# Patient Record
Sex: MALE | Race: WHITE | NOT HISPANIC OR LATINO
[De-identification: names, ages, dates, MRNs, and addresses within clinical notes are randomized per-mention and may not be internally consistent; named-entity substitution may affect disease eponyms.]

---

## 2015-04-08 RX ORDER — CARVEDILOL PHOSPHATE 80 MG/1
1 CAPSULE, EXTENDED RELEASE ORAL
Qty: 0 | Refills: 0 | DISCHARGE
Start: 2015-04-08

## 2015-04-08 RX ORDER — DOCUSATE SODIUM 100 MG
1 CAPSULE ORAL
Qty: 0 | Refills: 0 | DISCHARGE
Start: 2015-04-08

## 2015-04-08 RX ORDER — SPIRONOLACTONE 25 MG/1
0.5 TABLET, FILM COATED ORAL
Qty: 0 | Refills: 0 | DISCHARGE
Start: 2015-04-08

## 2017-01-26 ENCOUNTER — RX RENEWAL (OUTPATIENT)
Age: 67
End: 2017-01-26

## 2017-02-22 ENCOUNTER — APPOINTMENT (OUTPATIENT)
Dept: CARDIOLOGY | Facility: CLINIC | Age: 67
End: 2017-02-22

## 2017-02-22 VITALS
DIASTOLIC BLOOD PRESSURE: 76 MMHG | BODY MASS INDEX: 28.62 KG/M2 | WEIGHT: 211 LBS | RESPIRATION RATE: 16 BRPM | OXYGEN SATURATION: 96 % | SYSTOLIC BLOOD PRESSURE: 117 MMHG | HEART RATE: 85 BPM

## 2017-02-22 LAB
ANION GAP SERPL CALC-SCNC: 17 MMOL/L
BUN SERPL-MCNC: 25 MG/DL
CALCIUM SERPL-MCNC: 9.7 MG/DL
CHLORIDE SERPL-SCNC: 97 MMOL/L
CO2 SERPL-SCNC: 22 MMOL/L
CREAT SERPL-MCNC: 1.2 MG/DL
GLUCOSE SERPL-MCNC: 98 MG/DL
POTASSIUM SERPL-SCNC: 4 MMOL/L
SODIUM SERPL-SCNC: 136 MMOL/L

## 2017-04-03 ENCOUNTER — RX RENEWAL (OUTPATIENT)
Age: 67
End: 2017-04-03

## 2017-04-10 ENCOUNTER — APPOINTMENT (OUTPATIENT)
Dept: ELECTROPHYSIOLOGY | Facility: CLINIC | Age: 67
End: 2017-04-10

## 2017-04-10 ENCOUNTER — NON-APPOINTMENT (OUTPATIENT)
Age: 67
End: 2017-04-10

## 2017-04-10 VITALS — DIASTOLIC BLOOD PRESSURE: 73 MMHG | WEIGHT: 211 LBS | SYSTOLIC BLOOD PRESSURE: 107 MMHG | BODY MASS INDEX: 28.62 KG/M2

## 2017-05-23 ENCOUNTER — APPOINTMENT (OUTPATIENT)
Dept: CV DIAGNOSITCS | Facility: HOSPITAL | Age: 67
End: 2017-05-23

## 2017-06-16 ENCOUNTER — OTHER (OUTPATIENT)
Age: 67
End: 2017-06-16

## 2017-06-29 ENCOUNTER — APPOINTMENT (OUTPATIENT)
Dept: CARDIOLOGY | Facility: CLINIC | Age: 67
End: 2017-06-29

## 2017-06-29 VITALS
HEIGHT: 72 IN | OXYGEN SATURATION: 98 % | BODY MASS INDEX: 28.58 KG/M2 | RESPIRATION RATE: 17 BRPM | DIASTOLIC BLOOD PRESSURE: 70 MMHG | HEART RATE: 87 BPM | WEIGHT: 211 LBS | SYSTOLIC BLOOD PRESSURE: 105 MMHG

## 2017-06-30 ENCOUNTER — OTHER (OUTPATIENT)
Age: 67
End: 2017-06-30

## 2017-06-30 LAB
ANION GAP SERPL CALC-SCNC: 18 MMOL/L
BASOPHILS # BLD AUTO: 0.05 K/UL
BASOPHILS NFR BLD AUTO: 0.6 %
BUN SERPL-MCNC: 18 MG/DL
CALCIUM SERPL-MCNC: 9.8 MG/DL
CHLORIDE SERPL-SCNC: 101 MMOL/L
CO2 SERPL-SCNC: 19 MMOL/L
CREAT SERPL-MCNC: 0.96 MG/DL
EOSINOPHIL # BLD AUTO: 0.21 K/UL
EOSINOPHIL NFR BLD AUTO: 2.4 %
GLUCOSE SERPL-MCNC: 136 MG/DL
HCT VFR BLD CALC: 37.2 %
HGB BLD-MCNC: 12 G/DL
IMM GRANULOCYTES NFR BLD AUTO: 0.2 %
LYMPHOCYTES # BLD AUTO: 1.3 K/UL
LYMPHOCYTES NFR BLD AUTO: 14.6 %
MAN DIFF?: NORMAL
MCHC RBC-ENTMCNC: 29.4 PG
MCHC RBC-ENTMCNC: 32.3 GM/DL
MCV RBC AUTO: 91.2 FL
MONOCYTES # BLD AUTO: 0.92 K/UL
MONOCYTES NFR BLD AUTO: 10.4 %
NEUTROPHILS # BLD AUTO: 6.38 K/UL
NEUTROPHILS NFR BLD AUTO: 71.8 %
PLATELET # BLD AUTO: 264 K/UL
POTASSIUM SERPL-SCNC: 3.9 MMOL/L
RBC # BLD: 4.08 M/UL
RBC # FLD: 14.1 %
SODIUM SERPL-SCNC: 138 MMOL/L
WBC # FLD AUTO: 8.88 K/UL

## 2017-07-04 ENCOUNTER — RX RENEWAL (OUTPATIENT)
Age: 67
End: 2017-07-04

## 2017-07-10 ENCOUNTER — APPOINTMENT (OUTPATIENT)
Dept: ELECTROPHYSIOLOGY | Facility: CLINIC | Age: 67
End: 2017-07-10
Payer: MEDICARE

## 2017-07-10 ENCOUNTER — APPOINTMENT (OUTPATIENT)
Dept: CV DIAGNOSITCS | Facility: HOSPITAL | Age: 67
End: 2017-07-10

## 2017-07-10 ENCOUNTER — INPATIENT (INPATIENT)
Facility: HOSPITAL | Age: 67
LOS: 0 days | Discharge: HOME | DRG: 274 | End: 2017-07-11
Attending: INTERNAL MEDICINE | Admitting: INTERNAL MEDICINE
Payer: MEDICARE

## 2017-07-10 VITALS
OXYGEN SATURATION: 99 % | HEART RATE: 80 BPM | HEIGHT: 73 IN | SYSTOLIC BLOOD PRESSURE: 98 MMHG | TEMPERATURE: 98 F | RESPIRATION RATE: 18 BRPM | DIASTOLIC BLOOD PRESSURE: 73 MMHG | WEIGHT: 210.1 LBS

## 2017-07-10 DIAGNOSIS — I49.9 CARDIAC ARRHYTHMIA, UNSPECIFIED: ICD-10-CM

## 2017-07-10 DIAGNOSIS — Z98.89 OTHER SPECIFIED POSTPROCEDURAL STATES: Chronic | ICD-10-CM

## 2017-07-10 DIAGNOSIS — Z95.5 PRESENCE OF CORONARY ANGIOPLASTY IMPLANT AND GRAFT: Chronic | ICD-10-CM

## 2017-07-10 DIAGNOSIS — Z95.4 PRESENCE OF OTHER HEART-VALVE REPLACEMENT: Chronic | ICD-10-CM

## 2017-07-10 LAB
ALBUMIN SERPL ELPH-MCNC: 4.5 G/DL — SIGNIFICANT CHANGE UP (ref 3.3–5)
ALP SERPL-CCNC: 121 U/L — HIGH (ref 40–120)
ALT FLD-CCNC: 24 U/L RC — SIGNIFICANT CHANGE UP (ref 10–45)
ANION GAP SERPL CALC-SCNC: 15 MMOL/L — SIGNIFICANT CHANGE UP (ref 5–17)
APTT BLD: 33.2 SEC — SIGNIFICANT CHANGE UP (ref 27.5–37.4)
AST SERPL-CCNC: 22 U/L — SIGNIFICANT CHANGE UP (ref 10–40)
BILIRUB SERPL-MCNC: 1.1 MG/DL — SIGNIFICANT CHANGE UP (ref 0.2–1.2)
BLD GP AB SCN SERPL QL: NEGATIVE — SIGNIFICANT CHANGE UP
BUN SERPL-MCNC: 18 MG/DL — SIGNIFICANT CHANGE UP (ref 7–23)
CALCIUM SERPL-MCNC: 9.3 MG/DL — SIGNIFICANT CHANGE UP (ref 8.4–10.5)
CHLORIDE SERPL-SCNC: 101 MMOL/L — SIGNIFICANT CHANGE UP (ref 96–108)
CO2 SERPL-SCNC: 22 MMOL/L — SIGNIFICANT CHANGE UP (ref 22–31)
CREAT SERPL-MCNC: 0.95 MG/DL — SIGNIFICANT CHANGE UP (ref 0.5–1.3)
GLUCOSE SERPL-MCNC: 123 MG/DL — HIGH (ref 70–99)
HCT VFR BLD CALC: 39.7 % — SIGNIFICANT CHANGE UP (ref 39–50)
HGB BLD-MCNC: 13.3 G/DL — SIGNIFICANT CHANGE UP (ref 13–17)
INR BLD: 1.65 RATIO — HIGH (ref 0.88–1.16)
MCHC RBC-ENTMCNC: 31.1 PG — SIGNIFICANT CHANGE UP (ref 27–34)
MCHC RBC-ENTMCNC: 33.5 GM/DL — SIGNIFICANT CHANGE UP (ref 32–36)
MCV RBC AUTO: 92.8 FL — SIGNIFICANT CHANGE UP (ref 80–100)
PLATELET # BLD AUTO: 224 K/UL — SIGNIFICANT CHANGE UP (ref 150–400)
POTASSIUM SERPL-MCNC: 4 MMOL/L — SIGNIFICANT CHANGE UP (ref 3.5–5.3)
POTASSIUM SERPL-SCNC: 4 MMOL/L — SIGNIFICANT CHANGE UP (ref 3.5–5.3)
PROT SERPL-MCNC: 7.6 G/DL — SIGNIFICANT CHANGE UP (ref 6–8.3)
PROTHROM AB SERPL-ACNC: 18.2 SEC — HIGH (ref 9.8–12.7)
RBC # BLD: 4.28 M/UL — SIGNIFICANT CHANGE UP (ref 4.2–5.8)
RBC # FLD: 13.3 % — SIGNIFICANT CHANGE UP (ref 10.3–14.5)
RH IG SCN BLD-IMP: POSITIVE — SIGNIFICANT CHANGE UP
SODIUM SERPL-SCNC: 138 MMOL/L — SIGNIFICANT CHANGE UP (ref 135–145)
WBC # BLD: 7.2 K/UL — SIGNIFICANT CHANGE UP (ref 3.8–10.5)
WBC # FLD AUTO: 7.2 K/UL — SIGNIFICANT CHANGE UP (ref 3.8–10.5)

## 2017-07-10 PROCEDURE — XXXXX: CPT

## 2017-07-10 PROCEDURE — 93312 ECHO TRANSESOPHAGEAL: CPT | Mod: 26

## 2017-07-10 PROCEDURE — 93306 TTE W/DOPPLER COMPLETE: CPT | Mod: 26

## 2017-07-10 PROCEDURE — 93653 COMPRE EP EVAL TX SVT: CPT

## 2017-07-10 PROCEDURE — 93010 ELECTROCARDIOGRAM REPORT: CPT

## 2017-07-10 PROCEDURE — 93655 ICAR CATH ABLTJ DSCRT ARRHYT: CPT

## 2017-07-10 PROCEDURE — 93613 INTRACARDIAC EPHYS 3D MAPG: CPT

## 2017-07-10 RX ORDER — DOCUSATE SODIUM 100 MG
100 CAPSULE ORAL
Qty: 0 | Refills: 0 | Status: DISCONTINUED | OUTPATIENT
Start: 2017-07-10 | End: 2017-07-11

## 2017-07-10 RX ORDER — FUROSEMIDE 40 MG
20 TABLET ORAL EVERY OTHER DAY
Qty: 0 | Refills: 0 | Status: DISCONTINUED | OUTPATIENT
Start: 2017-07-10 | End: 2017-07-10

## 2017-07-10 RX ORDER — HEPARIN SODIUM 5000 [USP'U]/ML
INJECTION INTRAVENOUS; SUBCUTANEOUS
Qty: 25000 | Refills: 0 | Status: DISCONTINUED | OUTPATIENT
Start: 2017-07-10 | End: 2017-07-10

## 2017-07-10 RX ORDER — HEPARIN SODIUM 5000 [USP'U]/ML
INJECTION INTRAVENOUS; SUBCUTANEOUS
Qty: 25000 | Refills: 0 | Status: DISCONTINUED | OUTPATIENT
Start: 2017-07-10 | End: 2017-07-11

## 2017-07-10 RX ORDER — FUROSEMIDE 40 MG
40 TABLET ORAL EVERY OTHER DAY
Qty: 0 | Refills: 0 | Status: DISCONTINUED | OUTPATIENT
Start: 2017-07-12 | End: 2017-07-11

## 2017-07-10 RX ORDER — HEPARIN SODIUM 5000 [USP'U]/ML
3500 INJECTION INTRAVENOUS; SUBCUTANEOUS EVERY 6 HOURS
Qty: 0 | Refills: 0 | Status: DISCONTINUED | OUTPATIENT
Start: 2017-07-10 | End: 2017-07-11

## 2017-07-10 RX ORDER — ATORVASTATIN CALCIUM 80 MG/1
40 TABLET, FILM COATED ORAL AT BEDTIME
Qty: 0 | Refills: 0 | Status: DISCONTINUED | OUTPATIENT
Start: 2017-07-10 | End: 2017-07-11

## 2017-07-10 RX ORDER — FUROSEMIDE 40 MG
20 TABLET ORAL EVERY OTHER DAY
Qty: 0 | Refills: 0 | Status: DISCONTINUED | OUTPATIENT
Start: 2017-07-11 | End: 2017-07-11

## 2017-07-10 RX ORDER — ASPIRIN/CALCIUM CARB/MAGNESIUM 324 MG
81 TABLET ORAL DAILY
Qty: 0 | Refills: 0 | Status: DISCONTINUED | OUTPATIENT
Start: 2017-07-11 | End: 2017-07-11

## 2017-07-10 RX ORDER — CARVEDILOL PHOSPHATE 80 MG/1
25 CAPSULE, EXTENDED RELEASE ORAL EVERY 12 HOURS
Qty: 0 | Refills: 0 | Status: DISCONTINUED | OUTPATIENT
Start: 2017-07-10 | End: 2017-07-11

## 2017-07-10 RX ORDER — SPIRONOLACTONE 25 MG/1
25 TABLET, FILM COATED ORAL DAILY
Qty: 0 | Refills: 0 | Status: DISCONTINUED | OUTPATIENT
Start: 2017-07-10 | End: 2017-07-11

## 2017-07-10 RX ORDER — APIXABAN 2.5 MG/1
5 TABLET, FILM COATED ORAL EVERY 12 HOURS
Qty: 0 | Refills: 0 | Status: DISCONTINUED | OUTPATIENT
Start: 2017-07-11 | End: 2017-07-11

## 2017-07-10 RX ORDER — HEPARIN SODIUM 5000 [USP'U]/ML
7500 INJECTION INTRAVENOUS; SUBCUTANEOUS EVERY 6 HOURS
Qty: 0 | Refills: 0 | Status: DISCONTINUED | OUTPATIENT
Start: 2017-07-10 | End: 2017-07-11

## 2017-07-10 RX ORDER — PANTOPRAZOLE SODIUM 20 MG/1
40 TABLET, DELAYED RELEASE ORAL
Qty: 0 | Refills: 0 | Status: DISCONTINUED | OUTPATIENT
Start: 2017-07-10 | End: 2017-07-11

## 2017-07-10 RX ADMIN — CARVEDILOL PHOSPHATE 25 MILLIGRAM(S): 80 CAPSULE, EXTENDED RELEASE ORAL at 22:31

## 2017-07-10 RX ADMIN — ATORVASTATIN CALCIUM 40 MILLIGRAM(S): 80 TABLET, FILM COATED ORAL at 22:30

## 2017-07-10 RX ADMIN — Medication 5 MILLIGRAM(S): at 22:31

## 2017-07-10 RX ADMIN — Medication 100 MILLIGRAM(S): at 22:31

## 2017-07-10 RX ADMIN — SPIRONOLACTONE 25 MILLIGRAM(S): 25 TABLET, FILM COATED ORAL at 22:31

## 2017-07-10 RX ADMIN — HEPARIN SODIUM 1700 UNIT(S)/HR: 5000 INJECTION INTRAVENOUS; SUBCUTANEOUS at 23:40

## 2017-07-10 NOTE — PATIENT PROFILE ADULT. - PMH
AICD (automatic cardioverter/defibrillator) present    CAD (coronary artery disease)  2004  CHF (congestive heart failure)    HLD (hyperlipidemia)    HTN (hypertension)    MI (myocardial infarction)  Nov 2004  Syncope  epidsode in 11/14, was found to have been an episode of V-Fib with sucsessfull AICD shock  Systolic heart failure    URI (upper respiratory infection)  x 2 in the past 6 weeks  Valvular cardiomyopathy

## 2017-07-10 NOTE — H&P CARDIOLOGY - FAMILY HISTORY
<<-----Click on this checkbox to enter Family History Chronic obstructive pulmonary disease     Father  Still living? No  Family history of colon cancer, Age at diagnosis: Age Unknown

## 2017-07-10 NOTE — H&P CARDIOLOGY - HISTORY OF PRESENT ILLNESS
63 yo white male with h/o AVR 3 weeks ago.  Patient had f/u visit with surgeon and noted to be in afib.  Patient immediately started on eliquis and sent to see Dr Valentino for ANDREY and cardioversion.    Cardiologist Dr Blackwell  Cardiac Surgeon Dr Philippe Valentino 66 y/o male former smoker (60 PYH) with pmh of HTN (controlled on meds), HLD, CAD s/p MI (2004), PCI/LAD stent 2004, HFreF 2/2 ICM AICD 2005, Gen change 2011, LVEF 30% (as per pt), severe AS s/p AVR 2014 after 12 sec of Asystole/Syncope with 1 shock, Afib post procedure s/p DCCV 2014 with return 3 weeks later (on Eliquis last dose 7/9 AM) seen and evaluated by Dr. Snowden, EP for reports of shortness of breath from his AF noted to have atrial flutter and presents for Aflutter ablation with ANDREY prior.  Pt denies symptoms presently.      HF/Cardiologist - Dr. De Leon (taking over from Dr. Ricci)

## 2017-07-11 ENCOUNTER — TRANSCRIPTION ENCOUNTER (OUTPATIENT)
Age: 67
End: 2017-07-11

## 2017-07-11 VITALS
RESPIRATION RATE: 18 BRPM | TEMPERATURE: 98 F | OXYGEN SATURATION: 98 % | HEART RATE: 69 BPM | SYSTOLIC BLOOD PRESSURE: 117 MMHG | DIASTOLIC BLOOD PRESSURE: 88 MMHG

## 2017-07-11 DIAGNOSIS — I50.22 CHRONIC SYSTOLIC (CONGESTIVE) HEART FAILURE: ICD-10-CM

## 2017-07-11 DIAGNOSIS — I10 ESSENTIAL (PRIMARY) HYPERTENSION: ICD-10-CM

## 2017-07-11 DIAGNOSIS — E78.4 OTHER HYPERLIPIDEMIA: ICD-10-CM

## 2017-07-11 DIAGNOSIS — I48.3 TYPICAL ATRIAL FLUTTER: ICD-10-CM

## 2017-07-11 LAB
ANION GAP SERPL CALC-SCNC: 13 MMOL/L — SIGNIFICANT CHANGE UP (ref 5–17)
BUN SERPL-MCNC: 18 MG/DL — SIGNIFICANT CHANGE UP (ref 7–23)
CALCIUM SERPL-MCNC: 9 MG/DL — SIGNIFICANT CHANGE UP (ref 8.4–10.5)
CHLORIDE SERPL-SCNC: 103 MMOL/L — SIGNIFICANT CHANGE UP (ref 96–108)
CO2 SERPL-SCNC: 21 MMOL/L — LOW (ref 22–31)
CREAT SERPL-MCNC: 0.88 MG/DL — SIGNIFICANT CHANGE UP (ref 0.5–1.3)
GLUCOSE SERPL-MCNC: 113 MG/DL — HIGH (ref 70–99)
HCT VFR BLD CALC: 35.1 % — LOW (ref 39–50)
HGB BLD-MCNC: 11.6 G/DL — LOW (ref 13–17)
MCHC RBC-ENTMCNC: 31 PG — SIGNIFICANT CHANGE UP (ref 27–34)
MCHC RBC-ENTMCNC: 33.1 GM/DL — SIGNIFICANT CHANGE UP (ref 32–36)
MCV RBC AUTO: 93.5 FL — SIGNIFICANT CHANGE UP (ref 80–100)
PLATELET # BLD AUTO: 187 K/UL — SIGNIFICANT CHANGE UP (ref 150–400)
POTASSIUM SERPL-MCNC: 3.8 MMOL/L — SIGNIFICANT CHANGE UP (ref 3.5–5.3)
POTASSIUM SERPL-SCNC: 3.8 MMOL/L — SIGNIFICANT CHANGE UP (ref 3.5–5.3)
RBC # BLD: 3.76 M/UL — LOW (ref 4.2–5.8)
RBC # FLD: 13.3 % — SIGNIFICANT CHANGE UP (ref 10.3–14.5)
SODIUM SERPL-SCNC: 137 MMOL/L — SIGNIFICANT CHANGE UP (ref 135–145)
WBC # BLD: 6.5 K/UL — SIGNIFICANT CHANGE UP (ref 3.8–10.5)
WBC # FLD AUTO: 6.5 K/UL — SIGNIFICANT CHANGE UP (ref 3.8–10.5)

## 2017-07-11 PROCEDURE — C1730: CPT

## 2017-07-11 PROCEDURE — 85730 THROMBOPLASTIN TIME PARTIAL: CPT

## 2017-07-11 PROCEDURE — 93306 TTE W/DOPPLER COMPLETE: CPT

## 2017-07-11 PROCEDURE — 93312 ECHO TRANSESOPHAGEAL: CPT

## 2017-07-11 PROCEDURE — 86900 BLOOD TYPING SEROLOGIC ABO: CPT

## 2017-07-11 PROCEDURE — 93613 INTRACARDIAC EPHYS 3D MAPG: CPT

## 2017-07-11 PROCEDURE — 80048 BASIC METABOLIC PNL TOTAL CA: CPT

## 2017-07-11 PROCEDURE — 93653 COMPRE EP EVAL TX SVT: CPT

## 2017-07-11 PROCEDURE — 93010 ELECTROCARDIOGRAM REPORT: CPT

## 2017-07-11 PROCEDURE — C1766: CPT

## 2017-07-11 PROCEDURE — 86850 RBC ANTIBODY SCREEN: CPT

## 2017-07-11 PROCEDURE — 85610 PROTHROMBIN TIME: CPT

## 2017-07-11 PROCEDURE — 85027 COMPLETE CBC AUTOMATED: CPT

## 2017-07-11 PROCEDURE — C1894: CPT

## 2017-07-11 PROCEDURE — 93621 COMP EP EVL L PAC&REC C SINS: CPT

## 2017-07-11 PROCEDURE — C1732: CPT

## 2017-07-11 PROCEDURE — 80053 COMPREHEN METABOLIC PANEL: CPT

## 2017-07-11 PROCEDURE — 86901 BLOOD TYPING SEROLOGIC RH(D): CPT

## 2017-07-11 PROCEDURE — 93005 ELECTROCARDIOGRAM TRACING: CPT

## 2017-07-11 PROCEDURE — 93655 ICAR CATH ABLTJ DSCRT ARRHYT: CPT

## 2017-07-11 PROCEDURE — 99232 SBSQ HOSP IP/OBS MODERATE 35: CPT

## 2017-07-11 RX ADMIN — PANTOPRAZOLE SODIUM 40 MILLIGRAM(S): 20 TABLET, DELAYED RELEASE ORAL at 06:09

## 2017-07-11 RX ADMIN — Medication 100 MILLIGRAM(S): at 06:09

## 2017-07-11 RX ADMIN — Medication 20 MILLIGRAM(S): at 06:09

## 2017-07-11 RX ADMIN — CARVEDILOL PHOSPHATE 25 MILLIGRAM(S): 80 CAPSULE, EXTENDED RELEASE ORAL at 06:09

## 2017-07-11 RX ADMIN — Medication 81 MILLIGRAM(S): at 06:10

## 2017-07-11 RX ADMIN — Medication 5 MILLIGRAM(S): at 06:09

## 2017-07-11 RX ADMIN — APIXABAN 5 MILLIGRAM(S): 2.5 TABLET, FILM COATED ORAL at 08:44

## 2017-07-11 NOTE — DISCHARGE NOTE ADULT - CARE PROVIDER_API CALL
Carolina Snowden (MD), Cardiac Electrophysiology; Cardiovascular Disease  [address] [place] [address]  Phone: [phone]  Fax: [phone]    Jacob De Leon (MD; MPH), Adv Heart Fail Trnsplnt Cardio; Cardiology; Internal Medicine  [address] [place] [id number]  Phone: [phone]  Fax: [phone]

## 2017-07-11 NOTE — DISCHARGE NOTE ADULT - CARE PROVIDERS DIRECT ADDRESSES
[email or web address]

## 2017-07-11 NOTE — DISCHARGE NOTE ADULT - CARE PLAN
Principal Discharge DX:	Typical atrial flutter  Goal:	Your heart rate and rhythm will be controlled.  Instructions for follow-up, activity and diet:	Continue with your cardiologist and primary care MD. Continue your current medications. Call your physician for palpitations, feelings of rapid heart beat, lightheadedness, or dizziness.  Secondary Diagnosis:	Chronic systolic congestive heart failure  Goal:	You will not be short of breath.  Instructions for follow-up, activity and diet:	Take your medications as prescribed. Follow a low-salt, low salt, low cholesterol heart healthy diet. Weigh yourself every day and keep a record; call your doctor if you gain 2 pounds over one to two days or 5 pounds over three days. Get to or maintain a healthy weight; ask your heart failure team for referrals to a registered dietitian if needed. Avoid alcohol. Be active (check with your physician or cardiologist first). Find healthy ways to deal with stress, such as deep breathing, meditation, exercise, and doing hobbies that you enjoy. If you smoke, quit. (A resource to help you stop smoking is the [facility] [employer] Control – phone number [phone].).  Secondary Diagnosis:	CAD (coronary artery disease)  Goal:	Pt remains chest pain free  Instructions for follow-up, activity and diet:	Low salt, low fat diet.   Weight management.   Take medications as prescribed.    No smoking.  Follow up appointments with your doctor(s)  as instructed.  Secondary Diagnosis:	Essential hypertension  Goal:	Your blood pressure will be controlled.  Instructions for follow-up, activity and diet:	Continue with your blood pressure medications; eat a heart healthy diet with low salt diet; exercise regularly (consult with your physician or cardiologist first); maintain a heart healthy weight; if you smoke - quit (A resource to help you stop smoking is the [facility] [employer] Control – phone number 918-265-0020.); include healthy ways to manage stress. Continue to follow with your primary care physician or cardiologist.  Instructions for follow-up, activity and diet:	No heavy lifting, strenuous activity, and driving for 2 days. You may shower 24 hours following procedure but avoid baths and swimming for 1 week. Check groin site for bleeding and/or swelling daily following procedure. Call your doctor/cardiologist immediately for bleeding or swelling or if you have increased/persistent pain or drainage at the site. Principal Discharge DX:	Typical atrial flutter  Goal:	Your heart rate and rhythm will be controlled.  Instructions for follow-up, activity and diet:	Continue with your cardiologist and primary care MD. Continue your current medications. Call your physician for palpitations, feelings of rapid heart beat, lightheadedness, or dizziness.  Secondary Diagnosis:	Chronic systolic congestive heart failure  Goal:	You will not be short of breath.  Instructions for follow-up, activity and diet:	Take your medications as prescribed. Follow a low-salt, low salt, low cholesterol heart healthy diet. Weigh yourself every day and keep a record; call your doctor if you gain 2 pounds over one to two days or 5 pounds over three days. Get to or maintain a healthy weight; ask your heart failure team for referrals to a registered dietitian if needed. Avoid alcohol. Be active (check with your physician or cardiologist first). Find healthy ways to deal with stress, such as deep breathing, meditation, exercise, and doing hobbies that you enjoy. If you smoke, quit. (A resource to help you stop smoking is the [facility] [employer] Control – phone number [phone].).  Secondary Diagnosis:	CAD (coronary artery disease)  Goal:	Pt remains chest pain free  Instructions for follow-up, activity and diet:	Low salt, low fat diet.   Weight management.   Take medications as prescribed.    No smoking.  Follow up appointments with your doctor(s)  as instructed.  Secondary Diagnosis:	Essential hypertension  Goal:	Your blood pressure will be controlled.  Instructions for follow-up, activity and diet:	Continue with your blood pressure medications; eat a heart healthy diet with low salt diet; exercise regularly (consult with your physician or cardiologist first); maintain a heart healthy weight; if you smoke - quit (A resource to help you stop smoking is the [facility] [employer] Control – phone number [phone].); include healthy ways to manage stress. Continue to follow with your primary care physician or cardiologist.  Instructions for follow-up, activity and diet:	No heavy lifting, strenuous activity, and driving for 2 days. You may shower 24 hours following procedure but avoid baths and swimming for 1 week. Check groin site for bleeding and/or swelling daily following procedure. Call your doctor/cardiologist immediately for bleeding or swelling or if you have increased/persistent pain or drainage at the site.

## 2017-07-11 NOTE — DISCHARGE NOTE ADULT - PATIENT PORTAL LINK FT
“You can access the Follow[facility] Patient Portal, offered by [facility], by registering with the following website: [email or web address]”

## 2017-07-11 NOTE — PROGRESS NOTE ADULT - PROBLEM SELECTOR PLAN 1
s/p Typical Atrial Flutter Ablation on 7/10/17  Activity restrictions as d/w patient and family  Resume Eliquis 5mg bid  Monitor groin site for bleeding or hematoma  Follow up with Dr Carolina Snowden on 8/4/17 at 8:15am

## 2017-07-11 NOTE — PROGRESS NOTE ADULT - SUBJECTIVE AND OBJECTIVE BOX
INTERVAL HPI/OVERNIGHT EVENTS:  Sitting up in chair, anxious to go home    MEDICATIONS  (STANDING):  spironolactone 25 milliGRAM(s) Oral daily  aspirin enteric coated 81 milliGRAM(s) Oral daily  enalapril 5 milliGRAM(s) Oral every 12 hours  atorvastatin 40 milliGRAM(s) Oral at bedtime  carvedilol 25 milliGRAM(s) Oral every 12 hours  docusate sodium 100 milliGRAM(s) Oral two times a day  pantoprazole    Tablet 40 milliGRAM(s) Oral before breakfast  apixaban 5 milliGRAM(s) Oral every 12 hours  furosemide    Tablet 20 milliGRAM(s) Oral every other day    MEDICATIONS  (PRN):      Allergies    No Known Allergies    ROS:  General: Pt denies recent fever/chills  Neurological: denies dizziness  HEENT: denies visual changes, denies sore throat  Cardiovascular: denies chest pain/palpitations  Respiratory and Thorax: denies SOB  Gastrointestinal: denies abdominal pain/nausea/vomiting  Genitourinary: denies dysuria  Musculoskeletal: denies joint pain or swelling, denies restricted motion  Skin: denies rashes/sores    Vital Signs Last 24 Hrs  T(C): 37 (11 Jul 2017 04:43), Max: 37 (11 Jul 2017 04:43)  T(F): 98.6 (11 Jul 2017 04:43), Max: 98.6 (11 Jul 2017 04:43)  HR: 78 (11 Jul 2017 06:00) (61 - 84)  BP: 107/66 (11 Jul 2017 06:00) (93/60 - 142/97)  BP(mean): --  RR: 17 (11 Jul 2017 06:00) (17 - 18)  SpO2: 98% (11 Jul 2017 06:00) (96% - 99%)    Physical Exam:  Vital Signs : /66   HR78   RR17  Constitutional: well developed, well nourished,  no acute distress  Neurological: Alert & Oriented x 3, TERRAZAS  HEENT: NC/AT, PERRLA, Neck supple.  Respiratory: CTA B/L, No wheezing/crackles/rhonchi  Cardiovascular: (+) S1 & S2, RRR,   Gastrointestinal: soft, NT, nondistended, (+) BS  Genitourinary: non distended bladder, voiding freely  Extremities: No pedal edema, No clubbing, No cyanosis  Skin:  normal skin color and pigmentation, no skin lesions. Right groin intact, no hematoma or ecchymosis      LABS:                        11.6   6.5   )-----------( 187      ( 11 Jul 2017 05:13 )             35.1     07-11    137  |  103  |  18  ----------------------------<  113<H>  3.8   |  21<L>  |  0.88    Ca    9.0      11 Jul 2017 05:13    TPro  7.6  /  Alb  4.5  /  TBili  1.1  /  DBili  x   /  AST  22  /  ALT  24  /  AlkPhos  121<H>  07-10    PT/INR - ( 10 Jul 2017 12:46 )   PT: 18.2 sec;   INR: 1.65 ratio         PTT - ( 10 Jul 2017 12:46 )  PTT:33.2 sec      RADIOLOGY & ADDITIONAL TESTS:    TELE: NSR 60-80's    EKG: NSR @ 60 bpm

## 2017-07-11 NOTE — PROGRESS NOTE ADULT - ASSESSMENT
66 y/o male former smoker (60 PYH) with pmh of HTN (controlled on meds), HLD, CAD s/p MI (2004), PCI/LAD stent 2004, HFreF 2/2 ICM AICD 2005, Gen change 2011, LVEF 30% (as per pt), severe AS s/p AVR 2014 after 12 sec of Asystole/Syncope with 1 shock, Afib post procedure s/p DCCV 2014 with return 3 weeks later (on Eliquis last dose 7/9 AM). Patient reports of shortness of breath from his AF and noted to have atrial flutter and now s/p Typical Atrial Flutter Ablation on 7/10/17. Patient tolerated procedure well and remains in Sinus Rhythm.

## 2017-07-11 NOTE — PROGRESS NOTE ADULT - PROBLEM SELECTOR PLAN 2
Continue current medications including Coreg, Spironolactone, Enalapril, Lasix as prescribed.  Monitor daily weight or lower extremity swelling, f/u with cardiologist  Low salt, low cholesterol diet

## 2017-07-11 NOTE — DISCHARGE NOTE ADULT - PLAN OF CARE
Your heart rate and rhythm will be controlled. Continue with your cardiologist and primary care MD. Continue your current medications. Call your physician for palpitations, feelings of rapid heart beat, lightheadedness, or dizziness. You will not be short of breath. Take your medications as prescribed. Follow a low-salt, low salt, low cholesterol heart healthy diet. Weigh yourself every day and keep a record; call your doctor if you gain 2 pounds over one to two days or 5 pounds over three days. Get to or maintain a healthy weight; ask your heart failure team for referrals to a registered dietitian if needed. Avoid alcohol. Be active (check with your physician or cardiologist first). Find healthy ways to deal with stress, such as deep breathing, meditation, exercise, and doing hobbies that you enjoy. If you smoke, quit. (A resource to help you stop smoking is the [facility] – phone number [phone].). Pt remains chest pain free Low salt, low fat diet.   Weight management.   Take medications as prescribed.    No smoking.  Follow up appointments with your doctor(s)  as instructed. Your blood pressure will be controlled. Continue with your blood pressure medications; eat a heart healthy diet with low salt diet; exercise regularly (consult with your physician or cardiologist first); maintain a heart healthy weight; if you smoke - quit (A resource to help you stop smoking is the [facility] – phone number [phone].); include healthy ways to manage stress. Continue to follow with your primary care physician or cardiologist. No heavy lifting, strenuous activity, and driving for 2 days. You may shower 24 hours following procedure but avoid baths and swimming for 1 week. Check groin site for bleeding and/or swelling daily following procedure. Call your doctor/cardiologist immediately for bleeding or swelling or if you have increased/persistent pain or drainage at the site.

## 2017-07-11 NOTE — DISCHARGE NOTE ADULT - ADDITIONAL INSTRUCTIONS
Follow up with your cardiologist Dr. MONICA Snowden on 8/4/17 @ 8:15am and primary care provider in 1-2 weeks.

## 2017-08-04 ENCOUNTER — NON-APPOINTMENT (OUTPATIENT)
Age: 67
End: 2017-08-04

## 2017-08-04 ENCOUNTER — APPOINTMENT (OUTPATIENT)
Dept: ELECTROPHYSIOLOGY | Facility: CLINIC | Age: 67
End: 2017-08-04

## 2017-08-04 ENCOUNTER — APPOINTMENT (OUTPATIENT)
Dept: ELECTROPHYSIOLOGY | Facility: CLINIC | Age: 67
End: 2017-08-04
Payer: MEDICARE

## 2017-08-04 VITALS — DIASTOLIC BLOOD PRESSURE: 71 MMHG | HEART RATE: 58 BPM | SYSTOLIC BLOOD PRESSURE: 106 MMHG | OXYGEN SATURATION: 97 %

## 2017-08-04 PROCEDURE — 93000 ELECTROCARDIOGRAM COMPLETE: CPT

## 2017-08-04 PROCEDURE — 99215 OFFICE O/P EST HI 40 MIN: CPT

## 2017-08-09 ENCOUNTER — RX RENEWAL (OUTPATIENT)
Age: 67
End: 2017-08-09

## 2017-09-05 ENCOUNTER — RX RENEWAL (OUTPATIENT)
Age: 67
End: 2017-09-05

## 2017-09-28 ENCOUNTER — OTHER (OUTPATIENT)
Age: 67
End: 2017-09-28

## 2017-11-08 ENCOUNTER — APPOINTMENT (OUTPATIENT)
Dept: CARDIOLOGY | Facility: CLINIC | Age: 67
End: 2017-11-08
Payer: MEDICARE

## 2017-11-08 VITALS
HEART RATE: 66 BPM | HEIGHT: 72 IN | BODY MASS INDEX: 29.39 KG/M2 | DIASTOLIC BLOOD PRESSURE: 72 MMHG | SYSTOLIC BLOOD PRESSURE: 100 MMHG | WEIGHT: 217 LBS | RESPIRATION RATE: 17 BRPM | OXYGEN SATURATION: 97 %

## 2017-11-08 PROCEDURE — 93000 ELECTROCARDIOGRAM COMPLETE: CPT

## 2017-11-08 PROCEDURE — 36415 COLL VENOUS BLD VENIPUNCTURE: CPT

## 2017-11-08 PROCEDURE — 99214 OFFICE O/P EST MOD 30 MIN: CPT

## 2017-11-08 RX ORDER — AMOXICILLIN 500 MG/1
500 CAPSULE ORAL
Qty: 20 | Refills: 0 | Status: DISCONTINUED | COMMUNITY
Start: 2017-07-31 | End: 2017-11-08

## 2017-11-10 LAB
ALBUMIN SERPL ELPH-MCNC: 4.5 G/DL
ALP BLD-CCNC: 99 U/L
ALT SERPL-CCNC: 21 U/L
ANION GAP SERPL CALC-SCNC: 13 MMOL/L
AST SERPL-CCNC: 21 U/L
BILIRUB SERPL-MCNC: 0.9 MG/DL
BUN SERPL-MCNC: 17 MG/DL
CALCIUM SERPL-MCNC: 10 MG/DL
CHLORIDE SERPL-SCNC: 98 MMOL/L
CHOLEST SERPL-MCNC: 168 MG/DL
CHOLEST/HDLC SERPL: 2.4 RATIO
CO2 SERPL-SCNC: 27 MMOL/L
CREAT SERPL-MCNC: 1.31 MG/DL
GLUCOSE SERPL-MCNC: 90 MG/DL
HDLC SERPL-MCNC: 70 MG/DL
LDLC SERPL CALC-MCNC: 73 MG/DL
NT-PROBNP SERPL-MCNC: 899 PG/ML
POTASSIUM SERPL-SCNC: 4.4 MMOL/L
PROT SERPL-MCNC: 7.8 G/DL
SODIUM SERPL-SCNC: 138 MMOL/L
TRIGL SERPL-MCNC: 123 MG/DL

## 2017-11-15 ENCOUNTER — MEDICATION RENEWAL (OUTPATIENT)
Age: 67
End: 2017-11-15

## 2018-01-17 ENCOUNTER — OTHER (OUTPATIENT)
Age: 68
End: 2018-01-17

## 2018-01-17 ENCOUNTER — RX RENEWAL (OUTPATIENT)
Age: 68
End: 2018-01-17

## 2018-01-19 ENCOUNTER — MEDICATION RENEWAL (OUTPATIENT)
Age: 68
End: 2018-01-19

## 2018-02-21 ENCOUNTER — APPOINTMENT (OUTPATIENT)
Dept: CARDIOLOGY | Facility: CLINIC | Age: 68
End: 2018-02-21
Payer: MEDICARE

## 2018-02-21 VITALS
OXYGEN SATURATION: 97 % | BODY MASS INDEX: 29.8 KG/M2 | SYSTOLIC BLOOD PRESSURE: 120 MMHG | HEART RATE: 69 BPM | WEIGHT: 220 LBS | DIASTOLIC BLOOD PRESSURE: 69 MMHG | HEIGHT: 72 IN

## 2018-02-21 PROCEDURE — 36415 COLL VENOUS BLD VENIPUNCTURE: CPT

## 2018-02-21 PROCEDURE — 99215 OFFICE O/P EST HI 40 MIN: CPT

## 2018-02-23 LAB
ALBUMIN SERPL ELPH-MCNC: 4.4 G/DL
ALP BLD-CCNC: 95 U/L
ALT SERPL-CCNC: 20 U/L
ANION GAP SERPL CALC-SCNC: 15 MMOL/L
AST SERPL-CCNC: 22 U/L
BILIRUB SERPL-MCNC: 0.8 MG/DL
BUN SERPL-MCNC: 23 MG/DL
CALCIUM SERPL-MCNC: 9.8 MG/DL
CHLORIDE SERPL-SCNC: 99 MMOL/L
CO2 SERPL-SCNC: 24 MMOL/L
CREAT SERPL-MCNC: 1.42 MG/DL
GLUCOSE SERPL-MCNC: 119 MG/DL
NT-PROBNP SERPL-MCNC: 834 PG/ML
POTASSIUM SERPL-SCNC: 4.1 MMOL/L
PROT SERPL-MCNC: 7.5 G/DL
SODIUM SERPL-SCNC: 138 MMOL/L

## 2018-03-26 ENCOUNTER — APPOINTMENT (OUTPATIENT)
Dept: ELECTROPHYSIOLOGY | Facility: CLINIC | Age: 68
End: 2018-03-26

## 2018-05-18 ENCOUNTER — RX RENEWAL (OUTPATIENT)
Age: 68
End: 2018-05-18

## 2018-06-19 ENCOUNTER — OTHER (OUTPATIENT)
Age: 68
End: 2018-06-19

## 2018-08-08 NOTE — H&P CARDIOLOGY - PMH
AICD (automatic cardioverter/defibrillator) present    Aortic stenosis    CAD (coronary artery disease)  2004  CHF (congestive heart failure)    HLD (hyperlipidemia)    HTN (hypertension)    MI (myocardial infarction)  Nov 2004  Syncope  epidsode in 11/14, was found to have been an episode of V-Fib with sucsessfull AICD shock  Systolic heart failure    URI (upper respiratory infection)  x 2 in the past 6 weeks  Valvular cardiomyopathy 98

## 2018-08-13 ENCOUNTER — APPOINTMENT (OUTPATIENT)
Dept: CARDIOLOGY | Facility: CLINIC | Age: 68
End: 2018-08-13
Payer: MEDICARE

## 2018-08-13 VITALS
HEIGHT: 72 IN | WEIGHT: 212 LBS | HEART RATE: 60 BPM | OXYGEN SATURATION: 97 % | SYSTOLIC BLOOD PRESSURE: 117 MMHG | DIASTOLIC BLOOD PRESSURE: 76 MMHG | BODY MASS INDEX: 28.71 KG/M2

## 2018-08-13 PROCEDURE — 99214 OFFICE O/P EST MOD 30 MIN: CPT

## 2018-08-17 ENCOUNTER — NON-APPOINTMENT (OUTPATIENT)
Age: 68
End: 2018-08-17

## 2018-08-17 ENCOUNTER — APPOINTMENT (OUTPATIENT)
Dept: ELECTROPHYSIOLOGY | Facility: CLINIC | Age: 68
End: 2018-08-17
Payer: MEDICARE

## 2018-08-17 VITALS
HEART RATE: 69 BPM | HEIGHT: 72 IN | SYSTOLIC BLOOD PRESSURE: 118 MMHG | WEIGHT: 212 LBS | DIASTOLIC BLOOD PRESSURE: 76 MMHG | OXYGEN SATURATION: 97 % | BODY MASS INDEX: 28.71 KG/M2

## 2018-08-17 PROCEDURE — 93289 INTERROG DEVICE EVAL HEART: CPT

## 2018-08-17 PROCEDURE — 99214 OFFICE O/P EST MOD 30 MIN: CPT

## 2018-08-17 PROCEDURE — 93000 ELECTROCARDIOGRAM COMPLETE: CPT | Mod: 59

## 2018-08-20 ENCOUNTER — MEDICATION RENEWAL (OUTPATIENT)
Age: 68
End: 2018-08-20

## 2018-08-27 ENCOUNTER — NON-APPOINTMENT (OUTPATIENT)
Age: 68
End: 2018-08-27

## 2018-08-27 ENCOUNTER — APPOINTMENT (OUTPATIENT)
Dept: ELECTROPHYSIOLOGY | Facility: CLINIC | Age: 68
End: 2018-08-27
Payer: MEDICARE

## 2018-08-27 VITALS
WEIGHT: 213 LBS | OXYGEN SATURATION: 97 % | SYSTOLIC BLOOD PRESSURE: 104 MMHG | HEART RATE: 67 BPM | BODY MASS INDEX: 28.85 KG/M2 | DIASTOLIC BLOOD PRESSURE: 71 MMHG | HEIGHT: 72 IN

## 2018-08-27 PROCEDURE — 99213 OFFICE O/P EST LOW 20 MIN: CPT | Mod: 25

## 2018-08-27 PROCEDURE — 93000 ELECTROCARDIOGRAM COMPLETE: CPT | Mod: 59

## 2018-08-27 PROCEDURE — 93282 PRGRMG EVAL IMPLANTABLE DFB: CPT

## 2018-09-04 LAB
ANION GAP SERPL CALC-SCNC: 14 MMOL/L
BUN SERPL-MCNC: 15 MG/DL
CALCIUM SERPL-MCNC: 9.5 MG/DL
CHLORIDE SERPL-SCNC: 97 MMOL/L
CO2 SERPL-SCNC: 25 MMOL/L
CREAT SERPL-MCNC: 0.98 MG/DL
GLUCOSE SERPL-MCNC: 139 MG/DL
NT-PROBNP SERPL-MCNC: 793 PG/ML
POTASSIUM SERPL-SCNC: 4.5 MMOL/L
SODIUM SERPL-SCNC: 136 MMOL/L

## 2018-09-19 ENCOUNTER — APPOINTMENT (OUTPATIENT)
Dept: ELECTROPHYSIOLOGY | Facility: CLINIC | Age: 68
End: 2018-09-19
Payer: MEDICARE

## 2018-09-19 PROCEDURE — XXXXX: CPT

## 2018-09-28 ENCOUNTER — MEDICATION RENEWAL (OUTPATIENT)
Age: 68
End: 2018-09-28

## 2018-10-08 ENCOUNTER — RX RENEWAL (OUTPATIENT)
Age: 68
End: 2018-10-08

## 2018-10-16 ENCOUNTER — OUTPATIENT (OUTPATIENT)
Dept: OUTPATIENT SERVICES | Facility: HOSPITAL | Age: 68
LOS: 1 days | End: 2018-10-16
Payer: MEDICARE

## 2018-10-16 VITALS
HEIGHT: 72 IN | DIASTOLIC BLOOD PRESSURE: 63 MMHG | HEART RATE: 61 BPM | SYSTOLIC BLOOD PRESSURE: 99 MMHG | WEIGHT: 212.97 LBS | OXYGEN SATURATION: 98 % | RESPIRATION RATE: 18 BRPM | TEMPERATURE: 96 F

## 2018-10-16 DIAGNOSIS — I48.0 PAROXYSMAL ATRIAL FIBRILLATION: ICD-10-CM

## 2018-10-16 DIAGNOSIS — Z98.89 OTHER SPECIFIED POSTPROCEDURAL STATES: Chronic | ICD-10-CM

## 2018-10-16 DIAGNOSIS — I50.20 UNSPECIFIED SYSTOLIC (CONGESTIVE) HEART FAILURE: ICD-10-CM

## 2018-10-16 DIAGNOSIS — Z98.890 OTHER SPECIFIED POSTPROCEDURAL STATES: Chronic | ICD-10-CM

## 2018-10-16 DIAGNOSIS — Z29.9 ENCOUNTER FOR PROPHYLACTIC MEASURES, UNSPECIFIED: ICD-10-CM

## 2018-10-16 DIAGNOSIS — I10 ESSENTIAL (PRIMARY) HYPERTENSION: ICD-10-CM

## 2018-10-16 DIAGNOSIS — Z95.4 PRESENCE OF OTHER HEART-VALVE REPLACEMENT: Chronic | ICD-10-CM

## 2018-10-16 DIAGNOSIS — Z01.818 ENCOUNTER FOR OTHER PREPROCEDURAL EXAMINATION: ICD-10-CM

## 2018-10-16 DIAGNOSIS — T82.110A BREAKDOWN (MECHANICAL) OF CARDIAC ELECTRODE, INITIAL ENCOUNTER: ICD-10-CM

## 2018-10-16 DIAGNOSIS — Z95.5 PRESENCE OF CORONARY ANGIOPLASTY IMPLANT AND GRAFT: Chronic | ICD-10-CM

## 2018-10-16 DIAGNOSIS — Z95.810 PRESENCE OF AUTOMATIC (IMPLANTABLE) CARDIAC DEFIBRILLATOR: ICD-10-CM

## 2018-10-16 DIAGNOSIS — E78.5 HYPERLIPIDEMIA, UNSPECIFIED: ICD-10-CM

## 2018-10-16 DIAGNOSIS — Z95.810 PRESENCE OF AUTOMATIC (IMPLANTABLE) CARDIAC DEFIBRILLATOR: Chronic | ICD-10-CM

## 2018-10-16 LAB
ANION GAP SERPL CALC-SCNC: 13 MMOL/L — SIGNIFICANT CHANGE UP (ref 5–17)
BLD GP AB SCN SERPL QL: NEGATIVE — SIGNIFICANT CHANGE UP
BUN SERPL-MCNC: 16 MG/DL — SIGNIFICANT CHANGE UP (ref 7–23)
CALCIUM SERPL-MCNC: 9 MG/DL — SIGNIFICANT CHANGE UP (ref 8.4–10.5)
CHLORIDE SERPL-SCNC: 100 MMOL/L — SIGNIFICANT CHANGE UP (ref 96–108)
CO2 SERPL-SCNC: 25 MMOL/L — SIGNIFICANT CHANGE UP (ref 22–31)
CREAT SERPL-MCNC: 0.91 MG/DL — SIGNIFICANT CHANGE UP (ref 0.5–1.3)
GLUCOSE SERPL-MCNC: 122 MG/DL — HIGH (ref 70–99)
HCT VFR BLD CALC: 41.3 % — SIGNIFICANT CHANGE UP (ref 39–50)
HGB BLD-MCNC: 13.5 G/DL — SIGNIFICANT CHANGE UP (ref 13–17)
MCHC RBC-ENTMCNC: 31.1 PG — SIGNIFICANT CHANGE UP (ref 27–34)
MCHC RBC-ENTMCNC: 32.7 GM/DL — SIGNIFICANT CHANGE UP (ref 32–36)
MCV RBC AUTO: 95.2 FL — SIGNIFICANT CHANGE UP (ref 80–100)
PLATELET # BLD AUTO: 229 K/UL — SIGNIFICANT CHANGE UP (ref 150–400)
POTASSIUM SERPL-MCNC: 4.2 MMOL/L — SIGNIFICANT CHANGE UP (ref 3.5–5.3)
POTASSIUM SERPL-SCNC: 4.2 MMOL/L — SIGNIFICANT CHANGE UP (ref 3.5–5.3)
RBC # BLD: 4.34 M/UL — SIGNIFICANT CHANGE UP (ref 4.2–5.8)
RBC # FLD: 12.8 % — SIGNIFICANT CHANGE UP (ref 10.3–14.5)
RH IG SCN BLD-IMP: POSITIVE — SIGNIFICANT CHANGE UP
SODIUM SERPL-SCNC: 138 MMOL/L — SIGNIFICANT CHANGE UP (ref 135–145)
WBC # BLD: 6.9 K/UL — SIGNIFICANT CHANGE UP (ref 3.8–10.5)
WBC # FLD AUTO: 6.9 K/UL — SIGNIFICANT CHANGE UP (ref 3.8–10.5)

## 2018-10-16 PROCEDURE — 86900 BLOOD TYPING SEROLOGIC ABO: CPT

## 2018-10-16 PROCEDURE — 71046 X-RAY EXAM CHEST 2 VIEWS: CPT | Mod: 26

## 2018-10-16 PROCEDURE — G0463: CPT

## 2018-10-16 PROCEDURE — 86901 BLOOD TYPING SEROLOGIC RH(D): CPT

## 2018-10-16 PROCEDURE — 80048 BASIC METABOLIC PNL TOTAL CA: CPT

## 2018-10-16 PROCEDURE — 85027 COMPLETE CBC AUTOMATED: CPT

## 2018-10-16 PROCEDURE — 86850 RBC ANTIBODY SCREEN: CPT

## 2018-10-16 PROCEDURE — 71046 X-RAY EXAM CHEST 2 VIEWS: CPT

## 2018-10-16 RX ORDER — CEFAZOLIN SODIUM 1 G
2000 VIAL (EA) INJECTION ONCE
Qty: 0 | Refills: 0 | Status: DISCONTINUED | OUTPATIENT
Start: 2018-10-30 | End: 2018-10-31

## 2018-10-16 NOTE — H&P PST ADULT - PMH
AICD (automatic cardioverter/defibrillator) present    Aortic stenosis    CAD (coronary artery disease)  2004  CHF (congestive heart failure)    HLD (hyperlipidemia)    HTN (hypertension)    MI (myocardial infarction)  Nov 2004  Syncope  epidsode in 11/14, was found to have been an episode of V-Fib with sucsessfull AICD shock  Systolic heart failure    Valvular cardiomyopathy

## 2018-10-16 NOTE — H&P PST ADULT - PSH
AICD (automatic cardioverter/defibrillator) present    H/O prior ablation treatment  afib  S/P AVR    S/P hernia repair    S/P knee surgery    Stented coronary artery  LAD x 3 (Nov 2004)

## 2018-10-16 NOTE — H&P PST ADULT - ASSESSMENT
CAPRINI SCORE [CLOT]    AGE RELATED RISK FACTORS                                                       MOBILITY RELATED FACTORS  [ ] Age 41-60 years                                            (1 Point)                  [ ] Bed rest                                                        (1 Point)  [x] Age: 61-74 years                                           (2 Points)                 [ ] Plaster cast                                                   (2 Points)  [ ] Age= 75 years                                              (3 Points)                 [ ] Bed bound for more than 72 hours                 (2 Points)    DISEASE RELATED RISK FACTORS                                               GENDER SPECIFIC FACTORS  [ ] Edema in the lower extremities                       (1 Point)                  [ ] Pregnancy                                                     (1 Point)  [ ] Varicose veins                                               (1 Point)                  [ ] Post-partum < 6 weeks                                   (1 Point)             [x] BMI > 25 Kg/m2                                            (1 Point)                  [ ] Hormonal therapy  or oral contraception          (1 Point)                 [ ] Sepsis (in the previous month)                        (1 Point)                  [ ] History of pregnancy complications                 (1 point)  [ ] Pneumonia or serious lung disease                                               [ ] Unexplained or recurrent                     (1 Point)           (in the previous month)                               (1 Point)  [ ] Abnormal pulmonary function test                     (1 Point)                 SURGERY RELATED RISK FACTORS  [ ] Acute myocardial infarction                              (1 Point)                 [ ]  Section                                             (1 Point)  [ ] Congestive heart failure (in the previous month)  (1 Point)               [ ] Minor surgery                                                  (1 Point)   [ ] Inflammatory bowel disease                             (1 Point)                 [ ] Arthroscopic surgery                                        (2 Points)  [ ] Central venous access                                      (2 Points)                [x] General surgery lasting more than 45 minutes   (2 Points)       [ ] Stroke (in the previous month)                          (5 Points)               [ ] Elective arthroplasty                                         (5 Points)                                                                                                                                               HEMATOLOGY RELATED FACTORS                                                 TRAUMA RELATED RISK FACTORS  [ ] Prior episodes of VTE                                     (3 Points)                 [ ] Fracture of the hip, pelvis, or leg                       (5 Points)  [ ] Positive family history for VTE                         (3 Points)                 [ ] Acute spinal cord injury (in the previous month)  (5 Points)  [ ] Prothrombin 62214 A                                     (3 Points)                 [ ] Paralysis  (less than 1 month)                             (5 Points)  [ ] Factor V Leiden                                             (3 Points)                  [ ] Multiple Trauma within 1 month                        (5 Points)  [ ] Lupus anticoagulants                                     (3 Points)                                                           [ ] Anticardiolipin antibodies                               (3 Points)                                                       [ ] High homocysteine in the blood                      (3 Points)                                             [ ] Other congenital or acquired thrombophilia      (3 Points)                                                [ ] Heparin induced thrombocytopenia                  (3 Points)                                          Total Score [5 ]

## 2018-10-16 NOTE — H&P PST ADULT - NSANTHOSAYNRD_GEN_A_CORE
No. CONNER screening performed.  STOP BANG Legend: 0-2 = LOW Risk; 3-4 = INTERMEDIATE Risk; 5-8 = HIGH Risk

## 2018-10-16 NOTE — H&P PST ADULT - HISTORY OF PRESENT ILLNESS
This is a 67 y/o male former smoker (60 PYH) with PMH of HTN, HLD, CAD s/p anterior wall MI (2004), PCI/LAD stent x 3 2004 [place], HFreF EF 10-15%(last echo 2017), ICM with Class II heart failure, AICD 2005, Gen change 2011, severe AS s/p AVR 2014 Dr. Paez complicated by Asystole/Syncope with 1 shock, Praoxysmal Afibon Eliquis post procedure s/p DCCV 2014, s/p afib ablation 2017 presents for PST to proceed with ICD lead extraction with reimplantation 10/30/2018 for JANET.  Pt. denies chest pain/pressure, SOB/MATHEWS, dizziness, diaphoresis, palpitations, nausea, vomiting, peripheral edema, recent weight gain, or syncope. This is a 69 y/o male former smoker (60 PYH) with PMH of HTN, HLD, CAD s/p anterior wall MI (2004), PCI/LAD stent x 3 2004 [place], HFreF EF 10-15%(last echo 2017), ICM with Class II heart failure, AICD 2005, Gen change 2011, severe AS s/p AVR 2015 Dr. Paez complicated by Asystole/Syncope with 1 shock, Paroxysmal afib on Eliquis s/p DCCV 2014, s/p afib ablation 2017 presents for PST to proceed with ICD lead extraction with reimplantation 10/30/2018 for JANET.  Pt. denies chest pain/pressure, SOB/MATHEWS, dizziness, diaphoresis, palpitations, nausea, vomiting, peripheral edema, recent weight gain, or syncope.    Aortic Valve Anthony Serial number [id number] Model [id number] 4/3/2015  [employer] AICD 5/2/2005 Serial [id number] Model [id number]                          12/05/2011 Serial PSA [id number] Model [id number]

## 2018-10-22 ENCOUNTER — EMERGENCY (EMERGENCY)
Facility: HOSPITAL | Age: 68
LOS: 1 days | Discharge: HOME | End: 2018-10-22
Attending: EMERGENCY MEDICINE
Payer: MEDICARE

## 2018-10-22 ENCOUNTER — APPOINTMENT (OUTPATIENT)
Dept: CARDIOLOGY | Facility: CLINIC | Age: 68
End: 2018-10-22
Payer: MEDICARE

## 2018-10-22 VITALS
SYSTOLIC BLOOD PRESSURE: 128 MMHG | HEIGHT: 72 IN | WEIGHT: 214.95 LBS | TEMPERATURE: 98 F | HEART RATE: 68 BPM | OXYGEN SATURATION: 98 % | RESPIRATION RATE: 20 BRPM | DIASTOLIC BLOOD PRESSURE: 85 MMHG

## 2018-10-22 VITALS
BODY MASS INDEX: 29.26 KG/M2 | SYSTOLIC BLOOD PRESSURE: 120 MMHG | OXYGEN SATURATION: 97 % | WEIGHT: 216 LBS | HEART RATE: 65 BPM | HEIGHT: 72 IN | DIASTOLIC BLOOD PRESSURE: 75 MMHG

## 2018-10-22 DIAGNOSIS — Z98.89 OTHER SPECIFIED POSTPROCEDURAL STATES: Chronic | ICD-10-CM

## 2018-10-22 DIAGNOSIS — Z95.4 PRESENCE OF OTHER HEART-VALVE REPLACEMENT: Chronic | ICD-10-CM

## 2018-10-22 DIAGNOSIS — Z95.810 PRESENCE OF AUTOMATIC (IMPLANTABLE) CARDIAC DEFIBRILLATOR: Chronic | ICD-10-CM

## 2018-10-22 DIAGNOSIS — Z98.890 OTHER SPECIFIED POSTPROCEDURAL STATES: Chronic | ICD-10-CM

## 2018-10-22 DIAGNOSIS — Z95.5 PRESENCE OF CORONARY ANGIOPLASTY IMPLANT AND GRAFT: Chronic | ICD-10-CM

## 2018-10-22 PROCEDURE — 99283 EMERGENCY DEPT VISIT LOW MDM: CPT

## 2018-10-22 PROCEDURE — 99214 OFFICE O/P EST MOD 30 MIN: CPT

## 2018-10-22 PROCEDURE — 73630 X-RAY EXAM OF FOOT: CPT

## 2018-10-22 PROCEDURE — 73610 X-RAY EXAM OF ANKLE: CPT | Mod: 26,LT

## 2018-10-22 PROCEDURE — 99284 EMERGENCY DEPT VISIT MOD MDM: CPT

## 2018-10-22 PROCEDURE — 73630 X-RAY EXAM OF FOOT: CPT | Mod: 26,LT

## 2018-10-22 PROCEDURE — 73610 X-RAY EXAM OF ANKLE: CPT

## 2018-10-22 NOTE — ED ADULT NURSE NOTE - OBJECTIVE STATEMENT
pt presents to fast trak with pain left heel pt states some days ago comimng down step ladder missed step landed on left foot some small bruising to sole of left foot instep noted pain on weight bearing verbalized pt alert oriented verbal no other complaints pt declines any pain meds at this time xrays posted and pending

## 2018-10-22 NOTE — ED ADULT NURSE NOTE - NSIMPLEMENTINTERV_GEN_ALL_ED
Implemented All Universal Safety Interventions:  [place] to call system. Call bell, personal items and telephone within reach. Instruct patient to call for assistance. Room bathroom lighting operational. Non-slip footwear when patient is off stretcher. Physically safe environment: no spills, clutter or unnecessary equipment. Stretcher in lowest position, wheels locked, appropriate side rails in place.

## 2018-10-22 NOTE — ED PROVIDER NOTE - OBJECTIVE STATEMENT
TIFFANY LedbetterD: 68M hx cad htn p/w 1 week of left heel pain after missing a step while descending a step ladder. landed hard on left heel, with heel hitting concrete. since then has been having pain mainly at base of heel with difficulty ambulating. no numbness/tingling/weakness in leg. no cuts or abrasions to foot. has not taken anything for pain.

## 2018-10-22 NOTE — ED PROVIDER NOTE - PHYSICAL EXAMINATION
Rosalee Umana M.D: pt awake, alert, seen lying on stretcher NAD. 2+ DP pulse left foot. full ROM left ankle/knee/hip. intact motor and sensation throughout LLE. ttp over heel into arch with some bruising. able to ambulate with some difficulty.

## 2018-10-22 NOTE — ED ADULT NURSE NOTE - BREATHING, MLM
Addendum  created 04/20/17 1252 by Jhonathan George CRNA    Anesthesia Staff edited      
Spontaneous, unlabored and symmetrical

## 2018-10-22 NOTE — ED PROVIDER NOTE - FAMILY HISTORY
Chronic obstructive pulmonary disease     Father  Still living? No  Family history of colon cancer, Age at diagnosis: Age Unknown

## 2018-10-25 ENCOUNTER — CHART COPY (OUTPATIENT)
Age: 68
End: 2018-10-25

## 2018-10-29 ENCOUNTER — OUTPATIENT (OUTPATIENT)
Dept: OUTPATIENT SERVICES | Facility: HOSPITAL | Age: 68
LOS: 1 days | End: 2018-10-29
Payer: MEDICARE

## 2018-10-29 ENCOUNTER — TRANSCRIPTION ENCOUNTER (OUTPATIENT)
Age: 68
End: 2018-10-29

## 2018-10-29 VITALS — WEIGHT: 212.97 LBS | HEIGHT: 72 IN

## 2018-10-29 DIAGNOSIS — Z95.810 PRESENCE OF AUTOMATIC (IMPLANTABLE) CARDIAC DEFIBRILLATOR: Chronic | ICD-10-CM

## 2018-10-29 DIAGNOSIS — Z98.890 OTHER SPECIFIED POSTPROCEDURAL STATES: Chronic | ICD-10-CM

## 2018-10-29 DIAGNOSIS — Z95.4 PRESENCE OF OTHER HEART-VALVE REPLACEMENT: Chronic | ICD-10-CM

## 2018-10-29 DIAGNOSIS — Z95.5 PRESENCE OF CORONARY ANGIOPLASTY IMPLANT AND GRAFT: Chronic | ICD-10-CM

## 2018-10-29 DIAGNOSIS — Z98.89 OTHER SPECIFIED POSTPROCEDURAL STATES: Chronic | ICD-10-CM

## 2018-10-29 NOTE — PRE-ANESTHESIA EVALUATION ADULT - NSANTHPMHFT_GEN_ALL_CORE
Bioprosthetic AVR Bioprosthetic AVR in 2015. Afib ablation in 2017.  Denies difficulty swallowing food or liquids.  Had previous ANDREY in 2017 without issue.

## 2018-10-30 ENCOUNTER — TRANSCRIPTION ENCOUNTER (OUTPATIENT)
Age: 68
End: 2018-10-30

## 2018-10-30 ENCOUNTER — INPATIENT (INPATIENT)
Facility: HOSPITAL | Age: 68
LOS: 0 days | Discharge: HOME | DRG: 227 | End: 2018-10-31
Attending: INTERNAL MEDICINE | Admitting: INTERNAL MEDICINE
Payer: MEDICARE

## 2018-10-30 DIAGNOSIS — Z95.5 PRESENCE OF CORONARY ANGIOPLASTY IMPLANT AND GRAFT: Chronic | ICD-10-CM

## 2018-10-30 DIAGNOSIS — T82.110A BREAKDOWN (MECHANICAL) OF CARDIAC ELECTRODE, INITIAL ENCOUNTER: ICD-10-CM

## 2018-10-30 DIAGNOSIS — Z95.810 PRESENCE OF AUTOMATIC (IMPLANTABLE) CARDIAC DEFIBRILLATOR: Chronic | ICD-10-CM

## 2018-10-30 DIAGNOSIS — Z95.4 PRESENCE OF OTHER HEART-VALVE REPLACEMENT: Chronic | ICD-10-CM

## 2018-10-30 DIAGNOSIS — Z98.890 OTHER SPECIFIED POSTPROCEDURAL STATES: Chronic | ICD-10-CM

## 2018-10-30 DIAGNOSIS — Z98.89 OTHER SPECIFIED POSTPROCEDURAL STATES: Chronic | ICD-10-CM

## 2018-10-30 PROCEDURE — 71045 X-RAY EXAM CHEST 1 VIEW: CPT | Mod: 26

## 2018-10-30 PROCEDURE — 33241 REMOVE PULSE GENERATOR: CPT

## 2018-10-30 PROCEDURE — 93010 ELECTROCARDIOGRAM REPORT: CPT

## 2018-10-30 PROCEDURE — 33249 INSJ/RPLCMT DEFIB W/LEAD(S): CPT | Mod: 59

## 2018-10-30 PROCEDURE — 33244 REMOVE ELCTRD TRANSVENOUSLY: CPT | Mod: 59

## 2018-10-30 RX ORDER — ASPIRIN/CALCIUM CARB/MAGNESIUM 324 MG
81 TABLET ORAL DAILY
Qty: 0 | Refills: 0 | Status: DISCONTINUED | OUTPATIENT
Start: 2018-10-31 | End: 2018-10-31

## 2018-10-30 RX ORDER — CEFAZOLIN SODIUM 1 G
1000 VIAL (EA) INJECTION EVERY 8 HOURS
Qty: 0 | Refills: 0 | Status: COMPLETED | OUTPATIENT
Start: 2018-10-30 | End: 2018-10-31

## 2018-10-30 RX ORDER — FUROSEMIDE 40 MG
40 TABLET ORAL
Qty: 0 | Refills: 0 | Status: DISCONTINUED | OUTPATIENT
Start: 2018-10-31 | End: 2018-10-31

## 2018-10-30 RX ORDER — HYDROMORPHONE HYDROCHLORIDE 2 MG/ML
0.25 INJECTION INTRAMUSCULAR; INTRAVENOUS; SUBCUTANEOUS
Qty: 0 | Refills: 0 | Status: DISCONTINUED | OUTPATIENT
Start: 2018-10-30 | End: 2018-10-30

## 2018-10-30 RX ORDER — CARVEDILOL PHOSPHATE 80 MG/1
25 CAPSULE, EXTENDED RELEASE ORAL EVERY 12 HOURS
Qty: 0 | Refills: 0 | Status: DISCONTINUED | OUTPATIENT
Start: 2018-10-30 | End: 2018-10-31

## 2018-10-30 RX ORDER — POTASSIUM CHLORIDE 20 MEQ
10 PACKET (EA) ORAL DAILY
Qty: 0 | Refills: 0 | Status: DISCONTINUED | OUTPATIENT
Start: 2018-10-31 | End: 2018-10-31

## 2018-10-30 RX ORDER — PANTOPRAZOLE SODIUM 20 MG/1
40 TABLET, DELAYED RELEASE ORAL
Qty: 0 | Refills: 0 | Status: DISCONTINUED | OUTPATIENT
Start: 2018-10-30 | End: 2018-10-31

## 2018-10-30 RX ORDER — OXYCODONE AND ACETAMINOPHEN 5; 325 MG/1; MG/1
1 TABLET ORAL EVERY 4 HOURS
Qty: 0 | Refills: 0 | Status: DISCONTINUED | OUTPATIENT
Start: 2018-10-30 | End: 2018-10-31

## 2018-10-30 RX ORDER — INFLUENZA VIRUS VACCINE 15; 15; 15; 15 UG/.5ML; UG/.5ML; UG/.5ML; UG/.5ML
0.5 SUSPENSION INTRAMUSCULAR ONCE
Qty: 0 | Refills: 0 | Status: COMPLETED | OUTPATIENT
Start: 2018-10-30 | End: 2018-10-30

## 2018-10-30 RX ORDER — DOCUSATE SODIUM 100 MG
100 CAPSULE ORAL
Qty: 0 | Refills: 0 | Status: DISCONTINUED | OUTPATIENT
Start: 2018-10-30 | End: 2018-10-31

## 2018-10-30 RX ORDER — ATORVASTATIN CALCIUM 80 MG/1
40 TABLET, FILM COATED ORAL AT BEDTIME
Qty: 0 | Refills: 0 | Status: DISCONTINUED | OUTPATIENT
Start: 2018-10-30 | End: 2018-10-31

## 2018-10-30 RX ORDER — SPIRONOLACTONE 25 MG/1
25 TABLET, FILM COATED ORAL DAILY
Qty: 0 | Refills: 0 | Status: DISCONTINUED | OUTPATIENT
Start: 2018-10-31 | End: 2018-10-31

## 2018-10-30 RX ORDER — SODIUM CHLORIDE 9 MG/ML
3 INJECTION INTRAMUSCULAR; INTRAVENOUS; SUBCUTANEOUS EVERY 8 HOURS
Qty: 0 | Refills: 0 | Status: DISCONTINUED | OUTPATIENT
Start: 2018-10-30 | End: 2018-10-30

## 2018-10-30 RX ORDER — ACETAMINOPHEN 500 MG
1000 TABLET ORAL ONCE
Qty: 0 | Refills: 0 | Status: DISCONTINUED | OUTPATIENT
Start: 2018-10-30 | End: 2018-10-30

## 2018-10-30 RX ORDER — ACETAMINOPHEN 500 MG
650 TABLET ORAL EVERY 6 HOURS
Qty: 0 | Refills: 0 | Status: DISCONTINUED | OUTPATIENT
Start: 2018-10-30 | End: 2018-10-31

## 2018-10-30 RX ORDER — LIDOCAINE HCL 20 MG/ML
0.2 VIAL (ML) INJECTION ONCE
Qty: 0 | Refills: 0 | Status: DISCONTINUED | OUTPATIENT
Start: 2018-10-30 | End: 2018-10-30

## 2018-10-30 RX ORDER — ACETAMINOPHEN 500 MG
2 TABLET ORAL
Qty: 0 | Refills: 0 | DISCHARGE
Start: 2018-10-30

## 2018-10-30 RX ORDER — ONDANSETRON 8 MG/1
4 TABLET, FILM COATED ORAL ONCE
Qty: 0 | Refills: 0 | Status: DISCONTINUED | OUTPATIENT
Start: 2018-10-30 | End: 2018-10-30

## 2018-10-30 RX ADMIN — SODIUM CHLORIDE 3 MILLILITER(S): 9 INJECTION INTRAMUSCULAR; INTRAVENOUS; SUBCUTANEOUS at 07:59

## 2018-10-30 RX ADMIN — OXYCODONE AND ACETAMINOPHEN 1 TABLET(S): 5; 325 TABLET ORAL at 21:30

## 2018-10-30 RX ADMIN — OXYCODONE AND ACETAMINOPHEN 1 TABLET(S): 5; 325 TABLET ORAL at 21:00

## 2018-10-30 RX ADMIN — Medication 100 MILLIGRAM(S): at 20:24

## 2018-10-30 NOTE — DISCHARGE NOTE ADULT - CARE PLAN
Principal Discharge DX:	AICD (automatic cardioverter/defibrillator) present  Goal:	Your heart rate and rhythm will be controlled.  Assessment and plan of treatment:	Continue with follow-up visits to your electrophysiology team and with your home remote device monitoring (if applicable). Continue your medications as prescribed.  Secondary Diagnosis:	Congestive heart failure, unspecified HF chronicity, unspecified heart failure type  Goal:	You will not be short of breath.  Assessment and plan of treatment:	Take your medications as prescribed. Follow a low-salt, low salt, low cholesterol heart healthy diet. Weigh yourself every day and keep a record; call your doctor if you gain 2 pounds over one to two days or 5 pounds over three days. Get to or maintain a healthy weight; ask your heart failure team for referrals to a registered dietitian if needed. Avoid alcohol. Be active (check with your physician or cardiologist first). Find healthy ways to deal with stress, such as deep breathing, meditation, exercise, and doing hobbies that you enjoy. If you smoke, quit. (A resource to help you stop smoking is the [facility] – phone number [phone].).  Secondary Diagnosis:	Hypertension, unspecified type  Goal:	Your blood pressure will be controlled.  Assessment and plan of treatment:	Continue with your blood pressure medications; eat a heart healthy diet with low salt diet; exercise regularly (consult with your physician or cardiologist first); maintain a heart healthy weight; if you smoke - quit (A resource to help you stop smoking is the [facility] – phone number [phone].); include healthy ways to manage stress. Continue to follow with your primary care physician or cardiologist.  Secondary Diagnosis:	Hyperlipidemia, unspecified hyperlipidemia type  Goal:	Your LDL cholesterol will be less than 70mg/dL  Assessment and plan of treatment:	Continue with your cholesterol medications. Eat a heart healthy diet that is low in saturated fats and salt, and includes whole grains, fruits, vegetables and lean protein; exercise regularly (consult with your physician or cardiologist first); maintain a heart healthy weight; if you smoke - quit (A resource to help you stop smoking is the [facility] – phone number [phone].). Continue to follow with your primary physician or cardiologist.  Secondary Diagnosis:	PAF (paroxysmal atrial fibrillation)  Goal:	Your heart rate and rhythm will be controlled.  Assessment and plan of treatment:	Continue with your cardiologist and primary care MD. Continue your current medications. Call your physician for palpitations, feelings of rapid heart beat, lightheadedness, or dizziness. Resume taking your Eliquis on Saturday, November 3rd. Principal Discharge DX:	AICD (automatic cardioverter/defibrillator) present  Goal:	Your heart rate and rhythm will be controlled.  Assessment and plan of treatment:	Your follow up Electrophysiology appointment is scheduled on Friday 12/21 at 1:10 pm.  Continue with follow-up visits to your electrophysiology team and with your home remote device monitoring (if applicable). Continue your medications as prescribed.  Secondary Diagnosis:	Congestive heart failure, unspecified HF chronicity, unspecified heart failure type  Goal:	You will not be short of breath.  Assessment and plan of treatment:	Take your medications as prescribed. Follow a low-salt, low salt, low cholesterol heart healthy diet. Weigh yourself every day and keep a record; call your doctor if you gain 2 pounds over one to two days or 5 pounds over three days. Get to or maintain a healthy weight; ask your heart failure team for referrals to a registered dietitian if needed. Avoid alcohol. Be active (check with your physician or cardiologist first). Find healthy ways to deal with stress, such as deep breathing, meditation, exercise, and doing hobbies that you enjoy. If you smoke, quit. (A resource to help you stop smoking is the [facility] [employer] [facility] Tobacco Control – phone number [phone].).  Secondary Diagnosis:	Hypertension, unspecified type  Goal:	Your blood pressure will be controlled.  Assessment and plan of treatment:	Continue with your blood pressure medications; eat a heart healthy diet with low salt diet; exercise regularly (consult with your physician or cardiologist first); maintain a heart healthy weight; if you smoke - quit (A resource to help you stop smoking is the [facility] [employer] [facility] Tobacco [facility] – phone number [phone].); include healthy ways to manage stress. Continue to follow with your primary care physician or cardiologist.  Secondary Diagnosis:	Hyperlipidemia, unspecified hyperlipidemia type  Goal:	Your LDL cholesterol will be less than 70mg/dL  Assessment and plan of treatment:	Continue with your cholesterol medications. Eat a heart healthy diet that is low in saturated fats and salt, and includes whole grains, fruits, vegetables and lean protein; exercise regularly (consult with your physician or cardiologist first); maintain a heart healthy weight; if you smoke - quit (A resource to help you stop smoking is the [facility] for [facility] – phone number [phone].). Continue to follow with your primary physician or cardiologist.  Secondary Diagnosis:	PAF (paroxysmal atrial fibrillation)  Goal:	Your heart rate and rhythm will be controlled.  Assessment and plan of treatment:	Continue with your cardiologist and primary care MD. Continue your current medications. Call your physician for palpitations, feelings of rapid heart beat, lightheadedness, or dizziness. Resume taking your Eliquis on Saturday, November 3rd.

## 2018-10-30 NOTE — DISCHARGE NOTE ADULT - ADDITIONAL INSTRUCTIONS
Follow up with your cardiologist and primary care provider in 1-2 weeks. Your follow up Electrophysiology appointment is scheduled on Friday 12/21 at 1:10 pm.  Follow up with your cardiologist and primary care provider in 1-2 weeks.

## 2018-10-30 NOTE — DISCHARGE NOTE ADULT - HOSPITAL COURSE
HPI:  This is a 67 y/o male former smoker (60 PYH) with PMH of HTN, HLD, CAD s/p anterior wall MI (2004), PCI/LAD stent x 3 2004 [place], HFreF EF 10-15%(last echo 2017), ICM with Class II heart failure, AICD 2005, Gen change 2011, severe AS s/p AVR 2015 Dr. Paez complicated by Asystole/Syncope with 1 shock, Paroxysmal afib on Eliquis s/p DCCV 2014, s/p afib ablation 2017 presents for PST to proceed with ICD lead extraction with reimplantation 10/30/2018 for JANET.  Pt. denies chest pain/pressure, SOB/MATHEWS, dizziness, diaphoresis, palpitations, nausea, vomiting, peripheral edema, recent weight gain, or syncope.    Aortic Valve Anthony Serial number [id number] Model [id number] 4/3/2015  Medtronic AICD 5/2/2005 Serial [id number] Model [id number]                          12/05/2011 Serial PSA [id number] Model [id number] (16 Oct 2018 10:01)    10/30 lead extraction and AICD reimplant. Left chest wall site without swelling, bleeding. HPI:  This is a 69 y/o male former smoker (60 PYH) with PMH of HTN, HLD, CAD s/p anterior wall MI (2004), PCI/LAD stent x 3 2004 [place], HFreF EF 10-15%(last echo 2017), ICM with Class II heart failure, AICD 2005, Gen change 2011, severe AS s/p AVR 2015 Dr. Paez complicated by Asystole/Syncope with 1 shock, Paroxysmal afib on Eliquis s/p DCCV 2014, s/p afib ablation 2017 presented for  ICD lead extraction with reimplantation 10/30/2018 for JANET.  Pt. denies chest pain/pressure, SOB/MATHEWS, dizziness, diaphoresis, palpitations, nausea, vomiting, peripheral edema, recent weight gain, or syncope.    Aortic Valve Anthony Serial number [id number] Model [id number] 4/3/2015  Medtronic AICD 5/2/2005 Serial [id number] Model [id number]                          12/05/2011 Serial PSA [id number] Model [id number] (16 Oct 2018 10:01)    10/30 lead extraction and AICD reimplant. Left chest wall site without swelling, bleeding. CXR were reviewed by Dr. Rodriguez and EP team. The patient has been approved for discharge.

## 2018-10-30 NOTE — CHART NOTE - NSCHARTNOTEFT_GEN_A_CORE
BRIEF PROCEDURE NOTE    DUDLEY BERMUDEZ  [id number]    Pre-op Diagnosis: Advisory ICD lead, ICM, VT    Post-op Diagnosis: same    Procedure: ICD/lead extraction, ICD implantation    Electrophysiologist: Chen Rodriguez MD    Assistant: none    Anesthesia: GA    Access: Left axillary    Description:  6Fr sheath RFV/wire to SVC  Left ICD pocket opened and ICD removed  Lead freed from scar in pocket, active fix retracted, lead cut, anchor removed, LLD placed  access via left axillary puncture under fluoro guidance  ICD lead reomed with 16Fr laser sheath  New ICD lead to RVA  New Medtronic VVI ICD  wound closed, wire/sheath removed    Complications: none    EBL: 20cc    Disposition: to PACU/stable    Plan:  Stat CXR  PA/Lat in Am  check ICD in Am  ancef 1gm q8 x 2  F/U 10-14days  NO HEPARIN/LOVINOX

## 2018-10-30 NOTE — DISCHARGE NOTE ADULT - SECONDARY DIAGNOSIS.
Congestive heart failure, unspecified HF chronicity, unspecified heart failure type Hypertension, unspecified type Hyperlipidemia, unspecified hyperlipidemia type PAF (paroxysmal atrial fibrillation)

## 2018-10-30 NOTE — DISCHARGE NOTE ADULT - MEDICATION SUMMARY - MEDICATIONS TO TAKE
I will START or STAY ON the medications listed below when I get home from the hospital:    spironolactone 25 mg oral tablet  -- 0.5 tab(s) by mouth once a day (after a meal)  -- Indication: For Heart failure    acetaminophen 325 mg oral tablet  -- 2 tab(s) by mouth every 6 hours, As needed, Mild Pain (1 - 3)  -- Indication: For Mild pain    aspirin 81 mg oral delayed release capsule  --  by mouth   -- Indication: For history of prior ablation treatment    enalapril 5 mg oral tablet  -- 1 tab(s) by mouth every 12 hours  -- Indication: For Hypertension    Eliquis 5 mg oral tablet  -- 1 tab(s) by mouth 2 times a day. Restart taking on Saturday, November 3rd.  -- Indication: For Atrial fibrillation    atorvastatin 40 mg oral tablet  -- 1 tab(s) by mouth once a day (at bedtime)  -- Indication: For Hyperlipidemia    carvedilol 25 mg oral tablet  -- 1 tab(s) by mouth 2 times a day  -- Indication: For Hypertension    furosemide 40 mg oral tablet  -- 1 tab(s) by mouth every other day - Alt with 20  -- Indication: For Heart failure    furosemide 20 mg oral tablet  -- 1 tab(s) every other day - Alt with 40  -- Indication: For Heart failure    docusate sodium 100 mg oral capsule  -- 1 cap(s) by mouth 2 times a day  -- Indication: For stool softener    potassium chloride 10 mEq oral tablet, extended release  -- 1 tab(s) by mouth once a day  -- Indication: For potassium supplment    pantoprazole 40 mg oral delayed release tablet  -- 1 tab(s) by mouth once a day (before a meal)  -- Indication: For reflux I will START or STAY ON the medications listed below when I get home from the hospital:    spironolactone 25 mg oral tablet  -- 0.5 tab(s) by mouth once a day (after a meal)  -- Indication: For Heart failure    acetaminophen 325 mg oral tablet  -- 2 tab(s) by mouth every 6 hours, As needed, Mild Pain (1 - 3)  -- Indication: For Mild pain    aspirin 81 mg oral delayed release capsule  --  by mouth   -- Indication: For history of prior ablation treatment    enalapril 5 mg oral tablet  -- 1 tab(s) by mouth every 12 hours  -- Indication: For Hypertension    Eliquis 5 mg oral tablet  -- 1 tab(s) by mouth 2 times a day. Restart taking on Saturday, November 3rd.  -- Indication: For Atrial fibrillation    atorvastatin 40 mg oral tablet  -- 1 tab(s) by mouth once a day (at bedtime)  -- Indication: For Hyperlipidemia    carvedilol 25 mg oral tablet  -- 1 tab(s) by mouth 2 times a day  -- Indication: For Hypertension    furosemide 20 mg oral tablet  -- 1 tab(s) every other day.  -- Indication: For Heart failure    docusate sodium 100 mg oral capsule  -- 1 cap(s) by mouth 2 times a day  -- Indication: For stool softener    potassium chloride 10 mEq oral tablet, extended release  -- 1 tab(s) by mouth once a day  -- Indication: For potassium supplment    pantoprazole 40 mg oral delayed release tablet  -- 1 tab(s) by mouth once a day (before a meal)  -- Indication: For reflux

## 2018-10-30 NOTE — DISCHARGE NOTE ADULT - MEDICATION SUMMARY - MEDICATIONS TO STOP TAKING
I will STOP taking the medications listed below when I get home from the hospital:  None I will STOP taking the medications listed below when I get home from the hospital:    furosemide 40 mg oral tablet  -- 1 tab(s) by mouth every other day - Alt with 20

## 2018-10-30 NOTE — DISCHARGE NOTE ADULT - PLAN OF CARE
Your heart rate and rhythm will be controlled. Continue with follow-up visits to your electrophysiology team and with your home remote device monitoring (if applicable). Continue your medications as prescribed. You will not be short of breath. Take your medications as prescribed. Follow a low-salt, low salt, low cholesterol heart healthy diet. Weigh yourself every day and keep a record; call your doctor if you gain 2 pounds over one to two days or 5 pounds over three days. Get to or maintain a healthy weight; ask your heart failure team for referrals to a registered dietitian if needed. Avoid alcohol. Be active (check with your physician or cardiologist first). Find healthy ways to deal with stress, such as deep breathing, meditation, exercise, and doing hobbies that you enjoy. If you smoke, quit. (A resource to help you stop smoking is the [facility] for Tobacco Control – phone number [phone].). Your blood pressure will be controlled. Continue with your blood pressure medications; eat a heart healthy diet with low salt diet; exercise regularly (consult with your physician or cardiologist first); maintain a heart healthy weight; if you smoke - quit (A resource to help you stop smoking is the [facility] for Tobacco Control – phone number [phone].); include healthy ways to manage stress. Continue to follow with your primary care physician or cardiologist. Your LDL cholesterol will be less than 70mg/dL Continue with your cholesterol medications. Eat a heart healthy diet that is low in saturated fats and salt, and includes whole grains, fruits, vegetables and lean protein; exercise regularly (consult with your physician or cardiologist first); maintain a heart healthy weight; if you smoke - quit (A resource to help you stop smoking is the [facility] for Tobacco [facility] – phone number [phone].). Continue to follow with your primary physician or cardiologist. Continue with your cardiologist and primary care MD. Continue your current medications. Call your physician for palpitations, feelings of rapid heart beat, lightheadedness, or dizziness. Resume taking your Eliquis on Saturday, November 3rd. Your follow up Electrophysiology appointment is scheduled on Friday 12/21 at 1:10 pm.  Continue with follow-up visits to your electrophysiology team and with your home remote device monitoring (if applicable). Continue your medications as prescribed.

## 2018-10-30 NOTE — DISCHARGE NOTE ADULT - CONDITIONS AT DISCHARGE
Pt verbalized understanding of discharge instructions. Pt alert and oriented x4, ambulatory, voiding, tolerating diet, vss. Pt verbalized understanding of medications prescribed and to follow up with MD in time ordered.  IVL removed. Safety maintained. left chest wall site c/d/i. no s/s of hematoma/bleeding

## 2018-10-30 NOTE — DISCHARGE NOTE ADULT - PATIENT PORTAL LINK FT
You can access the [facility] Patient Portal, offered by [facility], by registering with the following website: [email or web address]

## 2018-10-30 NOTE — PROGRESS NOTE ADULT - SUBJECTIVE AND OBJECTIVE BOX
# S/P Extraction and Re- implantation today with Dr. Rodriguez    Left infraclavicular implant site slight swelling noted at site no bleeding, no ecchymosis  Pressure dressing applied, will remove and re-assess in the morning.   monitor telemetry over night  Continue with current medication regimen and resume Eliquis on Saturday morning as per Dr. Rodriguez.  F/U PA/ lat chest xray in am to check lead tip placement  Anticipate discharge planning for tomorrow.

## 2018-10-31 VITALS
OXYGEN SATURATION: 96 % | TEMPERATURE: 98 F | HEART RATE: 66 BPM | SYSTOLIC BLOOD PRESSURE: 98 MMHG | RESPIRATION RATE: 18 BRPM | DIASTOLIC BLOOD PRESSURE: 66 MMHG

## 2018-10-31 DIAGNOSIS — I50.9 HEART FAILURE, UNSPECIFIED: ICD-10-CM

## 2018-10-31 DIAGNOSIS — I10 ESSENTIAL (PRIMARY) HYPERTENSION: ICD-10-CM

## 2018-10-31 LAB
ANION GAP SERPL CALC-SCNC: 10 MMOL/L — SIGNIFICANT CHANGE UP (ref 5–17)
BUN SERPL-MCNC: 18 MG/DL — SIGNIFICANT CHANGE UP (ref 7–23)
CALCIUM SERPL-MCNC: 8.9 MG/DL — SIGNIFICANT CHANGE UP (ref 8.4–10.5)
CHLORIDE SERPL-SCNC: 103 MMOL/L — SIGNIFICANT CHANGE UP (ref 96–108)
CO2 SERPL-SCNC: 23 MMOL/L — SIGNIFICANT CHANGE UP (ref 22–31)
CREAT SERPL-MCNC: 0.88 MG/DL — SIGNIFICANT CHANGE UP (ref 0.5–1.3)
GLUCOSE SERPL-MCNC: 109 MG/DL — HIGH (ref 70–99)
HCT VFR BLD CALC: 38.3 % — LOW (ref 39–50)
HGB BLD-MCNC: 13.2 G/DL — SIGNIFICANT CHANGE UP (ref 13–17)
MCHC RBC-ENTMCNC: 32.3 PG — SIGNIFICANT CHANGE UP (ref 27–34)
MCHC RBC-ENTMCNC: 34.5 GM/DL — SIGNIFICANT CHANGE UP (ref 32–36)
MCV RBC AUTO: 93.7 FL — SIGNIFICANT CHANGE UP (ref 80–100)
PLATELET # BLD AUTO: 184 K/UL — SIGNIFICANT CHANGE UP (ref 150–400)
POTASSIUM SERPL-MCNC: 4.4 MMOL/L — SIGNIFICANT CHANGE UP (ref 3.5–5.3)
POTASSIUM SERPL-SCNC: 4.4 MMOL/L — SIGNIFICANT CHANGE UP (ref 3.5–5.3)
RBC # BLD: 4.09 M/UL — LOW (ref 4.2–5.8)
RBC # FLD: 11.7 % — SIGNIFICANT CHANGE UP (ref 10.3–14.5)
SODIUM SERPL-SCNC: 136 MMOL/L — SIGNIFICANT CHANGE UP (ref 135–145)
WBC # BLD: 9.2 K/UL — SIGNIFICANT CHANGE UP (ref 3.8–10.5)
WBC # FLD AUTO: 9.2 K/UL — SIGNIFICANT CHANGE UP (ref 3.8–10.5)

## 2018-10-31 PROCEDURE — 71046 X-RAY EXAM CHEST 2 VIEWS: CPT | Mod: 26

## 2018-10-31 PROCEDURE — 99231 SBSQ HOSP IP/OBS SF/LOW 25: CPT

## 2018-10-31 PROCEDURE — 93010 ELECTROCARDIOGRAM REPORT: CPT

## 2018-10-31 RX ORDER — FUROSEMIDE 40 MG
1 TABLET ORAL
Qty: 0 | Refills: 0 | COMMUNITY

## 2018-10-31 RX ORDER — FUROSEMIDE 40 MG
20 TABLET ORAL
Qty: 0 | Refills: 0 | Status: COMPLETED | OUTPATIENT
Start: 2018-10-31 | End: 2018-10-31

## 2018-10-31 RX ADMIN — PANTOPRAZOLE SODIUM 40 MILLIGRAM(S): 20 TABLET, DELAYED RELEASE ORAL at 05:38

## 2018-10-31 RX ADMIN — Medication 100 MILLIGRAM(S): at 05:37

## 2018-10-31 RX ADMIN — SPIRONOLACTONE 25 MILLIGRAM(S): 25 TABLET, FILM COATED ORAL at 05:38

## 2018-10-31 RX ADMIN — Medication 100 MILLIGRAM(S): at 05:38

## 2018-10-31 RX ADMIN — CARVEDILOL PHOSPHATE 25 MILLIGRAM(S): 80 CAPSULE, EXTENDED RELEASE ORAL at 05:38

## 2018-10-31 RX ADMIN — Medication 81 MILLIGRAM(S): at 05:38

## 2018-10-31 RX ADMIN — Medication 20 MILLIGRAM(S): at 09:38

## 2018-10-31 RX ADMIN — Medication 5 MILLIGRAM(S): at 05:38

## 2018-10-31 RX ADMIN — Medication 10 MILLIEQUIVALENT(S): at 09:38

## 2018-10-31 NOTE — PROGRESS NOTE ADULT - PROBLEM SELECTOR PLAN 1
Monitor chest wall site for swelling, bleeding  Confirm PA/LAT chest xray results before discharge home

## 2018-10-31 NOTE — PROGRESS NOTE ADULT - SUBJECTIVE AND OBJECTIVE BOX
INTERVAL HPI/OVERNIGHT EVENTS: Pt seen in chair, resting comfortably no complaints offered.     MEDICATIONS  (STANDING):  aspirin enteric coated 81 milliGRAM(s) Oral daily  atorvastatin 40 milliGRAM(s) Oral at bedtime  carvedilol 25 milliGRAM(s) Oral every 12 hours  ceFAZolin   IVPB 2000 milliGRAM(s) IV Intermittent once  docusate sodium 100 milliGRAM(s) Oral two times a day  enalapril 5 milliGRAM(s) Oral every 12 hours  furosemide    Tablet 20 milliGRAM(s) Oral <User Schedule>  influenza   Vaccine 0.5 milliLiter(s) IntraMuscular once  pantoprazole    Tablet 40 milliGRAM(s) Oral before breakfast  potassium chloride    Tablet ER 10 milliEquivalent(s) Oral daily  spironolactone 25 milliGRAM(s) Oral daily    MEDICATIONS  (PRN):  acetaminophen   Tablet .. 650 milliGRAM(s) Oral every 6 hours PRN Mild Pain (1 - 3)  oxyCODONE    5 mG/acetaminophen 325 mG 1 Tablet(s) Oral every 4 hours PRN Moderate Pain (4 - 6)      Allergies    No Known Allergies    Intolerances      ROS:  General: Pt denies fever/chills  Cardiovascular: denies chest pain/palpitations/leg edema  Respiratory and Thorax: denies SOB/cough/wheezing  Gastrointestinal: denies abdominal pain/diarrhea/constipation/bloody stool  Skin: denies rashes/sores      Vital Signs Last 24 Hrs  T(C): 36.6 (31 Oct 2018 05:03), Max: 36.7 (30 Oct 2018 20:43)  T(F): 97.8 (31 Oct 2018 05:03), Max: 98.1 (30 Oct 2018 20:43)  HR: 77 (31 Oct 2018 05:03) (61 - 77)  BP: 111/74 (31 Oct 2018 05:03) (100/79 - 119/73)  BP(mean): 91 (30 Oct 2018 13:00) (89 - 91)  RR: 18 (31 Oct 2018 05:03) (18 - 18)  SpO2: 94% (31 Oct 2018 05:03) (94% - 100%)      Physical Exam:  Neurological: Alert & Oriented x 3  Respiratory: CTA B/L, No wheezing/crackles/rhonchi  Cardiovascular: (+) S1 & S2, RRR  Gastrointestinal: soft, NT, nondistended, (+) BS  Extremities: No pedal edema, No clubbing, No cyanosis  Skin:  normal skin color and pigmentation, no skin lesions  Left infraclavicular site no bleeding or swelling noted. Pressure dressing removed.           LABS:                        13.2   9.2   )-----------( 184      ( 31 Oct 2018 05:13 )             38.3     10-31    136  |  103  |  18  ----------------------------<  109<H>  4.4   |  23  |  0.88    Ca    8.9      31 Oct 2018 05:13

## 2018-10-31 NOTE — PROGRESS NOTE ADULT - SUBJECTIVE AND OBJECTIVE BOX
Patient is a 68y old  Male who presents with a chief complaint of Lead extraction with AICD replacement (30 Oct 2018 23:16)          Allergies    No Known Allergies    Intolerances        Medications:  acetaminophen   Tablet .. 650 milliGRAM(s) Oral every 6 hours PRN  aspirin enteric coated 81 milliGRAM(s) Oral daily  atorvastatin 40 milliGRAM(s) Oral at bedtime  carvedilol 25 milliGRAM(s) Oral every 12 hours  ceFAZolin   IVPB 1000 milliGRAM(s) IV Intermittent every 8 hours  ceFAZolin   IVPB 2000 milliGRAM(s) IV Intermittent once  docusate sodium 100 milliGRAM(s) Oral two times a day  enalapril 5 milliGRAM(s) Oral every 12 hours  furosemide    Tablet 40 milliGRAM(s) Oral <User Schedule>  influenza   Vaccine 0.5 milliLiter(s) IntraMuscular once  oxyCODONE    5 mG/acetaminophen 325 mG 1 Tablet(s) Oral every 4 hours PRN  pantoprazole    Tablet 40 milliGRAM(s) Oral before breakfast  potassium chloride    Tablet ER 10 milliEquivalent(s) Oral daily  spironolactone 25 milliGRAM(s) Oral daily      Vitals:  T(C): 36.7 (10-30-18 @ 20:43), Max: 36.7 (10-30-18 @ 20:43)  HR: 64 (10-30-18 @ 20:43) (61 - 70)  BP: 111/64 (10-30-18 @ 20:43) (100/79 - 119/73)  BP(mean): 91 (10-30-18 @ 13:00) (89 - 91)  RR: 18 (10-30-18 @ 20:43) (16 - 18)  SpO2: 97% (10-30-18 @ 20:43) (94% - 100%)  Wt(kg): --  Daily Height in cm: 185.42 (30 Oct 2018 14:40)    Daily Weight in k.5 (30 Oct 2018 23:16)  I&O's Summary    30 Oct 2018 07:01  -  31 Oct 2018 00:09  --------------------------------------------------------  IN: 140 mL / OUT: 0 mL / NET: 140 mL          Physical Exam:  Appearance: Normal  Eyes: PERRL, EOMI  HENT: Normal oral [place], NC/AT  Cardiovascular: S1S2, RRR, No M/R/G, no JVD, No Lower extremity edema  Procedural Access Site: No hematoma, Non-tender to palpation, 2+ pulse, No bruit, No Ecchymosis  Respiratory: Clear to auscultation bilaterally  Gastrointestinal: Soft, Non tender, Normal Bowel Sounds  Musculoskeletal: No clubbing, No joint deformity   Neurologic: Non-focal  Lymphatic: No lymphadenopathy  Psychiatry: AAOx3, Mood & affect appropriate  Skin: No rashes, No ecchymoses, No cyanosis                  Lipid panel   Hgb A1c         ECG:    Electrophysiology: Lead extraction and AICD replacement    Imaging:    Interpretation of Telemetry: Patient is a 68y old  Male who presents with a chief complaint of Lead extraction with AICD replacement (30 Oct 2018 23:16)          Allergies    No Known Allergies    Intolerances        Medications:  acetaminophen   Tablet .. 650 milliGRAM(s) Oral every 6 hours PRN  aspirin enteric coated 81 milliGRAM(s) Oral daily  atorvastatin 40 milliGRAM(s) Oral at bedtime  carvedilol 25 milliGRAM(s) Oral every 12 hours  ceFAZolin   IVPB 1000 milliGRAM(s) IV Intermittent every 8 hours  ceFAZolin   IVPB 2000 milliGRAM(s) IV Intermittent once  docusate sodium 100 milliGRAM(s) Oral two times a day  enalapril 5 milliGRAM(s) Oral every 12 hours  furosemide    Tablet 40 milliGRAM(s) Oral <User Schedule>  influenza   Vaccine 0.5 milliLiter(s) IntraMuscular once  oxyCODONE    5 mG/acetaminophen 325 mG 1 Tablet(s) Oral every 4 hours PRN  pantoprazole    Tablet 40 milliGRAM(s) Oral before breakfast  potassium chloride    Tablet ER 10 milliEquivalent(s) Oral daily  spironolactone 25 milliGRAM(s) Oral daily      Vitals:  T(C): 36.7 (10-30-18 @ 20:43), Max: 36.7 (10-30-18 @ 20:43)  HR: 64 (10-30-18 @ 20:43) (61 - 70)  BP: 111/64 (10-30-18 @ 20:43) (100/79 - 119/73)  BP(mean): 91 (10-30-18 @ 13:00) (89 - 91)  RR: 18 (10-30-18 @ 20:43) (16 - 18)  SpO2: 97% (10-30-18 @ 20:43) (94% - 100%)  Wt(kg): --  Daily Height in cm: 185.42 (30 Oct 2018 14:40)    Daily Weight in k.5 (30 Oct 2018 23:16)  I&O's Summary    30 Oct 2018 07:01  -  31 Oct 2018 00:09  --------------------------------------------------------  IN: 140 mL / OUT: 0 mL / NET: 140 mL          Physical Exam:  Appearance: Normal  Eyes: PERRL, EOMI  HENT: Normal oral [place], NC/AT  Cardiovascular: S1S2, RRR, No M/R/G, no JVD, No Lower extremity edema  Procedural Access Site: No hematoma, Non-tender to palpation, 2+ pulse, No bruit, No Ecchymosis  Respiratory: Clear to auscultation bilaterally  Gastrointestinal: Soft, Non tender, Normal Bowel Sounds  Musculoskeletal: No clubbing, No joint deformity   Neurologic: Non-focal  Lymphatic: No lymphadenopathy  Psychiatry: AAOx3, Mood & affect appropriate  Skin: No rashes, No ecchymoses, No cyanosis                  Lipid panel   Hgb A1c         ECG:     Electrophysiology: Lead extraction and AICD replacement    Imaging:    Interpretation of Telemetry: SR 1st degree 70 bpm

## 2018-10-31 NOTE — PROGRESS NOTE ADULT - ASSESSMENT
HPI:  This is a 67 y/o male former smoker (60 PYH) with PMH of HTN, HLD, CAD s/p anterior wall MI (2004), PCI/LAD stent x 3 2004 [place], HFreF EF 10-15%(last echo 2017), ICM with Class II heart failure, AICD 2005, Gen change 2011, severe AS s/p AVR 2015 Dr. Paez complicated by Asystole/Syncope with 1 shock, Paroxysmal afib on Eliquis s/p DCCV 2014, s/p afib ablation 2017 presents for PST to proceed with ICD lead extraction with reimplantation 10/30/2018 for JANET.  Pt. denies chest pain/pressure, SOB/MATHEWS, dizziness, diaphoresis, palpitations, nausea, vomiting, peripheral edema, recent weight gain, or syncope.    Aortic Valve Anthony Serial number [id number] Model [id number] 4/3/2015  Medtronic AICD 5/2/2005 Serial [id number] Model [id number]                          12/05/2011 Serial PSA [id number] Model [id number] (16 Oct 2018 10:01)
Patient is a 68 year old male with PMH of HTN, HLD, CAD s/p anterior wall MI (2004), PCI/LAD stent x 3, HFreF EF 10-15%, ICM, HF, ICD, severe AS s/p AVR 2015, Paroxysmal afib on Eliquis s/p DCCV 2014, s/p afib ablation 2017 presented for PST to proceed with ICD lead extraction with reimplantation 10/30/2018 for JANET.      # S/P ICD lead extraction and reimplantation on 10/31with Dr. Rodriguez  - Tele: SR w/ 1HB, HR 60- 70's with PVCs  - Left infraclavicular site  pressure dressing removed- no bleeding or swelling noted. Dermabond tape in place.  - Chest X-ray reviewed lead tip in place  -Continue with current medication regimen, resume Eliquis on Saturday morning.   - Device checked and paired by Rep. Remote monitoring system setup by Rep.   - Discharge planning for today.

## 2018-11-03 RX ORDER — APIXABAN 2.5 MG/1
1 TABLET, FILM COATED ORAL
Qty: 0 | Refills: 0 | DISCHARGE
Start: 2018-11-03

## 2018-11-11 PROCEDURE — C1773: CPT

## 2018-11-11 PROCEDURE — C1777: CPT

## 2018-11-11 PROCEDURE — C1894: CPT

## 2018-11-11 PROCEDURE — 71046 X-RAY EXAM CHEST 2 VIEWS: CPT

## 2018-11-11 PROCEDURE — C1889: CPT

## 2018-11-11 PROCEDURE — 71045 X-RAY EXAM CHEST 1 VIEW: CPT

## 2018-11-11 PROCEDURE — 33249 INSJ/RPLCMT DEFIB W/LEAD(S): CPT

## 2018-11-11 PROCEDURE — C1892: CPT

## 2018-11-11 PROCEDURE — 80048 BASIC METABOLIC PNL TOTAL CA: CPT

## 2018-11-11 PROCEDURE — C1722: CPT

## 2018-11-11 PROCEDURE — 93005 ELECTROCARDIOGRAM TRACING: CPT

## 2018-11-11 PROCEDURE — C1769: CPT

## 2018-11-11 PROCEDURE — 85027 COMPLETE CBC AUTOMATED: CPT

## 2018-11-11 PROCEDURE — 76000 FLUOROSCOPY <1 HR PHYS/QHP: CPT

## 2018-11-11 PROCEDURE — C2629: CPT

## 2018-11-11 PROCEDURE — 82962 GLUCOSE BLOOD TEST: CPT

## 2018-12-21 DIAGNOSIS — T82.110A BREAKDOWN (MECHANICAL) OF CARDIAC ELECTRODE, INITIAL ENCOUNTER: ICD-10-CM

## 2018-12-21 DIAGNOSIS — Z01.818 ENCOUNTER FOR OTHER PREPROCEDURAL EXAMINATION: ICD-10-CM

## 2018-12-21 PROCEDURE — G0463: CPT

## 2018-12-21 PROCEDURE — 86923 COMPATIBILITY TEST ELECTRIC: CPT

## 2018-12-26 ENCOUNTER — RX RENEWAL (OUTPATIENT)
Age: 68
End: 2018-12-26

## 2019-01-14 ENCOUNTER — NON-APPOINTMENT (OUTPATIENT)
Age: 69
End: 2019-01-14

## 2019-01-14 ENCOUNTER — APPOINTMENT (OUTPATIENT)
Dept: ELECTROPHYSIOLOGY | Facility: CLINIC | Age: 69
End: 2019-01-14
Payer: MEDICARE

## 2019-01-14 VITALS
OXYGEN SATURATION: 96 % | HEART RATE: 67 BPM | SYSTOLIC BLOOD PRESSURE: 118 MMHG | WEIGHT: 222 LBS | BODY MASS INDEX: 30.07 KG/M2 | HEIGHT: 72 IN | DIASTOLIC BLOOD PRESSURE: 77 MMHG

## 2019-01-14 PROCEDURE — 93289 INTERROG DEVICE EVAL HEART: CPT

## 2019-01-14 PROCEDURE — 93000 ELECTROCARDIOGRAM COMPLETE: CPT | Mod: 59

## 2019-01-14 PROCEDURE — 99213 OFFICE O/P EST LOW 20 MIN: CPT

## 2019-01-14 NOTE — HISTORY OF PRESENT ILLNESS
[de-identified] : Referring Physician: Froilan Quiñones MD\par \par Dear Froilan:\par \par Mr. Chava Encinas was seen in the [facility] today. For our records, please allow me to summarize the history and my findings.\par \par This pleasant 67 year old man has a cardiovascular history significant for CAD s/p anterior wall MI in 2005 s/p PCI, NYHA II severe ICM (EF 20-25) s/p primary prevention ICD, bioAVR in 2015, and atrial flutter s/p CTI ablation in 2017.\par \par He was seen for device interrogation as he approached [facility]. He was referred to DR. Rodriguez for a [place] lead with appropriate prior shock. Extraction and reimplantation was performed. He has since done well without recurrent issue.\par \par Interrogation today shows normal pace/sense function and battery life. He has had no events. He denies any recent history of chest pain, shortness of breath, palpitations, dizziness, or syncope.

## 2019-01-14 NOTE — DISCUSSION/SUMMARY
[FreeTextEntry1] : In summary, this is a 68 year old man with severe ICM s/p primary prevention ICD with several episodes of appropriate therapy over the ensuing years. Mr. Encinas is now s/p extraction a Ignacio lead with reimplantation. I am pleased to see him doing well today with normal wound healing and device function. He will follow up in 3 months for repeat interrogation.\par \par Mr. Encinas appeared to understand the whole discussion and verbalized that all of his questions were answered to his satisfaction.\par \par Thank you for allowing me to be involved in the care of this pleasant man. Please feel free to contact me with any questions.

## 2019-01-22 ENCOUNTER — OUTPATIENT (OUTPATIENT)
Dept: OUTPATIENT SERVICES | Facility: HOSPITAL | Age: 69
LOS: 1 days | End: 2019-01-22
Payer: MEDICARE

## 2019-01-22 DIAGNOSIS — Z98.89 OTHER SPECIFIED POSTPROCEDURAL STATES: Chronic | ICD-10-CM

## 2019-01-22 DIAGNOSIS — Z98.890 OTHER SPECIFIED POSTPROCEDURAL STATES: Chronic | ICD-10-CM

## 2019-01-22 DIAGNOSIS — Z95.810 PRESENCE OF AUTOMATIC (IMPLANTABLE) CARDIAC DEFIBRILLATOR: Chronic | ICD-10-CM

## 2019-01-22 DIAGNOSIS — Z95.5 PRESENCE OF CORONARY ANGIOPLASTY IMPLANT AND GRAFT: Chronic | ICD-10-CM

## 2019-01-22 DIAGNOSIS — Z95.4 PRESENCE OF OTHER HEART-VALVE REPLACEMENT: Chronic | ICD-10-CM

## 2019-01-22 DIAGNOSIS — M79.672 PAIN IN LEFT FOOT: ICD-10-CM

## 2019-01-22 PROCEDURE — 73718 MRI LOWER EXTREMITY W/O DYE: CPT | Mod: 26,LT

## 2019-01-22 PROCEDURE — 73718 MRI LOWER EXTREMITY W/O DYE: CPT

## 2019-02-21 ENCOUNTER — RX RENEWAL (OUTPATIENT)
Age: 69
End: 2019-02-21

## 2019-02-22 ENCOUNTER — RX RENEWAL (OUTPATIENT)
Age: 69
End: 2019-02-22

## 2019-03-29 ENCOUNTER — APPOINTMENT (OUTPATIENT)
Dept: ELECTROPHYSIOLOGY | Facility: CLINIC | Age: 69
End: 2019-03-29

## 2019-04-01 ENCOUNTER — MOBILE ON CALL (OUTPATIENT)
Age: 69
End: 2019-04-01

## 2019-05-07 ENCOUNTER — RX RENEWAL (OUTPATIENT)
Age: 69
End: 2019-05-07

## 2019-05-20 ENCOUNTER — APPOINTMENT (OUTPATIENT)
Dept: CARDIOLOGY | Facility: CLINIC | Age: 69
End: 2019-05-20

## 2019-05-24 ENCOUNTER — MEDICATION RENEWAL (OUTPATIENT)
Age: 69
End: 2019-05-24

## 2019-05-24 ENCOUNTER — APPOINTMENT (OUTPATIENT)
Dept: CARDIOLOGY | Facility: CLINIC | Age: 69
End: 2019-05-24
Payer: MEDICARE

## 2019-05-24 VITALS — DIASTOLIC BLOOD PRESSURE: 54 MMHG | SYSTOLIC BLOOD PRESSURE: 96 MMHG

## 2019-05-24 VITALS
SYSTOLIC BLOOD PRESSURE: 92 MMHG | BODY MASS INDEX: 30.38 KG/M2 | WEIGHT: 224 LBS | OXYGEN SATURATION: 96 % | HEART RATE: 76 BPM | DIASTOLIC BLOOD PRESSURE: 50 MMHG

## 2019-05-24 PROCEDURE — 99215 OFFICE O/P EST HI 40 MIN: CPT

## 2019-05-27 NOTE — HISTORY OF PRESENT ILLNESS
[FreeTextEntry1] : DUDLEY BERMUDEZ  is a 68 year old  M\par Presents to establish local cardiovascular care. \par Last seen Dr Quiñones [facility]\par \par There is a history of coronary artery disease status post anterior wall myocardial infarction with occluded LAD status post revascularization, ischemic cardiomyopathy with severely reduced ejection fraction status post Medtronic ICD, history of aortic regurg status post aortic valve replacement, atrial fibrillation? flutter status post ablation\par \par Physically active at the gym, golf and on his boat\par Compliant with meds and diet\par There is no exertional chest pain, pressure or discomfort. \par There is no significant dyspnea on exertion or orthopnea. \par There is mild lightheadedness with positional changes\par No palpitations, ICD discharges\par No bleeding issues\par ]\par Last echocardiogram I see is from July 2017 with severely reduced LV function. Left atrial enlargement. Peak aortic velocity 1.9 m/s mild MR, mean gradient 9 mm of mercury \par Last blood work potassium 4.5, creatinine 1.0, \par \par

## 2019-05-27 NOTE — ASSESSMENT
[FreeTextEntry1] : Coronary artery disease, prior AW myocardial infarction s/p PCI LAD\par Ischemic cardiomyopathy, severe status post ICD, prior shock\par Aortic valve replacement '15 for AI\par Atrial fibrillation? flutter status post ablation '17\par Baseline low BP with h/o orthostasis\par \par Followup echocardiogram\par Followup device check.\par \par Continue ACE inhibitor. Try to transition to entresto if stable blood pressure and renal function.\par Continue aldosterone antagonist.\par Continue beta blocker. \par Continue aspirin and anticoagulation\par Reviewed bleeding precautions \par \par Monitor CBC, BNP, electrolytes and renal function\par Monior daily weights\par Low sodium diet\par SBE prophylaxis\par \par Well compensated but remains high risk\par Possible referral to cardiomyopathy specialist due to severity of LV dysfunction\par

## 2019-05-27 NOTE — PHYSICAL EXAM
[General Appearance - Well Developed] : well developed [Normal Appearance] : normal appearance [Well Groomed] : well groomed [General Appearance - Well Nourished] : well nourished [No Deformities] : no deformities [General Appearance - In No Acute Distress] : no acute distress [Normal Conjunctiva] : the conjunctiva exhibited no abnormalities [Eyelids - No Xanthelasma] : the eyelids demonstrated no xanthelasmas [Normal Oral Mucosa] : normal oral mucosa [No Oral Pallor] : no oral pallor [No Oral Cyanosis] : no oral cyanosis [Normal Jugular Venous A Waves Present] : normal jugular venous A waves present [Normal Jugular Venous V Waves Present] : normal jugular venous V waves present [No Jugular Venous Downs A Waves] : no jugular venous downs A waves [Respiration, Rhythm And Depth] : normal respiratory rhythm and effort [Exaggerated Use Of Accessory Muscles For Inspiration] : no accessory muscle use [Auscultation Breath Sounds / Voice Sounds] : lungs were clear to auscultation bilaterally [Heart Rate And Rhythm] : heart rate and rhythm were normal [Heart Sounds] : normal S1 and S2 [Murmurs] : no murmurs present [Abdomen Soft] : soft [Abdomen Tenderness] : non-tender [Abdomen Mass (___ Cm)] : no abdominal mass palpated [Abnormal Walk] : normal gait [Gait - Sufficient For Exercise Testing] : the gait was sufficient for exercise testing [Nail Clubbing] : no clubbing of the fingernails [Cyanosis, Localized] : no localized cyanosis [Petechial Hemorrhages (___cm)] : no petechial hemorrhages [Skin Color & Pigmentation] : normal skin color and pigmentation [] : no rash [No Venous Stasis] : no venous stasis [Skin Lesions] : no skin lesions [No Skin Ulcers] : no skin ulcer [No Xanthoma] : no  xanthoma was observed [Oriented To Time, Place, And Person] : oriented to person, place, and time [Affect] : the affect was normal [Mood] : the mood was normal [No Anxiety] : not feeling anxious

## 2019-05-27 NOTE — REASON FOR VISIT
[Consultation] : a consultation regarding [Cardiomyopathy] : cardiomyopathy [Coronary Artery Disease] : coronary artery disease [Medication Management] : Medication management

## 2019-06-18 ENCOUNTER — APPOINTMENT (OUTPATIENT)
Dept: CARDIOLOGY | Facility: CLINIC | Age: 69
End: 2019-06-18
Payer: MEDICARE

## 2019-06-18 PROCEDURE — 93306 TTE W/DOPPLER COMPLETE: CPT

## 2019-06-27 ENCOUNTER — APPOINTMENT (OUTPATIENT)
Dept: CARDIOLOGY | Facility: CLINIC | Age: 69
End: 2019-06-27
Payer: MEDICARE

## 2019-06-27 VITALS
DIASTOLIC BLOOD PRESSURE: 72 MMHG | HEIGHT: 73 IN | HEART RATE: 77 BPM | SYSTOLIC BLOOD PRESSURE: 120 MMHG | WEIGHT: 226 LBS | BODY MASS INDEX: 29.95 KG/M2 | OXYGEN SATURATION: 98 %

## 2019-06-27 PROCEDURE — 99215 OFFICE O/P EST HI 40 MIN: CPT

## 2019-07-01 NOTE — HISTORY OF PRESENT ILLNESS
[FreeTextEntry1] : DUDLEY BERMUDEZ  is a 69 year old  M\par History of coronary artery disease status post anterior wall myocardial infarction with occluded LAD status post revascularization, ischemic cardiomyopathy  \par with severely reduced ejection fraction status post Medtronic ICD, history of aortic regurg status post aortic valve replacement, atrial fibrillation? flutter status  \par post ablation\par \par Physically active at the gym, golf and on his boat\par Compliant with meds and diet\par There is no exertional chest pain, pressure or discomfort. \par There is no significant dyspnea on exertion or orthopnea. \par There is mild lightheadedness with positional changes\par No palpitations, ICD discharges\par No bleeding issues\par \par Labs Hemoglobin 13.4, creatinine 0.9, total cholesterol 147, LDL 65, TSH 1.9.  \par Echocardiogram demonstrates severely reduced LV function. Dilated left ventricle.  Mild mixed mitral valve disease. Bioprosthetic aortic valve mild pulmonary hypertension.\par \par \par \par

## 2019-07-01 NOTE — ASSESSMENT
[FreeTextEntry1] : Coronary artery disease, prior AW myocardial infarction s/p PCI LAD\par Ischemic cardiomyopathy, severe, dilated status post ICD, prior shock\par Aortic valve replacement '15 for AI\par Mild VHD PH\par Atrial fibrillation? flutter status post ablation '17\par Baseline low BP with h/o orthostasis\par \par Above echocardiogram and blood work have been reviewed\par Followup device check.\par \par Will start low dose entresto after washout of ACE inhibitor\par Followup blood work to monitor electrolytes and renal function\par Monitor BP/orthostasis\par Supplemental potassium has been discontinued and he will only use Lasix as needed\par If limited by blood pressure will transition carvedilol to metoprolol\par \par Continue aldosterone antagonist.\par Continue beta blocker. \par Continue aspirin and anticoagulation. Reviewed bleeding precautions \par \par Monior daily weights\par Low sodium diet\par SBE prophylaxis\par \par Well compensated but remains high risk\par Involve cardiomyopathy specialist due to severity of LV dysfunction\par

## 2019-07-22 ENCOUNTER — APPOINTMENT (OUTPATIENT)
Dept: ELECTROPHYSIOLOGY | Facility: CLINIC | Age: 69
End: 2019-07-22
Payer: MEDICARE

## 2019-07-22 PROCEDURE — 93296 REM INTERROG EVL PM/IDS: CPT

## 2019-07-22 PROCEDURE — 93295 DEV INTERROG REMOTE 1/2/MLT: CPT

## 2019-08-20 ENCOUNTER — APPOINTMENT (OUTPATIENT)
Dept: CARDIOLOGY | Facility: CLINIC | Age: 69
End: 2019-08-20
Payer: MEDICARE

## 2019-08-20 VITALS
HEART RATE: 72 BPM | BODY MASS INDEX: 30.35 KG/M2 | WEIGHT: 229 LBS | OXYGEN SATURATION: 96 % | HEIGHT: 73 IN | SYSTOLIC BLOOD PRESSURE: 88 MMHG | DIASTOLIC BLOOD PRESSURE: 42 MMHG

## 2019-08-20 PROCEDURE — 99214 OFFICE O/P EST MOD 30 MIN: CPT

## 2019-08-20 PROCEDURE — 93282 PRGRMG EVAL IMPLANTABLE DFB: CPT

## 2019-08-20 NOTE — PHYSICAL EXAM
[Normal Appearance] : normal appearance [No Deformities] : no deformities [] : no respiratory distress [Respiration, Rhythm And Depth] : normal respiratory rhythm and effort [Exaggerated Use Of Accessory Muscles For Inspiration] : no accessory muscle use [Heart Rate And Rhythm] : heart rate and rhythm were normal [Heart Sounds] : normal S1 and S2 [Bowel Sounds] : normal bowel sounds [Abdomen Soft] : soft [Skin Color & Pigmentation] : normal skin color and pigmentation [Affect] : the affect was normal [Mood] : the mood was normal [FreeTextEntry1] : no edema

## 2019-08-20 NOTE — PROCEDURE
[No] : not [ICD] : Implantable cardioverter-defibrillator [VVI] : VVI [Voltage: ___ volts] : Voltage was [unfilled] volts [Longevity: ___ months] : The estimated remaining battery life is [unfilled] months [Lead Imp:  ___ohms] : lead impedance was [unfilled] ohms [Sensing Amplitude ___mv] : sensing amplitude was [unfilled] mv [___V @] : [unfilled] V [___ ms] : [unfilled] ms [None] : none [Sense ___ %] : Sense [unfilled]% [Pace ___ %] : Pace [unfilled]% [de-identified] : Medtronic [de-identified] : Mirtha AF  MRI VR [id number] [de-identified] : 10-30-18 [de-identified] : [id number] [de-identified] : 40 [de-identified] : Detection/therapies\par \par VF detection 182bpm Therapies ATP with charge, 35J x 6\par fast VT detection 214bpm Therapies bust (2), 35J x 5\par VT detection 150bpm Therapies bust (3), 25J, 35J x 4\par \par settings\par \par Ventricle\par sensing 0.30mv\par amplitude 2.0v (auto)\par duration 0.40ms (auto)\par \par Episodes\par VT x 5:\par 7-25-19 x 3 sec with abg v rate 161bpm (does not seem real NSVT, seems possible atrial tachy/svt)\par 6-13-19 x <1sec with avg v rate 158bpm (does not seem real NSVT, seems possible atrial tachy/svt)\par 6-8-19 x 2 sec with avg v rate 156bpm (seems real NSVT) asx\par 5-21-19 x 1 sec with avg v rate 168bpm (seems real NSVT) asx\par 2-22-19 x 3 sec with avg v rate 201bpm (seems real NSVT) asx\par \par No treatments/therapies\par \par Also noted \par AF x 1 x 8minutes with max v rate 111bpm on 8-15-19 (difficult to discern if real AF, seems irregular s/p ablation on Eliquis). Note; single lead\par Patient is being monitored remotely through [facility]. Offered for us to follow remotes, patient prefers to make the decision when he speak with Dr. Pressley in follow up. \par \par Labs 7-15-19 K 4.4\par echo 6-18-19 ef 26%\par on Coreg\par \par Reviewed with Dr. Melo, no changes were advised. \par Next AICD interrogation 3 months, sooner if changes in symptoms or status. \par \par Sincerely,\par \par Emma Carter PA-C\par patient reviewed and plan advised by supervising physician Dr. Melo\par \par

## 2019-08-20 NOTE — ADDENDUM
[FreeTextEntry1] : Please note the patient was reviewed with the PA.\par I was physically present during the service of the patient\par I was directly involved in the management plan and recommendations of care provided to the patient. \par I personally reviewed the history and physical exam and plan as documented by the PA above.\par \par Aneesh Melo DO, FACC, RPVI\par Cardiologist\par 8/20/2019\par \par

## 2019-08-20 NOTE — REASON FOR VISIT
[Follow-Up - Clinic] : a clinic follow-up of [Cardiomyopathy] : cardiomyopathy [Coronary Artery Disease] : coronary artery disease [Medication Management] : Medication management

## 2019-08-20 NOTE — REVIEW OF SYSTEMS
[Dizziness] : dizziness [Shortness Of Breath] : no shortness of breath [Chest Pain] : no chest pain [Palpitations] : no palpitations

## 2019-08-20 NOTE — HISTORY OF PRESENT ILLNESS
[FreeTextEntry1] : Mr. Encinas is a 69 year old male that came in today 8-20-19 in follow up. \par \par He was last seen 6-27-19. \par \par As you know he has a history of coronary artery disease status post anterior wall myocardial infarction with occluded LAD status post revascularization, ischemic cardiomyopathy  \par with severely reduced ejection fraction status post Medtronic ICD, history of aortic regurg status post aortic valve replacement, atrial fibrillation? flutter status  \par post ablation\par \par He is very active,he just bought a boat and is out wakling everyday with his chores. \par He does not have a formal exercise regimen. He is somewhat limited with his knee and back. \par In past visit Dr. Pressley stopped Enalapril and K+ and started Entresto 24/26 BID. \par Since his recent medication changes he has noticed an increase in dizziness which is usually when he sits for a while then goes to stand. \par He currently denies chest pain, sob, palpitations, syncope, orthopena and PND. \par  \par Labs/tests\par \par labs 5-31-19 LDL 65, HDL 63, triglycerides 96, hgba1c 5.9, AST 19, ALT 20\par labs 7-15-19 bun/creat 14/0.96, Na= 140,K 4.4\par \par echocardiogram 6-18-19 ef 26% with severe global LV dysfunction with akinessi of the apex, mid to distal septum, mid to distal inferior wall and distal anterior wall. hypokinesis of all other wall segments, mild LVE,moderate diastolic dyfunction, peak MVR 4.2mmhg, mean transmitral valve gradient 2.2mmhg, mild MS, peak transaortic vavle gradient 19mmhg, mean transaortic valve gradient 9mmhg, in the presensce of bioprosthetic AV, mild TR, RVE, mild OR, PASP 44mhg, mild PHTN\par \par \par Labs Hemoglobin 13.4, creatinine 0.9, total cholesterol 147, LDL 65, TSH 1.9.  \par Echocardiogram demonstrates severely reduced LV function. Dilated left ventricle.  Mild mixed mitral valve disease. Bioprosthetic aortic valve mild pulmonary hypertension.\par \par \par \par

## 2019-08-20 NOTE — ASSESSMENT
[FreeTextEntry1] : PLAN 8-20-19\par \par \par -ICM with improvement in EF from 10-15% on 7-10-17 to currently 26%. Enalapril and K was discontinued in past visit while Entresto 24/26mg BID was added. Labs, stable as above. Patient has continued to take Furosemide 20mg QOD (which should be PRN) and has noticed some increase in dizziness since his recent medication changes. No acute edema or weight gain noted (?3 lbs since last visit) Recheck Bp in office 84/60 (sitting) standing with some dizziness 78 by palp. When reviewed with Dr. Melo, it was advised that the patient stop his Furosemide and follow his weights. If symptoms were to continue or conversely his weight starts to increase, he is aware to contact our office. We will follow him closely in the next couple of weeks. He is aware to avoid Na+. As mentioned in past note. If limited by blood pressure will transition carvedilol to metoprolol\par \par -Coronary artery disease, prior AW myocardial infarction s/p PCI LAD. Echo as above. Discuss future ischemic evaluation at time of next visit. Currently stable and asymptomatic. On Asa and statin\par \par -VHD/Aortic valve replacement '15 for AI. Echo as above. Stable. SBE prophylaxis.\par \par -s/p AICD. Interrogated today in office (see note; ?AF/NSVT) Labs and echo as above. No changes per Dr. Melo. f/u 3 months\par \par -Atrial fibrillation? flutter status post ablation '17. ?findings on AICD today. On Eliquis CBC 5-31-19 h/h 13.4/41.3. Will request most recent CBC, if not done would advise to update given symptoms as above (on Asa and Eliquis). \par \par -hyperlipidemia. On atorvastatin. Labs as above. \par \par -Baseline low BP with h/o orthostasis. Following as above. \par \par f/u in 2 weeks to follow bp on entresto, sooner if changes in symptoms or status.\par \par Sincerely,\par \par Emma Carter PA-C\par patient reviewed and plan advised by supervising physician Dr. Melo\par \par

## 2019-09-06 ENCOUNTER — APPOINTMENT (OUTPATIENT)
Dept: CARDIOLOGY | Facility: CLINIC | Age: 69
End: 2019-09-06
Payer: MEDICARE

## 2019-09-06 VITALS
DIASTOLIC BLOOD PRESSURE: 60 MMHG | BODY MASS INDEX: 30.75 KG/M2 | SYSTOLIC BLOOD PRESSURE: 100 MMHG | WEIGHT: 232 LBS | HEIGHT: 73 IN | HEART RATE: 75 BPM | OXYGEN SATURATION: 96 %

## 2019-09-06 DIAGNOSIS — Z86.79 PERSONAL HISTORY OF OTHER DISEASES OF THE CIRCULATORY SYSTEM: ICD-10-CM

## 2019-09-06 PROCEDURE — 99214 OFFICE O/P EST MOD 30 MIN: CPT

## 2019-09-06 RX ORDER — CARVEDILOL 25 MG/1
25 TABLET, FILM COATED ORAL
Qty: 28 | Refills: 0 | Status: DISCONTINUED | COMMUNITY
Start: 2018-10-09 | End: 2019-09-06

## 2019-09-06 NOTE — PHYSICAL EXAM
[Normal Appearance] : normal appearance [General Appearance - Well Developed] : well developed [Well Groomed] : well groomed [General Appearance - Well Nourished] : well nourished [No Deformities] : no deformities [General Appearance - In No Acute Distress] : no acute distress [Normal Conjunctiva] : the conjunctiva exhibited no abnormalities [Eyelids - No Xanthelasma] : the eyelids demonstrated no xanthelasmas [Normal Oral Mucosa] : normal oral mucosa [No Oral Pallor] : no oral pallor [No Oral Cyanosis] : no oral cyanosis [Normal Jugular Venous A Waves Present] : normal jugular venous A waves present [Normal Jugular Venous V Waves Present] : normal jugular venous V waves present [No Jugular Venous Downs A Waves] : no jugular venous downs A waves [Respiration, Rhythm And Depth] : normal respiratory rhythm and effort [Exaggerated Use Of Accessory Muscles For Inspiration] : no accessory muscle use [Auscultation Breath Sounds / Voice Sounds] : lungs were clear to auscultation bilaterally [Heart Rate And Rhythm] : heart rate and rhythm were normal [Heart Sounds] : normal S1 and S2 [Murmurs] : no murmurs present [Abdomen Soft] : soft [Abdomen Tenderness] : non-tender [Abdomen Mass (___ Cm)] : no abdominal mass palpated [Abnormal Walk] : normal gait [Gait - Sufficient For Exercise Testing] : the gait was sufficient for exercise testing [Nail Clubbing] : no clubbing of the fingernails [Cyanosis, Localized] : no localized cyanosis [Petechial Hemorrhages (___cm)] : no petechial hemorrhages [Skin Color & Pigmentation] : normal skin color and pigmentation [] : no rash [No Venous Stasis] : no venous stasis [Skin Lesions] : no skin lesions [No Skin Ulcers] : no skin ulcer [No Xanthoma] : no  xanthoma was observed [Oriented To Time, Place, And Person] : oriented to person, place, and time [Mood] : the mood was normal [Affect] : the affect was normal [No Anxiety] : not feeling anxious

## 2019-09-07 NOTE — ASSESSMENT
[FreeTextEntry1] : \par Coronary artery disease, prior AW myocardial infarction s/p PCI LAD\par Ischemic cardiomyopathy, severe, dilated status post ICD, prior shock\par Aortic valve replacement '15 for AI\par Mild VHD PH\par Atrial fibrillation? flutter status post ablation '17, brief recurrence\par NSVT\par Baseline low BP with orthostasis\par \par Above echocardiogram and blood work have been reviewed\par Followup device check.\par \par Continue entresto\par Monitor electrolytes and renal function\par Monitor BP/orthostasis\par Use Lasix as needed\par Transition carvedilol to metoprolol\par Hopefully this will allow us more blood pressure to up titrate his aldosterone antagonist and entresto\par Continue aldosterone antagonist.\par Continue aspirin and anticoagulation. Reviewed bleeding precautions \par Monior daily weights\par Low sodium diet\par SBE prophylaxis\par \par Well compensated but remains high risk\par Re involve cardiomyopathy specialist due to severity of LV dysfunction\par \par

## 2019-09-07 NOTE — HISTORY OF PRESENT ILLNESS
[FreeTextEntry1] : DUDLEY BERMUDEZ  is a 69 year old  M\par History of coronary artery disease status post anterior wall myocardial infarction with occluded LAD status post revascularization, ischemic cardiomyopathy  \par with severely reduced ejection fraction status post Medtronic ICD, history of aortic regurg status post aortic valve replacement, atrial fibrillation? flutter status  \par post ablation\par \par Physically active at the gym, golf and on his boat\par Compliant with meds and diet\par His major complaint is related to dizziness. \par There is no exertional chest pain, pressure or discomfort. \par There is no significant dyspnea on exertion or orthopnea. \par There is mild lightheadedness with positional changes\par No palpitations, ICD discharges\par No bleeding issues\par \par At last office visit started entresto\par \par Followup device checks will be performed in the office for followup will be scheduled in one month for adjustment of his chronic heart failure. Medications\par \par July 2019, potassium 4.4, creatinine 1.0 \par Device check with nonsustained ventricular tachycardia brief transient atrial fibrillation \par \par Labs Hemoglobin 13.4, creatinine 0.9, total cholesterol 147, LDL 65, TSH 1.9.  \par Echocardiogram demonstrates severely reduced LV function. Dilated left ventricle.  Mild mixed mitral valve disease. Bioprosthetic aortic valve mild pulmonary hypertension.\par

## 2019-09-07 NOTE — REASON FOR VISIT
[Cardiomyopathy] : cardiomyopathy [Coronary Artery Disease] : coronary artery disease [Medication Management] : Medication management [Follow-Up - Clinic] : a clinic follow-up of [Atrial Fibrillation] : atrial fibrillation [Dizziness] : dizziness [Heart Failure] : congestive heart failure

## 2019-09-18 ENCOUNTER — INBOUND DOCUMENT (OUTPATIENT)
Age: 69
End: 2019-09-18

## 2019-10-04 ENCOUNTER — APPOINTMENT (OUTPATIENT)
Dept: CARDIOLOGY | Facility: CLINIC | Age: 69
End: 2019-10-04

## 2019-10-14 ENCOUNTER — RX RENEWAL (OUTPATIENT)
Age: 69
End: 2019-10-14

## 2019-10-21 ENCOUNTER — APPOINTMENT (OUTPATIENT)
Dept: ELECTROPHYSIOLOGY | Facility: CLINIC | Age: 69
End: 2019-10-21
Payer: MEDICARE

## 2019-10-21 PROCEDURE — 93296 REM INTERROG EVL PM/IDS: CPT

## 2019-10-21 PROCEDURE — 93294 REM INTERROG EVL PM/LDLS PM: CPT

## 2019-10-25 ENCOUNTER — APPOINTMENT (OUTPATIENT)
Dept: CARDIOLOGY | Facility: CLINIC | Age: 69
End: 2019-10-25
Payer: MEDICARE

## 2019-10-25 VITALS
DIASTOLIC BLOOD PRESSURE: 60 MMHG | BODY MASS INDEX: 30.48 KG/M2 | WEIGHT: 230 LBS | HEIGHT: 73 IN | HEART RATE: 73 BPM | OXYGEN SATURATION: 95 % | SYSTOLIC BLOOD PRESSURE: 98 MMHG

## 2019-10-25 PROCEDURE — 99215 OFFICE O/P EST HI 40 MIN: CPT

## 2019-10-25 NOTE — ASSESSMENT
[FreeTextEntry1] : Coronary artery disease, prior AW myocardial infarction s/p PCI LAD\par Ischemic cardiomyopathy, severe, dilated status post ICD, prior shock\par Aortic valve replacement '15 for AI\par Mild VHD PH\par Atrial fibrillation? flutter status post ablation '17, brief recurrence\par NSVT\par Baseline low BP with orthostasis\par \par Followup device check.\par \par Continue entresto\par Monitor electrolytes and renal function.  BMP ordered.\par Monitor BP/orthostasis\par Use Lasix as needed\par Decrease metoprolol to QD dosing. \par Hopefully this will allow us more blood pressure to up titrate his aldosterone antagonist and entresto\par Continue aldosterone antagonist.\par Continue aspirin and anticoagulation. Reviewed bleeding precautions \par Monior daily weights\par Low sodium diet\par SBE prophylaxis\par \par Well compensated but remains high risk\par Re involve cardiomyopathy specialist due to severity of LV dysfunction\par \par

## 2019-10-25 NOTE — HISTORY OF PRESENT ILLNESS
[FreeTextEntry1] : DUDLEY BERMUDEZ  is a 69 year old  M\par History of coronary artery disease status post anterior wall myocardial infarction with occluded LAD status post revascularization, ischemic cardiomyopathy  \par with severely reduced ejection fraction status post Medtronic ICD, history of aortic regurg status post aortic valve replacement, atrial fibrillation? flutter status  \par post ablation\par \par Physically active at the gym, golf and on his boat\par Compliant with meds and diet\par His major complaint is related to dizziness.  Stable with most recent medication change. \par There is no exertional chest pain, pressure or discomfort. \par There is no significant dyspnea on exertion or orthopnea. \par There is mild lightheadedness with positional changes\par No palpitations, ICD discharges\par No bleeding issues\par \par Recently started Entresto.  Tolerating well.  Reports recent labs.  Results requested. \par Carvedilol was changed to metoprolol to increase BP.  Lasix was changed to PRN.  He has not utilized any.  Feels well.  Stable dizziness.  Stable MATHEWS with 1 flight of stairs.   No PND, orthopnea, worsening MATHEWS, CP, change in exercise tolerance, or edema. \par Reports recent lab work although after multiple calls, can't find BMP. \par \par July 2019, potassium 4.4, creatinine 1.0 \par Device check with nonsustained ventricular tachycardia brief transient atrial fibrillation \par \par Labs Hemoglobin 13.4, creatinine 0.9, total cholesterol 147, LDL 65, TSH 1.9.  \par Echocardiogram demonstrates severely reduced LV function. Dilated left ventricle.  Mild mixed mitral valve disease. Bioprosthetic aortic valve mild pulmonary hypertension.\par

## 2019-10-25 NOTE — PHYSICAL EXAM
[General Appearance - Well Developed] : well developed [Normal Appearance] : normal appearance [General Appearance - Well Nourished] : well nourished [Well Groomed] : well groomed [No Deformities] : no deformities [Normal Conjunctiva] : the conjunctiva exhibited no abnormalities [Eyelids - No Xanthelasma] : the eyelids demonstrated no xanthelasmas [General Appearance - In No Acute Distress] : no acute distress [No Oral Cyanosis] : no oral cyanosis [Normal Oral Mucosa] : normal oral mucosa [No Oral Pallor] : no oral pallor [Normal Jugular Venous V Waves Present] : normal jugular venous V waves present [No Jugular Venous Downs A Waves] : no jugular venous downs A waves [Normal Jugular Venous A Waves Present] : normal jugular venous A waves present [Respiration, Rhythm And Depth] : normal respiratory rhythm and effort [Exaggerated Use Of Accessory Muscles For Inspiration] : no accessory muscle use [Murmurs] : no murmurs present [Heart Rate And Rhythm] : heart rate and rhythm were normal [Auscultation Breath Sounds / Voice Sounds] : lungs were clear to auscultation bilaterally [Heart Sounds] : normal S1 and S2 [Abdomen Tenderness] : non-tender [Abdomen Soft] : soft [Abnormal Walk] : normal gait [Abdomen Mass (___ Cm)] : no abdominal mass palpated [Gait - Sufficient For Exercise Testing] : the gait was sufficient for exercise testing [Nail Clubbing] : no clubbing of the fingernails [Cyanosis, Localized] : no localized cyanosis [Petechial Hemorrhages (___cm)] : no petechial hemorrhages [Skin Color & Pigmentation] : normal skin color and pigmentation [] : no rash [No Venous Stasis] : no venous stasis [Skin Lesions] : no skin lesions [No Skin Ulcers] : no skin ulcer [No Xanthoma] : no  xanthoma was observed [Oriented To Time, Place, And Person] : oriented to person, place, and time [Affect] : the affect was normal [Mood] : the mood was normal [No Anxiety] : not feeling anxious

## 2019-10-25 NOTE — ADDENDUM
[FreeTextEntry1] : Please note the patient was reviewed with PA Uriel Barnett.\jacinto I was physically present during the service of the patient and was directly involved in the management plan and recommendations of care provided to the patient. \par I personally reviewed the history and physical examination as documented by the PA above\par

## 2019-10-25 NOTE — REASON FOR VISIT
[Follow-Up - Clinic] : a clinic follow-up of [Atrial Fibrillation] : atrial fibrillation [Dizziness] : dizziness [Coronary Artery Disease] : coronary artery disease [Cardiomyopathy] : cardiomyopathy [Heart Failure] : congestive heart failure [Medication Management] : Medication management

## 2019-10-29 ENCOUNTER — APPOINTMENT (OUTPATIENT)
Dept: UROLOGY | Facility: CLINIC | Age: 69
End: 2019-10-29

## 2019-11-05 ENCOUNTER — APPOINTMENT (OUTPATIENT)
Dept: UROLOGY | Facility: CLINIC | Age: 69
End: 2019-11-05
Payer: MEDICARE

## 2019-11-05 VITALS
DIASTOLIC BLOOD PRESSURE: 69 MMHG | HEIGHT: 73 IN | HEART RATE: 70 BPM | BODY MASS INDEX: 30.35 KG/M2 | WEIGHT: 229 LBS | SYSTOLIC BLOOD PRESSURE: 103 MMHG | TEMPERATURE: 97.9 F

## 2019-11-05 LAB
BILIRUB UR QL STRIP: NEGATIVE
CLARITY UR: CLEAR
COLLECTION METHOD: NORMAL
GLUCOSE UR-MCNC: NEGATIVE
HCG UR QL: 0.2 EU/DL
HGB UR QL STRIP.AUTO: NORMAL
KETONES UR-MCNC: NEGATIVE
LEUKOCYTE ESTERASE UR QL STRIP: NEGATIVE
NITRITE UR QL STRIP: NORMAL
PH UR STRIP: 5.5
PROT UR STRIP-MCNC: NEGATIVE
SP GR UR STRIP: 1.02

## 2019-11-05 PROCEDURE — 99204 OFFICE O/P NEW MOD 45 MIN: CPT | Mod: 25

## 2019-11-05 PROCEDURE — 81003 URINALYSIS AUTO W/O SCOPE: CPT | Mod: QW

## 2019-11-05 RX ORDER — FUROSEMIDE 20 MG/1
20 TABLET ORAL
Refills: 0 | Status: DISCONTINUED | COMMUNITY
End: 2019-11-05

## 2019-11-05 RX ORDER — AZITHROMYCIN 250 MG/1
250 TABLET, FILM COATED ORAL
Qty: 6 | Refills: 0 | Status: DISCONTINUED | COMMUNITY
Start: 2019-10-23 | End: 2019-11-05

## 2019-11-05 RX ORDER — GUAIFENESIN AND CODEINE PHOSPHATE 10; 100 MG/5ML; MG/5ML
100-10 SOLUTION ORAL
Qty: 180 | Refills: 0 | Status: DISCONTINUED | COMMUNITY
Start: 2019-10-23 | End: 2019-11-05

## 2019-11-05 NOTE — HISTORY OF PRESENT ILLNESS
[FreeTextEntry1] : Patient presents with hematuria about 4 months ago.  Only happened that once.  no dysuria. no appreciable frequency. nocturia X 1-2.  no trauma.  FOS is medium.  no history of kidney stones.

## 2019-11-05 NOTE — PHYSICAL EXAM
[General Appearance - Well Developed] : well developed [General Appearance - Well Nourished] : well nourished [Normal Appearance] : normal appearance [Well Groomed] : well groomed [General Appearance - In No Acute Distress] : no acute distress [Edema] : no peripheral edema [Respiration, Rhythm And Depth] : normal respiratory rhythm and effort [Exaggerated Use Of Accessory Muscles For Inspiration] : no accessory muscle use [Abdomen Soft] : soft [Abdomen Tenderness] : non-tender [Costovertebral Angle Tenderness] : no ~M costovertebral angle tenderness [Urethral Meatus] : meatus normal [Penis Abnormality] : normal circumcised penis [Urinary Bladder Findings] : the bladder was normal on palpation [Scrotum] : the scrotum was normal [Rectal Exam - Seminal Vesicles] : the seminal vesicles were normal [Epididymis] : the epididymides were normal [Testes Tenderness] : no tenderness of the testes [Testes Mass (___cm)] : there were no testicular masses [Anus Abnormality] : the anus and perineum were normal [Rectal Exam - Rectum] : digital rectal exam was normal [Prostate Enlargement] : the prostate was not enlarged [Prostate Tenderness] : the prostate was not tender [No Prostate Nodules] : no prostate nodules [Prostate Size ___ (0-4)] : prostate size [unfilled] (scale: 0-4) [Normal Station and Gait] : the gait and station were normal for the patient's age [] : no rash [No Focal Deficits] : no focal deficits [Oriented To Time, Place, And Person] : oriented to person, place, and time [Affect] : the affect was normal [Mood] : the mood was normal [Not Anxious] : not anxious [Inguinal Lymph Nodes Enlarged Bilaterally] : inguinal

## 2019-11-05 NOTE — LETTER BODY
[Dear  ___] : Dear  [unfilled], [Courtesy Letter:] : I had the pleasure of seeing your patient, [unfilled], in my office today. [Please see my note below.] : Please see my note below. [Sincerely,] : Sincerely, [FreeTextEntry3] : Ed\par \par Александр Florian MD\par [facility]\par  of Urology\par [employer] [facility]\par

## 2019-11-05 NOTE — ASSESSMENT
[FreeTextEntry1] : gross hematuria about 5 months ago\par \par Will get urine cytology and CT urogram\par he prefers to hold on cystoscopy\par

## 2019-11-06 LAB — URINE CYTOLOGY: NORMAL

## 2019-11-08 ENCOUNTER — APPOINTMENT (OUTPATIENT)
Dept: CT IMAGING | Facility: CLINIC | Age: 69
End: 2019-11-08
Payer: MEDICARE

## 2019-11-08 PROCEDURE — 74178 CT ABD&PLV WO CNTR FLWD CNTR: CPT

## 2019-11-08 PROCEDURE — Q9967B: CUSTOM

## 2019-11-08 PROCEDURE — 82565A: CUSTOM | Mod: QW

## 2019-11-19 ENCOUNTER — APPOINTMENT (OUTPATIENT)
Dept: UROLOGY | Facility: CLINIC | Age: 69
End: 2019-11-19
Payer: MEDICARE

## 2019-11-19 VITALS
SYSTOLIC BLOOD PRESSURE: 85 MMHG | WEIGHT: 229 LBS | BODY MASS INDEX: 30.35 KG/M2 | HEIGHT: 73 IN | HEART RATE: 76 BPM | DIASTOLIC BLOOD PRESSURE: 67 MMHG

## 2019-11-19 DIAGNOSIS — R31.0 GROSS HEMATURIA: ICD-10-CM

## 2019-11-19 PROCEDURE — 99213 OFFICE O/P EST LOW 20 MIN: CPT

## 2019-11-19 NOTE — HISTORY OF PRESENT ILLNESS
[FreeTextEntry1] : Patietn presents in follow up of CT. no gross hematuria or dysuria or urgency. no fevers or chills

## 2019-11-19 NOTE — ASSESSMENT
[FreeTextEntry1] : Impression:\par \par negative CT except for chest issue.  \par cytology negative\par \par Plan:\par \par follwou p three moths with repeat urine\par patient to speak with his PCP abutthe chest abnormality

## 2019-11-19 NOTE — LETTER BODY
[Dear  ___] : Dear  [unfilled], [Please see my note below.] : Please see my note below. [Sincerely,] : Sincerely, [Courtesy Letter:] : I had the pleasure of seeing your patient, [unfilled], in my office today. [FreeTextEntry3] : Ed\par \par Александр Florian MD\par [facility]\par  of Urology\par [employer] at [facility]\par

## 2019-11-19 NOTE — PHYSICAL EXAM
[General Appearance - Well Developed] : well developed [Normal Appearance] : normal appearance [General Appearance - Well Nourished] : well nourished [General Appearance - In No Acute Distress] : no acute distress [] : no rash [Well Groomed] : well groomed [Mood] : the mood was normal [Oriented To Time, Place, And Person] : oriented to person, place, and time [Edema] : no peripheral edema [Affect] : the affect was normal [Normal Station and Gait] : the gait and station were normal for the patient's age [No Focal Deficits] : no focal deficits [Not Anxious] : not anxious

## 2019-11-19 NOTE — PRE-OP CHECKLIST - SURGICAL CONSENT
Subjective:  Desire Masters is a 35 Fetal cardiac anomaly complicating pregnancy, antepartum 2019     Priority: Low     Family history of ASD  ? VSD on fetal anatomy US  Ruled out on MFM survey     • Pyelectasis of fetus on prenatal ultrasound-resolved 10/3/19 2019     Priority: Low     Bilateral   MFM follow up at 32 weeks     • Hx of macrosomia in infant in prior pregnancy, currently pregnant 2019     Priority: Low   • Prenatal care, subsequent pregnancy DANIEL      Birth Comments: del by liza   2 SAB     U       1 SAB     baby    Yogesh Caldwell MD     done

## 2019-11-20 ENCOUNTER — APPOINTMENT (OUTPATIENT)
Dept: CARDIOLOGY | Facility: CLINIC | Age: 69
End: 2019-11-20
Payer: MEDICARE

## 2019-11-20 PROCEDURE — 93282 PRGRMG EVAL IMPLANTABLE DFB: CPT

## 2019-11-21 NOTE — PROCEDURE
[No] : not [ICD] : Implantable cardioverter-defibrillator [VVI] : VVI [Voltage: ___ volts] : Voltage was [unfilled] volts [Longevity: ___ months] : The estimated remaining battery life is [unfilled] months [Lead Imp:  ___ohms] : lead impedance was [unfilled] ohms [___V @] : [unfilled] V [Sensing Amplitude ___mv] : sensing amplitude was [unfilled] mv [___ ms] : [unfilled] ms [None] : none [Pace ___ %] : Pace [unfilled]% [Sense ___ %] : Sense [unfilled]% [de-identified] : Medtronic [de-identified] : Mirtha AF  MRI VR [id number] [de-identified] : [id number] [de-identified] : 10-30-18 [de-identified] : 40 [de-identified] : Detection/therapies\par \par VF detection 182bpm Therapies ATP with charge, 35J x 6\par fast VT detection 214bpm Therapies bust (2), 35J x 5\par VT detection 150bpm Therapies bust (3), 25J, 35J x 4\par \par settings\par \par Ventricle\par sensing 0.30mv\par amplitude 2.0v (auto)\par duration 0.40ms (auto)\par \par Episodes\par 93 episodes of reported NSVT.  <1sec-1sec.  Most likely AF.  AF is now constant and rate controlled since early September\par \par F/U OV to discuss options.  BP remains low, but asymptomatic.  Continue current meds.  No increase at this time. Possible F/U with EP prior to OV. Will discuss with pts cardiologist.

## 2019-11-22 ENCOUNTER — MEDICATION RENEWAL (OUTPATIENT)
Age: 69
End: 2019-11-22

## 2019-12-21 ENCOUNTER — APPOINTMENT (OUTPATIENT)
Dept: CT IMAGING | Facility: CLINIC | Age: 69
End: 2019-12-21
Payer: MEDICARE

## 2019-12-21 PROCEDURE — 71250 CT THORAX DX C-: CPT

## 2019-12-30 ENCOUNTER — APPOINTMENT (OUTPATIENT)
Dept: ELECTROPHYSIOLOGY | Facility: CLINIC | Age: 69
End: 2019-12-30

## 2019-12-31 ENCOUNTER — APPOINTMENT (OUTPATIENT)
Dept: CARDIOLOGY | Facility: CLINIC | Age: 69
End: 2019-12-31
Payer: MEDICARE

## 2019-12-31 ENCOUNTER — NON-APPOINTMENT (OUTPATIENT)
Age: 69
End: 2019-12-31

## 2019-12-31 VITALS
HEIGHT: 73 IN | OXYGEN SATURATION: 98 % | HEART RATE: 74 BPM | SYSTOLIC BLOOD PRESSURE: 124 MMHG | DIASTOLIC BLOOD PRESSURE: 82 MMHG | BODY MASS INDEX: 30.35 KG/M2 | WEIGHT: 229 LBS

## 2019-12-31 PROCEDURE — 93000 ELECTROCARDIOGRAM COMPLETE: CPT

## 2019-12-31 PROCEDURE — 99215 OFFICE O/P EST HI 40 MIN: CPT

## 2019-12-31 NOTE — PHYSICAL EXAM
[General Appearance - Well Developed] : well developed [Normal Appearance] : normal appearance [General Appearance - Well Nourished] : well nourished [Well Groomed] : well groomed [No Deformities] : no deformities [General Appearance - In No Acute Distress] : no acute distress [Normal Conjunctiva] : the conjunctiva exhibited no abnormalities [No Oral Pallor] : no oral pallor [Normal Oral Mucosa] : normal oral mucosa [Eyelids - No Xanthelasma] : the eyelids demonstrated no xanthelasmas [No Oral Cyanosis] : no oral cyanosis [Normal Jugular Venous A Waves Present] : normal jugular venous A waves present [Normal Jugular Venous V Waves Present] : normal jugular venous V waves present [No Jugular Venous Downs A Waves] : no jugular venous downs A waves [Respiration, Rhythm And Depth] : normal respiratory rhythm and effort [Auscultation Breath Sounds / Voice Sounds] : lungs were clear to auscultation bilaterally [Exaggerated Use Of Accessory Muscles For Inspiration] : no accessory muscle use [Heart Rate And Rhythm] : heart rate and rhythm were normal [Murmurs] : no murmurs present [Abdomen Soft] : soft [Heart Sounds] : normal S1 and S2 [Abdomen Tenderness] : non-tender [Abdomen Mass (___ Cm)] : no abdominal mass palpated [Nail Clubbing] : no clubbing of the fingernails [Gait - Sufficient For Exercise Testing] : the gait was sufficient for exercise testing [Abnormal Walk] : normal gait [Cyanosis, Localized] : no localized cyanosis [Petechial Hemorrhages (___cm)] : no petechial hemorrhages [] : no rash [No Venous Stasis] : no venous stasis [Skin Color & Pigmentation] : normal skin color and pigmentation [No Skin Ulcers] : no skin ulcer [Skin Lesions] : no skin lesions [No Xanthoma] : no  xanthoma was observed [Mood] : the mood was normal [Affect] : the affect was normal [Oriented To Time, Place, And Person] : oriented to person, place, and time [No Anxiety] : not feeling anxious

## 2020-01-15 ENCOUNTER — OUTPATIENT (OUTPATIENT)
Dept: OUTPATIENT SERVICES | Facility: HOSPITAL | Age: 70
LOS: 1 days | End: 2020-01-15

## 2020-01-15 DIAGNOSIS — Z98.89 OTHER SPECIFIED POSTPROCEDURAL STATES: Chronic | ICD-10-CM

## 2020-01-15 DIAGNOSIS — Z95.5 PRESENCE OF CORONARY ANGIOPLASTY IMPLANT AND GRAFT: Chronic | ICD-10-CM

## 2020-01-15 DIAGNOSIS — Z95.4 PRESENCE OF OTHER HEART-VALVE REPLACEMENT: Chronic | ICD-10-CM

## 2020-01-15 DIAGNOSIS — Z98.890 OTHER SPECIFIED POSTPROCEDURAL STATES: Chronic | ICD-10-CM

## 2020-01-15 DIAGNOSIS — E87.5 HYPERKALEMIA: ICD-10-CM

## 2020-01-15 DIAGNOSIS — Z95.810 PRESENCE OF AUTOMATIC (IMPLANTABLE) CARDIAC DEFIBRILLATOR: Chronic | ICD-10-CM

## 2020-01-22 ENCOUNTER — APPOINTMENT (OUTPATIENT)
Dept: CT IMAGING | Facility: CLINIC | Age: 70
End: 2020-01-22
Payer: MEDICARE

## 2020-01-22 PROCEDURE — 70486 CT MAXILLOFACIAL W/O DYE: CPT

## 2020-02-06 ENCOUNTER — APPOINTMENT (OUTPATIENT)
Dept: CARDIOLOGY | Facility: CLINIC | Age: 70
End: 2020-02-06
Payer: MEDICARE

## 2020-02-06 VITALS — HEIGHT: 73 IN | WEIGHT: 225 LBS | BODY MASS INDEX: 29.82 KG/M2

## 2020-02-06 DIAGNOSIS — R04.0 EPISTAXIS: ICD-10-CM

## 2020-02-06 PROCEDURE — 99215 OFFICE O/P EST HI 40 MIN: CPT

## 2020-02-11 PROBLEM — R04.0 EPISTAXIS: Status: ACTIVE | Noted: 2020-02-11

## 2020-02-11 NOTE — REASON FOR VISIT
[Follow-Up - Clinic] : a clinic follow-up of [Cardiomyopathy] : cardiomyopathy [Coronary Artery Disease] : coronary artery disease [Atrial Fibrillation] : atrial fibrillation [Medication Management] : Medication management [Dizziness] : dizziness [Heart Failure] : congestive heart failure [FreeTextEntry2] : Afib

## 2020-02-11 NOTE — HISTORY OF PRESENT ILLNESS
[FreeTextEntry1] : DUDLEY BERMUDEZ  is a 69 year old  M\par History of coronary artery disease status post anterior wall myocardial infarction with occluded LAD status post revascularization, ischemic cardiomyopathy  \par with severely reduced ejection fraction status post Medtronic ICD, history of aortic regurg status post aortic valve replacement, AF post ablation.\par \par Physically active at the gym, golf and on his boat.\par There is MATHEWS with more than normal exertion.  No CP.\par Compliant with meds and diet\par Stable with most recent medication change. \par There is no exertional chest pain, pressure or discomfort. \par There is no PND or orthopnea. \par There is mild lightheadedness with positional changes\par No palpitations, ICD discharges\par No bleeding issues\par \par Recently started Entresto.  Tolerating well.  BUN: 19, Creat: 1.07\par Carvedilol was changed to metoprolol to increase BP.  Lasix was changed to PRN.  He has not utilized any.  Feels well.  Stable dizziness.  Slight increase in MATHEWS with walking hills in [place].   No PND, orthopnea, worsening MATHEWS, CP, change in exercise tolerance, or edema. \par AF now chronic.  Elevated HR at previous OV.  Dig started.  \par \par Presents today for further evaluation.  Feels well.  Epistaxis while in office.  \par Device interrogation to evaluate AF reveal decreased rates.  Average in 60's.  \par \par July 2019, potassium 4.4, creatinine 1.0 \par Device check with nonsustained ventricular tachycardia brief transient atrial fibrillation \par \par Labs Hemoglobin 13.4, creatinine 0.9, total cholesterol 147, LDL 65, TSH 1.9.  \par Echocardiogram demonstrates severely reduced LV function. Dilated left ventricle.  Mild mixed mitral valve disease. Bioprosthetic aortic valve mild pulmonary hypertension.\par

## 2020-02-11 NOTE — REASON FOR VISIT
[Atrial Fibrillation] : atrial fibrillation [Follow-Up - Clinic] : a clinic follow-up of [Cardiomyopathy] : cardiomyopathy [Coronary Artery Disease] : coronary artery disease [Heart Failure] : congestive heart failure [Dizziness] : dizziness [Medication Management] : Medication management [FreeTextEntry2] : Afib

## 2020-02-11 NOTE — ASSESSMENT
[FreeTextEntry1] : Coronary artery disease, prior anteior wall myocardial infarction s/p PCI LAD\par Ischemic cardiomyopathy, severe, dilated status post ICD, prior shock\par Aortic valve replacement '15 for AI\par Mild VHD PH\par Atrial fibrillation status post ablation '17.  Now persistent AF.  On digoxin.  Therapeutic levels.  1.0.  \par Repeat dig level to ensure it doesn't trend up.    \par NSVT\par Baseline low BP with orthostasis\par \par Epistaxis.  Called ENT and made emergent appointment.  If unable to control bleeding, they will contact us regarding NOAC. \par \par Followup device check.\par \par Continue Entresto\par Monitor electrolytes and renal function.\par Monitor BP/orthostasis\par Use Lasix as needed\par Decrease metoprolol to QD dosing. \par Hopefully this will allow us more blood pressure to up titrate his aldosterone antagonist and Entresto\par Follow up with HF team, Dr. Quiñones\par Continue aldosterone antagonist.\par Continue aspirin and anticoagulation for now.  \par Monior daily weights\par Low sodium diet\par SBE prophylaxis\par \par Well compensated but remains high risk\par Re involve cardiomyopathy specialist due to severity of LV dysfunction\par \par Preop for dental procedure.  Discussed with pts dentist, Dr. Mckeon.  May remain on NOAC for procedure.  Will use local anesthetic and will refrain from epinephrine.  \par At present, there are no active cardiac conditions. \par No recent unstable coronary syndromes, decompensated heart failure, severe valvular heart disease or significant dysrhythmias. \par Baseline functional status is acceptable. \par The clinical benefit of the proposed procedure outweighs the associated cardiovascular risk. \par Risk not attenuated with further CV testing. \par Prior testing as outlined above.\par Optimized from a cardiovascular perspective.

## 2020-02-11 NOTE — HISTORY OF PRESENT ILLNESS
[FreeTextEntry1] : DUDLEY BERMUDEZ  is a 69 year old  M\par History of coronary artery disease status post anterior wall myocardial infarction with occluded LAD status post revascularization, ischemic cardiomyopathy  \par with severely reduced ejection fraction status post Medtronic ICD, history of aortic regurg status post aortic valve replacement, AF post ablation.\par \par Physically active at the gym, golf and on his boat.\par There is MATHEWS with more than normal exertion.  No CP.\par Compliant with meds and diet\par Stable with most recent medication change. \par There is no exertional chest pain, pressure or discomfort. \par There is no PND or orthopnea. \par There is mild lightheadedness with positional changes\par No palpitations, ICD discharges\par No bleeding issues\par \par Recently started Entresto.  Tolerating well.  BUN: 19, Creat: 1.07\par Carvedilol was changed to metoprolol to increase BP.  Lasix was changed to PRN.  He has not utilized any.  Feels well.  Stable dizziness.  Slight increase in MATHEWS with walking hills in [place].   No PND, orthopnea, worsening MATHEWS, CP, change in exercise tolerance, or edema. \par AF now chronic.  Elevated HR at previous OV.  Dig started.  \par \par Presents today for further evaluation.  Feels well.  Epistaxis while in office.  \par Device interrogation to evaluate AF reveal decreased rates.  Average in 60's.  \par \par July 2019, potassium 4.4, creatinine 1.0 \par Device check with nonsustained ventricular tachycardia brief transient atrial fibrillation \par \par Labs Hemoglobin 13.4, creatinine 0.9, total cholesterol 147, LDL 65, TSH 1.9.  \par Echocardiogram demonstrates severely reduced LV function. Dilated left ventricle.  Mild mixed mitral valve disease. Bioprosthetic aortic valve mild pulmonary hypertension.\par

## 2020-02-11 NOTE — PHYSICAL EXAM
[General Appearance - Well Developed] : well developed [Normal Appearance] : normal appearance [General Appearance - Well Nourished] : well nourished [Well Groomed] : well groomed [No Deformities] : no deformities [Normal Conjunctiva] : the conjunctiva exhibited no abnormalities [General Appearance - In No Acute Distress] : no acute distress [Eyelids - No Xanthelasma] : the eyelids demonstrated no xanthelasmas [Normal Oral Mucosa] : normal oral mucosa [No Oral Pallor] : no oral pallor [No Oral Cyanosis] : no oral cyanosis [Normal Jugular Venous V Waves Present] : normal jugular venous V waves present [Normal Jugular Venous A Waves Present] : normal jugular venous A waves present [No Jugular Venous Downs A Waves] : no jugular venous downs A waves [Respiration, Rhythm And Depth] : normal respiratory rhythm and effort [Exaggerated Use Of Accessory Muscles For Inspiration] : no accessory muscle use [Auscultation Breath Sounds / Voice Sounds] : lungs were clear to auscultation bilaterally [Heart Sounds] : normal S1 and S2 [Heart Rate And Rhythm] : heart rate and rhythm were normal [Murmurs] : no murmurs present [Abdomen Soft] : soft [Abdomen Tenderness] : non-tender [Abdomen Mass (___ Cm)] : no abdominal mass palpated [Gait - Sufficient For Exercise Testing] : the gait was sufficient for exercise testing [Abnormal Walk] : normal gait [Nail Clubbing] : no clubbing of the fingernails [Cyanosis, Localized] : no localized cyanosis [Petechial Hemorrhages (___cm)] : no petechial hemorrhages [Skin Color & Pigmentation] : normal skin color and pigmentation [] : no rash [No Venous Stasis] : no venous stasis [No Xanthoma] : no  xanthoma was observed [Skin Lesions] : no skin lesions [No Skin Ulcers] : no skin ulcer [Oriented To Time, Place, And Person] : oriented to person, place, and time [Mood] : the mood was normal [Affect] : the affect was normal [No Anxiety] : not feeling anxious

## 2020-02-25 ENCOUNTER — APPOINTMENT (OUTPATIENT)
Dept: UROLOGY | Facility: CLINIC | Age: 70
End: 2020-02-25

## 2020-03-31 ENCOUNTER — APPOINTMENT (OUTPATIENT)
Dept: ELECTROPHYSIOLOGY | Facility: CLINIC | Age: 70
End: 2020-03-31

## 2020-04-07 ENCOUNTER — APPOINTMENT (OUTPATIENT)
Dept: CARDIOLOGY | Facility: CLINIC | Age: 70
End: 2020-04-07
Payer: MEDICARE

## 2020-04-07 ENCOUNTER — APPOINTMENT (OUTPATIENT)
Dept: CARDIOLOGY | Facility: CLINIC | Age: 70
End: 2020-04-07

## 2020-04-07 PROCEDURE — 99214 OFFICE O/P EST MOD 30 MIN: CPT | Mod: 95

## 2020-04-09 ENCOUNTER — APPOINTMENT (OUTPATIENT)
Dept: CARDIOLOGY | Facility: CLINIC | Age: 70
End: 2020-04-09

## 2020-04-09 NOTE — ASSESSMENT
[FreeTextEntry1] : 70 y/o M w/ h/o CAD s/p MI 2005 s/p PCI LAD, ICM (EF 20-25%, LVEDD 5.9 cm), bioprosthetic AVR (2015) 2/2 AI, aflutter ablation 7/17 who presents for f/u visit via Telehealth due to Covid pandemic. Last seen 10/22/18 and was lost to follow up due to moving to [place]. Since, he established care with Dr. Pressley. Currently endorses NYHA class I-II symptoms and is otherwise well. \par \par 1. HFrEF \par - prev on coreg 25 mg bid; switched to toprol xl 25 mg twice/day but pt was taking 1/2 tablet twice/day; instructed to increase to 1 tablet twice/day; goal to increase ultimately to 100 mg twice/day as tolerated\par - continue spironolactone 12.5 mg daily\par - on dig 0.125 mcg daily \par - c/w Entresto 24/26 twice/day \par - continue lasix prn \par \par 2. Aflutter - currently in afib (per last ECG)\par  continue eliquis 5 mg bid\par - on dig \par \par 3. CAD - s/p MI\par - continue ASA 81 mg daily and lipitor 40 mg daily\par \par total time spent via telephonic encounter 25 min

## 2020-04-09 NOTE — HISTORY OF PRESENT ILLNESS
[Home] : at home, [unfilled] , at the time of the visit. [Other Location: e.g. Home (Enter Location, City,State)___] : at [unfilled] [Spouse] : spouse [FreeTextEntry1] : Briefly, 70 y/o M w/ h/o CAD s/p MI 2005 s/p PCI LAD, ICM (EF 20-25%, LVEDD 5.9 cm), bioprosthetic AVR (2015) 2/2 AI, aflutter ablation 7/17 who presents for f/u visit via Telehealth due to Covid pandemic. Last seen 10/22/18 and was lost to follow up due to moving to [place]. Since, he established care with Dr. Pressley.\par \par Since last visit, has been doing very well. Interval changes were noted - he was started on digoxin when device interrogation revealed persistent afib. Coreg was also switched to toprol (although started on low dose) and was started on Entresto. Currently down in [place] since March. Developed shingles down there but reports otherwise doing well. \par \par Able to play golf without difficulty; able to walk 9 holes. Denies orthopnea/PND. Has been going to the gym. Denies LE edema. Adherent to medications/diet. Continues to have occasional orthostasis when sitting for prolonged periods of time but denies any episodes of syncope.

## 2020-05-27 ENCOUNTER — APPOINTMENT (OUTPATIENT)
Dept: CARDIOLOGY | Facility: CLINIC | Age: 70
End: 2020-05-27
Payer: MEDICARE

## 2020-05-27 ENCOUNTER — NON-APPOINTMENT (OUTPATIENT)
Age: 70
End: 2020-05-27

## 2020-05-27 VITALS
DIASTOLIC BLOOD PRESSURE: 84 MMHG | BODY MASS INDEX: 29.82 KG/M2 | RESPIRATION RATE: 16 BRPM | SYSTOLIC BLOOD PRESSURE: 122 MMHG | WEIGHT: 225 LBS | HEIGHT: 73 IN | OXYGEN SATURATION: 96 % | HEART RATE: 49 BPM | TEMPERATURE: 98.3 F

## 2020-05-27 DIAGNOSIS — R11.0 NAUSEA: ICD-10-CM

## 2020-05-27 PROCEDURE — 93000 ELECTROCARDIOGRAM COMPLETE: CPT

## 2020-05-27 PROCEDURE — 99215 OFFICE O/P EST HI 40 MIN: CPT

## 2020-05-27 RX ORDER — PREGABALIN 100 MG/1
100 CAPSULE ORAL TWICE DAILY
Refills: 0 | Status: DISCONTINUED | COMMUNITY
End: 2020-05-27

## 2020-05-27 NOTE — ADDENDUM
[FreeTextEntry1] : Please note the patient was reviewed with the PA.\par I was physically present during the service of the patient\par I was directly involved in the management plan and recommendations of care provided to the patient. \par I personally reviewed the history and physical exam and plan as documented by the PA above.\par \par Aneesh Melo DO, FACC, RPVI\par Cardiologist\par 05/27/2020\par \par

## 2020-05-27 NOTE — HISTORY OF PRESENT ILLNESS
[FreeTextEntry1] : DUDLEY BERMUDEZ  is a 69 year old  M\par History of coronary artery disease status post anterior wall myocardial infarction with occluded LAD status post revascularization, ischemic cardiomyopathy  \par with severely reduced ejection fraction status post Medtronic ICD, history of aortic regurg status post aortic valve replacement, AF post ablation.\par \par Requests early OV for significant fatigue.  Being treated for shingles.\par Saw Dr. Quiñones who increased his metoprolol from 12.5 BID to 25 BID.  \par EKG done in the office today: AF with slow V rate at 49.  Device set to VVI 40. \par Physically active at the gym, golf and on his boat.\par There is MATHEWS with more than normal exertion. \par Compliant with meds and diet.\par There is no exertional chest pain, pressure or discomfort. \par There is no PND or orthopnea. \par There is mild lightheadedness with positional changes\par No palpitations, ICD discharges\par No bleeding issues\par \par Tolerating Entresto.  BUN: 19, Creat: 1.07\par Carvedilol was changed to metoprolol to increase BP.  Lasix was changed to PRN.  He has not utilized any.  Feels well.  Stable dizziness. \par AF now chronic.  Elevated HR at previous OV.  Dig started.  \par \par July 2019, potassium 4.4, creatinine 1.0 \par Device check with nonsustained ventricular tachycardia brief transient atrial fibrillation \par \par Labs Hemoglobin 13.4, creatinine 0.9, total cholesterol 147, LDL 65, TSH 1.9.  \par Echocardiogram demonstrates severely reduced LV function. Dilated left ventricle.  Mild mixed mitral valve disease. Bioprosthetic aortic valve mild pulmonary hypertension.\par

## 2020-05-27 NOTE — REASON FOR VISIT
[Atrial Fibrillation] : atrial fibrillation [Follow-Up - Clinic] : a clinic follow-up of [Coronary Artery Disease] : coronary artery disease [Cardiomyopathy] : cardiomyopathy [Dizziness] : dizziness [Heart Failure] : congestive heart failure [Medication Management] : Medication management [FreeTextEntry2] : Afib

## 2020-05-27 NOTE — PHYSICAL EXAM
[Normal Appearance] : normal appearance [Well Groomed] : well groomed [General Appearance - Well Developed] : well developed [General Appearance - Well Nourished] : well nourished [No Deformities] : no deformities [General Appearance - In No Acute Distress] : no acute distress [Normal Conjunctiva] : the conjunctiva exhibited no abnormalities [Eyelids - No Xanthelasma] : the eyelids demonstrated no xanthelasmas [Normal Oral Mucosa] : normal oral mucosa [No Oral Cyanosis] : no oral cyanosis [No Oral Pallor] : no oral pallor [Normal Jugular Venous A Waves Present] : normal jugular venous A waves present [Normal Jugular Venous V Waves Present] : normal jugular venous V waves present [No Jugular Venous Downs A Waves] : no jugular venous downs A waves [Exaggerated Use Of Accessory Muscles For Inspiration] : no accessory muscle use [Respiration, Rhythm And Depth] : normal respiratory rhythm and effort [Auscultation Breath Sounds / Voice Sounds] : lungs were clear to auscultation bilaterally [Murmurs] : no murmurs present [Heart Sounds] : normal S1 and S2 [Heart Rate And Rhythm] : heart rate and rhythm were normal [Abdomen Soft] : soft [Abdomen Tenderness] : non-tender [Abdomen Mass (___ Cm)] : no abdominal mass palpated [Abnormal Walk] : normal gait [Gait - Sufficient For Exercise Testing] : the gait was sufficient for exercise testing [Petechial Hemorrhages (___cm)] : no petechial hemorrhages [Cyanosis, Localized] : no localized cyanosis [Nail Clubbing] : no clubbing of the fingernails [Skin Color & Pigmentation] : normal skin color and pigmentation [Skin Lesions] : no skin lesions [No Venous Stasis] : no venous stasis [] : no rash [Oriented To Time, Place, And Person] : oriented to person, place, and time [No Skin Ulcers] : no skin ulcer [No Xanthoma] : no  xanthoma was observed [Affect] : the affect was normal [Mood] : the mood was normal [No Anxiety] : not feeling anxious

## 2020-05-27 NOTE — ASSESSMENT
[FreeTextEntry1] : Coronary artery disease, prior anteior wall myocardial infarction s/p PCI LAD\par Ischemic cardiomyopathy, severe, dilated status post ICD, prior shock\par Aortic valve replacement '15 for AI\par Mild VHD PH\par Atrial fibrillation status post ablation '17.  Now persistent AF.  On digoxin.\par Repeat Dig level to ensure proper range. \par Decrease metoprolol to 12.5QAM and 25QHS.  \par Follow up device check next week.  May need to increase base rate.  \par \par NSVT\par Baseline low BP with orthostasis\par \par Continue Entresto\par Monitor electrolytes and renal function.\par Monitor BP/orthostasis\par Use Lasix as needed\par Up titrate his aldosterone antagonist and Entresto as pressure and symptoms allow. \par Continue follow up with HF team, Dr. Quiñones\par Continue aldosterone antagonist.\par Continue aspirin and anticoagulation.  \par Monitor daily weights\par Low sodium diet\par SBE prophylaxis\par \par Well compensated but remains high risk\par Re involve cardiomyopathy specialist due to severity of LV dysfunction

## 2020-06-02 ENCOUNTER — APPOINTMENT (OUTPATIENT)
Dept: CARDIOLOGY | Facility: CLINIC | Age: 70
End: 2020-06-02
Payer: MEDICARE

## 2020-06-02 ENCOUNTER — NON-APPOINTMENT (OUTPATIENT)
Age: 70
End: 2020-06-02

## 2020-06-02 ENCOUNTER — OUTPATIENT (OUTPATIENT)
Dept: OUTPATIENT SERVICES | Facility: HOSPITAL | Age: 70
LOS: 1 days | End: 2020-06-02

## 2020-06-02 ENCOUNTER — INPATIENT (INPATIENT)
Facility: HOSPITAL | Age: 70
LOS: 1 days | Discharge: HOME | End: 2020-06-04
Admitting: INTERNAL MEDICINE
Payer: MEDICARE

## 2020-06-02 VITALS
BODY MASS INDEX: 29.82 KG/M2 | TEMPERATURE: 98.3 F | WEIGHT: 225 LBS | HEIGHT: 73 IN | OXYGEN SATURATION: 98 % | SYSTOLIC BLOOD PRESSURE: 120 MMHG | HEART RATE: 63 BPM | DIASTOLIC BLOOD PRESSURE: 60 MMHG

## 2020-06-02 DIAGNOSIS — Z95.810 PRESENCE OF AUTOMATIC (IMPLANTABLE) CARDIAC DEFIBRILLATOR: Chronic | ICD-10-CM

## 2020-06-02 DIAGNOSIS — I50.43 ACUTE ON CHRONIC COMBINED SYSTOLIC (CONGESTIVE) AND DIASTOLIC (CONGESTIVE) HEART FAILURE: ICD-10-CM

## 2020-06-02 DIAGNOSIS — I48.20 CHRONIC ATRIAL FIBRILLATION, UNSPECIFIED: ICD-10-CM

## 2020-06-02 DIAGNOSIS — Z95.810 PRESENCE OF AUTOMATIC (IMPLANTABLE) CARDIAC DEFIBRILLATOR: ICD-10-CM

## 2020-06-02 DIAGNOSIS — Z95.5 PRESENCE OF CORONARY ANGIOPLASTY IMPLANT AND GRAFT: ICD-10-CM

## 2020-06-02 DIAGNOSIS — Z98.89 OTHER SPECIFIED POSTPROCEDURAL STATES: Chronic | ICD-10-CM

## 2020-06-02 DIAGNOSIS — I25.5 ISCHEMIC CARDIOMYOPATHY: ICD-10-CM

## 2020-06-02 DIAGNOSIS — Z95.3 PRESENCE OF XENOGENIC HEART VALVE: ICD-10-CM

## 2020-06-02 DIAGNOSIS — Z95.5 PRESENCE OF CORONARY ANGIOPLASTY IMPLANT AND GRAFT: Chronic | ICD-10-CM

## 2020-06-02 DIAGNOSIS — Z98.890 OTHER SPECIFIED POSTPROCEDURAL STATES: Chronic | ICD-10-CM

## 2020-06-02 DIAGNOSIS — R06.00 DYSPNEA, UNSPECIFIED: ICD-10-CM

## 2020-06-02 DIAGNOSIS — B02.9 ZOSTER WITHOUT COMPLICATIONS: ICD-10-CM

## 2020-06-02 DIAGNOSIS — Z95.4 PRESENCE OF OTHER HEART-VALVE REPLACEMENT: Chronic | ICD-10-CM

## 2020-06-02 DIAGNOSIS — I11.0 HYPERTENSIVE HEART DISEASE WITH HEART FAILURE: ICD-10-CM

## 2020-06-02 DIAGNOSIS — E11.9 TYPE 2 DIABETES MELLITUS WITHOUT COMPLICATIONS: ICD-10-CM

## 2020-06-02 DIAGNOSIS — Z79.01 LONG TERM (CURRENT) USE OF ANTICOAGULANTS: ICD-10-CM

## 2020-06-02 DIAGNOSIS — I25.10 ATHEROSCLEROTIC HEART DISEASE OF NATIVE CORONARY ARTERY WITHOUT ANGINA PECTORIS: ICD-10-CM

## 2020-06-02 DIAGNOSIS — I27.20 PULMONARY HYPERTENSION, UNSPECIFIED: ICD-10-CM

## 2020-06-02 DIAGNOSIS — Z79.84 LONG TERM (CURRENT) USE OF ORAL HYPOGLYCEMIC DRUGS: ICD-10-CM

## 2020-06-02 DIAGNOSIS — E78.5 HYPERLIPIDEMIA, UNSPECIFIED: ICD-10-CM

## 2020-06-02 DIAGNOSIS — I25.2 OLD MYOCARDIAL INFARCTION: ICD-10-CM

## 2020-06-02 DIAGNOSIS — Z87.891 PERSONAL HISTORY OF NICOTINE DEPENDENCE: ICD-10-CM

## 2020-06-02 PROCEDURE — 71045 X-RAY EXAM CHEST 1 VIEW: CPT | Mod: 26

## 2020-06-02 PROCEDURE — 99214 OFFICE O/P EST MOD 30 MIN: CPT

## 2020-06-02 PROCEDURE — 93000 ELECTROCARDIOGRAM COMPLETE: CPT

## 2020-06-02 PROCEDURE — 99284 EMERGENCY DEPT VISIT MOD MDM: CPT

## 2020-06-02 PROCEDURE — 99223 1ST HOSP IP/OBS HIGH 75: CPT

## 2020-06-02 RX ORDER — OXYCODONE AND ACETAMINOPHEN 5; 325 MG/1; MG/1
5-325 TABLET ORAL
Refills: 0 | Status: DISCONTINUED | COMMUNITY
End: 2020-06-02

## 2020-06-02 RX ORDER — METOPROLOL SUCCINATE 25 MG/1
25 TABLET, EXTENDED RELEASE ORAL
Qty: 180 | Refills: 3 | Status: DISCONTINUED | COMMUNITY
Start: 2019-09-06 | End: 2020-06-02

## 2020-06-02 NOTE — HISTORY OF PRESENT ILLNESS
[FreeTextEntry1] : DUDLEY BERMUDEZ  is a 69 year M  who presents today Jun 02, 2020 requesting to be seen for persistent dyspnea on exertion and fatigue. Over the past 11 weeks he has been suffering from shingles to the tifht upper chest which is significantly uncomfortable for him. There is shortness of breath with minimal exertion. His wife notes at times with exerting himself the tip of his nose gets blue. \par \par 70 y/o M w/ h/o CAD s/p MI 2005 s/p PCI LAD, ICM (EF 20-25%, LVEDD 5.9 cm), bioprosthetic AVR (2015) 2/2 AI, aflutter ablation 7/17. \par \par EKG done in the office today: AF with slow V rate at 49.  Device set to VVI 40. \par Compliant with meds and diet.\par There is no exertional chest pain, pressure or discomfort. \par There is no PND or orthopnea. \par There is mild lightheadedness with positional changes\par No palpitations, ICD discharges\par No bleeding issues\par \par Tolerating Entresto.  BUN: 19, Creat: 1.07\par Carvedilol was changed to metoprolol to increase BP.  Lasix was changed to PRN.  He has not utilized any.  Feels well.  Stable dizziness. \par AF now chronic.  Elevated HR at previous OV.  Dig started.  \par \par EKG today AF with ventricular rate control\par \par labs 5/28/2020 BUN 16, Creat 1.2, Digoxin level 1.2, Sodium 142, potassium 4.9\par \par July 2019, potassium 4.4, creatinine 1.0 \par Device check with nonsustained ventricular tachycardia brief transient atrial fibrillation \par \par Labs Hemoglobin 13.4, creatinine 0.9, total cholesterol 147, LDL 65, TSH 1.9.  \par Echocardiogram demonstrates severely reduced LV function. Dilated left ventricle.  Mild mixed mitral valve disease. Bioprosthetic aortic valve mild pulmonary hypertension.\par

## 2020-06-02 NOTE — PHYSICAL EXAM
[General Appearance - Well Developed] : well developed [Normal Appearance] : normal appearance [Well Groomed] : well groomed [General Appearance - Well Nourished] : well nourished [No Deformities] : no deformities [General Appearance - In No Acute Distress] : no acute distress [Normal Conjunctiva] : the conjunctiva exhibited no abnormalities [Eyelids - No Xanthelasma] : the eyelids demonstrated no xanthelasmas [Normal Oral Mucosa] : normal oral mucosa [No Oral Pallor] : no oral pallor [No Oral Cyanosis] : no oral cyanosis [Normal Jugular Venous A Waves Present] : normal jugular venous A waves present [Normal Jugular Venous V Waves Present] : normal jugular venous V waves present [No Jugular Venous Downs A Waves] : no jugular venous downs A waves [Exaggerated Use Of Accessory Muscles For Inspiration] : no accessory muscle use [Respiration, Rhythm And Depth] : normal respiratory rhythm and effort [Auscultation Breath Sounds / Voice Sounds] : lungs were clear to auscultation bilaterally [Heart Rate And Rhythm] : heart rate and rhythm were normal [Heart Sounds] : normal S1 and S2 [Murmurs] : no murmurs present [Abdomen Soft] : soft [Abdomen Tenderness] : non-tender [Abdomen Mass (___ Cm)] : no abdominal mass palpated [Abnormal Walk] : normal gait [Gait - Sufficient For Exercise Testing] : the gait was sufficient for exercise testing [Cyanosis, Localized] : no localized cyanosis [Nail Clubbing] : no clubbing of the fingernails [Petechial Hemorrhages (___cm)] : no petechial hemorrhages [Skin Color & Pigmentation] : normal skin color and pigmentation [] : no rash [No Venous Stasis] : no venous stasis [Skin Lesions] : no skin lesions [No Skin Ulcers] : no skin ulcer [No Xanthoma] : no  xanthoma was observed [Oriented To Time, Place, And Person] : oriented to person, place, and time [Affect] : the affect was normal [No Anxiety] : not feeling anxious [Mood] : the mood was normal

## 2020-06-02 NOTE — REASON FOR VISIT
[Atrial Fibrillation] : atrial fibrillation [Follow-Up - Clinic] : a clinic follow-up of [Coronary Artery Disease] : coronary artery disease [Cardiomyopathy] : cardiomyopathy [Medication Management] : Medication management [Dizziness] : dizziness [Heart Failure] : congestive heart failure [FreeTextEntry2] : Afib

## 2020-06-02 NOTE — REVIEW OF SYSTEMS
[see HPI] : see HPI [Shortness Of Breath] : shortness of breath [Dizziness] : no dizziness [Negative] : Heme/Lymph

## 2020-06-02 NOTE — ASSESSMENT
[FreeTextEntry1] : DUDLEY BERMUDEZ  is a 69 year M  who presents today Jun 02, 2020 with the above history and the following active issues\par \par \par Coronary artery disease, prior anteior wall myocardial infarction s/p PCI LAD\par Ischemic cardiomyopathy, severe, dilated status post ICD, prior shock\par Aortic valve replacement '15 for AI\par Mild VHD PH\par Atrial fibrillation status post ablation '17.  Now persistent AF.  On digoxin.\par \par Patient now complaining of persistent fatigue, shortness of breath on exertion, pain at shingles site. Recommend he proceed to the ER for further evaluation including labs, cardiac monitoring, echocardiogram, infectious disease evaluation and possible ischemic evaluation with angiogram. Patients and his wifes questions and concerns were addressed and answered. \par Close clinical follow-up is recommened. \par Continue to monitor AICD interrogation\par \par Continue Entresto\par Monitor electrolytes and renal function.\par Monitor BP/orthostasis\par Use Lasix as needed\par Monitor daily weights\par Low sodium diet\par SBE prophylaxis\par \par Red flag symptoms which would warrant sooner emergent evaluation reviewed with the patient. \par Questions and concerns were addressed and answered. \par \par Sincerely,\par \par Patricia Gregory PA-C\par Patients history, testing and plan reviewed with supervising MD: Dr. Harvey Pressley

## 2020-06-03 ENCOUNTER — OUTPATIENT (OUTPATIENT)
Dept: OUTPATIENT SERVICES | Facility: HOSPITAL | Age: 70
LOS: 1 days | End: 2020-06-03

## 2020-06-03 DIAGNOSIS — Z95.5 PRESENCE OF CORONARY ANGIOPLASTY IMPLANT AND GRAFT: Chronic | ICD-10-CM

## 2020-06-03 DIAGNOSIS — Z95.4 PRESENCE OF OTHER HEART-VALVE REPLACEMENT: Chronic | ICD-10-CM

## 2020-06-03 DIAGNOSIS — Z98.89 OTHER SPECIFIED POSTPROCEDURAL STATES: Chronic | ICD-10-CM

## 2020-06-03 DIAGNOSIS — Z95.810 PRESENCE OF AUTOMATIC (IMPLANTABLE) CARDIAC DEFIBRILLATOR: Chronic | ICD-10-CM

## 2020-06-03 DIAGNOSIS — Z98.890 OTHER SPECIFIED POSTPROCEDURAL STATES: Chronic | ICD-10-CM

## 2020-06-03 PROCEDURE — 93306 TTE W/DOPPLER COMPLETE: CPT | Mod: 26

## 2020-06-03 PROCEDURE — 99233 SBSQ HOSP IP/OBS HIGH 50: CPT

## 2020-06-03 PROCEDURE — 99223 1ST HOSP IP/OBS HIGH 75: CPT | Mod: 25

## 2020-06-04 PROCEDURE — 93567 NJX CAR CTH SPRVLV AORTGRPHY: CPT

## 2020-06-04 PROCEDURE — 93456 R HRT CORONARY ARTERY ANGIO: CPT | Mod: 26

## 2020-06-04 PROCEDURE — 99232 SBSQ HOSP IP/OBS MODERATE 35: CPT

## 2020-06-05 ENCOUNTER — APPOINTMENT (OUTPATIENT)
Dept: CARDIOLOGY | Facility: CLINIC | Age: 70
End: 2020-06-05
Payer: MEDICARE

## 2020-06-05 PROCEDURE — 93282 PRGRMG EVAL IMPLANTABLE DFB: CPT

## 2020-06-07 PROCEDURE — 0296T: CPT

## 2020-06-12 ENCOUNTER — APPOINTMENT (OUTPATIENT)
Dept: CARDIOLOGY | Facility: CLINIC | Age: 70
End: 2020-06-12

## 2020-06-19 ENCOUNTER — APPOINTMENT (OUTPATIENT)
Dept: CARDIOLOGY | Facility: CLINIC | Age: 70
End: 2020-06-19
Payer: MEDICARE

## 2020-06-19 VITALS
WEIGHT: 222 LBS | TEMPERATURE: 97.4 F | DIASTOLIC BLOOD PRESSURE: 72 MMHG | OXYGEN SATURATION: 96 % | HEIGHT: 72 IN | BODY MASS INDEX: 30.07 KG/M2 | HEART RATE: 60 BPM | SYSTOLIC BLOOD PRESSURE: 108 MMHG

## 2020-06-19 DIAGNOSIS — Z86.19 PERSONAL HISTORY OF OTHER INFECTIOUS AND PARASITIC DISEASES: ICD-10-CM

## 2020-06-19 DIAGNOSIS — Z86.79 PERSONAL HISTORY OF OTHER DISEASES OF THE CIRCULATORY SYSTEM: ICD-10-CM

## 2020-06-19 PROCEDURE — 0298T: CPT

## 2020-06-19 PROCEDURE — 99214 OFFICE O/P EST MOD 30 MIN: CPT

## 2020-06-19 NOTE — REVIEW OF SYSTEMS
[Dyspnea on exertion] : dyspnea during exertion [Negative] : Psychiatric [FreeTextEntry1] : dizziness

## 2020-06-19 NOTE — PHYSICAL EXAM
[General Appearance - Well Developed] : well developed [Normal Appearance] : normal appearance [Well Groomed] : well groomed [General Appearance - Well Nourished] : well nourished [Normal Conjunctiva] : the conjunctiva exhibited no abnormalities [No Deformities] : no deformities [General Appearance - In No Acute Distress] : no acute distress [Eyelids - No Xanthelasma] : the eyelids demonstrated no xanthelasmas [Normal Oral Mucosa] : normal oral mucosa [No Oral Pallor] : no oral pallor [Normal Jugular Venous A Waves Present] : normal jugular venous A waves present [No Oral Cyanosis] : no oral cyanosis [No Jugular Venous Downs A Waves] : no jugular venous downs A waves [Normal Jugular Venous V Waves Present] : normal jugular venous V waves present [Respiration, Rhythm And Depth] : normal respiratory rhythm and effort [Exaggerated Use Of Accessory Muscles For Inspiration] : no accessory muscle use [Auscultation Breath Sounds / Voice Sounds] : lungs were clear to auscultation bilaterally [Heart Sounds] : normal S1 and S2 [Murmurs] : no murmurs present [Heart Rate And Rhythm] : heart rate and rhythm were normal [Abdomen Soft] : soft [Abdomen Tenderness] : non-tender [Abdomen Mass (___ Cm)] : no abdominal mass palpated [Gait - Sufficient For Exercise Testing] : the gait was sufficient for exercise testing [Abnormal Walk] : normal gait [Cyanosis, Localized] : no localized cyanosis [Petechial Hemorrhages (___cm)] : no petechial hemorrhages [Nail Clubbing] : no clubbing of the fingernails [No Venous Stasis] : no venous stasis [] : no rash [Skin Color & Pigmentation] : normal skin color and pigmentation [No Skin Ulcers] : no skin ulcer [No Xanthoma] : no  xanthoma was observed [Skin Lesions] : no skin lesions [Oriented To Time, Place, And Person] : oriented to person, place, and time [Affect] : the affect was normal [No Anxiety] : not feeling anxious [Mood] : the mood was normal

## 2020-06-19 NOTE — HISTORY OF PRESENT ILLNESS
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 \par \par Last seen 6/2/20 with MATHEWS and was sent to the ER, subsequently had cardiac cath 6/5/20. He was discharged on Lasix daily and Spironolactone was increased to 25 mg daily. Last seen for a device interrogation 6/5/20. 17 episodes labeled VT, only 2 EGMs to review. 2 appear to be NSVT, longest being 7 beats in duration. 43 episodes labeled AF, 10 EGMs to review. Demonstrate atrial fibrillation, overall rate controlled, but rate can go up as high as 140 bpm. Due to the device being a single chamber, it is difficult to differentiate NSVT episodes (if it is actually NSVT or AF with RVR). Therefore a Zio was placed and he presents today to discuss results. See details below. Presents today 6/19/20 to review Zio. Reports some MATHEWS but it has imrproved. There is dizziness when sitting for long periods of time. Denies CP, palpitations, syncope, PND, orthopnea, claudication, and edema.\par \par \par Testing:\par \par Zio 6/7/20-6/11/20: Atrial fibrillation 100%; rates ranging from  bpm, average HR 73 bpm. 1 run of NSVT which lasted 14 beats in duration. PVC burden 1.2%. Rare ventricular couplets. Ventricular bigeminy and trigeminy present.\par \par Had cardiac cath 6/4/20. No stents were required. Hemodynamnics: Low cardiac output, mildly elevated pulm cap wedge pressure. Mild BV failure. Severe pulm htn. Bioprosthetic aortic valve was observed in the aortic position. It was recommended he f/u with pulmonologist as well.\par \par Echo 6/2/20: Technically difficult study with subobtimal images. LV function is severely reduced. LV apex, med, and distal septum, mid and distal interolateral wall, anterior wall are akinetic. Laterall wall is not well seen. LAE.  Dilated LV. Trace TR with PASP 42 mm Hg. Mod to severe DD.\par \par EKG 6/2/20 AF with ventricular rate control\par \par labs 5/28/2020 BUN 16, Creat 1.2, Digoxin level 1.2, Sodium 142, potassium 4.9\par \par July 2019, potassium 4.4, creatinine 1.0 \par Device check with nonsustained ventricular tachycardia brief transient atrial fibrillation \par \par Labs Hemoglobin 13.4, creatinine 0.9, total cholesterol 147, LDL 65, TSH 1.9.  \par Echocardiogram demonstrates severely reduced LV function. Dilated left ventricle. Mild mixed mitral valve disease. Bioprosthetic aortic valve mild pulmonary hypertension.\par

## 2020-06-19 NOTE — ADDENDUM
[FreeTextEntry1] : Please note the patient was reviewed with advanced practice provider\par I was physically present during the service of the patient\par I was directly involved in the management plan and recommendations of care provided to the patient. \par 06/05/2020\par \par Recent hospital records have been reviewed.  There is a history of coronary artery disease, ischemic cardiomyopathy, aortic valve replacement, chronic systolic heart failure.  Recent cardiac catheterization demonstrates heart failure with pulmonary hypertension and nonobstructive coronary artery disease.  Recent device checks have demonstrated persistent atrial fibrillation over the past 6 months plus months.  Requested electrophysiology evaluation regarding possible rhythm control strategy.  Reinvolve heart failure specialist regarding advanced therapies.

## 2020-06-19 NOTE — ASSESSMENT
[FreeTextEntry1] : DUDLEY BERMUDEZ is a 70 year old M who presents today Jun 19, 2020 with the above history and the following active issues:\par \par Persistent atrial fibrillation: Rate controlled today. Recommend follow up EP since patient now has persistent Afib and is symptomatic with heart failure. On Eliquis, Dig, and Toprol.\par \par CAD s/p MI 2005 s/p PCI LAD: On ASA, statin, betablocker.\par \par ICM/severe/dilated: On Entresto, Furosemide, betablocker. Medtronic single chamber ICD.\par \par Bioprosthetic AVR (2015) 2/2 AI: On ASA.  Surveillance echocardiography. Carotid and abd to be done at time of next echo.\par \par Aflutter ablation 7/17: Following with EP as above.\par \par Pulmonary HTN: Patient advised to f/u with Dr. Ochoa.\par \par Ongoing f/u with PCP and pain management.\par Patient has follow up with EP 7/1/20.\par \par \par F/U after EP eval.\par Discussed red flag symptoms, which would warrant sooner or emergent medical evaluation.\par Any questions and concerns were addressed and resolved.\par \par Sincerely,\par Gale DENTON-BC\par Patient's history, testing, and plan was reviewed with supervising physician, Dr. Harvey Pressley

## 2020-06-19 NOTE — ADDENDUM
[FreeTextEntry1] : Please note the patient was seen and examined with NP Gale Martin.\par I was physically present during the service of the patient and personally examined the patient. \par I was directly involved in the management plan and recommendations of the care provided to the patient. \par I personally reviewed the history and physical examination as documented by the NP above.\par 06/19/2020\par

## 2020-06-19 NOTE — PROCEDURE
[No] : not [Atrial Fibrillation] : atrial fibrillation [ICD] : Implantable cardioverter-defibrillator [VVI] : VVI [Lead Imp:  ___ohms] : lead impedance was [unfilled] ohms [Sensing Amplitude ___mv] : sensing amplitude was [unfilled] mv [___V @] : [unfilled] V [___ ms] : [unfilled] ms [de-identified] : Medtronic [de-identified] : 10/30/18 [de-identified] : [id number] [de-identified] : Mirtha AF MRI VR [id number] [de-identified] : Detection/therapies\par \par VF detection 182bpm Therapies ATP with charge, 35J x 6\par fast VT detection 214bpm Therapies bust (2), 35J x 5\par VT detection 150bpm Therapies bust (3), 25J, 35J x 4\par \par settings\par \par Ventricle\par sensing 0.30mv\par amplitude 2.0v (auto)\par duration 0.40ms (auto)\par \par  2.5%\par \par AF 99.7%\par \par 17 episodes labeled VT, only 2 EGMs to review. 2 appear to be NSVT, longest being 7 beats in duration.\par \par 43 episodes labeled AF, 10 EGMs to review. Demonstrate atrial fibrillation, overall rate controlled, but rate can go up as high as 140 bpm.\par \par Persistent AF noted. On AC and Dig. \par \par Possible optivol fluid accumulation 5/20/20-6/3/20.\par \par Of note, patient was sent to the hospital this week. Had cardiac cath yesterday. No stents were required. Hemodynamnics: Low cardiac output, mildly elevated pulm cap wedge pressure. Mild BV failure. Severe pulm htn. Bioprosthetic aortic valve was observed in the aortic position.\par \par He was discharged on Entresto, Spironolactone, and newly prescribed Lasix.\par \par Right groin site benign, positive pedal pulses.\par \par Plan:\par \par Due to the device being a single chamber, it is difficult to differentiate NSVT episodes (if it is actually NSVT or AF with RVR). Will place 7 day Zio. Also, now with signs of heart failure and persistent atrial fibrillation, recommend the patient f/u with an electrophysiologist. Referral placed.\par \par [place] cath [place] and [place] [facility] [place] \par Patient will follow up in 3 months for device check, but has OV 6/19/20.\par \par Of note, he has not been transmitting successfully. Advised to call [employer].\par \par F/U after testing to review results.\par Discussed red flag symptoms, which would warrant sooner or emergent medical evaluation.\par Any questions and concerns were addressed and resolved.\par \par Sincerely,\par Gale Martin FN-BC\par Patient's history, testing, and plan was reviewed with supervising physician, Dr. Harvey Pressley [de-identified] : 10.3 years [de-identified] : 39

## 2020-07-01 ENCOUNTER — APPOINTMENT (OUTPATIENT)
Dept: ELECTROPHYSIOLOGY | Facility: CLINIC | Age: 70
End: 2020-07-01
Payer: MEDICARE

## 2020-07-01 VITALS
HEART RATE: 50 BPM | SYSTOLIC BLOOD PRESSURE: 92 MMHG | DIASTOLIC BLOOD PRESSURE: 68 MMHG | OXYGEN SATURATION: 97 % | BODY MASS INDEX: 29.8 KG/M2 | HEIGHT: 72 IN | WEIGHT: 220 LBS

## 2020-07-01 PROCEDURE — 99204 OFFICE O/P NEW MOD 45 MIN: CPT

## 2020-07-01 RX ORDER — METHYLPREDNISOLONE 4 MG/1
4 TABLET ORAL
Qty: 21 | Refills: 0 | Status: DISCONTINUED | COMMUNITY
Start: 2020-01-13 | End: 2020-07-01

## 2020-07-01 RX ORDER — ACYCLOVIR 400 MG/1
400 TABLET ORAL
Refills: 0 | Status: DISCONTINUED | COMMUNITY
End: 2020-07-01

## 2020-07-01 RX ORDER — OXYCODONE AND ACETAMINOPHEN 10; 325 MG/1; MG/1
10-325 TABLET ORAL
Qty: 20 | Refills: 0 | Status: DISCONTINUED | COMMUNITY
Start: 2020-05-22 | End: 2020-07-01

## 2020-07-10 ENCOUNTER — APPOINTMENT (OUTPATIENT)
Dept: CARDIOLOGY | Facility: CLINIC | Age: 70
End: 2020-07-10
Payer: MEDICARE

## 2020-07-10 ENCOUNTER — NON-APPOINTMENT (OUTPATIENT)
Age: 70
End: 2020-07-10

## 2020-07-10 VITALS
SYSTOLIC BLOOD PRESSURE: 100 MMHG | BODY MASS INDEX: 30.48 KG/M2 | OXYGEN SATURATION: 96 % | HEART RATE: 64 BPM | WEIGHT: 225 LBS | DIASTOLIC BLOOD PRESSURE: 60 MMHG | HEIGHT: 72 IN

## 2020-07-10 PROCEDURE — 93000 ELECTROCARDIOGRAM COMPLETE: CPT

## 2020-07-10 PROCEDURE — 99215 OFFICE O/P EST HI 40 MIN: CPT

## 2020-07-10 RX ORDER — DIGOXIN 125 UG/1
125 TABLET ORAL
Qty: 93 | Refills: 1 | Status: DISCONTINUED | COMMUNITY
Start: 2019-12-31 | End: 2020-07-10

## 2020-07-10 NOTE — HISTORY OF PRESENT ILLNESS
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 \par \par Seen 6/2/20 with MATHEWS and was sent to the ER, subsequently had cardiac cath 6/5/20. He was discharged on Lasix daily and Spironolactone was increased to 25 mg daily. Last seen for a device interrogation 6/5/20. 17 episodes labeled VT, only 2 EGMs to review. 2 appear to be NSVT, longest being 7 beats in duration. 43 episodes labeled AF, 10 EGMs to review. Demonstrate atrial fibrillation, overall rate controlled, but rate can go up as high as 140 bpm. Due to the device being a single chamber, it is difficult to differentiate NSVT episodes (if it is actually NSVT or AF with RVR). Zio event monitor applied.  \par \par Since last seen, MATHEWS and exercise tolerance has decreased.  Difficultly walking short distances.  Denies CP, PND, orthopnea, or weight gain. Denies palpitations, syncope,claudication, and edema.\par Followed with EP.  Recommendations not available for review at this time.  Scheduled to follow with HF specialist Aug 17th. \par \par Testing:\par Zio 6/7/20-6/11/20: Atrial fibrillation 100%; rates ranging from  bpm, average HR 73 bpm. 1 run of NSVT which lasted 14 beats in duration. PVC burden 1.2%. Rare ventricular couplets. Ventricular bigeminy and trigeminy present.\par \par Had cardiac cath 6/4/20. No stents were required. Hemodynamnics: Low cardiac output, mildly elevated pulm cap wedge pressure. Mild BV failure. Severe pulm htn. Bioprosthetic aortic valve was observed in the aortic position. It was recommended he f/u with pulmonologist as well.\par \par Echo 6/2/20: Technically difficult study with subobtimal images. LV function is severely reduced. LV apex, med, and distal septum, mid and distal interolateral wall, anterior wall are akinetic. Laterall wall is not well seen. LAE.  Dilated LV. Trace TR with PASP 42 mm Hg. Mod to severe DD.\par \par EKG 6/2/20 AF with ventricular rate control\par \par labs 5/28/2020 BUN 16, Creat 1.2, Digoxin level 1.2, Sodium 142, potassium 4.9\par \par July 2019, potassium 4.4, creatinine 1.0 \par Device check with nonsustained ventricular tachycardia brief transient atrial fibrillation \par \par Labs Hemoglobin 13.4, creatinine 0.9, total cholesterol 147, LDL 65, TSH 1.9.  \par Echocardiogram demonstrates severely reduced LV function. Dilated left ventricle. Mild mixed mitral valve disease. Bioprosthetic aortic valve mild pulmonary hypertension.\par

## 2020-07-10 NOTE — ADDENDUM
[FreeTextEntry1] : Please note the patient was seen and examined with PA Uriel Barnett.\par I was physically present during the service of the patient and personally examined the patient. \par I was directly involved in the management plan and recommendations of care provided to the patient. \par I personally reviewed the history and physical exam examination as documented by the PA above.\par \par Dyspnea.  Reports feeling transient improvement shortly after hospitalization.  I believe the dyspnea is related to his atrial fibrillation.  Discussed with electrophysiology who will arrange rhythm control strategy.  In the meantime will increase diuretics.  Also scheduled to follow with heart failure team.  If progressive symptoms inpatient evaluation

## 2020-07-10 NOTE — PHYSICAL EXAM
[Normal Appearance] : normal appearance [General Appearance - Well Developed] : well developed [Well Groomed] : well groomed [No Deformities] : no deformities [General Appearance - Well Nourished] : well nourished [Normal Conjunctiva] : the conjunctiva exhibited no abnormalities [Eyelids - No Xanthelasma] : the eyelids demonstrated no xanthelasmas [General Appearance - In No Acute Distress] : no acute distress [Normal Oral Mucosa] : normal oral mucosa [No Oral Pallor] : no oral pallor [Normal Jugular Venous V Waves Present] : normal jugular venous V waves present [Normal Jugular Venous A Waves Present] : normal jugular venous A waves present [No Oral Cyanosis] : no oral cyanosis [No Jugular Venous Downs A Waves] : no jugular venous downs A waves [Respiration, Rhythm And Depth] : normal respiratory rhythm and effort [Heart Rate And Rhythm] : heart rate and rhythm were normal [Auscultation Breath Sounds / Voice Sounds] : lungs were clear to auscultation bilaterally [Exaggerated Use Of Accessory Muscles For Inspiration] : no accessory muscle use [Murmurs] : no murmurs present [Abdomen Soft] : soft [Heart Sounds] : normal S1 and S2 [Abdomen Tenderness] : non-tender [Abdomen Mass (___ Cm)] : no abdominal mass palpated [Nail Clubbing] : no clubbing of the fingernails [Gait - Sufficient For Exercise Testing] : the gait was sufficient for exercise testing [Abnormal Walk] : normal gait [Cyanosis, Localized] : no localized cyanosis [Petechial Hemorrhages (___cm)] : no petechial hemorrhages [No Venous Stasis] : no venous stasis [] : no rash [Skin Color & Pigmentation] : normal skin color and pigmentation [No Xanthoma] : no  xanthoma was observed [No Skin Ulcers] : no skin ulcer [Skin Lesions] : no skin lesions [Affect] : the affect was normal [Oriented To Time, Place, And Person] : oriented to person, place, and time [Mood] : the mood was normal [No Anxiety] : not feeling anxious

## 2020-07-10 NOTE — ASSESSMENT
[FreeTextEntry1] : DUDLEY BERMUDEZ is a 70 year old M who presents with the above history and the following active issues:\par \par Persistent atrial fibrillation: Rate controlled. Pt now has persistent Afib and is symptomatic with heart failure. On Eliquis, Dig, and Toprol.  Discussion to be had between EP and HF specialist to determine possible ablation.  Will follow up in 1 week. \par \par CAD s/p MI 2005 s/p PCI LAD: On ASA, statin, beta blocker.\par \par ICM/severe/dilated: On Entresto, beta blocker. Medtronic single chamber ICD.  Worsening symptoms.  Change furosemide to torsemide.  Recheck BMP 1 week. \par \par Bioprosthetic AVR (2015) 2/2 AI: On ASA.  Surveillance echocardiography. Carotid and abd to be done at time of next echo.\par \par Aflutter ablation 7/17: Following with EP as above.\par \par Pulmonary HTN: Patient advised to f/u with Dr. Ochoa.\par \par Ongoing f/u with PCP and pain management.\par \par Discussed red flag symptoms, which would warrant sooner or emergent medical evaluation.\par Any questions and concerns were addressed and resolved.

## 2020-07-11 NOTE — HISTORY OF PRESENT ILLNESS
[FreeTextEntry1] : Patient is a 70-year-old man with a prior history of myocardial infarction 2005 status post previous PCI to the LAD,  dilated cardiomyopathy ejection fraction 15 to 20%, previous bioprosthetic aortic valve replacement, s/p Medtronic single-chamber ICD implant 2005, status post appropriate shocks for VT VF 2015.  And prior atrial flutter ablation in 2017.  He underwent extraction of Medtronic [place] lead October 2018.  He has had increased shortness of breath and fatigue and his device interrogation had shown persistent atrial fibrillation.  He was treated with  digoxin.  In addition the patient has been on Coreg which was switched to Toprol and he was started also on Entresto.  He continues to complain of symptoms of shortness of breath as well as fatigue.  Denies chest pain dizziness, lightens, syncope or presyncope.  \par \par Cardiac catheterization performed 6/4/2020 showed nonobstructive coronary arteries.\par Echocardiogram from 6/3/2020 showed severely reduced ejection fraction 15 to 20%, dilated LV diameter 6.5 cm, moderate to severe diastolic dysfunction \par \par Device: Medtronic ICD VVI low rate 40, VT detection slow 150, VT / bpm\par \par Device Interrogation: Device programmed VVIR 40 bpm; remaining battery longevity 9.1 years R wave measured 9.1 mV.  Capture threshold 0.5 V at 0.4 ms.  No sustained episodes of VT VF.  Episode of nonsustained VT.  Device shows that the patient has been in persistent AF since September 2019.  The OptiVol fluid index was above threshold between May- June 2020 and is now returned to baseline\par \par Recent Zio patch Monitor for 3 days: AF with VR , NSVT\par

## 2020-07-11 NOTE — DISCUSSION/SUMMARY
[FreeTextEntry1] : Patient is a 70-year-old man with a prior history of ischemic cardiomyopathy with severe left ventricular dysfunction, status post single-chamber ICD for primary prevention and had appropriate shocks for VT/VF in 2015 and subsequent ATP terminations as well, status post extraction of [place] lead in 2018 and reimplantation of a single-chamber device.  Recent echo shows ejection fraction 15 to 20%.  His heart failure management regimen has been optimized.  He had repeat cardiac catheterization June 2020 which did not show any obstructive lesion that required intervention.  Previous echo had showed moderately dilated left atrium.\par \par He is currently rate controlled with digoxin.  Patient is also on metoprolol succinate, Entresto, torsemide and spironolactone.  His blood pressures are in the range of 100/60.\par \par Device Interrogation: Device programmed VVIR 40 bpm; remaining battery longevity 9.1 years R wave measured 9.1 mV.  Capture threshold 0.5 V at 0.4 ms.  No sustained episodes of VT VF.  Episode of nonsustained VT.  Device shows that the patient has been in persistent AF since September 2019.  The OptiVol fluid index was above threshold between May- June 2020 and is now returned to baseline.  The device was reprogrammed to VVIR 50 bpm.\par \par His main symptoms currently are shortness of breath and fatigue.  He seems to be on optimized medical regimen for his heart failure.  The AF has periods of increased ventricular response.  His QRS duration is narrow less than 110 ms.  He has been in atrial fibrillation for greater than 6 months and his left atrium is moderately dilated.  Option would be to try to restore sinus rhythm and see if he has any improvement in symptoms.  The treatment options could either be amiodarone or possibly Tikosyn.  The patient will be seeing Dr. Quiñones in follow-up and we will discuss the option of restoration of sinus rhythm.

## 2020-07-11 NOTE — PHYSICAL EXAM
[General Appearance - In No Acute Distress] : no acute distress [General Appearance - Well Developed] : well developed [Normal Conjunctiva] : the conjunctiva exhibited no abnormalities [No Oral Pallor] : no oral pallor [Normal Jugular Venous V Waves Present] : normal jugular venous V waves present [Auscultation Breath Sounds / Voice Sounds] : lungs were clear to auscultation bilaterally [Heart Sounds] : normal S1 and S2 [Arterial Pulses Normal] : the arterial pulses were normal [Abdomen Tenderness] : non-tender [Nail Clubbing] : no clubbing of the fingernails [Cyanosis, Localized] : no localized cyanosis [] : no rash [Impaired Insight] : insight and judgment were intact [FreeTextEntry1] : Normal prosthetic valve sound; trace bilateral edema

## 2020-07-11 NOTE — REVIEW OF SYSTEMS
[Shortness Of Breath] : shortness of breath [Feeling Fatigued] : feeling fatigued [Dyspnea on exertion] : dyspnea during exertion [Chest Pain] : no chest pain [Palpitations] : no palpitations [Abdominal Pain] : no abdominal pain [Dizziness] : no dizziness [Easy Bleeding] : no tendency for easy bleeding [Easy Bruising] : no tendency for easy bruising

## 2020-07-20 RX ORDER — FUROSEMIDE 20 MG/1
20 TABLET ORAL DAILY
Qty: 90 | Refills: 2 | Status: DISCONTINUED | COMMUNITY
End: 2020-07-20

## 2020-07-28 ENCOUNTER — APPOINTMENT (OUTPATIENT)
Dept: CARDIOLOGY | Facility: CLINIC | Age: 70
End: 2020-07-28
Payer: MEDICARE

## 2020-07-28 ENCOUNTER — NON-APPOINTMENT (OUTPATIENT)
Age: 70
End: 2020-07-28

## 2020-07-28 VITALS
SYSTOLIC BLOOD PRESSURE: 89 MMHG | HEART RATE: 78 BPM | WEIGHT: 221 LBS | DIASTOLIC BLOOD PRESSURE: 60 MMHG | BODY MASS INDEX: 29.93 KG/M2 | OXYGEN SATURATION: 97 % | HEIGHT: 72 IN

## 2020-07-28 PROCEDURE — 99214 OFFICE O/P EST MOD 30 MIN: CPT

## 2020-07-28 PROCEDURE — 93000 ELECTROCARDIOGRAM COMPLETE: CPT

## 2020-07-31 NOTE — ASSESSMENT
[FreeTextEntry1] : Briefly, 71 y/o M w/ h/o CAD s/p late-presenting MI 2005 s/p PCI LAD, ICM (EF 20-25%, LVEDD 5.9 cm), bioprosthetic AVR (2015) 2/2 AI, aflutter ablation 7/17, persistent afib who presents for f/u visit. Currently endorsing significant orthostasis (had longstanding issue before but now exacerbated over past several monhts) along with exertional fatigue with class III symptoms but appears hypovolemic. Etiology of decline in exertional tolerance may correlate with onset of atrial fibrillation and is pending a DCCV 8/12. Also noted to be possibly increased RV pacing recently possibly 2/2 amiodarone and may benefit from CRT upgrade. \par \par 1. HFrEF \par - prev on coreg 25 mg bid in 2018; had been on toprol xl 1/2 twice/day; couldn't tolerate 25 mg twice/day so only on toprol xl 25 mg daily\par - continue spironolactone 25 mg daily\par - on dig 0.125 mcg daily \par - on Entresto 24/26 but may be exacerbating orthostasis; will switch to losartan 12.5 mg twice/day\par - appears hypovolemic and was taking torsemide 20 mg daily; instructed him to take 10 mg as needed for weight gain\par - keep log of weight/BP\par - if persistent symptoms despite cardioversion +/- CRT upgrade, would do RHC\par - discussed possibility of LVAD if no improvement\par \par 2. Aflutter - currently in afib (per last ECG)\par  continue eliquis 5 mg bid\par - on dig \par - on amiodarone\par - plan for DCCV 8/12\par \par 3. CAD - s/p MI\par - continue ASA 81 mg daily and lipitor 40 mg daily\par \par RTC with NP at [place] in 4 weeks and me in 8 weeks

## 2020-07-31 NOTE — PHYSICAL EXAM
[General Appearance - Well Developed] : well developed [Normal Appearance] : normal appearance [General Appearance - Well Nourished] : well nourished [Normal Conjunctiva] : the conjunctiva exhibited no abnormalities [Normal Oral Mucosa] : normal oral mucosa [Respiration, Rhythm And Depth] : normal respiratory rhythm and effort [] : no respiratory distress [Heart Rate And Rhythm] : heart rate and rhythm were normal [Heart Sounds] : normal S1 and S2 [Auscultation Breath Sounds / Voice Sounds] : lungs were clear to auscultation bilaterally [Abdomen Tenderness] : non-tender [Bowel Sounds] : normal bowel sounds [Abdomen Soft] : soft [Nail Clubbing] : no clubbing of the fingernails [Abnormal Walk] : normal gait [FreeTextEntry1] : no edema b/l [Oriented To Time, Place, And Person] : oriented to person, place, and time [Skin Color & Pigmentation] : normal skin color and pigmentation

## 2020-07-31 NOTE — HISTORY OF PRESENT ILLNESS
[FreeTextEntry1] : Briefly, 71 y/o M w/ h/o CAD s/p late-presenting MI 2005 s/p PCI LAD, ICM (EF 20-25%, LVEDD 5.9 cm), bioprosthetic AVR (2015) 2/2 AI, aflutter ablation 7/17, persistent afib who presents for f/u visit. Last seen 10/22/18 and was lost to follow up due to moving to [place]. Since, he established care with Dr. Pressley.\par \jacinto Was last seen via Televisit on April 7 where he had been doing well. Had been in [place] during that time. Developed shingles down there which has been troubling him ever since. Experiences neuropathic pain despite acyclovir and lyrica. \par \par Reports not feeling well in May where he was getting more fatigued. Ended up hospitalized at [facility] in June for 2 days. Was given diuretics with improvement. Underwent angiogram with Dr. Nino and was told he had patent stents. Was told EF was reduced to 15-20%\par \jacinto Had an advisory [place] lead that was extracted and reimplanted in 2018. Has since been V-pacing 2-3%. Had his pacing rate increased recently from 40 to 50 bpm and appears to be pacing more frequently.\par \par Previously able to play golf in April and now reduced significantly. Denies orthopnea/PND. In February, was able to ride stationary bike. Denies LE edema. Adherent to medications/diet. Continues to have occasional orthostasis which has worsened when sitting for prolonged periods of time. Last Sunday he had dizziness and had a fall but denies syncope. \par \par orthostatics not attempted as supine BP was in 80s

## 2020-08-01 RX ORDER — SACUBITRIL AND VALSARTAN 24; 26 MG/1; MG/1
24-26 TABLET, FILM COATED ORAL
Qty: 180 | Refills: 1 | Status: DISCONTINUED | COMMUNITY
Start: 2019-06-27 | End: 2020-08-01

## 2020-08-05 ENCOUNTER — APPOINTMENT (OUTPATIENT)
Dept: CARDIOLOGY | Facility: CLINIC | Age: 70
End: 2020-08-05
Payer: MEDICARE

## 2020-08-05 VITALS
HEART RATE: 85 BPM | SYSTOLIC BLOOD PRESSURE: 110 MMHG | WEIGHT: 220 LBS | BODY MASS INDEX: 29.8 KG/M2 | DIASTOLIC BLOOD PRESSURE: 62 MMHG | OXYGEN SATURATION: 99 % | HEIGHT: 72 IN | TEMPERATURE: 96.9 F

## 2020-08-05 DIAGNOSIS — R53.83 OTHER FATIGUE: ICD-10-CM

## 2020-08-05 PROCEDURE — 99214 OFFICE O/P EST MOD 30 MIN: CPT

## 2020-08-05 NOTE — PHYSICAL EXAM
[General Appearance - Well Developed] : well developed [Normal Appearance] : normal appearance [Well Groomed] : well groomed [No Deformities] : no deformities [General Appearance - In No Acute Distress] : no acute distress [General Appearance - Well Nourished] : well nourished [Eyelids - No Xanthelasma] : the eyelids demonstrated no xanthelasmas [Normal Conjunctiva] : the conjunctiva exhibited no abnormalities [Normal Oral Mucosa] : normal oral mucosa [No Oral Pallor] : no oral pallor [No Oral Cyanosis] : no oral cyanosis [Normal Jugular Venous V Waves Present] : normal jugular venous V waves present [Normal Jugular Venous A Waves Present] : normal jugular venous A waves present [No Jugular Venous Downs A Waves] : no jugular venous downs A waves [Respiration, Rhythm And Depth] : normal respiratory rhythm and effort [Auscultation Breath Sounds / Voice Sounds] : lungs were clear to auscultation bilaterally [Exaggerated Use Of Accessory Muscles For Inspiration] : no accessory muscle use [Heart Rate And Rhythm] : heart rate and rhythm were normal [Heart Sounds] : normal S1 and S2 [Abdomen Soft] : soft [Murmurs] : no murmurs present [Abdomen Tenderness] : non-tender [Abdomen Mass (___ Cm)] : no abdominal mass palpated [Gait - Sufficient For Exercise Testing] : the gait was sufficient for exercise testing [Abnormal Walk] : normal gait [Cyanosis, Localized] : no localized cyanosis [Nail Clubbing] : no clubbing of the fingernails [Petechial Hemorrhages (___cm)] : no petechial hemorrhages [] : no rash [Skin Color & Pigmentation] : normal skin color and pigmentation [No Skin Ulcers] : no skin ulcer [Skin Lesions] : no skin lesions [No Venous Stasis] : no venous stasis [No Xanthoma] : no  xanthoma was observed [Oriented To Time, Place, And Person] : oriented to person, place, and time [Affect] : the affect was normal [No Anxiety] : not feeling anxious [Mood] : the mood was normal

## 2020-08-05 NOTE — ASSESSMENT
[FreeTextEntry1] : DUDLEY BERMUDEZ is a 70 year old M who presents with the above history and the following active issues:\par \par Persistent atrial fibrillation: Rate controlled. Pt now has persistent Afib and is symptomatic with heart failure. On Eliquis, Dig, and Toprol.  Scheduled for cardioversion next week. \par \par CAD s/p MI 2005 s/p PCI LAD: On ASA, statin, beta blocker.\par \par ICM/severe/dilated: Entresto DC'd by HF specialist.  On beta blocker. Medtronic single chamber ICD.  Worsening symptoms.  May attempt increase of Torsemide to 20mg.  If no improvement in symptoms, or worsening dizziness, decrease back to 10mg.  \par \par Bioprosthetic AVR (2015) 2/2 AI: On ASA.  Surveillance echocardiography. Carotid and abd to be done at time of next echo.\par \par Aflutter ablation 7/17: Following with EP as above.\par \par Pulmonary HTN: Patient advised to f/u with Dr. Ochoa.\par \par Ongoing f/u with PCP and pain management.\par \par Discussed red flag symptoms, which would warrant sooner or emergent medical evaluation.\par Any questions and concerns were addressed and resolved.

## 2020-08-05 NOTE — HISTORY OF PRESENT ILLNESS
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 \par \par Seen 6/2/20 with MATHEWS and was sent to the ER, subsequently had cardiac cath 6/5/20. He was discharged on Lasix daily and Spironolactone was increased to 25 mg daily.  \par \par Since last seen, MATHEWS increased and exercise tolerance has decreased.  Difficultly walking short distances.  Denies CP, PND, orthopnea, or weight gain. Denies palpitations, syncope,claudication, and edema.  Saw HF specialist (Nuria) who DC'd Entresto, started Losartan 12.5mg BID and decreased torsemide to 10mg QD.  Since then increased MATHEWS. BP on my evaluation was 88/52mmHg. \par \par Scheduled for cardioversion next Wednesday.  EP felt ablation was too high risk. \par \par Testing:\par Zio 6/7/20-6/11/20: Atrial fibrillation 100%; rates ranging from  bpm, average HR 73 bpm. 1 run of NSVT which lasted 14 beats in duration. PVC burden 1.2%. Rare ventricular couplets. Ventricular bigeminy and trigeminy present.\par \par Had cardiac cath 6/4/20. No stents were required. Hemodynamnics: Low cardiac output, mildly elevated pulm cap wedge pressure. Mild BV failure. Severe pulm htn. Bioprosthetic aortic valve was observed in the aortic position. It was recommended he f/u with pulmonologist as well.\par \par Echo 6/2/20: Technically difficult study with subobtimal images. LV function is severely reduced. LV apex, med, and distal septum, mid and distal interolateral wall, anterior wall are akinetic. Laterall wall is not well seen. LAE.  Dilated LV. Trace TR with PASP 42 mm Hg. Mod to severe DD.\par \par EKG 6/2/20 AF with ventricular rate control\par \par labs 5/28/2020 BUN 16, Creat 1.2, Digoxin level 1.2, Sodium 142, potassium 4.9\par \par July 2019, potassium 4.4, creatinine 1.0 \par Device check with nonsustained ventricular tachycardia brief transient atrial fibrillation \par \par Labs Hemoglobin 13.4, creatinine 0.9, total cholesterol 147, LDL 65, TSH 1.9.  \par Echocardiogram demonstrates severely reduced LV function. Dilated left ventricle. Mild mixed mitral valve disease. Bioprosthetic aortic valve mild pulmonary hypertension.\par

## 2020-08-05 NOTE — REVIEW OF SYSTEMS
[Dyspnea on exertion] : dyspnea during exertion [Negative] : Heme/Lymph [FreeTextEntry1] : dizziness

## 2020-08-09 ENCOUNTER — APPOINTMENT (OUTPATIENT)
Dept: DISASTER EMERGENCY | Facility: CLINIC | Age: 70
End: 2020-08-09

## 2020-08-09 ENCOUNTER — LABORATORY RESULT (OUTPATIENT)
Age: 70
End: 2020-08-09

## 2020-08-12 ENCOUNTER — OUTPATIENT (OUTPATIENT)
Dept: OUTPATIENT SERVICES | Facility: HOSPITAL | Age: 70
LOS: 1 days | End: 2020-08-12
Payer: MEDICARE

## 2020-08-12 DIAGNOSIS — Z98.89 OTHER SPECIFIED POSTPROCEDURAL STATES: Chronic | ICD-10-CM

## 2020-08-12 DIAGNOSIS — Z95.810 PRESENCE OF AUTOMATIC (IMPLANTABLE) CARDIAC DEFIBRILLATOR: Chronic | ICD-10-CM

## 2020-08-12 DIAGNOSIS — Z95.4 PRESENCE OF OTHER HEART-VALVE REPLACEMENT: Chronic | ICD-10-CM

## 2020-08-12 DIAGNOSIS — Z98.890 OTHER SPECIFIED POSTPROCEDURAL STATES: Chronic | ICD-10-CM

## 2020-08-12 DIAGNOSIS — Z95.5 PRESENCE OF CORONARY ANGIOPLASTY IMPLANT AND GRAFT: Chronic | ICD-10-CM

## 2020-08-12 PROCEDURE — 93320 DOPPLER ECHO COMPLETE: CPT | Mod: 26

## 2020-08-12 PROCEDURE — 93312 ECHO TRANSESOPHAGEAL: CPT | Mod: 26

## 2020-08-12 PROCEDURE — 93355 ECHO TRANSESOPHAGEAL (TEE): CPT

## 2020-08-12 PROCEDURE — 93010 ELECTROCARDIOGRAM REPORT: CPT | Mod: 76

## 2020-08-12 PROCEDURE — 92960 CARDIOVERSION ELECTRIC EXT: CPT

## 2020-08-14 RX ORDER — TRAMADOL HYDROCHLORIDE 50 MG/1
50 TABLET, COATED ORAL
Qty: 60 | Refills: 0 | Status: COMPLETED | COMMUNITY
Start: 2020-06-19 | End: 2020-08-14

## 2020-08-14 RX ORDER — PREGABALIN 100 MG/1
100 CAPSULE ORAL 3 TIMES DAILY
Refills: 0 | Status: COMPLETED | COMMUNITY
End: 2020-08-14

## 2020-08-14 RX ORDER — DIGOXIN 125 UG/1
125 TABLET ORAL DAILY
Qty: 90 | Refills: 0 | Status: COMPLETED | COMMUNITY
End: 2020-08-14

## 2020-08-17 ENCOUNTER — APPOINTMENT (OUTPATIENT)
Dept: HEART FAILURE | Facility: CLINIC | Age: 70
End: 2020-08-17

## 2020-08-18 ENCOUNTER — APPOINTMENT (OUTPATIENT)
Dept: CARDIOTHORACIC SURGERY | Facility: CLINIC | Age: 70
End: 2020-08-18
Payer: MEDICARE

## 2020-08-18 ENCOUNTER — INPATIENT (INPATIENT)
Facility: HOSPITAL | Age: 70
LOS: 3 days | Discharge: HOME | DRG: 267 | End: 2020-08-22
Attending: THORACIC SURGERY (CARDIOTHORACIC VASCULAR SURGERY) | Admitting: THORACIC SURGERY (CARDIOTHORACIC VASCULAR SURGERY)
Payer: MEDICARE

## 2020-08-18 ENCOUNTER — NON-APPOINTMENT (OUTPATIENT)
Age: 70
End: 2020-08-18

## 2020-08-18 ENCOUNTER — APPOINTMENT (OUTPATIENT)
Dept: CARDIOLOGY | Facility: CLINIC | Age: 70
End: 2020-08-18

## 2020-08-18 ENCOUNTER — OUTPATIENT (OUTPATIENT)
Dept: OUTPATIENT SERVICES | Facility: HOSPITAL | Age: 70
LOS: 1 days | End: 2020-08-18
Payer: MEDICARE

## 2020-08-18 ENCOUNTER — RESULT REVIEW (OUTPATIENT)
Age: 70
End: 2020-08-18

## 2020-08-18 VITALS
SYSTOLIC BLOOD PRESSURE: 109 MMHG | DIASTOLIC BLOOD PRESSURE: 72 MMHG | HEIGHT: 72 IN | TEMPERATURE: 97.6 F | HEART RATE: 64 BPM | OXYGEN SATURATION: 99 % | RESPIRATION RATE: 16 BRPM

## 2020-08-18 VITALS — BODY MASS INDEX: 27.94 KG/M2 | WEIGHT: 206 LBS

## 2020-08-18 VITALS
TEMPERATURE: 97 F | HEART RATE: 61 BPM | SYSTOLIC BLOOD PRESSURE: 90 MMHG | DIASTOLIC BLOOD PRESSURE: 58 MMHG | RESPIRATION RATE: 16 BRPM | OXYGEN SATURATION: 99 %

## 2020-08-18 DIAGNOSIS — I35.0 NONRHEUMATIC AORTIC (VALVE) STENOSIS: ICD-10-CM

## 2020-08-18 DIAGNOSIS — Z95.810 PRESENCE OF AUTOMATIC (IMPLANTABLE) CARDIAC DEFIBRILLATOR: Chronic | ICD-10-CM

## 2020-08-18 DIAGNOSIS — I48.91 UNSPECIFIED ATRIAL FIBRILLATION: ICD-10-CM

## 2020-08-18 DIAGNOSIS — R07.9 CHEST PAIN, UNSPECIFIED: ICD-10-CM

## 2020-08-18 DIAGNOSIS — Z98.89 OTHER SPECIFIED POSTPROCEDURAL STATES: Chronic | ICD-10-CM

## 2020-08-18 DIAGNOSIS — Z98.890 OTHER SPECIFIED POSTPROCEDURAL STATES: Chronic | ICD-10-CM

## 2020-08-18 DIAGNOSIS — Z95.5 PRESENCE OF CORONARY ANGIOPLASTY IMPLANT AND GRAFT: Chronic | ICD-10-CM

## 2020-08-18 DIAGNOSIS — Z95.4 PRESENCE OF OTHER HEART-VALVE REPLACEMENT: Chronic | ICD-10-CM

## 2020-08-18 DIAGNOSIS — I42.8 OTHER CARDIOMYOPATHIES: ICD-10-CM

## 2020-08-18 DIAGNOSIS — T82.857A STENOSIS OF OTHER CARDIAC PROSTHETIC DEVICES, IMPLANTS AND GRAFTS, INITIAL ENCOUNTER: ICD-10-CM

## 2020-08-18 LAB
ALBUMIN SERPL ELPH-MCNC: 4.8 G/DL — SIGNIFICANT CHANGE UP (ref 3.3–5)
ALP SERPL-CCNC: 94 U/L — SIGNIFICANT CHANGE UP (ref 40–120)
ALT FLD-CCNC: 34 U/L — SIGNIFICANT CHANGE UP (ref 10–45)
ANION GAP SERPL CALC-SCNC: 14 MMOL/L — SIGNIFICANT CHANGE UP (ref 5–17)
APPEARANCE UR: CLEAR — SIGNIFICANT CHANGE UP
APTT BLD: 33.4 SEC — SIGNIFICANT CHANGE UP (ref 27.5–35.5)
AST SERPL-CCNC: 26 U/L — SIGNIFICANT CHANGE UP (ref 10–40)
BASOPHILS # BLD AUTO: 0.09 K/UL — SIGNIFICANT CHANGE UP (ref 0–0.2)
BASOPHILS NFR BLD AUTO: 1.1 % — SIGNIFICANT CHANGE UP (ref 0–2)
BILIRUB SERPL-MCNC: 1.4 MG/DL — HIGH (ref 0.2–1.2)
BILIRUB UR-MCNC: NEGATIVE — SIGNIFICANT CHANGE UP
BLD GP AB SCN SERPL QL: NEGATIVE — SIGNIFICANT CHANGE UP
BUN SERPL-MCNC: 31 MG/DL — HIGH (ref 7–23)
CALCIUM SERPL-MCNC: 9.8 MG/DL — SIGNIFICANT CHANGE UP (ref 8.4–10.5)
CHLORIDE SERPL-SCNC: 92 MMOL/L — LOW (ref 96–108)
CO2 SERPL-SCNC: 29 MMOL/L — SIGNIFICANT CHANGE UP (ref 22–31)
COLOR SPEC: YELLOW — SIGNIFICANT CHANGE UP
CREAT SERPL-MCNC: 1.4 MG/DL — HIGH (ref 0.5–1.3)
DIFF PNL FLD: NEGATIVE — SIGNIFICANT CHANGE UP
EOSINOPHIL # BLD AUTO: 0.09 K/UL — SIGNIFICANT CHANGE UP (ref 0–0.5)
EOSINOPHIL NFR BLD AUTO: 1.1 % — SIGNIFICANT CHANGE UP (ref 0–6)
FIBRINOGEN PPP-MCNC: 400 MG/DL — SIGNIFICANT CHANGE UP (ref 350–510)
GLUCOSE BLDC GLUCOMTR-MCNC: 128 MG/DL — HIGH (ref 70–99)
GLUCOSE SERPL-MCNC: 285 MG/DL — HIGH (ref 70–99)
GLUCOSE UR QL: NEGATIVE — SIGNIFICANT CHANGE UP
HCT VFR BLD CALC: 41.5 % — SIGNIFICANT CHANGE UP (ref 39–50)
HGB BLD-MCNC: 13.4 G/DL — SIGNIFICANT CHANGE UP (ref 13–17)
IMM GRANULOCYTES NFR BLD AUTO: 0.5 % — SIGNIFICANT CHANGE UP (ref 0–1.5)
INR BLD: 2.14 RATIO — HIGH (ref 0.88–1.16)
KETONES UR-MCNC: NEGATIVE — SIGNIFICANT CHANGE UP
LEUKOCYTE ESTERASE UR-ACNC: NEGATIVE — SIGNIFICANT CHANGE UP
LYMPHOCYTES # BLD AUTO: 1.17 K/UL — SIGNIFICANT CHANGE UP (ref 1–3.3)
LYMPHOCYTES # BLD AUTO: 14 % — SIGNIFICANT CHANGE UP (ref 13–44)
MCHC RBC-ENTMCNC: 31 PG — SIGNIFICANT CHANGE UP (ref 27–34)
MCHC RBC-ENTMCNC: 32.3 GM/DL — SIGNIFICANT CHANGE UP (ref 32–36)
MCV RBC AUTO: 96.1 FL — SIGNIFICANT CHANGE UP (ref 80–100)
MONOCYTES # BLD AUTO: 0.76 K/UL — SIGNIFICANT CHANGE UP (ref 0–0.9)
MONOCYTES NFR BLD AUTO: 9.1 % — SIGNIFICANT CHANGE UP (ref 2–14)
NEUTROPHILS # BLD AUTO: 6.19 K/UL — SIGNIFICANT CHANGE UP (ref 1.8–7.4)
NEUTROPHILS NFR BLD AUTO: 74.2 % — SIGNIFICANT CHANGE UP (ref 43–77)
NITRITE UR-MCNC: NEGATIVE — SIGNIFICANT CHANGE UP
NRBC # BLD: 0 /100 WBCS — SIGNIFICANT CHANGE UP (ref 0–0)
NT-PROBNP SERPL-SCNC: 2154 PG/ML — HIGH (ref 0–300)
PH UR: 6 — SIGNIFICANT CHANGE UP (ref 5–8)
PLATELET # BLD AUTO: 245 K/UL — SIGNIFICANT CHANGE UP (ref 150–400)
POTASSIUM SERPL-MCNC: 3.3 MMOL/L — LOW (ref 3.5–5.3)
POTASSIUM SERPL-SCNC: 3.3 MMOL/L — LOW (ref 3.5–5.3)
PROT SERPL-MCNC: 7.3 G/DL — SIGNIFICANT CHANGE UP (ref 6–8.3)
PROT UR-MCNC: SIGNIFICANT CHANGE UP
PROTHROM AB SERPL-ACNC: 24.6 SEC — HIGH (ref 10.6–13.6)
RBC # BLD: 4.32 M/UL — SIGNIFICANT CHANGE UP (ref 4.2–5.8)
RBC # FLD: 13.6 % — SIGNIFICANT CHANGE UP (ref 10.3–14.5)
RH IG SCN BLD-IMP: POSITIVE — SIGNIFICANT CHANGE UP
SARS-COV-2 N GENE NPH QL NAA+PROBE: NOT DETECTED
SODIUM SERPL-SCNC: 135 MMOL/L — SIGNIFICANT CHANGE UP (ref 135–145)
SP GR SPEC: >1.05 (ref 1.01–1.02)
UROBILINOGEN FLD QL: ABNORMAL
WBC # BLD: 8.34 K/UL — SIGNIFICANT CHANGE UP (ref 3.8–10.5)
WBC # FLD AUTO: 8.34 K/UL — SIGNIFICANT CHANGE UP (ref 3.8–10.5)

## 2020-08-18 PROCEDURE — 99205 OFFICE O/P NEW HI 60 MIN: CPT

## 2020-08-18 PROCEDURE — 71045 X-RAY EXAM CHEST 1 VIEW: CPT | Mod: 26

## 2020-08-18 PROCEDURE — 99223 1ST HOSP IP/OBS HIGH 75: CPT

## 2020-08-18 PROCEDURE — 75572 CT HRT W/3D IMAGE: CPT

## 2020-08-18 PROCEDURE — 93000 ELECTROCARDIOGRAM COMPLETE: CPT

## 2020-08-18 PROCEDURE — 75572 CT HRT W/3D IMAGE: CPT | Mod: 26

## 2020-08-18 RX ORDER — ACETAMINOPHEN 500 MG
650 TABLET ORAL EVERY 6 HOURS
Refills: 0 | Status: DISCONTINUED | OUTPATIENT
Start: 2020-08-18 | End: 2020-08-22

## 2020-08-18 RX ORDER — FUROSEMIDE 40 MG
1 TABLET ORAL
Qty: 0 | Refills: 0 | DISCHARGE

## 2020-08-18 RX ORDER — ATORVASTATIN CALCIUM 80 MG/1
40 TABLET, FILM COATED ORAL AT BEDTIME
Refills: 0 | Status: DISCONTINUED | OUTPATIENT
Start: 2020-08-18 | End: 2020-08-22

## 2020-08-18 RX ORDER — INSULIN LISPRO 100/ML
VIAL (ML) SUBCUTANEOUS AT BEDTIME
Refills: 0 | Status: DISCONTINUED | OUTPATIENT
Start: 2020-08-18 | End: 2020-08-22

## 2020-08-18 RX ORDER — LOSARTAN POTASSIUM 100 MG/1
25 TABLET, FILM COATED ORAL DAILY
Refills: 0 | Status: DISCONTINUED | OUTPATIENT
Start: 2020-08-18 | End: 2020-08-22

## 2020-08-18 RX ORDER — HEPARIN SODIUM 5000 [USP'U]/ML
1000 INJECTION INTRAVENOUS; SUBCUTANEOUS
Qty: 25000 | Refills: 0 | Status: DISCONTINUED | OUTPATIENT
Start: 2020-08-18 | End: 2020-08-20

## 2020-08-18 RX ORDER — ASPIRIN/CALCIUM CARB/MAGNESIUM 324 MG
81 TABLET ORAL DAILY
Refills: 0 | Status: DISCONTINUED | OUTPATIENT
Start: 2020-08-18 | End: 2020-08-22

## 2020-08-18 RX ORDER — POTASSIUM CHLORIDE 20 MEQ
1 PACKET (EA) ORAL
Qty: 0 | Refills: 0 | DISCHARGE

## 2020-08-18 RX ORDER — METOPROLOL TARTRATE 50 MG
25 TABLET ORAL DAILY
Refills: 0 | Status: DISCONTINUED | OUTPATIENT
Start: 2020-08-18 | End: 2020-08-22

## 2020-08-18 RX ORDER — INSULIN LISPRO 100/ML
VIAL (ML) SUBCUTANEOUS
Refills: 0 | Status: DISCONTINUED | OUTPATIENT
Start: 2020-08-18 | End: 2020-08-22

## 2020-08-18 RX ORDER — SPIRONOLACTONE 25 MG/1
25 TABLET, FILM COATED ORAL DAILY
Refills: 0 | Status: DISCONTINUED | OUTPATIENT
Start: 2020-08-18 | End: 2020-08-18

## 2020-08-18 RX ORDER — POTASSIUM CHLORIDE 20 MEQ
20 PACKET (EA) ORAL
Refills: 0 | Status: COMPLETED | OUTPATIENT
Start: 2020-08-18 | End: 2020-08-18

## 2020-08-18 RX ORDER — AMIODARONE HYDROCHLORIDE 400 MG/1
200 TABLET ORAL DAILY
Refills: 0 | Status: DISCONTINUED | OUTPATIENT
Start: 2020-08-18 | End: 2020-08-22

## 2020-08-18 RX ORDER — SODIUM CHLORIDE 9 MG/ML
3 INJECTION INTRAMUSCULAR; INTRAVENOUS; SUBCUTANEOUS EVERY 8 HOURS
Refills: 0 | Status: DISCONTINUED | OUTPATIENT
Start: 2020-08-18 | End: 2020-08-22

## 2020-08-18 RX ADMIN — Medication 20 MILLIEQUIVALENT(S): at 23:03

## 2020-08-18 RX ADMIN — ATORVASTATIN CALCIUM 40 MILLIGRAM(S): 80 TABLET, FILM COATED ORAL at 23:03

## 2020-08-18 RX ADMIN — HEPARIN SODIUM 10 UNIT(S)/HR: 5000 INJECTION INTRAVENOUS; SUBCUTANEOUS at 21:13

## 2020-08-18 RX ADMIN — Medication 20 MILLIEQUIVALENT(S): at 20:43

## 2020-08-18 RX ADMIN — SODIUM CHLORIDE 3 MILLILITER(S): 9 INJECTION INTRAMUSCULAR; INTRAVENOUS; SUBCUTANEOUS at 23:04

## 2020-08-18 NOTE — H&P ADULT - HISTORY OF PRESENT ILLNESS
70M hx HLD, HTN, CAD s/p MI (04), PCI/LAD stent x3 ('04) at [place], Cardiomyopathy (NYHA II), AICD 2018, AFIB (on Eliquis, last dose this morning), episode of shingles back in april 2020 treated with acyclovir, with residual neuropathy despite treawith Lyrica which was discontinued due to nausea. Patient is S/P Aortic Valve Replacement (bovine prosthesis 4/3/2015) with Dr. Paez c/o of MATHEWS x 6months, fatigue, and frequent dizziness. He also reports nausea and loss of appetite x 2 weeks and lost 16lbs in 2 weeks. Patient was recently admitted to [facility] August 2020 for AFib s/p ANDREY DCCV 8/12/20 converted to SR. No thrombus in left atrial appendage. Workup/ANDREY showed well seated aortic valve prosthesis with restricted opening of aortic valve. Along with a simple atheroma in the ascending and descending aorta. EF 20%. Patient reports using 1 pillow to sleep and can walk 0.5 blocks without shortness of breathe. Patient is being considered CRT upgrade and possible LVAD if no symptomatic improvement.

## 2020-08-18 NOTE — ASSESSMENT
[FreeTextEntry1] : This is a 70 year old male with past medical history of Hyperlipidemia, Cardiomyopathy, AICD in 2018, Atrial Fibrillation ( on Eliquis, had ANDREY cardioversion 8/12/20 converted to SR ) and S/P Aortic valve Replacement  using 25 mm expanded orifice magna bovine prosthesis on 4/3/2015 with  with complaints of Dyspnea on exertion for 6 months, occasional dizziness  and fatigue ( NYHA II) . He also reports nausea and loss of appetite  for 2 weeks and lost about 16 lbs in the 2 weeks. He was recently admitted to [facility] in August 2020 for AFib. During the work up ANDREY revealed well seated aortic valve prosthesis. There is a restricted opening of aortic valve bio prosthesis. There is a simple atheroma in the ascending and descending aorta. There is smoke, but no thrombus in the left atrial appendage. EF 20%.Agitated saline contrast injection did not reveal the presence of intracardiac shunt.  He can walk up to 0.5 block  without shortness of breath and  uses 1 pillow to sleep . He had an episode of Shingles in April while in [place] treated with acyclovir, with residual neuropathy despite treatment with Lyrica which he discontinued due to nausea. He is  independent with ADLs. His STS Score is 2.17%.He is being considered for CRT upgrade and possible LVAD if he has no symptomatic improvement. He is referred by Dr. Pressley to the valve clinic for consideration of HECTOR. Denies any chest pain, palpitations, pedal edema, syncope or PND. \par \par I had the pleasure of evaluating your patient,Mr/Ms DUDLEY BERMUDEZ  who is an 70 year  male with heart failure symptoms of fatigue and Dyspnea on exertion (NYHA class II).\par \par Mr/Ms DUDLEY BERMUDEZ  has severe AS with  mG mmHg of 28, LVEF 20%.He is a intermediate risk for surgical aortic valve replacement with a calculated STS score of 2.17 %. The risks and benefits of both SAVR and HECTOR have been explained and the patient would like to proceed with further workup and consideration for HECTOR by the structural heart team. I explained the risks of vascular complication, pacemaker requirement and possible paravalvular leakage. He will require CT scan and catheterization before finalizing plans for the procedure.In light of the poor LV function and the progressive symptoms we are in favor with proceeding with urgent valve in valve TAVR and have recommended admission today and surgery Thursday pending creatinine\par \par \par \par \par

## 2020-08-18 NOTE — PHYSICAL EXAM
[General Appearance - Well Developed] : well developed [General Appearance - Well Nourished] : well nourished [Normal Conjunctiva] : the conjunctiva exhibited no abnormalities [Normal Oral Mucosa] : normal oral mucosa [No Oral Pallor] : no oral pallor [No Oral Cyanosis] : no oral cyanosis [Normal Jugular Venous A Waves Present] : normal jugular venous A waves present [Normal Jugular Venous V Waves Present] : normal jugular venous V waves present [No Pitting Edema] : no pitting edema present [No Jugular Venous Downs A Waves] : no jugular venous downs A waves [Respiration, Rhythm And Depth] : normal respiratory rhythm and effort [Abdomen Tenderness] : non-tender [Bowel Sounds] : normal bowel sounds [Abnormal Walk] : normal gait [Skin Turgor] : normal skin turgor [] : no rash [Impaired Insight] : insight and judgment were intact [Oriented To Time, Place, And Person] : oriented to person, place, and time [Cyanosis, Localized] : no localized cyanosis [Nail Clubbing] : no clubbing of the fingernails

## 2020-08-18 NOTE — CONSULT LETTER
[Dear  ___] : Dear  [unfilled], [Please see my note below.] : Please see my note below. [Courtesy Letter:] : I had the pleasure of seeing your patient, [unfilled], in my office today. [Sincerely,] : Sincerely, [Consult Closing:] : Thank you very much for allowing me to participate in the care of this patient.  If you have any questions, please do not hesitate to contact me. [FreeTextEntry2] : Dr. Harvey Pressley [FreeTextEntry3] : Alejandro Paez MD\par \par Cardiovascular & Thoracic Surgery\par Professor\par [facility]\par \par

## 2020-08-18 NOTE — H&P ADULT - NSICDXPASTSURGICALHX_GEN_ALL_CORE_FT
PAST SURGICAL HISTORY:  AICD (automatic cardioverter/defibrillator) present     H/O prior ablation treatment afib    S/P AVR     S/P hernia repair     S/P knee surgery     Stented coronary artery LAD x 3 (Nov 2004)

## 2020-08-18 NOTE — HISTORY OF PRESENT ILLNESS
[FreeTextEntry1] : This is a 70 year old male with past medical history of Hyperlipidemia, Cardiomyopathy, AICD in 2018, Atrial Fibrillation ( on Eliquis, had ANDREY cardioversion 8/12/20 converted to SR ) and S/P Aortic valve Replacement  using 25 mm expanded orifice magna bovine prosthesis on 4/3/2015 with  with complaints of Dyspnea on exertion for 6 months, occasional dizziness and fatigue ( NYHA II) . He also reports nausea and loss of appetite  for 2 weeks and lost about 16 lbs in the 2 weeks. He was recently admitted to [facility] in August 2020 for AFib. During the work up ANDREY revealed well seated aortic valve prosthesis. There is a restricted opening of aortic valve bio prosthesis. There is a simple atheroma in the ascending and descending aorta. There is smoke, but no thrombus in the left atrial appendage. EF 20%.Agitated saline contrast injection did not reveal the presence of intracardiac shunt.  He can walk up to 0.5 block  without shortness of breath and  uses 1 pillow to sleep . He had an episode of Shingles in April while in [place] treated with acyclovir, with residual neuropathy despite treatment with Lyrica which he discontinued due to nausea. He is  independent with ADLs. His STS Score is 2.17%.He is being considered for CRT upgrade and possible LVAD if he has no symptomatic improvement. He is referred by Dr. Pressley to the valve clinic for consideration of HECTOR. Denies any chest pain, palpitations, pedal edema, syncope or PND. \par

## 2020-08-18 NOTE — DISCUSSION/SUMMARY
[Severe Aortic Stenosis] : severe aortic stenosis [Deteriorating] : deteriorating [Inpatient Evaluation] : inpatient evaluation [Aortic Valve Replacement] : aortic valve replacement [Family] : the patient's family [Patient] : the patient [FreeTextEntry1] : Mr Encinas is referred by Dr Pressley for evaluation in the setting of a failing bioprosthetic AVR and progression of heart failure. 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 Visually, the leaflets of his bioAVR do appear somewhat thickened, however it is uncommon for a bioAVR to degenerate severely this quickly, suggesting some component of his AS is due to his severe LV dysfunction. Given his clinical deterioration, I would not recommend a Dobutamine stress echocardiogram--my feeling would be to proceed with TAV in HAIDER as soon as possible, to whatever extent it will mitigate his clinical decline and progression of heart failure--Dr Paez is in agreement with this recommendation.\par \par As such, we will arrange for him to be directly admitted from our office today with plans for TAV in HAID[place] this week (Thursday). His work up is completed. I would recommend holding Eliquis (last dose was this morning), starting a heparin drip given his recent cardioversion, starting Lasix 40mg IV BID (1st dose this afternoon), checking TFT's (he is on Amiodarone with significant fatigue and weight loss--though both could be due to his progressive heart failure), EPS consultation, and CHF consultation (he is followed as an outpatient by Dr Froilan Moreno, I have notified him). I left a VM for Dr Pressley outlining our findings and plan.

## 2020-08-18 NOTE — DATA REVIEWED
[FreeTextEntry1] : 8/12/20 ANDREY revealed well seated aortic valve prosthesis. There is a restricted opening of aortic valve bio prosthesis, mean gradient 28 mm hg. There is a simple atheroma in the ascending and descending aorta. There is smoke, but no thrombus in the left atrial appendage. EF 20%.Agitated saline contrast injection did not reveal the presence of intracardiac shunt. \par \par 6/4/20 Cardiac catheterization revealed mid LAD 10% stenosis at the site of prior stent. Severe pulmonary hypertension \par \par 6/18/19 ECHO revealed bio prosthetic aortic valve, peak trans aortic gradient 19 mm hg, mean trans aortic gradient 9 mm hg in the presence of bioprosthetic aortic valve and reduced flow, DVI 0.4. No AR. \par 
---

## 2020-08-18 NOTE — H&P ADULT - NSHPPHYSICALEXAM_GEN_ALL_CORE
General: WN/WD NAD  Neurology: A&Ox3, nonfocal, TERRAZAS x 4  Head:  Normocephalic, atraumatic  ENT:  Mucosa moist, no ulcerations  Neck:  Supple, no sinuses or palpable masses  Lymphatic:  No palpable cervical, supraclavicular, axillary or inguinal adenopathy  Respiratory: CTA B/L  CV: RRR, S1S2  Abdominal: Soft, NT, ND no palpable mass  MSK: No edema, + peripheral pulses, FROM all 4 extremity  Incisions: intact, no erythema or drainage

## 2020-08-18 NOTE — REVIEW OF SYSTEMS
[Feeling Tired] : feeling tired [Shortness Of Breath] : shortness of breath [SOB on Exertion] : shortness of breath during exertion [Dizziness] : dizziness [Negative] : Heme/Lymph [Chest Pain] : no chest pain [Palpitations] : no palpitations [Lower Ext Edema] : no extremity edema [Fainting] : no fainting [Easy Bruising] : no tendency for easy bruising [Easy Bleeding] : no tendency for easy bleeding

## 2020-08-18 NOTE — H&P ADULT - NSHPLABSRESULTS_GEN_ALL_CORE
13.4   8.34  )-----------( 245      ( 18 Aug 2020 18:29 )             41.5   08-18    135  |  92<L>  |  31<H>  ----------------------------<  285<H>  3.3<L>   |  29  |  1.40<H>    Ca    9.8      18 Aug 2020 18:29    TPro  7.3  /  Alb  4.8  /  TBili  1.4<H>  /  DBili  x   /  AST  26  /  ALT  34  /  AlkPhos  94  08-18      8/12/20 ANDREY   well seated aortic valve prosthesis  restricted opening of aortic valve bioprosthesis, mean gradient 28 mmhg  simple atheroma in the ascending and descending aorta   there is smoke, but no thrombus in left atrial appendage  EF 20%    6/4/20 Cardiac Cath:   mid LAD 10% stenosis of prior stent  Severe pulm hypertension

## 2020-08-18 NOTE — HISTORY OF PRESENT ILLNESS
[FreeTextEntry1] : Mr. Encinas is a 70 year old male, referred by Dr. Pressley for evaluation of aortic stenosis. He presented to the ED on 6/2 with exertional dyspnea, found to be in acute on chronic heart failure requiring IV diuresis. He has since been seen by our colleague Dr. Quiñones in the heart failure clinic, he remains symptomatic despite optimization of medical therapy. He underwent ANDREY cardioversion on 8/12 with Dr. Pressley with successful conversion to NSR. He is being considered for CRT upgrade and possible LVAD if he has no symptomatic improvement. \par \par He reports a progression of general malaise, decreased stamina, and dyspnea with minimal exertion since November. He has positional dizziness. He was started on Amiodarone two weeks prior to his cardioversion and has noted persistent nausea and decreased appetite since that time. He has lost 16lbs in the past week. He denies angina, PND, orthopnea, syncope, or LE edema. \par \par Past medical history includes CAD s/p MI in 2005 with subsequent PCI to mLAD, ICM (EF 20-25%), AVR 4/3/2015 with Dr. Paez for severe AI (25mm Magna), and aflutter ablation on 7/17. He had an episode of Shingles in April while in [place] treated with acyclovir, with residual neuropathy despite treatment with  Lyrica. \par \par Clint notes "I am not running with the same horsepower that I used to" over the past 5 months. He attributed some of this to his ongoing bains with shingles. He is "winded all the time" and has "dizzy spells". He was admitted to [facility] for diuresis and angiography demonstrated patent LAD stents. He was in AF and underwent a ANDREY/DCCV last week. At that time, it was noted that he had stenosis of the bioAVR, though unclear if true or pseudo--as in due to his severely reduced LV function. He took his last dose of Eliquis this morning.

## 2020-08-18 NOTE — H&P ADULT - PROBLEM SELECTOR PLAN 1
S/P AVR with Dr. Carmen (2015)   Plan for TAVR on thursday   Preop Workup in progress   Continue ASA/ATORV  continue toprol 25 daily   TTE/ANDREY/Cath done [place]   Mild CAD s/p stents x3 LAD

## 2020-08-18 NOTE — H&P ADULT - PROBLEM SELECTOR PLAN 3
Anticoagulated with home Eliquis 5mg BID (last dose 8/18 AM   S/P AICD 2018  S/P cardioversion (8/20)   continue Amio 200 daily   continue toprol 25 daily

## 2020-08-18 NOTE — H&P ADULT - NSHPREVIEWOFSYSTEMS_GEN_ALL_CORE
REVIEW OF SYSTEMS:  CONSTITUTIONAL: No fever, weight loss, or fatigue  EYES: No eye pain, visual disturbances, or discharge  ENMT:  No difficulty hearing, tinnitus, vertigo; No sinus or throat pain  NECK: No pain or stiffness  RESPIRATORY: Has non productive cough, No wheezing, chills or hemoptysis; + shortness of breath  CARDIOVASCULAR: No chest pain,No palpitations, dizziness, or leg swelling  GASTROINTESTINAL: No abdominal or epigastric pain. No nausea, vomiting, or hematemesis; No diarrhea ,has No melena  GENITOURINARY: No dysuria, frequency, hematuria, or incontinence  NEUROLOGICAL: No headaches, memory loss, loss of strength, numbness, or tremors  SKIN: No itching, burning, rashes, or lesions   LYMPH NODES: No enlarged glands  ENDOCRINE: No heat or cold intolerance; No hair loss  MUSCULOSKELETAL: No joint pain or swelling; No muscle, back, or extremity pain  PSYCHIATRIC: No depression, anxiety, mood swings, or difficulty sleeping  HEME/LYMPH: No easy bruising, or bleeding gums  ALLERGY: No hives or eczema REVIEW OF SYSTEMS:  CONSTITUTIONAL: No fever, +weight loss, +fatigue  EYES: No eye pain, visual disturbances, or discharge  ENMT:  No difficulty hearing, tinnitus, vertigo; No sinus or throat pain  NECK: No pain or stiffness  RESPIRATORY: Has non productive cough, No wheezing, chills or hemoptysis; + shortness of breath  CARDIOVASCULAR: No chest pain, No palpitations, or leg swelling +Dizziness  GASTROINTESTINAL: No abdominal or epigastric pain. No nausea, vomiting, or hematemesis; No diarrhea ,has No melena  GENITOURINARY: No dysuria, frequency, hematuria, or incontinence  NEUROLOGICAL: No headaches, memory loss, loss of strength, numbness, or tremors  SKIN: No itching, burning, rashes, or lesions   LYMPH NODES: No enlarged glands  ENDOCRINE: No heat or cold intolerance; No hair loss  MUSCULOSKELETAL: No joint pain or swelling; No muscle, back, or extremity pain  PSYCHIATRIC: No depression, anxiety, mood swings, or difficulty sleeping  HEME/LYMPH: No easy bruising, or bleeding gums  ALLERGY: No hives or eczema

## 2020-08-18 NOTE — H&P ADULT - NSHPSOCIALHISTORY_GEN_ALL_CORE
Patient lives at home with wife, retired, with independent ADLs. Hx of former smoking and social alcohol drinker. Denies illicit drug use.

## 2020-08-18 NOTE — REVIEW OF SYSTEMS
[Feeling Fatigued] : feeling fatigued [Eyeglasses] : currently wearing eyeglasses [Dyspnea on exertion] : dyspnea during exertion [Chest  Pressure] : chest pressure [Nausea] : nausea [Change in Appetite] : change in appetite [Dizziness] : dizziness [Negative] : Heme/Lymph [Fever] : no fever [Chills] : no chills [Lower Ext Edema] : no extremity edema [Vomiting] : no vomiting

## 2020-08-18 NOTE — H&P ADULT - ASSESSMENT
70M hx HLD, HTN, CAD s/p MI (04), PCI/LAD stent x3 ('04) at [facility], Cardiomyopathy (NYHA II), AICD 2018, AFIB (on Eliquis, last dose this morning), episode of shingles back in april 2020 treated with acyclovir, with residual neuropathy despite treawith Lyrica which was discontinued due to nausea. Patient is S/P Aortic Valve Replacement (bovine prosthesis 4/3/2015) with Dr. Paez c/o of MATHEWS x 6months, fatigue, and frequent dizziness. He also reports nausea and loss of appetite x 2 weeks and lost 16lbs in 2 weeks. Patient was recently admitted to [facility] August 2020 for AFib s/p ANDREY DCCV 8/12/20 converted to SR. No thrombus in left atrial appendage. Workup/ANDREY showed well seated aortic valve prosthesis with restricted opening of aortic valve. Along with a simple atheroma in the ascending and descending aorta. EF 20%. Patient reports using 1 pillow to sleep and can walk 0.5 blocks without shortness of breathe. Patient is being considered CRT upgrade and possible LVAD if no symptomatic improvement. Patient is a direct admit to [facility] surgery [address] [place] [address] for TAVR procedure with Dr. Paez and Dr. Waldrop on Thursday.     8/18 Direct Admission; TAVR workup in progress

## 2020-08-18 NOTE — H&P ADULT - NSICDXPASTMEDICALHX_GEN_ALL_CORE_FT
PAST MEDICAL HISTORY:  AICD (automatic cardioverter/defibrillator) present     Aortic stenosis     CAD (coronary artery disease) 2004    CHF (congestive heart failure)     HLD (hyperlipidemia)     HTN (hypertension)     MI (myocardial infarction) Nov 2004    Syncope epidsode in 11/14, was found to have been an episode of V-Fib with sucsessfull AICD shock    Systolic heart failure     Valvular cardiomyopathy

## 2020-08-18 NOTE — PHYSICAL EXAM
[General Appearance - Alert] : alert [General Appearance - In No Acute Distress] : in no acute distress [General Appearance - Well Nourished] : well nourished [General Appearance - Well Developed] : well developed [Sclera] : the sclera and conjunctiva were normal [PERRL With Normal Accommodation] : pupils were equal in size, round, and reactive to light [Outer Ear] : the ears and nose were normal in appearance [Hearing Threshold Finger Rub Not Gila] : hearing was normal [Both Tympanic Membranes Were Examined] : both tympanic membranes were normal [Neck Appearance] : the appearance of the neck was normal [] : no respiratory distress [Respiration, Rhythm And Depth] : normal respiratory rhythm and effort [Auscultation Breath Sounds / Voice Sounds] : lungs were clear to auscultation bilaterally [5th Left ICS - MCL] : palpated at the 5th LICS in the midclavicular line [Normal Rate] : normal [Rhythm Regular] : regular [Normal S1] : normal S1 [Normal S2] : normal S2 [II] : a grade 2 [I] : a grade 1 [Examination Of The Chest] : the chest was normal in appearance [2+] : left 2+ [Breast Appearance] : normal in appearance [Bowel Sounds] : normal bowel sounds [Abdomen Soft] : soft [Involuntary Movements] : no involuntary movements were seen [No CVA Tenderness] : no ~M costovertebral angle tenderness [Abnormal Walk] : normal gait [Skin Color & Pigmentation] : normal skin color and pigmentation [No Focal Deficits] : no focal deficits [Oriented To Time, Place, And Person] : oriented to person, place, and time [Skin Turgor] : normal skin turgor [Mood] : the mood was normal [Impaired Insight] : insight and judgment were intact [Affect] : the affect was normal [Memory Remote] : remote memory was not impaired [Memory Recent] : recent memory was not impaired [FreeTextEntry1] : Deferred

## 2020-08-18 NOTE — H&P ADULT - PROBLEM SELECTOR PLAN 2
EF 20%   continue diuretics   continue Losartan 25mg daily   continue toprol 25 daily   TAVR thursday

## 2020-08-18 NOTE — H&P ADULT - NSICDXFAMILYHX_GEN_ALL_CORE_FT
FAMILY HISTORY:  Chronic obstructive pulmonary disease    Father  Still living? No  Family history of colon cancer, Age at diagnosis: Age Unknown

## 2020-08-19 DIAGNOSIS — I25.10 ATHEROSCLEROTIC HEART DISEASE OF NATIVE CORONARY ARTERY WITHOUT ANGINA PECTORIS: ICD-10-CM

## 2020-08-19 DIAGNOSIS — I50.9 HEART FAILURE, UNSPECIFIED: ICD-10-CM

## 2020-08-19 DIAGNOSIS — I50.22 CHRONIC SYSTOLIC (CONGESTIVE) HEART FAILURE: ICD-10-CM

## 2020-08-19 DIAGNOSIS — N17.9 ACUTE KIDNEY FAILURE, UNSPECIFIED: ICD-10-CM

## 2020-08-19 LAB
A1C WITH ESTIMATED AVERAGE GLUCOSE RESULT: 6.3 % — HIGH (ref 4–5.6)
ANION GAP SERPL CALC-SCNC: 12 MMOL/L — SIGNIFICANT CHANGE UP (ref 5–17)
ANION GAP SERPL CALC-SCNC: 14 MMOL/L — SIGNIFICANT CHANGE UP (ref 5–17)
APTT BLD: 60.1 SEC — HIGH (ref 27.5–35.5)
APTT BLD: 67.9 SEC — HIGH (ref 27.5–35.5)
BASOPHILS # BLD AUTO: 0.1 K/UL — SIGNIFICANT CHANGE UP (ref 0–0.2)
BASOPHILS NFR BLD AUTO: 1 % — SIGNIFICANT CHANGE UP (ref 0–2)
BLD GP AB SCN SERPL QL: NEGATIVE — SIGNIFICANT CHANGE UP
BUN SERPL-MCNC: 30 MG/DL — HIGH (ref 7–23)
BUN SERPL-MCNC: 32 MG/DL — HIGH (ref 7–23)
CALCIUM SERPL-MCNC: 9.4 MG/DL — SIGNIFICANT CHANGE UP (ref 8.4–10.5)
CALCIUM SERPL-MCNC: 9.5 MG/DL — SIGNIFICANT CHANGE UP (ref 8.4–10.5)
CHLORIDE SERPL-SCNC: 96 MMOL/L — SIGNIFICANT CHANGE UP (ref 96–108)
CHLORIDE SERPL-SCNC: 97 MMOL/L — SIGNIFICANT CHANGE UP (ref 96–108)
CO2 SERPL-SCNC: 25 MMOL/L — SIGNIFICANT CHANGE UP (ref 22–31)
CO2 SERPL-SCNC: 27 MMOL/L — SIGNIFICANT CHANGE UP (ref 22–31)
CREAT SERPL-MCNC: 1.2 MG/DL — SIGNIFICANT CHANGE UP (ref 0.5–1.3)
CREAT SERPL-MCNC: 1.39 MG/DL — HIGH (ref 0.5–1.3)
EOSINOPHIL # BLD AUTO: 0.24 K/UL — SIGNIFICANT CHANGE UP (ref 0–0.5)
EOSINOPHIL NFR BLD AUTO: 2.4 % — SIGNIFICANT CHANGE UP (ref 0–6)
ESTIMATED AVERAGE GLUCOSE: 134 MG/DL — HIGH (ref 68–114)
GLUCOSE BLDC GLUCOMTR-MCNC: 126 MG/DL — HIGH (ref 70–99)
GLUCOSE BLDC GLUCOMTR-MCNC: 135 MG/DL — HIGH (ref 70–99)
GLUCOSE BLDC GLUCOMTR-MCNC: 136 MG/DL — HIGH (ref 70–99)
GLUCOSE SERPL-MCNC: 109 MG/DL — HIGH (ref 70–99)
GLUCOSE SERPL-MCNC: 156 MG/DL — HIGH (ref 70–99)
HCT VFR BLD CALC: 39 % — SIGNIFICANT CHANGE UP (ref 39–50)
HGB BLD-MCNC: 12.6 G/DL — LOW (ref 13–17)
IMM GRANULOCYTES NFR BLD AUTO: 0.5 % — SIGNIFICANT CHANGE UP (ref 0–1.5)
LYMPHOCYTES # BLD AUTO: 1.69 K/UL — SIGNIFICANT CHANGE UP (ref 1–3.3)
LYMPHOCYTES # BLD AUTO: 16.9 % — SIGNIFICANT CHANGE UP (ref 13–44)
MCHC RBC-ENTMCNC: 30.5 PG — SIGNIFICANT CHANGE UP (ref 27–34)
MCHC RBC-ENTMCNC: 32.3 GM/DL — SIGNIFICANT CHANGE UP (ref 32–36)
MCV RBC AUTO: 94.4 FL — SIGNIFICANT CHANGE UP (ref 80–100)
MONOCYTES # BLD AUTO: 1.19 K/UL — HIGH (ref 0–0.9)
MONOCYTES NFR BLD AUTO: 11.9 % — SIGNIFICANT CHANGE UP (ref 2–14)
MRSA PCR RESULT.: SIGNIFICANT CHANGE UP
NEUTROPHILS # BLD AUTO: 6.73 K/UL — SIGNIFICANT CHANGE UP (ref 1.8–7.4)
NEUTROPHILS NFR BLD AUTO: 67.3 % — SIGNIFICANT CHANGE UP (ref 43–77)
NRBC # BLD: 0 /100 WBCS — SIGNIFICANT CHANGE UP (ref 0–0)
PLATELET # BLD AUTO: 196 K/UL — SIGNIFICANT CHANGE UP (ref 150–400)
POTASSIUM SERPL-MCNC: 3.3 MMOL/L — LOW (ref 3.5–5.3)
POTASSIUM SERPL-MCNC: 3.8 MMOL/L — SIGNIFICANT CHANGE UP (ref 3.5–5.3)
POTASSIUM SERPL-SCNC: 3.3 MMOL/L — LOW (ref 3.5–5.3)
POTASSIUM SERPL-SCNC: 3.8 MMOL/L — SIGNIFICANT CHANGE UP (ref 3.5–5.3)
RBC # BLD: 4.13 M/UL — LOW (ref 4.2–5.8)
RBC # FLD: 13.5 % — SIGNIFICANT CHANGE UP (ref 10.3–14.5)
RH IG SCN BLD-IMP: POSITIVE — SIGNIFICANT CHANGE UP
S AUREUS DNA NOSE QL NAA+PROBE: SIGNIFICANT CHANGE UP
SARS-COV-2 IGG SERPL QL IA: NEGATIVE — SIGNIFICANT CHANGE UP
SARS-COV-2 IGM SERPL IA-ACNC: <0.1 INDEX — SIGNIFICANT CHANGE UP
SODIUM SERPL-SCNC: 135 MMOL/L — SIGNIFICANT CHANGE UP (ref 135–145)
SODIUM SERPL-SCNC: 136 MMOL/L — SIGNIFICANT CHANGE UP (ref 135–145)
T4 FREE SERPL-MCNC: 1.3 NG/DL — SIGNIFICANT CHANGE UP (ref 0.9–1.8)
TSH SERPL-MCNC: 5.35 UIU/ML — HIGH (ref 0.27–4.2)
WBC # BLD: 10 K/UL — SIGNIFICANT CHANGE UP (ref 3.8–10.5)
WBC # FLD AUTO: 10 K/UL — SIGNIFICANT CHANGE UP (ref 3.8–10.5)

## 2020-08-19 PROCEDURE — 93306 TTE W/DOPPLER COMPLETE: CPT | Mod: 26

## 2020-08-19 PROCEDURE — 93282 PRGRMG EVAL IMPLANTABLE DFB: CPT | Mod: 26

## 2020-08-19 PROCEDURE — 99223 1ST HOSP IP/OBS HIGH 75: CPT

## 2020-08-19 PROCEDURE — 93010 ELECTROCARDIOGRAM REPORT: CPT | Mod: 77

## 2020-08-19 PROCEDURE — 93010 ELECTROCARDIOGRAM REPORT: CPT

## 2020-08-19 PROCEDURE — 99231 SBSQ HOSP IP/OBS SF/LOW 25: CPT

## 2020-08-19 RX ORDER — MAGNESIUM SULFATE 500 MG/ML
2 VIAL (ML) INJECTION ONCE
Refills: 0 | Status: COMPLETED | OUTPATIENT
Start: 2020-08-19 | End: 2020-08-19

## 2020-08-19 RX ORDER — CHLORHEXIDINE GLUCONATE 213 G/1000ML
1 SOLUTION TOPICAL ONCE
Refills: 0 | Status: COMPLETED | OUTPATIENT
Start: 2020-08-20 | End: 2020-08-20

## 2020-08-19 RX ORDER — POTASSIUM BICARBONATE 978 MG/1
25 TABLET, EFFERVESCENT ORAL
Refills: 0 | Status: COMPLETED | OUTPATIENT
Start: 2020-08-19 | End: 2020-08-19

## 2020-08-19 RX ORDER — FUROSEMIDE 40 MG
40 TABLET ORAL ONCE
Refills: 0 | Status: COMPLETED | OUTPATIENT
Start: 2020-08-19 | End: 2020-08-19

## 2020-08-19 RX ORDER — CEFUROXIME AXETIL 250 MG
1500 TABLET ORAL ONCE
Refills: 0 | Status: DISCONTINUED | OUTPATIENT
Start: 2020-08-19 | End: 2020-08-22

## 2020-08-19 RX ORDER — POTASSIUM CHLORIDE 20 MEQ
20 PACKET (EA) ORAL ONCE
Refills: 0 | Status: COMPLETED | OUTPATIENT
Start: 2020-08-19 | End: 2020-08-19

## 2020-08-19 RX ORDER — CHLORHEXIDINE GLUCONATE 213 G/1000ML
1 SOLUTION TOPICAL ONCE
Refills: 0 | Status: COMPLETED | OUTPATIENT
Start: 2020-08-19 | End: 2020-08-19

## 2020-08-19 RX ORDER — CHLORHEXIDINE GLUCONATE 213 G/1000ML
30 SOLUTION TOPICAL ONCE
Refills: 0 | Status: COMPLETED | OUTPATIENT
Start: 2020-08-19 | End: 2020-08-20

## 2020-08-19 RX ORDER — POTASSIUM BICARBONATE 978 MG/1
25 TABLET, EFFERVESCENT ORAL
Refills: 0 | Status: DISCONTINUED | OUTPATIENT
Start: 2020-08-19 | End: 2020-08-19

## 2020-08-19 RX ADMIN — SODIUM CHLORIDE 3 MILLILITER(S): 9 INJECTION INTRAMUSCULAR; INTRAVENOUS; SUBCUTANEOUS at 06:15

## 2020-08-19 RX ADMIN — AMIODARONE HYDROCHLORIDE 200 MILLIGRAM(S): 400 TABLET ORAL at 06:19

## 2020-08-19 RX ADMIN — Medication 25 MILLIGRAM(S): at 09:51

## 2020-08-19 RX ADMIN — SODIUM CHLORIDE 3 MILLILITER(S): 9 INJECTION INTRAMUSCULAR; INTRAVENOUS; SUBCUTANEOUS at 21:13

## 2020-08-19 RX ADMIN — Medication 81 MILLIGRAM(S): at 09:51

## 2020-08-19 RX ADMIN — CHLORHEXIDINE GLUCONATE 1 APPLICATION(S): 213 SOLUTION TOPICAL at 20:35

## 2020-08-19 RX ADMIN — HEPARIN SODIUM 10 UNIT(S)/HR: 5000 INJECTION INTRAVENOUS; SUBCUTANEOUS at 04:02

## 2020-08-19 RX ADMIN — LOSARTAN POTASSIUM 25 MILLIGRAM(S): 100 TABLET, FILM COATED ORAL at 09:51

## 2020-08-19 RX ADMIN — SODIUM CHLORIDE 3 MILLILITER(S): 9 INJECTION INTRAMUSCULAR; INTRAVENOUS; SUBCUTANEOUS at 14:36

## 2020-08-19 RX ADMIN — ATORVASTATIN CALCIUM 40 MILLIGRAM(S): 80 TABLET, FILM COATED ORAL at 21:14

## 2020-08-19 RX ADMIN — HEPARIN SODIUM 10 UNIT(S)/HR: 5000 INJECTION INTRAVENOUS; SUBCUTANEOUS at 12:25

## 2020-08-19 RX ADMIN — Medication 40 MILLIGRAM(S): at 16:51

## 2020-08-19 RX ADMIN — Medication 20 MILLIEQUIVALENT(S): at 12:35

## 2020-08-19 RX ADMIN — Medication 50 GRAM(S): at 04:15

## 2020-08-19 RX ADMIN — POTASSIUM BICARBONATE 25 MILLIEQUIVALENT(S): 978 TABLET, EFFERVESCENT ORAL at 06:15

## 2020-08-19 RX ADMIN — POTASSIUM BICARBONATE 25 MILLIEQUIVALENT(S): 978 TABLET, EFFERVESCENT ORAL at 04:27

## 2020-08-19 NOTE — PROGRESS NOTE ADULT - PROBLEM SELECTOR PLAN 1
S/P AVR with Dr. Carmen (2015)   Plan for TAVR on thursday   Preop Workup in progress   Continue ASA/ATORV  continue toprol 25 daily   TTE/ANDREY/Cath done [facility]   Repeat TTE today as per HF  Mild CAD s/p stents x3 LAD

## 2020-08-19 NOTE — PROGRESS NOTE ADULT - SUBJECTIVE AND OBJECTIVE BOX
Cardiac Surgery Pre-op Note:    CC: Patient is a 70y old  Male who presents with a chief complaint of Direct Admit for Preop TAVR (18 Aug 2020 19:12)  Patient is seen sitting comfortably in bed with no complaints. Denies CP, SOB, Dizziness, Palpitations. Plan for Valve in Valve tomorrow with Dr. Paez 8/20/20.      Referring Physician:                                                                                                            Surgeon: Dr. Paez     Procedure: (Date) (Procedure) TAVR 8/20/20    Allergies    No Known Allergies    Intolerances    PAST MEDICAL & SURGICAL HISTORY:  Aortic stenosis  Valvular cardiomyopathy  CHF (congestive heart failure)  Syncope: episode in 11/14, was found to have been an episode of V-Fib with sucsessfull AICD shock  AICD (automatic cardioverter/defibrillator) present  HTN (hypertension)  HLD (hyperlipidemia)  Systolic heart failure  MI (myocardial infarction): Nov 2004  CAD (coronary artery disease): 2004  H/O prior ablation treatment: afib  AICD (automatic cardioverter/defibrillator) present  S/P AVR  S/P hernia repair  S/P knee surgery  Stented coronary artery: LAD x 3 (Nov 2004)      MEDICATIONS  (STANDING):  aMIOdarone    Tablet 200 milliGRAM(s) Oral daily  aspirin enteric coated 81 milliGRAM(s) Oral daily  atorvastatin 40 milliGRAM(s) Oral at bedtime  cefuroxime  IVPB 1500 milliGRAM(s) IV Intermittent once  chlorhexidine 0.12% Liquid 30 milliLiter(s) Swish and Spit once  chlorhexidine 4% Liquid 1 Application(s) Topical once  heparin  Infusion 1000 Unit(s)/Hr (10 mL/Hr) IV Continuous <Continuous>  insulin lispro (HumaLOG) corrective regimen sliding scale   SubCutaneous three times a day before meals  insulin lispro (HumaLOG) corrective regimen sliding scale   SubCutaneous at bedtime  losartan 25 milliGRAM(s) Oral daily  metoprolol succinate ER 25 milliGRAM(s) Oral daily  sodium chloride 0.9% lock flush 3 milliLiter(s) IV Push every 8 hours    MEDICATIONS  (PRN):  acetaminophen   Tablet .. 650 milliGRAM(s) Oral every 6 hours PRN Mild Pain (1 - 3)      Labs:                        12.6   10.00 )-----------( 196      ( 19 Aug 2020 03:12 )             39.0     08-19    136  |  97  |  32<H>  ----------------------------<  109<H>  3.3<L>   |  25  |  1.39<H>    Ca    9.4      19 Aug 2020 03:12    TPro  7.3  /  Alb  4.8  /  TBili  1.4<H>  /  DBili  x   /  AST  26  /  ALT  34  /  AlkPhos  94  08-18    PT/INR - ( 18 Aug 2020 18:29 )   PT: 24.6 sec;   INR: 2.14 ratio         PTT - ( 19 Aug 2020 03:12 )  PTT:60.1 sec    Blood Type: ABO Interpretation: O (08-18 @ 18:36)    HGB A1C:   A1C with Estimated Average Glucose (08.19.20 @ 00:12)    A1C with Estimated Average Glucose Result: 6.3: Method: Immunoassay       Reference Range                4.0-5.6%       High risk (prediabetic)        5.7-6.4%       Diabetic, diagnostic             >=6.5%       ADA diabetic treatment goal       <7.0%    Estimated Average Glucose: 134:    Prealbumin:   Pro-BNP: Serum Pro-Brain Natriuretic Peptide: 2154 pg/mL (08-18 @ 18:29)    Thyroid Panel: 08-19 @ 02:11/5.35  1.3/--/--    MRSA:  / MSSA:     Urinalysis Basic - ( 18 Aug 2020 18:29 )    Color: Yellow / Appearance: Clear / SG: >1.050 / pH: x  Gluc: x / Ketone: Negative  / Bili: Negative / Urobili: 3 mg/dL   Blood: x / Protein: Trace / Nitrite: Negative   Leuk Esterase: Negative / RBC: x / WBC x   Sq Epi: x / Non Sq Epi: x / Bacteria: x    CXR: clear lungs     EKG:    Carotid Duplex:      PFT's:    Echocardiogram:    Cardiac catheterization:    Vein Mapping:    Gen: WN/WD NAD  Neuro: AAOx3, nonfocal  Pulm: CTA B/L  CV: RRR, crescendo decrescendo murmur   Abd: Soft, NT, ND +BS  Ext: Trace edema, + peripheral pulses      Pt has AICD/PPM [x ] Yes  [ ] No             Brand Name: Medtronic   Pre-op Beta Blocker ordered within 24 hrs of surgery (CABG ONLY)?  [X ] Yes  [ ] No  If not, Why?  Type & Cross  [X ] Yes  [ ] No  NPO after Midnight [X ] Yes  [ ] No  Pre-op ABX ordered, to be taped on chart:  [X ] Yes  [ ] No     Hibiclens/Peridex ordered [X ] Yes  [ ] No  Intraop on Hold: PRBCs, CXR, ANDREY [X ]   Consent obtained  [X ] Yes  [ ] No Cardiac Surgery Pre-op Note:    CC: Patient is a 70y old  Male who presents with a chief complaint of Direct Admit for Preop TAVR (18 Aug 2020 19:12)  Patient is seen sitting comfortably in bed with no complaints. Denies CP, SOB, Dizziness, Palpitations. Plan for Valve in Valve tomorrow with Dr. Paez 8/20/20.      Referring Physician:                                                                                                            Surgeon: Dr. Paez     Procedure: (Date) (Procedure) TAVR 8/20/20    Allergies    No Known Allergies    Intolerances    PAST MEDICAL & SURGICAL HISTORY:  Aortic stenosis  Valvular cardiomyopathy  CHF (congestive heart failure)  Syncope: episode in 11/14, was found to have been an episode of V-Fib with sucsessfull AICD shock  AICD (automatic cardioverter/defibrillator) present  HTN (hypertension)  HLD (hyperlipidemia)  Systolic heart failure  MI (myocardial infarction): Nov 2004  CAD (coronary artery disease): 2004  H/O prior ablation treatment: afib  AICD (automatic cardioverter/defibrillator) present  S/P AVR  S/P hernia repair  S/P knee surgery  Stented coronary artery: LAD x 3 (Nov 2004)      MEDICATIONS  (STANDING):  aMIOdarone    Tablet 200 milliGRAM(s) Oral daily  aspirin enteric coated 81 milliGRAM(s) Oral daily  atorvastatin 40 milliGRAM(s) Oral at bedtime  cefuroxime  IVPB 1500 milliGRAM(s) IV Intermittent once  chlorhexidine 0.12% Liquid 30 milliLiter(s) Swish and Spit once  chlorhexidine 4% Liquid 1 Application(s) Topical once  heparin  Infusion 1000 Unit(s)/Hr (10 mL/Hr) IV Continuous <Continuous>  insulin lispro (HumaLOG) corrective regimen sliding scale   SubCutaneous three times a day before meals  insulin lispro (HumaLOG) corrective regimen sliding scale   SubCutaneous at bedtime  losartan 25 milliGRAM(s) Oral daily  metoprolol succinate ER 25 milliGRAM(s) Oral daily  sodium chloride 0.9% lock flush 3 milliLiter(s) IV Push every 8 hours    MEDICATIONS  (PRN):  acetaminophen   Tablet .. 650 milliGRAM(s) Oral every 6 hours PRN Mild Pain (1 - 3)      Labs:                        12.6   10.00 )-----------( 196      ( 19 Aug 2020 03:12 )             39.0     08-19    136  |  97  |  32<H>  ----------------------------<  109<H>  3.3<L>   |  25  |  1.39<H>    Ca    9.4      19 Aug 2020 03:12    TPro  7.3  /  Alb  4.8  /  TBili  1.4<H>  /  DBili  x   /  AST  26  /  ALT  34  /  AlkPhos  94  08-18    PT/INR - ( 18 Aug 2020 18:29 )   PT: 24.6 sec;   INR: 2.14 ratio         PTT - ( 19 Aug 2020 03:12 )  PTT:60.1 sec    Blood Type: ABO Interpretation: O (08-18 @ 18:36)    HGB A1C:   A1C with Estimated Average Glucose (08.19.20 @ 00:12)    A1C with Estimated Average Glucose Result: 6.3: Method: Immunoassay       Reference Range                4.0-5.6%       High risk (prediabetic)        5.7-6.4%       Diabetic, diagnostic             >=6.5%       ADA diabetic treatment goal       <7.0%    Estimated Average Glucose: 134:    Prealbumin:   Pro-BNP: Serum Pro-Brain Natriuretic Peptide: 2154 pg/mL (08-18 @ 18:29)    Thyroid Panel: 08-19 @ 02:11/5.35  1.3/--/--    MRSA:  / MSSA:     Urinalysis Basic - ( 18 Aug 2020 18:29 )    Color: Yellow / Appearance: Clear / SG: >1.050 / pH: x  Gluc: x / Ketone: Negative  / Bili: Negative / Urobili: 3 mg/dL   Blood: x / Protein: Trace / Nitrite: Negative   Leuk Esterase: Negative / RBC: x / WBC x   Sq Epi: x / Non Sq Epi: x / Bacteria: x    CXR: clear lungs   < from: Xray Chest 1 View- PORTABLE-Routine (08.18.20 @ 20:56) >  INTERPRETATION:  CLINICAL INFORMATION: Preoperative study for aortic valve replacement    TECHNIQUE: AP view of the chest. CT heart from 8/18/2020. CT chest from    COMPARISON: Chest radiograph from 10/31/2018. CT abdomen pelvis from 11/8/2019    FINDINGS:    Median sternotomy. Left chest wall AICD. Aortic valve replacement. Bilateral lower lung linear and hazy opacities. No pleural effusion orpneumothorax. Heart size is normal. Bones and soft tissues are unremarkable.    IMPRESSION:    Bilateral lower lung linear and hazy opacities are indeterminate. CT may be helpful for complete evaluation.      < end of copied text >    EKG:    Carotid Duplex:      PFT's:    Echocardiogram:    Cardiac catheterization:    Vein Mapping:    Gen: WN/WD NAD  Neuro: AAOx3, nonfocal  Pulm: CTA B/L  CV: RRR, crescendo decrescendo murmur   Abd: Soft, NT, ND +BS  Ext: Trace edema, + peripheral pulses      Pt has AICD/PPM [x ] Yes  [ ] No             Brand Name: Medtronic   Pre-op Beta Blocker ordered within 24 hrs of surgery (CABG ONLY)?  [X ] Yes  [ ] No  If not, Why?  Type & Cross  [X ] Yes  [ ] No  NPO after Midnight [X ] Yes  [ ] No  Pre-op ABX ordered, to be taped on chart:  [X ] Yes  [ ] No     Hibiclens/Peridex ordered [X ] Yes  [ ] No  Intraop on Hold: PRBCs, CXR, ANDREY [X ]   Consent obtained  [X ] Yes  [ ] No

## 2020-08-19 NOTE — CONSULT NOTE ADULT - ATTENDING COMMENTS
Case discussed at length with CTS and structural cardiology. While DSE could be utilized to distinguish pseudosevere AS from truly severe AS, his velocities/gradients are very near the severe range despite severe LV dysfunction with low stroke volume index and low cardiac output when last measured in 6/2020. Agree with plan for valve-in-valve TAVR and should he not improve then would proceed with LVAD evaluation which carries higher risks of morbidity. He appears volume overloaded but given severe AS, will wait until after TAVR for diuretics.

## 2020-08-19 NOTE — PROGRESS NOTE ADULT - SUBJECTIVE AND OBJECTIVE BOX
Cardiac Surgery Pre-op Note:    CC: Patient is a 70y old  Male who presents with a chief complaint of Direct Admit for Preop TAVR (19 Aug 2020 15:29)    Referring Physician:                                                                                                           Surgeon: Dr. Waldrop/Philippe    Procedure: (Date) (Procedure) TAVR 8/20/20    Allergies    No Known Allergies    Intolerances      PAST MEDICAL & SURGICAL HISTORY:  Aortic stenosis  Valvular cardiomyopathy  CHF (congestive heart failure)  Syncope: epidsode in 11/14, was found to have been an episode of V-Fib with sucsessfull AICD shock  AICD (automatic cardioverter/defibrillator) present  HTN (hypertension)  HLD (hyperlipidemia)  Systolic heart failure  MI (myocardial infarction): Nov 2004  CAD (coronary artery disease): 2004  H/O prior ablation treatment: afib  AICD (automatic cardioverter/defibrillator) present  S/P AVR  S/P hernia repair  S/P knee surgery  Stented coronary artery: LAD x 3 (Nov 2004)      MEDICATIONS  (STANDING):  aMIOdarone    Tablet 200 milliGRAM(s) Oral daily  aspirin enteric coated 81 milliGRAM(s) Oral daily  atorvastatin 40 milliGRAM(s) Oral at bedtime  cefuroxime  IVPB 1500 milliGRAM(s) IV Intermittent once  chlorhexidine 0.12% Liquid 30 milliLiter(s) Swish and Spit once  chlorhexidine 4% Liquid 1 Application(s) Topical once  heparin  Infusion 1000 Unit(s)/Hr (10 mL/Hr) IV Continuous <Continuous>  insulin lispro (HumaLOG) corrective regimen sliding scale   SubCutaneous three times a day before meals  insulin lispro (HumaLOG) corrective regimen sliding scale   SubCutaneous at bedtime  losartan 25 milliGRAM(s) Oral daily  metoprolol succinate ER 25 milliGRAM(s) Oral daily  sodium chloride 0.9% lock flush 3 milliLiter(s) IV Push every 8 hours    MEDICATIONS  (PRN):  acetaminophen   Tablet .. 650 milliGRAM(s) Oral every 6 hours PRN Mild Pain (1 - 3)        Labs:                        12.6   10.00 )-----------( 196      ( 19 Aug 2020 03:12 )             39.0     08-19    135  |  96  |  30<H>  ----------------------------<  156<H>  3.8   |  27  |  1.20    Ca    9.5      19 Aug 2020 11:08    TPro  7.3  /  Alb  4.8  /  TBili  1.4<H>  /  DBili  x   /  AST  26  /  ALT  34  /  AlkPhos  94  08-18    PT/INR - ( 18 Aug 2020 18:29 )   PT: 24.6 sec;   INR: 2.14 ratio         PTT - ( 19 Aug 2020 11:08 )  PTT:67.9 sec    Blood Type: ABO Interpretation: O (08-19 @ 11:22)    HGB A1C:   A1C with Estimated Average Glucose (08.19.20 @ 00:12)    A1C with Estimated Average Glucose Result: 6.3: Method: Immunoassay       Reference Range                4.0-5.6%       High risk (prediabetic)        5.7-6.4%       Diabetic, diagnostic             >=6.5%       ADA diabetic treatment goal       <7.0%    Estimated Average Glucose: 134    Prealbumin:   Pro-BNP: Serum Pro-Brain Natriuretic Peptide: 2154 pg/mL (08-18 @ 18:29)    Thyroid Panel: 08-19 @ 02:11/5.35  1.3/--/--    MRSA: MRSA PCR Result.: NotDetec (08-19 @ 00:01)   / MSSA:   Urinalysis Basic - ( 18 Aug 2020 18:29 )    Color: Yellow / Appearance: Clear / SG: >1.050 / pH: x  Gluc: x / Ketone: Negative  / Bili: Negative / Urobili: 3 mg/dL   Blood: x / Protein: Trace / Nitrite: Negative   Leuk Esterase: Negative / RBC: x / WBC x   Sq Epi: x / Non Sq Epi: x / Bacteria: x    CXR:   < from: Xray Chest 1 View- PORTABLE-Routine (08.18.20 @ 20:56) >  NTERPRETATION:  CLINICAL INFORMATION: Preoperative study for aortic valve replacement    TECHNIQUE: AP view of the chest. CT heart from 8/18/2020. CT chest from    COMPARISON: Chest radiograph from 10/31/2018. CT abdomen pelvis from 11/8/2019    FINDINGS:    Median sternotomy. Left chest wall AICD. Aortic valve replacement. Bilateral lower lung linear and hazy opacities. No pleural effusion orpneumothorax. Heart size is normal. Bones and soft tissues are unremarkable.    IMPRESSION:    Bilateral lower lung linear and hazy opacities are indeterminate. CT may be helpful for complete evaluation.      EILEEN ROACH M.D., RADIOLOGY RESIDENT    < end of copied text >    EKG:    Carotid Duplex:      PFT's:    Echocardiogram:  < from: TTE with Doppler (w/Cont) (08.19.20 @ 15:41) >  OBSERVATIONS:  Mitral Valve: There is posterior mitral annular  calcification with focal calcification on the anterior  mitral leaflet. Mild-moderate mitral regurgitation. The  peak/mean transmitral gradients= 4/1mmHg (HR= 53bpm)  Aortic Root: Aortic Root: 3 cm.  LVOT diameter: 2.3 cm.  Aortic Valve: Bioprosthetic aortic valve.  The  bioprosthetic leaflet are thickened/degenerated. Peak  transaortic valve gradient equals 58 mm Hg, mean  transaortic valve gradient equals 34 mm Hg, estimated  aortic valve area equals 0.7 sqcm (by continuity equation),  aortic valve velocity time integral equals 95 cm, the DVI=  0.17, consistent with severe bioprosthetic aortic  stenosis/degeneration of the bioprosthesis.  The LV SV= 69ml, the LV stroke volume index= 31ml/m2. (low  stroke volume). No aortic valve regurgitation seen. Peak  left ventricular outflow tract gradient equals 2 mm Hg,  mean gradient is equal to 1 mm Hg, LVOT velocity time  integral equals 16 cm.  Left Atrium: Severely dilated left atrium.  LA volume index  = 68 cc/m2.  Left Ventricle: There is severe global hypokinesis with the  basal segments stacia best and a very large area of  apical akinesis.  The LVEF= 20-25%. The left ventricle is  moderately dilated. Restrictive filling pattern with  elevated left atrial pressures.  Right Heart: The right atrium is moderately dilated.  The right atrial area= 26cm2, the right atrial volume=  95ml, the right atrial volume index= 43ml/m2.  A device wire is noted in the right heart. The right  ventricle is not well seen but the function appears  decreased. Normal tricuspid valve. Mild tricuspid  regurgitation. Normal pulmonic valve. Mild pulmonic  regurgitation.  Pericardium/PleuraNormal pericardium with no pericardial  effusion.  Hemodynamic: Estimated right ventricular systolic pressure  equals 70 mm Hg, assuming right atrial pressure equals 15  mm Hg, consistent with severe pulmonary hypertension.  ------------------------------------------------------------------------  CONCLUSIONS:  1. Bioprosthetic aortic valve.  Thebioprosthetic leaflet  are thickened/degenerated. Peak transaortic valve gradient  equals 58 mm Hg, mean transaortic valve gradient equals 34  mm Hg, estimated aortic valve area equals 0.7 sqcm (by  continuity equation), aortic valve velocity time integral  equals 95 cm, the DVI= 0.17, consistent with severe  bioprosthetic aortic stenosis/degeneration of the  bioprosthesis.  The LV SV= 69ml, the LV stroke volume index= 31ml/m2. (low  stroke volume). No aortic valve regurgitation seen.  2. The left ventricle is moderately dilated.  3. There is severe global hypokinesis with the basal  segments stacia best and a very large area of apical  akinesis.  The LVEF= 20-25%.  4. Restrictive filling pattern with elevated left atrial  pressures.  5.Estimated right ventricular systolic pressure equals 70  mm Hg, assuming right atrial pressure equals 15 mm Hg,  consistent with severe pulmonary hypertension.    < end of copied text >    Cardiac catheterization:    Vein Mapping:    Gen: WN/WD NAD  Neuro: AAOx3, nonfocal  Pulm: CTA B/L  CV: RRR, S1S2  Abd: Soft, NT, ND +BS  Ext: traceLE  edema, + peripheral pulses      Pt has AICD/PPM [X ] Yes  [ ] No             Brand Name: [employer] ([id number])  Pre-op Beta Blocker ordered within 24 hrs of surgery (CABG ONLY)?  [X ] Yes  [ ] No  If not, Why?  Type & Cross  [X ] Yes  [ ] No  NPO after Midnight [X ] Yes  [ ] No  Pre-op ABX ordered, to be taped on chart:  [X ] Yes  [ ] No     Hibiclens/Peridex ordered [X ] Yes  [ ] No  Intraop on Hold: PRBCs, CXR, ANDREY [X ]   Consent obtained  [X ] Yes  [ ] No

## 2020-08-19 NOTE — CONSULT NOTE ADULT - PROBLEM SELECTOR RECOMMENDATION 2
c/w Metoprolol ER 25mg PO daily  c/w Amiodarone 200mg PO daily  EP consult c/w Heparin drip   c/w Metoprolol ER 25mg PO daily  c/w Amiodarone 200mg PO daily  EP consult Will hold diuretics for now and reassess volume status post TAVR  c/w Losartan 25MG PO daily  c/w Metoprolol ER 25mg PO daily  c/w Amiodarone 200mg PO daily  Pending TTE  Will have EP interrogate ICD to assess percentage of RV pacing  Maintain potassium >4.0, Mg >2.0  Strict intake and output  Daily standing weight - GDMT: continue losartan 25 mg daily, if BP improved after TAVR can be switched to Entresto. Continue metoprolol succinate 25 mg daily. Eventually restart spironolactone  - Diuretics: appears overloaded but will diurese after TAVR given preload dependent state  - Device: consult EP to assess RV pacing burden which recently had been increasing

## 2020-08-19 NOTE — PROGRESS NOTE ADULT - PROBLEM SELECTOR PLAN 1
S/P AVR with Dr. Carmen (2015)   Plan for TAVR on thursday   Preop Workup in progress   Continue ASA/ATORV  continue toprol 25 daily   TTE/ANDREY/Cath done [facility]   Repeat TTE today as per HF  Mild CAD s/p stents x3 LAD

## 2020-08-19 NOTE — PROGRESS NOTE ADULT - PROBLEM SELECTOR PLAN 3
Anticoagulated with home Eliquis 5mg BID (last dose 8/18 AM)  Continue with Hep gtt   Monitor PTT  S/P AICD 2018  S/P cardioversion (8/20)   continue Amio 200 daily   continue toprol 25 daily

## 2020-08-19 NOTE — PROGRESS NOTE ADULT - ASSESSMENT
70M hx HLD, HTN, CAD s/p MI (04), PCI/LAD stent x3 ('04) at [place], Cardiomyopathy (NYHA II), AICD 2018, AFIB (on Eliquis, last dose this morning), episode of shingles back in april 2020 treated with acyclovir, with residual neuropathy despite treawith Lyrica which was discontinued due to nausea. Patient is S/P Aortic Valve Replacement (bovine prosthesis 4/3/2015) with Dr. Paez c/o of MATHEWS x 6months, fatigue, and frequent dizziness. He also reports nausea and loss of appetite x 2 weeks and lost 16lbs in 2 weeks. Patient was recently admitted to [facility] August 2020 for AFib s/p ANDREY DCCV 8/12/20 converted to SR. No thrombus in left atrial appendage. Workup/ANDREY showed well seated aortic valve prosthesis with restricted opening of aortic valve. Along with a simple atheroma in the ascending and descending aorta. EF 20%. Patient reports using 1 pillow to sleep and can walk 0.5 blocks without shortness of breathe. Patient is being considered CRT upgrade and possible LVAD if no symptomatic improvement. Patient is a direct admit to Ct surgery [address] [place] [address] for TAVR procedure with Dr. Paez and Dr. Waldrop on Thursday.     8/18 Direct Admission; TAVR workup in progress   8/19 TAVR tommrow 8/20/20; TTE today; Diuretics held for bump in creatine.

## 2020-08-19 NOTE — CONSULT NOTE ADULT - PROBLEM SELECTOR RECOMMENDATION 9
HFrEF NYHA Class III   Will hold diuretics for now, resume after procedure  c/w Losartan 25MG PO daily  c/w Metoprolol ER 25mg PO daily  c/w Amiodarone 200mg PO daily  Obtain a TTE  Will interrogate ICD HFrEF NYHA Class III   Patient is mildly fluid overloaded on exam  Will hold diuretics for now, resume after procedure  c/w Losartan 25MG PO daily  c/w Metoprolol ER 25mg PO daily  c/w Amiodarone 200mg PO daily  Obtain a TTE  Will interrogate ICD  Maintain potassium >4.0, Mg >2.1  Strict intake and output  Daily standing weight Plan is for valve in valve TAVR tomorrow with Dr. Paez and Dr. Waldrop - Plan is for valve in valve TAVR tomorrow with Dr. Paez and Dr. Waldrop

## 2020-08-19 NOTE — PROGRESS NOTE ADULT - ASSESSMENT
70M hx HLD, HTN, CAD s/p MI (04), PCI/LAD stent x3 ('04) at [place], Cardiomyopathy (NYHA II), AICD 2018, AFIB (on Eliquis, last dose this morning), episode of shingles back in april 2020 treated with acyclovir, with residual neuropathy despite treawith Lyrica which was discontinued due to nausea. Patient is S/P Aortic Valve Replacement (bovine prosthesis 4/3/2015) with Dr. Paez c/o of MATHEWS x 6months, fatigue, and frequent dizziness. He also reports nausea and loss of appetite x 2 weeks and lost 16lbs in 2 weeks. Patient was recently admitted to [facility] August 2020 for AFib s/p ANDREY DCCV 8/12/20 converted to SR. No thrombus in left atrial appendage. Workup/ANDREY showed well seated aortic valve prosthesis with restricted opening of aortic valve. Along with a simple atheroma in the ascending and descending aorta. EF 20%. Patient reports using 1 pillow to sleep and can walk 0.5 blocks without shortness of breathe. Patient is being considered CRT upgrade and possible LVAD if no symptomatic improvement. Patient is a direct admit to Ct surgery [address] [place] [address] for TAVR procedure with Dr. Paez and Dr. Waldrop on Thursday.     8/18 Direct Admission; TAVR workup in progress   8/19 TAVR tommrow 8/20/20; TTE today; Lasix IV 40x1 Today

## 2020-08-19 NOTE — CONSULT NOTE ADULT - ASSESSMENT
70M hx HLD, HTN, CAD s/p MI (04), PCI/LAD stent x3 ('04) at [place], Cardiomyopathy (NYHA II), AICD 2018, AFIB (on Eliquis, last dose this morning), episode of shingles back in april 2020 treated with acyclovir, with residual neuropathy. Patient is S/P Aortic Valve Replacement (bovine prosthesis 4/3/2015) with Dr. Paez. Patient was recently admitted to [facility] for AFib s/p ANDREY DCCV 8/12/20 converted to SR. No thrombus in left atrial appendage. Workup/ANDREY showed well seated aortic valve prosthesis with restricted opening of aortic valve.  Along with a simple atheroma in the ascending and descending aorta. EF 20%. He was also admitted in June 2020 with increased fatigue that improved with diuretics. S/P RHC, and left/right coronary angiography.  Patient admitted for TAVR tomorrow, he is mildly fluid overloaded on exam. Will hold diuretics for now and will resume after procedure.     Previous Studies:    RHC: 6/4/20  Findings: RA-8, RV-71/10, PA-86/24/40, PCW-20, PA sat-61.1, AO sat-98.4, CO-3.85, CI-1.71, SVR-21.81, PVR-415.31. Mild biventricular failure, severe pulmonary  hypertension     Coronary angiography: 6/4/20  Findings: LM normal sized, mild atherosclerosis, LAD large sized, mild atherosclerosis, Mid LAD diffuse 10% stenosis at the side of a prior stent, CX normal sized, moderated atherosclerosis, RCA large sized (dominant), moderate atherosclerosis, Aorta normal sized    ANDREY 8/12/20  Findings: Restricted opening of the well-seated aortic valve bioprosthesis, simple atheroma in the ascending and descending aorta, left ventricle dilated.   Estimated  EF 20%. Akinesis of septum, apex and anterior wall. 70M hx HLD, HTN, CAD s/p MI (04), PCI/LAD stent x3 ('04) at [place], Cardiomyopathy (NYHA II), AICD 2018, AFIB (on Eliquis, last dose this morning), episode of shingles back in april 2020 treated with acyclovir, with residual neuropathy. Patient is S/P Aortic Valve Replacement (bovine prosthesis 4/3/2015) with Dr. Paez. Patient was recently admitted to [facility] for AFib s/p ANDREY DCCV 8/12/20 converted to SR. No thrombus in left atrial appendage. Workup/ANDREY showed well seated aortic valve prosthesis with restricted opening of aortic valve.  Along with a simple atheroma in the ascending and descending aorta. EF 20%. He was also admitted in June 2020 with increased fatigue that improved with diuretics. S/P RHC, and left/right coronary angiography.  Patient admitted for TAVR tomorrow, he is mildly fluid overloaded on exam. Will hold diuretics for now and will resume after procedure.     Previous Studies:    RHC: 6/4/20  Findings: RA-8, RV-71/10, PA-86/24/40, PCW-20, PA sat-61.1, AO sat-98.4, CO-3.85, CI-1.71, SVR-21.81, PVR-415.31. Mild biventricular failure, severe pulmonary  hypertension     Coronary angiography: 6/4/20  Findings: LM normal sized, mild atherosclerosis, LAD large sized, mild atherosclerosis, Mid LAD diffuse 10% stenosis at the side of a prior stent, CX normal sized, moderated atherosclerosis, RCA large sized (dominant), moderate atherosclerosis, Aorta normal sized    ANDREY 8/12/20  Findings: No thrombus in left atrial appendage. Workup/ANDREY showed well seated aortic valve prosthesis with restricted opening of aortic valve.  Along with a simple atheroma in the ascending and descending aorta. EF 20%. Left ventricle dilated. Akinesis of septum, apex and anterior wall. 69 YO M with a PMH of ICM, HFrEF (EF 15-20%, LVEDD 6.5 cm), CAD s/p late-presenting MI (2005), s/p PCI to LAD, s/p single chamber AICD (2018), s/p bioprosthetic AVR (2015) 2/2 AI, AFib/flutter (on Eliquis), s/p ablation 7/2017 and more recently DCCV on 8/12/20, HTN, and HLD, who was admitted for valve in valve TAVR.      Workup/ANDREY showed well seated aortic valve prosthesis with restricted opening of aortic valve.  Along with a simple atheroma in the ascending and descending aorta. EF 20%. He was also admitted in June 2020 with increased fatigue that improved with diuretics. S/P RHC, and left/right coronary angiography.  Patient admitted for TAVR tomorrow, he is mildly fluid overloaded on exam. Will hold diuretics for now and will resume after procedure.     Previous Studies:    RHC: 6/4/20  Findings: RA-8, RV-71/10, PA-86/24/40, PCW-20, PA sat-61.1, AO sat-98.4, CO-3.85, CI-1.71, SVR-21.81, PVR-415.31. Mild biventricular failure, severe pulmonary  hypertension     Coronary angiography: 6/4/20  Findings: LM normal sized, mild atherosclerosis, LAD large sized, mild atherosclerosis, Mid LAD diffuse 10% stenosis at the side of a prior stent, CX normal sized, moderated atherosclerosis, RCA large sized (dominant), moderate atherosclerosis, Aorta normal sized    ANDREY 8/12/20  Findings: No thrombus in left atrial appendage. Workup/ANDREY showed well seated aortic valve prosthesis with restricted opening of aortic valve.  Along with a simple atheroma in the ascending and descending aorta. EF 20%. Left ventricle dilated. Akinesis of septum, apex and anterior wall. 71 YO M with a PMH of ICM, HFrEF (EF 15-20%, LVEDD 6.5 cm), CAD s/p late-presenting MI (2005), s/p PCI to LAD, s/p single chamber AICD (2018), s/p bioprosthetic AVR (2015) 2/2 AI, AFib/flutter (on Eliquis), s/p ablation 7/2017 and more recently DCCV on 8/12/20, HTN, and HLD, who was admitted for valve in valve TAVR, scheduled for tomorrow. He had an FEDERICO on admission which is improving and currently has mildly elevated filling pressures on exam. He is NYHA class III and normotensive on low dose GDMT.     Previous Studies:  6/4/20 RHC: RA-8, RV-71/10, PA-66/24/40, PCW-20, PA sat-61.1, AO sat-98.4, CO-3.85, CI-1.71, SVR-21.81, PVR-415.31.    Coronary angiography: 6/4/20  Findings: LM normal sized, mild atherosclerosis, LAD large sized, mild atherosclerosis, Mid LAD diffuse 10% stenosis at the side of a prior stent, CX normal sized, moderated atherosclerosis, RCA large sized (dominant), moderate atherosclerosis, Aorta normal sized    ANDREY 8/12/20  Findings: No thrombus in left atrial appendage. Well seated aortic valve prosthesis with restricted opening of aortic valve.  Along with a simple atheroma in the ascending and descending aorta. EF 20%. Left ventricle dilated. Akinesis of septum, apex and anterior wall. 71 YO M with a history of advanced ACC/AHA Stage C ICM (LV 6.5 cm, EF 15-20%), CAD with late-presenting MI (2005), s/p PCI to LAD, s/p single chamber AICD (2018), s/p bioprosthetic AVR (2015) 2/2 AI, persistent AFib/flutter (on Eliquis), s/p ablation 7/2017 and more recently DCCV on 8/12/20, HTN, and HLD, who was been having worsening HF symptoms and dizziness found incidentally to have severe bioprosthetic AS on ANDREY for DCCV who was admitted in preparation of valve in valve TAVR, scheduled for tomorrow. He had an FEDERICO on admission which has resolved and currently has mildly elevated filling pressures on exam. He is NYHA class III and normotensive on low dose GDMT. Given severe symptomatic AS which can be addressed in a minimally invasive fashion, TAVR is entirely reasonable but if he does not improve the plan would be to consider for advanced therapies evaluation in the future.     TTE 8/19: LV 6.4 cm, LVEF 20-25%, severe bioprosthetic AS (peak velocity 3.8 m/s, peak gradient 58 mmHg, mean gradient 34 mmHg, AALIYAH 0.7 cm2), SVI 31 ml/m2, estimated RAP 10-15 mmHg    Previous Studies:  6/4/20 RHC: RA-8, RV-71/10, PA-66/24/40, PCW-20, PA sat-61.1, AO sat-98.4, CO-3.85, CI-1.71, SVR-21.81, PVR-415.31.    Coronary angiography: 6/4/20  Findings: LM normal sized, mild atherosclerosis, LAD large sized, mild atherosclerosis, Mid LAD diffuse 10% stenosis at the side of a prior stent, CX normal sized, moderated atherosclerosis, RCA large sized (dominant), moderate atherosclerosis, Aorta normal sized    ANDREY 8/12/20  Findings: No thrombus in left atrial appendage. Well seated aortic valve prosthesis with restricted opening of aortic valve.  Along with a simple atheroma in the ascending and descending aorta. EF 20%. Left ventricle dilated. Akinesis of septum, apex and anterior wall.

## 2020-08-19 NOTE — CONSULT NOTE ADULT - PROBLEM SELECTOR RECOMMENDATION 5
c/w Heparin drip (on Eliquis at home)  c/w Metoprolol ER 25mg PO daily  c/w Amiodarone 200mg PO daily

## 2020-08-19 NOTE — PROGRESS NOTE ADULT - SUBJECTIVE AND OBJECTIVE BOX
*****Structural Heart Team*****    Subjective:    Patient resting comfortably in bed with his wife at bedside, offering no complaints.        PAST MEDICAL & SURGICAL HISTORY:  Aortic stenosis  Valvular cardiomyopathy  CHF (congestive heart failure)  Syncope: epidsode in 11/14, was found to have been an episode of V-Fib with sucsessfull AICD shock  AICD (automatic cardioverter/defibrillator) present  HTN (hypertension)  HLD (hyperlipidemia)  Systolic heart failure  MI (myocardial infarction): Nov 2004  CAD (coronary artery disease): 2004  H/O prior ablation treatment: afib  AICD (automatic cardioverter/defibrillator) present  S/P AVR  S/P hernia repair  S/P knee surgery  Stented coronary artery: LAD x 3 (Nov 2004)        T(C): 36.8 (08-19-20 @ 11:55), Max: 36.9 (08-18-20 @ 20:10)  HR: 55 (08-19-20 @ 11:55) (55 - 61)  BP: 99/65 (08-19-20 @ 11:55) (90/58 - 107/68)  RR: 18 (08-19-20 @ 11:55) (16 - 18)  SpO2: 97% (08-19-20 @ 11:55) (95% - 99%)  Wt(kg): --  08-18 @ 07:01  -  08-19 @ 07:00  --------------------------------------------------------  IN: 1000 mL / OUT: 400 mL / NET: 600 mL    08-19 @ 07:01  -  08-19 @ 15:30  --------------------------------------------------------  IN: 730 mL / OUT: 425 mL / NET: 305 mL      MEDICATIONS  (STANDING):  aMIOdarone    Tablet 200 milliGRAM(s) Oral daily  aspirin enteric coated 81 milliGRAM(s) Oral daily  atorvastatin 40 milliGRAM(s) Oral at bedtime  cefuroxime  IVPB 1500 milliGRAM(s) IV Intermittent once  chlorhexidine 0.12% Liquid 30 milliLiter(s) Swish and Spit once  chlorhexidine 4% Liquid 1 Application(s) Topical once  heparin  Infusion 1000 Unit(s)/Hr (10 mL/Hr) IV Continuous <Continuous>  insulin lispro (HumaLOG) corrective regimen sliding scale   SubCutaneous three times a day before meals  insulin lispro (HumaLOG) corrective regimen sliding scale   SubCutaneous at bedtime  losartan 25 milliGRAM(s) Oral daily  metoprolol succinate ER 25 milliGRAM(s) Oral daily  sodium chloride 0.9% lock flush 3 milliLiter(s) IV Push every 8 hours    MEDICATIONS  (PRN):  acetaminophen   Tablet .. 650 milliGRAM(s) Oral every 6 hours PRN Mild Pain (1 - 3)      Review of Symptoms:  Constitutional: Awake, Alert, Follows commands  Respiratory: + SOB/MATHEWS  Cardiac: Denies CP, Denies Palpitations  Gastrointestinal: Denies Pain, Denies N/V, tolerating po intake  Vascular: Negative  Extremities: No Edema, No joint pain or swelling  Neurological: Negative  Endocrine: No heat or cold intolerance, No excessive thirst  Heme/Onc: Negative    Exam:  General: A/O x3, WILLEM, Following commands  HEENT: Supple, No JVD, Trachea midline, no masses  Pulmonary: CTAB, = Chest Excursion, no accessory muscle use  Cor: S1S2, RRR, III/VI MALLIKA  ECG: SR  Gastrointestinal: Soft, NT/ND, + Bowel Sounds  Neuro: = motor and sensory B/L, No focal deficits  Vascular: 1+ pulses B/L, No edema  Extremities: No joint pain or swelling  Skin: Warm/Dry/Normal color, Normal turgor, no rashes                          12.6   10.00 )-----------( 196      ( 19 Aug 2020 03:12 )             39.0   08-19    135  |  96  |  30<H>  ----------------------------<  156<H>  3.8   |  27  |  1.20    Ca    9.5      19 Aug 2020 11:08    TPro  7.3  /  Alb  4.8  /  TBili  1.4<H>  /  DBili  x   /  AST  26  /  ALT  34  /  AlkPhos  94  08-18  PT/INR - ( 18 Aug 2020 18:29 )   PT: 24.6 sec;   INR: 2.14 ratio         PTT - ( 19 Aug 2020 11:08 )  PTT:67.9 sec    Assesment/Plan:    70 Year old male with a failed bioprosthetic AVR    1.) Failed BioAVR: Patient was admitted from clinic yesterday due to a progression of symptoms. He is scheduled for his TAVR tomorrow. I spent 15 minutes answering any questions he and his wife had.  2.) NPO after midnight

## 2020-08-19 NOTE — CONSULT NOTE ADULT - SUBJECTIVE AND OBJECTIVE BOX
HPI:  70M hx HLD, HTN, CAD s/p MI (04), PCI/LAD stent x3 ('04) at [place], Cardiomyopathy (NYHA II), AICD 2018, AFIB (on Eliquis, last dose this morning), episode of shingles back in 2020 treated with acyclovir, with residual neuropathy despite treawith Lyrica which was discontinued due to nausea. Patient is S/P Aortic Valve Replacement (bovine prosthesis 4/3/2015) with Dr. Paez c/o of MATHEWS x 6months, fatigue, and frequent dizziness. He also reports nausea and loss of appetite x 2 weeks and lost 16lbs in 2 weeks. Patient was recently admitted to [facility] 2020 for AFib s/p ANDREY DCCV 20 converted to SR. No thrombus in left atrial appendage. Workup/ANDREY showed well seated aortic valve prosthesis with restricted opening of aortic valve. Along with a simple atheroma in the ascending and descending aorta. EF 20%. Patient reports using 1 pillow to sleep and can walk 0.5 blocks without shortness of breathe. Patient is being considered CRT upgrade and possible LVAD if no symptomatic improvement. (18 Aug 2020 19:12)    PAST MEDICAL & SURGICAL HISTORY:  Aortic stenosis  Valvular cardiomyopathy  CHF (congestive heart failure)  Syncope: epidsode in , was found to have been an episode of V-Fib with sucsessfull AICD shock  AICD (automatic cardioverter/defibrillator) present  HTN (hypertension)  HLD (hyperlipidemia)  Systolic heart failure  MI (myocardial infarction): 2004  CAD (coronary artery disease):   H/O prior ablation treatment: afib  AICD (automatic cardioverter/defibrillator) present  S/P AVR  S/P hernia repair  S/P knee surgery  Stented coronary artery: LAD x 3 (2004)    REVIEW OF SYSTEMS:  14 point ROS negative in detail apart from as documented in HPI.    MEDICATIONS  (STANDING):  aMIOdarone    Tablet 200 milliGRAM(s) Oral daily  aspirin enteric coated 81 milliGRAM(s) Oral daily  atorvastatin 40 milliGRAM(s) Oral at bedtime  cefuroxime  IVPB 1500 milliGRAM(s) IV Intermittent once  chlorhexidine 0.12% Liquid 30 milliLiter(s) Swish and Spit once  chlorhexidine 4% Liquid 1 Application(s) Topical once  heparin  Infusion 1000 Unit(s)/Hr (10 mL/Hr) IV Continuous <Continuous>  insulin lispro (HumaLOG) corrective regimen sliding scale   SubCutaneous three times a day before meals  insulin lispro (HumaLOG) corrective regimen sliding scale   SubCutaneous at bedtime  losartan 25 milliGRAM(s) Oral daily  metoprolol succinate ER 25 milliGRAM(s) Oral daily  sodium chloride 0.9% lock flush 3 milliLiter(s) IV Push every 8 hours    MEDICATIONS  (PRN):  acetaminophen   Tablet .. 650 milliGRAM(s) Oral every 6 hours PRN Mild Pain (1 - 3)    Home Medications:  acetaminophen 325 mg oral tablet: 2 tab(s) orally every 6 hours, As needed, Mild Pain (1 - 3) (30 Oct 2018 23:29)  amiodarone 200 mg oral tablet: 1 tab(s) orally once a day (18 Aug 2020 18:56)  aspirin 81 mg oral delayed release capsule:  orally  (30 Oct 2018 07:49)  atorvastatin 40 mg oral tablet: 1 tab(s) orally once a day (at bedtime) (30 Oct 2018 07:49)  Eliquis 5 mg oral tablet: 1 tab(s) orally 2 times a day. Restart taking on Saturday, . (30 Oct 2018 23:29)  losartan 25 mg oral tablet: Take .5 tab(s) orally twice daily (18 Aug 2020 19:00)  spironolactone 25 mg oral tablet: 1 tab(s) orally once a day (18 Aug 2020 18:59)  Toprol-XL 25 mg oral tablet, extended release: 1 tab(s) orally once a day (18 Aug 2020 18:56)  torsemide 20 mg oral tablet: 1 tab(s) orally once a day (18 Aug 2020 18:57)    Allergies    No Known Allergies    Intolerances      Social History:  Patient lives at home with wife, retired, with independent ADLs. Hx of former smoking and social alcohol drinker. Denies illicit drug use. (18 Aug 2020 19:12)    FAMILY HISTORY:  Family history of colon cancer (Father)  Chronic obstructive pulmonary disease      ICU Vital Signs Last 24 Hrs  T(C): 36.8, Max: 36.9 (08-18 @ 20:10)  HR: 55 (55 - 61)  BP: 99/65 (90/58 - 107/68)  BP(mean): 81 (81 - 81)  ABP: --  ABP(mean): --  RR: 18 (16 - 18)  SpO2: 97% (95% - 99%)    Weight in k.8 (20)  Weight in k.3 (20)      I&O's Summary Last 24 Hrs    IN: 1000 mL / OUT: 400 mL / NET: 600 mL      Tele:    Physical Exam:    General: No distress. Comfortable.  HEENT: EOM intact.  Neck: Neck supple. JVP not elevated. No masses  Chest: Clear to auscultation bilaterally  CV: Normal S1 and S2. No murmurs, rub, or gallops. Radial pulses normal.  Abdomen: Soft, non-distended, non-tender  Skin: No rashes or skin breakdown  Neurology: Alert and oriented times three. Sensation intact  Psych: Affect normal    Labs:    ( 20 @ 03:12 )               12.6   10.00 )--------( 196                  39.0     ( 20 @ 11:08 )     135  |  96  |  30  ---------------------<  156  3.8  |  27  |  1.20    Ca 9.5  Phos x   Mg x     ( 20 @ 18:29 )  TPro  7.3  /  Alb  4.8  /  TBili  1.4  /  DBili  x   /  AST  26  /  ALT  34  /  AlkPhos  94    PTT/PT/INR - ( 20 @ 11:08 )  PTT: 67.9 sec / PT: x     / INR: x          Serum Pro-Brain Natriuretic Peptide: 2154 (20 @ 18:29)                RADIOLOGY & ADDITIONAL STUDIES: HPI:  70M hx HLD, HTN, CAD s/p MI (04), PCI/LAD stent x3 ('04) at [place], Cardiomyopathy (NYHA II), AICD 2018, AFIB (on Eliquis, last dose this morning), episode of shingles back in 2020 treated with acyclovir, with residual neuropathy. Patient is S/P Aortic Valve Replacement (bovine prosthesis 4/3/2015) with Dr. Paez. Patient was recently admitted to [facility] for AFib s/p ANDREY DCCV 20 converted to SR. No thrombus in left atrial appendage. Workup/ANDREY showed well seated aortic valve prosthesis with restricted opening of aortic valve.  Along with a simple atheroma in the ascending and descending aorta. EF 20%. He was also admitted in 2020 with increased fatigue that improved with diuretics. S/P RHC, and left/right coronary angiography.    Patient reports using 1 pillow to sleep, can walk one block, go up a flight of stairs without SOB, Patient reports a year ago he was able to ride his bike and be more physically active. His stamina has noticeable going down since December. Patient also endorsed dizziness when standing when sitting for a prolonged time (s/p fall in May). He has lost about 16 lbs for the past three weeks, he attributes it as a side effect of Amiodarone (which has improved since he started taking it at night). He denies LOC, fever, chills, changes in bowel habits. No chest pain, palpitations, bleeding, LE edema he denies any ICD firing.       PAST MEDICAL & SURGICAL HISTORY:  Aortic stenosis  Valvular cardiomyopathy  CHF (congestive heart failure)  Syncope: epidsode in , was found to have been an episode of V-Fib with successful AICD shock  AICD (automatic cardioverter/defibrillator) present  HTN (hypertension)  HLD (hyperlipidemia)  Systolic heart failure  MI (myocardial infarction): 2004  CAD (coronary artery disease):   H/O prior ablation treatment: afib  AICD (automatic cardioverter/defibrillator) present  S/P AVR  S/P hernia repair  S/P knee surgery  Stented coronary artery: LAD x 3 (2004)    REVIEW OF SYSTEMS:  14 point ROS negative in detail apart from as documented in HPI.    MEDICATIONS  (STANDING):  aMIOdarone    Tablet 200 milliGRAM(s) Oral daily  aspirin enteric coated 81 milliGRAM(s) Oral daily  atorvastatin 40 milliGRAM(s) Oral at bedtime  cefuroxime  IVPB 1500 milliGRAM(s) IV Intermittent once  chlorhexidine 0.12% Liquid 30 milliLiter(s) Swish and Spit once  chlorhexidine 4% Liquid 1 Application(s) Topical once  heparin  Infusion 1000 Unit(s)/Hr (10 mL/Hr) IV Continuous <Continuous>  insulin lispro (HumaLOG) corrective regimen sliding scale   SubCutaneous three times a day before meals  insulin lispro (HumaLOG) corrective regimen sliding scale   SubCutaneous at bedtime  losartan 25 milliGRAM(s) Oral daily  metoprolol succinate ER 25 milliGRAM(s) Oral daily  sodium chloride 0.9% lock flush 3 milliLiter(s) IV Push every 8 hours    MEDICATIONS  (PRN):  acetaminophen   Tablet .. 650 milliGRAM(s) Oral every 6 hours PRN Mild Pain (1 - 3)    Home Medications:  acetaminophen 325 mg oral tablet: 2 tab(s) orally every 6 hours, As needed, Mild Pain (1 - 3) (30 Oct 2018 23:29)  amiodarone 200 mg oral tablet: 1 tab(s) orally once a day (18 Aug 2020 18:56)  aspirin 81 mg oral delayed release capsule:  orally  (30 Oct 2018 07:49)  atorvastatin 40 mg oral tablet: 1 tab(s) orally once a day (at bedtime) (30 Oct 2018 07:49)  Eliquis 5 mg oral tablet: 1 tab(s) orally 2 times a day. Restart taking on Saturday, . (30 Oct 2018 23:29)  losartan 25 mg oral tablet: Take .5 tab(s) orally twice daily (18 Aug 2020 19:00)  spironolactone 25 mg oral tablet: 1 tab(s) orally once a day (18 Aug 2020 18:59)  Toprol-XL 25 mg oral tablet, extended release: 1 tab(s) orally once a day (18 Aug 2020 18:56)  torsemide 20 mg oral tablet: 1 tab(s) orally once a day (18 Aug 2020 18:57)    Allergies    No Known Allergies    Intolerances      Social History:  Patient lives at home with wife, retired, with independent ADLs. Hx of former smoking and social alcohol drinker. Denies illicit drug use. (18 Aug 2020 19:12)    FAMILY HISTORY:  Family history of colon cancer (Father)  Chronic obstructive pulmonary disease      ICU Vital Signs Last 24 Hrs  T(C): 36.8, Max: 36.9 ( @ 20:10)  HR: 55 (55 - 61)  BP: 99/65 (90/58 - 107/68)  BP(mean): 81 (81 - 81)  ABP: --  ABP(mean): --  RR: 18 (16 - 18)  SpO2: 97% (95% - 99%)    Weight in k.8 (20)  Weight in k.3 (20)      I&O's Summary Last 24 Hrs    IN: 1000 mL / OUT: 400 mL / NET: 600 mL      Tele:    Physical Exam:    General: No distress. Comfortable.  HEENT: EOM intact.  Neck: Neck supple.  10-12cm H2O JVP  Chest: Clear to auscultation bilaterally  CV: Normal S1 and S2. II out of VI systolic aortic murmur, no rub, or gallops. Radial pulses normal.  Abdomen: Soft, non-distended, non-tender  Skin: No rashes or skin breakdown  Neurology: Alert and oriented times three. Sensation intact  Psych: Affect normal    Labs:    ( 20 @ 03:12 )               12.6   10.00 )--------( 196                  39.0     ( 20 @ 11:08 )     135  |  96  |  30  ---------------------<  156  3.8  |  27  |  1.20    Ca 9.5  Phos x   Mg x     ( 20 @ 18:29 )  TPro  7.3  /  Alb  4.8  /  TBili  1.4  /  DBili  x   /  AST  26  /  ALT  34  /  AlkPhos  94    PTT/PT/INR - ( 20 @ 11:08 )  PTT: 67.9 sec / PT: x     / INR: x          Serum Pro-Brain Natriuretic Peptide: 2154 (20 @ 18:29) HPI:  70M hx HLD, HTN, CAD s/p MI (04), PCI/LAD stent x3 ('04) at [place], Cardiomyopathy (NYHA II), AICD 2018, AFIB (on Eliquis, last dose this morning), episode of shingles back in 2020 treated with acyclovir, with residual neuropathy. Patient is S/P Aortic Valve Replacement (bovine prosthesis 4/3/2015) with Dr. Paez. Patient was recently admitted to [facility] for AFib s/p ANDREY DCCV 20 converted to SR. He was also admitted in 2020 with increased fatigue that improved with diuretics. S/P RHC, and left/right coronary angiography.    Patient reports using 1 pillow to sleep, can walk one block, go up a flight of stairs without SOB, Patient reports a year ago he was able to ride his bike and be more physically active. His stamina has noticeable going down since December. Patient also endorsed dizziness when standing when sitting for a prolonged time (s/p fall in May). He has lost about 16 lbs for the past three weeks, he attributes it as a side effect of Amiodarone (which has improved since he started taking it at night). He denies LOC, fever, chills, changes in bowel habits. No chest pain, palpitations, bleeding, LE edema he denies any ICD firing.       PAST MEDICAL & SURGICAL HISTORY:  Aortic stenosis  Valvular cardiomyopathy  CHF (congestive heart failure)  Syncope: epidsode in , was found to have been an episode of V-Fib with successful AICD shock  AICD (automatic cardioverter/defibrillator) present  HTN (hypertension)  HLD (hyperlipidemia)  Systolic heart failure  MI (myocardial infarction): 2004  CAD (coronary artery disease):   H/O prior ablation treatment: afib  AICD (automatic cardioverter/defibrillator) present  S/P AVR  S/P hernia repair  S/P knee surgery  Stented coronary artery: LAD x 3 (2004)    REVIEW OF SYSTEMS:  14 point ROS negative in detail apart from as documented in HPI.    MEDICATIONS  (STANDING):  aMIOdarone    Tablet 200 milliGRAM(s) Oral daily  aspirin enteric coated 81 milliGRAM(s) Oral daily  atorvastatin 40 milliGRAM(s) Oral at bedtime  cefuroxime  IVPB 1500 milliGRAM(s) IV Intermittent once  chlorhexidine 0.12% Liquid 30 milliLiter(s) Swish and Spit once  chlorhexidine 4% Liquid 1 Application(s) Topical once  heparin  Infusion 1000 Unit(s)/Hr (10 mL/Hr) IV Continuous <Continuous>  insulin lispro (HumaLOG) corrective regimen sliding scale   SubCutaneous three times a day before meals  insulin lispro (HumaLOG) corrective regimen sliding scale   SubCutaneous at bedtime  losartan 25 milliGRAM(s) Oral daily  metoprolol succinate ER 25 milliGRAM(s) Oral daily  sodium chloride 0.9% lock flush 3 milliLiter(s) IV Push every 8 hours    MEDICATIONS  (PRN):  acetaminophen   Tablet .. 650 milliGRAM(s) Oral every 6 hours PRN Mild Pain (1 - 3)    Home Medications:  acetaminophen 325 mg oral tablet: 2 tab(s) orally every 6 hours, As needed, Mild Pain (1 - 3) (30 Oct 2018 23:29)  amiodarone 200 mg oral tablet: 1 tab(s) orally once a day (18 Aug 2020 18:56)  aspirin 81 mg oral delayed release capsule:  orally  (30 Oct 2018 07:49)  atorvastatin 40 mg oral tablet: 1 tab(s) orally once a day (at bedtime) (30 Oct 2018 07:49)  Eliquis 5 mg oral tablet: 1 tab(s) orally 2 times a day. Restart taking on Saturday, . (30 Oct 2018 23:29)  losartan 25 mg oral tablet: Take .5 tab(s) orally twice daily (18 Aug 2020 19:00)  spironolactone 25 mg oral tablet: 1 tab(s) orally once a day (18 Aug 2020 18:59)  Toprol-XL 25 mg oral tablet, extended release: 1 tab(s) orally once a day (18 Aug 2020 18:56)  torsemide 20 mg oral tablet: 1 tab(s) orally once a day (18 Aug 2020 18:57)    Allergies    No Known Allergies    Intolerances      Social History:  Patient lives at home with wife, retired, with independent ADLs. Hx of former smoking and social alcohol drinker. Denies illicit drug use. (18 Aug 2020 19:12)    FAMILY HISTORY:  Family history of colon cancer (Father)  Chronic obstructive pulmonary disease      ICU Vital Signs Last 24 Hrs  T(C): 36.8, Max: 36.9 ( @ 20:10)  HR: 55 (55 - 61)  BP: 99/65 (90/58 - 107/68)  BP(mean): 81 (81 - 81)  ABP: --  ABP(mean): --  RR: 18 (16 - 18)  SpO2: 97% (95% - 99%)    Weight in k.8 (20)  Weight in k.3 (20)      I&O's Summary Last 24 Hrs    IN: 1000 mL / OUT: 400 mL / NET: 600 mL      Tele:    Physical Exam:    General: No distress. Comfortable.  HEENT: EOM intact.  Neck: Neck supple.  10-12cm H2O JVP  Chest: Clear to auscultation bilaterally  CV: Normal S1 and S2. II out of VI systolic aortic murmur, no rub, or gallops. Radial pulses normal.  Abdomen: Soft, non-distended, non-tender  Skin: No rashes or skin breakdown  Neurology: Alert and oriented times three. Sensation intact  Psych: Affect normal    Labs:    ( 20 @ 03:12 )               12.6   10.00 )--------( 196                  39.0     ( 20 @ 11:08 )     135  |  96  |  30  ---------------------<  156  3.8  |  27  |  1.20    Ca 9.5  Phos x   Mg x     ( 20 @ 18:29 )  TPro  7.3  /  Alb  4.8  /  TBili  1.4  /  DBili  x   /  AST  26  /  ALT  34  /  AlkPhos  94    PTT/PT/INR - ( 20 @ 11:08 )  PTT: 67.9 sec / PT: x     / INR: x          Serum Pro-Brain Natriuretic Peptide: 2154 (20 @ 18:29) HPI:  71 YO M with a PMH of ICM, HFrEF (EF 15-20%, LVEDD 6.5 cm), CAD s/p late-presenting MI (), s/p PCI to LAD, s/p single chamber AICD (), s/p bioprosthetic AVR () 2/2 AI, AFib/flutter (on Eliquis), s/p ablation 2017 and more recently DCCV on 20, HTN, and HLD, who was admitted for valve in valve TAVR.     He was recently admitted in 2020 with increased fatigue that improved with diuretics. S/P RHC which showed normal right sided filling pressures, elevated left sided pressures, low CI, high SVR, and severe PH. Then     Over the past 6 months patient reports worsening MATHEWS and decreased stamina. Patient reports a year ago he was able to ride his bike and be more physically active. Patient reports using 1 pillow to sleep, can walk one block, go up a flight of stairs without SOB. Patient also endorsed dizziness when standing when sitting for a prolonged time (s/p fall in May). He has lost about 16 lbs for the past three weeks, which he attributes to nausea as a side effect of Amiodarone (which has improved since he started taking it at night). He denies LOC, fever, chills, changes in bowel habits. No chest pain, palpitations, bleeding, LE edema he denies any ICD discharges. Of note, he had an episode of shingles back in 2020 treated with acyclovir, with residual neuropathy.       PAST MEDICAL & SURGICAL HISTORY:  Aortic stenosis  Valvular cardiomyopathy  CHF (congestive heart failure)  Syncope: epidsode in , was found to have been an episode of V-Fib with successful AICD shock  AICD (automatic cardioverter/defibrillator) present  HTN (hypertension)  HLD (hyperlipidemia)  Systolic heart failure  MI (myocardial infarction): 2004  CAD (coronary artery disease):   H/O prior ablation treatment: afib  AICD (automatic cardioverter/defibrillator) present  S/P AVR  S/P hernia repair  S/P knee surgery  Stented coronary artery: LAD x 3 (2004)    REVIEW OF SYSTEMS:  14 point ROS negative in detail apart from as documented in HPI.    MEDICATIONS  (STANDING):  aMIOdarone    Tablet 200 milliGRAM(s) Oral daily  aspirin enteric coated 81 milliGRAM(s) Oral daily  atorvastatin 40 milliGRAM(s) Oral at bedtime  cefuroxime  IVPB 1500 milliGRAM(s) IV Intermittent once  chlorhexidine 0.12% Liquid 30 milliLiter(s) Swish and Spit once  chlorhexidine 4% Liquid 1 Application(s) Topical once  heparin  Infusion 1000 Unit(s)/Hr (10 mL/Hr) IV Continuous <Continuous>  insulin lispro (HumaLOG) corrective regimen sliding scale   SubCutaneous three times a day before meals  insulin lispro (HumaLOG) corrective regimen sliding scale   SubCutaneous at bedtime  losartan 25 milliGRAM(s) Oral daily  metoprolol succinate ER 25 milliGRAM(s) Oral daily  sodium chloride 0.9% lock flush 3 milliLiter(s) IV Push every 8 hours    MEDICATIONS  (PRN):  acetaminophen   Tablet .. 650 milliGRAM(s) Oral every 6 hours PRN Mild Pain (1 - 3)    Home Medications:  acetaminophen 325 mg oral tablet: 2 tab(s) orally every 6 hours, As needed, Mild Pain (1 - 3) (30 Oct 2018 23:29)  amiodarone 200 mg oral tablet: 1 tab(s) orally once a day (18 Aug 2020 18:56)  aspirin 81 mg oral delayed release capsule:  orally  (30 Oct 2018 07:49)  atorvastatin 40 mg oral tablet: 1 tab(s) orally once a day (at bedtime) (30 Oct 2018 07:49)  Eliquis 5 mg oral tablet: 1 tab(s) orally 2 times a day. Restart taking on Saturday, . (30 Oct 2018 23:29)  losartan 25 mg oral tablet: Take .5 tab(s) orally twice daily (18 Aug 2020 19:00)  spironolactone 25 mg oral tablet: 1 tab(s) orally once a day (18 Aug 2020 18:59)  Toprol-XL 25 mg oral tablet, extended release: 1 tab(s) orally once a day (18 Aug 2020 18:56)  torsemide 20 mg oral tablet: 1 tab(s) orally once a day alternating with 10 mg QOD (18 Aug 2020 18:57)    Allergies  No Known Allergies    Social History:  Patient lives at home with wife, retired, with independent ADLs. Hx of former smoking and social alcohol drinker. Denies illicit drug use. (18 Aug 2020 19:12)    FAMILY HISTORY:  Family history of colon cancer (Father)  Chronic obstructive pulmonary disease      ICU Vital Signs Last 24 Hrs  T(C): 36.8, Max: 36.9 ( @ 20:10)  HR: 55 (55 - 61)  BP: 99/65 (90/58 - 107/68)  BP(mean): 81 (81 - 81)  ABP: --  ABP(mean): --  RR: 18 (16 - 18)  SpO2: 97% (95% - 99%)    Weight in k.8 (20)  Weight in k.3 (20)      I&O's Summary Last 24 Hrs    IN: 1000 mL / OUT: 400 mL / NET: 600 mL      Tele: SB/SR/1st degree AVB with occasional  50-70s    Physical Exam:    General: No distress. Comfortable.  HEENT: EOM intact.  Neck: Neck supple. JVP 10 cm H2O  Chest: Clear to auscultation bilaterally  CV: RRR. Normal S1 and S2. II/VI SM, no rub, or gallops. Radial pulses normal. No edema.  Abdomen: Soft, non-distended, non-tender  Skin: No rashes or skin breakdown. Warm peripherally.  Neurology: Alert and oriented times three. Sensation intact  Psych: Affect normal    Labs:    ( 20 @ 03:12 )               12.6   10.00 )--------( 196                  39.0     ( 20 @ 11:08 )     135  |  96  |  30  ---------------------<  156  3.8  |  27  |  1.20    Ca 9.5  Phos x   Mg x     ( 20 @ 18:29 )  TPro  7.3  /  Alb  4.8  /  TBili  1.4  /  DBili  x   /  AST  26  /  ALT  34  /  AlkPhos  94    PTT/PT/INR - ( 20 @ 11:08 )  PTT: 67.9 sec / PT: x     / INR: x          Serum Pro-Brain Natriuretic Peptide: 2154 (20 @ 18:29) HPI:  71 YO M with a PMH of ICM, HFrEF (EF 15-20%, LVEDD 6.5 cm), CAD s/p late-presenting MI (), s/p PCI to LAD, s/p single chamber AICD (), s/p bioprosthetic AVR () 2/2 AI, AFib/flutter (on Eliquis), s/p ablation 2017 and more recently DCCV on 20, HTN, and HLD, who was admitted for valve in valve TAVR.     He was recently admitted in 2020 with increased fatigue that improved with diuretics. S/P RHC which showed normal right sided filling pressures, elevated left sided pressures, low CI, high SVR, and severe PH. More recently workup/ANDREY prior to DCCV last week showed well seated aortic valve prosthesis, but with restricted opening.    Over the past 6 months patient reports worsening MATHEWS and decreased stamina. Patient reports a year ago he was able to ride his bike and be more physically active. Patient reports using 1 pillow to sleep, can walk one block, go up a flight of stairs without SOB. Patient also endorsed dizziness when standing when sitting for a prolonged time (s/p fall in May). He has lost about 16 lbs for the past three weeks, which he attributes to nausea as a side effect of Amiodarone (which has improved since he started taking it at night). He denies LOC, fever, chills, changes in bowel habits. No chest pain, palpitations, bleeding, LE edema he denies any ICD discharges. Of note, he had an episode of shingles back in 2020 treated with acyclovir, with residual neuropathy.       PAST MEDICAL & SURGICAL HISTORY:  Aortic stenosis  Valvular cardiomyopathy  CHF (congestive heart failure)  Syncope: epidsode in , was found to have been an episode of V-Fib with successful AICD shock  AICD (automatic cardioverter/defibrillator) present  HTN (hypertension)  HLD (hyperlipidemia)  Systolic heart failure  MI (myocardial infarction): 2004  CAD (coronary artery disease):   H/O prior ablation treatment: afib  AICD (automatic cardioverter/defibrillator) present  S/P AVR  S/P hernia repair  S/P knee surgery  Stented coronary artery: LAD x 3 (2004)    REVIEW OF SYSTEMS:  14 point ROS negative in detail apart from as documented in HPI.    MEDICATIONS  (STANDING):  aMIOdarone    Tablet 200 milliGRAM(s) Oral daily  aspirin enteric coated 81 milliGRAM(s) Oral daily  atorvastatin 40 milliGRAM(s) Oral at bedtime  cefuroxime  IVPB 1500 milliGRAM(s) IV Intermittent once  chlorhexidine 0.12% Liquid 30 milliLiter(s) Swish and Spit once  chlorhexidine 4% Liquid 1 Application(s) Topical once  heparin  Infusion 1000 Unit(s)/Hr (10 mL/Hr) IV Continuous <Continuous>  insulin lispro (HumaLOG) corrective regimen sliding scale   SubCutaneous three times a day before meals  insulin lispro (HumaLOG) corrective regimen sliding scale   SubCutaneous at bedtime  losartan 25 milliGRAM(s) Oral daily  metoprolol succinate ER 25 milliGRAM(s) Oral daily  sodium chloride 0.9% lock flush 3 milliLiter(s) IV Push every 8 hours    MEDICATIONS  (PRN):  acetaminophen   Tablet .. 650 milliGRAM(s) Oral every 6 hours PRN Mild Pain (1 - 3)    Home Medications:  acetaminophen 325 mg oral tablet: 2 tab(s) orally every 6 hours, As needed, Mild Pain (1 - 3) (30 Oct 2018 23:29)  amiodarone 200 mg oral tablet: 1 tab(s) orally once a day (18 Aug 2020 18:56)  aspirin 81 mg oral delayed release capsule:  orally  (30 Oct 2018 07:49)  atorvastatin 40 mg oral tablet: 1 tab(s) orally once a day (at bedtime) (30 Oct 2018 07:49)  Eliquis 5 mg oral tablet: 1 tab(s) orally 2 times a day. Restart taking on Saturday, . (30 Oct 2018 23:29)  losartan 25 mg oral tablet: Take .5 tab(s) orally twice daily (18 Aug 2020 19:00)  spironolactone 25 mg oral tablet: 1 tab(s) orally once a day (18 Aug 2020 18:59)  Toprol-XL 25 mg oral tablet, extended release: 1 tab(s) orally once a day (18 Aug 2020 18:56)  torsemide 20 mg oral tablet: 1 tab(s) orally once a day alternating with 10 mg QOD (18 Aug 2020 18:57)    Allergies  No Known Allergies    Social History:  Patient lives at home with wife, retired, with independent ADLs. Hx of former smoking and social alcohol drinker. Denies illicit drug use. (18 Aug 2020 19:12)    FAMILY HISTORY:  Family history of colon cancer (Father)  Chronic obstructive pulmonary disease      ICU Vital Signs Last 24 Hrs  T(C): 36.8, Max: 36.9 ( @ 20:10)  HR: 55 (55 - 61)  BP: 99/65 (90/58 - 107/68)  BP(mean): 81 (81 - 81)  ABP: --  ABP(mean): --  RR: 18 (16 - 18)  SpO2: 97% (95% - 99%)    Weight in k.8 (20)  Weight in k.3 (20)      I&O's Summary Last 24 Hrs    IN: 1000 mL / OUT: 400 mL / NET: 600 mL      Tele: SB/SR/1st degree AVB with occasional  50-70s    Physical Exam:    General: No distress. Comfortable.  HEENT: EOM intact.  Neck: Neck supple. JVP 10 cm H2O  Chest: Clear to auscultation bilaterally  CV: RRR. Normal S1 and S2. II/VI SM, no rub, or gallops. Radial pulses normal. No edema.  Abdomen: Soft, non-distended, non-tender  Skin: No rashes or skin breakdown. Warm peripherally.  Neurology: Alert and oriented times three. Sensation intact  Psych: Affect normal    Labs:    ( 20 @ 03:12 )               12.6   10.00 )--------( 196                  39.0     ( 20 @ 11:08 )     135  |  96  |  30  ---------------------<  156  3.8  |  27  |  1.20    Ca 9.5  Phos x   Mg x     ( 20 @ 18:29 )  TPro  7.3  /  Alb  4.8  /  TBili  1.4  /  DBili  x   /  AST  26  /  ALT  34  /  AlkPhos  94    PTT/PT/INR - ( 20 @ 11:08 )  PTT: 67.9 sec / PT: x     / INR: x          Serum Pro-Brain Natriuretic Peptide: 2154 (20 @ 18:29)

## 2020-08-20 ENCOUNTER — APPOINTMENT (OUTPATIENT)
Dept: CARDIOTHORACIC SURGERY | Facility: HOSPITAL | Age: 70
End: 2020-08-20

## 2020-08-20 LAB
ALBUMIN SERPL ELPH-MCNC: 4.2 G/DL — SIGNIFICANT CHANGE UP (ref 3.3–5)
ALP SERPL-CCNC: 82 U/L — SIGNIFICANT CHANGE UP (ref 40–120)
ALT FLD-CCNC: 32 U/L — SIGNIFICANT CHANGE UP (ref 10–45)
ANION GAP SERPL CALC-SCNC: 13 MMOL/L — SIGNIFICANT CHANGE UP (ref 5–17)
APTT BLD: 28.1 SEC — SIGNIFICANT CHANGE UP (ref 27.5–35.5)
AST SERPL-CCNC: 25 U/L — SIGNIFICANT CHANGE UP (ref 10–40)
BASOPHILS # BLD AUTO: 0.06 K/UL — SIGNIFICANT CHANGE UP (ref 0–0.2)
BASOPHILS NFR BLD AUTO: 0.7 % — SIGNIFICANT CHANGE UP (ref 0–2)
BILIRUB SERPL-MCNC: 1.3 MG/DL — HIGH (ref 0.2–1.2)
BUN SERPL-MCNC: 26 MG/DL — HIGH (ref 7–23)
CALCIUM SERPL-MCNC: 8.7 MG/DL — SIGNIFICANT CHANGE UP (ref 8.4–10.5)
CHLORIDE SERPL-SCNC: 99 MMOL/L — SIGNIFICANT CHANGE UP (ref 96–108)
CO2 SERPL-SCNC: 25 MMOL/L — SIGNIFICANT CHANGE UP (ref 22–31)
CREAT SERPL-MCNC: 1.06 MG/DL — SIGNIFICANT CHANGE UP (ref 0.5–1.3)
EOSINOPHIL # BLD AUTO: 0.21 K/UL — SIGNIFICANT CHANGE UP (ref 0–0.5)
EOSINOPHIL NFR BLD AUTO: 2.6 % — SIGNIFICANT CHANGE UP (ref 0–6)
GLUCOSE BLDC GLUCOMTR-MCNC: 125 MG/DL — HIGH (ref 70–99)
GLUCOSE BLDC GLUCOMTR-MCNC: 126 MG/DL — HIGH (ref 70–99)
GLUCOSE BLDC GLUCOMTR-MCNC: 95 MG/DL — SIGNIFICANT CHANGE UP (ref 70–99)
GLUCOSE SERPL-MCNC: 136 MG/DL — HIGH (ref 70–99)
HCT VFR BLD CALC: 36.7 % — LOW (ref 39–50)
HGB BLD-MCNC: 11.7 G/DL — LOW (ref 13–17)
IMM GRANULOCYTES NFR BLD AUTO: 0.6 % — SIGNIFICANT CHANGE UP (ref 0–1.5)
INR BLD: 1.38 RATIO — HIGH (ref 0.88–1.16)
LYMPHOCYTES # BLD AUTO: 1.03 K/UL — SIGNIFICANT CHANGE UP (ref 1–3.3)
LYMPHOCYTES # BLD AUTO: 12.8 % — LOW (ref 13–44)
MCHC RBC-ENTMCNC: 30.6 PG — SIGNIFICANT CHANGE UP (ref 27–34)
MCHC RBC-ENTMCNC: 31.9 GM/DL — LOW (ref 32–36)
MCV RBC AUTO: 96.1 FL — SIGNIFICANT CHANGE UP (ref 80–100)
MONOCYTES # BLD AUTO: 0.98 K/UL — HIGH (ref 0–0.9)
MONOCYTES NFR BLD AUTO: 12.2 % — SIGNIFICANT CHANGE UP (ref 2–14)
NEUTROPHILS # BLD AUTO: 5.69 K/UL — SIGNIFICANT CHANGE UP (ref 1.8–7.4)
NEUTROPHILS NFR BLD AUTO: 71.1 % — SIGNIFICANT CHANGE UP (ref 43–77)
NRBC # BLD: 0 /100 WBCS — SIGNIFICANT CHANGE UP (ref 0–0)
PLATELET # BLD AUTO: 187 K/UL — SIGNIFICANT CHANGE UP (ref 150–400)
POTASSIUM SERPL-MCNC: 3.4 MMOL/L — LOW (ref 3.5–5.3)
POTASSIUM SERPL-SCNC: 3.4 MMOL/L — LOW (ref 3.5–5.3)
PROT SERPL-MCNC: 6.5 G/DL — SIGNIFICANT CHANGE UP (ref 6–8.3)
PROTHROM AB SERPL-ACNC: 16.2 SEC — HIGH (ref 10.6–13.6)
RBC # BLD: 3.82 M/UL — LOW (ref 4.2–5.8)
RBC # FLD: 13.6 % — SIGNIFICANT CHANGE UP (ref 10.3–14.5)
SODIUM SERPL-SCNC: 137 MMOL/L — SIGNIFICANT CHANGE UP (ref 135–145)
WBC # BLD: 8.02 K/UL — SIGNIFICANT CHANGE UP (ref 3.8–10.5)
WBC # FLD AUTO: 8.02 K/UL — SIGNIFICANT CHANGE UP (ref 3.8–10.5)

## 2020-08-20 PROCEDURE — 33361 REPLACE AORTIC VALVE PERQ: CPT | Mod: Q0,62

## 2020-08-20 PROCEDURE — 93010 ELECTROCARDIOGRAM REPORT: CPT

## 2020-08-20 PROCEDURE — 93287 PERI-PX DEVICE EVAL & PRGR: CPT | Mod: 26,76

## 2020-08-20 PROCEDURE — 33361 REPLACE AORTIC VALVE PERQ: CPT | Mod: 62,Q0

## 2020-08-20 PROCEDURE — 93355 ECHO TRANSESOPHAGEAL (TEE): CPT

## 2020-08-20 PROCEDURE — 94010 BREATHING CAPACITY TEST: CPT | Mod: 26

## 2020-08-20 RX ORDER — POTASSIUM BICARBONATE 978 MG/1
25 TABLET, EFFERVESCENT ORAL EVERY 4 HOURS
Refills: 0 | Status: COMPLETED | OUTPATIENT
Start: 2020-08-20 | End: 2020-08-20

## 2020-08-20 RX ORDER — CEFUROXIME AXETIL 250 MG
1500 TABLET ORAL EVERY 8 HOURS
Refills: 0 | Status: COMPLETED | OUTPATIENT
Start: 2020-08-20 | End: 2020-08-21

## 2020-08-20 RX ORDER — APIXABAN 2.5 MG/1
5 TABLET, FILM COATED ORAL EVERY 12 HOURS
Refills: 0 | Status: DISCONTINUED | OUTPATIENT
Start: 2020-08-20 | End: 2020-08-22

## 2020-08-20 RX ADMIN — Medication 81 MILLIGRAM(S): at 15:36

## 2020-08-20 RX ADMIN — Medication 100 MILLIGRAM(S): at 19:06

## 2020-08-20 RX ADMIN — SODIUM CHLORIDE 3 MILLILITER(S): 9 INJECTION INTRAMUSCULAR; INTRAVENOUS; SUBCUTANEOUS at 05:30

## 2020-08-20 RX ADMIN — ATORVASTATIN CALCIUM 40 MILLIGRAM(S): 80 TABLET, FILM COATED ORAL at 21:54

## 2020-08-20 RX ADMIN — Medication 25 MILLIGRAM(S): at 05:33

## 2020-08-20 RX ADMIN — SODIUM CHLORIDE 3 MILLILITER(S): 9 INJECTION INTRAMUSCULAR; INTRAVENOUS; SUBCUTANEOUS at 15:23

## 2020-08-20 RX ADMIN — APIXABAN 5 MILLIGRAM(S): 2.5 TABLET, FILM COATED ORAL at 19:06

## 2020-08-20 RX ADMIN — POTASSIUM BICARBONATE 25 MILLIEQUIVALENT(S): 978 TABLET, EFFERVESCENT ORAL at 15:37

## 2020-08-20 RX ADMIN — SODIUM CHLORIDE 3 MILLILITER(S): 9 INJECTION INTRAMUSCULAR; INTRAVENOUS; SUBCUTANEOUS at 21:50

## 2020-08-20 RX ADMIN — AMIODARONE HYDROCHLORIDE 200 MILLIGRAM(S): 400 TABLET ORAL at 05:33

## 2020-08-20 RX ADMIN — CHLORHEXIDINE GLUCONATE 30 MILLILITER(S): 213 SOLUTION TOPICAL at 10:25

## 2020-08-20 RX ADMIN — POTASSIUM BICARBONATE 25 MILLIEQUIVALENT(S): 978 TABLET, EFFERVESCENT ORAL at 21:54

## 2020-08-20 RX ADMIN — CHLORHEXIDINE GLUCONATE 1 APPLICATION(S): 213 SOLUTION TOPICAL at 05:23

## 2020-08-20 NOTE — PROGRESS NOTE ADULT - ASSESSMENT
70M hx HLD, HTN, CAD s/p MI (04), PCI/LAD stent x3 ('04) at [facility], Cardiomyopathy (NYHA II), AICD 2018, AFIB (on Eliquis, last dose this morning), episode of shingles back in april 2020 treated with acyclovir, with residual neuropathy despite treawith Lyrica which was discontinued due to nausea. Patient is S/P Aortic Valve Replacement (bovine prosthesis 4/3/2015) with Dr. Paez c/o of MATHWES x 6months, fatigue, and frequent dizziness. He also reports nausea and loss of appetite x 2 weeks and lost 16lbs in 2 weeks. Patient was recently admitted to [facility] August 2020 for AFib s/p ANDREY DCCV 8/12/20 converted to SR. No thrombus in left atrial appendage. Workup/ANDREY showed well seated aortic valve prosthesis with restricted opening of aortic valve. Along with a simple atheroma in the ascending and descending aorta. EF 20%. Patient reports using 1 pillow to sleep and can walk 0.5 blocks without shortness of breathe. Patient is being considered CRT upgrade and possible LVAD if no symptomatic improvement. Patient is a direct admit to Ct surgery [address] [place] [address] for TAVR procedure with Dr. Paez and Dr. Waldrop on Thursday.     8/18 Direct Admission; TAVR workup in progress   8/19 TAVR tommrow 8/20/20; TTE today; Lasix IV 40x1 Today   8/20  POST   tavr    VSS    NSB/SR  w/  PPM

## 2020-08-20 NOTE — PROCEDURE NOTE - ADDITIONAL PROCEDURE DETAILS
1. Normal ICD function   2. ICD therapies turned back on and mode changed back to VVIR (from VVI)
1. Normal ICD function with pacing/sensing thresholds and impedances WNL  2. Battery longevity is 10.1years  3. Patient is not pacemaker dependent  4. ICD therapies turned off and mode changed to VVI (from VVIR)
Indication: Check percentage of pacing  -normal sensing and pacing thresholds  -stable lead impedance  -Review of stored data did not reveal any events for review.   -Ventricular paced 43.8%   -Optivol fluid index below threshold indicating euvolemia   -Changes Made: None    [id number]

## 2020-08-20 NOTE — PROGRESS NOTE ADULT - PROBLEM SELECTOR PLAN 3
Anticoagulated with home Eliquis 5mg BID   S/P AICD 2018  S/P cardioversion (8/20)   continue Amio 200 daily   continue toprol 25 daily

## 2020-08-20 NOTE — PROGRESS NOTE ADULT - PROBLEM SELECTOR PLAN 1
post  TAVR  Continue ASA/ATORV  continue toprol 25 daily   TTe  friday   home fri saturday    Mild CAD s/p stents x3 LAD

## 2020-08-20 NOTE — CHART NOTE - NSCHARTNOTEFT_GEN_A_CORE
FEMORAL ANGIOSEAL ASSESSMENT NOTE    Right groin angioseal in place with good hemostatis w/o any bleeding or hematoma  Pulses in the RIGHT lower extremity are palpablE, ( right femoral and right pedal pulses + 2).  Right groin is soft/non tender, + capillary refill   Patient denies leg/s  or foot pain, numbeness, tingling,  or chest pain  post 12 lead EKG WNL, no ST or T wave changes noted when compared to pre procedural EKG    Complications: None

## 2020-08-20 NOTE — PROGRESS NOTE ADULT - SUBJECTIVE AND OBJECTIVE BOX
This patient has been implanted with a Transcatheter Aortic Valve Implant under the following NCT/HEATH:    TAVR:    Commercial Implant NCT [id number], HEATH N/A and will be placed into the TVT Registry.  YEHUDA Leon  [id number]

## 2020-08-20 NOTE — PRE-OP CHECKLIST - SELECT TESTS ORDERED
Urinalysis/HCG/CMP/INR/Type and Cross/Spirometry/Results in MD note/EKG/CXR/PT/PTT/UCG/Hepatic Function/Type and Screen/BMP/CBC

## 2020-08-20 NOTE — PROGRESS NOTE ADULT - SUBJECTIVE AND OBJECTIVE BOX
VITAL SIGNS    Telemetry:     NSB-  NSR  55    Vital Signs Last 24 Hrs  T(C): 36.4 (20 @ 16:23), Max: 36.8 (20 @ 20:36)  T(F): 97.5 (20 @ 16:23), Max: 98.2 (20 @ 20:36)  HR: 54 (20 @ 16:23) (50 - 58)  BP: 151/78 (20 @ 16:23) (95/65 - 151/78)  RR: 18 (20 @ 16:23) (15 - 18)  SpO2: 96% (20 @ 16:23) (95% - 98%)                   Daily Height in cm: 185.42 (20 Aug 2020 10:22)    Daily Weight in k.6 (20 Aug 2020 08:07)      Bilirubin Total, Serum: 1.3 mg/dL ( @ 13:35)    CAPILLARY BLOOD GLUCOSE      POCT Blood Glucose.: 126 mg/dL (20 Aug 2020 07:33)  POCT Blood Glucose.: 136 mg/dL (19 Aug 2020 21:34)  POCT Blood Glucose.: 142 mg/dL (19 Aug 2020 17:23)                        PHYSICAL EXAM    Neurology: alert and oriented x 3, moves all extremities with no defecits  CV :  RRR  Lungs:   CTA B/L  Abdomen: soft, nontender, nondistended, positive bowel sounds  Extremities:       BL  groin     no edemA

## 2020-08-21 DIAGNOSIS — R91.1 SOLITARY PULMONARY NODULE: ICD-10-CM

## 2020-08-21 LAB
ANION GAP SERPL CALC-SCNC: 11 MMOL/L — SIGNIFICANT CHANGE UP (ref 5–17)
BASOPHILS # BLD AUTO: 0.08 K/UL — SIGNIFICANT CHANGE UP (ref 0–0.2)
BASOPHILS NFR BLD AUTO: 0.7 % — SIGNIFICANT CHANGE UP (ref 0–2)
BUN SERPL-MCNC: 20 MG/DL — SIGNIFICANT CHANGE UP (ref 7–23)
CALCIUM SERPL-MCNC: 8.9 MG/DL — SIGNIFICANT CHANGE UP (ref 8.4–10.5)
CHLORIDE SERPL-SCNC: 98 MMOL/L — SIGNIFICANT CHANGE UP (ref 96–108)
CO2 SERPL-SCNC: 25 MMOL/L — SIGNIFICANT CHANGE UP (ref 22–31)
CREAT SERPL-MCNC: 1.12 MG/DL — SIGNIFICANT CHANGE UP (ref 0.5–1.3)
EOSINOPHIL # BLD AUTO: 0.26 K/UL — SIGNIFICANT CHANGE UP (ref 0–0.5)
EOSINOPHIL NFR BLD AUTO: 2.4 % — SIGNIFICANT CHANGE UP (ref 0–6)
GLUCOSE BLDC GLUCOMTR-MCNC: 117 MG/DL — HIGH (ref 70–99)
GLUCOSE BLDC GLUCOMTR-MCNC: 129 MG/DL — HIGH (ref 70–99)
GLUCOSE BLDC GLUCOMTR-MCNC: 167 MG/DL — HIGH (ref 70–99)
GLUCOSE BLDC GLUCOMTR-MCNC: 197 MG/DL — HIGH (ref 70–99)
GLUCOSE SERPL-MCNC: 121 MG/DL — HIGH (ref 70–99)
HCT VFR BLD CALC: 37.4 % — LOW (ref 39–50)
HGB BLD-MCNC: 11.8 G/DL — LOW (ref 13–17)
IMM GRANULOCYTES NFR BLD AUTO: 0.7 % — SIGNIFICANT CHANGE UP (ref 0–1.5)
LYMPHOCYTES # BLD AUTO: 1.33 K/UL — SIGNIFICANT CHANGE UP (ref 1–3.3)
LYMPHOCYTES # BLD AUTO: 12.4 % — LOW (ref 13–44)
MCHC RBC-ENTMCNC: 30.9 PG — SIGNIFICANT CHANGE UP (ref 27–34)
MCHC RBC-ENTMCNC: 31.6 GM/DL — LOW (ref 32–36)
MCV RBC AUTO: 97.9 FL — SIGNIFICANT CHANGE UP (ref 80–100)
MONOCYTES # BLD AUTO: 1.57 K/UL — HIGH (ref 0–0.9)
MONOCYTES NFR BLD AUTO: 14.6 % — HIGH (ref 2–14)
NEUTROPHILS # BLD AUTO: 7.44 K/UL — HIGH (ref 1.8–7.4)
NEUTROPHILS NFR BLD AUTO: 69.2 % — SIGNIFICANT CHANGE UP (ref 43–77)
NRBC # BLD: 0 /100 WBCS — SIGNIFICANT CHANGE UP (ref 0–0)
PLATELET # BLD AUTO: 181 K/UL — SIGNIFICANT CHANGE UP (ref 150–400)
POTASSIUM SERPL-MCNC: 3.9 MMOL/L — SIGNIFICANT CHANGE UP (ref 3.5–5.3)
POTASSIUM SERPL-SCNC: 3.9 MMOL/L — SIGNIFICANT CHANGE UP (ref 3.5–5.3)
RBC # BLD: 3.82 M/UL — LOW (ref 4.2–5.8)
RBC # FLD: 13.7 % — SIGNIFICANT CHANGE UP (ref 10.3–14.5)
SODIUM SERPL-SCNC: 134 MMOL/L — LOW (ref 135–145)
WBC # BLD: 10.75 K/UL — HIGH (ref 3.8–10.5)
WBC # FLD AUTO: 10.75 K/UL — HIGH (ref 3.8–10.5)

## 2020-08-21 PROCEDURE — 99232 SBSQ HOSP IP/OBS MODERATE 35: CPT

## 2020-08-21 PROCEDURE — 99233 SBSQ HOSP IP/OBS HIGH 50: CPT

## 2020-08-21 PROCEDURE — 93010 ELECTROCARDIOGRAM REPORT: CPT

## 2020-08-21 PROCEDURE — 93306 TTE W/DOPPLER COMPLETE: CPT | Mod: 26

## 2020-08-21 RX ORDER — SPIRONOLACTONE 25 MG/1
25 TABLET, FILM COATED ORAL DAILY
Refills: 0 | Status: DISCONTINUED | OUTPATIENT
Start: 2020-08-21 | End: 2020-08-22

## 2020-08-21 RX ADMIN — Medication 25 MILLIGRAM(S): at 05:56

## 2020-08-21 RX ADMIN — SODIUM CHLORIDE 3 MILLILITER(S): 9 INJECTION INTRAMUSCULAR; INTRAVENOUS; SUBCUTANEOUS at 13:11

## 2020-08-21 RX ADMIN — APIXABAN 5 MILLIGRAM(S): 2.5 TABLET, FILM COATED ORAL at 05:56

## 2020-08-21 RX ADMIN — SODIUM CHLORIDE 3 MILLILITER(S): 9 INJECTION INTRAMUSCULAR; INTRAVENOUS; SUBCUTANEOUS at 21:25

## 2020-08-21 RX ADMIN — Medication 2: at 17:18

## 2020-08-21 RX ADMIN — AMIODARONE HYDROCHLORIDE 200 MILLIGRAM(S): 400 TABLET ORAL at 05:56

## 2020-08-21 RX ADMIN — Medication 81 MILLIGRAM(S): at 11:08

## 2020-08-21 RX ADMIN — LOSARTAN POTASSIUM 25 MILLIGRAM(S): 100 TABLET, FILM COATED ORAL at 05:56

## 2020-08-21 RX ADMIN — Medication 100 MILLIGRAM(S): at 02:43

## 2020-08-21 RX ADMIN — SODIUM CHLORIDE 3 MILLILITER(S): 9 INJECTION INTRAMUSCULAR; INTRAVENOUS; SUBCUTANEOUS at 05:55

## 2020-08-21 RX ADMIN — APIXABAN 5 MILLIGRAM(S): 2.5 TABLET, FILM COATED ORAL at 17:10

## 2020-08-21 RX ADMIN — ATORVASTATIN CALCIUM 40 MILLIGRAM(S): 80 TABLET, FILM COATED ORAL at 21:27

## 2020-08-21 NOTE — SBIRT NOTE ADULT - NSSBIRTALCPOSREINDET_GEN_A_CORE
SW discussed alcohol consumption with patient. He feels confident that he can stop at anytime he wants and never has more than 2 beers in one sitting.

## 2020-08-21 NOTE — PROGRESS NOTE ADULT - ASSESSMENT
71 YO M with a history of advanced ACC/AHA Stage C ICM (LV 6.5 cm, EF 15-20%), CAD with late-presenting MI (2005), s/p PCI to LAD, s/p single chamber AICD (2018), s/p bioprosthetic AVR (2015) 2/2 AI, persistent AFib/flutter (on Eliquis), s/p ablation 7/2017 and more recently DCCV on 8/12/20, HTN, and HLD, who was been having worsening HF symptoms and dizziness found incidentally to have severe bioprosthetic AS on ANDREY for DCCV who was admitted in preparation of valve in valve TAVR which he underwent yesterday. Procedure was uncomplicated and blood pressures appears somewhat improved. ICD interrogation also showed 44% ventricular pacing which is also likely contributing to his heart failure syndrome. Will augment HF medical therapy and follow closely as outpatient.     Outpatient f/u August 26 at 9:30 AM at [facility]    TTE 8/19: LV 6.4 cm, LVEF 20-25%, severe bioprosthetic AS (peak velocity 3.8 m/s, peak gradient 58 mmHg, mean gradient 34 mmHg, AALIYAH 0.7 cm2), SVI 31 ml/m2, estimated RAP 10-15 mmHg    Previous Studies:  6/4/20 RHC: RA-8, RV-71/10, PA-66/24/40, PCW-20, PA sat-61.1, AO sat-98.4, CO-3.85, CI-1.71, SVR-21.81, PVR-415.31.    Coronary angiography: 6/4/20  Findings: LM normal sized, mild atherosclerosis, LAD large sized, mild atherosclerosis, Mid LAD diffuse 10% stenosis at the side of a prior stent, CX normal sized, moderated atherosclerosis, RCA large sized (dominant), moderate atherosclerosis, Aorta normal sized    ANDREY 8/12/20  Findings: No thrombus in left atrial appendage. Well seated aortic valve prosthesis with restricted opening of aortic valve.  Along with a simple atheroma in the ascending and descending aorta. EF 20%. Left ventricle dilated. Akinesis of septum, apex and anterior wall.

## 2020-08-21 NOTE — PROVIDER CONTACT NOTE (OTHER) - ASSESSMENT
Pt AOx4, woken from sleep. Per cardiac tele: 8 beats WCT. Pt states asymptomatic. 37.0C 111/72, HR 66, 99% RA, RR 18.

## 2020-08-21 NOTE — PROGRESS NOTE ADULT - PROBLEM SELECTOR PLAN 1
post  TAVR  Continue ASA/ATORV  continue toprol 25 daily   Home Saturday    Mild CAD s/p stents x3 LAD

## 2020-08-21 NOTE — CONSULT NOTE ADULT - ASSESSMENT
ASSESSMENT:  70M hx HLD, HTN, former smoker (2ppd for 30 years, quit 16 years ago), CAD s/p MI (04), PCI/LAD stent x3 ('04) at [place], Aortic Valve Replacement (bovine prosthesis 4/3/2015) with Dr. Paez, Cardiomyopathy (NYHA II), AICD 2018, AFIB (on Eliquis, last dose this morning), episode of shingles back in april 2020 treated with acyclovir, with residual neuropathy despite treated with Lyrica presents s/p TAVR with improvement in fatigue and SOB. He currently denies any coughing, SOB, chest pain, fever, chills, hemoptysis. He was incidentally found to have new 2cm RLL mass potentially consistent with neoplasm vs infection. We are consulted for further workup of new lung mass.     PLAN:    - Plan discussed with Dr. Waggoner  - Recommend outpatient PET/CT Chest to further characterize mass   - Follow up with Dr. Waggoner 1-2 weeks after PET/CT chest performed    [id number]

## 2020-08-21 NOTE — PROGRESS NOTE ADULT - SUBJECTIVE AND OBJECTIVE BOX
Subjective:  Underwent TAVR yesterday which was uncomplicated. Denies dyspnea or dizziness when ambulating.     Medications:  acetaminophen   Tablet .. 650 milliGRAM(s) Oral every 6 hours PRN  aMIOdarone    Tablet 200 milliGRAM(s) Oral daily  apixaban 5 milliGRAM(s) Oral every 12 hours  aspirin enteric coated 81 milliGRAM(s) Oral daily  atorvastatin 40 milliGRAM(s) Oral at bedtime  cefuroxime  IVPB 1500 milliGRAM(s) IV Intermittent once  insulin lispro (HumaLOG) corrective regimen sliding scale   SubCutaneous three times a day before meals  insulin lispro (HumaLOG) corrective regimen sliding scale   SubCutaneous at bedtime  losartan 25 milliGRAM(s) Oral daily  metoprolol succinate ER 25 milliGRAM(s) Oral daily  sodium chloride 0.9% lock flush 3 milliLiter(s) IV Push every 8 hours    Vitals:  T(C): 36.6 (08-21-20 @ 05:28), Max: 37 (08-21-20 @ 02:33)  HR: 82 (08-21-20 @ 05:28) (50 - 82)  BP: 112/67 (08-21-20 @ 05:28) (108/75 - 151/78)  BP(mean): 85 (08-21-20 @ 00:03) (85 - 85)  RR: 18 (08-21-20 @ 05:28) (15 - 18)  SpO2: 96% (08-21-20 @ 05:28) (95% - 99%)    Daily Height in cm: 185.42 (20 Aug 2020 10:22)    Daily     Weight (kg): 94.6 (08-20 @ 10:22)    I&O's Summary    20 Aug 2020 07:01  -  21 Aug 2020 07:00  --------------------------------------------------------  IN: 470 mL / OUT: 0 mL / NET: 470 mL        Physical Exam:  Appearance: No Acute Distress  HEENT: JVP  10-12 cm H20  Cardiovascular: RRR, Normal S1 S2, No murmurs/rubs/gallops  Respiratory: Clear to auscultation bilaterally  Gastrointestinal: Soft, Non-tender, non-distended	  Skin: no skin lesions  Neurologic: Non-focal  Extremities: No LE edema, warm and well perfused  Psychiatry: A & O x 3, Mood & affect appropriate      Labs:                        11.8   10.75 )-----------( 181      ( 21 Aug 2020 07:20 )             37.4     08-21    134<L>  |  98  |  20  ----------------------------<  121<H>  3.9   |  25  |  1.12    Ca    8.9      21 Aug 2020 07:20    TPro  6.5  /  Alb  4.2  /  TBili  1.3<H>  /  DBili  x   /  AST  25  /  ALT  32  /  AlkPhos  82  08-20      PT/INR - ( 20 Aug 2020 13:35 )   PT: 16.2 sec;   INR: 1.38 ratio         PTT - ( 20 Aug 2020 13:35 )  PTT:28.1 sec      Serum Pro-Brain Natriuretic Peptide: 2154 pg/mL (08-18 @ 18:29)

## 2020-08-21 NOTE — PROGRESS NOTE ADULT - SUBJECTIVE AND OBJECTIVE BOX
*****Structural Heart Team*****    Subjective:    The patient is resting comfortably in bed, offering no complaints. He states that he feels better than he did prior to his TAVR.    PAST MEDICAL & SURGICAL HISTORY:  Aortic stenosis  Valvular cardiomyopathy  CHF (congestive heart failure)  Syncope: epidsode in 11/14, was found to have been an episode of V-Fib with sucsessfull AICD shock  AICD (automatic cardioverter/defibrillator) present  HTN (hypertension)  HLD (hyperlipidemia)  Systolic heart failure  MI (myocardial infarction): Nov 2004  CAD (coronary artery disease): 2004  H/O prior ablation treatment: afib  AICD (automatic cardioverter/defibrillator) present  S/P AVR  S/P hernia repair  S/P knee surgery  Stented coronary artery: LAD x 3 (Nov 2004)        T(C): 36.6 (08-21-20 @ 05:28), Max: 37 (08-21-20 @ 02:33)  HR: 82 (08-21-20 @ 05:28) (50 - 82)  BP: 112/67 (08-21-20 @ 05:28) (111/60 - 151/78)  RR: 18 (08-21-20 @ 05:28) (15 - 18)  SpO2: 96% (08-21-20 @ 05:28) (95% - 99%)  Wt(kg): --  08-20 @ 07:01  -  08-21 @ 07:00  --------------------------------------------------------  IN: 470 mL / OUT: 0 mL / NET: 470 mL    08-21 @ 07:01  -  08-21 @ 10:56  --------------------------------------------------------  IN: 360 mL / OUT: 0 mL / NET: 360 mL      MEDICATIONS  (STANDING):  aMIOdarone    Tablet 200 milliGRAM(s) Oral daily  apixaban 5 milliGRAM(s) Oral every 12 hours  aspirin enteric coated 81 milliGRAM(s) Oral daily  atorvastatin 40 milliGRAM(s) Oral at bedtime  cefuroxime  IVPB 1500 milliGRAM(s) IV Intermittent once  insulin lispro (HumaLOG) corrective regimen sliding scale   SubCutaneous three times a day before meals  insulin lispro (HumaLOG) corrective regimen sliding scale   SubCutaneous at bedtime  losartan 25 milliGRAM(s) Oral daily  metoprolol succinate ER 25 milliGRAM(s) Oral daily  sodium chloride 0.9% lock flush 3 milliLiter(s) IV Push every 8 hours    MEDICATIONS  (PRN):  acetaminophen   Tablet .. 650 milliGRAM(s) Oral every 6 hours PRN Mild Pain (1 - 3)      Review of Symptoms:  Constitutional: Awake, Alert, Follows commands  Respiratory: + MATHEWS on admission  Cardiac: Denies CP, Denies Palpitations  Gastrointestinal: Denies Pain, Denies N/V, tolerating po intake  Vascular: Negative  Extremities: + Edema, No joint pain or swelling  Neurological: Negative  Endocrine: No heat or cold intolerance, No excessive thirst  Heme/Onc: Negative    Exam:  General: A/O x3, NAD  HEENT: Supple, No JVD, Trachea midline, no masses  Pulmonary: CTAB, = Chest Excursion, no accessory muscle use  Cor: S1S2, RRR, No murmur or rubs noted  ECG: paced  Groin/Wound: B/L soft, no hematoma. Mild ecchymosis  Gastrointestinal: Soft, NT/ND, + Bowel Sounds  Neuro: = motor and sensory B/L, No focal deficits  Vascular: 1+ pulses B/L, Trace edema  Extremities: No joint pain or swelling.  Skin: Warm/Dry/Normal color, Normal turgor, no rashes                          11.8   10.75 )-----------( 181      ( 21 Aug 2020 07:20 )             37.4   08-21    134<L>  |  98  |  20  ----------------------------<  121<H>  3.9   |  25  |  1.12    Ca    8.9      21 Aug 2020 07:20    TPro  6.5  /  Alb  4.2  /  TBili  1.3<H>  /  DBili  x   /  AST  25  /  ALT  32  /  AlkPhos  82  08-20  PT/INR - ( 20 Aug 2020 13:35 )   PT: 16.2 sec;   INR: 1.38 ratio         PTT - ( 20 Aug 2020 13:35 )  PTT:28.1 sec    Imaging Reviewed:    Post Op TTE: Pending      Assesment/Plan:  70 M s/p TAVR for Severe Aortic Stenosis, Acute on Chronic Combined Heart Failure Class 3  1.) S/P TAVR: ASA 81 mg po daily, Continue to Monitor Groins. Ambulate as tolerated.  Will f/u Post op TTE  2.) Daily ECG's  3.) Heart Failure: Monitor I and O's, daily weight. I will restart his torsemide 20 mg po daily, and his spironolactone 25 mg po daily.  4.) Ambulate  5.) Discharge Plan: Patient is to follow up with Dr. Castellanos in 1 week post discharge. HE should then follow up with the [facility]  in 30 days, with a repeat Transthoracic Echo to be done at that visit.

## 2020-08-21 NOTE — PROGRESS NOTE ADULT - SUBJECTIVE AND OBJECTIVE BOX
Subjective:  "Feel great"  NASRIN ganesh      Tele:  SR 60-70           V/S:                    T(F): 97.9 (20 @ 05:28), Max: 98.6 (20 @ 02:33)  HR: 82 (20 @ 05:28) (50 - 82)  BP: 112/67 (20 @ 05:28) (111/72 - 151/78)  RR: 18 (20 @ 05:28) (17 - 18)  SpO2: 96% (20 @ 05:28) (95% - 99%)  Wt(kg): --      LV EF:      Labs:      134<L>  |  98  |  20  ----------------------------<  121<H>  3.9   |  25  |  1.12    Ca    8.9      21 Aug 2020 07:20    TPro  6.5  /  Alb  4.2  /  TBili  1.3<H>  /  DBili  x   /  AST  25  /  ALT  32  /  AlkPhos  82                                 11.8   10.75 )-----------( 181      ( 21 Aug 2020 07:20 )             37.4        PT/INR - ( 20 Aug 2020 13:35 )   PT: 16.2 sec;   INR: 1.38 ratio         PTT - ( 20 Aug 2020 13:35 )  PTT:28.1 sec     CAPILLARY BLOOD GLUCOSE      POCT Blood Glucose.: 117 mg/dL (21 Aug 2020 11:41)  POCT Blood Glucose.: 129 mg/dL (21 Aug 2020 08:09)  POCT Blood Glucose.: 125 mg/dL (20 Aug 2020 21:53)  POCT Blood Glucose.: 95 mg/dL (20 Aug 2020 18:27)               I&O's Detail    20 Aug 2020 07:01  -  21 Aug 2020 07:00  --------------------------------------------------------  IN:    IV PiggyBack: 50 mL    Oral Fluid: 420 mL  Total IN: 470 mL    OUT:  Total OUT: 0 mL    Total NET: 470 mL      21 Aug 2020 07:01  -  21 Aug 2020 13:47  --------------------------------------------------------  IN:    Oral Fluid: 360 mL  Total IN: 360 mL    OUT:  Total OUT: 0 mL    Total NET: 360 mL          CHEST TUBE:  [ ] YES [ ] NO  OUTPUT:     per 24 hours    AIR LEAKS:  [ ] YES [ ] NO      MESHA DRAIN:   [ ] YES [ ] NO  OUTPUT:     per 24 hours    EPICARDIAL WIRES:  [ ] YES [ ] NO      BOWEL MOVEMENT:  [ ] YES [ ] NO      Daily     Daily Weight in k (21 Aug 2020 09:53)  Medications:  acetaminophen   Tablet .. 650 milliGRAM(s) Oral every 6 hours PRN  aMIOdarone    Tablet 200 milliGRAM(s) Oral daily  apixaban 5 milliGRAM(s) Oral every 12 hours  aspirin enteric coated 81 milliGRAM(s) Oral daily  atorvastatin 40 milliGRAM(s) Oral at bedtime  cefuroxime  IVPB 1500 milliGRAM(s) IV Intermittent once  insulin lispro (HumaLOG) corrective regimen sliding scale   SubCutaneous three times a day before meals  insulin lispro (HumaLOG) corrective regimen sliding scale   SubCutaneous at bedtime  losartan 25 milliGRAM(s) Oral daily  metoprolol succinate ER 25 milliGRAM(s) Oral daily  sodium chloride 0.9% lock flush 3 milliLiter(s) IV Push every 8 hours  spironolactone 25 milliGRAM(s) Oral daily  torsemide 20 milliGRAM(s) Oral daily        Physical Exam:    Neuro:     Pulm:     CV:    Abd:     Extremities:    Incision(s):                  PAST MEDICAL & SURGICAL HISTORY:  Aortic stenosis  Valvular cardiomyopathy  CHF (congestive heart failure)  Syncope: epidsode in , was found to have been an episode of V-Fib with sucsessfull AICD shock  AICD (automatic cardioverter/defibrillator) present  HTN (hypertension)  HLD (hyperlipidemia)  Systolic heart failure  MI (myocardial infarction): 2004  CAD (coronary artery disease):   H/O prior ablation treatment: afib  AICD (automatic cardioverter/defibrillator) present  S/P AVR  S/P hernia repair  S/P knee surgery  Stented coronary artery: LAD x 3 (2004)

## 2020-08-21 NOTE — PROGRESS NOTE ADULT - REASON FOR ADMISSION
Direct Admit for Preop TAVR
sp  TAVR
TAVR
Direct Admit for Preop TAVR

## 2020-08-21 NOTE — PROGRESS NOTE ADULT - ASSESSMENT
70M hx HLD, HTN, CAD s/p MI (04), PCI/LAD stent x3 ('04) at [place], Cardiomyopathy (NYHA II), AICD 2018, AFIB (on Eliquis, last dose this morning), episode of shingles back in april 2020 treated with acyclovir, with residual neuropathy despite treawith Lyrica which was discontinued due to nausea. Patient is S/P Aortic Valve Replacement (bovine prosthesis 4/3/2015) with Dr. Paez c/o of MATHEWS x 6months, fatigue, and frequent dizziness. He also reports nausea and loss of appetite x 2 weeks and lost 16lbs in 2 weeks. Patient was recently admitted to [facility] August 2020 for AFib s/p ANDREY DCCV 8/12/20 converted to SR. No thrombus in left atrial appendage. Workup/ANDREY showed well seated aortic valve prosthesis with restricted opening of aortic valve. Along with a simple atheroma in the ascending and descending aorta. EF 20%. Patient reports using 1 pillow to sleep and can walk 0.5 blocks without shortness of breathe. Patient is being considered CRT upgrade and possible LVAD if no symptomatic improvement. Patient is a direct admit to Ct surgery [facility] for TAVR procedure with Dr. Paez and Dr. Waldrop on Thursday.     8/18 Direct Admission; TAVR workup in progress   8/19 TAVR tommrow 8/20/20; TTE today; Lasix IV 40x1 Today   8/20  POST   tavr    VSS    NSB/SR  w/  PPM  8/21 Echo completed CT reveal RLL spicated lesion> O/P folow up pulmonary and PET scan as per Dr Waggoner

## 2020-08-21 NOTE — CONSULT NOTE ADULT - SUBJECTIVE AND OBJECTIVE BOX
HPI:  70M hx HLD, HTN, former smoker (2ppd for 30 years, quit 16 years ago), CAD s/p MI (04), PCI/LAD stent x3 ('04) at [place], Cardiomyopathy (NYHA II), AICD 2018, AFIB (on Eliquis, last dose this morning), episode of shingles back in april 2020 treated with acyclovir, with residual neuropathy despite treated with Lyrica which was discontinued due to nausea. Patient is S/P Aortic Valve Replacement (bovine prosthesis 4/3/2015) with Dr. Paez c/o of MATHEWS x 6months, fatigue, and frequent dizziness. He also reports nausea and loss of appetite x 2 weeks and lost 16lbs in 2 weeks. Patient was recently admitted to [facility] August 2020 for AFib s/p ANDREY DCCV 8/12/20 converted to SR. No thrombus in left atrial appendage. Workup/ANDREY showed well seated aortic valve prosthesis with restricted opening of aortic valve. Along with a simple atheroma in the ascending and descending aorta. EF 20%. Patient reports using 1 pillow to sleep and can walk 0.5 blocks without shortness of breathe. Patient is being considered CRT upgrade and possible LVAD if no symptomatic improvement. While undergoing TAVR workup, CT Heart demonstarted 2cm spiculated right lower lobe mass not seen on previous chest CT in December. He follow with an outpatient pulmonologist for emphysema in [place], [place] who he states is Dr. Reddy. Thoracic surgery is consulted for new 2cm RLL lung nodule.       PAST MEDICAL & SURGICAL HISTORY:  Aortic stenosis  Valvular cardiomyopathy  CHF (congestive heart failure)  Syncope: epidsode in 11/14, was found to have been an episode of V-Fib with sucsessfull AICD shock  AICD (automatic cardioverter/defibrillator) present  HTN (hypertension)  HLD (hyperlipidemia)  Systolic heart failure  MI (myocardial infarction): Nov 2004  CAD (coronary artery disease): 2004  H/O prior ablation treatment: afib  AICD (automatic cardioverter/defibrillator) present  S/P AVR  S/P hernia repair  S/P knee surgery  Stented coronary artery: LAD x 3 (Nov 2004)      REVIEW OF SYSTEMS      General: No Weight change/ Fatigue/ HA/Dizzy	    Skin/Breast: No Rashes/ Lesions/ Masses  	  Ophthalmologic: No Blurry vision/ Glaucoma/ Blindness  	  ENMT: No Hearing loss/ Drainage/ Lesions	    Respiratory and Thorax: No Cough/ Wheezing/ SOB/ Hemoptysis/ Sputum production  	  Cardiovascular: No Chest pain/ Palpitations/ Diaphoresis	    Gastrointestinal: No Nausea/ Vomiting/ Constipation/ Appetite Change	    Genitourinary: No Hematuria Dysuria/ Frequency change/ Impotence	    Musculoskeletal: No Pain/ Weakness/ Claudication	    Neurological: No Seizures/ TIA/CVA/ Paresthesias    Psychiatric: No Dementia/ Depression/ SI/HI	    Hematology/Lymphatics: No hx of bleeding/ Edema	    Endocrine: No Hyperglycemia/ Hypoglycemia    Allergic/Immunologic: No Anaphylaxis/ Intolerance/ Recent illnesses    MEDICATIONS  (STANDING):  aMIOdarone    Tablet 200 milliGRAM(s) Oral daily  apixaban 5 milliGRAM(s) Oral every 12 hours  aspirin enteric coated 81 milliGRAM(s) Oral daily  atorvastatin 40 milliGRAM(s) Oral at bedtime  cefuroxime  IVPB 1500 milliGRAM(s) IV Intermittent once  insulin lispro (HumaLOG) corrective regimen sliding scale   SubCutaneous three times a day before meals  insulin lispro (HumaLOG) corrective regimen sliding scale   SubCutaneous at bedtime  losartan 25 milliGRAM(s) Oral daily  metoprolol succinate ER 25 milliGRAM(s) Oral daily  sodium chloride 0.9% lock flush 3 milliLiter(s) IV Push every 8 hours  spironolactone 25 milliGRAM(s) Oral daily  torsemide 20 milliGRAM(s) Oral daily    MEDICATIONS  (PRN):  acetaminophen   Tablet .. 650 milliGRAM(s) Oral every 6 hours PRN Mild Pain (1 - 3)      Allergies    No Known Allergies    Intolerances        SOCIAL HISTORY:  Occupation: Retired, formerly telephone   Smoking Hx: 60 pack year history ( 2ppd for 30 years, quit 16 years ago)  Etoh Hx: Socially  IVDA Hx: denies  Lives w/ wife (47 years)      FAMILY HISTORY:  Family history of colon cancer (Father)  Chronic obstructive pulmonary disease    unless noted, no significant family hx with Mother, Father, Siblings    Vital Signs Last 24 Hrs  T(C): 36.6 (21 Aug 2020 05:28), Max: 37 (21 Aug 2020 02:33)  T(F): 97.9 (21 Aug 2020 05:28), Max: 98.6 (21 Aug 2020 02:33)  HR: 82 (21 Aug 2020 05:28) (50 - 82)  BP: 112/67 (21 Aug 2020 05:28) (111/72 - 151/78)  BP(mean): 85 (21 Aug 2020 00:03) (85 - 85)  RR: 18 (21 Aug 2020 05:28) (17 - 18)  SpO2: 96% (21 Aug 2020 05:28) (95% - 99%)    General: WN/WD NAD  Neurology: Awake, nonfocal, TERRAZAS x 4  Eyes: Scleras clear, Gross vision intact  ENT: Gross hearing intact, grossly patent pharynx, no stridor  Neck: Neck supple, trachea midline, No JVD  Respiratory: CTA B/L, No wheezing, rales, rhonchi  CV: RRR, S1S2, no murmurs, rubs or gallops  Abdominal: Soft, NT, ND    Extremities: No edema, + peripheral pulses  Psych: Oriented x 3, normal affect      LABS:                        11.8   10.75 )-----------( 181      ( 21 Aug 2020 07:20 )             37.4     08-21    134<L>  |  98  |  20  ----------------------------<  121<H>  3.9   |  25  |  1.12    Ca    8.9      21 Aug 2020 07:20    TPro  6.5  /  Alb  4.2  /  TBili  1.3<H>  /  DBili  x   /  AST  25  /  ALT  32  /  AlkPhos  82  08-20    PT/INR - ( 20 Aug 2020 13:35 )   PT: 16.2 sec;   INR: 1.38 ratio         PTT - ( 20 Aug 2020 13:35 )  PTT:28.1 sec      RADIOLOGY & ADDITIONAL STUDIES:    < from: CT Heart without Coronaries w/ IV Cont (08.18.20 @ 12:02) >  IMPRESSION:    1.  Bioprosthetic aortic valve with calcified leaflets.    2.  Aortic and peripheral access vessel measurements as reported above.    3.  Dilated left ventricle with markedly thinned myocardium in the mid anteroseptal and anterior segments as well as the apical septal, anterior and inferior segments of the left ventricle with myocardial calcification and fatty metaplasia.  These findings are consistent with myocardial infarction in the territory supplied by the left anterior descending coronary artery.    4.  Region of low attenuation noted adjacent to the ICD wire in the superior vena cava at the level of the azygous vein measuringapproximately 20 mm x 7 mm (sagittal view) may represent thrombus, fibrin, less likely a vegetation.  Clinical correlation recommended.      5. New right lower lobe 2 cm nodule with mild spiculation suspicious for neoplasm given concurrent finding of emphysema. No lymphadenopathy. Centrilobular emphysema noted. There is a new right lower lobe 2 cm nodule when compared with December 21, 2019, which is indeterminate.

## 2020-08-21 NOTE — PROGRESS NOTE ADULT - PROBLEM SELECTOR PLAN 2
- GDMT: increase losartan to 25 mg BID, will consider switch to Entresto as outpatient. Start spironolactone 25 mg daily. Continue metoprolol succinate 25 mg daily  - Diuretics: resume lasix 20 mg PO daily  - Device: interrogation reveals 44% v-pacing, outpatient f/u to consider CRT upgrade - GDMT: increase losartan to 25 mg BID, will consider switch to Entresto as outpatient. Start spironolactone 25 mg daily. Continue metoprolol succinate 25 mg daily  - Diuretics: resume torsemide 10 mg PO daily  - Device: interrogation reveals 44% v-pacing, outpatient f/u to consider CRT upgrade

## 2020-08-22 ENCOUNTER — TRANSCRIPTION ENCOUNTER (OUTPATIENT)
Age: 70
End: 2020-08-22

## 2020-08-22 VITALS — WEIGHT: 212.08 LBS

## 2020-08-22 LAB
ANION GAP SERPL CALC-SCNC: 10 MMOL/L — SIGNIFICANT CHANGE UP (ref 5–17)
BASOPHILS # BLD AUTO: 0.07 K/UL — SIGNIFICANT CHANGE UP (ref 0–0.2)
BASOPHILS NFR BLD AUTO: 0.9 % — SIGNIFICANT CHANGE UP (ref 0–2)
BUN SERPL-MCNC: 18 MG/DL — SIGNIFICANT CHANGE UP (ref 7–23)
CALCIUM SERPL-MCNC: 8.9 MG/DL — SIGNIFICANT CHANGE UP (ref 8.4–10.5)
CHLORIDE SERPL-SCNC: 101 MMOL/L — SIGNIFICANT CHANGE UP (ref 96–108)
CO2 SERPL-SCNC: 26 MMOL/L — SIGNIFICANT CHANGE UP (ref 22–31)
CREAT SERPL-MCNC: 0.99 MG/DL — SIGNIFICANT CHANGE UP (ref 0.5–1.3)
EOSINOPHIL # BLD AUTO: 0.23 K/UL — SIGNIFICANT CHANGE UP (ref 0–0.5)
EOSINOPHIL NFR BLD AUTO: 3.1 % — SIGNIFICANT CHANGE UP (ref 0–6)
GLUCOSE BLDC GLUCOMTR-MCNC: 161 MG/DL — HIGH (ref 70–99)
GLUCOSE SERPL-MCNC: 124 MG/DL — HIGH (ref 70–99)
HCT VFR BLD CALC: 35.7 % — LOW (ref 39–50)
HGB BLD-MCNC: 11.4 G/DL — LOW (ref 13–17)
IMM GRANULOCYTES NFR BLD AUTO: 0.4 % — SIGNIFICANT CHANGE UP (ref 0–1.5)
LYMPHOCYTES # BLD AUTO: 1.31 K/UL — SIGNIFICANT CHANGE UP (ref 1–3.3)
LYMPHOCYTES # BLD AUTO: 17.6 % — SIGNIFICANT CHANGE UP (ref 13–44)
MCHC RBC-ENTMCNC: 31.1 PG — SIGNIFICANT CHANGE UP (ref 27–34)
MCHC RBC-ENTMCNC: 31.9 GM/DL — LOW (ref 32–36)
MCV RBC AUTO: 97.3 FL — SIGNIFICANT CHANGE UP (ref 80–100)
MONOCYTES # BLD AUTO: 1.14 K/UL — HIGH (ref 0–0.9)
MONOCYTES NFR BLD AUTO: 15.3 % — HIGH (ref 2–14)
NEUTROPHILS # BLD AUTO: 4.66 K/UL — SIGNIFICANT CHANGE UP (ref 1.8–7.4)
NEUTROPHILS NFR BLD AUTO: 62.7 % — SIGNIFICANT CHANGE UP (ref 43–77)
NRBC # BLD: 0 /100 WBCS — SIGNIFICANT CHANGE UP (ref 0–0)
PLATELET # BLD AUTO: 176 K/UL — SIGNIFICANT CHANGE UP (ref 150–400)
POTASSIUM SERPL-MCNC: 3.9 MMOL/L — SIGNIFICANT CHANGE UP (ref 3.5–5.3)
POTASSIUM SERPL-SCNC: 3.9 MMOL/L — SIGNIFICANT CHANGE UP (ref 3.5–5.3)
RBC # BLD: 3.67 M/UL — LOW (ref 4.2–5.8)
RBC # FLD: 13.7 % — SIGNIFICANT CHANGE UP (ref 10.3–14.5)
SODIUM SERPL-SCNC: 137 MMOL/L — SIGNIFICANT CHANGE UP (ref 135–145)
WBC # BLD: 7.44 K/UL — SIGNIFICANT CHANGE UP (ref 3.8–10.5)
WBC # FLD AUTO: 7.44 K/UL — SIGNIFICANT CHANGE UP (ref 3.8–10.5)

## 2020-08-22 PROCEDURE — 86923 COMPATIBILITY TEST ELECTRIC: CPT

## 2020-08-22 PROCEDURE — C8929: CPT

## 2020-08-22 PROCEDURE — 87641 MR-STAPH DNA AMP PROBE: CPT

## 2020-08-22 PROCEDURE — 86900 BLOOD TYPING SEROLOGIC ABO: CPT

## 2020-08-22 PROCEDURE — P9016: CPT

## 2020-08-22 PROCEDURE — 83880 ASSAY OF NATRIURETIC PEPTIDE: CPT

## 2020-08-22 PROCEDURE — C1889: CPT

## 2020-08-22 PROCEDURE — 99239 HOSP IP/OBS DSCHRG MGMT >30: CPT

## 2020-08-22 PROCEDURE — 83036 HEMOGLOBIN GLYCOSYLATED A1C: CPT

## 2020-08-22 PROCEDURE — C1769: CPT

## 2020-08-22 PROCEDURE — 81003 URINALYSIS AUTO W/O SCOPE: CPT

## 2020-08-22 PROCEDURE — 93355 ECHO TRANSESOPHAGEAL (TEE): CPT

## 2020-08-22 PROCEDURE — 84439 ASSAY OF FREE THYROXINE: CPT

## 2020-08-22 PROCEDURE — 80053 COMPREHEN METABOLIC PANEL: CPT

## 2020-08-22 PROCEDURE — 93010 ELECTROCARDIOGRAM REPORT: CPT

## 2020-08-22 PROCEDURE — 94010 BREATHING CAPACITY TEST: CPT

## 2020-08-22 PROCEDURE — 80048 BASIC METABOLIC PNL TOTAL CA: CPT

## 2020-08-22 PROCEDURE — C1894: CPT

## 2020-08-22 PROCEDURE — P9047: CPT

## 2020-08-22 PROCEDURE — 87640 STAPH A DNA AMP PROBE: CPT

## 2020-08-22 PROCEDURE — 82962 GLUCOSE BLOOD TEST: CPT

## 2020-08-22 PROCEDURE — C1884: CPT

## 2020-08-22 PROCEDURE — 71045 X-RAY EXAM CHEST 1 VIEW: CPT

## 2020-08-22 PROCEDURE — C1887: CPT

## 2020-08-22 PROCEDURE — 84443 ASSAY THYROID STIM HORMONE: CPT

## 2020-08-22 PROCEDURE — 85384 FIBRINOGEN ACTIVITY: CPT

## 2020-08-22 PROCEDURE — 85730 THROMBOPLASTIN TIME PARTIAL: CPT

## 2020-08-22 PROCEDURE — 86901 BLOOD TYPING SEROLOGIC RH(D): CPT

## 2020-08-22 PROCEDURE — 93005 ELECTROCARDIOGRAM TRACING: CPT

## 2020-08-22 PROCEDURE — 86850 RBC ANTIBODY SCREEN: CPT

## 2020-08-22 PROCEDURE — 85027 COMPLETE CBC AUTOMATED: CPT

## 2020-08-22 PROCEDURE — 85610 PROTHROMBIN TIME: CPT

## 2020-08-22 PROCEDURE — 76000 FLUOROSCOPY <1 HR PHYS/QHP: CPT

## 2020-08-22 PROCEDURE — C9399: CPT

## 2020-08-22 PROCEDURE — 86769 SARS-COV-2 COVID-19 ANTIBODY: CPT

## 2020-08-22 PROCEDURE — C1760: CPT

## 2020-08-22 RX ADMIN — Medication 20 MILLIGRAM(S): at 07:59

## 2020-08-22 RX ADMIN — LOSARTAN POTASSIUM 25 MILLIGRAM(S): 100 TABLET, FILM COATED ORAL at 05:29

## 2020-08-22 RX ADMIN — Medication 81 MILLIGRAM(S): at 12:56

## 2020-08-22 RX ADMIN — Medication 2: at 08:06

## 2020-08-22 RX ADMIN — SODIUM CHLORIDE 3 MILLILITER(S): 9 INJECTION INTRAMUSCULAR; INTRAVENOUS; SUBCUTANEOUS at 05:29

## 2020-08-22 RX ADMIN — APIXABAN 5 MILLIGRAM(S): 2.5 TABLET, FILM COATED ORAL at 05:29

## 2020-08-22 RX ADMIN — SPIRONOLACTONE 25 MILLIGRAM(S): 25 TABLET, FILM COATED ORAL at 07:59

## 2020-08-22 RX ADMIN — AMIODARONE HYDROCHLORIDE 200 MILLIGRAM(S): 400 TABLET ORAL at 05:29

## 2020-08-22 RX ADMIN — Medication 25 MILLIGRAM(S): at 05:29

## 2020-08-22 NOTE — DISCHARGE NOTE PROVIDER - NSDCFUADDINST_GEN_ALL_CORE_FT
TAVR discharge instructions>Keep femoral or groin site clean,please shower daily and pat site dry.Watch site for signs of reddness or drainage, these should be reported to your doctor.You will receive a card about your valve in the mail,please carry in your wallet.

## 2020-08-22 NOTE — DISCHARGE NOTE PROVIDER - INSTRUCTIONS
Do not drink more than four  8 ounce glasses of beverage in a 24 hour period during your first 2 weeks home    Low salt diet

## 2020-08-22 NOTE — DISCHARGE NOTE NURSING/CASE MANAGEMENT/SOCIAL WORK - PATIENT PORTAL LINK FT
You can access the FollowMyHealth Patient Portal offered by [facility] by registering at the following website: [email or web address]. By joining [employer]’s FollowMyHealth portal, you will also be able to view your health information using other applications (apps) compatible with our system.

## 2020-08-22 NOTE — DISCHARGE NOTE PROVIDER - NSDCCPCAREPLAN_GEN_ALL_CORE_FT
PRINCIPAL DISCHARGE DIAGNOSIS  Diagnosis: S/P TAVR (transcatheter aortic valve replacement)  Assessment and Plan of Treatment: ASA valve thrombotic prophylaxis  Eliqiis Afib  Low dose diuretics leg edema  losartan HTN  aMIO aFIB

## 2020-08-22 NOTE — DISCHARGE NOTE NURSING/CASE MANAGEMENT/SOCIAL WORK - NSDCFUADDAPPT_GEN_ALL_CORE_FT
Your Care Navigator Nurse Practitioner will be in touch to see you in your home within a few days from discharge. The Follow Your Heart program can help ensure you understand your medications, discharge instructions and answer any questions you may have at that time. They are also a great source to address concerns during the day and may be reached at [phone].  Follow up appointment with Dr Philippe Morgan 8/25   Cardiac surgery office at [facility] entrance 3  first floor on left after entering [facility]  Office telephone

## 2020-08-22 NOTE — DISCHARGE NOTE PROVIDER - HOSPITAL COURSE
70M hx HLD, HTN, CAD s/p MI (04), PCI/LAD stent x3 ('04) at [place], Cardiomyopathy (NYHA II), AICD 2018, AFIB (on Eliquis, last dose this morning), episode of shingles back in april 2020 treated with acyclovir, with residual neuropathy despite treawith Lyrica which was discontinued due to nausea. Patient is S/P Aortic Valve Replacement (bovine prosthesis 4/3/2015) with Dr. Black c/o of MATHEWS x 6months, fatigue, and frequent dizziness. He also reports nausea and loss of appetite x 2 weeks and lost 16lbs in 2 weeks. Patient was recently admitted to [facility] August 2020 for AFib s/p ANDREY DCCV 8/12/20 converted to SR. No thrombus in left atrial appendage. Workup/ANDREY showed well seated aortic valve prosthesis with restricted opening of aortic valve. Along with a simple atheroma in the ascending and descending aorta. EF 20%. Patient reports using 1 pillow to sleep and can walk 0.5 blocks without shortness of breathe. Patient is being considered CRT upgrade and possible LVAD if no symptomatic improvement. Patient is a direct admit to Ct surgery [address] [place] [address] for TAVR procedure with Dr. Black and Dr. Waldrop on Thursday.         8/18 Direct Admission; TAVR workup in progress     8/19 TAVR tommrow 8/20/20; TTE today; Lasix IV 40x1 Today     8/20  POST   tavr    VSS    NSB/SR  w/  PPM    8/21 Echo completed While undergoing TAVR workup, CT Heart demonstrated 2cm spiculated right lower lobe mass not seen on previous chest CT in December. He will follow with an outpatient pulmonologist Dr Guerrero  in [place] who has seen him before (last January) for treatment COPD  Will need PET scan as per Dr Waggoner        8/22 D/C home, reinforce follow up Dr Guerrero in [place] for RLL lesion and need for PET scan Spoken to by Dr black at bedside

## 2020-08-22 NOTE — DISCHARGE NOTE PROVIDER - NSDCACTIVITY_GEN_ALL_CORE
Bathing allowed/Showering allowed/Do not make important decisions/Walking - Indoors allowed/No heavy lifting/straining/Do not drive or operate machinery/Stairs allowed/Walking - Outdoors allowed

## 2020-08-22 NOTE — DISCHARGE NOTE PROVIDER - NSDCPNSUBOBJ_GEN_ALL_CORE
Subjective:  "Im ok, ready to leave...going to make appt with Dr Guerrero my lung doctor about that spot in my lung"    Explained to pt he needs to have PET scan to evaluatte RLL lesion            Tele:  Sr  60s                 V/S:                      T(F): 97.9 (20 @ 04:51), Max: 98.8 (20 @ 00:17)    HR: 60 (20 @ 06:14) (60 - 73)    BP: 99/65 (20 @ 06:14) (99/65 - 115/70)    RR: 16 (20 @ 04:51) (16 - 18)    SpO2: 98% (20 @ 04:51) (97% - 98%)    Wt(kg): --          LV EF:            Labs:            137  |  101  |  18    ----------------------------<  124<H>    3.9   |  26  |  0.99        Ca    8.9      22 Aug 2020 05:37        TPro  6.5  /  Alb  4.2  /  TBili  1.3<H>  /  DBili  x   /  AST  25  /  ALT  32  /  AlkPhos  82                                     11.4     7.44  )-----------( 176      ( 22 Aug 2020 05:37 )               35.7          PT/INR - ( 20 Aug 2020 13:35 )   PT: 16.2 sec;   INR: 1.38 ratio               PTT - ( 20 Aug 2020 13:35 )  PTT:28.1 sec       CAPILLARY BLOOD GLUCOSE            POCT Blood Glucose.: 161 mg/dL (22 Aug 2020 07:55)    POCT Blood Glucose.: 197 mg/dL (21 Aug 2020 21:45)    POCT Blood Glucose.: 167 mg/dL (21 Aug 2020 17:17)                 CXR:        I&O's Detail        21 Aug 2020 07:01  -  22 Aug 2020 07:00    --------------------------------------------------------    IN:      Oral Fluid: 760 mL    Total IN: 760 mL        OUT:      Voided: 800 mL    Total OUT: 800 mL        Total NET: -40 mL            22 Aug 2020 07:01  -  22 Aug 2020 11:55    --------------------------------------------------------    IN:      Oral Fluid: 360 mL    Total IN: 360 mL        OUT:    Total OUT: 0 mL        Total NET: 360 mL            Echo :         BOWEL MOVEMENT:  [ x] YES [ ] NO          Daily       Daily Weight in k.2 (22 Aug 2020 07:49)    Medications:    acetaminophen   Tablet .. 650 milliGRAM(s) Oral every 6 hours PRN    aMIOdarone    Tablet 200 milliGRAM(s) Oral daily    apixaban 5 milliGRAM(s) Oral every 12 hours    aspirin enteric coated 81 milliGRAM(s) Oral daily    atorvastatin 40 milliGRAM(s) Oral at bedtime    cefuroxime  IVPB 1500 milliGRAM(s) IV Intermittent once    insulin lispro (HumaLOG) corrective regimen sliding scale   SubCutaneous three times a day before meals    insulin lispro (HumaLOG) corrective regimen sliding scale   SubCutaneous at bedtime    losartan 25 milliGRAM(s) Oral daily    metoprolol succinate ER 25 milliGRAM(s) Oral daily    sodium chloride 0.9% lock flush 3 milliLiter(s) IV Push every 8 hours    spironolactone 25 milliGRAM(s) Oral daily    torsemide 20 milliGRAM(s) Oral daily                Physical Exam:        Neuro: alert, no deficits        Pulm: clear bilaterally        CV: S1 S2 RRR         Abd: soft  non tender reports BM this am        Extremities: trace leg edema B/l        Incision(s): B/L groin sites sl ecchymosis, no hematoma                           PAST MEDICAL & SURGICAL HISTORY:    Aortic stenosis    Valvular cardiomyopathy    CHF (congestive heart failure)    Syncope: epidsode in , was found to have been an episode of V-Fib with sucsessfull AICD shock    AICD (automatic cardioverter/defibrillator) present    HTN (hypertension)    HLD (hyperlipidemia)    Systolic heart failure    MI (myocardial infarction): 2004    CAD (coronary artery disease):     H/O prior ablation treatment: afib    AICD (automatic cardioverter/defibrillator) present    S/P AVR    S/P hernia repair    S/P knee surgery    Stented coronary artery: LAD x 3 (2004)

## 2020-08-22 NOTE — DISCHARGE NOTE PROVIDER - NSDCFUSCHEDAPPT_GEN_ALL_CORE_FT
DUDLEY BERMUDEZ ; 08/24/2020 ; [facility] [address]  DUDLEY BERMUDEZ ; 08/25/2020 ; [facility] [address] [facility] DUDLEY Skelton ; 08/26/2020 ; [facility] [address] [facility] DUDLEY Skelton ; 09/28/2020 ; [facility] [address]  DUDLEY BERMUDEZ ; 09/28/2020 ; [facility] Card[facility] [address]

## 2020-08-22 NOTE — DISCHARGE NOTE PROVIDER - CARE PROVIDER_API CALL
Alejandro Paez  THORACIC AND CARDIAC SURGERY  [address] [place] [address]  Phone: [phone]  Fax: [phone]  Scheduled Appointment: 08/24/2020    Avel Guerrero  [id number]  [address] [place] [address]  Phone: [phone]  Fax: [phone]  Follow Up Time: 1 week

## 2020-08-22 NOTE — DISCHARGE NOTE PROVIDER - NSDCFUADDAPPT_GEN_ALL_CORE_FT
Your Care Navigator Nurse Practitioner will be in touch to see you in your home within a few days from discharge. The Follow Your Heart program can help ensure you understand your medications, discharge instructions and answer any questions you may have at that time. They are also a great source to address concerns during the day and may be reached at [phone].  Follow up appointment with Dr Philippe Morgan 8/25   Cardiac surgery office at [facility] entrance 3  first floor on left after entering [facility]  Office telephone

## 2020-08-22 NOTE — DISCHARGE NOTE PROVIDER - PROVIDER TOKENS
PROVIDER:[TOKEN:[[id number]],SCHEDULEDAPPT:[08/24/2020]],PROVIDER:[TOKEN:[[id number]],FOLLOWUP:[1 week]]

## 2020-08-23 ENCOUNTER — TRANSCRIPTION ENCOUNTER (OUTPATIENT)
Age: 70
End: 2020-08-23

## 2020-08-24 ENCOUNTER — NON-APPOINTMENT (OUTPATIENT)
Age: 70
End: 2020-08-24

## 2020-08-24 ENCOUNTER — APPOINTMENT (OUTPATIENT)
Dept: ELECTROPHYSIOLOGY | Facility: CLINIC | Age: 70
End: 2020-08-24
Payer: MEDICARE

## 2020-08-24 VITALS
SYSTOLIC BLOOD PRESSURE: 80 MMHG | BODY MASS INDEX: 27.9 KG/M2 | HEART RATE: 66 BPM | TEMPERATURE: 97.3 F | OXYGEN SATURATION: 99 % | WEIGHT: 206 LBS | HEIGHT: 72 IN | DIASTOLIC BLOOD PRESSURE: 60 MMHG

## 2020-08-24 PROCEDURE — 99214 OFFICE O/P EST MOD 30 MIN: CPT

## 2020-08-25 ENCOUNTER — APPOINTMENT (OUTPATIENT)
Dept: HEART FAILURE | Facility: CLINIC | Age: 70
End: 2020-08-25
Payer: MEDICARE

## 2020-08-25 ENCOUNTER — APPOINTMENT (OUTPATIENT)
Dept: CARDIOTHORACIC SURGERY | Facility: CLINIC | Age: 70
End: 2020-08-25
Payer: MEDICARE

## 2020-08-25 VITALS
TEMPERATURE: 98.7 F | OXYGEN SATURATION: 98 % | HEART RATE: 90 BPM | WEIGHT: 211 LBS | BODY MASS INDEX: 28.58 KG/M2 | HEIGHT: 72 IN | SYSTOLIC BLOOD PRESSURE: 88 MMHG | RESPIRATION RATE: 12 BRPM | DIASTOLIC BLOOD PRESSURE: 53 MMHG

## 2020-08-25 VITALS — HEART RATE: 54 BPM | DIASTOLIC BLOOD PRESSURE: 67 MMHG | SYSTOLIC BLOOD PRESSURE: 103 MMHG

## 2020-08-25 VITALS — DIASTOLIC BLOOD PRESSURE: 62 MMHG | HEART RATE: 55 BPM | SYSTOLIC BLOOD PRESSURE: 89 MMHG

## 2020-08-25 VITALS — WEIGHT: 210 LBS | HEIGHT: 72 IN | BODY MASS INDEX: 28.44 KG/M2

## 2020-08-25 VITALS — SYSTOLIC BLOOD PRESSURE: 80 MMHG | DIASTOLIC BLOOD PRESSURE: 60 MMHG

## 2020-08-25 VITALS — HEART RATE: 63 BPM | RESPIRATION RATE: 15 BRPM | OXYGEN SATURATION: 97 % | TEMPERATURE: 98.2 F

## 2020-08-25 PROCEDURE — 99214 OFFICE O/P EST MOD 30 MIN: CPT

## 2020-08-25 RX ORDER — HYDROCORTISONE 25 MG/G
2.5 OINTMENT TOPICAL
Qty: 28 | Refills: 0 | Status: DISCONTINUED | COMMUNITY
Start: 2020-06-15

## 2020-08-25 RX ORDER — PREGABALIN 50 MG/1
50 CAPSULE ORAL
Qty: 90 | Refills: 0 | Status: DISCONTINUED | COMMUNITY
Start: 2020-05-08

## 2020-08-25 RX ORDER — METFORMIN HYDROCHLORIDE 500 MG/1
500 TABLET, COATED ORAL DAILY
Refills: 0 | Status: COMPLETED | COMMUNITY
End: 2020-08-25

## 2020-08-25 RX ORDER — ACYCLOVIR 200 MG/1
200 CAPSULE ORAL
Qty: 80 | Refills: 0 | Status: DISCONTINUED | COMMUNITY
Start: 2020-06-04

## 2020-08-25 NOTE — ASSESSMENT
[FreeTextEntry1] : Briefly, 69 y/o M w/ h/o CAD s/p late-presenting MI 2005 s/p PCI LAD, ICM (EF 25%, LVEDD 5.4 cm), s/p single chamber ICD with lead revision in 2018, bioprosthetic AVR (2015) 2/2 AI, now s/p HECTOR  for severe bioprosthetic AS 8/20/20, aflutter ablation 7/17, persistent afib, s/p DCCV 8/12/20, who presents for f/u visit post discharge. He is currently orthostatic and hypotensive likely in the setting of hypovolemia. He is NYHA class II. \par \par 1. HFrEF \par - prev on coreg 25 mg bid in 2018; had been on toprol xl 1/2 twice/day; couldn't tolerate 25 mg twice/day so only on toprol xl 25 mg daily\par - also prev on Entresto 24/26 but switched to losartan 12.5 mg twice/day d/t orthostasis\par - currently hypovolemic and orthostatic, so he will hold his torsemide/spironolactone and I asked him to increase his fluid intake today and tomorrow. \par - he will continue Toprol XL 25 mg daily and resume losartan 12.5 mg BID only if his SBP is >90\par - he will repeat labs on Thursday and I will call him to see how his weight/BP is\par - per ICD interrogation during admission, he was  44%, which could also be contributing to his HF syndrome and can consider CRT upgrade, although appears to be improving post TAVR\par \par 2. Aflutter - currently in afib (per last ECG), rate controlled\par - continue eliquis 5 mg bid\par - off digoxin since his DCCV 8/2020, c/w amiodarone per Dr. Apple\par \par 3. 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 Paez and Dr. Waldrop\par \par 4. CAD - s/p MI\par - continue ASA 81 mg daily and lipitor 40 mg daily\par \par 5. RLL nodule\par - pending PET/CT per thoracic surgery, Dr. Waggoner\par - he will also follow-up with his pulmonologist, Dr. Guerrero in [place]\jacinto \jacinto RTC in 2 weeks with the HF NP and with Dr. Quiñones at his next availability.

## 2020-08-25 NOTE — HISTORY OF PRESENT ILLNESS
[FreeTextEntry1] : Briefly, 71 y/o M w/ h/o CAD s/p late-presenting MI 2005 s/p PCI LAD, ICM (EF 25%, LVEDD 5.4 cm), s/p single chamber ICD with lead revision in 2018, bioprosthetic AVR (2015) 2/2 AI, now s/p HECTOR for severe bioprosthetic AS 8/20/20, aflutter ablation 7/17, persistent afib, s/p DCCV 8/12/20, who presents for f/u post hospital discharge. He has also established care with Dr. Pressley since moving to [place].\par \par Patient reports over the last 6 months worsening MATHEWS and fatigue. Ended up hospitalized at [facility] in June for 2 days. Was given diuretics with improvement. Underwent angiogram with Dr. Nino which showed 10% stenosis at the site of prior stent. He also had a RHC at that time with results below. Was told EF was reduced to 15-20%. He was also incidentally found to have severe bioprosthetic AS on ANDREY for DCCV and was admitted to [facility] on 8/18 for a valve in valve TAVR, which he had done on 8/20. He had an FEDERICO on admission for which his diuretics were held and then resumed prior to discharge with torsemide 20 mg daily (PTA was alternating 10mg and 20mg QOD). Of note, incidentally found to have a new RLL nodule on cardiac CT for which he is planning to have an outpatient PET/CT per Dr. Waggoner (thoracic sx). He was discharged home on 8/22 with a discharge weight of 212 lbs. \par \par Since discharge he states he feels "great." He was able to walk about 1/2 a mile with his wife yesterday evening without SOB or fatigue. His BP at home has been ranging /50-60s, but today in clinic he is orthostatic and hypotensive. His weight has been stable at 210 lbs since discharge, but he endorses urinating frequently with the torsemide 20mg daily.\par \par He has occasional lightheadedness when he stands up, but denies any syncope or falls. He denies CP, palpitations, orthopnea, PND, cough, abdominal discomfort, and LE edema. His appetite has significantly increased and his bowel and bladder habits are unchanged. He has been limiting fluid and sodium in his diet and taking his medications as directed.

## 2020-08-25 NOTE — PHYSICAL EXAM
[General Appearance - Well Developed] : well developed [Normal Conjunctiva] : the conjunctiva exhibited no abnormalities [General Appearance - Well Nourished] : well nourished [Normal Appearance] : normal appearance [Normal Oral Mucosa] : normal oral mucosa [Respiration, Rhythm And Depth] : normal respiratory rhythm and effort [] : no respiratory distress [Auscultation Breath Sounds / Voice Sounds] : lungs were clear to auscultation bilaterally [Heart Rate And Rhythm] : heart rate and rhythm were normal [Heart Sounds] : normal S1 and S2 [Bowel Sounds] : normal bowel sounds [Abdomen Soft] : soft [Abdomen Tenderness] : non-tender [Abnormal Walk] : normal gait [Nail Clubbing] : no clubbing of the fingernails [Skin Color & Pigmentation] : normal skin color and pigmentation [Oriented To Time, Place, And Person] : oriented to person, place, and time [FreeTextEntry1] : no edema b/l

## 2020-08-25 NOTE — REASON FOR VISIT
[Follow-Up - From Hospitalization] : follow-up of a recent hospitalization for [Aortic Stenosis] : aortic stenosis [Discharge Date: ___] : Discharge Date: [unfilled] [Spouse] : spouse [FreeTextEntry2] : and follow-up of congestive heart failure

## 2020-08-26 ENCOUNTER — NON-APPOINTMENT (OUTPATIENT)
Age: 70
End: 2020-08-26

## 2020-08-26 ENCOUNTER — APPOINTMENT (OUTPATIENT)
Dept: HEART FAILURE | Facility: CLINIC | Age: 70
End: 2020-08-26

## 2020-08-30 NOTE — HISTORY OF PRESENT ILLNESS
[FreeTextEntry1] : Patient is a 70-year-old man with a prior history of myocardial infarction 2005 status post previous PCI to the LAD,  dilated cardiomyopathy ejection fraction 15 to 20%, s/p Medtronic single-chamber ICD implant 2005, status post appropriate shocks for VT VF 2015.  And prior atrial flutter ablation in 2017.  He underwent extraction of Medtronic Estevan lead October 2018.  Previous bioprosthetic aortic valve replacement with subsequent premature failure noted on ANDREY.  S/P  TAV in [place]. \par \par He has had increased shortness of breath and fatigue and his device interrogation had shown persistent atrial fibrillation.  He was treated with  digoxin.  In addition the patient has been on Coreg which was switched to Toprol and he was started also on Entresto.  \par \par Since the procedure, he feels MUCH better.  Has gained some weight back.  More energy.  MATHEWS has decreased significantly.  Denies chest pain dizziness, lightheadedness, syncope or presyncope.  \par \par EKG today shows AF with V pacing. \par \par Cardiac catheterization performed 6/4/2020 showed nonobstructive coronary arteries.\par Echocardiogram from 6/3/2020 showed severely reduced ejection fraction 15 to 20%, dilated LV diameter 6.5 cm, moderate to severe diastolic dysfunction \par \par Device: Medtronic ICD VVI low rate 40, VT detection slow 150, VT / bpm\par \par Device Interrogation: Device programmed VVIR 40 bpm; remaining battery longevity 9.1 years R wave measured 9.1 mV.  Capture threshold 0.5 V at 0.4 ms.  No sustained episodes of VT VF.  Episode of nonsustained VT.  Device shows that the patient has been in persistent AF since September 2019.  The OptiVol fluid index was above threshold between May- June 2020 and is now returned to baseline\par \par Recent Zio patch Monitor for 3 days: AF with VR , NSVT\par

## 2020-08-30 NOTE — DISCUSSION/SUMMARY
[FreeTextEntry1] : Patient is a 70-year-old man with a prior history of ischemic cardiomyopathy with severe left ventricular dysfunction, status post single-chamber ICD for primary prevention and had appropriate shocks for VT/VF in 2015 and subsequent ATP terminations as well, status post extraction of [place] lead in 2018 and reimplantation of a single-chamber device.  Recent echo shows ejection fraction 15 to 20%.  His heart failure management regimen has been optimized.  He had repeat cardiac catheterization June 2020 which did not show any obstructive lesion that required intervention.  Previous echo had showed moderately dilated left atrium.\par \par His main symptoms currently are shortness of breath and fatigue.  Improved significantly since his TAVR.  Still in AF on EKG today.  Remain on amiodarone temporarily.  Evaluate for worsening symtpoms that may be related to AF.  If continued improvement, will consider discontinuation.  Continue follow up with Philippe Rojas and Wendie. \par \par The patient was seen with the PA.  We discussed the history physical findings and plans.  Agree with the findings and recommendations.  He is feeling well status post TAVR.  He remains in atrial fibrillation and is on amiodarone 200 mg once daily.  He was cardioverted on 8/12/2020 and was in sinus rhythm.  We will see how he does over the next few months and decide whether to pursue another cardioversion or catheter ablation based on how he is feeling.  The patient will follow-up with pulmonary regarding amiodarone as well as the right lower lobe lung mass that was described during the TAVR work-up.\par Follow-up evaluation with electrophysiology in 3 months\par

## 2020-08-30 NOTE — PHYSICAL EXAM
[General Appearance - In No Acute Distress] : no acute distress [Normal Conjunctiva] : the conjunctiva exhibited no abnormalities [General Appearance - Well Developed] : well developed [Auscultation Breath Sounds / Voice Sounds] : lungs were clear to auscultation bilaterally [No Oral Pallor] : no oral pallor [Normal Jugular Venous V Waves Present] : normal jugular venous V waves present [Heart Sounds] : normal S1 and S2 [Arterial Pulses Normal] : the arterial pulses were normal [Cyanosis, Localized] : no localized cyanosis [Nail Clubbing] : no clubbing of the fingernails [Abdomen Tenderness] : non-tender [Impaired Insight] : insight and judgment were intact [] : no rash [FreeTextEntry1] : Normal prosthetic valve sound; trace bilateral edema

## 2020-08-30 NOTE — PHYSICAL EXAM
[Normal Conjunctiva] : the conjunctiva exhibited no abnormalities [General Appearance - Well Developed] : well developed [General Appearance - In No Acute Distress] : no acute distress [No Oral Pallor] : no oral pallor [Normal Jugular Venous V Waves Present] : normal jugular venous V waves present [Auscultation Breath Sounds / Voice Sounds] : lungs were clear to auscultation bilaterally [Arterial Pulses Normal] : the arterial pulses were normal [Heart Sounds] : normal S1 and S2 [Nail Clubbing] : no clubbing of the fingernails [Cyanosis, Localized] : no localized cyanosis [Abdomen Tenderness] : non-tender [Impaired Insight] : insight and judgment were intact [] : no rash [FreeTextEntry1] : Normal prosthetic valve sound; trace bilateral edema

## 2020-08-31 ENCOUNTER — APPOINTMENT (OUTPATIENT)
Dept: NUCLEAR MEDICINE | Facility: CLINIC | Age: 70
End: 2020-08-31

## 2020-09-09 ENCOUNTER — APPOINTMENT (OUTPATIENT)
Dept: HEART FAILURE | Facility: CLINIC | Age: 70
End: 2020-09-09
Payer: MEDICARE

## 2020-09-09 VITALS
SYSTOLIC BLOOD PRESSURE: 108 MMHG | BODY MASS INDEX: 29.8 KG/M2 | TEMPERATURE: 97.9 F | HEART RATE: 74 BPM | HEIGHT: 72 IN | WEIGHT: 220 LBS | RESPIRATION RATE: 12 BRPM | OXYGEN SATURATION: 96 % | DIASTOLIC BLOOD PRESSURE: 66 MMHG

## 2020-09-09 PROCEDURE — 99214 OFFICE O/P EST MOD 30 MIN: CPT

## 2020-09-09 RX ORDER — LOSARTAN POTASSIUM 25 MG/1
25 TABLET, FILM COATED ORAL TWICE DAILY
Qty: 30 | Refills: 2 | Status: DISCONTINUED | COMMUNITY
Start: 2020-08-01 | End: 2020-09-09

## 2020-09-14 NOTE — ASSESSMENT
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 He is currently NYHA class II and normotensive.\par \par 1. HFrEF \par - prev on coreg 25 mg bid in 2018; had been on toprol xl 1/2 twice/day; couldn't tolerate 25 mg twice/day so only on toprol xl 25 mg daily\par - currently tolerating losartan 12.5 mg twice/day with SBP generally 100-110s, so will try switching to Entresto 24-25 mg BID\par - he will take an additional 10 mg of torsemide today (took 10 mg this morning) and then continue only PRN for weight gain 3 lbs in a day or 5 lbs in a week (goal weight ~217 lbs).\par - continue spironolactone 25 mg daily\par - he will repeat BMP and pro-BNP in 1 week\par - per ICD interrogation during admission, he was  44%, which could also be contributing to his HF syndrome and can consider CRT upgrade, although appears to be improving post TAVR\par \par 2. Aflutter - currently in afib (per last ECG), rate controlled\par - continue eliquis 5 mg bid\par - off digoxin since his DCCV 8/2020, c/w amiodarone per Dr. Apple\par \par 3. 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 Paez and Dr. Waldrop\par \par 4. CAD - s/p MI\par - continue ASA 81 mg daily and lipitor 40 mg daily\par \par 5. RLL nodule\par - pending PET/CT on 9/16 per thoracic surgery, Dr. Waggoner\par - he will also follow-up with his pulmonologist, Dr. Guerrero in [facility] RTC in 2 weeks with the HF NP for further medication uptitration and with Dr. Quiñones in November (earliest availability).

## 2020-09-14 NOTE — HISTORY OF PRESENT ILLNESS
[FreeTextEntry1] : Briefly, 69 y/o M w/ h/o CAD s/p late-presenting MI 2005 s/p PCI LAD, ICM (EF 25%, LVEDD 5.4 cm), s/p single chamber ICD with lead revision in 2018, bioprosthetic AVR (2015) 2/2 AI, now s/p HECTOR for severe bioprosthetic AS 8/20/20, aflutter ablation 7/17, persistent afib, s/p DCCV 8/12/20, who presents for routine f/u. He has also established care with Dr. Pressley since moving to [place].\par \par Patient reports over the last 6 months worsening MATHEWS and fatigue. Ended up hospitalized at [facility] in June for 2 days. Was given diuretics with improvement. Underwent angiogram with Dr. Nino which showed 10% stenosis at the site of prior stent. He also had a RHC at that time with results below. Was told EF was reduced to 15-20%. He was also incidentally found to have severe bioprosthetic AS on ANDREY for DCCV and was admitted to [facility] on 8/18 for a valve in valve TAVR, which he had done on 8/20. He had an FEDERICO on admission for which his diuretics were held and then resumed prior to discharge with torsemide 20 mg daily (PTA was alternating 10mg and 20mg QOD). Of note, incidentally found to have a new RLL nodule on cardiac CT for which he is planning to have an outpatient PET/CT per Dr. Waggoner (thoracic sx). He was discharged home on 8/22 with a discharge weight of 212 lbs. \par \par Since he was seen in clinic 2 weeks ago he continues to feel "great." He is able to walk about 1/2 a mile without SOB or fatigue. He was noted to be hypotensive and orthostatic in clinic last week so his diuretics were held. Since this time his weight had increased from 210 lbs to 215-217 lbs, so resumed torsemide alternating with 10/20 mg QOD (today 220 lbs). His SBP generally ranges 100-110s at home. He also attributes some of his weight gain to increased appetite, but does endorse occasional dietary indiscretion. He denies CP, palpitations, syncope, LH/dizziness, orthopnea, PND, cough, abdominal discomfort, and LE edema. His appetite is normal and his bowel and bladder habits are unchanged.

## 2020-09-14 NOTE — PHYSICAL EXAM
[Normal Appearance] : normal appearance [General Appearance - Well Developed] : well developed [General Appearance - Well Nourished] : well nourished [Normal Conjunctiva] : the conjunctiva exhibited no abnormalities [Normal Oral Mucosa] : normal oral mucosa [Respiration, Rhythm And Depth] : normal respiratory rhythm and effort [] : no respiratory distress [Auscultation Breath Sounds / Voice Sounds] : lungs were clear to auscultation bilaterally [Heart Rate And Rhythm] : heart rate and rhythm were normal [Bowel Sounds] : normal bowel sounds [Heart Sounds] : normal S1 and S2 [Abdomen Soft] : soft [Abdomen Tenderness] : non-tender [Abnormal Walk] : normal gait [Nail Clubbing] : no clubbing of the fingernails [Skin Color & Pigmentation] : normal skin color and pigmentation [Oriented To Time, Place, And Person] : oriented to person, place, and time [FreeTextEntry1] : trace edema bilaterally (R>L)

## 2020-09-14 NOTE — REASON FOR VISIT
[Spouse] : spouse [Follow-Up - Clinic] : a clinic follow-up of [Heart Failure] : congestive heart failure

## 2020-09-16 ENCOUNTER — APPOINTMENT (OUTPATIENT)
Dept: NUCLEAR MEDICINE | Facility: CLINIC | Age: 70
End: 2020-09-16
Payer: MEDICARE

## 2020-09-16 PROCEDURE — A9552B: CUSTOM

## 2020-09-16 PROCEDURE — 78815 PET IMAGE W/CT SKULL-THIGH: CPT | Mod: PI

## 2020-09-21 ENCOUNTER — RX RENEWAL (OUTPATIENT)
Age: 70
End: 2020-09-21

## 2020-09-22 ENCOUNTER — APPOINTMENT (OUTPATIENT)
Dept: HEART FAILURE | Facility: CLINIC | Age: 70
End: 2020-09-22
Payer: MEDICARE

## 2020-09-22 VITALS
BODY MASS INDEX: 30.07 KG/M2 | WEIGHT: 222 LBS | OXYGEN SATURATION: 96 % | HEART RATE: 72 BPM | HEIGHT: 72 IN | RESPIRATION RATE: 12 BRPM | DIASTOLIC BLOOD PRESSURE: 53 MMHG | TEMPERATURE: 98.8 F | SYSTOLIC BLOOD PRESSURE: 94 MMHG

## 2020-09-22 PROCEDURE — 99214 OFFICE O/P EST MOD 30 MIN: CPT

## 2020-09-22 NOTE — PHYSICAL EXAM
[General Appearance - Well Developed] : well developed [Normal Appearance] : normal appearance [General Appearance - Well Nourished] : well nourished [Normal Conjunctiva] : the conjunctiva exhibited no abnormalities [Respiration, Rhythm And Depth] : normal respiratory rhythm and effort [Auscultation Breath Sounds / Voice Sounds] : lungs were clear to auscultation bilaterally [Heart Rate And Rhythm] : heart rate and rhythm were normal [Heart Sounds] : normal S1 and S2 [Bowel Sounds] : normal bowel sounds [Abdomen Soft] : soft [Abdomen Tenderness] : non-tender [Abnormal Walk] : normal gait [Nail Clubbing] : no clubbing of the fingernails [Skin Color & Pigmentation] : normal skin color and pigmentation [Oriented To Time, Place, And Person] : oriented to person, place, and time [Well Groomed] : well groomed [General Appearance - In No Acute Distress] : no acute distress [Edema] : no peripheral edema present [] : no hepato-splenomegaly [Cyanosis, Localized] : no localized cyanosis [Impaired Insight] : insight and judgment were intact [Affect] : the affect was normal [Mood] : the mood was normal [FreeTextEntry1] : BLE varicosities

## 2020-09-22 NOTE — HISTORY OF PRESENT ILLNESS
[FreeTextEntry1] : Mr. Encinas is a very pleasant 71 y/o M w/ h/o CAD s/p late-presenting MI 2005 s/p PCI LAD, ICM (EF 25%, LVEDD 5.4 cm), s/p single chamber ICD with lead revision in 2018, bioprosthetic AVR (2015) 2/2 AI, more recently s/p HECTOR for severe bioprosthetic AS (8/20/20), aflutter ablation 7/17 w/ persistent afib, s/p DCCV 8/12/20, who presents for routine follow-up for his cardiomyopathy. Of note, since moving to [place] has established care with Dr. Pressley. \par \par In June 2020, presented to [facility] for 6 months of worsening SOB and fatigue and was admitted for 2 days and given diuretics with improvement. Underwent angiogram with Dr. Nino which showed 10% stenosis at the site of prior stent. He also had a RHC at that time with results below. Was told EF was reduced to 15-20%. He was also incidentally found to have severe bioprosthetic AS on ANDREY for DCCV and was admitted to [facility] on 8/18 for a valve in valve TAVR, which he had done on 8/20. He had an FEDERICO on admission for which his diuretics were held and then resumed prior to discharge with torsemide 20 mg daily (PTA was alternating 10mg and 20mg QOD). Of note, incidentally found to have a new RLL nodule on cardiac CT for which he is planning to have an outpatient PET/CT per Dr. Waggoner (thoracic sx). He was discharged home on 8/22 with a discharge weight of 212 lbs. \par \par Currently, he tells me this is the best he's felt. He notes marked improvement of exertional tolerance and has been physical active with boating. He was able to walk to clinic today from entrance 3 without stopping. His appetite has been excellent and is consuming more food. Tolerated transition from Losartan to low dose Entresto well but continued with Torsemide, alternating 10 mg with 20 mg daily as he noted gradual weight gain. Weight now 219-220 lbs (today 219.5), compared to 215-217 lbs at his last visit. Review of home BPs generally 100-110/70s, with few readings with SBP 90s. He endorses occasional episodes of dizziness especially if he sits for prolonged periods. Currently without LH/dizziness and denies CP, palpitations, orthopnea, PND, cough, ABD distention and LE swelling. Additionally, he underwent PET scan for further evaluation of perviously visualized RLL nodule and plans to follow-up with Dr. Waggoner this week to review results. \par

## 2020-09-28 ENCOUNTER — APPOINTMENT (OUTPATIENT)
Dept: CARDIOLOGY | Facility: CLINIC | Age: 70
End: 2020-09-28
Payer: MEDICARE

## 2020-09-28 VITALS
HEIGHT: 72 IN | BODY MASS INDEX: 30.34 KG/M2 | OXYGEN SATURATION: 97 % | HEART RATE: 75 BPM | DIASTOLIC BLOOD PRESSURE: 60 MMHG | SYSTOLIC BLOOD PRESSURE: 98 MMHG | WEIGHT: 224 LBS

## 2020-09-28 PROCEDURE — 99215 OFFICE O/P EST HI 40 MIN: CPT

## 2020-09-28 PROCEDURE — 93279 PRGRMG DEV EVAL PM/LDLS PM: CPT

## 2020-09-28 NOTE — PROCEDURE
[No] : not [Atrial Fibrillation] : atrial fibrillation [ICD] : Implantable cardioverter-defibrillator [VVI] : VVI [Lead Imp:  ___ohms] : lead impedance was [unfilled] ohms [Sensing Amplitude ___mv] : sensing amplitude was [unfilled] mv [___V @] : [unfilled] V [___ ms] : [unfilled] ms [de-identified] : Medtronic [de-identified] : Mirtha AF MRI VR [id number] [de-identified] : [id number] [de-identified] : 10/30/18 [de-identified] : 39 [de-identified] : 10 years [de-identified] : Detection/therapies\par \par VF detection 182bpm Therapies ATP with charge, 35J x 6\par fast VT detection 214bpm Therapies bust (2), 35J x 5\par VT detection 150bpm Therapies bust (3), 25J, 35J x 4\par \par settings\par \par Ventricle\par sensing 0.30mv\par amplitude 2.0v (auto)\par duration 0.40ms (auto)\par \par  47.7%\par \par AF 0%\par \par Increase in OptiVol - recommend remote check in 1 month\par In office check in 3 months - same day as OV with SD\par \par Red flag symptoms which would warrant sooner emergent evaluation reviewed with the patient. \par Questions and concerns were addressed and answered. \par \par Sincerely,\par \par Patricia Gregory PA-C\par Patients history, testing and plan reviewed with supervising MD: Dr. Harvey Pressley

## 2020-09-28 NOTE — ADDENDUM
[FreeTextEntry1] : Please note the patient was reviewed with advanced practice provider\par I was physically present during the service of the patient\par I was directly involved in the management plan and recommendations of care provided to the patient. \par

## 2020-10-01 ENCOUNTER — NON-APPOINTMENT (OUTPATIENT)
Age: 70
End: 2020-10-01

## 2020-10-01 ENCOUNTER — OUTPATIENT (OUTPATIENT)
Dept: OUTPATIENT SERVICES | Facility: HOSPITAL | Age: 70
LOS: 1 days | End: 2020-10-01
Payer: MEDICARE

## 2020-10-01 ENCOUNTER — APPOINTMENT (OUTPATIENT)
Dept: CARDIOTHORACIC SURGERY | Facility: CLINIC | Age: 70
End: 2020-10-01
Payer: MEDICARE

## 2020-10-01 VITALS
BODY MASS INDEX: 29.93 KG/M2 | RESPIRATION RATE: 17 BRPM | OXYGEN SATURATION: 97 % | HEART RATE: 62 BPM | HEIGHT: 72 IN | DIASTOLIC BLOOD PRESSURE: 56 MMHG | WEIGHT: 221 LBS | SYSTOLIC BLOOD PRESSURE: 80 MMHG

## 2020-10-01 VITALS — DIASTOLIC BLOOD PRESSURE: 62 MMHG | SYSTOLIC BLOOD PRESSURE: 102 MMHG

## 2020-10-01 DIAGNOSIS — Z98.89 OTHER SPECIFIED POSTPROCEDURAL STATES: Chronic | ICD-10-CM

## 2020-10-01 DIAGNOSIS — I35.0 NONRHEUMATIC AORTIC (VALVE) STENOSIS: ICD-10-CM

## 2020-10-01 DIAGNOSIS — Z95.810 PRESENCE OF AUTOMATIC (IMPLANTABLE) CARDIAC DEFIBRILLATOR: Chronic | ICD-10-CM

## 2020-10-01 DIAGNOSIS — Z98.890 OTHER SPECIFIED POSTPROCEDURAL STATES: Chronic | ICD-10-CM

## 2020-10-01 DIAGNOSIS — Z95.5 PRESENCE OF CORONARY ANGIOPLASTY IMPLANT AND GRAFT: Chronic | ICD-10-CM

## 2020-10-01 DIAGNOSIS — Z95.4 PRESENCE OF OTHER HEART-VALVE REPLACEMENT: Chronic | ICD-10-CM

## 2020-10-01 PROCEDURE — 99215 OFFICE O/P EST HI 40 MIN: CPT

## 2020-10-01 PROCEDURE — 93000 ELECTROCARDIOGRAM COMPLETE: CPT

## 2020-10-01 PROCEDURE — C8929: CPT

## 2020-10-01 PROCEDURE — 93306 TTE W/DOPPLER COMPLETE: CPT | Mod: 26

## 2020-10-01 RX ORDER — SACUBITRIL AND VALSARTAN 24; 26 MG/1; MG/1
24-26 TABLET, FILM COATED ORAL TWICE DAILY
Qty: 60 | Refills: 5 | Status: DISCONTINUED | COMMUNITY
Start: 2020-09-09 | End: 2020-10-01

## 2020-10-01 NOTE — ASSESSMENT
[FreeTextEntry1] : Mr. Encinas is recovering well from from his TAV in [place]. He feels well, and is back to his usual activities. No medication changes were made today, but I did reconcile his current medication list. He should continue to increase his activity as tolerated. He will continue to follow up with the Heart Failure service and Dr. Pressley. We will see him again in one year, but I did explain we will see him sooner if needed. He is going to have bloodwork done in his PCP's office tomorrow,

## 2020-10-01 NOTE — HISTORY OF PRESENT ILLNESS
[FreeTextEntry1] : DUDLEY BERMUDEZ 70 year year old M, presenting today for his  30-day follow up after his TAVR procedure on 8/20/2020 with a 26 mm CoreValve Via Transfemoral approach. He had an uncomplicated course, and he was discharged on POD #2.\par \par Today he presents with his wife, stating that he feels great, and is back to how he was before his symptoms. He is back walking, and playing golf, as well as working on his boat. He denies any SOB or CP. He does havr trace pedal edema. He recently was c/o dizziness and Low BP, so Dr. Pressley took him off of Entresto, and placed him back on losartan.\par \par 5 meter Gait:   4.2/ 4.2/ 4.1        Seconds\par Current NYHA Class: I\par  [Diabetes Mellitus] : Diabetes Mellitus [Oral] : controlled with oral diabetes medication [Dyslipidemia] : Dyslipidemia [Hypertension] : Hypertension [No angina, No symptoms] : No symptoms of [Heart Failure within 2 Weeks] : Heart Failure in last 2 weeks [Class I] : Class I [A fib / A flutter] : A fib / A flutter

## 2020-10-01 NOTE — PHYSICAL EXAM
[Sclera] : the sclera and conjunctiva were normal [PERRL With Normal Accommodation] : pupils were equal in size, round, and reactive to light [Neck Appearance] : the appearance of the neck was normal [Neck Cervical Mass (___cm)] : no neck mass was observed [Jugular Venous Distention Increased] : there was no jugular-venous distention [Respiration, Rhythm And Depth] : normal respiratory rhythm and effort [Exaggerated Use Of Accessory Muscles For Inspiration] : no accessory muscle use [Auscultation Breath Sounds / Voice Sounds] : lungs were clear to auscultation bilaterally [Apical Impulse] : the apical impulse was normal [Heart Rate And Rhythm] : heart rate was normal and rhythm regular [Heart Sounds] : normal S1 and S2 [Murmurs] : no murmurs [Arterial Pulses Carotid] : carotid pulses were normal with no bruits [Abdominal Aorta] : the abdominal aorta was normal [Arterial Pulses Femoral] : femoral pulses were normal without bruits [Nonpitting Edema] : nonpitting edema present [___ +] : bilateral [unfilled]+ pitting edema to the ankles [Bowel Sounds] : normal bowel sounds [Abdomen Soft] : soft [Abdomen Tenderness] : non-tender [Abnormal Walk] : normal gait [Nail Clubbing] : no clubbing  or cyanosis of the fingernails [Musculoskeletal - Swelling] : no joint swelling seen [Skin Color & Pigmentation] : normal skin color and pigmentation [Skin Turgor] : normal skin turgor [] : no rash [Sensation] : the sensory exam was normal to light touch and pinprick [Motor Exam] : the motor exam was normal [No Focal Deficits] : no focal deficits [Oriented To Time, Place, And Person] : oriented to person, place, and time

## 2020-10-03 NOTE — REVIEW OF SYSTEMS
Dyspnea on exertion] : dyspnea during exertion [Negative] : Heme/Lymph [FreeTextEntry1] : dizziness

## 2020-10-03 NOTE — HISTORY OF PRESENT ILLNESS
[FreeTextEntry1] : DUDLEY BERMUDEZ  is a 70 year old  M\par w/ h/o CAD s/p late-presenting MI 2005 s/p PCI LAD, ICM,s/p single chamber ICD with lead revision in 2018, bioprosthetic AVR (2015I, more recently s/p HECTOR for severe bioprosthetic AS (8/20/20), aflutter ablation 7/17 w/ persistent afib, s/p DCCV 8/12/20, who presents for routine follow-up for his cardiomyopathy. \par \par In June 2020, presented to [facility] for 6 months of worsening SOB and fatigue and was admitted for 2 days and given diuretics \par Underwent angiogram with Dr. Nino which showed 10% stenosis at the site of prior stent. He also had a RHC at that time with results below. \par Found to have severe bioprosthetic AS on ANDREY for DCCV and was admitted to [facility] on 8/18 for a valve in valve TAVR, which he had done on 8/20. \par Of note, incidentally found to have a new RLL nodule on cardiac CT for which he is planning to have an outpatient PET/CT \par Adjustment of his diuretics due to weight gain there is an increased appetite \par \par He feels back to his baseline. He reports a dramatic recovery. \par He now takes alternating doses of diuretic. \par On my examination his blood pressure is still low. \par There is lightheadedness with change in position. \par There was prior intolerance to Entresto. \par \par Zio 6/7/20-6/11/20: Atrial fibrillation 100%; rates ranging from  bpm, average HR 73 bpm. 1 run of NSVT which lasted 14 beats in duration. PVC burden 1.2%. Rare ventricular couplets. Ventricular bigeminy and trigeminy present.\par \par Had cardiac cath 6/4/20. No stents were required. Hemodynamnics: Low cardiac output, Severe pulm htn. Bioprosthetic aortic valve was observed in the aortic position. \par \par Echo: \par TTE 8/21/20 - LVEDD 5.4 cm, EF 25%, nl RV size with mildly reduced function, mild-mod MR, well seated Evolut Pro+ THV-in-valve with nl function (peak 11, mean 6), mild TR, est RAP 15 mmHg, est RVSP 71 mmHg.\par \par Cardiac Cath: \par [facility] 6/4/20 - Ao 126/77/92, RA 8, RV 71/10, PA 66/24/40, PCW 20, PaSat 61%, AoSat 98%, Jan CO/CI 3.85/1.7, PVR 5.19 Reyna, SVR 1744 dsc\par [facility] 6/4/20 - LM mild CAD, mLAD 10% stenosis at site of prior stent, LCx/RCA mod CAD\par  \par Blood work September 2020 creatinine 1.4 BNP 1050 potassium 4.4 \par

## 2020-10-03 NOTE — ASSESSMENT
[FreeTextEntry1] : Coronary artery disease, prior MI, PCI\par Ischemic cardiomyopathy \par Implantable defibrillator h/o NSVT\par s/p valve in valve TAVR\par Right lower lobe nodule \par Atrial fibrillation status post cardioversion \par Prior CTA Afl\par \par Referred to cardiac rehab. \par Change to angiotensin receptor blocker.  Off entresto\par Requested upcoming valve center echocardiogram. \par Continue short-term amiodarone. Follow-up with electrophysiology regarding long-term rhythm control strategy. ? CRT  Off digoxin\par Continue aspirin and high intensity statin therapy. \par Continue anticoagulation. \par Continue beta-blocker. \par Continue diuretics.  Aldactone, prn torsemide, monitor daily wt\par SBE ppx prn\par \par Follow up thoracics, pulm\par Monitor heart rate blood pressure and weights at cardiac rehab \par Discussed red flag symptoms, which would warrant sooner or emergent medical evaluation.\par Any questions and concerns were addressed and resolved. \par

## 2020-10-06 ENCOUNTER — APPOINTMENT (OUTPATIENT)
Dept: CARDIOLOGY | Facility: CLINIC | Age: 70
End: 2020-10-06
Payer: MEDICARE

## 2020-10-06 VITALS
HEIGHT: 72 IN | SYSTOLIC BLOOD PRESSURE: 114 MMHG | BODY MASS INDEX: 30.2 KG/M2 | HEART RATE: 63 BPM | WEIGHT: 223 LBS | DIASTOLIC BLOOD PRESSURE: 75 MMHG | OXYGEN SATURATION: 99 %

## 2020-10-06 PROCEDURE — 99214 OFFICE O/P EST MOD 30 MIN: CPT

## 2020-10-07 ENCOUNTER — APPOINTMENT (OUTPATIENT)
Dept: THORACIC SURGERY | Facility: CLINIC | Age: 70
End: 2020-10-07
Payer: MEDICARE

## 2020-10-07 DIAGNOSIS — R91.1 SOLITARY PULMONARY NODULE: ICD-10-CM

## 2020-10-07 PROCEDURE — 99443: CPT

## 2020-10-09 NOTE — HISTORY OF PRESENT ILLNESS
[FreeTextEntry1] : 70 year old male with hx of HTN, HLD, DM, CAD s/p MI (04), PCI/LAD stent x3 ('04) at [place], \par cardiomyopathy (NYHA II), AICD 2018, AFIB (on Eliquis, last dose this morning), and s/p TAVR 8/2020 found to have lung nodule on CT Heart scan\par \par CT Heart 8/18/2020: There is a new right lower lobe 2 cm nodule when compared with December 21, 2019, which is indeterminate.  \par \par PETCT scan 9/16/2020: Non-FDG avid groundglass opacity, 1.8 x 1.4 cm (image 126) in the location of previously identified 2 cm right lower lobe nodule seen on CT chest August 18, 2020. Centrilobular emphysema and peripheral reticulations in the lower lobes bilaterally, unchanged.

## 2020-10-09 NOTE — ASSESSMENT
[FreeTextEntry1] : 70 year old male with hx of HTN, HLD, DM, CAD s/p MI (04), PCI/LAD stent x3 ('04) at [place], cardiomyopathy (NYHA II), AICD 2018, AFIB (on Eliquis, last dose this morning), and s/p TAVR 8/2020 found to have lung nodule on CT Heart scan\par \par CT Heart 8/18/2020: There is a new right lower lobe 2 cm nodule when compared with December 21, 2019, which is indeterminate.  \par \par PETCT scan 9/16/2020: Non-FDG avid groundglass opacity, 1.8 x 1.4 cm (image 126) in the location of previously identified 2 cm right lower lobe nodule seen on CT chest August 18, 2020. Centrilobular emphysema and peripheral reticulations in the lower lobes bilaterally, unchanged. \par \par I have reviewed the medical records and images with the patient and made the following recommendations. The nodule appears inflammatory in nature. No surgical intervention is warranted at  this time. Follow up with Dr. Guerrero, Pulmonologist. No further visits are required at this time, however, the patient may return to the office as needed if any issues arise. \par \par Written by Cony Sanches NP, acting as a scribe for Ruperto Leavitt MD.\par The documentation recorded by the scribe accurately reflects the service I personally performed and the decisions made by me. RUPERTO LEAVITT MD\par  \par

## 2020-10-12 NOTE — HISTORY OF PRESENT ILLNESS
[FreeTextEntry1] : Mr. 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 Followed by Dr. Pressley. \jacinto peng In June 2020, presented to [facility] for 6 months of worsening SOB and fatigue and was admitted for 2 days and given diuretics with improvement. Underwent angiogram with Dr. Nino which showed 10% stenosis at the site of prior stent. He also had a RHC at that time with results below. Was told EF was reduced to 15-20%. He was also incidentally found to have severe bioprosthetic AS on ANDREY for DCCV and was admitted to [facility] on 8/18 for a valve in valve TAVR, which he had done on 8/20. He had an FEDERICO on admission for which his diuretics were held and then resumed prior to discharge with torsemide 20 mg daily. Of note, incidentally found to have a new RLL nodule on cardiac CT for which he is planning to have an outpatient PET/CT per Dr. Waggoner (thoracic sx). He was discharged home on 8/22 with a discharge weight of 212 lbs. \jacinto peng Has been followed by NPs recently. He notes marked improvement of exertional tolerance and has been physically active with boating. His appetite has been excellent and is consuming more food. Weight has increased gradually from 212 (on discharge) to 223 which he thinks is due to caloric intake. Was put on Entresto but reports worsening dizziness/lightheadedness so was switched to losartan with improvement. Review of home BPs generally 100-110/70s, with few readings with SBP 90s. Currently without LH/dizziness and denies CP, palpitations, orthopnea, PND, cough, ABD distention and LE swelling. \par \jacinto Has been taking torsemide 20 mg per day alternating with 10 mg daily.

## 2020-10-12 NOTE — PHYSICAL EXAM
[General Appearance - Well Developed] : well developed [Normal Appearance] : normal appearance [Well Groomed] : well groomed [General Appearance - Well Nourished] : well nourished [General Appearance - In No Acute Distress] : no acute distress [Normal Conjunctiva] : the conjunctiva exhibited no abnormalities [Respiration, Rhythm And Depth] : normal respiratory rhythm and effort [Auscultation Breath Sounds / Voice Sounds] : lungs were clear to auscultation bilaterally [Heart Rate And Rhythm] : heart rate and rhythm were normal [Heart Sounds] : normal S1 and S2 [Edema] : no peripheral edema present [Bowel Sounds] : normal bowel sounds [Abdomen Soft] : soft [Abdomen Tenderness] : non-tender [] : no hepato-splenomegaly [Abnormal Walk] : normal gait [Nail Clubbing] : no clubbing of the fingernails [Cyanosis, Localized] : no localized cyanosis [Skin Color & Pigmentation] : normal skin color and pigmentation [Oriented To Time, Place, And Person] : oriented to person, place, and time [Impaired Insight] : insight and judgment were intact [Affect] : the affect was normal [Mood] : the mood was normal [FreeTextEntry1] : BLE varicosities

## 2020-10-12 NOTE — ASSESSMENT
[FreeTextEntry1] : 69 y/o M w/ h/o CAD s/p late-presenting MI 2005 s/p PCI LAD, ICM (EF 15-20%, LVEDD 6 cm) s/p single chamber ICD with lead revision in 2018, bioprosthetic AVR (2015) 2/2 AI more recently s/p 26 mm EVOLUT HECTOR for severe bioprosthetic AS (8/20/20), aflutter ablation 7/17 w/ persistent afib, s/p DCCV 8/12/20, who presents for routine follow-up for his cardiomyopathy. Followed by Dr. Pressley. Currently appears fairly compensated and has NYHA class II symptoms. \par \par 1. ICM, HFrEF \par - reduce torsemide to 10 mg daily with 20 mg as needed for weight gain\par - does not tolerate Entresto as has significant pre-syncopal episodes (were occurring even prior to Entresto many years ago); on losartan 12.5 mg twice/day; increase to 25 mg twice/day; repeat labs in 2 weeks \par - Continue Sprinolactone 25 mg QD\par - Continue Toprol XL 25 mg QD; has been RV pacing\par - per ICD interrogation during admission, he was  44%, which could also be contributing to his HF syndrome and can consider CRT upgrade, although appears to be improving post TAVR\par \par 2. Aflutter/afib\par - currently in afib (per last ECG), rate controlled; RV pacing\par - continue eliquis 5 mg bid\par - off digoxin since his DCCV 8/2020, c/w amiodarone per Dr. Apple\par - if has worsening symptoms, may need BiV upgrade\par \par 3. 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 Paez and Dr. Waldrop\par \par 4. CAD - s/p MI\par - continue ASA 81 mg daily and lipitor 40 mg daily\par \par 5. RLL nodule\par - underwent PET/CT on 9/16 per thoracic surgery, Dr. Waggoner; awaiting to review results this week\par - he will also follow-up with his pulmonologist, Dr. Guerrero in [place]\par \par RTC in 3 weeks with the HF NP for further medication uptitration

## 2020-10-13 ENCOUNTER — APPOINTMENT (OUTPATIENT)
Dept: HEART FAILURE | Facility: CLINIC | Age: 70
End: 2020-10-13

## 2020-10-19 ENCOUNTER — APPOINTMENT (OUTPATIENT)
Dept: ELECTROPHYSIOLOGY | Facility: CLINIC | Age: 70
End: 2020-10-19
Payer: MEDICARE

## 2020-10-19 VITALS
BODY MASS INDEX: 30.88 KG/M2 | DIASTOLIC BLOOD PRESSURE: 50 MMHG | HEIGHT: 72 IN | TEMPERATURE: 97.3 F | SYSTOLIC BLOOD PRESSURE: 80 MMHG | HEART RATE: 73 BPM | WEIGHT: 228 LBS | OXYGEN SATURATION: 98 %

## 2020-10-19 PROCEDURE — 99213 OFFICE O/P EST LOW 20 MIN: CPT

## 2020-10-19 PROCEDURE — 99072 ADDL SUPL MATRL&STAF TM PHE: CPT

## 2020-10-26 ENCOUNTER — APPOINTMENT (OUTPATIENT)
Dept: HEART FAILURE | Facility: CLINIC | Age: 70
End: 2020-10-26
Payer: MEDICARE

## 2020-10-26 VITALS
HEIGHT: 72 IN | RESPIRATION RATE: 12 BRPM | OXYGEN SATURATION: 97 % | WEIGHT: 222 LBS | SYSTOLIC BLOOD PRESSURE: 95 MMHG | HEART RATE: 66 BPM | BODY MASS INDEX: 30.07 KG/M2 | TEMPERATURE: 98.2 F | DIASTOLIC BLOOD PRESSURE: 62 MMHG

## 2020-10-26 PROCEDURE — 99072 ADDL SUPL MATRL&STAF TM PHE: CPT

## 2020-10-26 PROCEDURE — 99214 OFFICE O/P EST MOD 30 MIN: CPT

## 2020-10-26 NOTE — ASSESSMENT
[FreeTextEntry1] : 71 y/o M w/ h/o CAD s/p late-presenting MI 2005 s/p PCI LAD, ICM (EF 15-20%, LVEDD 6 cm) s/p single chamber ICD with lead revision in 2018, bioprosthetic AVR (2015) 2/2 AI more recently s/p 26 mm EVOLUT HECTOR for severe bioprosthetic AS (8/20/20), aflutter ablation 7/17 w/ persistent afib, s/p DCCV 8/12/20, who presents for routine follow-up for his cardiomyopathy. Followed by Dr. Pressley. Currently appears fairly compensated and has NYHA class II symptoms. \par \par 1. ICM, HFrEF \par - will resume torsemide 10 daily alternating with 20 mg daily \par - does not tolerate Entresto as has significant pre-syncopal episodes (were occurring even prior to Entresto many years ago); on losartan 12.5 mg twice/day; unable to increase\par - Continue Sprinolactone 25 mg QD\par - Continue Toprol XL 12.5 mg twice/day; has been RV pacing\par - per ICD interrogation during admission, he was  44%, which could also be contributing to his HF syndrome; would be in favor of upgrading to CRT to optimize him further and possibly prevent deterioration from chronic RV pacing\par - discussed if things were to worsen in future of possible LVAD as an option but no current indication for this\par \par 2. Aflutter/afib\par - currently in afib (per last ECG), rate controlled; RV pacing\par - continue eliquis 5 mg bid\par - off digoxin since his DCCV 8/2020, c/w amiodarone per Dr. Apple \par - if has worsening symptoms, may need BiV upgrade\par \par 3. 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 Paez and Dr. Waldrop\par \par 4. CAD - s/p MI\par - continue ASA 81 mg daily and lipitor 40 mg daily\par \par 5. RLL nodule 1.8x1.4 cm opacity \par - underwent PET/CT on 9/16 whichh was non-FDG avid and reviewed with Dr. Waggoner which felt it as inflammatory and no further intervention was warranted\par - he will also follow-up with his pulmonologist, Dr. Guerrero in [place]\[facility] \par RTC in 3 months

## 2020-10-26 NOTE — HISTORY OF PRESENT ILLNESS
[FreeTextEntry1] : Mr. Encinas is a very pleasant 71 y/o M w/ h/o CAD s/p late-presenting MI 2005 s/p PCI LAD, ICM (EF 15-20%, LVEDD 6 cm) s/p single chamber ICD with lead revision in 2018, bioprosthetic AVR (2015) 2/2 AI more recently s/p 26 mm EVOLUT HECTOR for severe bioprosthetic AS (8/20/20), aflutter ablation 7/17 w/ persistent afib, s/p DCCV 8/12/20, who presents for routine follow-up for his cardiomyopathy. Followed by Dr. Pressley. \par \par Since last visit has been feeling well. Unable to tolerate increase in losartan to 25 mg twice/day so reduced back to losartan 12.5 mg twice/day. Also had tried reducing torsemide to 10 mg daily but had fluid retention which improved with increase in diuretics. Weight has been 222-224 pounds. He notes marked improvement of exertional tolerance and has been physically active with boating. His appetite has been excellent and is consuming more food. Currently without LH/dizziness and denies CP, palpitations, orthopnea, PND, cough, ABD distention and LE swelling. \par \jacinto Had seen Dr. Apple for possible consideration of BiV device since he is predominantly RV pacing but concerns are of risk of infection with a new valve so being deferred for now.

## 2020-10-29 ENCOUNTER — APPOINTMENT (OUTPATIENT)
Dept: CARDIOLOGY | Facility: CLINIC | Age: 70
End: 2020-10-29
Payer: MEDICARE

## 2020-10-29 PROCEDURE — 93295 DEV INTERROG REMOTE 1/2/MLT: CPT

## 2020-10-29 NOTE — PROCEDURE
[No] : not [Atrial Fibrillation] : atrial fibrillation [ICD] : Implantable cardioverter-defibrillator [VVI] : VVI [Lead Imp:  ___ohms] : lead impedance was [unfilled] ohms [Sensing Amplitude ___mv] : sensing amplitude was [unfilled] mv [___V @] : [unfilled] V [___ ms] : [unfilled] ms [de-identified] : Medtronic [de-identified] : Mirtha AF MRI VR [id number] [de-identified] : [id number] [de-identified] : 10/30/18 [de-identified] : 40 [de-identified] : 10 years [de-identified] : Detection/therapies\par \par VF detection 182bpm Therapies ATP with charge, 35J x 6\par fast VT detection 214bpm Therapies bust (2), 35J x 5\par VT detection 150bpm Therapies bust (3), 25J, 35J x 4\par \par settings\par \par Ventricle\par sensing 0.30mv\par amplitude 2.0v (auto)\par duration 0.40ms (auto)\par \par  35.9%\par \par AF 0%\par \par OptiVol returned to normal limits.\par \par Continue to monitor AICD\par \par Sincerely,\par \par Patricia Gregory PA-C\par supervising MD: Dr. Patrick Valentino

## 2020-11-02 NOTE — PHYSICAL EXAM
[General Appearance - Well Developed] : well developed [General Appearance - In No Acute Distress] : no acute distress [Normal Conjunctiva] : the conjunctiva exhibited no abnormalities [No Oral Pallor] : no oral pallor [Normal Jugular Venous V Waves Present] : normal jugular venous V waves present [Auscultation Breath Sounds / Voice Sounds] : lungs were clear to auscultation bilaterally [Heart Sounds] : normal S1 and S2 [Arterial Pulses Normal] : the arterial pulses were normal [Abdomen Tenderness] : non-tender [Nail Clubbing] : no clubbing of the fingernails [Cyanosis, Localized] : no localized cyanosis [] : no rash [Impaired Insight] : insight and judgment were intact [FreeTextEntry1] : Normal prosthetic valve sound; trace bilateral edema

## 2020-11-02 NOTE — REVIEW OF SYSTEMS
[Feeling Fatigued] : feeling fatigued [Shortness Of Breath] : shortness of breath [Dyspnea on exertion] : dyspnea during exertion [Chest Pain] : no chest pain [Palpitations] : no palpitations [Abdominal Pain] : no abdominal pain [Dizziness] : no dizziness [Easy Bleeding] : no tendency for easy bleeding [Easy Bruising] : no tendency for easy bruising

## 2020-11-02 NOTE — DISCUSSION/SUMMARY
[FreeTextEntry1] : Patient is a 70-year-old man with a prior history of ischemic cardiomyopathy with severe left ventricular dysfunction, status post single-chamber ICD for primary prevention and had appropriate shocks for VT/VF in 2015 and subsequent ATP terminations as well, status post extraction of [place] lead in 2018 and reimplantation of a single-chamber device.  He is status post HECTOR procedure and is feeling well.  His paced QRS is left bundle with apical pacing from the RV.  Would consider upgrade to biventricular device.  I discussed this with the patient and and he is considering the option.

## 2020-11-02 NOTE — HISTORY OF PRESENT ILLNESS
[FreeTextEntry1] : Patient seen in follow-up status post recent TAVR of prosthetic aortic valve.  He has been feeling much better since the procedure.  He is also status post cardioversion for A. fib and is on amiodarone.    Patient is a 70-year-old man with a prior history of myocardial infarction 2005 status post previous PCI to the LAD,  dilated cardiomyopathy ejection fraction 15 to 20%, s/p Medtronic single-chamber ICD implant 2005, status post appropriate shocks for VT VF 2015.  And prior atrial flutter ablation in 2017.  He underwent extraction of Medtronic [place] lead October 2018.  Previous bioprosthetic aortic valve replacement with subsequent premature failure noted on ANDREY.  S/P  TAV in [place]. \par \par Cardiac catheterization performed 6/4/2020 showed nonobstructive coronary arteries.\par Echocardiogram from 6/3/2020 showed severely reduced ejection fraction 15 to 20%, dilated LV diameter 6.5 cm, moderate to severe diastolic dysfunction \par \par Device: Medtronic ICD VVI low rate 40, VT detection slow 150, VT / bpm\par \par Device Interrogation: Device programmed VVIR 40 bpm; remaining battery longevity 9.1 years R wave measured 9.1 mV.  Capture threshold 0.5 V at 0.4 ms.  No sustained episodes of VT VF.  Episode of nonsustained VT.  Device shows that the patient has been in persistent AF since September 2019.  The OptiVol fluid index was above threshold between May- June 2020 and is now returned to baseline\par \par \par

## 2020-11-10 ENCOUNTER — APPOINTMENT (OUTPATIENT)
Dept: CARDIOLOGY | Facility: CLINIC | Age: 70
End: 2020-11-10

## 2020-12-07 ENCOUNTER — APPOINTMENT (OUTPATIENT)
Dept: CARDIOLOGY | Facility: CLINIC | Age: 70
End: 2020-12-07

## 2020-12-08 ENCOUNTER — OUTPATIENT (OUTPATIENT)
Dept: OUTPATIENT SERVICES | Facility: HOSPITAL | Age: 70
LOS: 1 days | End: 2020-12-08

## 2020-12-08 DIAGNOSIS — Z98.890 OTHER SPECIFIED POSTPROCEDURAL STATES: Chronic | ICD-10-CM

## 2020-12-08 DIAGNOSIS — Z95.810 PRESENCE OF AUTOMATIC (IMPLANTABLE) CARDIAC DEFIBRILLATOR: Chronic | ICD-10-CM

## 2020-12-08 DIAGNOSIS — Z95.4 PRESENCE OF OTHER HEART-VALVE REPLACEMENT: Chronic | ICD-10-CM

## 2020-12-08 DIAGNOSIS — Z95.5 PRESENCE OF CORONARY ANGIOPLASTY IMPLANT AND GRAFT: Chronic | ICD-10-CM

## 2020-12-08 DIAGNOSIS — Z98.89 OTHER SPECIFIED POSTPROCEDURAL STATES: Chronic | ICD-10-CM

## 2020-12-10 ENCOUNTER — APPOINTMENT (OUTPATIENT)
Dept: CT IMAGING | Facility: CLINIC | Age: 70
End: 2020-12-10
Payer: MEDICARE

## 2020-12-10 PROCEDURE — 71250 CT THORAX DX C-: CPT

## 2020-12-14 ENCOUNTER — OUTPATIENT (OUTPATIENT)
Dept: OUTPATIENT SERVICES | Facility: HOSPITAL | Age: 70
LOS: 1 days | End: 2020-12-14

## 2020-12-14 DIAGNOSIS — Z98.89 OTHER SPECIFIED POSTPROCEDURAL STATES: Chronic | ICD-10-CM

## 2020-12-14 DIAGNOSIS — Z95.810 PRESENCE OF AUTOMATIC (IMPLANTABLE) CARDIAC DEFIBRILLATOR: Chronic | ICD-10-CM

## 2020-12-14 DIAGNOSIS — Z98.890 OTHER SPECIFIED POSTPROCEDURAL STATES: Chronic | ICD-10-CM

## 2020-12-14 DIAGNOSIS — Z95.4 PRESENCE OF OTHER HEART-VALVE REPLACEMENT: Chronic | ICD-10-CM

## 2020-12-14 DIAGNOSIS — Z95.5 PRESENCE OF CORONARY ANGIOPLASTY IMPLANT AND GRAFT: Chronic | ICD-10-CM

## 2020-12-15 ENCOUNTER — APPOINTMENT (OUTPATIENT)
Dept: CARDIOLOGY | Facility: CLINIC | Age: 70
End: 2020-12-15
Payer: MEDICARE

## 2020-12-15 PROCEDURE — 93282 PRGRMG EVAL IMPLANTABLE DFB: CPT

## 2020-12-15 PROCEDURE — 99072 ADDL SUPL MATRL&STAF TM PHE: CPT

## 2020-12-15 NOTE — PROCEDURE
[No] : not [Atrial Fibrillation] : atrial fibrillation [ICD] : Implantable cardioverter-defibrillator [VVI] : VVI [Voltage: ___ volts] : Voltage was [unfilled] volts [Charge Time: ___ sec] : charge time was [unfilled] seconds [Threshold Testing Performed] : Threshold testing was performed [Lead Imp:  ___ohms] : lead impedance was [unfilled] ohms [Sensing Amplitude ___mv] : sensing amplitude was [unfilled] mv [___V @] : [unfilled] V [___ ms] : [unfilled] ms [de-identified] : Medtronic [de-identified] : Mirtha AF MRI VR [id number] [de-identified] : [id number] [de-identified] : 10/30/18 [de-identified] : 39 [de-identified] : 9.8 years [de-identified] : Detection/therapies\par \par VF detection 182bpm Therapies ATP with charge, 35J x 6\par fast VT detection 214bpm Therapies bust (2), 35J x 5\par VT detection 150bpm Therapies bust (3), 25J, 35J x 4\par \par settings\par \par Ventricle\par sensing 0.30mv\par amplitude 2.0v (auto)\par duration 0.40ms (auto)\par \par  59.7%\par \par AF 0%\par \par 0 Episodes. \par \par Recheck device in office 3 months.  OV same day.

## 2021-03-19 ENCOUNTER — APPOINTMENT (OUTPATIENT)
Dept: CARDIOLOGY | Facility: CLINIC | Age: 71
End: 2021-03-19
Payer: MEDICARE

## 2021-03-19 VITALS
WEIGHT: 236 LBS | HEIGHT: 72 IN | SYSTOLIC BLOOD PRESSURE: 110 MMHG | HEART RATE: 82 BPM | TEMPERATURE: 96.8 F | OXYGEN SATURATION: 98 % | DIASTOLIC BLOOD PRESSURE: 60 MMHG | BODY MASS INDEX: 31.97 KG/M2

## 2021-03-19 PROCEDURE — 93282 PRGRMG EVAL IMPLANTABLE DFB: CPT

## 2021-03-19 PROCEDURE — 99215 OFFICE O/P EST HI 40 MIN: CPT

## 2021-03-19 PROCEDURE — 99072 ADDL SUPL MATRL&STAF TM PHE: CPT

## 2021-03-19 NOTE — PROCEDURE
[ICD] : Implantable cardioverter-defibrillator [Lead Imp:  ___ohms] : lead impedance was [unfilled] ohms [Sensing Amplitude ___mv] : sensing amplitude was [unfilled] mv [___V @] : [unfilled] V [___ ms] : [unfilled] ms [de-identified] : Medtronic [de-identified] : Mirtha AF MRI VR [id number] [de-identified] : [id number] [de-identified] : 10/30/18 [de-identified] : [id number] [de-identified] : Battery longevity 5.1 years [de-identified] :  68.9%\par \par AF 0%\par \par No events.\par \par Detection/therapies\par \par VF detection 182bpm Therapies ATP with charge, 35J x 6\par fast VT detection 214bpm Therapies burst (2), 35J x 5\par VT detection 150bpm Therapies bust (3), 25J, 35J x 4\par \par settings\par \par Ventricle\par sensing 0.30mv\par amplitude 2.0v (auto)\par duration 0.40ms (auto)\par \par F/U: DRC in 3 months and DVC in 6 months\par Discussed red flag symptoms, which would warrant sooner or emergent medical evaluation.\par Any questions and concerns were addressed and resolved.\par \par Sincerely,\par Gale Martin FNP-BC\par Patient's history, testing, and plan was reviewed with supervising physician, Dr. Harvey Pressley

## 2021-03-20 NOTE — ASSESSMENT
[FreeTextEntry1] : \par Coronary artery disease, prior MI, PCI\par Ischemic cardiomyopathy \par CSHF PH\par Implantable defibrillator h/o NSVT\par s/p valve in valve TAVR 8/20\par Right lower lobe nodule \par Atrial fibrillation status post cardioversion \par Prior CTA Afl\par CRI last 1.9\par \par Follow up echo\par Continue cardiac rehab. \par \par Continue angiotensin receptor blocker\par Continue beta-blocker. \par Continue aldosterone antagonist\par Continue loop diuretic as prescribed.   No additional doses\par May re challenge with entresto if stable BP and BMP\par Consider addition of SGLT2 inhibitor \par \par Continue short-term amiodarone.  \par Follow-up with electrophysiology regarding long-term rhythm control strategy.  Prefer not to use long term amio \par if significant ventricular pacing can consider upgrade to BiV ? CRT  \par Off digoxin\par \par Continue aspirin \par Continue high intensity statin therapy.  cholesterol well controlled \par Continue anticoagulation. \par \par Monitor daily wt.  weight loss for long-term cardiovascular health\par SBE ppx prn\par Monitor heart rate blood pressure and weights at cardiac rehab \par \par Follow up re HF re advanced rx defer evaluation for LVAD at this time\par \par Discussed red flag symptoms, which would warrant sooner or emergent medical evaluation.\par Any questions and concerns were addressed and resolved. \par

## 2021-03-20 NOTE — REVIEW OF SYSTEMS
[Dyspnea on exertion] : dyspnea during exertion [Negative] : Heme/Lymph [Recent Weight Gain (___ Lbs)] : recent [unfilled] ~Ulb weight gain [see HPI] : see HPI [FreeTextEntry1] : dizziness

## 2021-03-20 NOTE — HISTORY OF PRESENT ILLNESS
[FreeTextEntry1] : DUDLEY BERMUDEZ  is a 70 year old  M\par \par w/ h/o CAD s/p late-presenting MI 2005 s/p PCI LAD, ICM, s/p single chamber ICD with lead revision in 2018, bioprosthetic AVR (2015, s/p HECTOR for severe bioprosthetic AS (8/20/20), aflutter ablation 7/17 w/ persistent afib, s/p DCCV 8/12/20\par \par In June 2020, presented to [facility] for 6 months of worsening SOB and fatigue and was admitted for 2 days and given diuretics \par Underwent angiogram with Dr. Nino which showed 10% stenosis at the site of prior stent. He also had a RHC at that time with results below. \par Found to have severe bioprosthetic AS on ANDREY for DCCV and was admitted to [facility] on 8/18 for a valve in valve TAVR, which he had done on 8/20. \par Of note, incidentally found to have a new RLL nodule on cardiac CT, had thoracic f/u and PET/CT \par \par He feels back to his baseline. \par He reports a dramatic recovery. \par Gained weight d/t lifestyle, calories\par Loves cardiac rehab\par Received his vaccines \par Recently had abnormal blood work which he attributes to taking additional doses of loop diuretic \par Previously Entresto was stopped due to dizziness and hypotension\par Remains on amiodarone per EP, last saw EP in October\par \par Last blood work with A1c 6.8 potassium 4.2 BUN 47 total cholesterol 96 LDL 45 creatinine 1.9 \par Prior creatinine 1.1 \par \par last EKG demonstrates a paced rhythm\par outside echocardiogram describes severe LV dysfunction with a mean gradient of 6 no stenosis or regurgitation mild mitral regurgitation \par Cardiac Cath: \par RHC 6/4/20 - Ao 126/77/92, RA 8, RV 71/10, PA 66/24/40, PCW 20, PaSat 61%, Jan CO/CI 3.85/1.7\par LHC 6/4/20 - LM mild CAD, mLAD 10% stenosis at site of prior stent, LCx/RCA mod CAD

## 2021-03-20 NOTE — HISTORY OF PRESENT ILLNESS
[FreeTextEntry1] : DUDLEY BERMUDEZ  is a 70 year old  M\par \par w/ h/o CAD s/p late-presenting MI 2005 s/p PCI LAD, ICM, s/p single chamber ICD with lead revision in 2018, bioprosthetic AVR (2015, s/p HECTOR for severe bioprosthetic AS (8/20/20), aflutter ablation 7/17 w/ persistent afib, s/p DCCV 8/12/20\par \par In June 2020, presented to [facility] for 6 months of worsening SOB and fatigue and was admitted for 2 days and given diuretics \par Underwent angiogram with Dr. Nino which showed 10% stenosis at the site of prior stent. He also had a RHC at that time with results below. \par Found to have severe bioprosthetic AS on ANDREY for DCCV and was admitted to [facility] on 8/18 for a valve in valve TAVR, which he had done on 8/20. \par Of note, incidentally found to have a new RLL nodule on cardiac CT, had thoracic f/u and PET/CT \par \par He feels back to his baseline. \par He reports a dramatic recovery. \par Gained weight d/t lifestyle, calories\par Loves cardiac rehab\par Received his vaccines \par Recently had abnormal blood work which he attributes to taking additional doses of loop diuretic \par Previously Entresto was stopped due to dizziness and hypotension\par Remains on amiodarone per EP, last saw EP in October\par \par Last blood work with A1c 6.8 potassium 4.2 BUN 47 total cholesterol 96 LDL 45 creatinine 1.9 \par Prior creatinine 1.1 \par \par last EKG demonstrates a paced rhythm\par outside echocardiogram describes severe LV dysfunction with a mean gradient of 6 no stenosis or regurgitation mild mitral regurgitation \par Cardiac Cath: \par RHC 6/4/20 - Ao 126/77/92, RA 8, RV 71/10, PA 66/24/40, PCW 20, PaSat 61%, Jan CO/CI 3.85/1.7\par LHC 6/4/20 - LM mild CAD, mLAD 10% stenosis at site of prior stent, LCx/RCA mod CAD

## 2021-04-13 ENCOUNTER — RX RENEWAL (OUTPATIENT)
Age: 71
End: 2021-04-13

## 2021-04-13 ENCOUNTER — NON-APPOINTMENT (OUTPATIENT)
Age: 71
End: 2021-04-13

## 2021-04-13 ENCOUNTER — APPOINTMENT (OUTPATIENT)
Dept: CARDIOLOGY | Facility: CLINIC | Age: 71
End: 2021-04-13
Payer: MEDICARE

## 2021-04-13 PROCEDURE — 93306 TTE W/DOPPLER COMPLETE: CPT

## 2021-04-13 PROCEDURE — 99072 ADDL SUPL MATRL&STAF TM PHE: CPT

## 2021-04-14 ENCOUNTER — RX RENEWAL (OUTPATIENT)
Age: 71
End: 2021-04-14

## 2021-04-29 ENCOUNTER — OUTPATIENT (OUTPATIENT)
Dept: OUTPATIENT SERVICES | Facility: HOSPITAL | Age: 71
LOS: 1 days | End: 2021-04-29

## 2021-04-29 DIAGNOSIS — Z95.5 PRESENCE OF CORONARY ANGIOPLASTY IMPLANT AND GRAFT: Chronic | ICD-10-CM

## 2021-04-29 DIAGNOSIS — Z95.810 PRESENCE OF AUTOMATIC (IMPLANTABLE) CARDIAC DEFIBRILLATOR: Chronic | ICD-10-CM

## 2021-04-29 DIAGNOSIS — Z98.89 OTHER SPECIFIED POSTPROCEDURAL STATES: Chronic | ICD-10-CM

## 2021-04-29 DIAGNOSIS — Z98.890 OTHER SPECIFIED POSTPROCEDURAL STATES: Chronic | ICD-10-CM

## 2021-04-29 DIAGNOSIS — Z95.4 PRESENCE OF OTHER HEART-VALVE REPLACEMENT: Chronic | ICD-10-CM

## 2021-05-12 PROCEDURE — 71046 X-RAY EXAM CHEST 2 VIEWS: CPT

## 2021-05-18 ENCOUNTER — NON-APPOINTMENT (OUTPATIENT)
Age: 71
End: 2021-05-18

## 2021-06-10 ENCOUNTER — INPATIENT (INPATIENT)
Facility: HOSPITAL | Age: 71
LOS: 0 days | Discharge: HOME | End: 2021-06-11
Attending: FAMILY MEDICINE | Admitting: INTERNAL MEDICINE
Payer: MEDICARE

## 2021-06-10 ENCOUNTER — OUTPATIENT (OUTPATIENT)
Dept: OUTPATIENT SERVICES | Facility: HOSPITAL | Age: 71
LOS: 1 days | End: 2021-06-10

## 2021-06-10 DIAGNOSIS — Z95.3 PRESENCE OF XENOGENIC HEART VALVE: ICD-10-CM

## 2021-06-10 DIAGNOSIS — Z98.89 OTHER SPECIFIED POSTPROCEDURAL STATES: Chronic | ICD-10-CM

## 2021-06-10 DIAGNOSIS — I11.0 HYPERTENSIVE HEART DISEASE WITH HEART FAILURE: ICD-10-CM

## 2021-06-10 DIAGNOSIS — Z95.810 PRESENCE OF AUTOMATIC (IMPLANTABLE) CARDIAC DEFIBRILLATOR: Chronic | ICD-10-CM

## 2021-06-10 DIAGNOSIS — Z20.822 CONTACT WITH AND (SUSPECTED) EXPOSURE TO COVID-19: ICD-10-CM

## 2021-06-10 DIAGNOSIS — Z95.5 PRESENCE OF CORONARY ANGIOPLASTY IMPLANT AND GRAFT: Chronic | ICD-10-CM

## 2021-06-10 DIAGNOSIS — Z79.01 LONG TERM (CURRENT) USE OF ANTICOAGULANTS: ICD-10-CM

## 2021-06-10 DIAGNOSIS — I50.43 ACUTE ON CHRONIC COMBINED SYSTOLIC (CONGESTIVE) AND DIASTOLIC (CONGESTIVE) HEART FAILURE: ICD-10-CM

## 2021-06-10 DIAGNOSIS — Z95.4 PRESENCE OF OTHER HEART-VALVE REPLACEMENT: Chronic | ICD-10-CM

## 2021-06-10 DIAGNOSIS — Z87.891 PERSONAL HISTORY OF NICOTINE DEPENDENCE: ICD-10-CM

## 2021-06-10 DIAGNOSIS — I25.2 OLD MYOCARDIAL INFARCTION: ICD-10-CM

## 2021-06-10 DIAGNOSIS — E66.9 OBESITY, UNSPECIFIED: ICD-10-CM

## 2021-06-10 DIAGNOSIS — E78.5 HYPERLIPIDEMIA, UNSPECIFIED: ICD-10-CM

## 2021-06-10 DIAGNOSIS — B02.29 OTHER POSTHERPETIC NERVOUS SYSTEM INVOLVEMENT: ICD-10-CM

## 2021-06-10 DIAGNOSIS — Z79.84 LONG TERM (CURRENT) USE OF ORAL HYPOGLYCEMIC DRUGS: ICD-10-CM

## 2021-06-10 DIAGNOSIS — Z95.810 PRESENCE OF AUTOMATIC (IMPLANTABLE) CARDIAC DEFIBRILLATOR: ICD-10-CM

## 2021-06-10 DIAGNOSIS — E11.9 TYPE 2 DIABETES MELLITUS WITHOUT COMPLICATIONS: ICD-10-CM

## 2021-06-10 DIAGNOSIS — I48.20 CHRONIC ATRIAL FIBRILLATION, UNSPECIFIED: ICD-10-CM

## 2021-06-10 DIAGNOSIS — Z98.890 OTHER SPECIFIED POSTPROCEDURAL STATES: Chronic | ICD-10-CM

## 2021-06-10 DIAGNOSIS — I50.9 HEART FAILURE, UNSPECIFIED: ICD-10-CM

## 2021-06-10 DIAGNOSIS — I25.10 ATHEROSCLEROTIC HEART DISEASE OF NATIVE CORONARY ARTERY WITHOUT ANGINA PECTORIS: ICD-10-CM

## 2021-06-10 DIAGNOSIS — I25.9 CHRONIC ISCHEMIC HEART DISEASE, UNSPECIFIED: ICD-10-CM

## 2021-06-10 PROCEDURE — 99223 1ST HOSP IP/OBS HIGH 75: CPT

## 2021-06-10 PROCEDURE — 71045 X-RAY EXAM CHEST 1 VIEW: CPT | Mod: 26

## 2021-06-10 PROCEDURE — 71250 CT THORAX DX C-: CPT | Mod: 26

## 2021-06-10 PROCEDURE — 93010 ELECTROCARDIOGRAM REPORT: CPT

## 2021-06-10 PROCEDURE — 99285 EMERGENCY DEPT VISIT HI MDM: CPT

## 2021-06-11 ENCOUNTER — OUTPATIENT (OUTPATIENT)
Dept: OUTPATIENT SERVICES | Facility: HOSPITAL | Age: 71
LOS: 1 days | End: 2021-06-11

## 2021-06-11 DIAGNOSIS — Z98.89 OTHER SPECIFIED POSTPROCEDURAL STATES: Chronic | ICD-10-CM

## 2021-06-11 DIAGNOSIS — Z98.890 OTHER SPECIFIED POSTPROCEDURAL STATES: Chronic | ICD-10-CM

## 2021-06-11 DIAGNOSIS — Z95.810 PRESENCE OF AUTOMATIC (IMPLANTABLE) CARDIAC DEFIBRILLATOR: Chronic | ICD-10-CM

## 2021-06-11 DIAGNOSIS — Z95.4 PRESENCE OF OTHER HEART-VALVE REPLACEMENT: Chronic | ICD-10-CM

## 2021-06-11 DIAGNOSIS — Z95.5 PRESENCE OF CORONARY ANGIOPLASTY IMPLANT AND GRAFT: Chronic | ICD-10-CM

## 2021-06-11 PROCEDURE — 93010 ELECTROCARDIOGRAM REPORT: CPT

## 2021-06-11 PROCEDURE — 99233 SBSQ HOSP IP/OBS HIGH 50: CPT

## 2021-06-13 NOTE — PHYSICAL EXAM
[General Appearance - Well Developed] : well developed [Normal Appearance] : normal appearance [Well Groomed] : well groomed [General Appearance - In No Acute Distress] : no acute distress [General Appearance - Well Nourished] : well nourished [No Deformities] : no deformities [Normal Conjunctiva] : the conjunctiva exhibited no abnormalities [Eyelids - No Xanthelasma] : the eyelids demonstrated no xanthelasmas [No Oral Pallor] : no oral pallor [Normal Oral Mucosa] : normal oral mucosa [Normal Jugular Venous A Waves Present] : normal jugular venous A waves present [No Oral Cyanosis] : no oral cyanosis [No Jugular Venous Downs A Waves] : no jugular venous downs A waves [Normal Jugular Venous V Waves Present] : normal jugular venous V waves present [Respiration, Rhythm And Depth] : normal respiratory rhythm and effort [Auscultation Breath Sounds / Voice Sounds] : lungs were clear to auscultation bilaterally [Exaggerated Use Of Accessory Muscles For Inspiration] : no accessory muscle use [Heart Sounds] : normal S1 and S2 [Heart Rate And Rhythm] : heart rate and rhythm were normal [Murmurs] : no murmurs present [Abdomen Tenderness] : non-tender [Abdomen Soft] : soft [Abdomen Mass (___ Cm)] : no abdominal mass palpated [Abnormal Walk] : normal gait [Gait - Sufficient For Exercise Testing] : the gait was sufficient for exercise testing [Nail Clubbing] : no clubbing of the fingernails [Cyanosis, Localized] : no localized cyanosis [Petechial Hemorrhages (___cm)] : no petechial hemorrhages [Skin Color & Pigmentation] : normal skin color and pigmentation [] : no rash [No Venous Stasis] : no venous stasis [Skin Lesions] : no skin lesions [No Skin Ulcers] : no skin ulcer [Oriented To Time, Place, And Person] : oriented to person, place, and time [No Xanthoma] : no  xanthoma was observed [Affect] : the affect was normal [No Anxiety] : not feeling anxious [Mood] : the mood was normal 13-Jun-2021 16:19

## 2021-06-14 ENCOUNTER — NON-APPOINTMENT (OUTPATIENT)
Age: 71
End: 2021-06-14

## 2021-06-14 ENCOUNTER — APPOINTMENT (OUTPATIENT)
Dept: ELECTROPHYSIOLOGY | Facility: CLINIC | Age: 71
End: 2021-06-14
Payer: MEDICARE

## 2021-06-14 VITALS
HEART RATE: 72 BPM | SYSTOLIC BLOOD PRESSURE: 100 MMHG | TEMPERATURE: 98 F | WEIGHT: 228 LBS | OXYGEN SATURATION: 96 % | DIASTOLIC BLOOD PRESSURE: 60 MMHG | HEIGHT: 72 IN | BODY MASS INDEX: 30.88 KG/M2

## 2021-06-14 DIAGNOSIS — E10.29 TYPE 1 DIABETES MELLITUS WITH OTHER DIABETIC KIDNEY COMPLICATION: ICD-10-CM

## 2021-06-14 PROCEDURE — 99072 ADDL SUPL MATRL&STAF TM PHE: CPT

## 2021-06-14 PROCEDURE — 93000 ELECTROCARDIOGRAM COMPLETE: CPT

## 2021-06-14 PROCEDURE — 99214 OFFICE O/P EST MOD 30 MIN: CPT

## 2021-06-16 PROBLEM — E10.29 TYPE 1 DIABETES MELLITUS WITH OTHER KIDNEY COMPLICATION: Status: ACTIVE | Noted: 2020-07-28

## 2021-06-16 NOTE — DISCUSSION/SUMMARY
[FreeTextEntry1] : Patient is a 70-year-old man with a prior history of ischemic cardiomyopathy with severe left ventricular dysfunction, status post single-chamber ICD for primary prevention and previously had appropriate shocks for VT/VF in 2015 and subsequent ATP terminations as well, status post extraction of [place] lead in 2018 and reimplantation of a single-chamber device.  He is status post HECTOR procedure.\par \par He had acute systolic heart failure that has improved with diuretics.\par \par Patient is in atrial fibrillation and and it may be beneficial him to restore sinus rhythm.  I discussed this with his cardiologist Dr. Pressley and agreed to proceed with a ANDREY cardioversion.  He has had prior ANDREY and no contraindication to undergoing a ANDREY.  The patient will also benefit from upgrade to a dual-chamber biventricular device.  He is right ventricular pacing has a wide  ms.  We will discuss this further after the cardioversion procedure.\par \par Procedural Planning: ANDREY cardioversion with Dr. Pressley.\par Anticoagulation: Eliquis -continue\par \par Antiarrhythmic-amiodarone continue\par \par

## 2021-06-16 NOTE — REASON FOR VISIT
[Follow-Up - Clinic] : a clinic follow-up of [Arrhythmia/ECG Abnorrmalities] : arrhythmia/ECG abnormalities [Spouse] : spouse

## 2021-06-16 NOTE — REVIEW OF SYSTEMS
[Feeling Fatigued] : feeling fatigued [SOB] : shortness of breath [Dyspnea on exertion] : dyspnea during exertion [Sore Throat] : no sore throat [Cough] : no cough [Abdominal Pain] : no abdominal pain [Rash] : no rash [Dizziness] : no dizziness [Easy Bleeding] : no tendency for easy bleeding

## 2021-06-16 NOTE — PHYSICAL EXAM
[General Appearance - Well Developed] : well developed [General Appearance - In No Acute Distress] : no acute distress [No Oral Pallor] : no oral pallor [Normal Jugular Venous V Waves Present] : normal jugular venous V waves present [Heart Sounds] : normal S1 and S2 [Auscultation Breath Sounds / Voice Sounds] : lungs were clear to auscultation bilaterally [Arterial Pulses Normal] : the arterial pulses were normal [Abdomen Tenderness] : non-tender [Nail Clubbing] : no clubbing of the fingernails [Cyanosis, Localized] : no localized cyanosis [] : no rash [Impaired Insight] : insight and judgment were intact [No Acute Distress] : no acute distress [Normal Conjunctiva] : normal conjunctiva [Normal Venous Pressure] : normal venous pressure [FreeTextEntry1] : Normal prosthetic valve sound; trace bilateral edema

## 2021-06-16 NOTE — HISTORY OF PRESENT ILLNESS
[FreeTextEntry1] : Patient is a 70-year-old man who is seen in follow-up evaluation for his atrial fibrillation.  He has been on amiodarone.  The patient presented to the office last week with increased swelling in his hands as well as lower extremity and shortness of breath and he was felt to be in heart failure.  He was referred to the hospital where he was given diuretics and improved.  He is having occasional dizzy spells.  The dizziness has been chronic usually occurs upon standing.  He denies chest pain, palpitations, cough, fever or abdominal pain.  He has baseline dyspnea with exertion as well as fatigue.  The patient denies PND or orthopnea.\par \par \par Previous history: S/P  TAV in [facility].  He has had prior  cardioversion for A. fib.  Prior  history of myocardial infarction 2005 status post previous PCI to the LAD,  dilated cardiomyopathy ejection fraction 15 to 20%, s/p Medtronic single-chamber ICD implant 2005, status post appropriate shocks for VT VF 2015.  And prior atrial flutter ablation in 2017.  He underwent extraction of Medtronic Estevan lead October 2018.  Previous bioprosthetic aortic valve replacement with subsequent premature failure noted on ANDREY. \par \par Cardiac catheterization performed 6/4/2020 showed nonobstructive coronary arteries.\par Echocardiogram from 6/3/2020 showed severely reduced ejection fraction 15 to 20%, dilated LV diameter 6.5 cm, moderate to severe diastolic dysfunction \par \par Device: Medtronic ICD VVI low rate 40, VT detection slow 150, VT / bpm\par \par Device Interrogation: Device programmed VVIR 40 bpm; remaining battery longevity 8.9 years.  R wave measured 7.1 mV.  Capture threshold 0.75 V at 0.4 ms.  Noepisodes of VT VF.    The OptiVol fluid index was above threshold between May 2021 \par \par \par

## 2021-06-28 ENCOUNTER — OUTPATIENT (OUTPATIENT)
Dept: OUTPATIENT SERVICES | Facility: HOSPITAL | Age: 71
LOS: 1 days | End: 2021-06-28

## 2021-06-28 DIAGNOSIS — Z98.89 OTHER SPECIFIED POSTPROCEDURAL STATES: Chronic | ICD-10-CM

## 2021-06-28 DIAGNOSIS — Z95.5 PRESENCE OF CORONARY ANGIOPLASTY IMPLANT AND GRAFT: Chronic | ICD-10-CM

## 2021-06-28 DIAGNOSIS — Z98.890 OTHER SPECIFIED POSTPROCEDURAL STATES: Chronic | ICD-10-CM

## 2021-06-28 DIAGNOSIS — Z95.810 PRESENCE OF AUTOMATIC (IMPLANTABLE) CARDIAC DEFIBRILLATOR: Chronic | ICD-10-CM

## 2021-06-28 DIAGNOSIS — Z95.4 PRESENCE OF OTHER HEART-VALVE REPLACEMENT: Chronic | ICD-10-CM

## 2021-07-02 ENCOUNTER — APPOINTMENT (OUTPATIENT)
Dept: CARDIOLOGY | Facility: CLINIC | Age: 71
End: 2021-07-02
Payer: MEDICARE

## 2021-07-02 VITALS
WEIGHT: 230 LBS | DIASTOLIC BLOOD PRESSURE: 66 MMHG | HEART RATE: 78 BPM | OXYGEN SATURATION: 97 % | HEIGHT: 73 IN | TEMPERATURE: 97.3 F | SYSTOLIC BLOOD PRESSURE: 106 MMHG | BODY MASS INDEX: 30.48 KG/M2

## 2021-07-02 PROCEDURE — 99215 OFFICE O/P EST HI 40 MIN: CPT

## 2021-07-02 PROCEDURE — 99072 ADDL SUPL MATRL&STAF TM PHE: CPT

## 2021-07-05 NOTE — ASSESSMENT
[FreeTextEntry1] : plan for ANDREY guided cardioversion \par subsequent upgrade to biventricular device?\par follow-up with electrophysiology regarding use of amiodarone \par outside hospital records have been requested \par cut back on his losartan to once a day \par continue aspirin anticoagulation statin beta-blocker diuretic and aldosterone antagonist \par will set up appointment with heart failure specialist\par \par Coronary artery disease, prior MI, PCI\par Ischemic cardiomyopathy \par CSHF PH\par Implantable defibrillator h/o NSVT\par s/p valve in valve TAVR 8/20\par Right lower lobe nodule \par Atrial fibrillation status post cardioversion recurrent\par Prior CTA Afl\par CRI \par \par s/p cardiac rehab. \par \par Continue angiotensin receptor blocker\par Continue beta-blocker. \par Continue aldosterone antagonist\par Continue loop diuretic as prescribed\par May re challenge with entresto if stable BP and BMP\par Consider addition of SGLT2 inhibitor \par \par Continue short-term amiodarone.  \par Follow-up with electrophysiology regarding long-term rhythm control strategy.  Prefer not to use long term amio \par if significant ventricular pacing can consider upgrade to BiV ? CRT  \par Off digoxin\par \par Continue aspirin \par Continue high intensity statin therapy.  cholesterol well controlled \par Continue anticoagulation. \par \par Monitor daily wt.  weight loss for long-term cardiovascular health\par SBE ppx prn\par Monitor heart rate blood pressure and weights \par \par Follow up re HF re advanced rx \par \par Discussed red flag symptoms, which would warrant sooner or emergent medical evaluation.\par Any questions and concerns were addressed and resolved.

## 2021-07-05 NOTE — REVIEW OF SYSTEMS
[Negative] : Heme/Lymph [Feeling Fatigued] : feeling fatigued [Dizziness] : dizziness [Weight Loss (___ Lbs)] : [unfilled] ~Ulb weight loss

## 2021-07-05 NOTE — HISTORY OF PRESENT ILLNESS
[FreeTextEntry1] : DUDLEY BERMUDEZ  is a 71 year old  M\par \par w/ h/o CAD s/p late-presenting MI 2005 s/p PCI LAD, ICM, s/p single chamber ICD with lead revision in 2018, bioprosthetic AVR (2015, s/p HECTOR for severe bioprosthetic AS (8/20/20), aflutter ablation 7/17 w/ persistent afib, s/p DCCV 8/12/20\par \par In June 2020, presented to [facility] for 6 months of worsening SOB and fatigue and was admitted for 2 days and given diuretics \par Underwent angiogram with Dr. Nino which showed 10% stenosis at the site of prior stent. He also had a RHC at that time with results below. \par Found to have severe bioprosthetic AS on ANDREY for DCCV and was admitted to [facility] on 8/18 for a valve in valve TAVR, which he had done on 8/20. \par Of note, incidentally found to have a new RLL nodule on cardiac CT, had thoracic f/u and PET/CT \par \par He reports a dramatic recovery. \par Previously Entresto was stopped due to dizziness and hypotension\par Remains on amiodarone per EP\par \par Last seen in March at that time device check was unremarkable \par there was subsequent loss of connection of his device \par \par recent fluid retention and weight gain clinical heart failure \par admitted to the hospital improved with IV diuretics \par he lost over 10 pounds \par \par he does report having dizzy spells \par \par last echocardiogram demonstrates severe left ventricular dysfunction mild mitral regurgitation TAVR with peak gradient of 17 no aortic regurgitation moderate pulmonary hypertension \par \par \par

## 2021-07-07 ENCOUNTER — OUTPATIENT (OUTPATIENT)
Dept: OUTPATIENT SERVICES | Facility: HOSPITAL | Age: 71
LOS: 1 days | End: 2021-07-07
Payer: MEDICARE

## 2021-07-07 DIAGNOSIS — Z98.89 OTHER SPECIFIED POSTPROCEDURAL STATES: Chronic | ICD-10-CM

## 2021-07-07 DIAGNOSIS — Z95.4 PRESENCE OF OTHER HEART-VALVE REPLACEMENT: Chronic | ICD-10-CM

## 2021-07-07 DIAGNOSIS — Z95.5 PRESENCE OF CORONARY ANGIOPLASTY IMPLANT AND GRAFT: Chronic | ICD-10-CM

## 2021-07-07 DIAGNOSIS — Z95.810 PRESENCE OF AUTOMATIC (IMPLANTABLE) CARDIAC DEFIBRILLATOR: Chronic | ICD-10-CM

## 2021-07-07 DIAGNOSIS — Z98.890 OTHER SPECIFIED POSTPROCEDURAL STATES: Chronic | ICD-10-CM

## 2021-07-07 PROCEDURE — 93320 DOPPLER ECHO COMPLETE: CPT | Mod: 26

## 2021-07-07 PROCEDURE — 93010 ELECTROCARDIOGRAM REPORT: CPT

## 2021-07-07 PROCEDURE — 93312 ECHO TRANSESOPHAGEAL: CPT | Mod: 26

## 2021-07-19 ENCOUNTER — APPOINTMENT (OUTPATIENT)
Dept: ELECTROPHYSIOLOGY | Facility: CLINIC | Age: 71
End: 2021-07-19
Payer: MEDICARE

## 2021-07-19 ENCOUNTER — NON-APPOINTMENT (OUTPATIENT)
Age: 71
End: 2021-07-19

## 2021-07-19 VITALS
WEIGHT: 226 LBS | SYSTOLIC BLOOD PRESSURE: 100 MMHG | OXYGEN SATURATION: 97 % | HEIGHT: 73 IN | HEART RATE: 69 BPM | TEMPERATURE: 97.3 F | BODY MASS INDEX: 29.95 KG/M2 | DIASTOLIC BLOOD PRESSURE: 64 MMHG

## 2021-07-19 PROCEDURE — 99214 OFFICE O/P EST MOD 30 MIN: CPT

## 2021-07-19 PROCEDURE — 93000 ELECTROCARDIOGRAM COMPLETE: CPT

## 2021-07-19 NOTE — REASON FOR VISIT
[Arrhythmia/ECG Abnorrmalities] : arrhythmia/ECG abnormalities [Spouse] : spouse [Follow-Up - Clinic] : a clinic follow-up of

## 2021-07-20 ENCOUNTER — APPOINTMENT (OUTPATIENT)
Age: 71
End: 2021-07-20
Payer: MEDICARE

## 2021-07-20 VITALS
HEIGHT: 73 IN | HEART RATE: 71 BPM | DIASTOLIC BLOOD PRESSURE: 74 MMHG | OXYGEN SATURATION: 99 % | BODY MASS INDEX: 29.95 KG/M2 | SYSTOLIC BLOOD PRESSURE: 114 MMHG | WEIGHT: 226 LBS | RESPIRATION RATE: 14 BRPM

## 2021-07-20 DIAGNOSIS — E03.9 HYPOTHYROIDISM, UNSPECIFIED: ICD-10-CM

## 2021-07-20 PROCEDURE — 99215 OFFICE O/P EST HI 40 MIN: CPT

## 2021-07-23 NOTE — CARDIOLOGY SUMMARY
[de-identified] : \par 7/19/21 - ? afib (per Dr. Apple in sinus); HR 70, RV paced \par 10/1/20 - afib, V paced, HR 51 \par 8/24/20 - AFib/, HR 69\par 12/31/19 - AFib, HR 73, low voltage, PRWP \par 11/8/17 - NSR, HR 76, APC, limb lead reversal, PRWP\par 6/29/17  Aflutter,HR 79 [de-identified] : \par 10/1/20 - LVEDD 6 cm, EF 20% (segmental; inferoseptal, apical, anterolateral wall akinetic; basal lateral function preserved), mild MR; TAVR in place; AALIYAH 2.2; LVOT VTi 12.9 cm, IVC nl sized\par \par TTE 8/21/20 - LVEDD 5.4 cm, EF 25%, nl RV size with mildly reduced function, mild-mod MR, well seated Evolut Pro+ THV-in-valve with nl function (peak 11, mean 6), mild TR, est RAP 15 mmHg, est RVSP 71 mmHg.\par \par TTE 6/18/19 - LVEDD 5.8 cm, EF 20-25%, mild MR, nl functioning bioAVR (peak 19, mean 9), mod LA dilatation, \par \par ANDREY 7/10/17 - EF 10-15%, LVEDD 5.9 cm, mild MR, bioprosthetic Ao valve, no aortic insufficiency, severe segmental LV dysfunction (akinesis of mid-apical septum, aneurysmal and dyskinetic LV apex, akinesis of anterior wall)\par \par TTE 12/16 LVIDd 5.8 cm. severe global LVSD. Right ventricle mildly dilated with global hypokinesis.  Normal prosthetic aortic valve: IVS 1.0 cm, severe global LVSD, normal diastolic functioning. Right ventricle mildly dilated with global hypokinesis. Normal prosthetic aortic valve, mild MR, mild pulmonic insufficiency, normal Tricuspid valve. [de-identified] : \par 8/20/20 - HECTOR; /36, Ao 101/63/80\par \par RHC 6/4/20 - Ao 126/77/92, RA 8, RV 71/10, PA 66/24/40, PCW 20, PaSat 61%, AoSat 98%, Jan CO/CI 3.85/1.7, PVR 5.19 Reyna, SVR 1744 dsc\par \par [facility] 6/4/20 - LM mild CAD, mLAD 10% stenosis at site of prior stent, LCx/RCA mod CAD\par  \par [facility] ([place]) - 3 stents placed\par

## 2021-07-23 NOTE — PHYSICAL EXAM
[Well Developed] : well developed [Well Nourished] : well nourished [No Acute Distress] : no acute distress [Normal Conjunctiva] : normal conjunctiva [No Carotid Bruit] : no carotid bruit [Normal S1, S2] : normal S1, S2 [No Murmur] : no murmur [No Rub] : no rub [No Gallop] : no gallop [Clear Lung Fields] : clear lung fields [Good Air Entry] : good air entry [No Respiratory Distress] : no respiratory distress  [Soft] : abdomen soft [Non Tender] : non-tender [No Masses/organomegaly] : no masses/organomegaly [Normal Bowel Sounds] : normal bowel sounds [Normal Gait] : normal gait [No Cyanosis] : no cyanosis [No Clubbing] : no clubbing [No Varicosities] : no varicosities [No Rash] : no rash [No Skin Lesions] : no skin lesions [Moves all extremities] : moves all extremities [No Focal Deficits] : no focal deficits [Normal Speech] : normal speech [Alert and Oriented] : alert and oriented [Normal memory] : normal memory [de-identified] : JVP approx 10-12 cm with HJR, v waves [de-identified] : overweight [de-identified] : trace edema L ankle

## 2021-07-23 NOTE — HISTORY OF PRESENT ILLNESS
[FreeTextEntry1] : Mr. Encinas is a very pleasant 70 y/o M w/ h/o CAD s/p late-presenting MI 2005 s/p PCI LAD, ICM (EF 15-20%, LVEDD 6 cm) s/p single chamber ICD with lead revision in 2018, bioprosthetic AVR (2015) 2/2 AI more recently s/p 26 mm EVOLUT HECTOR for severe bioprosthetic AS (8/20/20), aflutter ablation 7/17 w/ persistent afib, s/p DCCV x2 (8/12/20, 7/7/21), who presents for routine follow-up for his cardiomyopathy. Followed by Dr. Pressley. \jacinto peng Was last seen by me 10/26/20 and had been doing well. Was in cardiac rehab September 2020-March 2021. Was well until mid-April when he gradually had been gaining weight and his wife noted increased dyspnea with exertion. Had gone to [place] to visit his granddaughter. Subsequently, had gradual weight gain until June when noted he was up to 240 pounds. Was admitted to [place] in June for 2 days where he had IV diuresis with improvement and lost approximately 10-14 pounds. Subsequently felt well but due to concern that this was precipitated by afib, he underwent ANDREY/DCCV July 7th. \par \par He has previously been intolerant to adjustment in his neurohormonal therapies. Unable to tolerate increase in losartan to 25 mg twice/day so reduced back to losartan 12.5 mg daily. Has had chronic dizziness worse with standing. He had 2 episodes of syncope (May and June) unrelated to arrhythmias. \par \par Weight has lately been 226-228 pounds. He notes marked improvement of exertional tolerance but has difficulty due to pain in right knee. Has been boating without issues. His appetite has been excellent. Denies orthopnea/PND. Does note a cough. Currently without LH/dizziness and denies CP, palpitations, orthopnea, PND, cough, ABD distention and LE swelling. \jacinto \jacinto Had seen Dr. Apple yesterday for possible consideration of BiV device since he is predominantly RV pacing. Dr. Apple would like to do dual chamber BiV upgrade once more than 1 month out from cardioversion. \par \jacinto Received Covid (Moderna) vaccine x2 (last in February).

## 2021-07-23 NOTE — ASSESSMENT
[FreeTextEntry1] : Mr. Encinas is a very pleasant 70 y/o M w/ h/o CAD s/p late-presenting MI 2005 s/p PCI LAD, ICM (EF 15-20%, LVEDD 6 cm) s/p single chamber ICD with lead revision in 2018, bioprosthetic AVR (2015) 2/2 AI c/b prosthetic stenosis s/p 26 mm EVOLUT HECTOR (8/20/20), aflutter ablation 7/17 w/ afib s/p DCCV x 2 (most recent 7/7/21; on AC) who presents for routine follow-up for his cardiomyopathy. Currently appears mildly overloaded and has NYHA class II symptoms. Of note, chronically RV pacing. \par \par 1. ICM, HFrEF \par - on torsemide 20 mg daily; encouraged to take additional 20 mg tonight; goal weight 222-224 pounds; if weight gain >3 pounds, instructed to take additional 10 mg in evening as needed  \par - does not tolerate Entresto as has significant pre-syncopal episodes (were occurring even prior to Entresto many years ago); on losartan 12.5 mg daily\par - Continue Sprinolactone 25 mg QD\par - Continue Toprol XL 12.5 mg twice/day; has been RV pacing with plan for dual chamber BiV device once >1 month from DCCV\par - discussed if things were to worsen in future of possible LVAD as an option but no current indication for this\par \par 2. Aflutter/afib\par - currently in afib (per last ECG), rate controlled; RV pacing\par - continue eliquis 5 mg bid\par - off digoxin since his DCCV 8/2020, c/w amiodarone per Dr. Apple \par \par 3. 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 Paez and Dr. Waldrop\par \par 4. CAD - s/p MI\par - continue ASA 81 mg daily and lipitor 40 mg daily\par \par 5. RLL nodule 1.8x1.4 cm opacity \par - underwent PET/CT on 9/16 whichh was non-FDG avid and reviewed with Dr. Waggoner which felt it as inflammatory and no further intervention was warranted\par - he will also follow-up with his pulmonologist, Dr. Guerrero in [place]\par \par RTC in 2 months with me

## 2021-07-29 ENCOUNTER — OUTPATIENT (OUTPATIENT)
Dept: OUTPATIENT SERVICES | Facility: HOSPITAL | Age: 71
LOS: 1 days | End: 2021-07-29

## 2021-07-29 DIAGNOSIS — Z95.810 PRESENCE OF AUTOMATIC (IMPLANTABLE) CARDIAC DEFIBRILLATOR: Chronic | ICD-10-CM

## 2021-07-29 DIAGNOSIS — Z95.4 PRESENCE OF OTHER HEART-VALVE REPLACEMENT: Chronic | ICD-10-CM

## 2021-07-29 DIAGNOSIS — Z98.89 OTHER SPECIFIED POSTPROCEDURAL STATES: Chronic | ICD-10-CM

## 2021-07-29 DIAGNOSIS — Z95.5 PRESENCE OF CORONARY ANGIOPLASTY IMPLANT AND GRAFT: Chronic | ICD-10-CM

## 2021-07-29 DIAGNOSIS — Z98.890 OTHER SPECIFIED POSTPROCEDURAL STATES: Chronic | ICD-10-CM

## 2021-08-06 NOTE — HISTORY OF PRESENT ILLNESS
[FreeTextEntry1] : The patient is a 72-year-old man who is seen in follow-up evaluation status post electrical cardioversion this week.  Patient underwent ANDREY cardioversion.  Accompanied by spouse today.  In follow-up he is feeling much better with less shortness of breath and more energy.\par \par He had an EKG today that shows he is still in sinus rhythm.  The main discussion  is whether we should upgrade his device with an atrial lead and LV pacing.\par \par I am in favor of the upgrade -  the issue would be timing -  we will have to wait approximately a month after the cardioversion before we can safely hold anticoagulation.\par \par Previous history: S/P  TAV in [place].  He has had prior  cardioversion for A. fib.  Prior  history of myocardial infarction 2005 status post previous PCI to the LAD,  dilated cardiomyopathy ejection fraction 15 to 20%, s/p Medtronic single-chamber ICD implant 2005, status post appropriate shocks for VT VF 2015.  And prior atrial flutter ablation in 2017.  He underwent extraction of Medtronic Estevan lead October 2018.  Previous bioprosthetic aortic valve replacement with subsequent premature failure noted on ANDREY. \par \par Cardiac catheterization performed 6/4/2020 showed nonobstructive coronary arteries.\par Echocardiogram from 6/3/2020 showed severely reduced ejection fraction 15 to 20%, dilated LV diameter 6.5 cm, moderate to severe diastolic dysfunction \par \par Device: Medtronic ICD VVI low rate 40, VT detection slow 150, VT / bpm\par \par Device Interrogation: Device programmed VVIR 40 bpm; remaining battery longevity 8.9 years.  R wave measured 7.1 mV.  Capture threshold 0.75 V at 0.4 ms.  Noepisodes of VT VF.    The OptiVol fluid index was above threshold between May 2021 \par \par \par

## 2021-08-06 NOTE — PHYSICAL EXAM
[No Acute Distress] : no acute distress [Normal Venous Pressure] : normal venous pressure [General Appearance - Well Developed] : well developed [General Appearance - In No Acute Distress] : no acute distress [Normal Conjunctiva] : the conjunctiva exhibited no abnormalities [No Oral Pallor] : no oral pallor [Normal Jugular Venous V Waves Present] : normal jugular venous V waves present [Auscultation Breath Sounds / Voice Sounds] : lungs were clear to auscultation bilaterally [Heart Sounds] : normal S1 and S2 [Arterial Pulses Normal] : the arterial pulses were normal [Abdomen Tenderness] : non-tender [Nail Clubbing] : no clubbing of the fingernails [Cyanosis, Localized] : no localized cyanosis [] : no rash [Impaired Insight] : insight and judgment were intact [FreeTextEntry1] : Normal prosthetic valve sound; trace bilateral edema

## 2021-08-06 NOTE — DISCUSSION/SUMMARY
[FreeTextEntry1] : Previous history of ischemic cardiomyopathy with severe left ventricular dysfunction, status post single-chamber ICD for primary prevention and previously had appropriate shocks for VT/VF in 2015 and subsequent ATP terminations as well, status post extraction of Estevan lead in 2018 and reimplantation of a single-chamber device.  He is status post HECTOR procedure.  He has had atrial fibrillation and recently underwent electrical cardioversion and has been feeling well since the procedure with improvement in symptoms.  The patient has maintained sinus rhythm and is mostly ventricular paced.  He is feeling better in sinus rhythm.  Would recommend that we upgrade his device to provide AV synchrony as well as RV LV synchrony.  I discussed the procedure in detail with the patient and his spouse and they are agreeable.  Also discussed it with his cardiologist Dr. Pressley who is in agreement.\par \par We will plan for upgrade to biventricular device as soon as we can safely hold anticoagulation.  He had a recent cardioversion and would like to wait approximately a month.  \par \par \par \par \par Procedural Planning: Hold Eliquis evening prior to procedure\par \par \par \par

## 2021-08-11 ENCOUNTER — NON-APPOINTMENT (OUTPATIENT)
Age: 71
End: 2021-08-11

## 2021-08-16 ENCOUNTER — OUTPATIENT (OUTPATIENT)
Dept: OUTPATIENT SERVICES | Facility: HOSPITAL | Age: 71
LOS: 1 days | End: 2021-08-16

## 2021-08-16 DIAGNOSIS — Z95.4 PRESENCE OF OTHER HEART-VALVE REPLACEMENT: Chronic | ICD-10-CM

## 2021-08-16 DIAGNOSIS — Z98.89 OTHER SPECIFIED POSTPROCEDURAL STATES: Chronic | ICD-10-CM

## 2021-08-16 DIAGNOSIS — Z95.810 PRESENCE OF AUTOMATIC (IMPLANTABLE) CARDIAC DEFIBRILLATOR: Chronic | ICD-10-CM

## 2021-08-16 DIAGNOSIS — Z95.5 PRESENCE OF CORONARY ANGIOPLASTY IMPLANT AND GRAFT: Chronic | ICD-10-CM

## 2021-08-16 DIAGNOSIS — Z98.890 OTHER SPECIFIED POSTPROCEDURAL STATES: Chronic | ICD-10-CM

## 2021-08-17 ENCOUNTER — OUTPATIENT (OUTPATIENT)
Dept: OUTPATIENT SERVICES | Facility: HOSPITAL | Age: 71
LOS: 1 days | End: 2021-08-17
Payer: MEDICARE

## 2021-08-17 DIAGNOSIS — Z95.4 PRESENCE OF OTHER HEART-VALVE REPLACEMENT: Chronic | ICD-10-CM

## 2021-08-17 DIAGNOSIS — Z98.89 OTHER SPECIFIED POSTPROCEDURAL STATES: Chronic | ICD-10-CM

## 2021-08-17 DIAGNOSIS — Z95.5 PRESENCE OF CORONARY ANGIOPLASTY IMPLANT AND GRAFT: Chronic | ICD-10-CM

## 2021-08-17 DIAGNOSIS — Z95.810 PRESENCE OF AUTOMATIC (IMPLANTABLE) CARDIAC DEFIBRILLATOR: Chronic | ICD-10-CM

## 2021-08-17 DIAGNOSIS — Z98.890 OTHER SPECIFIED POSTPROCEDURAL STATES: Chronic | ICD-10-CM

## 2021-08-17 PROCEDURE — 33249 INSJ/RPLCMT DEFIB W/LEAD(S): CPT

## 2021-08-17 PROCEDURE — 93010 ELECTROCARDIOGRAM REPORT: CPT

## 2021-08-17 PROCEDURE — 33241 REMOVE PULSE GENERATOR: CPT

## 2021-08-18 PROCEDURE — 93010 ELECTROCARDIOGRAM REPORT: CPT

## 2021-08-18 PROCEDURE — 71045 X-RAY EXAM CHEST 1 VIEW: CPT | Mod: 26

## 2021-08-25 ENCOUNTER — APPOINTMENT (OUTPATIENT)
Dept: CARDIOLOGY | Facility: CLINIC | Age: 71
End: 2021-08-25
Payer: MEDICARE

## 2021-08-25 VITALS
DIASTOLIC BLOOD PRESSURE: 60 MMHG | HEART RATE: 62 BPM | BODY MASS INDEX: 30.08 KG/M2 | OXYGEN SATURATION: 95 % | SYSTOLIC BLOOD PRESSURE: 100 MMHG | WEIGHT: 228 LBS | TEMPERATURE: 97.8 F

## 2021-08-25 PROCEDURE — 99024 POSTOP FOLLOW-UP VISIT: CPT

## 2021-08-25 PROCEDURE — 93283 PRGRMG EVAL IMPLANTABLE DFB: CPT

## 2021-08-30 ENCOUNTER — NON-APPOINTMENT (OUTPATIENT)
Age: 71
End: 2021-08-30

## 2021-08-30 ENCOUNTER — APPOINTMENT (OUTPATIENT)
Dept: CARDIOLOGY | Facility: CLINIC | Age: 71
End: 2021-08-30
Payer: MEDICARE

## 2021-08-30 VITALS
DIASTOLIC BLOOD PRESSURE: 66 MMHG | TEMPERATURE: 97.5 F | OXYGEN SATURATION: 96 % | HEART RATE: 70 BPM | WEIGHT: 226 LBS | HEIGHT: 73 IN | BODY MASS INDEX: 29.95 KG/M2 | SYSTOLIC BLOOD PRESSURE: 112 MMHG

## 2021-08-30 PROCEDURE — 93295 DEV INTERROG REMOTE 1/2/MLT: CPT

## 2021-08-30 PROCEDURE — 99205 OFFICE O/P NEW HI 60 MIN: CPT

## 2021-08-30 PROCEDURE — 93296 REM INTERROG EVL PM/IDS: CPT

## 2021-09-07 NOTE — PATIENT PROFILE ADULT - NSTRANSFERDENTURES_GEN_A_NUR
"Chief Complaint  Abdominal Pain (diverticulitis flare up)    Subjective          Nadege Bose presents to Carroll Regional Medical Center lower/partial

## 2021-09-09 ENCOUNTER — NON-APPOINTMENT (OUTPATIENT)
Age: 71
End: 2021-09-09

## 2021-09-13 ENCOUNTER — APPOINTMENT (OUTPATIENT)
Dept: DISASTER EMERGENCY | Facility: CLINIC | Age: 71
End: 2021-09-13

## 2021-09-13 DIAGNOSIS — Z01.818 ENCOUNTER FOR OTHER PREPROCEDURAL EXAMINATION: ICD-10-CM

## 2021-09-14 LAB — SARS-COV-2 N GENE NPH QL NAA+PROBE: NOT DETECTED

## 2021-09-16 ENCOUNTER — OUTPATIENT (OUTPATIENT)
Dept: INPATIENT UNIT | Facility: HOSPITAL | Age: 71
LOS: 1 days | End: 2021-09-16
Payer: MEDICARE

## 2021-09-16 DIAGNOSIS — Z95.4 PRESENCE OF OTHER HEART-VALVE REPLACEMENT: Chronic | ICD-10-CM

## 2021-09-16 DIAGNOSIS — Z95.810 PRESENCE OF AUTOMATIC (IMPLANTABLE) CARDIAC DEFIBRILLATOR: Chronic | ICD-10-CM

## 2021-09-16 DIAGNOSIS — Z95.5 PRESENCE OF CORONARY ANGIOPLASTY IMPLANT AND GRAFT: Chronic | ICD-10-CM

## 2021-09-16 DIAGNOSIS — Z98.890 OTHER SPECIFIED POSTPROCEDURAL STATES: Chronic | ICD-10-CM

## 2021-09-16 DIAGNOSIS — Z98.89 OTHER SPECIFIED POSTPROCEDURAL STATES: Chronic | ICD-10-CM

## 2021-09-16 PROCEDURE — 93010 ELECTROCARDIOGRAM REPORT: CPT

## 2021-09-16 PROCEDURE — 93568 NJX CAR CTH NSLC P-ART ANGRP: CPT

## 2021-09-16 PROCEDURE — 93451 RIGHT HEART CATH: CPT | Mod: 26

## 2021-09-20 ENCOUNTER — APPOINTMENT (OUTPATIENT)
Dept: CARDIOLOGY | Facility: CLINIC | Age: 71
End: 2021-09-20
Payer: MEDICARE

## 2021-09-20 VITALS
RESPIRATION RATE: 14 BRPM | DIASTOLIC BLOOD PRESSURE: 60 MMHG | HEIGHT: 73 IN | SYSTOLIC BLOOD PRESSURE: 98 MMHG | OXYGEN SATURATION: 97 % | TEMPERATURE: 96.9 F | BODY MASS INDEX: 30.35 KG/M2 | HEART RATE: 66 BPM | WEIGHT: 229 LBS

## 2021-09-20 PROCEDURE — 99213 OFFICE O/P EST LOW 20 MIN: CPT

## 2021-09-20 NOTE — PHYSICAL EXAM
[Well Developed] : well developed [Well Nourished] : well nourished [No Acute Distress] : no acute distress [Normal Conjunctiva] : normal conjunctiva [Normal Venous Pressure] : normal venous pressure [Normal S1, S2] : normal S1, S2 [No Rub] : no rub [No Gallop] : no gallop [Clear Lung Fields] : clear lung fields [Good Air Entry] : good air entry [No Respiratory Distress] : no respiratory distress  [Soft] : abdomen soft [Non Tender] : non-tender [Normal Bowel Sounds] : normal bowel sounds [Normal Gait] : normal gait [No Cyanosis] : no cyanosis [No Clubbing] : no clubbing [No Varicosities] : no varicosities [No Rash] : no rash [No Skin Lesions] : no skin lesions [Moves all extremities] : moves all extremities [No Focal Deficits] : no focal deficits [Normal Speech] : normal speech [Alert and Oriented] : alert and oriented [Normal memory] : normal memory [de-identified] : Well-healed midline sternotomy, device well-seated in pocket [de-identified] : trace to 1+ RLE

## 2021-09-20 NOTE — DISCUSSION/SUMMARY
[FreeTextEntry1] : \par 70 yo M with CAD s/p late presentation MI, chronic systolic CHF s/p biV upgrade and with hospitalization in 6/21 for decompensated CHF, AVR s/p Manasa HECTOR, AF, HTN, HLD presents for follow up after cardiomems.\par \par RHC normal wedge mild phtn. feels well. no chest pain or palpitations. \par \par transmitted past 4 days, pending reads. RCFV access c/d/i.\par \par - continue current cv regimen. close follow up with advanced HF, EP/device, primary cardiologist.

## 2021-09-20 NOTE — REASON FOR VISIT
[Cardiac Failure] : cardiac failure [Structural Heart and Valve Disease] : structural heart and valve disease [Hyperlipidemia] : hyperlipidemia [Hypertension] : hypertension [Coronary Artery Disease] : coronary artery disease [FreeTextEntry3] : Dr. Diaz

## 2021-09-20 NOTE — CARDIOLOGY SUMMARY
[de-identified] : 8/18/21: A-paced, v-sensed [de-identified] : 6/11/21: [facility] - EF 15%, TAVR well-seated [de-identified] : 8/25/21: [facility] [de-identified] : 6/2020: 10% ISR of LAD stent [de-identified] : 8/2020: Manasa - 26MM Evolut Pro Plus TAVR

## 2021-09-20 NOTE — HISTORY OF PRESENT ILLNESS
[FreeTextEntry1] : \par 70 yo M with CAD s/p late presentation MI, chronic systolic CHF s/p biV upgrade and with hospitalization in 6/21 for decompensated CHF, AVR s/p Manasa HECTOR, AF, HTN, HLD presents for follow up after cardiomems.\par \par RHC normal wedge mild phtn. feels well. no chest pain or palpitations. \par \par transmitted past 4 days, pending reads.

## 2021-09-21 ENCOUNTER — NON-APPOINTMENT (OUTPATIENT)
Age: 71
End: 2021-09-21

## 2021-09-21 ENCOUNTER — APPOINTMENT (OUTPATIENT)
Dept: CARDIOLOGY | Facility: CLINIC | Age: 71
End: 2021-09-21
Payer: MEDICARE

## 2021-09-21 VITALS
BODY MASS INDEX: 30.35 KG/M2 | HEART RATE: 72 BPM | WEIGHT: 229 LBS | HEIGHT: 73 IN | RESPIRATION RATE: 14 BRPM | OXYGEN SATURATION: 96 % | DIASTOLIC BLOOD PRESSURE: 66 MMHG | SYSTOLIC BLOOD PRESSURE: 100 MMHG

## 2021-09-21 PROCEDURE — 99215 OFFICE O/P EST HI 40 MIN: CPT

## 2021-09-21 PROCEDURE — 93000 ELECTROCARDIOGRAM COMPLETE: CPT

## 2021-09-23 ENCOUNTER — RESULT CHARGE (OUTPATIENT)
Age: 71
End: 2021-09-23

## 2021-09-23 ENCOUNTER — APPOINTMENT (OUTPATIENT)
Dept: ULTRASOUND IMAGING | Facility: CLINIC | Age: 71
End: 2021-09-23
Payer: MEDICARE

## 2021-09-23 PROCEDURE — 76700 US EXAM ABDOM COMPLETE: CPT

## 2021-09-24 RX ORDER — AMIODARONE HYDROCHLORIDE 200 MG/1
200 TABLET ORAL
Qty: 90 | Refills: 1 | Status: DISCONTINUED | COMMUNITY
Start: 2020-07-16 | End: 2021-09-24

## 2021-09-24 NOTE — PHYSICAL EXAM
[Well Developed] : well developed [Well Nourished] : well nourished [No Acute Distress] : no acute distress [Normal Conjunctiva] : normal conjunctiva [No Carotid Bruit] : no carotid bruit [Normal S1, S2] : normal S1, S2 [No Murmur] : no murmur [No Rub] : no rub [No Gallop] : no gallop [Clear Lung Fields] : clear lung fields [Good Air Entry] : good air entry [No Respiratory Distress] : no respiratory distress  [Soft] : abdomen soft [Non Tender] : non-tender [No Masses/organomegaly] : no masses/organomegaly [Normal Bowel Sounds] : normal bowel sounds [Normal Gait] : normal gait [No Cyanosis] : no cyanosis [No Clubbing] : no clubbing [No Varicosities] : no varicosities [No Rash] : no rash [No Skin Lesions] : no skin lesions [Moves all extremities] : moves all extremities [No Focal Deficits] : no focal deficits [Normal Speech] : normal speech [Alert and Oriented] : alert and oriented [Normal memory] : normal memory [de-identified] : overweight [de-identified] : JVP approx 8-10 cm with HJR, v waves [de-identified] : trace edema L ankle

## 2021-09-24 NOTE — HISTORY OF PRESENT ILLNESS
[FreeTextEntry1] : Mr. Encinas is a very pleasant 72 y/o M w/ h/o CAD s/p late-presenting MI 2005 s/p PCI LAD, ICM/HFrEF (EF 15-20%, LVEDD 6 cm) s/p single chamber ICD with lead revision in 2018 with upgrade to dual-chamber device (8/17/21) and recent MEMS (9/16/21), bioprosthetic AVR (2015) 2/2 AI s/p 26 mm EVOLUT HECTOR for severe bioprosthetic AS (8/20/20), aflutter ablation 7/17 w/ persistent afib, s/p DCCV x2 (8/12/20, 7/7/21), who presents for routine follow-up for his cardiomyopathy. Followed by Dr. Pressley. \jacinto peng Was last seen by me 7/20/21 and had been doing well but in April had noted dyspnea on exertion and was admitted for heart failure in [place] in June with subsequent DCCV in July. \par \par He has previously been intolerant to adjustment in his neurohormonal therapies. Unable to tolerate increase in losartan to 25 mg twice/day so reduced back to losartan 12.5 mg daily (although has been taking twice/day). Has had chronic dizziness worse with standing which has increased despite dual-chamber upgrade. He previously had 2 episodes of syncope (May and June) unrelated to arrhythmias. Had another episode mid-July with positional change. \par \par Weight has lately been 228-230 pounds. He notes marked improvement of exertional tolerance but has difficulty due to pain in right knee and would like to consider knee replacement. Has tried physical therapy and gel injections. His appetite has been excellent. Denies orthopnea/PND. Currently without LH/dizziness and denies CP, palpitations, orthopnea, PND, cough, ABD distention and LE swelling. \par \jacinto Received Covid (Moderna) vaccine x2 (last in February). \par \jacinto Denies any numbness/tingling in hands. Reports some back pain at times. \par \par Orthostatics checked:\par supine: HR 66, /80\par sitting: HR 61, /64\par standing (not dizzy): HR 64, /63

## 2021-09-24 NOTE — ASSESSMENT
[FreeTextEntry1] : Mr. Encinas is a very pleasant 70 y/o M w/ h/o CAD s/p late-presenting MI 2005 s/p PCI LAD, HFrEF/ICM (EF 15-20%, LVEDD 6 cm) s/p single chamber ICD with lead revision in 2018 with upgrade to dual-chamber, s/p MEMS, bioprosthetic AVR (2015) 2/2 AI c/b prosthetic stenosis s/p 26 mm EVOLUT HECTOR (8/20/20), aflutter ablation 7/17 w/ afib s/p DCCV x 2 (most recent 7/7/21; on AC) who presents for routine follow-up for his cardiomyopathy. Currently appears euvolemic with persistent dizziness that is episodic. Endorses NYHA class II symptoms. Of note, chronically RV pacing. \par \par 1. ICM, HFrEF \par - on torsemide 20 mg daily; if weight gain >3 pounds, instructed to take additional 10 mg in evening as needed  \par - does not tolerate Entresto as has significant pre-syncopal episodes (were occurring even prior to Entresto many years ago)\par - reduce losartan to 12.5 mg daily\par - Continue Sprinolactone 25 mg QD\par - Continue Toprol XL 12.5 mg twice/day; currently A pacing after atrial lead placed \par - discussed if things were to worsen in future of possible LVAD as an option but no current indication for this\par \par 2. Aflutter/afib\par - A pacing\par - continue eliquis 5 mg bid\par - off digoxin since his DCCV 8/2020\par - d/w Dr. Apple; can d/c amio \par \par 3. 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 CAD - s/p MI\par - continue ASA 81 mg daily and lipitor 40 mg daily\par \par 5. RLL nodule 1.8x1.4 cm opacity \par - underwent PET/CT on 9/16 which was non-FDG avid and reviewed with Dr. Waggoner which felt it as inflammatory and no further intervention was warranted\par - he will also follow-up with his pulmonologist, Dr. Guerrero in [place]\par \par 6. Syncope -\par - would do carotid dopplers \par - given low voltage and atrial myopathy, would consider evaluation for TTR amyloidosis \par - recommend compression stockings\par \par RTC in 3 months with me

## 2021-09-24 NOTE — CARDIOLOGY SUMMARY
[de-identified] : \par 9/21/21 - A paced, long LA interval, PRWP, low voltage \par 7/19/21 - ? afib (per Dr. Apple in sinus); HR 70, RV paced \par 10/1/20 - afib, V paced, HR 51 \par 8/24/20 - AFib/, HR 69\par 12/31/19 - AFib, HR 73, low voltage, PRWP \par 11/8/17 - NSR, HR 76, APC, limb lead reversal, PRWP\par 6/29/17  Aflutter,HR 79 [de-identified] : \par 10/1/20 - LVEDD 6 cm, EF 20% (segmental; inferoseptal, apical, anterolateral wall akinetic; basal lateral function preserved), mild MR; TAVR in place; AALIYAH 2.2; LVOT VTi 12.9 cm, IVC nl sized\par \par TTE 8/21/20 - LVEDD 5.4 cm, EF 25%, nl RV size with mildly reduced function, mild-mod MR, well seated Evolut Pro+ THV-in-valve with nl function (peak 11, mean 6), mild TR, est RAP 15 mmHg, est RVSP 71 mmHg.\par \par TTE 6/18/19 - LVEDD 5.8 cm, EF 20-25%, mild MR, nl functioning bioAVR (peak 19, mean 9), mod LA dilatation, \par \par ANDREY 7/10/17 - EF 10-15%, LVEDD 5.9 cm, mild MR, bioprosthetic Ao valve, no aortic insufficiency, severe segmental LV dysfunction (akinesis of mid-apical septum, aneurysmal and dyskinetic LV apex, akinesis of anterior wall)\par \par TTE 12/16 LVIDd 5.8 cm. severe global LVSD. Right ventricle mildly dilated with global hypokinesis.  Normal prosthetic aortic valve: IVS 1.0 cm, severe global LVSD, normal diastolic functioning. Right ventricle mildly dilated with global hypokinesis. Normal prosthetic aortic valve, mild MR, mild pulmonic insufficiency, normal Tricuspid valve. [de-identified] : \par 9/16/21 - RHC/MEMS implant - RA 12, RV 50/13, PA 51/20/33, PCWP 11, CO/CI 4.7/2.08 (PA sat 59%, Ao sat 93%, Hb 11.5), PVR 4.68 REYNA\par \par 8/20/20 - HECTOR; /36, Ao 101/63/80\par \par [facility] 6/4/20 - Ao 126/77/92, RA 8, RV 71/10, PA 66/24/40, PCW 20, PaSat 61%, AoSat 98%, Jan CO/CI 3.85/1.7, PVR 5.19 Reyna, SVR 1744 dsc\par \par [facility] 6/4/20 - LM mild CAD, mLAD 10% stenosis at site of prior stent, LCx/RCA mod CAD\par  \par [facility] ([place]) - 3 stents placed\par

## 2021-09-28 ENCOUNTER — NON-APPOINTMENT (OUTPATIENT)
Age: 71
End: 2021-09-28

## 2021-10-04 ENCOUNTER — NON-APPOINTMENT (OUTPATIENT)
Age: 71
End: 2021-10-04

## 2021-10-05 ENCOUNTER — NON-APPOINTMENT (OUTPATIENT)
Age: 71
End: 2021-10-05

## 2021-10-06 ENCOUNTER — NON-APPOINTMENT (OUTPATIENT)
Age: 71
End: 2021-10-06

## 2021-10-11 ENCOUNTER — NON-APPOINTMENT (OUTPATIENT)
Age: 71
End: 2021-10-11

## 2021-10-11 ENCOUNTER — APPOINTMENT (OUTPATIENT)
Dept: ELECTROPHYSIOLOGY | Facility: CLINIC | Age: 71
End: 2021-10-11
Payer: MEDICARE

## 2021-10-11 VITALS
HEART RATE: 88 BPM | WEIGHT: 230 LBS | BODY MASS INDEX: 30.48 KG/M2 | SYSTOLIC BLOOD PRESSURE: 100 MMHG | HEIGHT: 73 IN | TEMPERATURE: 97.7 F | OXYGEN SATURATION: 95 % | DIASTOLIC BLOOD PRESSURE: 60 MMHG

## 2021-10-11 PROCEDURE — 99024 POSTOP FOLLOW-UP VISIT: CPT

## 2021-10-11 NOTE — END OF VISIT
[FreeTextEntry3] : The patient was seen with the PA.  We discussed the history physical findings and plans.  Agree with the findings and recommendations.  He is feeling much better after the atrial lead upgrade.  Patient is off amiodarone.  If we should see any increase in AF burden we will rediscuss treatment options which includes catheter ablation.\par

## 2021-10-11 NOTE — HISTORY OF PRESENT ILLNESS
[FreeTextEntry1] : Patient is a 71-year-old man who is seen in follow-up evaluation for his atrial fibrillation.  Now s/p upgrade to dual chamber device.  Increased AV delay in order to minimize RV pacing.  Currently 2.7%.  Amiodarone was D/C'd with symptoms of shaking.  \par Presents for follow up.  Since device implant and d/c amiodarone, pt feels much better.  Tremors have resolved.  MATHEWS has decreased.  Endurance has increased.  He recently was able to walk across a field for a family members [employer] game.   The patient denies PND or orthopnea.  Weight has been stable.  CardioMEMs readings have been stable. \par \par Device check today with [employer] rep reveals low AF burden off amio.  0.4%.  V rate controlled.  Atrial lead functioning well.  \par \par \par Previous history: S/P  TAV in [place].  He has had prior  cardioversion for A. fib.  Prior  history of myocardial infarction 2005 status post previous PCI to the LAD,  dilated cardiomyopathy ejection fraction 15 to 20%, s/p Medtronic single-chamber ICD implant 2005, status post appropriate shocks for VT VF 2015.  And prior atrial flutter ablation in 2017.  He underwent extraction of Medtronic [place] lead October 2018.  Previous bioprosthetic aortic valve replacement with subsequent premature failure noted on ANDREY. \par \par Cardiac catheterization performed 6/4/2020 showed nonobstructive coronary arteries.\par Echocardiogram from 6/3/2020 showed severely reduced ejection fraction 15 to 20%, dilated LV diameter 6.5 cm, moderate to severe diastolic dysfunction \par \par Device: Medtronic ICD VVI low rate 40, VT detection slow 150, VT / bpm\par \par Device Interrogation: Device programmed VVIR 40 bpm; remaining battery longevity 8.9 years.  R wave measured 7.1 mV.  Capture threshold 0.75 V at 0.4 ms.  Noepisodes of VT VF.    The OptiVol fluid index was above threshold between May 2021 \par \par \par

## 2021-10-11 NOTE — DISCUSSION/SUMMARY
[FreeTextEntry1] : Patient is a 71-year-old man with a prior history of ischemic cardiomyopathy with severe left ventricular dysfunction, status post upgrade to dual chamber ICD for primary prevention.  Previously had appropriate shocks for VT/VF in 2015 and subsequent ATP terminations as well, status post extraction of [place] lead in 2018 and reimplantation of a single-chamber device.  He is status post HECTOR procedure.\par \par He had acute systolic heart failure that has improved with diuretics.  Now stable s/p CardioMEMs device. \par \par Pt feels much better with dual chamber device and off amiodarone.  Will continue to monitor for increased AF burden.  May consider antiarrhythmics with increased burden and symptoms.  Continue AC and other meds. \par \par Remote monitoring of device.  Reevaluation in 4-5 months.

## 2021-10-20 ENCOUNTER — TRANSCRIPTION ENCOUNTER (OUTPATIENT)
Age: 71
End: 2021-10-20

## 2021-10-20 ENCOUNTER — NON-APPOINTMENT (OUTPATIENT)
Age: 71
End: 2021-10-20

## 2021-10-22 ENCOUNTER — NON-APPOINTMENT (OUTPATIENT)
Age: 71
End: 2021-10-22

## 2021-10-25 ENCOUNTER — NON-APPOINTMENT (OUTPATIENT)
Age: 71
End: 2021-10-25

## 2021-10-26 ENCOUNTER — NON-APPOINTMENT (OUTPATIENT)
Age: 71
End: 2021-10-26

## 2021-11-01 ENCOUNTER — APPOINTMENT (OUTPATIENT)
Dept: CARDIOLOGY | Facility: CLINIC | Age: 71
End: 2021-11-01

## 2021-11-03 ENCOUNTER — NON-APPOINTMENT (OUTPATIENT)
Age: 71
End: 2021-11-03

## 2021-11-05 ENCOUNTER — APPOINTMENT (OUTPATIENT)
Dept: ORTHOPEDIC SURGERY | Facility: CLINIC | Age: 71
End: 2021-11-05
Payer: MEDICARE

## 2021-11-05 PROCEDURE — 99205 OFFICE O/P NEW HI 60 MIN: CPT

## 2021-11-05 PROCEDURE — 73564 X-RAY EXAM KNEE 4 OR MORE: CPT | Mod: RT

## 2021-11-06 NOTE — DISCUSSION/SUMMARY
[de-identified] : This patient has severe right knee osteoarthritis.  He is tried and failed a course of conservative management and would like to proceed with a right total knee arthroplasty using robotic navigation for assistance.\par \par The patient is an appropriate candidate for consideration of right total knee replacement. An extensive discussion was conducted of the natural history of the disease and the variety of surgical and non-surgical treatment options available to the patient. A risk/benefit analysis was discussed with the patient reviewing the advantages and disadvantages of surgical intervention at this time. Both the level and length of the patient's pain have made additional conservative treatment measures consisting of NSAIDs, physical therapy, corticosteroids, and/or viscosupplementation contraindicated. A full explanation was given of the nature and the purpose of the procedure and anesthesia, its benefits, possible alternative methods of diagnosis or treatment, the risks involved, the possibility of complications, the foreseeable consequences of the procedure and the possible results of the non-treatment. I reviewed the plan of care as well as used a model of a total knee implant equivalent to the one that will be used for their total knee joint replacement. The ability to secure the implant utilizing cement or cementless (press-fit) was discussed with the patient. The patient agrees with the plan of care, as well as the use of implants for their total knee replacement.   We also discussed that if robotic/computer navigation is utilized, then additional incisions may need to be made to accommodate the computer navigation arrays, which will be placed in the femur and tibia.\par \par No guarantee or assurance was made as to the results that may be obtained. Specifically, the risks were identified to include, but are not limited to the following: Infection, phlebitis, pulmonary embolism, death, paralysis, dislocation, pain, stiffness, instability, limp, weakness, breakage, leg-length inequality, uncontrolled bleeding, nerve injury, blood vessel injury, pressure sores, anesthetic risks, delayed healing of wound and bone, and wear and loosening. Further discussion was undertaken with the patient about the details of surgical preparation, treatment, and postoperative rehabilitation including medical clearance, autotransfusion, the hospital course, and the postoperative rehabilitation involved. All in all, I feel that this patient is a good candidate for surgical reconstruction.\par \par The patient and I discussed the current SARS-CoV-2 (COVID-19) pandemic which has affected our local hospitals. We discussed that our hospitals treat patients with COVID-19. All efforts will be made to avoid cohorting the patient with diagnosed or suspected COVID-19 patient. They also understand that we will screen them 24-48 hours prior to surgery. Despite our best efforts, there is a potential risk for iatrogenic transmission of COVID-19 to the patient during the perioperative period. Al COVID-19 during the perioperative period may increase the patient´s risks of an adverse outcome including postoperative pneumonia, difficulty breathing, requirement for a breathing tube (general endotracheal intubation), and death. The patient is understanding of this risk, and is willing to proceed with surgery at this time.\par \par I did have a discussion with the patient they are at an increased risk for complication after the surgery.  He has atrial fibrillation and is on Eliquis for this as well as an AICD.  His cardiac risk factors are therefore significantly increased.  We discussed that he will need to hold the Eliquis before surgery and we can restart it after surgery but will need to reduce the dose of Eliquis to 2.5 mg twice a day from 5 mg twice a day because of the risk of wound hematoma.

## 2021-11-06 NOTE — PHYSICAL EXAM
[de-identified] : Patient is well nourished, well-developed, in no acute distress, with appropriate mood and affect. The patient is oriented to time, place, and person. Respirations are even and unlabored. Gait evaluation does reveal a limp. There is no inguinal adenopathy. Examination of the contralateral knee shows normal range of motion, strength, no tenderness, and intact skin. The affected limb is well-perfused, without skin lesions, shows a grossly normal motor and sensory examination. Knee motion is significantly reduced and does cause significant pain. The knee moves from  degrees. The knee is stable within that range-of-motion to AP and ML stress. The alignment of the knee is 5 degrees varus. Muscle strength is normal. Pedal pulses are palpable. Hip examination was negative.\par  [de-identified] : Long standing knee, AP knee, lateral knee, and patellar views of the right knee were ordered and taken in the office and demonstrate degenerative joint disease of the knee with joint space narrowing, osteophyte formation, and subchondral sclerosis.

## 2021-11-06 NOTE — HISTORY OF PRESENT ILLNESS
[de-identified] : This is very nice 71-year-old gentleman experiencing chronic right knee pain, which is severe in intensity. The pain substantially limits activities of daily living. Walking tolerance is reduced. Medication and activity modification have been minimally effective for a period lasting greater than three months in duration. Assistive devices and external support were not deemed by the patient to be helpful in improving their function. Due to the severity of osteoarthritis and level of pain, physical therapy is contraindicated. Pain and restriction of function are intolerable at this time. The patient denies any radiation of the pain to the feet and it is not associated with numbness, tingling, or weakness.  He does have a history of atrial fibrillation, AICD and is on Eliquis.

## 2021-11-08 ENCOUNTER — RX RENEWAL (OUTPATIENT)
Age: 71
End: 2021-11-08

## 2021-11-10 ENCOUNTER — NON-APPOINTMENT (OUTPATIENT)
Age: 71
End: 2021-11-10

## 2021-11-11 ENCOUNTER — RX RENEWAL (OUTPATIENT)
Age: 71
End: 2021-11-11

## 2021-11-16 ENCOUNTER — NON-APPOINTMENT (OUTPATIENT)
Age: 71
End: 2021-11-16

## 2021-11-17 ENCOUNTER — OUTPATIENT (OUTPATIENT)
Dept: OUTPATIENT SERVICES | Facility: HOSPITAL | Age: 71
LOS: 1 days | End: 2021-11-17

## 2021-11-17 DIAGNOSIS — Z98.89 OTHER SPECIFIED POSTPROCEDURAL STATES: Chronic | ICD-10-CM

## 2021-11-17 DIAGNOSIS — Z98.890 OTHER SPECIFIED POSTPROCEDURAL STATES: Chronic | ICD-10-CM

## 2021-11-17 DIAGNOSIS — Z95.810 PRESENCE OF AUTOMATIC (IMPLANTABLE) CARDIAC DEFIBRILLATOR: Chronic | ICD-10-CM

## 2021-11-17 DIAGNOSIS — Z95.4 PRESENCE OF OTHER HEART-VALVE REPLACEMENT: Chronic | ICD-10-CM

## 2021-11-17 DIAGNOSIS — Z95.5 PRESENCE OF CORONARY ANGIOPLASTY IMPLANT AND GRAFT: Chronic | ICD-10-CM

## 2021-11-18 ENCOUNTER — APPOINTMENT (OUTPATIENT)
Dept: CARDIOLOGY | Facility: CLINIC | Age: 71
End: 2021-11-18
Payer: MEDICARE

## 2021-11-18 ENCOUNTER — OUTPATIENT (OUTPATIENT)
Dept: OUTPATIENT SERVICES | Facility: HOSPITAL | Age: 71
LOS: 1 days | End: 2021-11-18

## 2021-11-18 DIAGNOSIS — Z95.5 PRESENCE OF CORONARY ANGIOPLASTY IMPLANT AND GRAFT: Chronic | ICD-10-CM

## 2021-11-18 DIAGNOSIS — Z95.810 PRESENCE OF AUTOMATIC (IMPLANTABLE) CARDIAC DEFIBRILLATOR: Chronic | ICD-10-CM

## 2021-11-18 DIAGNOSIS — Z95.4 PRESENCE OF OTHER HEART-VALVE REPLACEMENT: Chronic | ICD-10-CM

## 2021-11-18 DIAGNOSIS — Z98.89 OTHER SPECIFIED POSTPROCEDURAL STATES: Chronic | ICD-10-CM

## 2021-11-18 DIAGNOSIS — Z98.890 OTHER SPECIFIED POSTPROCEDURAL STATES: Chronic | ICD-10-CM

## 2021-11-18 PROCEDURE — 78803 RP LOCLZJ TUM SPECT 1 AREA: CPT

## 2021-11-18 PROCEDURE — A9538 TC99M PYROPHOSPHATE: CPT

## 2021-11-19 ENCOUNTER — NON-APPOINTMENT (OUTPATIENT)
Age: 71
End: 2021-11-19

## 2021-11-23 ENCOUNTER — APPOINTMENT (OUTPATIENT)
Dept: CARDIOLOGY | Facility: CLINIC | Age: 71
End: 2021-11-23
Payer: MEDICARE

## 2021-11-23 ENCOUNTER — RX CHANGE (OUTPATIENT)
Age: 71
End: 2021-11-23

## 2021-11-23 VITALS
SYSTOLIC BLOOD PRESSURE: 100 MMHG | TEMPERATURE: 98.2 F | OXYGEN SATURATION: 96 % | DIASTOLIC BLOOD PRESSURE: 66 MMHG | WEIGHT: 227 LBS | BODY MASS INDEX: 30.09 KG/M2 | HEART RATE: 83 BPM | HEIGHT: 73 IN

## 2021-11-23 DIAGNOSIS — Z95.0 PRESENCE OF CARDIAC PACEMAKER: ICD-10-CM

## 2021-11-23 PROCEDURE — 99215 OFFICE O/P EST HI 40 MIN: CPT

## 2021-11-23 RX ORDER — LEVOTHYROXINE SODIUM 0.05 MG/1
50 TABLET ORAL
Qty: 90 | Refills: 1 | Status: DISCONTINUED | COMMUNITY
Start: 2021-07-20 | End: 2021-11-23

## 2021-11-23 NOTE — ASSESSMENT
[FreeTextEntry1] : Coronary artery disease, prior MI, Prior CTA Afl, PCI, HTN, Ischemic cardiomyopathy, CRI\par CSHF right heart cath with depressed cardiac index with normal wedge pressure and mild pulmonary \par Implantable defibrillator h/o NSVT\par s/p dual chamber PPM not CRT increased AV delay to minimize RV pacing\par s/p valve in valve TAVR 8/20\par Right lower lobe nodule . Atrial fibrillation status post cardioversion recurrent 7/21 off amiodarone \par s/p mems\par dizziness\par \par follow-up CardioMEMS Case discussed with heart failure specialist  follow-up with heart failure regarding advanced therapy\par follow-up device check if recurrent atrial fibrillation then will address catheter ablation Follow-up with electrophysiology off amio, dig\par Instructed to follow up with PMD regarding thyroid medication \par \par continue aspirin anticoagulation HI statin ARB beta-blocker diuretic and aldosterone antagonist \par additional diuretics for weight gain \par addition of SGLT2 inhibitor \par intolerant of Entresto \par Off amiodorone \par There is overall improvement in pulmonary function, tremors, dizziness, and fatigue\par SBE ppx prn\par Monitor heart rate blood pressure and weights \par \par Lexiscan stress test requested to evaluate perfusion for preoperative risk\par Carotid Doppler requested due to symptoms of dizziness \par \par F/u after noninvasive testing \par \par Discussed red flag symptoms that would warrant immediate intervention. All patient questions and concerns have been addressed Instructed to call the office if any new symptoms.\par \par Encounter documented by Anna Abernathy on 11/23/2021  acting as a scribe for Dr. Harvey Pressley MD [facility]\par

## 2021-11-23 NOTE — HISTORY OF PRESENT ILLNESS
[FreeTextEntry1] : DUDLEY BERMUDEZ  is a 71 year old  M\par \par w/ h/o CAD s/p late-presenting MI 2005 s/p PCI LAD, ICM s/p mems, s/p single chamber ICD with lead revision in 2018 then dual chamber (failed CRT), bioprosthetic AVR (2015, s/p HECTOR for severe bioprosthetic AS (8/20/20), aflutter ablation 7/17 w/ persistent afib, s/p DCCV 8/12/20 then 7/21\par unable to place LV lead due to coronary venous anatomy\par \par In June 2020, presented to [facility] for 6 months due to worsening SOB and fatigue. was admitted for 2 days and given diuretics \par Underwent angiogram with Dr. Nino which showed 10% stenosis at the site of prior stent. He also had a RHC at that time with results below. \par Found to have severe bioprosthetic AS on ANDREY for DCCV and was admitted to [facility] on 8/18 for a valve in valve TAVR, which he had done on 8/20. \par Of note, incidentally found to have a new RLL nodule on cardiac CT, had thoracic f/u and PET/CT \par \par Previous intolerance to Entresto. Symptoms of dizziness and hypotension on medication \par Is no longer on amiodarone. improvement in tremors\par overall improvement in pulmonary function, dizziness, and fatigue \par Start Jardiance \par \par Previously given extra doses of torsemide for elevated mems readings CardioMEMS f\par ollowed by heart failure service \par \par Scheduled to undergo knee replacement January 31 2022 [place] \par Further CV testing requested to assess cardiovascular risk \par pt. reports surgery is required as current functional status affects QOL\par \par device with low burden of atrial fibrillation \par July 2021 ANDREY aortic valve bioprosthesis LV dysfunction mild MR \par device upgrade August 2021 \par CardioMEMS September 2021 right heart cath with depressed cardiac index with normal wedge pressure and mild pulmonary hypertension PA 51/20 wedge 11 \par PYP study was equivocal for cardiac amyloid \par blood work November 2021 hemoglobin 13.6 potassium 4.0 creatinine 1.2 LDL 64\par last echocardiogram demonstrates severe left ventricular dysfunction mild mitral regurgitation TAVR with peak gradient of 17 no aortic regurgitation moderate pulmonary hypertension

## 2021-11-24 ENCOUNTER — RX CHANGE (OUTPATIENT)
Age: 71
End: 2021-11-24

## 2021-11-29 ENCOUNTER — NON-APPOINTMENT (OUTPATIENT)
Age: 71
End: 2021-11-29

## 2021-11-29 ENCOUNTER — APPOINTMENT (OUTPATIENT)
Dept: CARDIOLOGY | Facility: CLINIC | Age: 71
End: 2021-11-29
Payer: MEDICARE

## 2021-11-29 PROCEDURE — 93296 REM INTERROG EVL PM/IDS: CPT

## 2021-11-29 PROCEDURE — 93295 DEV INTERROG REMOTE 1/2/MLT: CPT | Mod: NC

## 2021-12-01 ENCOUNTER — RX CHANGE (OUTPATIENT)
Age: 71
End: 2021-12-01

## 2021-12-01 ENCOUNTER — APPOINTMENT (OUTPATIENT)
Dept: CARDIOLOGY | Facility: CLINIC | Age: 71
End: 2021-12-01
Payer: MEDICARE

## 2021-12-01 VITALS
HEART RATE: 69 BPM | TEMPERATURE: 97.1 F | DIASTOLIC BLOOD PRESSURE: 68 MMHG | SYSTOLIC BLOOD PRESSURE: 104 MMHG | WEIGHT: 229 LBS | BODY MASS INDEX: 30.35 KG/M2 | HEIGHT: 73 IN | OXYGEN SATURATION: 97 %

## 2021-12-01 PROCEDURE — 93283 PRGRMG EVAL IMPLANTABLE DFB: CPT

## 2021-12-01 RX ORDER — ORAL SEMAGLUTIDE 7 MG/1
7 TABLET ORAL DAILY
Refills: 0 | Status: DISCONTINUED | COMMUNITY
End: 2021-12-01

## 2021-12-02 ENCOUNTER — NON-APPOINTMENT (OUTPATIENT)
Age: 71
End: 2021-12-02

## 2021-12-02 ENCOUNTER — RX CHANGE (OUTPATIENT)
Age: 71
End: 2021-12-02

## 2021-12-02 ENCOUNTER — APPOINTMENT (OUTPATIENT)
Dept: RADIOLOGY | Facility: CLINIC | Age: 71
End: 2021-12-02
Payer: MEDICARE

## 2021-12-02 PROCEDURE — 71046 X-RAY EXAM CHEST 2 VIEWS: CPT

## 2021-12-02 RX ORDER — EMPAGLIFLOZIN 10 MG/1
10 TABLET, FILM COATED ORAL DAILY
Qty: 90 | Refills: 1 | Status: DISCONTINUED | COMMUNITY
Start: 2021-11-23 | End: 2021-12-02

## 2021-12-06 ENCOUNTER — RESULT CHARGE (OUTPATIENT)
Age: 71
End: 2021-12-06

## 2021-12-06 ENCOUNTER — NON-APPOINTMENT (OUTPATIENT)
Age: 71
End: 2021-12-06

## 2021-12-07 ENCOUNTER — APPOINTMENT (OUTPATIENT)
Dept: CARDIOLOGY | Facility: CLINIC | Age: 71
End: 2021-12-07
Payer: MEDICARE

## 2021-12-07 ENCOUNTER — NON-APPOINTMENT (OUTPATIENT)
Age: 71
End: 2021-12-07

## 2021-12-07 VITALS
DIASTOLIC BLOOD PRESSURE: 78 MMHG | BODY MASS INDEX: 29.95 KG/M2 | SYSTOLIC BLOOD PRESSURE: 126 MMHG | OXYGEN SATURATION: 98 % | HEART RATE: 79 BPM | RESPIRATION RATE: 14 BRPM | WEIGHT: 226 LBS | HEIGHT: 73 IN

## 2021-12-07 PROCEDURE — 93000 ELECTROCARDIOGRAM COMPLETE: CPT

## 2021-12-07 PROCEDURE — 99214 OFFICE O/P EST MOD 30 MIN: CPT

## 2021-12-07 NOTE — CARDIOLOGY SUMMARY
[de-identified] : \par 12/7/21 - A paced, long NH interval, PVC, \par 9/21/21 - A paced, long NH interval, PRWP, low voltage \par 7/19/21 - ? afib (per Dr. Apple in sinus); HR 70, RV paced \par 10/1/20 - afib, V paced, HR 51 \par 8/24/20 - AFib/, HR 69\par 12/31/19 - AFib, HR 73, low voltage, PRWP \par 11/8/17 - NSR, HR 76, APC, limb lead reversal, PRWP\par 6/29/17  Aflutter,HR 79 [de-identified] : \par 11/18/21 - Tc-Pyp - heart-to-contralateral ratio 1.3 (borderline/suggestive of ATTR); grade 2 (myocardial uptake to rib; borderline/suggestive of ATTR); equivocal for cardiac amyloidosis  [de-identified] : \par 10/1/20 - LVEDD 6 cm, EF 20% (segmental; inferoseptal, apical, anterolateral wall akinetic; basal lateral function preserved), mild MR; TAVR in place; AAILYAH 2.2; LVOT VTi 12.9 cm, IVC nl sized\par \par TTE 8/21/20 - LVEDD 5.4 cm, EF 25%, nl RV size with mildly reduced function, mild-mod MR, well seated Evolut Pro+ THV-in-valve with nl function (peak 11, mean 6), mild TR, est RAP 15 mmHg, est RVSP 71 mmHg.\par \par TTE 6/18/19 - LVEDD 5.8 cm, EF 20-25%, mild MR, nl functioning bioAVR (peak 19, mean 9), mod LA dilatation, \par \par ANDREY 7/10/17 - EF 10-15%, LVEDD 5.9 cm, mild MR, bioprosthetic Ao valve, no aortic insufficiency, severe segmental LV dysfunction (akinesis of mid-apical septum, aneurysmal and dyskinetic LV apex, akinesis of anterior wall)\par \par TTE 12/16 LVIDd 5.8 cm. severe global LVSD. Right ventricle mildly dilated with global hypokinesis.  Normal prosthetic aortic valve: IVS 1.0 cm, severe global LVSD, normal diastolic functioning. Right ventricle mildly dilated with global hypokinesis. Normal prosthetic aortic valve, mild MR, mild pulmonic insufficiency, normal Tricuspid valve. [de-identified] : \par  [de-identified] : \par 9/16/21 - RHC/MEMS implant - RA 12, RV 50/13, PA 51/20/33, PCWP 11, CO/CI 4.7/2.08 (PA sat 59%, Ao sat 93%, Hb 11.5), PVR 4.68 REYNA\par \par 8/20/20 - HECTOR; /36, Ao 101/63/80\par \par [facility] 6/4/20 - Ao 126/77/92, RA 8, RV 71/10, PA 66/24/40, PCW 20, PaSat 61%, AoSat 98%, Jan CO/CI 3.85/1.7, PVR 5.19 Reyna, SVR 1744 dsc\par \par [facility] 6/4/20 - LM mild CAD, mLAD 10% stenosis at site of prior stent, LCx/RCA mod CAD\par  \par [facility] ([place]) - 3 stents placed\par

## 2021-12-07 NOTE — PHYSICAL EXAM
[Well Developed] : well developed [Well Nourished] : well nourished [No Acute Distress] : no acute distress [Normal Conjunctiva] : normal conjunctiva [No Carotid Bruit] : no carotid bruit [Normal S1, S2] : normal S1, S2 [No Murmur] : no murmur [No Rub] : no rub [No Gallop] : no gallop [Clear Lung Fields] : clear lung fields [Good Air Entry] : good air entry [No Respiratory Distress] : no respiratory distress  [Soft] : abdomen soft [Non Tender] : non-tender [No Masses/organomegaly] : no masses/organomegaly [Normal Bowel Sounds] : normal bowel sounds [Normal Gait] : normal gait [No Cyanosis] : no cyanosis [No Clubbing] : no clubbing [No Varicosities] : no varicosities [No Rash] : no rash [No Skin Lesions] : no skin lesions [Moves all extremities] : moves all extremities [No Focal Deficits] : no focal deficits [Normal Speech] : normal speech [Alert and Oriented] : alert and oriented [Normal memory] : normal memory [de-identified] : overweight [de-identified] : JVP approx 6-8 cm without HJR [de-identified] : trace edema L ankle

## 2021-12-07 NOTE — ASSESSMENT
[FreeTextEntry1] : Mr. 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 Currently appears euvolemic with persistent dizziness that is episodic. Endorses NYHA class II symptoms and is euvolemic currently. \par \par 1. ICM, HFrEF \par - on torsemide 20 mg daily with 10 mg in evening every other day; goal weight 227-228 pounds\par - plans on starting Farxiga 10 mg daily so counseled him to stop 10 mg at night for now; may need to reduce morning torsemide as well \par - does not tolerate Entresto as has significant pre-syncopal episodes (were occurring even prior to Entresto many years ago)\par - c/w losartan 12.5 mg twice/day\par - Continue Sprinolactone 25 mg QD\par - Continue Toprol XL 12.5 mg twice/day; currently A pacing after atrial lead placed \par - discussed if things were to worsen in future of possible LVAD as an option but no current indication for this\par \par 2. Aflutter/afib\par - A pacing\par - continue eliquis 5 mg bid\par - off digoxin since his DCCV 8/2020\par - now off amiodarone with improvement in tremor\par \par 3. 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 CAD - s/p MI\par - continue ASA 81 mg daily and lipitor 40 mg daily\par \par 5. RLL nodule 1.8x1.4 cm opacity \par - underwent PET/CT on 9/16 which was non-FDG avid and reviewed with Dr. Waggoner which felt it as inflammatory and no further intervention was warranted\par - he will also follow-up with his pulmonologist, Dr. Guerrero in [place]\par \par 6. Syncope -\par - would do carotid dopplers \par - given low voltage and atrial myopathy, evaluated for TTR amyloidosis which was equivocal; would require cardiac biopsy for definitive diagnosis but will defer as low suspicion \par - recommend compression stockings\par \par RTC in 3 months with me

## 2021-12-07 NOTE — HISTORY OF PRESENT ILLNESS
[FreeTextEntry1] : Mr. Encinas is a very pleasant 72 y/o M w/ h/o CAD s/p late-presenting MI 2005 s/p PCI LAD, ICM/HFrEF (EF 15-20%, LVEDD 6 cm) s/p single chamber ICD with lead revision in 2018 with upgrade to dual-chamber device (8/17/21) and recent MEMS (9/16/21), bioprosthetic AVR (2015) 2/2 AI s/p 26 mm EVOLUT HECTOR for severe bioprosthetic AS (8/20/20), aflutter ablation 7/17 w/ persistent afib, s/p DCCV x2 (8/12/20, 7/7/21), who presents for routine follow-up for his cardiomyopathy. Followed by Dr. Pressley. danish peng Was last seen by me 9/21/21 and had been doing well. Has had diuretics adjusted based on his CardioMEMS. Also underwent Tc-Pyp study to evaluate for amyloidosis which was equivocal. For definitive purposes, would require a cardiac biopsy which we will defer for now. danish peng He has previously been intolerant to adjustment in his neurohormonal therapies. Has been tolerating losartan 12.5 mg twice/day and toprol 12.5 mg twice/day. Has had chronic dizziness worse with standing after prolonged period of sitting which has been fairly stable. Hasn't had recent episodes of syncope. He previously had 2 episodes of syncope (May and June) unrelated to arrhythmias. danish peng Weight has lately been 228-230 pounds; today was 227 pounds. He notes marked improvement of exertional tolerance but has difficulty due to pain in right knee and would like to consider knee replacement; would like to have it done end of January. Has tried physical therapy and gel injections. His appetite has been excellent. Denies orthopnea/PND. Currently without LH/dizziness and denies CP, palpitations, orthopnea, PND, cough, abdominal distention and LE swelling.danish peng Was recently placed on Farxiga 10 mg daily which he will be starting today. danish peng Received Covid (Moderna) vaccine x2 (last in February). danish peng Denies any numbness/tingling in hands. Reports some back pain at times.

## 2021-12-14 ENCOUNTER — NON-APPOINTMENT (OUTPATIENT)
Age: 71
End: 2021-12-14

## 2021-12-17 ENCOUNTER — NON-APPOINTMENT (OUTPATIENT)
Age: 71
End: 2021-12-17

## 2021-12-27 ENCOUNTER — APPOINTMENT (OUTPATIENT)
Dept: CARDIOLOGY | Facility: CLINIC | Age: 71
End: 2021-12-27
Payer: MEDICARE

## 2021-12-27 ENCOUNTER — NON-APPOINTMENT (OUTPATIENT)
Age: 71
End: 2021-12-27

## 2021-12-27 PROCEDURE — 93979 VASCULAR STUDY: CPT

## 2021-12-27 PROCEDURE — 93880 EXTRACRANIAL BILAT STUDY: CPT

## 2021-12-28 ENCOUNTER — NON-APPOINTMENT (OUTPATIENT)
Age: 71
End: 2021-12-28

## 2022-01-01 ENCOUNTER — RX RENEWAL (OUTPATIENT)
Age: 72
End: 2022-01-01

## 2022-01-03 ENCOUNTER — NON-APPOINTMENT (OUTPATIENT)
Age: 72
End: 2022-01-03

## 2022-01-06 ENCOUNTER — APPOINTMENT (OUTPATIENT)
Dept: CARDIOLOGY | Facility: CLINIC | Age: 72
End: 2022-01-06
Payer: MEDICARE

## 2022-01-06 PROCEDURE — 93015 CV STRESS TEST SUPVJ I&R: CPT

## 2022-01-06 PROCEDURE — A9502: CPT

## 2022-01-06 PROCEDURE — 78452 HT MUSCLE IMAGE SPECT MULT: CPT

## 2022-01-10 ENCOUNTER — OUTPATIENT (OUTPATIENT)
Dept: OUTPATIENT SERVICES | Facility: HOSPITAL | Age: 72
LOS: 1 days | End: 2022-01-10
Payer: MEDICARE

## 2022-01-10 VITALS
TEMPERATURE: 98 F | WEIGHT: 227.96 LBS | OXYGEN SATURATION: 95 % | RESPIRATION RATE: 16 BRPM | DIASTOLIC BLOOD PRESSURE: 68 MMHG | HEART RATE: 78 BPM | HEIGHT: 72 IN | SYSTOLIC BLOOD PRESSURE: 101 MMHG

## 2022-01-10 DIAGNOSIS — Z95.4 PRESENCE OF OTHER HEART-VALVE REPLACEMENT: Chronic | ICD-10-CM

## 2022-01-10 DIAGNOSIS — Z95.810 PRESENCE OF AUTOMATIC (IMPLANTABLE) CARDIAC DEFIBRILLATOR: Chronic | ICD-10-CM

## 2022-01-10 DIAGNOSIS — Z95.5 PRESENCE OF CORONARY ANGIOPLASTY IMPLANT AND GRAFT: Chronic | ICD-10-CM

## 2022-01-10 DIAGNOSIS — Z01.818 ENCOUNTER FOR OTHER PREPROCEDURAL EXAMINATION: ICD-10-CM

## 2022-01-10 DIAGNOSIS — Z98.890 OTHER SPECIFIED POSTPROCEDURAL STATES: Chronic | ICD-10-CM

## 2022-01-10 DIAGNOSIS — E11.9 TYPE 2 DIABETES MELLITUS WITHOUT COMPLICATIONS: ICD-10-CM

## 2022-01-10 DIAGNOSIS — Z98.89 OTHER SPECIFIED POSTPROCEDURAL STATES: Chronic | ICD-10-CM

## 2022-01-10 DIAGNOSIS — M17.11 UNILATERAL PRIMARY OSTEOARTHRITIS, RIGHT KNEE: ICD-10-CM

## 2022-01-10 DIAGNOSIS — I48.91 UNSPECIFIED ATRIAL FIBRILLATION: ICD-10-CM

## 2022-01-10 DIAGNOSIS — Z95.810 PRESENCE OF AUTOMATIC (IMPLANTABLE) CARDIAC DEFIBRILLATOR: ICD-10-CM

## 2022-01-10 DIAGNOSIS — Z29.9 ENCOUNTER FOR PROPHYLACTIC MEASURES, UNSPECIFIED: ICD-10-CM

## 2022-01-10 DIAGNOSIS — J43.9 EMPHYSEMA, UNSPECIFIED: ICD-10-CM

## 2022-01-10 DIAGNOSIS — Z86.79 PERSONAL HISTORY OF OTHER DISEASES OF THE CIRCULATORY SYSTEM: ICD-10-CM

## 2022-01-10 LAB
ANION GAP SERPL CALC-SCNC: 16 MMOL/L — SIGNIFICANT CHANGE UP (ref 5–17)
BLD GP AB SCN SERPL QL: NEGATIVE — SIGNIFICANT CHANGE UP
BUN SERPL-MCNC: 34 MG/DL — HIGH (ref 7–23)
CALCIUM SERPL-MCNC: 9.5 MG/DL — SIGNIFICANT CHANGE UP (ref 8.4–10.5)
CHLORIDE SERPL-SCNC: 97 MMOL/L — SIGNIFICANT CHANGE UP (ref 96–108)
CO2 SERPL-SCNC: 24 MMOL/L — SIGNIFICANT CHANGE UP (ref 22–31)
CREAT SERPL-MCNC: 1.52 MG/DL — HIGH (ref 0.5–1.3)
GLUCOSE SERPL-MCNC: 96 MG/DL — SIGNIFICANT CHANGE UP (ref 70–99)
HCT VFR BLD CALC: 42.5 % — SIGNIFICANT CHANGE UP (ref 39–50)
HGB BLD-MCNC: 13.9 G/DL — SIGNIFICANT CHANGE UP (ref 13–17)
MCHC RBC-ENTMCNC: 31.2 PG — SIGNIFICANT CHANGE UP (ref 27–34)
MCHC RBC-ENTMCNC: 32.7 GM/DL — SIGNIFICANT CHANGE UP (ref 32–36)
MCV RBC AUTO: 95.5 FL — SIGNIFICANT CHANGE UP (ref 80–100)
NRBC # BLD: 0 /100 WBCS — SIGNIFICANT CHANGE UP (ref 0–0)
PLATELET # BLD AUTO: 209 K/UL — SIGNIFICANT CHANGE UP (ref 150–400)
POTASSIUM SERPL-MCNC: 3.6 MMOL/L — SIGNIFICANT CHANGE UP (ref 3.5–5.3)
POTASSIUM SERPL-SCNC: 3.6 MMOL/L — SIGNIFICANT CHANGE UP (ref 3.5–5.3)
RBC # BLD: 4.45 M/UL — SIGNIFICANT CHANGE UP (ref 4.2–5.8)
RBC # FLD: 13 % — SIGNIFICANT CHANGE UP (ref 10.3–14.5)
RH IG SCN BLD-IMP: POSITIVE — SIGNIFICANT CHANGE UP
SODIUM SERPL-SCNC: 137 MMOL/L — SIGNIFICANT CHANGE UP (ref 135–145)
WBC # BLD: 10.71 K/UL — HIGH (ref 3.8–10.5)
WBC # FLD AUTO: 10.71 K/UL — HIGH (ref 3.8–10.5)

## 2022-01-10 PROCEDURE — G1004: CPT

## 2022-01-10 PROCEDURE — 73700 CT LOWER EXTREMITY W/O DYE: CPT | Mod: ME

## 2022-01-10 PROCEDURE — 36415 COLL VENOUS BLD VENIPUNCTURE: CPT

## 2022-01-10 PROCEDURE — 87640 STAPH A DNA AMP PROBE: CPT

## 2022-01-10 PROCEDURE — 86901 BLOOD TYPING SEROLOGIC RH(D): CPT

## 2022-01-10 PROCEDURE — 80048 BASIC METABOLIC PNL TOTAL CA: CPT

## 2022-01-10 PROCEDURE — 87641 MR-STAPH DNA AMP PROBE: CPT

## 2022-01-10 PROCEDURE — 73700 CT LOWER EXTREMITY W/O DYE: CPT | Mod: 26,RT,ME

## 2022-01-10 PROCEDURE — 83036 HEMOGLOBIN GLYCOSYLATED A1C: CPT

## 2022-01-10 PROCEDURE — G0463: CPT

## 2022-01-10 PROCEDURE — 85027 COMPLETE CBC AUTOMATED: CPT

## 2022-01-10 PROCEDURE — 86900 BLOOD TYPING SEROLOGIC ABO: CPT

## 2022-01-10 PROCEDURE — 86850 RBC ANTIBODY SCREEN: CPT

## 2022-01-10 RX ORDER — METOPROLOL TARTRATE 50 MG
1 TABLET ORAL
Qty: 0 | Refills: 0 | DISCHARGE

## 2022-01-10 RX ORDER — METOPROLOL TARTRATE 50 MG
0.5 TABLET ORAL
Qty: 0 | Refills: 0 | DISCHARGE

## 2022-01-10 RX ORDER — AMIODARONE HYDROCHLORIDE 400 MG/1
1 TABLET ORAL
Qty: 0 | Refills: 0 | DISCHARGE

## 2022-01-10 RX ORDER — CEFAZOLIN SODIUM 1 G
2000 VIAL (EA) INJECTION ONCE
Refills: 0 | Status: COMPLETED | OUTPATIENT
Start: 2022-01-31 | End: 2022-01-31

## 2022-01-10 NOTE — H&P PST ADULT - PROBLEM SELECTOR PLAN 5
last dose Eliquis 10/27/18 PM dose per Dr. Rodriguez  continue beta blocker Last dose of Eliquis 01/27/22, will confirm with cardio.

## 2022-01-10 NOTE — H&P PST ADULT - NSICDXPASTMEDICALHX_GEN_ALL_CORE_FT
PAST MEDICAL HISTORY:  AICD (automatic cardioverter/defibrillator) present     Aortic stenosis severe, s/p AVR    CAD (coronary artery disease) 2004    CHF (congestive heart failure)     HLD (hyperlipidemia)     HTN (hypertension)     MI (myocardial infarction) Nov 2004    Syncope epidsode in 11/14, was found to have been an episode of V-Fib with sucsessfull AICD shock    Systolic heart failure EF 15%    Valvular cardiomyopathy      PAST MEDICAL HISTORY:  AICD (automatic cardioverter/defibrillator) present     Aortic stenosis severe, s/p AVR    CAD (coronary artery disease) 2004    Carotid stenosis - moderate, b/l (Doppler 12/27/21)    CHF (congestive heart failure)     H/O emphysema - moderately severe, CT chest 06/10/21    H/O pulmonary hypertension     HLD (hyperlipidemia)     HTN (hypertension)     Ischemic cardiomyopathy     Longstanding persistent atrial fibrillation     MI (myocardial infarction) Nov 2004    NSVT (nonsustained ventricular tachycardia)     Osteoarthritis of right knee     Syncope epidsode in 11/14, was found to have been an episode of V-Fib with sucsessfull AICD shock    Systolic heart failure EF 15%    Type 2 diabetes mellitus     Valvular cardiomyopathy      PAST MEDICAL HISTORY:  AICD (automatic cardioverter/defibrillator) present     Aortic stenosis severe, s/p AVR    CAD (coronary artery disease) 2004    Carotid stenosis - moderate, b/l (Doppler 12/27/21)    CHF (congestive heart failure)     H/O emphysema - moderately severe, CT chest 06/10/21    H/O pulmonary hypertension     HLD (hyperlipidemia)     HTN (hypertension)     Ischemic cardiomyopathy     Longstanding persistent atrial fibrillation     MI (myocardial infarction) Nov 2004    Neuralgia     NSVT (nonsustained ventricular tachycardia)     Osteoarthritis of right knee     Shingles 04/2020    Syncope epidsode in 11/14, was found to have been an episode of V-Fib with sucsessfull AICD shock    Systolic heart failure EF 15%    Type 2 diabetes mellitus     Valvular cardiomyopathy

## 2022-01-10 NOTE — H&P PST ADULT - PROBLEM SELECTOR PLAN 1
14-Oct-2021 11:51 ICD lead extraction with reimplantation 10/30/18  CXR, CBC, BMP, type and screen sent Right Total Knee Arthroplasty with JOCELIN Robot.

## 2022-01-10 NOTE — H&P PST ADULT - MUSCULOSKELETAL
negative No joint pain, swelling or deformity; no limitation of movement detailed exam details… Right knee/no joint swelling/decreased ROM

## 2022-01-10 NOTE — H&P PST ADULT - PROBLEM SELECTOR PLAN 3
continue statin The Caprini score indicates that this patient is at high risk for a VTE event (score 6 or greater). Surgical patients in this group will benefit from both pharmacologic prophylaxis and intermittent compression devices.  The surgical team will determine the balance between VTE risk and bleeding risk, and other clinical considerations.

## 2022-01-10 NOTE — H&P PST ADULT - LAST CARDIAC ANGIOGRAM/IMAGING
2014 LAD moderate atherosclerosis moderate in-stent restenosis 09/16/21 - [facility]/VERÓNICA implant

## 2022-01-10 NOTE — H&P PST ADULT - PROBLEM SELECTOR PLAN 6
The Caprini score indicates this patient is at risk for a VTE event (score 3-5).  Most surgical patients in this group would benefit from pharmacologic prophylaxis.  The surgical team will determine the balance between VTE risk and bleeding risk Pulmonary clearance pending.

## 2022-01-10 NOTE — H&P PST ADULT - NS MD HP INPLANTS MED DEV
3 REINA LAD/Automatic Implantable Cardioverter Defibrillator/Pacemaker/Heart valve Medtronic [id number] Cobald XT DR THOMAS, 3 REINA LAD, Aortic valve, MEMS/Automatic Implantable Cardioverter Defibrillator

## 2022-01-10 NOTE — H&P PST ADULT - PAIN LOCATION, PROFILE
DOS   04/01/2019  CPT   03909  18902    86940.26  [id number]   NPR  DX   M51.26   M54.16   M47.816  OP SX AUTH  [id number]   VALID   04/01/2019 - 06/01/2019    LEFT  LEVELS   L5 - S1   PROCEDURE   TRANSFORAMINAL EPIDURAL INJECTIN  DR. Justin Samano  INSURANCE:   Arsalan Sol Right knee

## 2022-01-10 NOTE — H&P PST ADULT - REASON FOR ADMISSION
Right knee pain. Goal - exercising more pain free. Right knee pain. Goal - to exercise more - pain free.

## 2022-01-10 NOTE — H&P PST ADULT - OTHER CARE PROVIDERS
Dr. Froilan Quiñones Dr. Froilan Quiñones [phone] - main cardiologist giving clearance, Dr. Harvey Pressley [phone] - local cardiologist in [place]  - did stress test on 01/03/22, Dr. Avel Guerrero [phone] (pulm) - will schedule an appointment for clearance Dr. Froilan Quiñones [phone] - main cardiologist giving clearance, Dr. Harvey Pressley [phone] - local cardiologist in [place], Dr. Avel Guerrero [phone] (pulm) - will schedule an appointment for clearance

## 2022-01-10 NOTE — H&P PST ADULT - ASSESSMENT
CAPRINI SCORE [CLOT]    AGE RELATED RISK FACTORS                                                       MOBILITY RELATED FACTORS  [ ] Age 41-60 years                                            (1 Point)                  [ ] Bed rest                                                        (1 Point)  [x] Age: 61-74 years                                           (2 Points)                 [ ] Plaster cast                                                   (2 Points)  [ ] Age= 75 years                                              (3 Points)                 [ ] Bed bound for more than 72 hours                 (2 Points)    DISEASE RELATED RISK FACTORS                                               GENDER SPECIFIC FACTORS  [ ] Edema in the lower extremities                       (1 Point)                  [ ] Pregnancy                                                     (1 Point)  [ ] Varicose veins                                               (1 Point)                  [ ] Post-partum < 6 weeks                                   (1 Point)             [x] BMI > 25 Kg/m2                                            (1 Point)                  [ ] Hormonal therapy  or oral contraception          (1 Point)                 [ ] Sepsis (in the previous month)                        (1 Point)                  [ ] History of pregnancy complications                 (1 point)  [ ] Pneumonia or serious lung disease                                               [ ] Unexplained or recurrent                     (1 Point)           (in the previous month)                               (1 Point)  [ ] Abnormal pulmonary function test                     (1 Point)                 SURGERY RELATED RISK FACTORS  [ ] Acute myocardial infarction                              (1 Point)                 [ ]  Section                                             (1 Point)  [ ] Congestive heart failure (in the previous month)  (1 Point)               [ ] Minor surgery                                                  (1 Point)   [ ] Inflammatory bowel disease                             (1 Point)                 [ ] Arthroscopic surgery                                        (2 Points)  [ ] Central venous access                                      (2 Points)                [x] General surgery lasting more than 45 minutes   (2 Points)       [ ] Stroke (in the previous month)                          (5 Points)               [ ] Elective arthroplasty                                         (5 Points)                                                                                                                                               HEMATOLOGY RELATED FACTORS                                                 TRAUMA RELATED RISK FACTORS  [ ] Prior episodes of VTE                                     (3 Points)                 [ ] Fracture of the hip, pelvis, or leg                       (5 Points)  [ ] Positive family history for VTE                         (3 Points)                 [ ] Acute spinal cord injury (in the previous month)  (5 Points)  [ ] Prothrombin 62839 A                                     (3 Points)                 [ ] Paralysis  (less than 1 month)                             (5 Points)  [ ] Factor V Leiden                                             (3 Points)                  [ ] Multiple Trauma within 1 month                        (5 Points)  [ ] Lupus anticoagulants                                     (3 Points)                                                           [ ] Anticardiolipin antibodies                               (3 Points)                                                       [ ] High homocysteine in the blood                      (3 Points)                                             [ ] Other congenital or acquired thrombophilia      (3 Points)                                                [ ] Heparin induced thrombocytopenia                  (3 Points)                                          Total Score [5 ] CAPRINI SCORE [CLOT]    AGE RELATED RISK FACTORS                                                       MOBILITY RELATED FACTORS  [ ] Age 41-60 years                                            (1 Point)                  [ ] Bed rest                                                        (1 Point)  [x] Age: 61-74 years                                           (2 Points)                 [ ] Plaster cast                                                   (2 Points)  [ ] Age= 75 years                                              (3 Points)                 [ ] Bed bound for more than 72 hours                 (2 Points)    DISEASE RELATED RISK FACTORS                                               GENDER SPECIFIC FACTORS  [ ] Edema in the lower extremities                       (1 Point)                  [ ] Pregnancy                                                     (1 Point)  [ ] Varicose veins                                               (1 Point)                  [ ] Post-partum < 6 weeks                                   (1 Point)             [x] BMI > 25 Kg/m2                                            (1 Point)                  [ ] Hormonal therapy  or oral contraception          (1 Point)                 [ ] Sepsis (in the previous month)                        (1 Point)                  [ ] History of pregnancy complications                 (1 point)  [ ] Pneumonia or serious lung disease                                               [ ] Unexplained or recurrent                     (1 Point)           (in the previous month)                               (1 Point)  [ ] Abnormal pulmonary function test                     (1 Point)                 SURGERY RELATED RISK FACTORS  [ ] Acute myocardial infarction                              (1 Point)                 [ ]  Section                                             (1 Point)  [x] Congestive heart failure (in the previous month)  (1 Point)               [ ] Minor surgery                                                  (1 Point)   [ ] Inflammatory bowel disease                             (1 Point)                 [ ] Arthroscopic surgery                                        (2 Points)  [ ] Central venous access                                      (2 Points)                [ ] General surgery lasting more than 45 minutes   (2 Points)       [ ] Stroke (in the previous month)                          (5 Points)               [x] Elective arthroplasty                                         (5 Points)                                                                                                                                               HEMATOLOGY RELATED FACTORS                                                 TRAUMA RELATED RISK FACTORS  [ ] Prior episodes of VTE                                     (3 Points)                 [ ] Fracture of the hip, pelvis, or leg                       (5 Points)  [ ] Positive family history for VTE                         (3 Points)                 [ ] Acute spinal cord injury (in the previous month)  (5 Points)  [ ] Prothrombin 32891 A                                     (3 Points)                 [ ] Paralysis  (less than 1 month)                             (5 Points)  [ ] Factor V Leiden                                             (3 Points)                  [ ] Multiple Trauma within 1 month                        (5 Points)  [ ] Lupus anticoagulants                                     (3 Points)                                                           [ ] Anticardiolipin antibodies                               (3 Points)                                                       [ ] High homocysteine in the blood                      (3 Points)                                             [ ] Other congenital or acquired thrombophilia      (3 Points)                                                [ ] Heparin induced thrombocytopenia                  (3 Points)                                          Total Score [ 9 ]

## 2022-01-10 NOTE — H&P PST ADULT - NSICDXPASTSURGICALHX_GEN_ALL_CORE_FT
PAST SURGICAL HISTORY:  AICD (automatic cardioverter/defibrillator) present single chamber 2005, replaced with dual chamber 08/17 /2021    H/O prior ablation treatment afib - 2017, 07/2021    S/P AVR 2015, complicated by Asystole/Syncope with 1 shock, Transcatheter valveinvalve aortic valve replacement utilizing a right common femoral arterial percutaneous approach utilizing a 26 Medtronic Evolut core valve on 08/20/2020    S/P hernia repair - right inguinal, 1982    S/P knee surgery - Right knee arthroscopy, 2000    Stented coronary artery LAD x 3 (Nov 2004)     PAST SURGICAL HISTORY:  AICD (automatic cardioverter/defibrillator) present single chamber 2005, replaced with dual chamber 08/17/2021    H/O prior ablation treatment afib - 2017, 07/2021    S/P AVR 2015, complicated by Asystole/Syncope with 1 shock, Transcatheter valve-in-valve aortic valve replacement utilizing a right common femoral arterial percutaneous approach utilizing a 26 Medtronic Evolut core valve on 08/20/2020    S/P hernia repair - right inguinal, 1982    S/P knee surgery - Right knee arthroscopy, 2000    Stented coronary artery LAD x 3 (Nov 2004)

## 2022-01-10 NOTE — H&P PST ADULT - HISTORY OF PRESENT ILLNESS
This is a 72 y/o male former smoker (60 PYH) with PMH of severe emphysema, HTN, HLD, CAD s/p anterior wall MI (2004), PCI/LAD stent x 3 2004 [place], CHF (Echo 06/10/21 EF 15%), single chamber AICD 2005, Gen change 2011, ICD lead extraction with reimplantation 10/30/2018, upgrade to dual chamber device 08/17/21 and recent MEMS 09/16/21, severe AS s/p AVR 2015 Dr. Paez complicated by Asystole/Syncope with 1 shock, Transcatheter valve-in-valve aortic valve replacement utilizing a right common femoral arterial percutaneous approach utilizing a 26 Medtronic Evolut core valve on 08/20/2020, Paroxysmal afib on Eliquis, s/p afib ablation 2017, 07/2021 c/o worsening Right knee pain for over 20 years, an x-ray evealed severe OA of Right knee. Pt. denies chest pain/pressure, SOB/MATHEWS, dizziness, diaphoresis, palpitations, nausea, vomiting, peripheral edema, recent weight gain, or syncope. He is scheduled for      This is a 72 y/o male former smoker (60 PYH) with PMH of moderately severe emphysema (CT chest 06/10/21), HTN, HLD, CAD s/p anterior wall MI (2004), PCI/LAD stent x 3 2004 [place], CHF (Echo 06/10/21 EF 15%), single chamber AICD 2005, Generator change 2011, ICD lead extraction with reimplantation 10/30/2018, upgrade to dual chamber device 08/17/21 and recent MEMS 09/16/21, severe AS s/p AVR 2015 Dr. Paez complicated by Asystole/Syncope with 1 shock, Transcatheter valve-in-valve aortic valve replacement utilizing a right common femoral arterial percutaneous approach utilizing a 26 Medtronic Evolut core valve on 08/20/2020, Paroxysmal a-fib on Eliquis, s/p afib ablation 2017, 07/2021 c/o worsening Right knee pain for over 20 years, an x-ray evealed severe OA of Right knee.  He is scheduled for Right Total Knee Arthroplasty with JOCELIN Robot.     Patient will schedule covid test for 01/28/22 in [place].      This is a 70 y/o male former smoker (60 PYH) with PMH of moderately severe emphysema (CT chest 06/10/21), HTN, HLD, CAD s/p anterior wall MI (2004), PCI/LAD stent x 3 2004 [place], CHF (Echo 06/10/21 EF 15%), single chamber AICD 2005, Generator change 2011, ICD lead extraction with reimplantation 10/30/2018, upgrade to dual chamber device 08/17/21 and recent MEMS 09/16/21, severe AS s/p AVR 2015 Dr. Paez complicated by Asystole/Syncope with 1 shock, Transcatheter valve-in-valve aortic valve replacement utilizing a right common femoral arterial percutaneous approach utilizing a 26 Medtronic Evolut core valve on 08/20/2020, Paroxysmal a-fib on Eliquis, s/p a-fib ablation 2017, 07/2021 c/o worsening Right knee pain for over 20 years, an x-ray evealed severe OA of Right knee.  He is scheduled for Right Total Knee Arthroplasty with JOCELIN Robot.     Patient will schedule covid test for 01/28/22 in [place].

## 2022-01-11 LAB
A1C WITH ESTIMATED AVERAGE GLUCOSE RESULT: 6.1 % — HIGH (ref 4–5.6)
ESTIMATED AVERAGE GLUCOSE: 128 MG/DL — HIGH (ref 68–114)
MRSA PCR RESULT.: SIGNIFICANT CHANGE UP
S AUREUS DNA NOSE QL NAA+PROBE: SIGNIFICANT CHANGE UP

## 2022-01-14 ENCOUNTER — NON-APPOINTMENT (OUTPATIENT)
Age: 72
End: 2022-01-14

## 2022-01-14 ENCOUNTER — APPOINTMENT (OUTPATIENT)
Dept: CARDIOLOGY | Facility: CLINIC | Age: 72
End: 2022-01-14
Payer: MEDICARE

## 2022-01-14 VITALS
HEART RATE: 82 BPM | OXYGEN SATURATION: 95 % | SYSTOLIC BLOOD PRESSURE: 98 MMHG | HEIGHT: 72 IN | BODY MASS INDEX: 29.8 KG/M2 | WEIGHT: 220 LBS | DIASTOLIC BLOOD PRESSURE: 60 MMHG | TEMPERATURE: 96.8 F

## 2022-01-14 PROBLEM — M17.11 UNILATERAL PRIMARY OSTEOARTHRITIS, RIGHT KNEE: Chronic | Status: ACTIVE | Noted: 2022-01-10

## 2022-01-14 PROBLEM — E11.9 TYPE 2 DIABETES MELLITUS WITHOUT COMPLICATIONS: Chronic | Status: ACTIVE | Noted: 2022-01-10

## 2022-01-14 PROBLEM — I65.29 OCCLUSION AND STENOSIS OF UNSPECIFIED CAROTID ARTERY: Chronic | Status: ACTIVE | Noted: 2022-01-10

## 2022-01-14 PROBLEM — I25.5 ISCHEMIC CARDIOMYOPATHY: Chronic | Status: ACTIVE | Noted: 2022-01-10

## 2022-01-14 PROBLEM — Z87.09 PERSONAL HISTORY OF OTHER DISEASES OF THE RESPIRATORY SYSTEM: Chronic | Status: ACTIVE | Noted: 2022-01-10

## 2022-01-14 PROBLEM — I48.11 LONGSTANDING PERSISTENT ATRIAL FIBRILLATION: Chronic | Status: ACTIVE | Noted: 2022-01-10

## 2022-01-14 PROBLEM — Z86.79 PERSONAL HISTORY OF OTHER DISEASES OF THE CIRCULATORY SYSTEM: Chronic | Status: ACTIVE | Noted: 2022-01-10

## 2022-01-14 PROCEDURE — 99214 OFFICE O/P EST MOD 30 MIN: CPT

## 2022-01-14 NOTE — PHYSICAL EXAM
[Normal S1, S2] : normal S1, S2 [Normal Gait] : normal gait [No Edema] : no edema [Normal] : alert and oriented, normal memory

## 2022-01-14 NOTE — HISTORY OF PRESENT ILLNESS
[FreeTextEntry1] : DUDLEY BERMUDEZ  is a 71 year old  M\par \par w/ h/o CAD s/p late-presenting MI 2005 s/p PCI LAD, ICM s/p mems, s/p single chamber ICD with lead revision in 2018 then dual chamber (failed CRT), bioprosthetic AVR (2015, s/p HECTOR for severe bioprosthetic AS (8/20/20), aflutter ablation 7/17 w/ persistent afib, s/p DCCV 8/12/20 then 7/21\par unable to place LV lead due to coronary venous anatomy\par \par In June 2020, presented to [facility] for 6 months due to worsening SOB and fatigue. was admitted for 2 days and given diuretics \par Underwent angiogram with Dr. Nino which showed 10% stenosis at the site of prior stent. He also had a RHC at that time with results below. \par Found to have severe bioprosthetic AS on ANDREY for DCCV and was admitted to [facility] on 8/18 for a valve in valve TAVR, which he had done on 8/20. \par Of note, incidentally found to have a new RLL nodule on cardiac CT, had thoracic f/u and PET/CT \par \par Previous intolerance to Entresto. Symptoms of dizziness and hypotension on medication \par Is no longer on amiodarone. improvement in tremors\par overall improvement in pulmonary function, dizziness, and fatigue \par now tolerating SGLT2i well just w/ mild positional dizziness\par \par Previously given extra doses of torsemide for elevated mems readings CardioMEMS followed by heart failure service Dr. Quiñones \par \par Scheduled to undergo knee replacement January 31 2022 [place] \par He can walk up to 5 min, limited w/ knee pain. Denies exertional chest pain or discomfort. Denies unusual shortness of breath, orthopnea, weight gain, or LE edema. Denies palpitations, lightheadedness, dizziness, or syncope.  Denies any unusual bleeding or black/tarry stools. \par \par EKG 12/7/21 A paced\par Carotid doppler 12/27/21 mild to mod nonbstructive atherosclerosis BL\par US abd AAA  mod atherosclerosis \par Nuclear stress test 1/6/22 large anterior, apical , inferior, inferolateral fixed defects s/o infarct, TID 1.05, EF 19%\par \par Device interrogated 12/1/21 high atrial rate 10/26 c/w AT, v-rates controlled. Outputs adjusted for battery longevity. \par \par July 2021 ANDREY aortic valve bioprosthesis LV dysfunction mild MR. EF 20%\par device upgrade August 2021 dual chamber, atrial paced\par CardioMEMS September 2021 right heart cath with depressed cardiac index with normal wedge pressure and mild pulmonary hypertension PA 51/20 wedge 11 \par PYP study was equivocal for cardiac amyloid \par blood work November 2021 hemoglobin 13.6 potassium 4.0 creatinine 1.2 LDL 64\par last echocardiogram demonstrates severe left ventricular dysfunction mild mitral regurgitation TAVR with peak gradient of 17 no aortic regurgitation moderate pulmonary hypertension

## 2022-01-14 NOTE — ADDENDUM
[FreeTextEntry1] : Please note the patient was seen and examined with NP Alem Forbes\par I was physically present during the service of the patient and personally examined the patient. \jacinto I was directly involved in the management plan and recommendations of the care provided to the patient. \par I personally reviewed the history and physical examination as documented by the NP above.\par 01/14/2022\par

## 2022-01-14 NOTE — ASSESSMENT
[FreeTextEntry1] : DUDLEY BERMUDEZ is a 71 year old M who presents today Jan 14, 2022 \par preop cardiac clearance for TKR in [place] 1/31/22\par fax [phone]\par [phone]\par \par At present, there is no recent unstable coronary syndromes, decompensated heart failure, severe valvular heart disease or significant dysrhythmias.  \par Baseline functional status is limited. Knee pain limiting his QOL. \par The clinical benefit of the proposed procedure outweighs the associated cardiovascular risk.  \par Risk not attenuated with further CV testing.  \par Prior testing as outlined above.\par Optimized from a cardiovascular perspective.\par Please continue beta blocker and ASA perioperatively. \par May hold eliquis 48-72h prior and restart as soon as hemodynamically stable. \par DVT ppx per protocol. Monitor VS closely. \par Even fluid balance with h/o CHF \par \par Coronary artery disease, prior MI, Prior CTA Afl, PCI, HTN, Ischemic cardiomyopathy (EF 20%), CRI\par CSHF right heart cath with depressed cardiac index with normal wedge pressure and mild pulmonary \par Implantable defibrillator h/o NSVT\par s/p dual chamber PPM not CRT increased AV delay to minimize RV pacing\par s/p valve in valve TAVR 8/20\par Right lower lobe nodule . Atrial fibrillation status post cardioversion recurrent 7/21 off amiodarone \par s/p mems, follows w/ heart failure Dr. Quiñones specialist \par \par Positional dizziness self limiting. Use caution when changing position. BP borderline, cont to monitor. \par \par Reviewed results of nuclear stress testing and US imaging. \par There is no evidence of ischemia at this time. Large fixed defect c/w prior MI. \par Mild to moderate nonobstructive PAD noted. \par No angina at present. \par Cont medical rx. \par continue aspirin anticoagulation HI statin ARB beta-blocker diuretic and aldosterone antagonist \par additional diuretics for weight gain \par tolerating addition of SGLT2 inhibitor well\par intolerant of Entresto \par Off amiodorone \par There is overall improvement in pulmonary function, tremors, dizziness, and fatigue\par SBE ppx prn\par Monitor heart rate blood pressure and weights \par \par Please do not hesitate to call for any questions or concerns. \par \par Followup echo planned 2 mo to monitor aortic valve gradient, PASP, and EF. Office visit thereafter. \par Discussed red flag symptoms that would warrant immediate intervention. All patient questions and concerns have been addressed Instructed to call the office if any new symptoms.\par \par Sincerely,\par \par RAN Davis\par Patients history, testing, and plan reviewed with supervising MD: Dr. Harvey Pressley

## 2022-01-14 NOTE — REASON FOR VISIT
[FreeTextEntry1] : \par preop cardiac clearance for TKR in [place] 1/31/22\par fax [phone]\par [phone]\par

## 2022-01-20 ENCOUNTER — NON-APPOINTMENT (OUTPATIENT)
Age: 72
End: 2022-01-20

## 2022-01-21 NOTE — H&P PST ADULT - NSANTHSNORERD_ENT_A_CORE
----- Message from Linda Vieira sent at 1/21/2022 11:31 AM EST -----  Subject: Message to Provider    QUESTIONS  Information for Provider? Need Losartan to be re send to [facility] on [facility] since [employer] do not have any. Please call  ---------------------------------------------------------------------------  --------------  CALL BACK INFO  What is the best way for the office to contact you? OK to leave message on   voicemail  Preferred Call Back Phone Number? [phone]  ---------------------------------------------------------------------------  --------------  SCRIPT ANSWERS  Relationship to Patient?  Self No

## 2022-01-30 ENCOUNTER — FORM ENCOUNTER (OUTPATIENT)
Age: 72
End: 2022-01-30

## 2022-01-31 ENCOUNTER — INPATIENT (INPATIENT)
Facility: HOSPITAL | Age: 72
LOS: 0 days | Discharge: HOME | DRG: 470 | End: 2022-02-01
Attending: ORTHOPAEDIC SURGERY | Admitting: ORTHOPAEDIC SURGERY
Payer: MEDICARE

## 2022-01-31 ENCOUNTER — APPOINTMENT (OUTPATIENT)
Dept: ORTHOPEDIC SURGERY | Facility: HOSPITAL | Age: 72
End: 2022-01-31

## 2022-01-31 VITALS
SYSTOLIC BLOOD PRESSURE: 103 MMHG | RESPIRATION RATE: 18 BRPM | OXYGEN SATURATION: 97 % | HEART RATE: 80 BPM | TEMPERATURE: 98 F | HEIGHT: 72 IN | DIASTOLIC BLOOD PRESSURE: 70 MMHG | WEIGHT: 227.96 LBS

## 2022-01-31 DIAGNOSIS — Z95.5 PRESENCE OF CORONARY ANGIOPLASTY IMPLANT AND GRAFT: Chronic | ICD-10-CM

## 2022-01-31 DIAGNOSIS — Z98.890 OTHER SPECIFIED POSTPROCEDURAL STATES: Chronic | ICD-10-CM

## 2022-01-31 DIAGNOSIS — Z95.810 PRESENCE OF AUTOMATIC (IMPLANTABLE) CARDIAC DEFIBRILLATOR: Chronic | ICD-10-CM

## 2022-01-31 DIAGNOSIS — M17.11 UNILATERAL PRIMARY OSTEOARTHRITIS, RIGHT KNEE: ICD-10-CM

## 2022-01-31 DIAGNOSIS — Z98.89 OTHER SPECIFIED POSTPROCEDURAL STATES: Chronic | ICD-10-CM

## 2022-01-31 DIAGNOSIS — Z95.4 PRESENCE OF OTHER HEART-VALVE REPLACEMENT: Chronic | ICD-10-CM

## 2022-01-31 DIAGNOSIS — Z00.00 ENCOUNTER FOR GENERAL ADULT MEDICAL EXAMINATION W/OUT ABNORMAL FINDINGS: ICD-10-CM

## 2022-01-31 PROCEDURE — 20985 CPTR-ASST DIR MS PX: CPT

## 2022-01-31 PROCEDURE — 73560 X-RAY EXAM OF KNEE 1 OR 2: CPT | Mod: 26,RT

## 2022-01-31 PROCEDURE — 27447 TOTAL KNEE ARTHROPLASTY: CPT | Mod: RT

## 2022-01-31 DEVICE — COMP FEM CR CMNTLSS BEADED W/ PA SZ 6 RT: Type: IMPLANTABLE DEVICE | Site: RIGHT | Status: FUNCTIONAL

## 2022-01-31 DEVICE — INSERT TIB BEARING CS X3 SZ 6 9MM: Type: IMPLANTABLE DEVICE | Site: RIGHT | Status: FUNCTIONAL

## 2022-01-31 DEVICE — BASEPLATE TIB TRIATHLON TRITAN SZ 6: Type: IMPLANTABLE DEVICE | Site: RIGHT | Status: FUNCTIONAL

## 2022-01-31 DEVICE — MAKO BONE PIN 4MM X 140MM: Type: IMPLANTABLE DEVICE | Site: RIGHT | Status: FUNCTIONAL

## 2022-01-31 DEVICE — IMP PATELLA SYMMETRIC 9X33MM: Type: IMPLANTABLE DEVICE | Site: RIGHT | Status: FUNCTIONAL

## 2022-01-31 DEVICE — MAKO BONE PIN 4MM X 110MM: Type: IMPLANTABLE DEVICE | Site: RIGHT | Status: FUNCTIONAL

## 2022-01-31 RX ORDER — APIXABAN 2.5 MG/1
5 TABLET, FILM COATED ORAL
Refills: 0 | Status: DISCONTINUED | OUTPATIENT
Start: 2022-02-01 | End: 2022-02-01

## 2022-01-31 RX ORDER — PANTOPRAZOLE SODIUM 20 MG/1
40 TABLET, DELAYED RELEASE ORAL
Refills: 0 | Status: DISCONTINUED | OUTPATIENT
Start: 2022-01-31 | End: 2022-02-01

## 2022-01-31 RX ORDER — ONDANSETRON 8 MG/1
4 TABLET, FILM COATED ORAL EVERY 6 HOURS
Refills: 0 | Status: DISCONTINUED | OUTPATIENT
Start: 2022-01-31 | End: 2022-02-01

## 2022-01-31 RX ORDER — ACETAMINOPHEN 500 MG
975 TABLET ORAL EVERY 8 HOURS
Refills: 0 | Status: DISCONTINUED | OUTPATIENT
Start: 2022-01-31 | End: 2022-02-01

## 2022-01-31 RX ORDER — MAGNESIUM HYDROXIDE 400 MG/1
30 TABLET, CHEWABLE ORAL DAILY
Refills: 0 | Status: DISCONTINUED | OUTPATIENT
Start: 2022-01-31 | End: 2022-02-01

## 2022-01-31 RX ORDER — LOSARTAN POTASSIUM 100 MG/1
12.5 TABLET, FILM COATED ORAL
Refills: 0 | Status: DISCONTINUED | OUTPATIENT
Start: 2022-01-31 | End: 2022-02-01

## 2022-01-31 RX ORDER — ACETAMINOPHEN 500 MG
1000 TABLET ORAL ONCE
Refills: 0 | Status: DISCONTINUED | OUTPATIENT
Start: 2022-01-31 | End: 2022-02-01

## 2022-01-31 RX ORDER — ASPIRIN/CALCIUM CARB/MAGNESIUM 324 MG
81 TABLET ORAL
Refills: 0 | Status: DISCONTINUED | OUTPATIENT
Start: 2022-01-31 | End: 2022-01-31

## 2022-01-31 RX ORDER — GLUCAGON INJECTION, SOLUTION 0.5 MG/.1ML
1 INJECTION, SOLUTION SUBCUTANEOUS ONCE
Refills: 0 | Status: DISCONTINUED | OUTPATIENT
Start: 2022-01-31 | End: 2022-02-01

## 2022-01-31 RX ORDER — CHLORHEXIDINE GLUCONATE 213 G/1000ML
1 SOLUTION TOPICAL ONCE
Refills: 0 | Status: DISCONTINUED | OUTPATIENT
Start: 2022-01-31 | End: 2022-01-31

## 2022-01-31 RX ORDER — SENNA PLUS 8.6 MG/1
2 TABLET ORAL AT BEDTIME
Refills: 0 | Status: DISCONTINUED | OUTPATIENT
Start: 2022-01-31 | End: 2022-02-01

## 2022-01-31 RX ORDER — CEFAZOLIN SODIUM 1 G
2000 VIAL (EA) INJECTION EVERY 8 HOURS
Refills: 0 | Status: COMPLETED | OUTPATIENT
Start: 2022-01-31 | End: 2022-02-01

## 2022-01-31 RX ORDER — PANTOPRAZOLE SODIUM 20 MG/1
40 TABLET, DELAYED RELEASE ORAL ONCE
Refills: 0 | Status: COMPLETED | OUTPATIENT
Start: 2022-01-31 | End: 2022-01-31

## 2022-01-31 RX ORDER — TRAMADOL HYDROCHLORIDE 50 MG/1
50 TABLET ORAL EVERY 6 HOURS
Refills: 0 | Status: DISCONTINUED | OUTPATIENT
Start: 2022-01-31 | End: 2022-02-01

## 2022-01-31 RX ORDER — OXYCODONE HYDROCHLORIDE 5 MG/1
10 TABLET ORAL EVERY 4 HOURS
Refills: 0 | Status: DISCONTINUED | OUTPATIENT
Start: 2022-01-31 | End: 2022-02-01

## 2022-01-31 RX ORDER — SPIRONOLACTONE 25 MG/1
25 TABLET, FILM COATED ORAL DAILY
Refills: 0 | Status: DISCONTINUED | OUTPATIENT
Start: 2022-01-31 | End: 2022-02-01

## 2022-01-31 RX ORDER — SODIUM CHLORIDE 9 MG/ML
500 INJECTION, SOLUTION INTRAVENOUS ONCE
Refills: 0 | Status: DISCONTINUED | OUTPATIENT
Start: 2022-01-31 | End: 2022-01-31

## 2022-01-31 RX ORDER — OXYCODONE HYDROCHLORIDE 5 MG/1
5 TABLET ORAL EVERY 4 HOURS
Refills: 0 | Status: DISCONTINUED | OUTPATIENT
Start: 2022-01-31 | End: 2022-02-01

## 2022-01-31 RX ORDER — INSULIN LISPRO 100/ML
VIAL (ML) SUBCUTANEOUS AT BEDTIME
Refills: 0 | Status: DISCONTINUED | OUTPATIENT
Start: 2022-01-31 | End: 2022-02-01

## 2022-01-31 RX ORDER — INSULIN LISPRO 100/ML
VIAL (ML) SUBCUTANEOUS
Refills: 0 | Status: DISCONTINUED | OUTPATIENT
Start: 2022-01-31 | End: 2022-02-01

## 2022-01-31 RX ORDER — SODIUM CHLORIDE 9 MG/ML
1000 INJECTION, SOLUTION INTRAVENOUS
Refills: 0 | Status: DISCONTINUED | OUTPATIENT
Start: 2022-01-31 | End: 2022-02-01

## 2022-01-31 RX ORDER — METOPROLOL TARTRATE 50 MG
12.5 TABLET ORAL
Refills: 0 | Status: DISCONTINUED | OUTPATIENT
Start: 2022-01-31 | End: 2022-02-01

## 2022-01-31 RX ORDER — DEXTROSE 50 % IN WATER 50 %
12.5 SYRINGE (ML) INTRAVENOUS ONCE
Refills: 0 | Status: DISCONTINUED | OUTPATIENT
Start: 2022-01-31 | End: 2022-02-01

## 2022-01-31 RX ORDER — ASPIRIN/CALCIUM CARB/MAGNESIUM 324 MG
81 TABLET ORAL DAILY
Refills: 0 | Status: DISCONTINUED | OUTPATIENT
Start: 2022-01-31 | End: 2022-02-01

## 2022-01-31 RX ORDER — SODIUM CHLORIDE 9 MG/ML
1000 INJECTION INTRAMUSCULAR; INTRAVENOUS; SUBCUTANEOUS
Refills: 0 | Status: DISCONTINUED | OUTPATIENT
Start: 2022-01-31 | End: 2022-02-01

## 2022-01-31 RX ORDER — SODIUM CHLORIDE 9 MG/ML
3 INJECTION INTRAMUSCULAR; INTRAVENOUS; SUBCUTANEOUS EVERY 8 HOURS
Refills: 0 | Status: DISCONTINUED | OUTPATIENT
Start: 2022-01-31 | End: 2022-01-31

## 2022-01-31 RX ORDER — DEXTROSE 50 % IN WATER 50 %
25 SYRINGE (ML) INTRAVENOUS ONCE
Refills: 0 | Status: DISCONTINUED | OUTPATIENT
Start: 2022-01-31 | End: 2022-02-01

## 2022-01-31 RX ORDER — ATORVASTATIN CALCIUM 80 MG/1
40 TABLET, FILM COATED ORAL AT BEDTIME
Refills: 0 | Status: DISCONTINUED | OUTPATIENT
Start: 2022-01-31 | End: 2022-02-01

## 2022-01-31 RX ORDER — TRAMADOL HYDROCHLORIDE 50 MG/1
50 TABLET ORAL ONCE
Refills: 0 | Status: DISCONTINUED | OUTPATIENT
Start: 2022-01-31 | End: 2022-01-31

## 2022-01-31 RX ORDER — SODIUM CHLORIDE 9 MG/ML
500 INJECTION, SOLUTION INTRAVENOUS ONCE
Refills: 0 | Status: DISCONTINUED | OUTPATIENT
Start: 2022-02-01 | End: 2022-01-31

## 2022-01-31 RX ORDER — LIDOCAINE HCL 20 MG/ML
0.2 VIAL (ML) INJECTION ONCE
Refills: 0 | Status: DISCONTINUED | OUTPATIENT
Start: 2022-01-31 | End: 2022-01-31

## 2022-01-31 RX ORDER — DEXTROSE 50 % IN WATER 50 %
15 SYRINGE (ML) INTRAVENOUS ONCE
Refills: 0 | Status: DISCONTINUED | OUTPATIENT
Start: 2022-01-31 | End: 2022-02-01

## 2022-01-31 RX ORDER — FENTANYL CITRATE 50 UG/ML
25 INJECTION INTRAVENOUS
Refills: 0 | Status: DISCONTINUED | OUTPATIENT
Start: 2022-01-31 | End: 2022-01-31

## 2022-01-31 RX ORDER — POLYETHYLENE GLYCOL 3350 17 G/17G
17 POWDER, FOR SOLUTION ORAL AT BEDTIME
Refills: 0 | Status: DISCONTINUED | OUTPATIENT
Start: 2022-01-31 | End: 2022-02-01

## 2022-01-31 RX ADMIN — TRAMADOL HYDROCHLORIDE 50 MILLIGRAM(S): 50 TABLET ORAL at 06:55

## 2022-01-31 RX ADMIN — PANTOPRAZOLE SODIUM 40 MILLIGRAM(S): 20 TABLET, DELAYED RELEASE ORAL at 06:54

## 2022-01-31 RX ADMIN — Medication 100 MILLIGRAM(S): at 22:10

## 2022-01-31 RX ADMIN — Medication 81 MILLIGRAM(S): at 22:11

## 2022-01-31 RX ADMIN — SODIUM CHLORIDE 50 MILLILITER(S): 9 INJECTION INTRAMUSCULAR; INTRAVENOUS; SUBCUTANEOUS at 11:19

## 2022-01-31 RX ADMIN — ATORVASTATIN CALCIUM 40 MILLIGRAM(S): 80 TABLET, FILM COATED ORAL at 22:10

## 2022-01-31 RX ADMIN — Medication 2: at 18:07

## 2022-01-31 RX ADMIN — Medication 1: at 12:31

## 2022-01-31 RX ADMIN — SENNA PLUS 2 TABLET(S): 8.6 TABLET ORAL at 22:10

## 2022-01-31 RX ADMIN — Medication 100 MILLIGRAM(S): at 17:56

## 2022-01-31 NOTE — PHYSICAL THERAPY INITIAL EVALUATION ADULT - PERTINENT HX OF CURRENT PROBLEM, REHAB EVAL
Pt is a 72 y/o male former smoker with PMH of moderately severe emphysema, HTN, HLD, CAD s/p anterior wall MI, PCI/LAD stent x 3 2004 [place], CHF, single chamber AICD 2005, Generator change 2011, ICD lead extraction with reimplantation, upgrade to dual chamber device 08/17/21 and recent MEMS, severe AS s/p AVR 2015 Dr. Paez complicated by Asystole/Syncope with 1 shock, Transcatheter valve in valve aortic valve replacement,

## 2022-01-31 NOTE — OCCUPATIONAL THERAPY INITIAL EVALUATION ADULT - LIVES WITH, PROFILE
Pt lives with spouse in condo, [address] and first floor setup. Pt reports independence with self care and fxnl mobility./spouse

## 2022-01-31 NOTE — PRE-ANESTHESIA EVALUATION ADULT - NS MD HP INPLANTS MED DEV
Medtronic [id number] Cobald XT DR THOMAS, 3 REINA LAD, Aortic valve, MEMS/Automatic Implantable Cardioverter Defibrillator

## 2022-01-31 NOTE — PRE-ANESTHESIA EVALUATION ADULT - LAST CARDIAC ANGIOGRAM/IMAGING
Same Day Surgery Discharge Instructions for  Sedation and General Anesthesia       It's not unusual to feel dizzy, light-headed or faint for up to 24 hours after surgery or while taking pain medication.  If you have these symptoms: sit for a few minutes before standing and have someone assist you when you get up to walk or use the bathroom.      You should rest and relax for the next 24 hours. We recommend you make arrangements to have an adult stay with you for at least 24 hours after your discharge.  Avoid hazardous and strenuous activity.      DO NOT DRIVE any vehicle or operate mechanical equipment for 24 hours following the end of your surgery.  Even though you may feel normal, your reactions may be affected by the medication you have received.      Do not drink alcoholic beverages for 24 hours following surgery.       Slowly progress to your regular diet as you feel able. It's not unusual to feel nauseated and/or vomit after receiving anesthesia.  If you develop these symptoms, drink clear liquids (apple juice, ginger ale, broth, 7-up, etc. ) until you feel better.  If your nausea and vomiting persists for 24 hours, please notify your surgeon.        All narcotic pain medications, along with inactivity and anesthesia, can cause constipation. Drinking plenty of liquids and increasing fiber intake will help.      For any questions of a medical nature, call your surgeon.      Do not make important decisions for 24 hours.      If you had general anesthesia, you may have a sore throat for a couple of days related to the breathing tube used during surgery.  You may use Cepacol lozenges to help with this discomfort.  If it worsens or if you develop a fever, contact your surgeon.       If you feel your pain is not well managed with the pain medications prescribed by your surgeon, please contact your surgeon's office to let them know so they can address your concerns.             **If you have questions or concerns  about your procedure,  call Dr. Salazar at [phone]**        Discharge Instructions for Power Port Placement      General Instructions:    You will be given a card with information about your port.  You should carry this with you in your wallet/billfold. It provides information to clinicians about your port.  You should also carry the card in case your port triggers any security devices.     Activity:    Limit your activity for the first few days after your port is placed    Incision Care:     Unless otherwise directed by your surgeon, the dressing may removed tomorrow.      If a topical skin adhesive was used to close incision, you may shower tomorrow. Do not use soaps, lotions, or ointments on the wound area. Do not scrub the wound. After bathing, pat the wound dry with a soft towel.  Do not scratch, rub, or pick at the strips or film. Do not place tape directly over the strips or film.  Do not apply liquids (such as peroxide), ointments, or creams to the wound while the strips or film are in place.    Call your surgeon if you have:     Redness, swelling or drainage from incision     A fever of 101 F or greater.      Nausea or vomiting.     Pain that is not controlled by medications and/or rest.     Questions or concerns.        **If you have questions or concerns about your procedure,  call Dr. Salazar at [phone]**   09/16/21 - [facility]/VERÓNICA implant

## 2022-01-31 NOTE — PRE-ANESTHESIA EVALUATION ADULT - NSANTHPMHFT_GEN_ALL_CORE
Chart and notes/labs/imaging reviewed.  H&P - 72 y/o male.  Pmhx -  former smoker (60 PYH) w/ moderately severe emphysema (CT chest 06/10/21), HTN, HLD, CAD s/p anterior wall MI (2004), PCI/LAD stent x 3 2004 [place], CHF (Echo 06/10/21 EF 15-20%), single chamber AICD 2005 w/ Generator change 2011, ICD lead extraction with reimplantation 10/30/2018, upgrade to dual chamber device 08/17/21 and recent MEMS 09/16/21, severe AS s/p AVR 2015 Dr. Paez complicated by Asystole/Syncope with 1 shock, Transcatheter valveinvalve aortic valve replacement utilizing a right common femoral arterial percutaneous approach utilizing a 26 Medtronic Evolut core valve on 08/20/2020, Paroxysmal a-fib on Eliquis, s/p a-fib ablation 2017.  Admission - C/ worsening Right knee pain for over 20 years, an x-ray evealed severe OA of Right knee.  He is scheduled for Right Total Knee Arthroplasty with JOCELIN Robot.

## 2022-01-31 NOTE — PRE-ANESTHESIA EVALUATION ADULT - NSANTHADDINFOFT_GEN_ALL_CORE
-Patient w/ multiple comorbidities as above.  -Risks of anesthesia were explained to the patient.  -Risks including but not limited to MI, CVA, Heart failure, Malignant arrythmia, Respiratory failure requiring prolonged intubation, PE/Clot, Aspiration, and death were explained to the patient.  -wife at the bedside.  -Patient understands and wishes to proceed.    Pt seen and preopd @~715am

## 2022-01-31 NOTE — CHART NOTE - NSCHARTNOTEFT_GEN_A_CORE
Discontinued Patients right brachial arturo. catheter tip intact. Pressure applied for 15 minutes. Hemostasis achieved Palatable right radial and ulnar pulses noted. Good pleth on pulse on right thumb and 5th finger. No hematoma noted

## 2022-01-31 NOTE — PHYSICAL THERAPY INITIAL EVALUATION ADULT - PLANNED THERAPY INTERVENTIONS, PT EVAL
GOAL: Pt will perform 12 stairs with or without U HR as needed within 2-4weeks./gait training/transfer training

## 2022-01-31 NOTE — OCCUPATIONAL THERAPY INITIAL EVALUATION ADULT - TRANSFER TRAINING, PT EVAL
Pt will complete functional simulated tub transfer with independence and appropriate AD within 4 weeks.

## 2022-01-31 NOTE — PHYSICAL THERAPY INITIAL EVALUATION ADULT - ADDITIONAL COMMENTS
As per pt, pt lives in a condo w/ spouse and no  steps to enter. PTA pt was independent w/ all mobility and ADLs. Pt owns RW

## 2022-01-31 NOTE — PHYSICAL THERAPY INITIAL EVALUATION ADULT - PRECAUTIONS/LIMITATIONS, REHAB EVAL
continued from above: Paroxysmal a-fib on Eliquis, s/p a-fib ablation 2017, 07/2021 c/o worsening Right knee pain for over 20 years, an x-ray evealed severe OA of Right knee. Now s/p Right Total Knee Arthroplasty with JOCELIN Robot on 1/31/22 continued from above: Paroxysmal a-fib on Eliquis, s/p a-fib ablation 2017, 07/2021 c/o worsening Right knee pain for over 20 years, an x-ray evealed severe OA of Right knee. Now s/p Right Total Knee Arthroplasty with JOCELIN Robot on 1/31/22/fall precautions

## 2022-01-31 NOTE — OCCUPATIONAL THERAPY INITIAL EVALUATION ADULT - PERTINENT HX OF CURRENT PROBLEM, REHAB EVAL
71M former smoker (60 PYH) with PMH of moderately severe emphysema (CT chest 06/10/21), HTN, HLD, CAD s/p anterior wall MI (2004), PCI/LAD stent x 3 2004 [place], CHF (Echo 06/10/21 EF 15%), single chamber AICD 2005, Generator change 2011, ICD lead extraction with reimplantation 10/30/2018, upgrade to dual chamber device 08/17/21 and recent MEMS 09/16/21, severe AS s/p AVR 2015 Dr. Paez complicated by Asystole/Syncope with 1 shock. (CONT BELOW)

## 2022-01-31 NOTE — PATIENT PROFILE ADULT - VISION (WITH CORRECTIVE LENSES IF THE PATIENT USUALLY WEARS THEM):
Patient: Nilsa Rodríguez MRN: [id number]  SSN: [id number] YOB: 1966  Age: 45 y.o. Sex: male Nilsa Rodríguez is a 45 y.o. male who was seen by radiation oncology at the request of Dr. Will Escalona regarding the role of radiation therapy in treatment of this locally advanced rectal adenocarcinoma. He has a medical history significant for GERD and prior bilateral inguinal hernia repair. He is a former smoker, recently quit. He drinks 1-2 beers daily. Over the few months prior to diagnosis he noted bright red blood per rectum and change in bowel habits with frequent, small caliber stools. He was seen by GI and was scheduled for colonoscopy. However, following his bowel prep he passed only a small amount of stool, but began vomiting. He was seen at the ER on 06/26/18 where a CT scan showed fluid-filled large and small bowel loops with increased soft tissue suggesting an obstructing high rectal cancer. Flexible sigmoidoscopy on 06/26/18 showed a completely obstructing circumferential mass at 5-6 cm from the anal verge which could not be traversed. Extensive biopsies were taken. However, final pathology showed only fragments of hyperplastic polyp with ulceration and inflammation. On 06/29/18 he underwent right transverse diverting colostomy under the direction of Dr. Lindsay Ochoa. Additional biopsies were taken at that time. Pathology  Revealed poorly differentiated colorectal adenocarcinoma. Peritoneal fluid was also sent, which revealed only rare atypical cells. No obvious peritoneal disease was seen. Pet/CT scan on 07/19/18 showed hypermetabolic mass in the proximal rectum. In addition, multiple pelvic lymph nodes were suspicious for regional lymph node metastases. Pelvic MRI on 07/23/18 showed the rectal tumor approximately 8 cm in length with extension through the muscularis wall into the perirectal fat on the right.    Lymph nodes were seen both within and lateral to the mesorectal fascia. Clinical staging was T3N2M0. He was seen by Dr. Hudson Vivar and was recommended to undergo pre-operative chemoradiotherapy. According to Dr. Jessy Oglesby, surgery would likely consist of a low anterior resection with low rectal anastomosis. He was also seen by Dr. Luis Sanchez, radiation oncology. The recommendation was for neoadjuvant chemoradiation therapy.  
  
Please see the details of his treatment below as well as my plans for future care and surveillance. Please do not hesitate to call with questions or concerns at any time. DIAGNOSIS: Adenocarcinoma of the rectum, T3N2M0, Stage III 
 
PREVIOUS TREATMENT:   
1) Flexible sigmoidoscopy w/biopsy, 06/26/18 2) Right transverse diverting colostomy, 6/29/2018 TREATMENT DATES:   
1) Initial: 8/20/2018 - 9/25/2018  
2) Boost: 9/26/2018 - 9/28/2018 ANATOMIC SITE:  Pelvis DOSE: 4500 cGy in 25 fractions pelvis, 540cGy in 3 fractions pelvis boost. Total 5040 cGy. BEAM ARRANGEMENT:  3D Conformal - 15MV pelvis, 3D Conformal 15MV pelvis boost.  
 
CHEMOTHERAPY: Capecitabine TREATMENT COURSE:  Mr Casey Deleon did reasonably well. He had the frequent urge for bowel movements with discharge of blood, mucous, other fecal material despite colostomy. Week 2 worsening fecal urgency with less blood, but continued fecal material, small amounts. Also thinks that he has an aggravated hemorrhoid that was making urgency worse. Week 3 increased zeferino-anal pain and dysuria. Continued frequent urgent small bowel movements. Week 4 decreased small urgent bowel movements, but increased nocturia and daytime urinary frequency. Continued mild to moderate perianal discomfort. Week 5-6 no significant changes. PLAN:  The patient will be seen in follow up in 4 weeks to assess acute and sub acute side effects. He is also scheduled with his surgeon on October 1, 2018. Scheduled for repeat imaging, 10/17/2018.   
 
 
Davis Samuel NP    
 
 Partially impaired: cannot see medication labels or newsprint, but can see obstacles in path, and the surrounding layout; can count fingers at arm's length

## 2022-01-31 NOTE — CHART NOTE - NSCHARTNOTEFT_GEN_A_CORE
Resting without complaints.  No Chest Pain, SOB, N/V.    T(C): 36 (01-31-22 @ 10:10), Max: 36.7 (01-31-22 @ 06:37)  HR: 59 (01-31-22 @ 11:15) (59 - 80)  BP: 96/55 (01-31-22 @ 11:15) (95/52 - 114/58)  RR: 16 (01-31-22 @ 11:15) (16 - 18)  SpO2: 97% (01-31-22 @ 11:15) (95% - 99%)      Exam:  Alert and [place], No Acute Distress  Card: +S1/S2, RRR  Pulm: CTAB  Laterality: R Knee  Dressing: Aquacel c/d/i  Calves soft, non-tender bilaterally  +PF/DF/EHL/FHL  SILT  + DP pulse    Xray: Impression:    Status-post right total knee arthroplasty.    --- End of Report ---             A/P: 71y Male S/p R Total Knee Arthroplasty. VSS. NAD  -PT/OT-WBAT RLE  -IS  -DVT PPx: Eliquis 5mg PO BID, ASA 81mg PO Daily, SCDs, OOB and early ambulation  -Pain Control  -Avoid NSAIDs  -Continue Current Tx  -Dispo planning    Bib Lovelace PA-C  Orthopedic Surgery Team  Team Pager #[phone]

## 2022-01-31 NOTE — OCCUPATIONAL THERAPY INITIAL EVALUATION ADULT - PRECAUTIONS/LIMITATIONS, REHAB EVAL
(CONT) Transcatheter valve-in-valve aortic valve replacement utilizing a right common femoral arterial percutaneous approach utilizing a 26 Medtronic Evolut core valve on 08/20/2020, Paroxysmal a-fib on Eliquis, s/p a-fib ablation 2017, 07/2021 c/o worsening Right knee pain for over 20 years, an x-ray evealed severe OA of Right knee.  s/p Right Total knee arthroplasty with Kin robot on 1/31. (CONT) Transcatheter valve-in-valve aortic valve replacement utilizing a right common femoral arterial percutaneous approach utilizing a 26 Medtronic Evolut core valve on 08/20/2020, Paroxysmal a-fib on Eliquis, s/p a-fib ablation 2017, 07/2021 c/o worsening Right knee pain for over 20 years, an x-ray evealed severe OA of Right knee.  s/p Right Total knee arthroplasty with Kin robot on 1/31./fall precautions

## 2022-01-31 NOTE — PATIENT PROFILE ADULT - FALL HARM RISK - UNIVERSAL INTERVENTIONS
Bed in lowest position, wheels locked, appropriate side rails in place/Call bell, personal items and telephone in reach/Instruct patient to call for assistance before getting out of bed or chair/Non-slip footwear when patient is out of bed/[place] to call system/Physically safe environment - no spills, clutter or unnecessary equipment/Purposeful Proactive Rounding/Room/bathroom lighting operational, light cord in reach

## 2022-02-01 ENCOUNTER — TRANSCRIPTION ENCOUNTER (OUTPATIENT)
Age: 72
End: 2022-02-01

## 2022-02-01 VITALS
DIASTOLIC BLOOD PRESSURE: 67 MMHG | RESPIRATION RATE: 18 BRPM | TEMPERATURE: 98 F | HEART RATE: 73 BPM | SYSTOLIC BLOOD PRESSURE: 101 MMHG | OXYGEN SATURATION: 97 %

## 2022-02-01 LAB
ANION GAP SERPL CALC-SCNC: 14 MMOL/L — SIGNIFICANT CHANGE UP (ref 5–17)
BUN SERPL-MCNC: 35 MG/DL — HIGH (ref 7–23)
CALCIUM SERPL-MCNC: 9.1 MG/DL — SIGNIFICANT CHANGE UP (ref 8.4–10.5)
CHLORIDE SERPL-SCNC: 98 MMOL/L — SIGNIFICANT CHANGE UP (ref 96–108)
CO2 SERPL-SCNC: 26 MMOL/L — SIGNIFICANT CHANGE UP (ref 22–31)
CREAT SERPL-MCNC: 1.37 MG/DL — HIGH (ref 0.5–1.3)
GLUCOSE SERPL-MCNC: 124 MG/DL — HIGH (ref 70–99)
HCT VFR BLD CALC: 36.3 % — LOW (ref 39–50)
HGB BLD-MCNC: 11.8 G/DL — LOW (ref 13–17)
MCHC RBC-ENTMCNC: 31.6 PG — SIGNIFICANT CHANGE UP (ref 27–34)
MCHC RBC-ENTMCNC: 32.5 GM/DL — SIGNIFICANT CHANGE UP (ref 32–36)
MCV RBC AUTO: 97.3 FL — SIGNIFICANT CHANGE UP (ref 80–100)
NRBC # BLD: 0 /100 WBCS — SIGNIFICANT CHANGE UP (ref 0–0)
PLATELET # BLD AUTO: 173 K/UL — SIGNIFICANT CHANGE UP (ref 150–400)
POTASSIUM SERPL-MCNC: 4.2 MMOL/L — SIGNIFICANT CHANGE UP (ref 3.5–5.3)
POTASSIUM SERPL-SCNC: 4.2 MMOL/L — SIGNIFICANT CHANGE UP (ref 3.5–5.3)
RBC # BLD: 3.73 M/UL — LOW (ref 4.2–5.8)
RBC # FLD: 13.3 % — SIGNIFICANT CHANGE UP (ref 10.3–14.5)
SODIUM SERPL-SCNC: 138 MMOL/L — SIGNIFICANT CHANGE UP (ref 135–145)
WBC # BLD: 13.27 K/UL — HIGH (ref 3.8–10.5)
WBC # FLD AUTO: 13.27 K/UL — HIGH (ref 3.8–10.5)

## 2022-02-01 PROCEDURE — 82962 GLUCOSE BLOOD TEST: CPT

## 2022-02-01 PROCEDURE — 80048 BASIC METABOLIC PNL TOTAL CA: CPT

## 2022-02-01 PROCEDURE — 97161 PT EVAL LOW COMPLEX 20 MIN: CPT

## 2022-02-01 PROCEDURE — 97165 OT EVAL LOW COMPLEX 30 MIN: CPT

## 2022-02-01 PROCEDURE — C1713: CPT

## 2022-02-01 PROCEDURE — 73560 X-RAY EXAM OF KNEE 1 OR 2: CPT

## 2022-02-01 PROCEDURE — C1776: CPT

## 2022-02-01 PROCEDURE — 97535 SELF CARE MNGMENT TRAINING: CPT

## 2022-02-01 PROCEDURE — S2900: CPT

## 2022-02-01 PROCEDURE — 85027 COMPLETE CBC AUTOMATED: CPT

## 2022-02-01 PROCEDURE — C9399: CPT

## 2022-02-01 RX ORDER — OXYCODONE HYDROCHLORIDE 5 MG/1
1 TABLET ORAL
Qty: 0 | Refills: 0 | DISCHARGE
Start: 2022-02-01

## 2022-02-01 RX ORDER — OXYCODONE HYDROCHLORIDE 5 MG/1
1 TABLET ORAL
Qty: 42 | Refills: 0
Start: 2022-02-01 | End: 2022-02-07

## 2022-02-01 RX ORDER — TRAMADOL HYDROCHLORIDE 50 MG/1
1 TABLET ORAL
Qty: 28 | Refills: 0
Start: 2022-02-01 | End: 2022-02-07

## 2022-02-01 RX ORDER — PANTOPRAZOLE SODIUM 20 MG/1
1 TABLET, DELAYED RELEASE ORAL
Qty: 0 | Refills: 0 | DISCHARGE
Start: 2022-02-01

## 2022-02-01 RX ORDER — TRAMADOL HYDROCHLORIDE 50 MG/1
1 TABLET ORAL
Qty: 0 | Refills: 0 | DISCHARGE
Start: 2022-02-01

## 2022-02-01 RX ORDER — ACETAMINOPHEN 500 MG
3 TABLET ORAL
Qty: 0 | Refills: 0 | DISCHARGE
Start: 2022-02-01

## 2022-02-01 RX ORDER — POLYETHYLENE GLYCOL 3350 17 G/17G
17 POWDER, FOR SOLUTION ORAL
Qty: 0 | Refills: 0 | DISCHARGE
Start: 2022-02-01

## 2022-02-01 RX ORDER — SENNA PLUS 8.6 MG/1
2 TABLET ORAL
Qty: 0 | Refills: 0 | DISCHARGE
Start: 2022-02-01

## 2022-02-01 RX ADMIN — PANTOPRAZOLE SODIUM 40 MILLIGRAM(S): 20 TABLET, DELAYED RELEASE ORAL at 05:57

## 2022-02-01 RX ADMIN — Medication 100 MILLIGRAM(S): at 12:08

## 2022-02-01 RX ADMIN — Medication 100 MILLIGRAM(S): at 00:27

## 2022-02-01 RX ADMIN — OXYCODONE HYDROCHLORIDE 5 MILLIGRAM(S): 5 TABLET ORAL at 08:59

## 2022-02-01 RX ADMIN — Medication 975 MILLIGRAM(S): at 00:27

## 2022-02-01 RX ADMIN — Medication 975 MILLIGRAM(S): at 09:25

## 2022-02-01 RX ADMIN — Medication 1: at 12:06

## 2022-02-01 RX ADMIN — OXYCODONE HYDROCHLORIDE 10 MILLIGRAM(S): 5 TABLET ORAL at 15:00

## 2022-02-01 RX ADMIN — OXYCODONE HYDROCHLORIDE 5 MILLIGRAM(S): 5 TABLET ORAL at 09:25

## 2022-02-01 RX ADMIN — OXYCODONE HYDROCHLORIDE 10 MILLIGRAM(S): 5 TABLET ORAL at 14:36

## 2022-02-01 RX ADMIN — Medication 81 MILLIGRAM(S): at 12:08

## 2022-02-01 RX ADMIN — APIXABAN 5 MILLIGRAM(S): 2.5 TABLET, FILM COATED ORAL at 05:56

## 2022-02-01 RX ADMIN — Medication 975 MILLIGRAM(S): at 08:55

## 2022-02-01 NOTE — DISCHARGE NOTE PROVIDER - NSDCFUADDINST_GEN_ALL_CORE_FT
Dressing should be removed per date on dressing.   Patient may shower, limit direct water to dressing, if wet pat dry   Out of bed, ambulate, weight bearing as tolerated-   Physical therapy to assist with exercise and help increase endurance.   Please contact Doctors office regarding arrangements for out patient Physical therapy.

## 2022-02-01 NOTE — DISCHARGE NOTE NURSING/CASE MANAGEMENT/SOCIAL WORK - NSDCPEFALRISK_GEN_ALL_CORE
For information on Fall & Injury Prevention, visit: [email or web address] OR  [email or web address] OR  https://www.cdc.gov/steadi/patient.html

## 2022-02-01 NOTE — PROGRESS NOTE ADULT - ASSESSMENT
S/P R TKR    Plan    ck labs  OOB/PT/WBAT  Ecotrin for DVT prophylaxis         Concepcion Brooks PA-C   Beeper    [id number]

## 2022-02-01 NOTE — PROGRESS NOTE ADULT - SUBJECTIVE AND OBJECTIVE BOX
Patient is a 71y old  Male who presents with a chief complaint of Right knee pain. Goal - to exercise more - pain free. (10 Jabari 2022 15:40)      POST OPERATIVE DAY #: 1  Patient comfortable  No complaints    T(C): 36.5 (02-01-22 @ 04:24), Max: 37.3 (01-31-22 @ 21:20)  HR: 72 (02-01-22 @ 04:24) (58 - 85)  BP: 96/55 (02-01-22 @ 04:24) (92/54 - 114/58)  RR: 18 (02-01-22 @ 04:24) (16 - 18)  SpO2: 97% (02-01-22 @ 04:24) (93% - 100%)  Wt(kg): --    PHYSICAL EXAM:  NAD, Alert  EXT:  Rt Knee:  Aquacel Dressing C/D/I  Calves soft  (+) DF/PF;  EHL FHL:  No gross Sensation deficits noted   (+) Distal Pulses;   B/L, PAS

## 2022-02-01 NOTE — DISCHARGE NOTE NURSING/CASE MANAGEMENT/SOCIAL WORK - PATIENT PORTAL LINK FT
You can access the FollowMyHealth Patient Portal offered by [facility] by registering at the following website: [email or web address]. By joining [email or web address]’s FollowMyHealth portal, you will also be able to view your health information using other applications (apps) compatible with our system.

## 2022-02-01 NOTE — DISCHARGE NOTE PROVIDER - NSDCFUADDAPPT_GEN_ALL_CORE_FT
Please call Dr. Edmonds' s office within next few days to schedule a follow up appointment about 14 days after surgery.

## 2022-02-01 NOTE — DISCHARGE NOTE PROVIDER - HOSPITAL COURSE
History of Present Illness    This is a 72 y/o male former smoker (60 PYH) with PMH of moderately severe emphysema (CT chest 06/10/21), HTN, HLD, CAD s/p anterior wall MI (2004), PCI/LAD stent x 3 2004 [place], CHF (Echo 06/10/21 EF 15%), single chamber AICD 2005, Generator change 2011, ICD lead extraction with reimplantation 10/30/2018, upgrade to dual chamber device 08/17/21 and recent MEMS 09/16/21, severe AS s/p AVR 2015 Dr. Paez complicated by Asystole/Syncope with 1 shock, Transcatheter valve-in-valve aortic valve replacement utilizing a right common femoral arterial percutaneous approach utilizing a 26 Medtronic Evolut core valve on 08/20/2020, Paroxysmal a-fib on Eliquis, s/p a-fib ablation 2017, 07/2021 c/o worsening Right knee pain for over 20 years, an x-ray evealed severe OA of Right knee.  He is scheduled for Right Total Knee Arthroplasty with JOCELIN Robot.   Patient will schedule covid test for 01/28/22 in [place].     Goal - to exercise more - pain free.    Allergies/Medications:   Allergies:        Allergies:  	No Known Allergies:        Intolerances:  	Entresto: Drug, Other, dizziness    Home Medications:   * Patient Currently Takes Medications as of 10-Jabari-2022 16:22 documented in Structured Notes  · 	atorvastatin 40 mg oral tablet: Last Dose Taken:  , 1 tab(s) orally once a day (at bedtime)  · 	aspirin 81 mg oral delayed release capsule: Last Dose Taken:  ,  orally   · 	torsemide 20 mg oral tablet: Last Dose Taken:  , 1 tab(s) orally once a day  · 	spironolactone 25 mg oral tablet: Last Dose Taken:  , 1 tab(s) orally once a day  · 	losartan 25 mg oral tablet: Last Dose Taken:  , Take .5 tab(s) orally twice daily  · 	torsemide 10 mg oral tablet: Last Dose Taken:  , 1 tab(s) orally every other night  · 	Farxiga 10 mg oral tablet: Last Dose Taken:  , 1 tab(s) orally once a day, last dose 01/27/22  · 	Eliquis 5 mg oral tablet: Last Dose Taken:  , 1 tab(s) orally 2 times a day, last dose 01/27/22  · 	Lyrica 100 mg oral capsule: Last Dose Taken:  , 1 cap(s) orally 2 times a day  · 	docusate sodium 100 mg oral tablet: Last Dose Taken:  , 1 tab(s) orally 2 times a day  · 	metoprolol succinate 25 mg oral tablet, extended release: Last Dose Taken:  , 0.5 tab(s) orally 2 times a day    PMH/PSH/FH/SH:    Past Medical, Past Surgical, and Family History:  PAST MEDICAL HISTORY:  AICD (automatic cardioverter/defibrillator) present   Aortic stenosis severe, s/p AVR  CAD (coronary artery disease) 2004  Carotid stenosis - moderate, b/l (Doppler 12/27/21)  CHF (congestive heart failure)   H/O emphysema - moderately severe, CT chest 06/10/21  H/O pulmonary hypertension   HLD (hyperlipidemia)   HTN (hypertension)   Ischemic cardiomyopathy   Longstanding persistent atrial fibrillation   MI (myocardial infarction) Nov 2004  Neuralgia   NSVT (nonsustained ventricular tachycardia)   Osteoarthritis of right knee   Shingles 04/2020  Syncope epidsode in 11/14, was found to have been an episode of V-Fib with sucsessfull AICD shock  Systolic heart failure EF 15%  Type 2 diabetes mellitus   Valvular cardiomyopathy.     PAST SURGICAL HISTORY:  AICD (automatic cardioverter/defibrillator) present single chamber 2005, replaced with dual chamber 08/17/2021  H/O prior ablation treatment afib - 2017, 07/2021  S/P AVR 2015, complicated by Asystole/Syncope with 1 shock, Transcatheter valve-in-valve aortic valve replacement utilizing a right common femoral arterial percutaneous approach utilizing a 26 Medtronic Evolut core valve on 08/20/2020  S/P hernia repair - right inguinal, 1982     Patient presents to the hospital for elective surgery, underwent a right total knee replacement-  -tolerated procedure without complications.  Patient was evaluated by Physical therapy whom recommended home with out patient PT for discharge plan.  Patient is stable for discharge when cleared by PT.

## 2022-02-01 NOTE — DISCHARGE NOTE PROVIDER - NSDCFUSCHEDAPPT_GEN_ALL_CORE_FT
DUDLEY BERMUDEZ ; 02/28/2022 ; [facility] [address] ; 03/01/2022 ; [facility] [address] ; 03/15/2022 ; [facility] [address] ; 04/06/2022 ; [facility] [address] ; 04/08/2022 ; [facility] [address] [place]

## 2022-02-01 NOTE — DISCHARGE NOTE PROVIDER - NSDCMRMEDTOKEN_GEN_ALL_CORE_FT
acetaminophen 325 mg oral tablet: 3 tab(s) orally every 8 hours  aspirin 81 mg oral delayed release capsule:  orally   atorvastatin 40 mg oral tablet: 1 tab(s) orally once a day (at bedtime)  docusate sodium 100 mg oral tablet: 1 tab(s) orally 2 times a day  Eliquis 5 mg oral tablet: 1 tab(s) orally 2 times a day, last dose 01/27/22  Farxiga 10 mg oral tablet: 1 tab(s) orally once a day, last dose 01/27/22  losartan 25 mg oral tablet: Take .5 tab(s) orally twice daily  Lyrica 100 mg oral capsule: 1 cap(s) orally 2 times a day  metoprolol succinate 25 mg oral tablet, extended release: 0.5 tab(s) orally 2 times a day  oxyCODONE 5 mg oral tablet: 1 or 2 tab(s) orally every 4 hours, as needed for moderate to severe pain  pantoprazole 40 mg oral delayed release tablet: 1 tab(s) orally once a day (before a meal)  polyethylene glycol 3350 oral powder for reconstitution: 17 gram(s) orally once a day (at bedtime)  senna oral tablet: 2 tab(s) orally once a day (at bedtime)  spironolactone 25 mg oral tablet: 1 tab(s) orally once a day  torsemide 10 mg oral tablet: 1 tab(s) orally every other night  torsemide 20 mg oral tablet: 1 tab(s) orally once a day  traMADol 50 mg oral tablet: 1 tab(s) orally every 6 hours, As needed, Mild Pain (1 - 3)   acetaminophen 325 mg oral tablet: 3 tab(s) orally every 8 hours  aspirin 81 mg oral delayed release capsule: 1 cap(s) orally once a day  atorvastatin 40 mg oral tablet: 1 tab(s) orally once a day (at bedtime)  docusate sodium 100 mg oral tablet: 1 tab(s) orally 2 times a day  Eliquis 5 mg oral tablet: 1 tab(s) orally 2 times a day, last dose 01/27/22  Farxiga 10 mg oral tablet: 1 tab(s) orally once a day, last dose 01/27/22  losartan 25 mg oral tablet: Take .5 tab(s) orally twice daily  Lyrica 100 mg oral capsule: 1 cap(s) orally 2 times a day  metoprolol succinate 25 mg oral tablet, extended release: 0.5 tab(s) orally 2 times a day  oxyCODONE 5 mg oral tablet: 1 or 2 tab(s) orally every 4 hours, as needed for moderate to severe pain MDD:6  pantoprazole 40 mg oral delayed release tablet: 1 tab(s) orally once a day (before a meal)  polyethylene glycol 3350 oral powder for reconstitution: 17 gram(s) orally once a day (at bedtime)  senna oral tablet: 2 tab(s) orally once a day (at bedtime)  spironolactone 25 mg oral tablet: 1 tab(s) orally once a day  torsemide 10 mg oral tablet: 1 tab(s) orally every other day  torsemide 20 mg oral tablet: 1 tab(s) orally once a day  traMADol 50 mg oral tablet: 1 tab(s) orally every 6 hours, As needed, oderate Pain MDD:4

## 2022-02-01 NOTE — DISCHARGE NOTE PROVIDER - CARE PROVIDER_API CALL
Bradly Edmonds)  Orthopaedic Surgery  [address], [address]  [place] [address]  Phone: [phone]  Fax: [phone]  Follow Up Time:

## 2022-02-02 ENCOUNTER — NON-APPOINTMENT (OUTPATIENT)
Age: 72
End: 2022-02-02

## 2022-02-03 ENCOUNTER — NON-APPOINTMENT (OUTPATIENT)
Age: 72
End: 2022-02-03

## 2022-02-09 ENCOUNTER — APPOINTMENT (OUTPATIENT)
Dept: ORTHOPEDIC SURGERY | Facility: CLINIC | Age: 72
End: 2022-02-09
Payer: MEDICARE

## 2022-02-09 VITALS — BODY MASS INDEX: 30.48 KG/M2 | WEIGHT: 230 LBS | HEIGHT: 73 IN

## 2022-02-09 PROCEDURE — 99024 POSTOP FOLLOW-UP VISIT: CPT

## 2022-02-09 NOTE — DISCUSSION/SUMMARY
[de-identified] : The patient is doing well after joint replacement surgery. \par Aquacel removed. Steris placed.\par Absorbable sutures intact. \par f/u next week for regular scheduled post-op visit.\par

## 2022-02-09 NOTE — HISTORY OF PRESENT ILLNESS
[de-identified] : Status-post right  total knee  arthroplasty here for initial postoperative evaluation. He was concerned about his bandage filling up with blood. He is here for a bandage change and wound check.  Excellent progress is noted in terms of pain and restoration of function. Pain is well controlled with oral medications. There has been no change in medical health since discharge. The patient does require assistive devices.

## 2022-02-09 NOTE — PHYSICAL EXAM
[de-identified] : Well developed, well nourished in no apparent distress, awake, alert and orientated to person, place and time with appropriate mood and affect\par Respirations are even and unlabored. Gait evaluation does not reveal a limp. There is no inguinal adenopathy. The affected limb is well-perfused with palpable pedal pulse, without skin lesions, shows a grossly normal motor and sensory examination. Incision is CDI Knee motion is 10-80\par Bandage saturated, incision clean and dry.

## 2022-02-17 ENCOUNTER — APPOINTMENT (OUTPATIENT)
Dept: ORTHOPEDIC SURGERY | Facility: CLINIC | Age: 72
End: 2022-02-17
Payer: MEDICARE

## 2022-02-17 DIAGNOSIS — M17.11 UNILATERAL PRIMARY OSTEOARTHRITIS, RIGHT KNEE: ICD-10-CM

## 2022-02-17 PROCEDURE — 73564 X-RAY EXAM KNEE 4 OR MORE: CPT | Mod: RT

## 2022-02-17 PROCEDURE — 99024 POSTOP FOLLOW-UP VISIT: CPT

## 2022-02-17 NOTE — PHYSICAL EXAM
[de-identified] : Well developed, well nourished in no apparent distress, awake, alert and orientated to person, place and time with appropriate mood and affect\par Respirations are even and unlabored. Gait evaluation does not reveal a limp. There is no inguinal adenopathy. The affected limb is well-perfused with palpable pedal pulse, without skin lesions, shows a grossly normal motor and sensory examination. Incision is CDI. Knee motion is 10-90\par  [de-identified] : AP, lateral, sunrise knee x-rays of the right knee were ordered and obtained in the office and demonstrate satisfactory position and alignment of the components are present. No signs of loosening are seen.

## 2022-02-17 NOTE — HISTORY OF PRESENT ILLNESS
[de-identified] : Status-post right  total knee  arthroplasty here for routine postoperative evaluation.   Excellent progress is noted in terms of pain and restoration of function. Pain is well controlled with oral medications. There has been no change in medical health since discharge. The patient does require assistive devices.

## 2022-02-17 NOTE — DISCUSSION/SUMMARY
[de-identified] : The patient is doing well after right total knee arthroplasty. Written infectious precautions were reviewed. The patient will progress with physical therapy at this time and they will work on transitioning from requiring assistive devices for ambulation.  Eliquis therapy will be continued for the full 4 week postoperative course for the purpose of orthopedic thromboembolism prophylaxis. Return around the 6 week anniversary from surgery for follow-up evaluation.

## 2022-02-21 ENCOUNTER — OUTPATIENT (OUTPATIENT)
Dept: OUTPATIENT SERVICES | Facility: HOSPITAL | Age: 72
LOS: 1 days | End: 2022-02-21

## 2022-02-21 ENCOUNTER — FORM ENCOUNTER (OUTPATIENT)
Age: 72
End: 2022-02-21

## 2022-02-21 DIAGNOSIS — Z95.4 PRESENCE OF OTHER HEART-VALVE REPLACEMENT: Chronic | ICD-10-CM

## 2022-02-21 DIAGNOSIS — Z96.651 PRESENCE OF RIGHT ARTIFICIAL KNEE JOINT: ICD-10-CM

## 2022-02-21 DIAGNOSIS — Z98.89 OTHER SPECIFIED POSTPROCEDURAL STATES: Chronic | ICD-10-CM

## 2022-02-21 DIAGNOSIS — Z95.5 PRESENCE OF CORONARY ANGIOPLASTY IMPLANT AND GRAFT: Chronic | ICD-10-CM

## 2022-02-21 DIAGNOSIS — Z95.810 PRESENCE OF AUTOMATIC (IMPLANTABLE) CARDIAC DEFIBRILLATOR: Chronic | ICD-10-CM

## 2022-02-21 DIAGNOSIS — Z98.890 OTHER SPECIFIED POSTPROCEDURAL STATES: Chronic | ICD-10-CM

## 2022-02-22 ENCOUNTER — NON-APPOINTMENT (OUTPATIENT)
Age: 72
End: 2022-02-22

## 2022-02-28 ENCOUNTER — APPOINTMENT (OUTPATIENT)
Dept: CARDIOLOGY | Facility: CLINIC | Age: 72
End: 2022-02-28

## 2022-03-01 ENCOUNTER — APPOINTMENT (OUTPATIENT)
Dept: CARDIOLOGY | Facility: CLINIC | Age: 72
End: 2022-03-01
Payer: MEDICARE

## 2022-03-01 ENCOUNTER — NON-APPOINTMENT (OUTPATIENT)
Age: 72
End: 2022-03-01

## 2022-03-01 PROCEDURE — 93296 REM INTERROG EVL PM/IDS: CPT

## 2022-03-01 PROCEDURE — 93295 DEV INTERROG REMOTE 1/2/MLT: CPT

## 2022-03-02 ENCOUNTER — NON-APPOINTMENT (OUTPATIENT)
Age: 72
End: 2022-03-02

## 2022-03-07 ENCOUNTER — NON-APPOINTMENT (OUTPATIENT)
Age: 72
End: 2022-03-07

## 2022-03-11 ENCOUNTER — APPOINTMENT (OUTPATIENT)
Dept: ORTHOPEDIC SURGERY | Facility: CLINIC | Age: 72
End: 2022-03-11
Payer: MEDICARE

## 2022-03-11 PROCEDURE — 99024 POSTOP FOLLOW-UP VISIT: CPT

## 2022-03-12 NOTE — HISTORY OF PRESENT ILLNESS
[de-identified] : This is very nice 71 year male  here for interim evaluation of right total knee arthroplasty. The patient reports good pain relief since surgery in the replaced joint and satisfactory restoration of function in terms of activities of daily living. The patient no longer requires an assistive device for ambulation, does not require pain medication, and completed postoperative physical therapy. They report unlimited activities of daily living and unlimited walking tolerance. The patient is thrilled with their progress from surgery and ultimate outcome.

## 2022-03-12 NOTE — PHYSICAL EXAM
[de-identified] : Well developed, well nourished in no apparent distress, awake, alert and orientated to person, place and time with appropriate mood and affect\par Respirations are even and unlabored. Gait evaluation does not reveal a limp. There is no inguinal adenopathy. The affected limb is well-perfused with palpable pedal pulse, without skin lesions, shows a grossly normal motor and sensory examination. Incision is healed. Knee motion is 5-118

## 2022-03-12 NOTE — DISCUSSION/SUMMARY
[de-identified] : The patient is doing well after joint replacement surgery. Continue to be weight bearing as tolerated without restriction. We recommend to continue long term exercise program and stretching exercises Follow up is recommended at the 6 month anniversary from surgery.

## 2022-03-15 ENCOUNTER — APPOINTMENT (OUTPATIENT)
Dept: CARDIOLOGY | Facility: CLINIC | Age: 72
End: 2022-03-15

## 2022-03-23 ENCOUNTER — APPOINTMENT (OUTPATIENT)
Dept: CARDIOLOGY | Facility: CLINIC | Age: 72
End: 2022-03-23
Payer: MEDICARE

## 2022-03-23 VITALS
WEIGHT: 228 LBS | BODY MASS INDEX: 30.22 KG/M2 | OXYGEN SATURATION: 98 % | SYSTOLIC BLOOD PRESSURE: 90 MMHG | DIASTOLIC BLOOD PRESSURE: 60 MMHG | HEIGHT: 73 IN | HEART RATE: 83 BPM | TEMPERATURE: 97.5 F

## 2022-03-23 PROCEDURE — 93283 PRGRMG EVAL IMPLANTABLE DFB: CPT

## 2022-03-27 ENCOUNTER — FORM ENCOUNTER (OUTPATIENT)
Age: 72
End: 2022-03-27

## 2022-03-29 ENCOUNTER — NON-APPOINTMENT (OUTPATIENT)
Age: 72
End: 2022-03-29

## 2022-03-30 ENCOUNTER — NON-APPOINTMENT (OUTPATIENT)
Age: 72
End: 2022-03-30

## 2022-04-01 ENCOUNTER — APPOINTMENT (OUTPATIENT)
Dept: CARDIOLOGY | Facility: CLINIC | Age: 72
End: 2022-04-01
Payer: MEDICARE

## 2022-04-01 ENCOUNTER — NON-APPOINTMENT (OUTPATIENT)
Age: 72
End: 2022-04-01

## 2022-04-01 VITALS
OXYGEN SATURATION: 98 % | DIASTOLIC BLOOD PRESSURE: 77 MMHG | HEART RATE: 64 BPM | WEIGHT: 229 LBS | BODY MASS INDEX: 30.35 KG/M2 | HEIGHT: 73 IN | SYSTOLIC BLOOD PRESSURE: 126 MMHG

## 2022-04-01 PROCEDURE — 99214 OFFICE O/P EST MOD 30 MIN: CPT

## 2022-04-01 PROCEDURE — 93000 ELECTROCARDIOGRAM COMPLETE: CPT

## 2022-04-03 NOTE — ASSESSMENT
[FreeTextEntry1] : Mr. 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 S/P Right total knee arthroplasty 1/31/22  with improvement in his exercise tolerance. Currently appears compensated and normotensive with approx 1-2 lbs of extra fluid. Reports some late evening eating this week. MEMS 31 today with range 26-31. Still with episodic dizziness related to position changes  Endorses NYHA class II symptoms. Episode of VT 2/4/22  treated with ATP( post op) \par \par 1. ICM, HFrEF \par - on torsemide 20 mg daily with 10 mg in evening every other day; goal weight 227-228 pounds. MEMS is 31 today and will take the evening torsemide 10 mg today\par - continue Farxiga 10 mg daily \par - does not tolerate Entresto as has significant pre-syncopal episodes (were occurring even prior to Entresto many years ago)\par - c/w losartan 12.5 mg twice/day\par - Continue Spirolactone 25 mg QD\par - Continue Toprol XL 12.5 mg twice/day; intermittent A pacing after atrial lead placed. Set MVP  \par - discussed if things were to worsen in future of possible LVAD as an option but no current indication for this\par \par 2. Aflutter/afib- in NSR today with 1 st degree AVB  ms\par - intermittent A pacing\par - continue eliquis 5 mg bid\par - off digoxin since his DCCV 8/2020\par - now off amiodarone with improvement in tremor\par \par 3. 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 CAD - s/p MI\par - continue ASA 81 mg daily and Lipitor 40 mg daily\par \par 5. RLL nodule 1.8x1.4 cm opacity \par - underwent PET/CT on 9/16 which was non-FDG avid and previously reviewed with Dr. Waggoner which felt it as inflammatory and no further intervention was warranted\par - he will also follow-up with his pulmonologist, Dr. Guerrero in [place]\par \par 6. Syncope - no recent episodes\par - given low voltage and atrial myopathy, evaluated for TTR amyloidosis which was equivocal; would require cardiac biopsy for definitive diagnosis but will defer as low suspicion \par - recommend compression stockings\par - carotid dopplers 12/27/21 moderate non-obstructive disease\par \par RTC in 6 months with me

## 2022-04-03 NOTE — HISTORY OF PRESENT ILLNESS
[FreeTextEntry1] : Mr. Encinas is a very pleasant 72 y/o M w/ h/o CAD s/p late-presenting MI 2005 s/p PCI LAD, ICM/HFrEF (EF 15-20%, LVEDD 6 cm) s/p single chamber ICD with lead revision in 2018 with upgrade to dual-chamber device (8/17/21) s/p MEMS (9/16/21), bioprosthetic AVR (2015) 2/2 AI s/p 26 mm EVOLUT HECTOR for severe bioprosthetic AS (8/20/20), aflutter ablation 7/17 w/ persistent afib, s/p DCCV x2 (8/12/20, 7/7/21), who presents for routine follow-up for his cardiomyopathy. Followed by Dr. Pressley. \jacinto peng Was last seen by me 12/7/21 and had been doing well. Has had diuretics adjusted based on his CardioMEMS. Previously underwent Tc-Pyp study to evaluate for amyloidosis which was equivocal. For definitive purposes, would require a cardiac biopsy which we will defer for now. \par \par He has previously been intolerant to adjustment in his neurohormonal therapies. Has been tolerating losartan 12.5 mg twice/day and Toprol 12.5 mg twice/day. Has had chronic dizziness worse with standing after prolonged period of sitting which has been fairly stable. Hasn't had recent episodes of syncope. He previously had 2 episodes of syncope (May and June 2021) unrelated to arrhythmias. \par danish Weight has lately been 228-230 pounds; today was 228 pounds and home B/P range 100/60 and is 126/77 in office today. He notes marked improvement of exertional tolerance since his knee replacement. CardioMEMS : 4/1- 31, 3/31/22-  28, 3/30- 27, 3/27/22- 32 and 3/26/22- 26.\jacinto peng Was having significant pain in right knee and underwent knee replacement 1/31/21 from which he had no complications. Underwent physical therapy. He is able to walk 2 miles without dyspnea. Still has some mild swelling of right knee with no pain and is off pain meds. \par \par His appetite has been excellent. Still has occasional dizziness with position changes Currently without LH/dizziness and denies CP, palpitations, orthopnea, PND, cough, abdominal distention and LE swelling.\par He was called for ICD follow up on 3/23/22 after remote monitor revealed an episode of VT 2/4/22 for 12 seconds successfully treated by ATP. Device with MVP  with normal function and Optivol WNL. \par \jacinto Received Covid (Moderna) vaccine x2 (last in February).and booster without adverse reaction.\par \par Denies any numbness/tingling in hands. Reports some back pain at times.

## 2022-04-03 NOTE — CARDIOLOGY SUMMARY
[de-identified] : \par 4/1/22 NSR with 1 st degree  ms, low voltage, PRWP\par 12/7/21 - A paced, long NV interval, PVC, \par 9/21/21 - A paced, long NV interval, PRWP, low voltage \par 7/19/21 - ? afib (per Dr. Apple in sinus); HR 70, RV paced \par \par 10/1/20 - afib, V paced, HR 51 \par 8/24/20 - AFib/, HR 69\par 12/31/19 - AFib, HR 73, low voltage, PRWP \par 11/8/17 - NSR, HR 76, APC, limb lead reversal, PRWP\par 6/29/17  Aflutter,HR 79 [de-identified] : \par 11/18/21 - Tc-Pyp - heart-to-contralateral ratio 1.3 (borderline/suggestive of ATTR); grade 2 (myocardial uptake to rib; borderline/suggestive of ATTR); equivocal for cardiac amyloidosis  [de-identified] : \par 10/1/20 - LVEDD 6 cm, EF 20% (segmental; inferoseptal, apical, anterolateral wall akinetic; basal lateral function preserved), mild MR; TAVR in place; AALIYAH 2.2; LVOT VTi 12.9 cm, IVC nl sized\par \par TTE 8/21/20 - LVEDD 5.4 cm, EF 25%, nl RV size with mildly reduced function, mild-mod MR, well seated Evolut Pro+ THV-in-valve with nl function (peak 11, mean 6), mild TR, est RAP 15 mmHg, est RVSP 71 mmHg.\par \par TTE 6/18/19 - LVEDD 5.8 cm, EF 20-25%, mild MR, nl functioning bioAVR (peak 19, mean 9), mod LA dilatation, \par \par ANDREY 7/10/17 - EF 10-15%, LVEDD 5.9 cm, mild MR, bioprosthetic Ao valve, no aortic insufficiency, severe segmental LV dysfunction (akinesis of mid-apical septum, aneurysmal and dyskinetic LV apex, akinesis of anterior wall)\par \par TTE 12/16 LVIDd 5.8 cm. severe global LVSD. Right ventricle mildly dilated with global hypokinesis.  Normal prosthetic aortic valve: IVS 1.0 cm, severe global LVSD, normal diastolic functioning. Right ventricle mildly dilated with global hypokinesis. Normal prosthetic aortic valve, mild MR, mild pulmonic insufficiency, normal Tricuspid valve. [de-identified] : \par  [de-identified] : \par 9/16/21 - RHC/MEMS implant - RA 12, RV 50/13, PA 51/20/33, PCWP 11, CO/CI 4.7/2.08 (PA sat 59%, Ao sat 93%, Hb 11.5), PVR 4.68 REYNA\par \par 8/20/20 - HECTOR; /36, Ao 101/63/80\par \par [facility] 6/4/20 - Ao 126/77/92, RA 8, RV 71/10, PA 66/24/40, PCW 20, PaSat 61%, AoSat 98%, Jan CO/CI 3.85/1.7, PVR 5.19 Reyna, SVR 1744 dsc\par \par [facility] 6/4/20 - LM mild CAD, mLAD 10% stenosis at site of prior stent, LCx/RCA mod CAD\par  \par [facility] ([place]) - 3 stents placed\par

## 2022-04-03 NOTE — PHYSICAL EXAM
[Well Developed] : well developed [Well Nourished] : well nourished [No Acute Distress] : no acute distress [Normal Conjunctiva] : normal conjunctiva [No Carotid Bruit] : no carotid bruit [Normal S1, S2] : normal S1, S2 [No Murmur] : no murmur [No Rub] : no rub [No Gallop] : no gallop [Clear Lung Fields] : clear lung fields [Good Air Entry] : good air entry [No Respiratory Distress] : no respiratory distress  [Soft] : abdomen soft [Non Tender] : non-tender [No Masses/organomegaly] : no masses/organomegaly [Normal Bowel Sounds] : normal bowel sounds [Normal Gait] : normal gait [No Cyanosis] : no cyanosis [No Clubbing] : no clubbing [No Varicosities] : no varicosities [No Rash] : no rash [No Skin Lesions] : no skin lesions [Moves all extremities] : moves all extremities [No Focal Deficits] : no focal deficits [Normal Speech] : normal speech [Alert and Oriented] : alert and oriented [Normal memory] : normal memory [Normal] : alert and oriented, normal memory [de-identified] : overweight [de-identified] : JVP approx 8 cm without HJR [de-identified] : Left chest ICD [de-identified] :  edema of right knee with healing incision

## 2022-04-05 LAB
ALBUMIN SERPL ELPH-MCNC: 4.8 G/DL
ALP BLD-CCNC: 128 U/L
ALT SERPL-CCNC: 17 U/L
ANION GAP SERPL CALC-SCNC: 18 MMOL/L
AST SERPL-CCNC: 20 U/L
BASOPHILS # BLD AUTO: 0.1 K/UL
BASOPHILS NFR BLD AUTO: 1 %
BILIRUB SERPL-MCNC: 0.9 MG/DL
BUN SERPL-MCNC: 24 MG/DL
CALCIUM SERPL-MCNC: 9.8 MG/DL
CHLORIDE SERPL-SCNC: 96 MMOL/L
CO2 SERPL-SCNC: 24 MMOL/L
CREAT SERPL-MCNC: 1.24 MG/DL
EGFR: 62 ML/MIN/1.73M2
EOSINOPHIL # BLD AUTO: 0.22 K/UL
EOSINOPHIL NFR BLD AUTO: 2.2 %
ESTIMATED AVERAGE GLUCOSE: 134 MG/DL
HBA1C MFR BLD HPLC: 6.3 %
HCT VFR BLD CALC: 45.4 %
HGB BLD-MCNC: 14 G/DL
IMM GRANULOCYTES NFR BLD AUTO: 0.5 %
LYMPHOCYTES # BLD AUTO: 1.76 K/UL
LYMPHOCYTES NFR BLD AUTO: 17.5 %
MAGNESIUM SERPL-MCNC: 2.4 MG/DL
MAN DIFF?: NORMAL
MCHC RBC-ENTMCNC: 29.2 PG
MCHC RBC-ENTMCNC: 30.8 GM/DL
MCV RBC AUTO: 94.6 FL
MONOCYTES # BLD AUTO: 1.26 K/UL
MONOCYTES NFR BLD AUTO: 12.5 %
NEUTROPHILS # BLD AUTO: 6.68 K/UL
NEUTROPHILS NFR BLD AUTO: 66.3 %
NT-PROBNP SERPL-MCNC: 1033 PG/ML
PLATELET # BLD AUTO: 272 K/UL
POTASSIUM SERPL-SCNC: 4.1 MMOL/L
PROT SERPL-MCNC: 8 G/DL
RBC # BLD: 4.8 M/UL
RBC # FLD: 14.1 %
SODIUM SERPL-SCNC: 137 MMOL/L
WBC # FLD AUTO: 10.07 K/UL

## 2022-04-06 ENCOUNTER — APPOINTMENT (OUTPATIENT)
Dept: CARDIOLOGY | Facility: CLINIC | Age: 72
End: 2022-04-06
Payer: MEDICARE

## 2022-04-06 PROCEDURE — 93306 TTE W/DOPPLER COMPLETE: CPT

## 2022-04-08 ENCOUNTER — APPOINTMENT (OUTPATIENT)
Dept: CARDIOLOGY | Facility: CLINIC | Age: 72
End: 2022-04-08
Payer: MEDICARE

## 2022-04-08 VITALS
DIASTOLIC BLOOD PRESSURE: 58 MMHG | BODY MASS INDEX: 29.82 KG/M2 | WEIGHT: 225 LBS | TEMPERATURE: 97.3 F | OXYGEN SATURATION: 96 % | HEART RATE: 85 BPM | SYSTOLIC BLOOD PRESSURE: 104 MMHG | HEIGHT: 73 IN

## 2022-04-08 PROCEDURE — 99214 OFFICE O/P EST MOD 30 MIN: CPT

## 2022-04-08 RX ORDER — OXYCODONE 5 MG/1
5 TABLET ORAL EVERY 6 HOURS
Qty: 28 | Refills: 0 | Status: DISCONTINUED | COMMUNITY
Start: 2022-02-25 | End: 2022-04-08

## 2022-04-08 RX ORDER — OXYCODONE 5 MG/1
5 TABLET ORAL
Qty: 40 | Refills: 0 | Status: DISCONTINUED | COMMUNITY
Start: 2022-03-07 | End: 2022-04-08

## 2022-04-08 RX ORDER — OXYCODONE 5 MG/1
5 TABLET ORAL
Qty: 40 | Refills: 0 | Status: DISCONTINUED | COMMUNITY
Start: 2022-02-08 | End: 2022-04-08

## 2022-04-08 RX ORDER — OXYCODONE 5 MG/1
5 TABLET ORAL
Qty: 40 | Refills: 0 | Status: DISCONTINUED | COMMUNITY
Start: 2022-03-11 | End: 2022-04-08

## 2022-04-08 RX ORDER — OXYCODONE 5 MG/1
5 TABLET ORAL
Qty: 40 | Refills: 0 | Status: DISCONTINUED | COMMUNITY
Start: 2022-02-16 | End: 2022-04-08

## 2022-04-11 ENCOUNTER — RX RENEWAL (OUTPATIENT)
Age: 72
End: 2022-04-11

## 2022-04-11 NOTE — REASON FOR VISIT
[Arrhythmia/ECG Abnorrmalities] : arrhythmia/ECG abnormalities [Structural Heart and Valve Disease] : structural heart and valve disease [Coronary Artery Disease] : coronary artery disease

## 2022-04-11 NOTE — ASSESSMENT
[FreeTextEntry1] : Coronary artery disease, prior MI, Prior CTA Afl, PCI, HTN, Ischemic cardiomyopathy (EF 20%), CRI\par CSHF right heart cath with depressed cardiac index with normal wedge pressure and mild pulmonary \par Implantable defibrillator h/o NSVT\par s/p dual chamber PPM not CRT increased AV delay to minimize RV pacing\par s/p valve in valve TAVR 8/20\par Right lower lobe nodule . Atrial fibrillation status post cardioversion recurrent 7/21 off amiodarone \par s/p mems, follows w/ heart failure Dr. Quiñones specialist \par \par I discussed possible enrollment in cardiac rehab CardioMEMS is being managed by Dr. Quiñones recent device check has been reviewed continue beta-blocker therapy\par \par Cont medical rx. \par continue aspirin anticoagulation HI statin ARB beta-blocker diuretic and aldosterone antagonist \par additional diuretics for weight gain \par tolerating addition of SGLT2 inhibitor well\par intolerant of Entresto \par Off amiodorone \par There is overall improvement in pulmonary function, tremors, dizziness, and fatigue\par SBE ppx prn\par Monitor heart rate blood pressure and weights \par Discussed red flag symptoms that would warrant immediate intervention. All patient questions and concerns have been addressed Instructed to call the office if any new symptoms.\par \par

## 2022-04-11 NOTE — HISTORY OF PRESENT ILLNESS
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 was admitted for 2 days and given diuretics \par Underwent angiogram with Dr. Nino which showed 10% stenosis at the site of prior stent. He also had a RHC at that time with results below. \par Found to have severe bioprosthetic AS on ANDREY for DCCV and was admitted to [facility] on 8/18 for a valve in valve TAVR, which he had done on 8/20. \par Of note, incidentally found to have a new RLL nodule on cardiac CT, had thoracic f/u and PET/CT \par \par Previous intolerance to Entresto. Symptoms of dizziness and hypotension on medication \par Is no longer on amiodarone. improvement in tremors\par overall improvement in pulmonary function, dizziness, and fatigue \par now tolerating SGLT2i wellw/ mild positional dizziness\par \par Previously given extra doses of torsemide for elevated mems readings CardioMEMS followed by heart failure service Dr. Quiñones \par \par In the interim he underwent his knee replacement he reports feeling the best he has felt in a long time he is increasing his stamina completed work on physical therapy he is ambulating \par \par Denies exertional chest pain or discomfort. Denies unusual shortness of breath, orthopnea, weight gain, or LE edema. Denies palpitations, lightheadedness, dizziness, or syncope.  Denies any unusual bleeding or black/tarry stools. \par \par EKG 12/7/21 A paced\par Carotid doppler 12/27/21 mild to mod nonbstructive atherosclerosis BL\par US abd AAA  mod atherosclerosis \par Nuclear stress test 1/6/22 large anterior, apical , inferior, inferolateral fixed defects s/o infarct, TID 1.05, EF 19%\par Device interrogated 12/1/21 high atrial rate 10/26 c/w AT, v-rates controlled. Outputs adjusted for battery longevity. \par july 2021 ANDREY aortic valve bioprosthesis LV dysfunction mild MR. EF 20%\par device upgrade August 2021 dual chamber, atrial paced\par CardioMEMS September 2021 right heart cath with depressed cardiac index with normal wedge pressure and mild pulmonary hypertension PA 51/20 wedge 11 \par PYP study was equivocal for cardiac amyloid \par blood work November 2021 hemoglobin 13.6 potassium 4.0 creatinine 1.2 LDL 64\par last echocardiogram demonstrates severe left ventricular dysfunction mild mitral regurgitation TAVR with peak gradient of 17 no aortic regurgitation moderate pulmonary hypertension \par \par \par

## 2022-04-15 ENCOUNTER — APPOINTMENT (OUTPATIENT)
Dept: CARDIOLOGY | Facility: CLINIC | Age: 72
End: 2022-04-15

## 2022-04-18 ENCOUNTER — NON-APPOINTMENT (OUTPATIENT)
Age: 72
End: 2022-04-18

## 2022-04-26 ENCOUNTER — NON-APPOINTMENT (OUTPATIENT)
Age: 72
End: 2022-04-26

## 2022-04-26 ENCOUNTER — APPOINTMENT (OUTPATIENT)
Dept: CARDIOLOGY | Facility: CLINIC | Age: 72
End: 2022-04-26
Payer: MEDICARE

## 2022-04-26 VITALS
HEIGHT: 73 IN | WEIGHT: 223 LBS | TEMPERATURE: 97.1 F | HEART RATE: 81 BPM | BODY MASS INDEX: 29.55 KG/M2 | SYSTOLIC BLOOD PRESSURE: 110 MMHG | OXYGEN SATURATION: 95 % | DIASTOLIC BLOOD PRESSURE: 60 MMHG

## 2022-04-26 PROCEDURE — 93283 PRGRMG EVAL IMPLANTABLE DFB: CPT

## 2022-04-26 NOTE — PROCEDURE
[No] : not [Atrial Fibrillation] : atrial fibrillation [ICD] : Implantable cardioverter-defibrillator [Threshold Testing Performed] : Threshold testing was performed [Lead Imp:  ___ohms] : lead impedance was [unfilled] ohms [Sensing Amplitude ___mv] : sensing amplitude was [unfilled] mv [___V @] : [unfilled] V [___ ms] : [unfilled] ms [de-identified] : Medtronic [de-identified] : Cobalt XT  [id number] [de-identified] : [id number] [de-identified] : 08/17/2021 [de-identified] : AAI-DDDR [de-identified] :  [de-identified] : 11 years [de-identified] : AP: 3.6%\par : 61.3%\par \par AF started yesterday.  Seen today for urgent evaluation with device alerting at 4 am for the past two days.  Device was set to alert for AF >6 hours as well as AF with V rate >100 for 6 hours.  Pt has now been in AF appox 92% of the time for the past 2 days.  Asymptomatic. \par \par AF alert has been disabled.  AF with elevated HR alert remains.  Time adjusted to Eastern Standard Time so alerts will occur at 920 AM instead of 420AM.  \par \par Metoprolol recently increased by HF specialist.  4/13\par \par Will discuss AF with EP tomorrow in office and will contact pt with further instructions.

## 2022-04-28 ENCOUNTER — FORM ENCOUNTER (OUTPATIENT)
Age: 72
End: 2022-04-28

## 2022-05-11 ENCOUNTER — NON-APPOINTMENT (OUTPATIENT)
Age: 72
End: 2022-05-11

## 2022-05-23 ENCOUNTER — APPOINTMENT (OUTPATIENT)
Dept: ELECTROPHYSIOLOGY | Facility: CLINIC | Age: 72
End: 2022-05-23
Payer: MEDICARE

## 2022-05-23 VITALS
OXYGEN SATURATION: 95 % | BODY MASS INDEX: 29.55 KG/M2 | SYSTOLIC BLOOD PRESSURE: 102 MMHG | HEART RATE: 74 BPM | HEIGHT: 73 IN | WEIGHT: 223 LBS | DIASTOLIC BLOOD PRESSURE: 60 MMHG | TEMPERATURE: 97.7 F

## 2022-05-23 PROCEDURE — 99214 OFFICE O/P EST MOD 30 MIN: CPT

## 2022-05-30 NOTE — PHYSICAL EXAM
[No Acute Distress] : no acute distress [Normal Conjunctiva] : normal conjunctiva [Normal Venous Pressure] : normal venous pressure [Normal S1, S2] : normal S1, S2 [No Rub] : no rub [Clear Lung Fields] : clear lung fields [Non Tender] : non-tender [No Edema] : no edema [No Oral Pallor] : no oral pallor [Normal Jugular Venous V Waves Present] : normal jugular venous V waves present [Arterial Pulses Normal] : the arterial pulses were normal [FreeTextEntry1] : Normal prosthetic valve sound; trace bilateral edema [Impaired Insight] : insight and judgment were intact

## 2022-05-30 NOTE — HISTORY OF PRESENT ILLNESS
[FreeTextEntry1] : Patient is a 69-year-old man is seen in follow-up evaluation for atrial fibrillation.\par \par Previous history: S/P  TAV in [place].  He has had prior  cardioversion for A. fib.  Prior  history of myocardial infarction 2005 status post previous PCI to the LAD,  dilated cardiomyopathy ejection fraction 15 to 20%, s/p Medtronic single-chamber ICD implant 2005, status post appropriate shocks for VT VF 2015.  And prior atrial flutter ablation in 2017.  He underwent extraction of Medtronic Estevan lead October 2018.  Previous bioprosthetic aortic valve replacement with subsequent premature failure noted on ANDREY.he underwent upgrade of his single-chamber ICD to a dual-chamber ICD.  He has a CardioMEMS device\par \par Patient was previously on amiodarone and this was discontinued because of tremors.  The tremors had resolved after discontinuation.\par After upgrade to biventricular device the patient has felt well and has remained in sinus rhythm.  Recently had paroxysmal AF episode but is now in sinus rhythm and feeling well again.\par \par \par Echocardiogram from 4/Lasix 22 showed EF 20%.  He has mild mitral regurgitation.  He had a low mean transaortic valve gradient 8 mmHg.  Patient has severe left atrial enlargement with left atrial volume index 54 cc per metered square.\par Previous history:  \par \par Cardiac catheterization performed 6/4/2020 showed nonobstructive coronary arteries.\par Echocardiogram from 6/3/2020 showed severely reduced ejection fraction 15 to 20%, dilated LV diameter 6.5 cm, moderate to severe diastolic dysfunction\par \par Device: Medtronic cobalt XT DR dual-chamber remaining longevity 10.8 years.  Programmed lower rate 60 upper rate 130 with VF as well as VT zone.  Paced and sensed AV delay set long at 350 ms.  Rate sensor was programmed on.\par \par

## 2022-05-30 NOTE — REVIEW OF SYSTEMS
[Feeling Fatigued] : not feeling fatigued [Sore Throat] : no sore throat [SOB] : no shortness of breath [Dyspnea on exertion] : dyspnea during exertion [Chest Discomfort] : no chest discomfort [Palpitations] : no palpitations [Cough] : no cough [Abdominal Pain] : no abdominal pain [Rash] : no rash [Dizziness] : no dizziness [Easy Bleeding] : no tendency for easy bleeding

## 2022-05-30 NOTE — DISCUSSION/SUMMARY
[FreeTextEntry1] : Patient is a 71-year-old man with a prior history of ischemic cardiomyopathy with severe left ventricular dysfunction, systolic heart failure, status post upgrade to dual chamber ICD for primary prevention.  Previously had appropriate shocks for VT/VF in 2015 and subsequent ATP terminations as well, status post extraction of Estevan lead in 2018 and reimplantation of a single-chamber device.  He is status post HECTOR procedure.\par \par He had acute systolic heart failure that has improved with diuretics.  Now stable s/p CardioMEMs device. \par \par Pt feels much better with dual chamber device.\par \par He has had paroxysmal atrial fibrillation and associated symptoms with fatigue as well as shortness of breath.  When he is in sinus rhythm he feels well.  Patient previously took amiodarone but developed tremors and it was discontinued.\par \par Is at high risk for recurrent atrial fibrillation given his severely enlarged left atrium.  He does not do well in A. fib in terms of symptoms and would need additional treatment within the antiarrhythmic and/or catheter ablation procedure.  Choice of antiarrhythmic can be Tikosyn.  His creatinine from 4/5/2022 was 1.2.\par \par Is a burden currently is less than 0.1% based on the device interrogation.  We will monitor his over the next few months and decide on antiarrhythmic with Tikosyn versus catheter ablation.\par \par The OptiVol fluid index was below threshold\par \par There were no episodes of VT VF.\par \par Recommendation: Continue anticoagulation, aggressive treatment for his LV dysfunction heart failure and follow the burden.  Follow-up electrophysiology evaluation in 3 to 4 months\par

## 2022-06-07 ENCOUNTER — NON-APPOINTMENT (OUTPATIENT)
Age: 72
End: 2022-06-07

## 2022-06-17 ENCOUNTER — RX RENEWAL (OUTPATIENT)
Age: 72
End: 2022-06-17

## 2022-06-26 ENCOUNTER — INPATIENT (INPATIENT)
Facility: HOSPITAL | Age: 72
LOS: 4 days | Discharge: HOME | End: 2022-07-01
Attending: STUDENT IN AN ORGANIZED HEALTH CARE EDUCATION/TRAINING PROGRAM | Admitting: INTERNAL MEDICINE
Payer: MEDICARE

## 2022-06-26 DIAGNOSIS — Z98.89 OTHER SPECIFIED POSTPROCEDURAL STATES: Chronic | ICD-10-CM

## 2022-06-26 DIAGNOSIS — Z98.890 OTHER SPECIFIED POSTPROCEDURAL STATES: Chronic | ICD-10-CM

## 2022-06-26 DIAGNOSIS — Z95.4 PRESENCE OF OTHER HEART-VALVE REPLACEMENT: Chronic | ICD-10-CM

## 2022-06-26 DIAGNOSIS — Z95.5 PRESENCE OF CORONARY ANGIOPLASTY IMPLANT AND GRAFT: Chronic | ICD-10-CM

## 2022-06-26 DIAGNOSIS — Z95.810 PRESENCE OF AUTOMATIC (IMPLANTABLE) CARDIAC DEFIBRILLATOR: Chronic | ICD-10-CM

## 2022-06-26 PROCEDURE — 99291 CRITICAL CARE FIRST HOUR: CPT

## 2022-06-26 PROCEDURE — 33967 INSERT I-AORT PERCUT DEVICE: CPT

## 2022-06-26 PROCEDURE — 71045 X-RAY EXAM CHEST 1 VIEW: CPT | Mod: 26,76

## 2022-06-26 PROCEDURE — 93460 R&L HRT ART/VENTRICLE ANGIO: CPT | Mod: 26

## 2022-06-26 PROCEDURE — 99223 1ST HOSP IP/OBS HIGH 75: CPT

## 2022-06-26 PROCEDURE — 93010 ELECTROCARDIOGRAM REPORT: CPT | Mod: 76

## 2022-06-27 ENCOUNTER — NON-APPOINTMENT (OUTPATIENT)
Age: 72
End: 2022-06-27

## 2022-06-27 PROCEDURE — 93306 TTE W/DOPPLER COMPLETE: CPT | Mod: 26

## 2022-06-27 PROCEDURE — 93283 PRGRMG EVAL IMPLANTABLE DFB: CPT | Mod: 26

## 2022-06-27 PROCEDURE — 99223 1ST HOSP IP/OBS HIGH 75: CPT

## 2022-06-27 PROCEDURE — 71045 X-RAY EXAM CHEST 1 VIEW: CPT | Mod: 26

## 2022-06-28 ENCOUNTER — APPOINTMENT (OUTPATIENT)
Dept: CARDIOLOGY | Facility: CLINIC | Age: 72
End: 2022-06-28

## 2022-06-28 PROCEDURE — 99233 SBSQ HOSP IP/OBS HIGH 50: CPT

## 2022-07-01 ENCOUNTER — OUTPATIENT (OUTPATIENT)
Dept: OUTPATIENT SERVICES | Facility: HOSPITAL | Age: 72
LOS: 1 days | End: 2022-07-01

## 2022-07-01 DIAGNOSIS — Z95.810 PRESENCE OF AUTOMATIC (IMPLANTABLE) CARDIAC DEFIBRILLATOR: Chronic | ICD-10-CM

## 2022-07-01 DIAGNOSIS — Z98.890 OTHER SPECIFIED POSTPROCEDURAL STATES: Chronic | ICD-10-CM

## 2022-07-01 DIAGNOSIS — Z95.4 PRESENCE OF OTHER HEART-VALVE REPLACEMENT: Chronic | ICD-10-CM

## 2022-07-01 DIAGNOSIS — Z95.5 PRESENCE OF CORONARY ANGIOPLASTY IMPLANT AND GRAFT: Chronic | ICD-10-CM

## 2022-07-01 DIAGNOSIS — Z98.89 OTHER SPECIFIED POSTPROCEDURAL STATES: Chronic | ICD-10-CM

## 2022-07-01 PROCEDURE — 93010 ELECTROCARDIOGRAM REPORT: CPT

## 2022-07-01 PROCEDURE — 99233 SBSQ HOSP IP/OBS HIGH 50: CPT

## 2022-07-07 DIAGNOSIS — E87.6 HYPOKALEMIA: ICD-10-CM

## 2022-07-07 DIAGNOSIS — I47.2 VENTRICULAR TACHYCARDIA: ICD-10-CM

## 2022-07-07 DIAGNOSIS — I50.9 HEART FAILURE, UNSPECIFIED: ICD-10-CM

## 2022-07-07 DIAGNOSIS — Z87.891 PERSONAL HISTORY OF NICOTINE DEPENDENCE: ICD-10-CM

## 2022-07-07 DIAGNOSIS — I25.10 ATHEROSCLEROTIC HEART DISEASE OF NATIVE CORONARY ARTERY WITHOUT ANGINA PECTORIS: ICD-10-CM

## 2022-07-07 DIAGNOSIS — E86.0 DEHYDRATION: ICD-10-CM

## 2022-07-07 DIAGNOSIS — Z95.3 PRESENCE OF XENOGENIC HEART VALVE: ICD-10-CM

## 2022-07-07 DIAGNOSIS — I11.0 HYPERTENSIVE HEART DISEASE WITH HEART FAILURE: ICD-10-CM

## 2022-07-07 DIAGNOSIS — B02.29 OTHER POSTHERPETIC NERVOUS SYSTEM INVOLVEMENT: ICD-10-CM

## 2022-07-07 DIAGNOSIS — R07.9 CHEST PAIN, UNSPECIFIED: ICD-10-CM

## 2022-07-07 DIAGNOSIS — Z95.5 PRESENCE OF CORONARY ANGIOPLASTY IMPLANT AND GRAFT: ICD-10-CM

## 2022-07-07 DIAGNOSIS — I48.91 UNSPECIFIED ATRIAL FIBRILLATION: ICD-10-CM

## 2022-07-07 DIAGNOSIS — I27.20 PULMONARY HYPERTENSION, UNSPECIFIED: ICD-10-CM

## 2022-07-07 DIAGNOSIS — Z95.810 PRESENCE OF AUTOMATIC (IMPLANTABLE) CARDIAC DEFIBRILLATOR: ICD-10-CM

## 2022-07-07 DIAGNOSIS — I25.5 ISCHEMIC CARDIOMYOPATHY: ICD-10-CM

## 2022-07-07 DIAGNOSIS — Z85.038 PERSONAL HISTORY OF OTHER MALIGNANT NEOPLASM OF LARGE INTESTINE: ICD-10-CM

## 2022-07-07 DIAGNOSIS — E03.9 HYPOTHYROIDISM, UNSPECIFIED: ICD-10-CM

## 2022-07-07 DIAGNOSIS — A05.9 BACTERIAL FOODBORNE INTOXICATION, UNSPECIFIED: ICD-10-CM

## 2022-07-07 DIAGNOSIS — J44.9 CHRONIC OBSTRUCTIVE PULMONARY DISEASE, UNSPECIFIED: ICD-10-CM

## 2022-07-07 DIAGNOSIS — I25.2 OLD MYOCARDIAL INFARCTION: ICD-10-CM

## 2022-07-07 DIAGNOSIS — Z88.8 ALLERGY STATUS TO OTHER DRUGS, MEDICAMENTS AND BIOLOGICAL SUBSTANCES STATUS: ICD-10-CM

## 2022-07-08 DIAGNOSIS — I50.22 CHRONIC SYSTOLIC (CONGESTIVE) HEART FAILURE: ICD-10-CM

## 2022-07-11 ENCOUNTER — NON-APPOINTMENT (OUTPATIENT)
Age: 72
End: 2022-07-11

## 2022-07-12 NOTE — ED ADULT NURSE NOTE - CHPI ED NUR DURATION
week(s) Price (Do Not Change): 0.00 Instructions: This plan will send the code FBSD to the PM system.  DO NOT or CHANGE the price. Detail Level: Simple

## 2022-07-13 ENCOUNTER — APPOINTMENT (OUTPATIENT)
Dept: CARDIOLOGY | Facility: CLINIC | Age: 72
End: 2022-07-13

## 2022-07-13 ENCOUNTER — NON-APPOINTMENT (OUTPATIENT)
Age: 72
End: 2022-07-13

## 2022-07-13 VITALS
OXYGEN SATURATION: 96 % | BODY MASS INDEX: 30.35 KG/M2 | DIASTOLIC BLOOD PRESSURE: 60 MMHG | TEMPERATURE: 97.1 F | WEIGHT: 230 LBS | HEART RATE: 71 BPM | SYSTOLIC BLOOD PRESSURE: 110 MMHG

## 2022-07-13 PROCEDURE — 93283 PRGRMG EVAL IMPLANTABLE DFB: CPT

## 2022-07-18 NOTE — PROGRESS NOTE ADULT - PROBLEM/PLAN-2
DISPLAY PLAN FREE TEXT Orientation to room/Bed in low position, brakes on/Side rails x 2 or 4 up, assess large gaps, such that a patient could get extremity or other body part entrapped, use additional safety procedures/Call light is within reach, educate patient/family on its functionality/Patient and family education available to parents and patient

## 2022-07-22 ENCOUNTER — APPOINTMENT (OUTPATIENT)
Dept: CARDIOLOGY | Facility: CLINIC | Age: 72
End: 2022-07-22

## 2022-07-22 VITALS
HEIGHT: 73 IN | OXYGEN SATURATION: 96 % | BODY MASS INDEX: 30.22 KG/M2 | TEMPERATURE: 97.5 F | WEIGHT: 228 LBS | DIASTOLIC BLOOD PRESSURE: 60 MMHG | SYSTOLIC BLOOD PRESSURE: 96 MMHG | HEART RATE: 77 BPM

## 2022-07-22 PROCEDURE — 99215 OFFICE O/P EST HI 40 MIN: CPT

## 2022-07-28 ENCOUNTER — NON-APPOINTMENT (OUTPATIENT)
Age: 72
End: 2022-07-28

## 2022-07-29 DIAGNOSIS — R00.2 PALPITATIONS: ICD-10-CM

## 2022-07-29 DIAGNOSIS — Z95.0 PRESENCE OF CARDIAC PACEMAKER: ICD-10-CM

## 2022-07-29 DIAGNOSIS — I47.2 VENTRICULAR TACHYCARDIA: ICD-10-CM

## 2022-08-01 ENCOUNTER — NON-APPOINTMENT (OUTPATIENT)
Age: 72
End: 2022-08-01

## 2022-08-04 NOTE — ASSESSMENT
[FreeTextEntry1] : dilated cardiomyopathy \par VT storm.  \par Multiple ICD shocks.   \par Monitor electrolytes. \par Follow-up with EP regarding long-term rhythm control strategy.  \par Low-sodium diet \par Follow-up CardioMEMS.  \par will coordinate care with heart failure specialist and electrophysiology\par \par Coronary artery disease, prior MI, Prior CTA Afl, PCI, HTN, Ischemic cardiomyopathy (EF 20%), CRI\par CSHF right heart cath with depressed cardiac index with normal wedge pressure and mild pulmonary \par Implantable defibrillator h/o NSVT\par s/p dual chamber PPM not CRT increased AV delay to minimize RV pacing\par s/p valve in valve TAVR 8/20\par Right lower lobe nodule \par Atrial fibrillation status post cardioversion recurrent 7/21 \par s/p mems, follows w/ heart failure Dr. Quiñones specialist \par \par ? enrollment in cardiac rehab \par CardioMEMS is being managed by Dr. Quiñones \par recent device check has been reviewed \par continue beta-blocker therapy\par \par continue aspirin anticoagulation HI statin ARB beta-blocker diuretic and aldosterone antagonist \par additional diuretics for weight gain \par tolerating addition of SGLT2 inhibitor \par intolerant of Entresto \par SBE ppx prn\par Monitor heart rate blood pressure and weights \par Discussed red flag symptoms that would warrant immediate intervention. All patient questions and concerns have been addressed Instructed to call the office if any new symptoms.\par

## 2022-08-04 NOTE — HISTORY OF PRESENT ILLNESS
[FreeTextEntry1] : DUDLEY BERMUDEZ  is a 72 year old  M\par w/ h/o CAD s/p late-presenting MI 2005 s/p PCI LAD, ICM s/p mems, s/p single chamber ICD with lead revision in 2018 then dual chamber (failed CRT), bioprosthetic AVR (2015, s/p HECTOR for severe bioprosthetic AS (8/20/20), aflutter ablation 7/17 w/ persistent afib, s/p DCCV 8/12/20 then 7/21\par unable to place LV lead due to coronary venous anatomy\par \par In June 2020, presented to [facility] for 6 months due to worsening SOB and fatigue. was admitted for 2 days and given diuretics \par Underwent angiogram with Dr. Nino which showed 10% stenosis at the site of prior stent. He also had a RHC at that time with results below. \par Found to have severe bioprosthetic AS on ANDREY for DCCV and was admitted to [facility] on 8/18 for a valve in valve TAVR, which he had done on 8/20. \par Of note, incidentally found to have a new RLL nodule on cardiac CT, had thoracic f/u and PET/CT \par \par Previous intolerance to Entresto. Symptoms of dizziness and hypotension on medication \par prior tremors with amio\par Previously given extra doses of torsemide for elevated mems readings CardioMEMS followed by heart failure service Dr. Quiñones \par \par p/w VT storm \par He reports having gastroenteritis.  Dyspepsia, nausea, loose bowel movements.  Felt wiped out.  ICD discharges started at home. Total of 26 shocks.\par Cardiac catheterization demonstrated low filling pressures\par Echocardiogram demonstrated severe left ventricular dysfunction\par Loaded with amiodarone.  \par Had balloon pump and Raleigh-Jian catheter.   \par Received IV fluids.\par \par recent device check with elevated OptiVol. \par There is no syncope.\par \par Denies exertional chest pain or discomfort. \par Denies unusual shortness of breath, orthopnea, weight gain, or LE edema. \par Denies any unusual bleeding or black/tarry stools.  \par \par Blood work July 2022 hemoglobin 11.6 potassium 4.2 creatinine 0.8 \par EKG 12/7/21 A paced\par Carotid doppler 12/27/21 mild to mod nonbstructive atherosclerosis BL\par US abd AAA  mod atherosclerosis \par Nuclear stress test 1/6/22 large anterior, apical , inferior, inferolateral fixed defects s/o infarct, TID 1.05, EF 19%\par PYP study was equivocal for cardiac amyloid \par last echocardiogram demonstrates severe left ventricular dysfunction mild mitral regurgitation TAVR with peak gradient of 17 no aortic regurgitation moderate pulmonary hypertension \par

## 2022-08-07 ENCOUNTER — FORM ENCOUNTER (OUTPATIENT)
Age: 72
End: 2022-08-07

## 2022-08-11 ENCOUNTER — APPOINTMENT (OUTPATIENT)
Dept: ORTHOPEDIC SURGERY | Facility: CLINIC | Age: 72
End: 2022-08-11

## 2022-08-11 VITALS — WEIGHT: 228 LBS | HEIGHT: 74 IN | BODY MASS INDEX: 29.26 KG/M2

## 2022-08-11 PROCEDURE — 99024 POSTOP FOLLOW-UP VISIT: CPT

## 2022-08-11 PROCEDURE — 73564 X-RAY EXAM KNEE 4 OR MORE: CPT | Mod: RT

## 2022-08-11 NOTE — DISCUSSION/SUMMARY
[de-identified] : The patient is doing well after right total knee arthroplasty. Continue to be weight bearing as tolerated without restriction.  Daily home exercise program recommended.  Follow up is recommended in 1 year for long-term monitoring.

## 2022-08-11 NOTE — PHYSICAL EXAM
[de-identified] : Patient is well nourished, well-developed, in no acute distress, with appropriate mood and affect. The patient is oriented to time, place, and person. Respirations are even and unlabored. Gait evaluation does not reveal a limp. There is no inguinal adenopathy. Examination of the contralateral knee shows normal range of motion, strength, no tenderness, and intact skin. The operative limb is well-perfused, has a well appearing surgical scar, and shows a grossly normal motor and sensory examination. Knee motion is painless and the right knee moves from 0 to 125 degrees. The knee is stable within that range-of-motion to AP and ML stress. The alignment of the knee is neutral. Muscle strength is normal. Quadriceps and hamstring muscle strength is normal bilaterally. Pedal pulses are palpable.  [de-identified] : AP, lateral, tunnel, and sunrise knee x-rays of the right knee were ordered and obtained in the office and demonstrate satisfactory position and alignment of the components are present. No signs of loosening are seen.

## 2022-08-11 NOTE — HISTORY OF PRESENT ILLNESS
[de-identified] : This is very nice 72-year-old gentleman here for interim evaluation of right total knee arthroplasty. The patient reports good pain relief since surgery in the replaced joint and satisfactory restoration of function in terms of activities of daily living. The patient no longer requires an assistive device for ambulation, does not require pain medication, and completed postoperative physical therapy. They report unlimited activities of daily living and unlimited walking tolerance. The patient is thrilled with their progress from surgery and ultimate outcome. \par

## 2022-08-21 ENCOUNTER — NON-APPOINTMENT (OUTPATIENT)
Age: 72
End: 2022-08-21

## 2022-08-22 ENCOUNTER — APPOINTMENT (OUTPATIENT)
Dept: CARDIOLOGY | Facility: CLINIC | Age: 72
End: 2022-08-22

## 2022-08-22 PROCEDURE — 93296 REM INTERROG EVL PM/IDS: CPT

## 2022-08-22 PROCEDURE — 93295 DEV INTERROG REMOTE 1/2/MLT: CPT

## 2022-09-07 ENCOUNTER — APPOINTMENT (OUTPATIENT)
Dept: ELECTROPHYSIOLOGY | Facility: CLINIC | Age: 72
End: 2022-09-07

## 2022-09-07 ENCOUNTER — NON-APPOINTMENT (OUTPATIENT)
Age: 72
End: 2022-09-07

## 2022-09-07 VITALS
SYSTOLIC BLOOD PRESSURE: 100 MMHG | BODY MASS INDEX: 29.26 KG/M2 | TEMPERATURE: 97.1 F | OXYGEN SATURATION: 96 % | WEIGHT: 228 LBS | HEIGHT: 74 IN | DIASTOLIC BLOOD PRESSURE: 60 MMHG | HEART RATE: 79 BPM

## 2022-09-07 PROCEDURE — 99214 OFFICE O/P EST MOD 30 MIN: CPT

## 2022-09-09 NOTE — PHYSICAL EXAM
[No Acute Distress] : no acute distress [Normal Conjunctiva] : normal conjunctiva [Normal Venous Pressure] : normal venous pressure [Normal S1, S2] : normal S1, S2 [No Rub] : no rub [Clear Lung Fields] : clear lung fields [Non Tender] : non-tender [No Edema] : no edema [No Oral Pallor] : no oral pallor [Normal Jugular Venous V Waves Present] : normal jugular venous V waves present [Arterial Pulses Normal] : the arterial pulses were normal [Impaired Insight] : insight and judgment were intact [FreeTextEntry1] : Normal prosthetic valve sound; trace bilateral edema

## 2022-09-12 ENCOUNTER — NON-APPOINTMENT (OUTPATIENT)
Age: 72
End: 2022-09-12

## 2022-09-13 ENCOUNTER — OUTPATIENT (OUTPATIENT)
Dept: OUTPATIENT SERVICES | Facility: HOSPITAL | Age: 72
LOS: 1 days | Discharge: HOME | End: 2022-09-13
Payer: COMMERCIAL

## 2022-09-13 ENCOUNTER — NON-APPOINTMENT (OUTPATIENT)
Age: 72
End: 2022-09-13

## 2022-09-13 DIAGNOSIS — Z98.890 OTHER SPECIFIED POSTPROCEDURAL STATES: Chronic | ICD-10-CM

## 2022-09-13 DIAGNOSIS — Z98.89 OTHER SPECIFIED POSTPROCEDURAL STATES: Chronic | ICD-10-CM

## 2022-09-13 DIAGNOSIS — Z95.5 PRESENCE OF CORONARY ANGIOPLASTY IMPLANT AND GRAFT: Chronic | ICD-10-CM

## 2022-09-13 DIAGNOSIS — Z95.810 PRESENCE OF AUTOMATIC (IMPLANTABLE) CARDIAC DEFIBRILLATOR: Chronic | ICD-10-CM

## 2022-09-13 DIAGNOSIS — Z95.4 PRESENCE OF OTHER HEART-VALVE REPLACEMENT: Chronic | ICD-10-CM

## 2022-09-13 PROCEDURE — 93283 PRGRMG EVAL IMPLANTABLE DFB: CPT | Mod: 26

## 2022-09-20 DIAGNOSIS — Z79.01 LONG TERM (CURRENT) USE OF ANTICOAGULANTS: ICD-10-CM

## 2022-09-20 DIAGNOSIS — I11.0 HYPERTENSIVE HEART DISEASE WITH HEART FAILURE: ICD-10-CM

## 2022-09-20 DIAGNOSIS — I25.10 ATHEROSCLEROTIC HEART DISEASE OF NATIVE CORONARY ARTERY WITHOUT ANGINA PECTORIS: ICD-10-CM

## 2022-09-20 DIAGNOSIS — Z95.810 PRESENCE OF AUTOMATIC (IMPLANTABLE) CARDIAC DEFIBRILLATOR: ICD-10-CM

## 2022-09-20 DIAGNOSIS — I25.2 OLD MYOCARDIAL INFARCTION: ICD-10-CM

## 2022-09-20 DIAGNOSIS — I48.91 UNSPECIFIED ATRIAL FIBRILLATION: ICD-10-CM

## 2022-09-20 DIAGNOSIS — E11.9 TYPE 2 DIABETES MELLITUS WITHOUT COMPLICATIONS: ICD-10-CM

## 2022-09-20 DIAGNOSIS — E03.9 HYPOTHYROIDISM, UNSPECIFIED: ICD-10-CM

## 2022-09-20 DIAGNOSIS — I50.23 ACUTE ON CHRONIC SYSTOLIC (CONGESTIVE) HEART FAILURE: ICD-10-CM

## 2022-09-20 DIAGNOSIS — I42.0 DILATED CARDIOMYOPATHY: ICD-10-CM

## 2022-09-20 PROCEDURE — 92960 CARDIOVERSION ELECTRIC EXT: CPT

## 2022-09-21 ENCOUNTER — APPOINTMENT (OUTPATIENT)
Dept: ELECTROPHYSIOLOGY | Facility: CLINIC | Age: 72
End: 2022-09-21

## 2022-09-21 ENCOUNTER — NON-APPOINTMENT (OUTPATIENT)
Age: 72
End: 2022-09-21

## 2022-09-21 VITALS
HEART RATE: 72 BPM | SYSTOLIC BLOOD PRESSURE: 98 MMHG | OXYGEN SATURATION: 96 % | TEMPERATURE: 97.1 F | WEIGHT: 223 LBS | BODY MASS INDEX: 28.62 KG/M2 | DIASTOLIC BLOOD PRESSURE: 62 MMHG | HEIGHT: 74 IN

## 2022-09-21 PROCEDURE — 99214 OFFICE O/P EST MOD 30 MIN: CPT | Mod: 25

## 2022-09-21 PROCEDURE — 93000 ELECTROCARDIOGRAM COMPLETE: CPT

## 2022-10-03 ENCOUNTER — NON-APPOINTMENT (OUTPATIENT)
Age: 72
End: 2022-10-03

## 2022-10-04 ENCOUNTER — NON-APPOINTMENT (OUTPATIENT)
Age: 72
End: 2022-10-04

## 2022-10-06 ENCOUNTER — NON-APPOINTMENT (OUTPATIENT)
Age: 72
End: 2022-10-06

## 2022-10-06 NOTE — REVIEW OF SYSTEMS
[Dyspnea on exertion] : dyspnea during exertion [Feeling Fatigued] : not feeling fatigued [Sore Throat] : no sore throat [SOB] : no shortness of breath [Chest Discomfort] : no chest discomfort [Palpitations] : no palpitations [Cough] : no cough [Abdominal Pain] : no abdominal pain [Rash] : no rash [Dizziness] : no dizziness [Easy Bleeding] : no tendency for easy bleeding

## 2022-10-06 NOTE — DISCUSSION/SUMMARY
[FreeTextEntry1] : Atrial fibrillation: Has remained in sinus rhythm post cardioversion.  Seems to be feeling better in sinus rhythm.  Continue low-dose amiodarone.  We could consider other alternatives if he has recurrent atrial fibrillation: Other antiarrhythmic versus catheter ablation.  Continue on anticoagulation no bleeding issues.\par \par Dual-chamber ICD: Interrogated and functioning normally.  No VT VF.\par \par Ischemic cardiomyopathy: EF approximately 20 to 25% by last echo on 6/27/2022.  s/p CardioMEMs device.  Normal readings.  Follow-up evaluation of his heart failure regimen.\par \par  Follow-up electrophysiology evaluation in 3 to 4 months\par  [EKG obtained to assist in diagnosis and management of assessed problem(s)] : EKG obtained to assist in diagnosis and management of assessed problem(s)

## 2022-10-06 NOTE — HISTORY OF PRESENT ILLNESS
[FreeTextEntry1] : He is status post recent cardioversion.  Follow-up today he is feeling better in sinus rhythm.\par \par Patient is currently on amiodarone 200 mg/day anticoagulation with Eliquis 5 mg twice daily.  He is also on aspirin.  Patient is on metoprolol 25 mg/day.\par \par Previous history: S/P  TAV in [place].  He has had prior  cardioversion for A. fib.  Prior  history of myocardial infarction 2005 status post previous PCI to the LAD,  dilated cardiomyopathy ejection fraction 15 to 20%, s/p Medtronic single-chamber ICD implant 2005, status post appropriate shocks for VT VF 2015.  And prior atrial flutter ablation in 2017.  He underwent extraction of Medtronic Estevan lead October 2018.  Previous bioprosthetic aortic valve replacement with subsequent premature failure noted on ANDREY.he underwent upgrade of his single-chamber ICD to a dual-chamber ICD.  He has a CardioMEMS device.Patient was previously on amiodarone and this was discontinued because of tremors.  The tremors had resolved after discontinuation. After upgrade to dual-chamber device the patient has felt well and had remained in sinus rhythm.  \par \par Echocardiogram from 4/Lasix 22 showed EF 20%.  He has mild mitral regurgitation.  He had a low mean transaortic valve gradient 8 mmHg.  Patient has severe left atrial enlargement with left atrial volume index 54 cc per metered square.\par Previous history:  \par \par Cardiac catheterization performed 6/4/2020 showed nonobstructive coronary arteries.\par Echocardiogram from 6/3/2020 showed severely reduced ejection fraction 15 to 20%, dilated LV diameter 6.5 cm, moderate to severe diastolic dysfunction\par \par Device: Medtronic cobalt XT DR dual-chamber remaining longevity 10.8 years.  Programmed lower rate 60 upper rate 130 with VF as well as VT zone.  Paced and sensed AV delay set long at 350 ms.  Rate sensor was programmed on.\par \par

## 2022-10-10 ENCOUNTER — NON-APPOINTMENT (OUTPATIENT)
Age: 72
End: 2022-10-10

## 2022-10-10 ENCOUNTER — APPOINTMENT (OUTPATIENT)
Dept: ELECTROPHYSIOLOGY | Facility: CLINIC | Age: 72
End: 2022-10-10

## 2022-10-10 VITALS
BODY MASS INDEX: 28.75 KG/M2 | DIASTOLIC BLOOD PRESSURE: 40 MMHG | HEIGHT: 74 IN | SYSTOLIC BLOOD PRESSURE: 80 MMHG | OXYGEN SATURATION: 94 % | TEMPERATURE: 97.1 F | WEIGHT: 224 LBS | HEART RATE: 86 BPM

## 2022-10-10 VITALS — DIASTOLIC BLOOD PRESSURE: 54 MMHG | SYSTOLIC BLOOD PRESSURE: 82 MMHG

## 2022-10-10 PROCEDURE — 93000 ELECTROCARDIOGRAM COMPLETE: CPT

## 2022-10-10 PROCEDURE — 99214 OFFICE O/P EST MOD 30 MIN: CPT | Mod: 25

## 2022-10-11 ENCOUNTER — NON-APPOINTMENT (OUTPATIENT)
Age: 72
End: 2022-10-11

## 2022-10-11 NOTE — DISCUSSION/SUMMARY
[EKG obtained to assist in diagnosis and management of assessed problem(s)] : EKG obtained to assist in diagnosis and management of assessed problem(s) [FreeTextEntry1] : Atrial fibrillation: Extensive discussion with patient regarding further treatment options to include AF ablation versus discontinuation of amiodarone and initiation of dofetilide.  Risks and benefits of each modality discussed.   At this point would recommend initiation of dofetilide.  Explained the need for inpatient monitoring.  Patient verbalizes understanding.  We will arrange for admission beginning of next week.  Hold amiodarone starting this Friday.  Any new or worsening symptoms prior to admission, he will alert us or go to the nearest ED.\par \par Dual-chamber ICD: Interrogated and functioning normally.  No VT VF.\par \par Ischemic cardiomyopathy: EF approximately 20 to 25% by last echo on 6/27/2022.  s/p CardioMEMs device.  Increased PAD readings with atrial fibrillation.  History of syncope.  Most likely orthostatic in nature.  No arrhythmia noted on device interrogation.  He is scheduled to see his primary cardiologist this week.  Has a new home blood pressure monitor that has been averaging systolic pressure in the 90s.  Recommend patient bring home monitor to next office visit to determine accuracy and for further medication management.  He also follows with Dr. Quiñones and has an appointment with him next week.  They are following his cardio mems readings.\par

## 2022-10-11 NOTE — REVIEW OF SYSTEMS
[Feeling Fatigued] : feeling fatigued [Dyspnea on exertion] : dyspnea during exertion [Dizziness] : dizziness [Weakness] : weakness [Negative] : Psychiatric [Sore Throat] : no sore throat [SOB] : no shortness of breath [Chest Discomfort] : no chest discomfort [Palpitations] : no palpitations [Easy Bleeding] : no tendency for easy bleeding

## 2022-10-11 NOTE — PHYSICAL EXAM
[No Acute Distress] : no acute distress [Normal Conjunctiva] : normal conjunctiva [Normal Venous Pressure] : normal venous pressure [Normal S1, S2] : normal S1, S2 [No Rub] : no rub [Clear Lung Fields] : clear lung fields [Non Tender] : non-tender [Normal] : alert and oriented, normal memory [de-identified] : Trace edema b/l.

## 2022-10-11 NOTE — END OF VISIT
[FreeTextEntry3] : The patient was seen with the PA.  We discussed the history, physical findings and plans.  Agree with the findings and recommendations.\par The patient is symptomatic from atrial fibrillation and would benefit from restoration of sinus rhythm.  He was previously seen better after recent cardioversion.  Echocardiogram was reviewed and is moderate left atrial dilatation and does not have significant MR.  Would favor catheter ablation given the failure of amiodarone but the patient is concerned about invasive procedure and would prefer to try other medication first.  We discussed the option of dofetilide.  He will need to be admitted for initiation of dofetilide.  We will discontinue amiodarone prior to dofetilide is initiation.  Continuing on interrupted anticoagulation.\par \par Patient and wife expressed understanding and I given option to come to emergency room tomorrow morning.

## 2022-10-11 NOTE — HISTORY OF PRESENT ILLNESS
[FreeTextEntry1] : Presents today for reevaluation in the setting of symptomatic atrial fibrillation.  Notes that since going back into atrial fibrillation, his stamina has been steadily decreasing.  Shortness of breath has been increasing.  Denies any chest pain, PND, orthopnea, or edema.  CardioMEMS PAD readings have been increasing.  Historically mid 20s.  With atrial fibrillation increased to high 30s low 40s.  Underwent cardioversion and maintained sinus rhythm for approximately 2 weeks.  PAD began to drop.  Went back into atrial fibrillation and PAD is now increasing again.\par \par He is hypotensive with orthostatic symptoms.  Had episode of syncope flying home from [place] over the weekend.  Had been sitting on the plane for approximately an hour, got up to walk to the bathroom and fell.  States that he became dizzy upon standing and syncopized.  He regained consciousness and got up.  This has happened to him on several occasions previously.  No arrhythmia noted on device interrogation today.\par \par AF burden currently at 57% and is ventricular paced.  Remains on amiodarone 200 mg/day anticoagulation with Eliquis 5 mg twice daily.  He is also on aspirin.  Patient is on metoprolol 25 mg/day.\par \par EKG today shows atrial fibrillation with ventricular pacing.\par \par Previous history: S/P  TAV in [place].  He has had prior  cardioversion for A. fib.  Prior  history of myocardial infarction 2005 status post previous PCI to the LAD,  dilated cardiomyopathy ejection fraction 15 to 20%, s/p Medtronic single-chamber ICD implant 2005, status post appropriate shocks for VT VF 2015.  And prior atrial flutter ablation in 2017.  He underwent extraction of Medtronic [place] lead October 2018.  Previous bioprosthetic aortic valve replacement with subsequent premature failure noted on ANDREY.he underwent upgrade of his single-chamber ICD to a dual-chamber ICD.  He has a CardioMEMS device.Patient was previously on amiodarone and this was discontinued because of tremors.  The tremors had resolved after discontinuation. After upgrade to dual-chamber device the patient has felt well and had remained in sinus rhythm.  \par \par Echocardiogram from 4/Lasix 22 showed EF 20%.  He has mild mitral regurgitation.  He had a low mean transaortic valve gradient 8 mmHg.  Patient has severe left atrial enlargement with left atrial volume index 54 cc per metered square.\par Previous history:  \par \par Cardiac catheterization performed 6/4/2020 showed nonobstructive coronary arteries.\par Echocardiogram from 6/3/2020 showed severely reduced ejection fraction 15 to 20%, dilated LV diameter 6.5 cm, moderate to severe diastolic dysfunction\par \par Device: MedJudobaby cobalt XT DR dual-chamber remaining longevity 10.8 years.  Programmed lower rate 60 upper rate 130 with VF as well as VT zone.  Paced and sensed AV delay set long at 350 ms.  Rate sensor was programmed on.

## 2022-10-14 ENCOUNTER — APPOINTMENT (OUTPATIENT)
Dept: CARDIOLOGY | Facility: CLINIC | Age: 72
End: 2022-10-14

## 2022-10-18 ENCOUNTER — NON-APPOINTMENT (OUTPATIENT)
Age: 72
End: 2022-10-18

## 2022-10-18 NOTE — REVIEW OF SYSTEMS
[Feeling Fatigued] : not feeling fatigued [Sore Throat] : no sore throat [SOB] : no shortness of breath [Dyspnea on exertion] : not dyspnea during exertion [Chest Discomfort] : no chest discomfort [Lower Ext Edema] : no extremity edema [Palpitations] : no palpitations [Orthopnea] : no orthopnea [PND] : no PND [Cough] : no cough [Abdominal Pain] : no abdominal pain [Rash] : no rash [Dizziness] : no dizziness [Easy Bleeding] : no tendency for easy bleeding

## 2022-10-18 NOTE — DISCUSSION/SUMMARY
[FreeTextEntry1] : Patient is a 72-year-old man with a prior history of ischemic cardiomyopathy with severe left ventricular dysfunction, systolic heart failure, status post upgrade to dual chamber ICD for primary prevention.  Previously had appropriate shocks for VT/VF in 2015 and subsequent ATP terminations as well, status post extraction of [place] lead in 2018 and reimplantation of a single-chamber device.  Now with dual chamber device.  He is status post HECTOR procedure.\par \par He had acute systolic heart failure that has improved with diuretics.  Now stable s/p CardioMEMs device. Followed by Dr. Quiñones.  \par \par He has had paroxysmal atrial fibrillation.  Is now in AF and asymptomatic.  Tolerating low dose amiodarone.  Rate controlled with V. pacing.   Recommend increase amiodarone to 400mg QD for the next several days.  If he does not self convert, will schedule DCCV early next week so long as he has not missed any of his Eliquis.  \par \par He is at high risk for recurrent atrial fibrillation given his severely enlarged left atrium.  Historically, he  does not do well in A. fib in terms of symptoms and would need additional treatment with an antiarrhythmic and/or catheter ablation procedure.  If amio/DCCV does not work, may consider Tikosyn.  His creatinine from 4/5/2022 was 1.2.\par \par The OptiVol fluid index was below threshold\par \par There were no episodes of VT VF.

## 2022-10-18 NOTE — HISTORY OF PRESENT ILLNESS
[FreeTextEntry1] : Patient is a 72-year-old man that presents today for routine evaluation.  Device interrogated and patient is in atrial fibrillation.  AF started approximately midnight.  Rate controlled with v pacing.  States that overall he feels well.  Denies any fatigue, shortness of breath, chest pain, palpitations, PND, orthopnea, or edema.\par \par He is tolerating current dose of amiodarone.  Previously noted tremors have not recurred with 200 mg daily.\par \par PAD readings on CardioMEMs have overall been stable in upper 20's.  Most recent reading was 33mmHg and is being addressed by Dr. Quiñones.  \par \par Device: Medtronic cobalt XT DR dual-chamber remaining longevity 9.2 years.  Programmed lower rate 60 upper rate 130 with VF as well as VT zone.  Paced and sensed AV delay set long at 350 ms.  Rate sensor was programmed on.\par \par Previous history: \par S/P  TAV in [place].  He has had prior  cardioversion for A. fib.  Prior  history of myocardial infarction 2005 status post previous PCI to the LAD,  dilated cardiomyopathy ejection fraction 15 to 20%, s/p Medtronic single-chamber ICD implant 2005, status post appropriate shocks for VT VF 2015.  And prior atrial flutter ablation in 2017.  He underwent extraction of Medtronic [place] lead October 2018.  Previous bioprosthetic aortic valve replacement with subsequent premature failure noted on ANDREY.  He underwent upgrade of his single-chamber ICD to a dual-chamber ICD.  He has a CardioMEMS device\par \par Patient was previously on amiodarone and this was discontinued because of tremors.  The tremors had resolved after discontinuation.\par \par Echocardiogram from 4/Lasix 22 showed EF 20%.  He has mild mitral regurgitation.  He had a low mean transaortic valve gradient 8 mmHg.  Patient has severe left atrial enlargement with left atrial volume index 54 cc per metered square.\par Previous history:  \par \par Cardiac catheterization performed 6/4/2020 showed nonobstructive coronary arteries.\par Echocardiogram from 6/3/2020 showed severely reduced ejection fraction 15 to 20%, dilated LV diameter 6.5 cm, moderate to severe diastolic dysfunction

## 2022-10-19 ENCOUNTER — APPOINTMENT (OUTPATIENT)
Dept: CARDIOLOGY | Facility: CLINIC | Age: 72
End: 2022-10-19

## 2022-11-01 ENCOUNTER — APPOINTMENT (OUTPATIENT)
Dept: RADIOLOGY | Facility: CLINIC | Age: 72
End: 2022-11-01

## 2022-11-01 PROCEDURE — 71046 X-RAY EXAM CHEST 2 VIEWS: CPT

## 2022-11-02 ENCOUNTER — NON-APPOINTMENT (OUTPATIENT)
Age: 72
End: 2022-11-02

## 2022-11-07 ENCOUNTER — NON-APPOINTMENT (OUTPATIENT)
Age: 72
End: 2022-11-07

## 2022-11-11 ENCOUNTER — NON-APPOINTMENT (OUTPATIENT)
Age: 72
End: 2022-11-11

## 2022-11-11 ENCOUNTER — APPOINTMENT (OUTPATIENT)
Dept: CARDIOLOGY | Facility: CLINIC | Age: 72
End: 2022-11-11

## 2022-11-11 VITALS
DIASTOLIC BLOOD PRESSURE: 58 MMHG | HEART RATE: 76 BPM | HEIGHT: 74 IN | WEIGHT: 225 LBS | SYSTOLIC BLOOD PRESSURE: 100 MMHG | BODY MASS INDEX: 28.88 KG/M2 | TEMPERATURE: 96.9 F | OXYGEN SATURATION: 95 %

## 2022-11-11 DIAGNOSIS — I47.20 VENTRICULAR TACHYCARDIA, UNSPECIFIED: ICD-10-CM

## 2022-11-11 DIAGNOSIS — I50.23 ACUTE ON CHRONIC SYSTOLIC (CONGESTIVE) HEART FAILURE: ICD-10-CM

## 2022-11-11 PROCEDURE — 99215 OFFICE O/P EST HI 40 MIN: CPT | Mod: 25

## 2022-11-11 PROCEDURE — 93000 ELECTROCARDIOGRAM COMPLETE: CPT

## 2022-11-11 RX ORDER — AMIODARONE HYDROCHLORIDE 200 MG/1
200 TABLET ORAL DAILY
Refills: 0 | Status: DISCONTINUED | COMMUNITY
End: 2022-11-11

## 2022-11-14 NOTE — ASSESSMENT
[FreeTextEntry1] : DUDLEY BERMUDEZ is a 72 year old M who presents today Nov 14, 2022 with the above history and the following active issues: \par \par AF \par reviewed records [facility] discharged 11/9/22. Presented with MATHEWS increasing AF burden\par Saw EP then had inpatient TIkosyn load.  \par converted to NSR by atrial paced rhythm\par QTc improved by today's EKG, rec keep EP followup, cont remote monitoring for recurrent AF\par cont AC, monitor CBC\par Reviewed drug interactions with Tikosyn pt will notify our office before any change/additions are made\par \par dilated cardiomyopathy \par VT storm.  \par Multiple ICD shocks.   \par Monitor electrolytes. \par Follow-up with EP regarding long-term rhythm control strategy.  \par Follow-up CardioMEMS.  \par will coordinate care with heart failure specialist and electrophysiology\par \par HFrEF\par PAD trending down, euvolemic on exam, cont to monitor CMEMS now back in NSR\par \par Coronary artery disease, prior MI, Prior CTA Afl, PCI, HTN, Ischemic cardiomyopathy (EF 20%), CRI\par CSHF right heart cath with depressed cardiac index with normal wedge pressure and mild pulmonary \par Implantable defibrillator h/o NSVT\par s/p dual chamber PPM not CRT increased AV delay to minimize RV pacing\par s/p valve in valve TAVR 8/20\par Right lower lobe nodule \par Atrial fibrillation \par s/p mems, follows w/ heart failure Dr. Quiñones specialist \par \par ? enrollment in cardiac rehab \par CardioMEMS is being managed by Dr. Quiñones \par recent device check has been reviewed \par continue beta-blocker therapy\par continue aspirin anticoagulation HI statin ARB beta-blocker diuretic and aldosterone antagonist \par additional diuretics for weight gain \par tolerating addition of SGLT2 inhibitor \par intolerant of Entresto \par SBE ppx prn\par Monitor heart rate blood pressure and weights \par \par \par Discussed red flag symptoms that would warrant immediate intervention. All patient questions and concerns have been addressed Instructed to call the office if any new symptoms.\par Close clinical followup arranged\par \par Sincerely,\par \par RAN Davis\par Patients history, testing, and plan reviewed with supervising MD: Dr. Harvey Pressley

## 2022-11-14 NOTE — ADDENDUM
[FreeTextEntry1] : Please note the patient was seen and examined with NP Alem Forbes\par I was physically present during the service of the patient and personally examined the patient. \par I was directly involved in the management plan and recommendations of the care provided to the patient. \par I personally reviewed the history and physical examination as documented by the NP above.\par 11/11/2022\par

## 2022-11-14 NOTE — HISTORY OF PRESENT ILLNESS
Telephone Encounter by Bonnie Vital RN at 09/25/18 04:37 PM     Author:  Bonnie Vital RN Service:  (none) Author Type:  Registered Nurse     Filed:  09/25/18 04:37 PM Encounter Date:  9/4/2018 Status:  Signed     :  Bonnie Vital RN (Registered Nurse)            See telephone encounter 9/21/18[SA1.1M]      Revision History        User Key Date/Time User Provider Type Action    > SA1.1 09/25/18 04:37 PM Bonnie Vital RN Registered Nurse Sign    M - Manual             [FreeTextEntry1] : DUDLEY EBRMUDEZ  is a 72 year old  M\par w/ h/o CAD s/p late-presenting MI 2005 s/p PCI LAD, ICM s/p mems, s/p single chamber ICD with lead revision in 2018 then dual chamber (failed CRT), bioprosthetic AVR (2015, s/p HECTOR for severe bioprosthetic AS (8/20/20), aflutter ablation 7/17 w/ persistent afib, s/p DCCV 8/12/20 then 7/21\par unable to place LV lead due to coronary venous anatomy\par \par In June 2020, presented to [facility] for 6 months due to worsening SOB and fatigue. was admitted for 2 days and given diuretics \par Underwent angiogram with Dr. Nino which showed 10% stenosis at the site of prior stent. He also had a RHC at that time with results below. \par Found to have severe bioprosthetic AS on ANDREY for DCCV and was admitted to [facility] on 8/18 for a valve in valve TAVR, which he had done on 8/20. \par Of note, incidentally found to have a new RLL nodule on cardiac CT, had thoracic f/u and PET/CT \par \par Previous intolerance to Entresto. Symptoms of dizziness and hypotension on medication \par prior tremors with amio\par Previously given extra doses of torsemide for elevated mems readings CardioMEMS followed by heart failure service Dr. Quiñones \par \par p/w VT storm \par He reports having gastroenteritis.  Dyspepsia, nausea, loose bowel movements.  Felt wiped out.  ICD discharges started at home. Total of 26 shocks.\par Cardiac catheterization demonstrated low filling pressures\par Echocardiogram demonstrated severe left ventricular dysfunction\par Loaded with amiodarone.  \par Had balloon pump and [place]-J[place] catheter.   \par Received IV fluids.\par \par There is no syncope.\par Denies exertional chest pain or discomfort. \par Denies unusual shortness of breath, orthopnea, weight gain, or LE edema. \par Denies any unusual bleeding or black/tarry stools.  \par \par Saw EP then had inpatient TIkosyn load. Presented with MATHEWS increasing AF. \par reviewed records [facility] discharged 11/9/22. \par converted to NSR by atrial paced rhythm\par EKG 11/9/22 atrial paced QTc 585msec (prior 603msec) \par labs 11/10/22 hgb 12.1, k 3.5, cr 1.4, LDL 49, a1c 6.5, tsh 1.7, mg 2\par \par * EKG today 11/11/22 Atrial Paced QTc 489msec\par Pt feels much better since converting back to NSR. Has ongoing EP followup in office and remote device f/u. \par PAD shows 36 mmHg which has trended down in the past month. \par \par Past testing for reference: \par Carotid doppler 12/27/21 mild to mod nonbstructive atherosclerosis BL\par US abd AAA  mod atherosclerosis \par Nuclear stress test 1/6/22 large anterior, apical , inferior, inferolateral fixed defects s/o infarct, TID 1.05, EF 19%\par PYP study was equivocal for cardiac amyloid \par last echocardiogram demonstrates severe left ventricular dysfunction mild mitral regurgitation TAVR with peak gradient of 17 no aortic regurgitation moderate pulmonary hypertension \par \par

## 2022-11-16 ENCOUNTER — NON-APPOINTMENT (OUTPATIENT)
Age: 72
End: 2022-11-16

## 2022-11-21 ENCOUNTER — APPOINTMENT (OUTPATIENT)
Dept: CARDIOLOGY | Facility: CLINIC | Age: 72
End: 2022-11-21

## 2022-11-21 ENCOUNTER — NON-APPOINTMENT (OUTPATIENT)
Age: 72
End: 2022-11-21

## 2022-11-21 PROCEDURE — 93296 REM INTERROG EVL PM/IDS: CPT

## 2022-11-21 PROCEDURE — 93295 DEV INTERROG REMOTE 1/2/MLT: CPT

## 2022-11-21 NOTE — DISCHARGE NOTE ADULT - MEDICATION SUMMARY - MEDICATIONS TO TAKE
Patient seen 11/21/22 surgery letter received Washout of abdominal wall hematoma 30min OR time needed under MAC    Pre op instructions reviewed. Dr. Germania Donovan will do H&P. Pre op packet given to patient.     Post op appt scheduled     Faxed to scheduling     Placed on calender and [place] I will START or STAY ON the medications listed below when I get home from the hospital:    spironolactone 25 mg oral tablet  -- 0.5 tab(s) by mouth once a day (after a meal)  -- Indication: For Heart Failure    aspirin 81 mg oral delayed release capsule  --  by mouth   -- Indication: For Coronary artery disease    enalapril 5 mg oral tablet  -- 1 tab(s) by mouth every 12 hours  -- Indication: For Heart Failure    Eliquis 5 mg oral tablet  -- 1 tab(s) by mouth 2 times a day last dose 7/9 AM  -- Indication: For Aflutter    atorvastatin 40 mg oral tablet  -- 1 tab(s) by mouth once a day (at bedtime)  -- Indication: For Hyperlipidemia    carvedilol 25 mg oral tablet  -- 1 tab(s) by mouth 2 times a day  -- Indication: For Heart Failure    furosemide 40 mg oral tablet  -- 1 tab(s) by mouth every other day - Alt with 20  -- Indication: For Heart Failure    furosemide 20 mg oral tablet  -- 1 tab(s) every other day - Alt with 40  -- Indication: For Heart Failure    docusate sodium 100 mg oral capsule  -- 1 cap(s) by mouth 2 times a day  -- Indication: For Constipation    pantoprazole 40 mg oral delayed release tablet  -- 1 tab(s) by mouth once a day (before a meal)  -- Indication: For GERD

## 2022-11-28 ENCOUNTER — APPOINTMENT (OUTPATIENT)
Dept: ELECTROPHYSIOLOGY | Facility: CLINIC | Age: 72
End: 2022-11-28

## 2022-11-28 VITALS
BODY MASS INDEX: 28.62 KG/M2 | SYSTOLIC BLOOD PRESSURE: 108 MMHG | HEIGHT: 74 IN | TEMPERATURE: 96.9 F | HEART RATE: 75 BPM | OXYGEN SATURATION: 95 % | WEIGHT: 223 LBS | DIASTOLIC BLOOD PRESSURE: 70 MMHG

## 2022-11-28 PROCEDURE — 93000 ELECTROCARDIOGRAM COMPLETE: CPT

## 2022-11-28 PROCEDURE — 99214 OFFICE O/P EST MOD 30 MIN: CPT | Mod: 25

## 2022-12-03 ENCOUNTER — NON-APPOINTMENT (OUTPATIENT)
Age: 72
End: 2022-12-03

## 2022-12-03 NOTE — CARDIOLOGY SUMMARY
[de-identified] : Atrial paced ventricula sensed\par Diffuse low voltage. \par QRS duration 110 msec  \par Old anterior infarct. \par Diffuse nonspecific T-abnormality.

## 2022-12-03 NOTE — DISCUSSION/SUMMARY
[FreeTextEntry1] : Atrial fibrillation: Has remained in sinus rhythm since conversion on dofetilide 11/9/2022.  He is atrially paced.  QT interval is 470 ms.  His creatinine was 1.4.  Get follow-up creatinine.  His medications reviewed and no significant interaction with dofetilide.  Continue on anticoagulation with Eliquis.  \par \par Dual-chamber ICD: Interrogated and functioning normally.  No VT/ VF. Had episode of AT on 11/16/22. No AF.\par \par Ischemic cardiomyopathy: EF approximately 20 to 25% by last echo on 6/27/2022.  s/p CardioMEMs device.  Normal readings. Optivol fluid index below threshold since in SR. Ventricular paced 28%.   Follow-up evaluation of his heart failure regimen.\par \par Follow-up electrophysiology evaluation in 3 to 4 months\par  [EKG obtained to assist in diagnosis and management of assessed problem(s)] : EKG obtained to assist in diagnosis and management of assessed problem(s)

## 2022-12-03 NOTE — HISTORY OF PRESENT ILLNESS
[FreeTextEntry1] : Cardiologist: Harvey Pressley MD\par PCP: Chava Diaz MD\par Follow-up for atrial fibrillation.\par Recent admission to [facility] 11/7/2022 to 11/10/2020.  Presented to the emergency room for admission for initiation of dofetilide.  He converted on dofetilide 250 MCG's twice daily.  Patient is feeling better in sinus rhythm.  He had shortness of breath and dyspnea with mild exertion -the symptoms has improved significantly after conversion to sinus rhythm..\par The patient was previously treated with amiodarone and had cardioversion and was on amiodarone and had recurrent atrial fibrillation.  Amiodarone was discontinued in preparation for initiation of dofetilide.  He also had tremors on amiodarone.\par \par He has an ischemic cardiomyopathy with an EF 20 to 25%.\par \par Dual-chamber ICD was interrogated and functioning normally.  There are no VT VF episode.  No AF episodes since conversion on Tikosyn.\par Previous history: S/P  TAV in [place].  He has had prior  cardioversion for A. fib.  Prior  history of myocardial infarction 2005 status post previous PCI to the LAD,  dilated cardiomyopathy ejection fraction 15 to 20%, s/p Medtronic single-chamber ICD implant 2005, status post appropriate shocks for VT VF 2015.  And prior atrial flutter ablation in 2017.  He underwent extraction of Medtronic [place] lead October 2018.  Previous bioprosthetic aortic valve replacement with subsequent premature failure noted on ANDREY. He underwent upgrade of his single-chamber ICD to a dual-chamber ICD.  He has a CardioMEMS device. Patient was previously on amiodarone and this was discontinued because of tremors.  The tremors had resolved after discontinuation. After upgrade to dual-chamber device the patient has felt well and had remained in sinus rhythm.  \par \par Echocardiogram from 4/20 22 showed EF 20%.  He has mild mitral regurgitation.  He had a low mean transaortic valve gradient 8 mmHg.  Patient has severe left atrial enlargement with left atrial volume index 54 cc per metered square.\par   \par Cardiac catheterization performed 6/4/2020 showed nonobstructive coronary arteries.\par Echocardiogram from 6/3/2020 showed severely reduced ejection fraction 15 to 20%, dilated LV diameter 6.5 cm, moderate to severe diastolic dysfunction\par \par Device: Medtronic cobalt XT DR dual-chamber remaining longevity  8years.  Programmed lower rate 60 upper rate 130 with VF as well as VT zone.  Paced and sensed AV delay set long at 350 ms.  Rate sensor was programmed on. Optivol fluid index below threshold\par Episode Nov 16th 2022: at\par Atrial paced 72%, ventricular paced 29%\par \par

## 2022-12-03 NOTE — PHYSICAL EXAM
[No Acute Distress] : no acute distress [Normal Venous Pressure] : normal venous pressure [Normal S1, S2] : normal S1, S2 [No Rub] : no rub [Clear Lung Fields] : clear lung fields [Non Tender] : non-tender [No Edema] : no edema [Normal Jugular Venous V Waves Present] : normal jugular venous V waves present [Normal Gait] : normal gait [No Cyanosis] : no cyanosis [No Clubbing] : no clubbing [Alert and Oriented] : alert and oriented

## 2022-12-06 ENCOUNTER — NON-APPOINTMENT (OUTPATIENT)
Age: 72
End: 2022-12-06

## 2022-12-20 ENCOUNTER — NON-APPOINTMENT (OUTPATIENT)
Age: 72
End: 2022-12-20

## 2022-12-23 ENCOUNTER — NON-APPOINTMENT (OUTPATIENT)
Age: 72
End: 2022-12-23

## 2023-01-03 ENCOUNTER — OUTPATIENT (OUTPATIENT)
Dept: OUTPATIENT SERVICES | Facility: HOSPITAL | Age: 73
LOS: 1 days | End: 2023-01-03
Payer: MEDICARE

## 2023-01-03 DIAGNOSIS — Z98.890 OTHER SPECIFIED POSTPROCEDURAL STATES: Chronic | ICD-10-CM

## 2023-01-03 DIAGNOSIS — G50.0 TRIGEMINAL NEURALGIA: ICD-10-CM

## 2023-01-03 DIAGNOSIS — Z95.5 PRESENCE OF CORONARY ANGIOPLASTY IMPLANT AND GRAFT: Chronic | ICD-10-CM

## 2023-01-03 DIAGNOSIS — Z98.89 OTHER SPECIFIED POSTPROCEDURAL STATES: Chronic | ICD-10-CM

## 2023-01-03 DIAGNOSIS — Z95.810 PRESENCE OF AUTOMATIC (IMPLANTABLE) CARDIAC DEFIBRILLATOR: Chronic | ICD-10-CM

## 2023-01-03 DIAGNOSIS — Z95.4 PRESENCE OF OTHER HEART-VALVE REPLACEMENT: Chronic | ICD-10-CM

## 2023-01-03 PROCEDURE — 70553 MRI BRAIN STEM W/O & W/DYE: CPT | Mod: 26

## 2023-01-03 PROCEDURE — 70553 MRI BRAIN STEM W/O & W/DYE: CPT

## 2023-01-03 PROCEDURE — 71046 X-RAY EXAM CHEST 2 VIEWS: CPT

## 2023-01-03 PROCEDURE — 71046 X-RAY EXAM CHEST 2 VIEWS: CPT | Mod: 26

## 2023-01-06 ENCOUNTER — NON-APPOINTMENT (OUTPATIENT)
Age: 73
End: 2023-01-06

## 2023-01-10 ENCOUNTER — NON-APPOINTMENT (OUTPATIENT)
Age: 73
End: 2023-01-10

## 2023-01-18 ENCOUNTER — APPOINTMENT (OUTPATIENT)
Dept: ELECTROPHYSIOLOGY | Facility: CLINIC | Age: 73
End: 2023-01-18
Payer: MEDICARE

## 2023-01-18 VITALS
SYSTOLIC BLOOD PRESSURE: 100 MMHG | DIASTOLIC BLOOD PRESSURE: 60 MMHG | OXYGEN SATURATION: 94 % | BODY MASS INDEX: 28.88 KG/M2 | TEMPERATURE: 97.1 F | HEART RATE: 74 BPM | HEIGHT: 74 IN | WEIGHT: 225 LBS

## 2023-01-18 PROCEDURE — 99214 OFFICE O/P EST MOD 30 MIN: CPT | Mod: 25

## 2023-01-18 PROCEDURE — 93000 ELECTROCARDIOGRAM COMPLETE: CPT

## 2023-01-18 NOTE — PHYSICAL EXAM
[No Acute Distress] : no acute distress [Normal Venous Pressure] : normal venous pressure [Normal S1, S2] : normal S1, S2 [No Rub] : no rub [Clear Lung Fields] : clear lung fields [Non Tender] : non-tender [Normal Gait] : normal gait [No Edema] : no edema [No Cyanosis] : no cyanosis [No Clubbing] : no clubbing [Alert and Oriented] : alert and oriented [Normal Jugular Venous V Waves Present] : normal jugular venous V waves present

## 2023-01-23 ENCOUNTER — NON-APPOINTMENT (OUTPATIENT)
Age: 73
End: 2023-01-23

## 2023-01-23 ENCOUNTER — FORM ENCOUNTER (OUTPATIENT)
Age: 73
End: 2023-01-23

## 2023-01-27 ENCOUNTER — NON-APPOINTMENT (OUTPATIENT)
Age: 73
End: 2023-01-27

## 2023-01-30 ENCOUNTER — NON-APPOINTMENT (OUTPATIENT)
Age: 73
End: 2023-01-30

## 2023-02-03 ENCOUNTER — APPOINTMENT (OUTPATIENT)
Dept: CARDIOLOGY | Facility: CLINIC | Age: 73
End: 2023-02-03
Payer: MEDICARE

## 2023-02-03 ENCOUNTER — NON-APPOINTMENT (OUTPATIENT)
Age: 73
End: 2023-02-03

## 2023-02-03 VITALS
WEIGHT: 223 LBS | HEIGHT: 74 IN | OXYGEN SATURATION: 99 % | BODY MASS INDEX: 28.62 KG/M2 | SYSTOLIC BLOOD PRESSURE: 109 MMHG | HEART RATE: 74 BPM | DIASTOLIC BLOOD PRESSURE: 76 MMHG

## 2023-02-03 DIAGNOSIS — G50.0 TRIGEMINAL NEURALGIA: ICD-10-CM

## 2023-02-03 PROCEDURE — 99214 OFFICE O/P EST MOD 30 MIN: CPT | Mod: 25

## 2023-02-03 PROCEDURE — 93000 ELECTROCARDIOGRAM COMPLETE: CPT

## 2023-02-03 NOTE — DISCUSSION/SUMMARY
[FreeTextEntry1] : Atrial fibrillation: Has remained in sinus rhythm since conversion on dofetilide 11/9/2022.  He is atrially paced.  QT interval is 460 ms.  His medications reviewed and no significant interaction with dofetilide.  Continue on anticoagulation with Eliquis.  \par \par Dual-chamber ICD: Interrogated and functioning normally.  No VT/ VF. Had episode of AT on 11/16/22. No AF.\par \par Ischemic cardiomyopathy: EF approximately 20 to 25% by last echo on 6/27/2022.  s/p CardioMEMs device.  Normal readings. Optivol fluid index below threshold since in SR. Ventricular paced25%.   Follow-up evaluation of his heart failure regimen.\par \par Follow-up electrophysiology evaluation in  4 months\par F/u laboratory testing - renal function, K+\par f/u with cardiology and HF\par  [EKG obtained to assist in diagnosis and management of assessed problem(s)] : EKG obtained to assist in diagnosis and management of assessed problem(s)

## 2023-02-03 NOTE — HISTORY OF PRESENT ILLNESS
[FreeTextEntry1] : Cardiologist: Harvey Pressley MD\par PCP: Chava Diaz MD\par Follow-up for atrial fibrillation.\par Admission to [facility] 11/7/2022 to 11/10/202: .  Presented to the emergency room for admission for initiation of dofetilide.  He converted on dofetilide 250 MCG's twice daily. Lar[place] better in SR.\par f/u 1/18/23: remains in SR. Feeling well. He had  dyspnea with mild exertion -the symptoms has improved significantly after conversion to sinus rhythm..\par Previous treatment for AF: The patient was previously treated with amiodarone and had cardioversion and was on amiodarone and had recurrent atrial fibrillation.  Amiodarone was discontinued in preparation for initiation of dofetilide.  He also had tremors on amiodarone.\par \par He has an ischemic cardiomyopathy with an EF 20 to 25%.\par \par Dual-chamber ICD was interrogated and functioning normally.  There are no VT VF episode.  No AF episodes since conversion on Tikosyn.\par Previous history: S/P  TAV in [place].  He has had prior  cardioversion for A. fib.  Prior  history of myocardial infarction 2005 status post previous PCI to the LAD,  dilated cardiomyopathy ejection fraction 15 to 20%, s/p Medtronic single-chamber ICD implant 2005, status post appropriate shocks for VT VF 2015.  And prior atrial flutter ablation in 2017.  He underwent extraction of Medtronic [place] lead October 2018.  Previous bioprosthetic aortic valve replacement with subsequent premature failure noted on ANDREY. He underwent upgrade of his single-chamber ICD to a dual-chamber ICD.  He has a CardioMEMS device. Patient was previously on amiodarone and this was discontinued because of tremors.  The tremors had resolved after discontinuation. After upgrade to dual-chamber device the patient has felt well and had remained in sinus rhythm.  \par \par Echocardiogram from 4/20 22 showed EF 20%.  He has mild mitral regurgitation.  He had a low mean transaortic valve gradient 8 mmHg.  Patient has severe left atrial enlargement with left atrial volume index 54 cc per metered square.\par   \par Cardiac catheterization performed 6/4/2020 showed nonobstructive coronary arteries.\par Echocardiogram from 6/3/2020 showed severely reduced ejection fraction 15 to 20%, dilated LV diameter 6.5 cm, moderate to severe diastolic dysfunction\par \par Device: Medtronic cobalt XT DR dual-chamber remaining longevity  8years.  Programmed lower rate 60 upper rate 130 with VF as well as VT zone.  Paced and sensed AV delay set long at 350 ms.  Rate sensor was programmed on. Optivol fluid index below threshold\par Episode Nov 16th 2022: at\par Atrial paced 72%, ventricular paced 29%\par \par

## 2023-02-07 ENCOUNTER — NON-APPOINTMENT (OUTPATIENT)
Age: 73
End: 2023-02-07

## 2023-02-10 ENCOUNTER — APPOINTMENT (OUTPATIENT)
Dept: CARDIOLOGY | Facility: CLINIC | Age: 73
End: 2023-02-10
Payer: MEDICARE

## 2023-02-10 VITALS
HEIGHT: 74 IN | WEIGHT: 227 LBS | DIASTOLIC BLOOD PRESSURE: 62 MMHG | HEART RATE: 76 BPM | SYSTOLIC BLOOD PRESSURE: 90 MMHG | TEMPERATURE: 96.8 F | BODY MASS INDEX: 29.13 KG/M2 | OXYGEN SATURATION: 96 %

## 2023-02-10 PROCEDURE — 99215 OFFICE O/P EST HI 40 MIN: CPT

## 2023-02-10 RX ORDER — AMOXICILLIN 500 MG/1
500 TABLET, FILM COATED ORAL
Qty: 4 | Refills: 0 | Status: DISCONTINUED | COMMUNITY
Start: 2022-07-28 | End: 2023-02-10

## 2023-02-12 NOTE — HISTORY OF PRESENT ILLNESS
[FreeTextEntry1] : DUDLEY BERMUDEZ  is a 72 year old  M\par w/ h/o CAD s/p late-presenting MI 2005 s/p PCI LAD, ICM s/p mems, s/p single chamber ICD with lead revision in 2018 then dual chamber (failed CRT), bioprosthetic AVR (2015, s/p HECTOR for severe bioprosthetic AS (8/20/20), aflutter ablation 7/17 w/ persistent afib, s/p DCCV 8/12/20 then 7/21 now on tikosyn, VT mendez, CRI\par \par Unable to place LV lead due to coronary venous anatomy\par \par In June 2020, presented to [facility] for 6 months due to worsening SOB and fatigue\par Underwent angiogram with Dr. Nino which showed 10% stenosis at the site of prior stent\par Severe bioprosthetic AS on ANDREY for DCCV, [facility] valve in valve TAVR 8/20. \par Of note, incidentally found to have a new RLL nodule on cardiac CT, had thoracic f/u and PET/CT \par \par p/w VT storm \par He reports having gastroenteritis.  Dyspepsia, nausea, loose bowel movements.  Felt wiped out.  \par ICD discharges started at home. Total of 26 shocks.\par Cardiac catheterization demonstrated low filling pressures\par Echocardiogram demonstrated severe left ventricular dysfunction\par Loaded with amiodarone.  \par Had balloon pump and Anderson-Jian catheter.   \par Received IV fluids.\par \par Presented with MATHEWS increasing AF. \par Inpatient TIkosyn load. \par \par Previous intolerance to Entresto. Symptoms of dizziness and hypotension on medication \par prior tremors with amio\par Previously given extra doses of torsemide for elevated mems readings \par CardioMEMS followed by heart failure service Dr. Quiñones \par recently seen heart failure no changes in medical therapy\par \par Recent major difficulty has been related to the development of trigeminal neuralgia \par \par minor weight gain he reports related to the season\par has remained in sinus rhythm\par Denies exertional chest pain or discomfort. \par Denies unusual shortness of breath, orthopnea, weight gain, or LE edema. \par Denies any unusual bleeding or black/tarry stools.  \par \par November 2022 hemoglobin 15.1 creatinine 1.6 \par EKG 11/9/22 atrial paced QTc 585msec (prior 603msec) \par labs 11/10/22 hgb 12.1, k 3.5, cr 1.4, LDL 49, a1c 6.5, tsh 1.7, mg 2\par \par Past testing for reference: \par Carotid doppler 12/27/21 mild to mod nonbstructive atherosclerosis BL\par US abd AAA  mod atherosclerosis \par Nuclear stress test 1/6/22 large anterior, apical , inferior, inferolateral fixed defects s/o infarct, TID 1.05, EF 19%\par PYP study was equivocal for cardiac amyloid \par last echocardiogram demonstrates severe left ventricular dysfunction mild mitral regurgitation TAVR with peak gradient of 17 no aortic regurgitation moderate pulmonary hypertension \par

## 2023-02-12 NOTE — ASSESSMENT
[FreeTextEntry1] : AF, sx\par Tikosyn \par remote monitoring follow-up device check \par follow-up blood work, EKG, EP on dofetilide \par cont AC, monitor CBC\par Reviewed drug interactions with Tikosyn \par pt will notify our office before any change/additions are made\par \par s/p ICD\par VT storm.  \par Multiple ICD shocks.   \par Monitor electrolytes. \par Follow-up with EP \par \par CSHF\par dilated ischemic cardiomyopathy \par HFrEF\par CRI\par s/p dual chamber PPM not CRT increased AV delay to minimize RV pacing\par s/p valve in valve TAVR 8/20\par s/p mems, follows w/ heart failure Dr. Quiñones specialist \par baseline low blood pressure \par continue ARB beta-blocker SGLT2i diuretic and aldosterone antagonist \par intolerant of Entresto \par SBE ppx prn\par Monitor heart rate blood pressure and weights \par refer to cardiac rehab \par follow-up echo monitor prosthetic valve \par \par Coronary artery disease, prior MI\par antiplatelet and lipid-lowering therapy therapy\par continue beta-blocker therapy\par

## 2023-02-13 ENCOUNTER — NON-APPOINTMENT (OUTPATIENT)
Age: 73
End: 2023-02-13

## 2023-02-21 ENCOUNTER — APPOINTMENT (OUTPATIENT)
Dept: CARDIOLOGY | Facility: CLINIC | Age: 73
End: 2023-02-21
Payer: MEDICARE

## 2023-02-21 ENCOUNTER — NON-APPOINTMENT (OUTPATIENT)
Age: 73
End: 2023-02-21

## 2023-02-21 PROCEDURE — 93296 REM INTERROG EVL PM/IDS: CPT

## 2023-02-21 PROCEDURE — 93295 DEV INTERROG REMOTE 1/2/MLT: CPT

## 2023-02-26 ENCOUNTER — FORM ENCOUNTER (OUTPATIENT)
Age: 73
End: 2023-02-26

## 2023-02-27 ENCOUNTER — NON-APPOINTMENT (OUTPATIENT)
Age: 73
End: 2023-02-27

## 2023-03-01 NOTE — CARDIOLOGY SUMMARY
[de-identified] : 2/3/23  NSR with 1 st degree AVB , low voltage, PRWP\par 4/1/22 NSR with 1 st degree  ms, low voltage, PRWP\par 12/7/21 - A paced, long DC interval, PVC, \par 9/21/21 - A paced, long DC interval, PRWP, low voltage \par 7/19/21 - ? afib (per Dr. Apple in sinus); HR 70, RV paced \par \par 10/1/20 - afib, V paced, HR 51 \par 8/24/20 - AFib/, HR 69\par 12/31/19 - AFib, HR 73, low voltage, PRWP \par 11/8/17 - NSR, HR 76, APC, limb lead reversal, PRWP\par 6/29/17  Aflutter,HR 79 [de-identified] : \par 11/18/21 - Tc-Pyp - heart-to-contralateral ratio 1.3 (borderline/suggestive of ATTR); grade 2 (myocardial uptake to rib; borderline/suggestive of ATTR); equivocal for cardiac amyloidosis  [de-identified] : \par 10/1/20 - LVEDD 6 cm, EF 20% (segmental; inferoseptal, apical, anterolateral wall akinetic; basal lateral function preserved), mild MR; TAVR in place; AALIYAH 2.2; LVOT VTi 12.9 cm, IVC nl sized\par \par TTE 8/21/20 - LVEDD 5.4 cm, EF 25%, nl RV size with mildly reduced function, mild-mod MR, well seated Evolut Pro+ THV-in-valve with nl function (peak 11, mean 6), mild TR, est RAP 15 mmHg, est RVSP 71 mmHg.\par \par TTE 6/18/19 - LVEDD 5.8 cm, EF 20-25%, mild MR, nl functioning bioAVR (peak 19, mean 9), mod LA dilatation, \par \par ANDREY 7/10/17 - EF 10-15%, LVEDD 5.9 cm, mild MR, bioprosthetic Ao valve, no aortic insufficiency, severe segmental LV dysfunction (akinesis of mid-apical septum, aneurysmal and dyskinetic LV apex, akinesis of anterior wall)\par \par TTE 12/16 LVIDd 5.8 cm. severe global LVSD. Right ventricle mildly dilated with global hypokinesis.  Normal prosthetic aortic valve: IVS 1.0 cm, severe global LVSD, normal diastolic functioning. Right ventricle mildly dilated with global hypokinesis. Normal prosthetic aortic valve, mild MR, mild pulmonic insufficiency, normal Tricuspid valve. [de-identified] : \par  [de-identified] : \par 9/16/21 - RHC/MEMS implant - RA 12, RV 50/13, PA 51/20/33, PCWP 11, CO/CI 4.7/2.08 (PA sat 59%, Ao sat 93%, Hb 11.5), PVR 4.68 REYNA\par \par 8/20/20 - HECTOR; /36, Ao 101/63/80\par \par [facility] 6/4/20 - Ao 126/77/92, RA 8, RV 71/10, PA 66/24/40, PCW 20, PaSat 61%, AoSat 98%, Jan CO/CI 3.85/1.7, PVR 5.19 Reyna, SVR 1744 dsc\par \par [facility] 6/4/20 - LM mild CAD, mLAD 10% stenosis at site of prior stent, LCx/RCA mod CAD\par  \par [facility] ([place]) - 3 stents placed\par

## 2023-03-01 NOTE — ASSESSMENT
[FreeTextEntry1] : Mr. Encinas is a very pleasant 72 y/o M w/ h/o CAD s/p late-presenting MI 2005 s/p PCI LAD, HFrEF/ICM (EF 15-20%, LVEDD 6 cm) s/p single chamber ICD with lead revision in 2018 with upgrade to dual-chamber, s/p MEMS, bioprosthetic AVR (2015) 2/2 AI c/b prosthetic stenosis s/p 26 mm EVOLUT HECTOR (8/20/20), aflutter ablation 7/17 w/ afib s/p DCCV x 2 (most recent 7/7/21; on AC), trigeminal neuralgia ( 4/2022)  who presents for routine follow-up for his cardiomyopathy. S/P Right total knee arthroplasty 1/31/22  with improvement in his exercise tolerance.but has new issues secondary to trigeminal neuralgia with left face pain, decreased ability to talk and eat. \par  Currently appears compensated and normotensive with MEMS 25.( at goal)  Still with episodic dizziness related to position changes  Endorses NYHA class II symptoms. \par \par 1. ICM, HFrEF \par - on torsemide 20 mg alternating with 30 mg daily with 10 mg in evening every other day; goal weight 227-228 and is 223 lbs today in office with   MEMS 25 ( at goal) \par - continue Farxiga 10 mg daily \par - does not tolerate Entresto as has significant pre-syncopal episodes (were occurring even prior to Entresto many years ago)\par - c/w losartan 12.5 mg twice/day\par - Continue Spirolactone 25 mg QD\par - Continue Toprol XL 12.5 mg twice/day; intermittent A pacing after atrial lead placed. Set MVP  \par - will add serum pro BNP with next lab work \par - discussed if things were to worsen in future of possible LVAD as an option but no current indication for this\par \par 2. Aflutter/afib- in NSR today with 1 st degree AVB \par - intermittent A pacing\par - continue eliquis 5 mg bid\par - off digoxin since his DCCV 8/2020\par - now off amiodarone and is on Tikosyn, will follow up with EP next week\par \par 3. 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 CAD - s/p MI\par - continue ASA 81 mg daily and Lipitor 40 mg daily\par \par 5. RLL nodule 1.8x1.4 cm opacity \par - underwent PET/CT on 9/16 which was non-FDG avid and previously reviewed with Dr. Waggoner which felt it as inflammatory and no further intervention was warranted\par - he will also follow-up with his pulmonologist, Dr. Guerrero in [place]\par \par 6. Syncope - no recent episodes\par - given low voltage and atrial myopathy, evaluated for TTR amyloidosis which was equivocal; would require cardiac biopsy for definitive diagnosis but will defer as low suspicion \par - recommend compression stockings\par - carotid dopplers 12/27/21 moderate non-obstructive disease\par \par 7. Trigeminal Neuralgia\par - will follow up with neurology for treatment \par \par RTC in 6 months

## 2023-03-01 NOTE — PHYSICAL EXAM
[Well Developed] : well developed [Well Nourished] : well nourished [No Acute Distress] : no acute distress [No Carotid Bruit] : no carotid bruit [Normal S1, S2] : normal S1, S2 [No Murmur] : no murmur [No Rub] : no rub [No Gallop] : no gallop [Clear Lung Fields] : clear lung fields [Good Air Entry] : good air entry [No Respiratory Distress] : no respiratory distress  [Soft] : abdomen soft [Non Tender] : non-tender [No Masses/organomegaly] : no masses/organomegaly [Normal Bowel Sounds] : normal bowel sounds [No Cyanosis] : no cyanosis [No Clubbing] : no clubbing [No Varicosities] : no varicosities [No Rash] : no rash [No Skin Lesions] : no skin lesions [Moves all extremities] : moves all extremities [No Focal Deficits] : no focal deficits [Normal Speech] : normal speech [Normal] : alert and oriented, normal memory [Alert and Oriented] : alert and oriented [Normal memory] : normal memory [Normal Conjunctiva] : normal conjunctiva [de-identified] : overweight [de-identified] : JVP approx 8 cm without HJR [de-identified] : Left facial decreased tone secondary to trigeminal neuralgia [de-identified] : Left chest ICD [de-identified] : slow gait  [de-identified] : trace lower extremity edema bilaterally, healed right knee incision

## 2023-03-01 NOTE — HISTORY OF PRESENT ILLNESS
[FreeTextEntry1] : Mr. Encinas is a very pleasant 73 y/o M w/ h/o CAD s/p late-presenting MI 2005 s/p PCI LAD, ICM/HFrEF (EF 15-20%, LVEDD 6 cm) s/p single chamber ICD with lead revision in 2018 with upgrade to dual-chamber device (8/17/21) s/p MEMS (9/16/21), bioprosthetic AVR (2015) 2/2 AI s/p 26 mm EVOLUT HECTOR for severe bioprosthetic AS (8/20/20), aflutter ablation 7/17 w/ persistent afib, s/p DCCV x2 (8/12/20, 7/7/21), Trigeminal neuralgia ( 4/2022) who presents for routine follow-up for his cardiomyopathy, last seen 4/2022 Followed by Dr. Pressley. \par \jacinto Was last seen by me 4/1/22 and has been feeling well from heart perspective but was diagnosed with Trigeminal Neuralgia last spring ( 2022)  but worsened around October-November 2022 with increased pain on left face with difficult taking and eating. He had a RTKR 1/2022 and has recovered well after that surgery. His activity is not limited by shortness of breath but is limited by back issues ( chronic)   He has also had Covid since last visit but had mild symptoms and did not require treatment. \par \par He is being treated with Lyrica and started at 100 mg bid and is now up to 1200 mg daily. He was seen by neuro and will be starting Tegretol 200 mg bid. He will also be trying acupuncture. He is looking into going to a center that specializes in Trigeminal Neuralgia. \par \par Weight 223- 225 lbs He takes torsemide 20 mg alternating with 30 mg daily MEMS today 25 with goal 24. \par \par He was seen by EP 1/2022 and amiio was d/c and started on Tikosyn which he is tolerating. He has a follow up schedule with EP next week.  \par \par He is tolerating neurohormonal therapies with B/P 109/76 today. Has had chronic dizziness worse with standing after prolonged period of sitting which has been fairly stable. Hasn't had recent episodes of syncope but did have a fall while going to bathroom on an airplane. . He previously had 2 episodes of syncope (May and June 2021) unrelated to arrhythmias. \par \par Currently, he denies LH/dizziness and denies CP, palpitations, orthopnea, PND, cough, abdominal distention and LE swelling.\par \par Previously underwent Tc-Pyp study to evaluate for amyloidosis which was equivocal. For definitive purposes, would require a cardiac biopsy which we will defer for now. \par . \par Received Covid (Moderna) vaccine x2 (last in February).and booster without adverse reaction. He had Covid 2022, mild symptoms

## 2023-03-21 ENCOUNTER — NON-APPOINTMENT (OUTPATIENT)
Age: 73
End: 2023-03-21

## 2023-03-29 ENCOUNTER — NON-APPOINTMENT (OUTPATIENT)
Age: 73
End: 2023-03-29

## 2023-03-30 ENCOUNTER — FORM ENCOUNTER (OUTPATIENT)
Age: 73
End: 2023-03-30

## 2023-04-03 ENCOUNTER — NON-APPOINTMENT (OUTPATIENT)
Age: 73
End: 2023-04-03

## 2023-04-04 ENCOUNTER — NON-APPOINTMENT (OUTPATIENT)
Age: 73
End: 2023-04-04

## 2023-04-05 ENCOUNTER — NON-APPOINTMENT (OUTPATIENT)
Age: 73
End: 2023-04-05

## 2023-04-10 ENCOUNTER — NON-APPOINTMENT (OUTPATIENT)
Age: 73
End: 2023-04-10

## 2023-04-10 ENCOUNTER — APPOINTMENT (OUTPATIENT)
Dept: ELECTROPHYSIOLOGY | Facility: CLINIC | Age: 73
End: 2023-04-10

## 2023-04-10 ENCOUNTER — APPOINTMENT (OUTPATIENT)
Dept: ELECTROPHYSIOLOGY | Facility: CLINIC | Age: 73
End: 2023-04-10
Payer: MEDICARE

## 2023-04-10 VITALS
WEIGHT: 227 LBS | DIASTOLIC BLOOD PRESSURE: 60 MMHG | BODY MASS INDEX: 29.13 KG/M2 | HEIGHT: 74 IN | HEART RATE: 81 BPM | OXYGEN SATURATION: 98 % | SYSTOLIC BLOOD PRESSURE: 100 MMHG

## 2023-04-10 PROCEDURE — 93000 ELECTROCARDIOGRAM COMPLETE: CPT | Mod: 59

## 2023-04-10 PROCEDURE — 93283 PRGRMG EVAL IMPLANTABLE DFB: CPT

## 2023-04-10 PROCEDURE — 99214 OFFICE O/P EST MOD 30 MIN: CPT

## 2023-04-10 NOTE — PHYSICAL EXAM
[Well Developed] : well developed [Well Nourished] : well nourished [No Acute Distress] : no acute distress [Normal Venous Pressure] : normal venous pressure [No Carotid Bruit] : no carotid bruit [Normal S1, S2] : normal S1, S2 [No Murmur] : no murmur [No Rub] : no rub [No Gallop] : no gallop [Clear Lung Fields] : clear lung fields [Good Air Entry] : good air entry [No Respiratory Distress] : no respiratory distress  [Soft] : abdomen soft [Non Tender] : non-tender [Normal Bowel Sounds] : normal bowel sounds [Normal Gait] : normal gait [No Edema] : no edema [No Cyanosis] : no cyanosis [Moves all extremities] : moves all extremities [Normal Speech] : normal speech [Alert and Oriented] : alert and oriented

## 2023-04-13 NOTE — DISCUSSION/SUMMARY
[EKG obtained to assist in diagnosis and management of assessed problem(s)] : EKG obtained to assist in diagnosis and management of assessed problem(s) [FreeTextEntry1] : This is a 73 yo male with h/o HTN, DM, CAD, MI, PCI, Ischemic Cardiomyopathy, TAVR, Medtronic ICD, VT storm with multiple shocks, atrial fibrillation on Dofetilide who presents for evaluation today for recurrent atrial fibrillation noted on remote alert. The patient reports recent URI and is on antibiotics (last dose today) that started after the atrial fibrillation started. He did notice difficulty walking around the time of the start of this episode of Afib. He denies CP, SOB, palpitations, lightheadedness, dizziness, near syncope or syncope. ICD interrogation today revealed normal device function. Optivol WNL. Stored data revealed recurrent Afib with most recent started April 2nd. AF burden is 11.4%. He self converted and remains in NSR/Apace Vsense during this visit. It was d/w the patient and his wife that he self converted and is an acceptable endpoint at this time. The Dofetilide is maintaining NSR with AF burden only 11.4%. If he has recurrent Afib and burden increased to more than 20% then may need to consider an Afib ablation at that time. Questions were answered and the patient verbalized understanding. The assessment, findings and plan were d/w Dr. Apple who was present during the visit.

## 2023-04-13 NOTE — REVIEW OF SYSTEMS
[Fever] : no fever [Chills] : no chills [Feeling Fatigued] : not feeling fatigued [SOB] : no shortness of breath [Dyspnea on exertion] : not dyspnea during exertion [Chest Discomfort] : no chest discomfort [Lower Ext Edema] : no extremity edema [Palpitations] : no palpitations [Orthopnea] : no orthopnea [Syncope] : no syncope [Cough] : no cough [Wheezing] : no wheezing [Abdominal Pain] : no abdominal pain [Dizziness] : no dizziness [Weakness] : no weakness

## 2023-04-13 NOTE — HISTORY OF PRESENT ILLNESS
[FreeTextEntry1] : This is a 73 yo male with h/o HTN, DM, CAD, MI, PCI, Ischemic Cardiomyopathy, TAVR, Medtronic ICD, VT storm with multiple shocks, atrial fibrillation on Dofetilide who presents for evaluation today for recurrent atrial fibrillation noted on remote alert. The patient reports recent URI and is on antibiotics (last dose today) that started after the atrial fibrillation started. He did notice difficulty walking around the time of the start of this episode of Afib. He denies CP, SOB, palpitations, lightheadedness, dizziness, near syncope or syncope. \par \par Previously\par Admission to [facility] 11/7/2022 to 11/10/202. Presented to the emergency room for admission for initiation of dofetilide. He converted on dofetilide 250 MCG's twice daily. [place] better in SR.\par F/U 1/18/23: remains in SR. Feeling well. He had  dyspnea with mild exertion -the symptoms has improved significantly after conversion to sinus rhythm..\par Previous treatment for AF: The patient was previously treated with amiodarone and had cardioversion and was on amiodarone and had recurrent atrial fibrillation. Amiodarone was discontinued in preparation for initiation of dofetilide. He had tremors on amiodarone.\par \par He has an ischemic cardiomyopathy with an EF 20 to 25%.\par \par Dual-chamber ICD was interrogated and functioning normally.  There are no VT VF episode.  No AF episodes since conversion on Tikosyn.\par Previous history: S/P  TAV in [place].  He has had prior  cardioversion for A. fib.  Prior  history of myocardial infarction 2005 status post previous PCI to the LAD,  dilated cardiomyopathy ejection fraction 15 to 20%, s/p Medtronic single-chamber ICD implant 2005, status post appropriate shocks for VT VF 2015.  And prior atrial flutter ablation in 2017.  He underwent extraction of Medtronic [place] lead October 2018.  Previous bioprosthetic aortic valve replacement with subsequent premature failure noted on ANDREY. He underwent upgrade of his single-chamber ICD to a dual-chamber ICD.  He has a CardioMEMS device. Patient was previously on amiodarone and this was discontinued because of tremors.  The tremors had resolved after discontinuation. After upgrade to dual-chamber device the patient has felt well and had remained in sinus rhythm.  \par \par Echocardiogram from 4/20 22 showed EF 20%.  He has mild mitral regurgitation.  He had a low mean transaortic valve gradient 8 mmHg.  Patient has severe left atrial enlargement with left atrial volume index 54 cc per metered square.\par   \par Cardiac catheterization performed 6/4/2020 showed nonobstructive coronary arteries.\par Echocardiogram from 6/3/2020 showed severely reduced ejection fraction 15 to 20%, dilated LV diameter 6.5 cm, moderate to severe diastolic dysfunction\par \par Device: Medtronic cobalt XT DR dual-chamber remaining longevity  8years.  Programmed lower rate 60 upper rate 130 with VF as well as VT zone.  Paced and sensed AV delay set long at 350 ms.  Rate sensor was programmed on. Optivol fluid index below threshold\par Episode Nov 16th 2022: at\par Atrial paced 72%, ventricular paced 29%\par \par

## 2023-04-13 NOTE — END OF VISIT
[FreeTextEntry3] : The patient was seen with the PA.  We discussed the history, physical findings and plans.  Agree with the findings and recommendations.\par

## 2023-04-13 NOTE — CARDIOLOGY SUMMARY
[de-identified] : 4/10/2023 Apace Vsense  79bpm OR 380ms, QRS 128ms and QT 408ms [de-identified] : Medtronic Cobalt DDD ICD interrogation revealed normal pacing and sensing thresholds\par Atrial lead sensing 1.1mV output 0.40ms at 0.50V\par RV lead sensing 9.9mV output 0.40ms at .50V\par Stored data revealed atrial high rate episodes c/w Afib \par AF burden 11.4%\par Optivol WNL\par Estimated battery longevity is 6.9 years\par Real time EGM Karli Smyth

## 2023-04-14 ENCOUNTER — NON-APPOINTMENT (OUTPATIENT)
Age: 73
End: 2023-04-14

## 2023-04-18 ENCOUNTER — RX RENEWAL (OUTPATIENT)
Age: 73
End: 2023-04-18

## 2023-04-18 ENCOUNTER — NON-APPOINTMENT (OUTPATIENT)
Age: 73
End: 2023-04-18

## 2023-04-21 ENCOUNTER — NON-APPOINTMENT (OUTPATIENT)
Age: 73
End: 2023-04-21

## 2023-04-24 ENCOUNTER — RX RENEWAL (OUTPATIENT)
Age: 73
End: 2023-04-24

## 2023-04-25 ENCOUNTER — NON-APPOINTMENT (OUTPATIENT)
Age: 73
End: 2023-04-25

## 2023-04-25 ENCOUNTER — RX RENEWAL (OUTPATIENT)
Age: 73
End: 2023-04-25

## 2023-05-01 ENCOUNTER — FORM ENCOUNTER (OUTPATIENT)
Age: 73
End: 2023-05-01

## 2023-05-01 ENCOUNTER — NON-APPOINTMENT (OUTPATIENT)
Age: 73
End: 2023-05-01

## 2023-05-15 ENCOUNTER — NON-APPOINTMENT (OUTPATIENT)
Age: 73
End: 2023-05-15

## 2023-05-23 ENCOUNTER — NON-APPOINTMENT (OUTPATIENT)
Age: 73
End: 2023-05-23

## 2023-05-23 ENCOUNTER — APPOINTMENT (OUTPATIENT)
Dept: CARDIOLOGY | Facility: CLINIC | Age: 73
End: 2023-05-23
Payer: MEDICARE

## 2023-05-23 PROCEDURE — 93296 REM INTERROG EVL PM/IDS: CPT

## 2023-05-23 PROCEDURE — 93295 DEV INTERROG REMOTE 1/2/MLT: CPT

## 2023-05-26 ENCOUNTER — APPOINTMENT (OUTPATIENT)
Dept: CARDIOLOGY | Facility: CLINIC | Age: 73
End: 2023-05-26
Payer: MEDICARE

## 2023-05-26 VITALS
WEIGHT: 233 LBS | SYSTOLIC BLOOD PRESSURE: 100 MMHG | HEIGHT: 74 IN | DIASTOLIC BLOOD PRESSURE: 70 MMHG | BODY MASS INDEX: 29.9 KG/M2 | OXYGEN SATURATION: 95 % | HEART RATE: 73 BPM

## 2023-05-26 PROCEDURE — 93306 TTE W/DOPPLER COMPLETE: CPT

## 2023-05-26 PROCEDURE — 93000 ELECTROCARDIOGRAM COMPLETE: CPT

## 2023-05-26 PROCEDURE — 99214 OFFICE O/P EST MOD 30 MIN: CPT | Mod: 25

## 2023-05-26 PROCEDURE — 93283 PRGRMG EVAL IMPLANTABLE DFB: CPT

## 2023-05-26 NOTE — PHYSICAL EXAM
[Well Developed] : well developed [Well Nourished] : well nourished [No Acute Distress] : no acute distress [Normal Venous Pressure] : normal venous pressure [No Carotid Bruit] : no carotid bruit [Normal S1, S2] : normal S1, S2 [No Murmur] : no murmur [No Rub] : no rub [No Gallop] : no gallop [Clear Lung Fields] : clear lung fields [Good Air Entry] : good air entry [No Respiratory Distress] : no respiratory distress  [Soft] : abdomen soft [Non Tender] : non-tender [Normal Bowel Sounds] : normal bowel sounds [Normal Gait] : normal gait [No Edema] : no edema [No Cyanosis] : no cyanosis [Normal] : no rash, no skin lesions [Moves all extremities] : moves all extremities [Normal Speech] : normal speech [Alert and Oriented] : alert and oriented

## 2023-05-26 NOTE — HISTORY OF PRESENT ILLNESS
[FreeTextEntry1] : This is a 71 yo male presents today for follow up. He denies Chest pain , SOB or Palpitations.\par Cardiomems PAD 31-29 has been taking Torsemide 40 mg BID ( he was only supposed to take this for 2 days)\par Daisha shows Afib burden 48% rate controlled.\par 5/26/23 Echo EF 20-25% LVDIDd 6.6 cm ( 6.2 cm)\par \par  h/o HTN, DM, CAD, MI, PCI, Ischemic Cardiomyopathy, TAVR, Medtronic ICD, VT storm with multiple shocks, atrial fibrillation on Dofetilide who presents for evaluation today for recurrent atrial fibrillation noted on remote alert. The patient reports recent URI and is on antibiotics (last dose today) that started after the atrial fibrillation started. He did notice difficulty walking around the time of the start of this episode of Afib. He denies CP, SOB, palpitations, lightheadedness, dizziness, near syncope or syncope. \par \par Previously\par Admission to [facility] 11/7/2022 to 11/10/202. Presented to the emergency room for admission for initiation of dofetilide. He converted on dofetilide 250 MCG's twice daily. [place] better in SR.\par F/U 1/18/23: remains in SR. Feeling well. He had  dyspnea with mild exertion -the symptoms has improved significantly after conversion to sinus rhythm..\par Previous treatment for AF: The patient was previously treated with amiodarone and had cardioversion and was on amiodarone and had recurrent atrial fibrillation. Amiodarone was discontinued in preparation for initiation of dofetilide. He had tremors on amiodarone.\par \par He has an ischemic cardiomyopathy with an EF 20 to 25%.\par \par Dual-chamber ICD was interrogated and functioning normally.  There are no VT VF episode.  No AF episodes since conversion on Tikosyn.\par Previous history: S/P  TAV in [place].  He has had prior  cardioversion for A. fib.  Prior  history of myocardial infarction 2005 status post previous PCI to the LAD,  dilated cardiomyopathy ejection fraction 15 to 20%, s/p Medtronic single-chamber ICD implant 2005, status post appropriate shocks for VT VF 2015.  And prior atrial flutter ablation in 2017.  He underwent extraction of Medtronic [place] lead October 2018.  Previous bioprosthetic aortic valve replacement with subsequent premature failure noted on ANDREY. He underwent upgrade of his single-chamber ICD to a dual-chamber ICD.  He has a CardioMEMS device. Patient was previously on amiodarone and this was discontinued because of tremors.  The tremors had resolved after discontinuation. After upgrade to dual-chamber device the patient has felt well and had remained in sinus rhythm.  \par \par Echocardiogram from 4/20 22 showed EF 20%.  He has mild mitral regurgitation.  He had a low mean transaortic valve gradient 8 mmHg.  Patient has severe left atrial enlargement with left atrial volume index 54 cc per metered square.\par   \par Cardiac catheterization performed 6/4/2020 showed nonobstructive coronary arteries.\par Echocardiogram from 6/3/2020 showed severely reduced ejection fraction 15 to 20%, dilated LV diameter 6.5 cm, moderate to severe diastolic dysfunction\par \par Device: Medtronic cobalt XT DR dual-chamber remaining longevity  8years.  Programmed lower rate 60 upper rate 130 with VF as well as VT zone.  Paced and sensed AV delay set long at 350 ms.  Rate sensor was programmed on. Optivol fluid index below threshold\par Episode Nov 16th 2022: at\par Atrial paced 72%, ventricular paced 29%\par \par

## 2023-05-26 NOTE — DISCUSSION/SUMMARY
[Patient] : the patient [FreeTextEntry1] : Discussed case with Dr Gordon\par Pt will call Tuesday to have a appointment with EP to discuss ablation\par Having lab work done today Will check K+ ( due to increased Torsemide)\par Will do Cardiomems QD\par \par

## 2023-05-26 NOTE — CARDIOLOGY SUMMARY
[de-identified] : 4/10/2023 Apace Vsense  79bpm OH 380ms, QRS 128ms and QT 408ms [de-identified] : Medtronic Cobalt DDD ICD interrogation revealed normal pacing and sensing thresholds\par Atrial lead sensing 1.1mV output 0.40ms at 0.50V\par RV lead sensing 9.9mV output 0.40ms at .50V\par Stored data revealed atrial high rate episodes c/w Afib \par AF burden 11.4%\par Optivol WNL\par Estimated battery longevity is 6.9 years\par Real time EGM Karli Smyth

## 2023-05-26 NOTE — REVIEW OF SYSTEMS
[Feeling Fatigued] : not feeling fatigued [Blurry Vision] : no blurred vision [SOB] : no shortness of breath [Dyspnea on exertion] : not dyspnea during exertion [Cough] : no cough [Wheezing] : no wheezing [Weakness] : no weakness [Negative] : Musculoskeletal

## 2023-05-31 ENCOUNTER — NON-APPOINTMENT (OUTPATIENT)
Age: 73
End: 2023-05-31

## 2023-05-31 ENCOUNTER — APPOINTMENT (OUTPATIENT)
Dept: ELECTROPHYSIOLOGY | Facility: CLINIC | Age: 73
End: 2023-05-31
Payer: MEDICARE

## 2023-05-31 VITALS
WEIGHT: 230 LBS | DIASTOLIC BLOOD PRESSURE: 60 MMHG | BODY MASS INDEX: 29.52 KG/M2 | SYSTOLIC BLOOD PRESSURE: 100 MMHG | HEIGHT: 74 IN | HEART RATE: 75 BPM | OXYGEN SATURATION: 95 %

## 2023-05-31 PROCEDURE — 93000 ELECTROCARDIOGRAM COMPLETE: CPT

## 2023-05-31 PROCEDURE — 99214 OFFICE O/P EST MOD 30 MIN: CPT | Mod: 25

## 2023-06-04 ENCOUNTER — FORM ENCOUNTER (OUTPATIENT)
Age: 73
End: 2023-06-04

## 2023-06-05 ENCOUNTER — NON-APPOINTMENT (OUTPATIENT)
Age: 73
End: 2023-06-05

## 2023-06-12 ENCOUNTER — NON-APPOINTMENT (OUTPATIENT)
Age: 73
End: 2023-06-12

## 2023-06-14 ENCOUNTER — NON-APPOINTMENT (OUTPATIENT)
Age: 73
End: 2023-06-14

## 2023-06-18 NOTE — DISCUSSION/SUMMARY
[FreeTextEntry1] : Atrial fibrillation: now in SR. Continue on Dofetilide 250 bid. Considering AF ablation if recurrent AF\par \par Dual-chamber ICD: Interrogated and functioning normally.  No VT VF.\par \par Ischemic cardiomyopathy: EF approximately 20 to 25% by last echo on 5/26/23.  s/p CardioMEMs device.  Normal readings.  Follow-up evaluation of his heart failure regimen.\par \par  Follow-up electrophysiology evaluation in  4 months\par  [EKG obtained to assist in diagnosis and management of assessed problem(s)] : EKG obtained to assist in diagnosis and management of assessed problem(s)

## 2023-06-18 NOTE — HISTORY OF PRESENT ILLNESS
[FreeTextEntry1] : Patient seen in follow-up evaluation today because he was noted to be in atrial fibrillation.  He does not have symptoms and is not aware of the AF.  He has had no increase in swelling/edema or shortness of breath.  He feels at his baseline.  He has been doing work around the house including gardening without any difficulty.\par \par The patient has been on dofetilide 250 mcg's twice daily.  He is also maintained on anticoagulation with Eliquis in addition to his other medications. \par Patient had an echocardiogram performed on 5/26/2023 that showed EF 20 to 25%, mild mitral regurgitation, mild left atrial enlargement with left atrial volume index 36.7 cc per metered square.\par \par \par \par Previous history: S/P  TAV in [place].  He has had prior  cardioversion for A. fib.  Prior  history of myocardial infarction 2005 status post previous PCI to the LAD,  dilated cardiomyopathy ejection fraction 15 to 20%, s/p Medtronic single-chamber ICD implant 2005, status post appropriate shocks for VT VF 2015.  And prior atrial flutter ablation in 2017.  He underwent extraction of Medtronic Estevan lead October 2018.  Previous bioprosthetic aortic valve replacement with subsequent premature failure noted on ANDREY.he underwent upgrade of his single-chamber ICD to a dual-chamber ICD.  He has a CardioMEMS device.Patient was previously on amiodarone and this was discontinued because of tremors.  The tremors had resolved after discontinuation. After upgrade to dual-chamber device the patient has felt well and had remained in sinus rhythm.  \par \par Echocardiogram from 4/Lasix 22 showed EF 20%.  He has mild mitral regurgitation.  He had a low mean transaortic valve gradient 8 mmHg.  Patient has severe left atrial enlargement with left atrial volume index 54 cc per metered square.\par Previous history:  \par \par Cardiac catheterization performed 6/4/2020 showed nonobstructive coronary arteries.\par Echocardiogram from 6/3/2020 showed severely reduced ejection fraction 15 to 20%, dilated LV diameter 6.5 cm, moderate to severe diastolic dysfunction\par \par Device: Medtronic cobalt XT DR dual-chamber remaining longevity 10.8 years.  Programmed lower rate 60 upper rate 130 with VF as well as VT zone.  Paced and sensed AV delay set long at 350 ms.  Rate sensor was programmed on.\par \par

## 2023-06-18 NOTE — REVIEW OF SYSTEMS
6 [Dyspnea on exertion] : dyspnea during exertion [Feeling Fatigued] : not feeling fatigued [Sore Throat] : no sore throat [SOB] : no shortness of breath [Chest Discomfort] : no chest discomfort [Palpitations] : no palpitations [Cough] : no cough [Abdominal Pain] : no abdominal pain [Rash] : no rash [Dizziness] : no dizziness [Easy Bleeding] : no tendency for easy bleeding

## 2023-06-18 NOTE — CARDIOLOGY SUMMARY
[de-identified] : Sinus  Rhythm \par -Old anterior infarct. \par  -  Diffuse nonspecific T-abnormality. \par  Low voltage

## 2023-06-20 ENCOUNTER — NON-APPOINTMENT (OUTPATIENT)
Age: 73
End: 2023-06-20

## 2023-06-27 ENCOUNTER — NON-APPOINTMENT (OUTPATIENT)
Age: 73
End: 2023-06-27

## 2023-06-27 ENCOUNTER — APPOINTMENT (OUTPATIENT)
Dept: CARDIOLOGY | Facility: CLINIC | Age: 73
End: 2023-06-27
Payer: MEDICARE

## 2023-06-27 VITALS
HEART RATE: 80 BPM | DIASTOLIC BLOOD PRESSURE: 72 MMHG | HEIGHT: 74 IN | OXYGEN SATURATION: 95 % | WEIGHT: 230 LBS | BODY MASS INDEX: 29.52 KG/M2 | SYSTOLIC BLOOD PRESSURE: 100 MMHG

## 2023-06-27 PROCEDURE — 93000 ELECTROCARDIOGRAM COMPLETE: CPT

## 2023-06-27 PROCEDURE — 99215 OFFICE O/P EST HI 40 MIN: CPT | Mod: 25

## 2023-07-03 NOTE — H&P ADULT - PROBLEM/PLAN-3
Mskristen from mom, , [phone].  Mom calling to see if Shaun needed any more vaccinations.  The had his 1 yr wcc in Feb but they did a delayed vaccination schedule so mom checking to see if he's utd.     DISPLAY PLAN FREE TEXT

## 2023-07-05 ENCOUNTER — NON-APPOINTMENT (OUTPATIENT)
Age: 73
End: 2023-07-05

## 2023-07-05 ENCOUNTER — APPOINTMENT (OUTPATIENT)
Dept: ELECTROPHYSIOLOGY | Facility: CLINIC | Age: 73
End: 2023-07-05
Payer: MEDICARE

## 2023-07-05 VITALS
HEIGHT: 74 IN | BODY MASS INDEX: 29.52 KG/M2 | OXYGEN SATURATION: 95 % | WEIGHT: 230 LBS | HEART RATE: 81 BPM | DIASTOLIC BLOOD PRESSURE: 60 MMHG | SYSTOLIC BLOOD PRESSURE: 100 MMHG

## 2023-07-05 PROCEDURE — 99214 OFFICE O/P EST MOD 30 MIN: CPT | Mod: 25

## 2023-07-05 PROCEDURE — 93000 ELECTROCARDIOGRAM COMPLETE: CPT

## 2023-07-06 NOTE — ASSESSMENT
[FreeTextEntry1] : AF, sx\par Tikosyn \par follow-up device check \par follow-up blood work, EKG, EP on dofetilide \par cont AC, monitor CBC\par Reviewed drug interactions with Tikosyn \par Discussed rhythm control strategy.  Note that he is on Tikosyn.  Case has been discussed with electrophysiology.  \par Discussed ablation procedure.\par \par s/p ICD\par VT storm.  \par Multiple ICD shocks.   \par Monitor electrolytes. \par Follow-up with EP \par \par CSHF\par dilated ischemic cardiomyopathy \par HFrEF\par CRI\par s/p dual chamber PPM not CRT increased AV delay to minimize RV pacing\par s/p valve in valve TAVR 8/20\par s/p mems, follows w/ heart failure Dr. Quiñones specialist \par baseline low blood pressure \par continue ARB beta-blocker SGLT2i diuretic and aldosterone antagonist \par intolerant of Entresto \par SBE ppx prn\par Monitor heart rate blood pressure and weights \par ? cardiac rehab \par follow-up echo monitor prosthetic valve \par \par Coronary artery disease, prior MI\par antiplatelet and lipid-lowering therapy therapy\par continue beta-blocker therapy\par \par

## 2023-07-06 NOTE — HISTORY OF PRESENT ILLNESS
[FreeTextEntry1] : DUDLEY BERMUDEZ  is a 73 year old  M\par \par w/ h/o CAD s/p late-presenting MI 2005 s/p PCI LAD, ICM s/p mems, s/p single chamber ICD with lead revision in 2018 then dual chamber (failed CRT), bioprosthetic AVR (2015, s/p HECTOR for severe bioprosthetic AS (8/20/20), aflutter ablation 7/17 w/ persistent afib, s/p DCCV 8/12/20 then 7/21 now on tikosyn, VT mendez, CRI\par \par Unable to place LV lead due to coronary venous anatomy\par \par In June 2020, presented to [facility] for 6 months due to worsening SOB and fatigue\par Underwent angiogram with Dr. Nino which showed 10% stenosis at the site of prior stent\par Severe bioprosthetic AS on ANDREY for DCCV, [facility] valve in valve TAVR 8/20. \par Of note, incidentally found to have a new RLL nodule on cardiac CT, had thoracic f/u and PET/CT \par \par p/w VT storm \par He reports having gastroenteritis.  Dyspepsia, nausea, loose bowel movements.  Felt wiped out.  \par ICD discharges started at home. Total of 26 shocks.\par Cardiac catheterization demonstrated low filling pressures\par Echocardiogram demonstrated severe left ventricular dysfunction\par Loaded with amiodarone.  \par Had balloon pump and [place]-Jian catheter.   \par Received IV fluids.\par \par Presented with MATHEWS increasing AF. \par Inpatient TIkosyn load. \par \par Previous intolerance to Entresto. Symptoms of dizziness and hypotension on medication \par prior tremors with amio\par Previously given extra doses of torsemide for elevated mems readings \par \par Recently his family was visiting.  \par He had significant symptoms of fatigue and dyspnea.  \par He did not have his usual exercise tolerance.  \par His weight has been steady.  \par There is easy bruising\par \par There was recurrent atrial fibrillation on a remote alert.  Seen by electrophysiology.  At electrophysiology follow-up he was in normal sinus rhythm.  There is an increasing burden of atrial fibrillation last saw EP in May \par recent CardioMEMS readings have been elevated.  \par CardioMEMS followed by advanced heart failure clinic.\par \par last echocardiogram demonstrates an ejection fraction of 20 to 25%.  Dilated cardiomyopathy.  Moderate pulmonary hypertension.  P\par prior echocardiogram demonstrates similar findings\par May 2023 creatinine 1.3 TSH 1.6 potassium 4.4 \par prior blood work with A1c 6.1 LDL 65 \par EKG demonstrates a paced rhythm with PVCs.  \par \par Denies exertional chest pain or discomfort. \par Denies orthopnea, weight gain, or LE edema. \par Denies any unusual bleeding\par \par Past testing for reference: \par Carotid doppler 12/27/21 mild to mod nonbstructive atherosclerosis BL\par US abd AAA  mod atherosclerosis \par Nuclear stress test 1/6/22 large anterior, apical , inferior, inferolateral fixed defects s/o infarct, TID 1.05, EF 19%\par PYP study was equivocal for cardiac amyloid \par

## 2023-07-12 ENCOUNTER — NON-APPOINTMENT (OUTPATIENT)
Age: 73
End: 2023-07-12

## 2023-07-14 ENCOUNTER — NON-APPOINTMENT (OUTPATIENT)
Age: 73
End: 2023-07-14

## 2023-07-16 ENCOUNTER — RX RENEWAL (OUTPATIENT)
Age: 73
End: 2023-07-16

## 2023-07-21 ENCOUNTER — NON-APPOINTMENT (OUTPATIENT)
Age: 73
End: 2023-07-21

## 2023-07-25 ENCOUNTER — NON-APPOINTMENT (OUTPATIENT)
Age: 73
End: 2023-07-25

## 2023-08-01 ENCOUNTER — NON-APPOINTMENT (OUTPATIENT)
Age: 73
End: 2023-08-01

## 2023-08-02 ENCOUNTER — APPOINTMENT (OUTPATIENT)
Dept: CARDIOLOGY | Facility: CLINIC | Age: 73
End: 2023-08-02
Payer: MEDICARE

## 2023-08-02 VITALS
OXYGEN SATURATION: 96 % | HEIGHT: 74 IN | BODY MASS INDEX: 29.39 KG/M2 | WEIGHT: 229 LBS | DIASTOLIC BLOOD PRESSURE: 58 MMHG | HEART RATE: 74 BPM | SYSTOLIC BLOOD PRESSURE: 96 MMHG

## 2023-08-02 PROCEDURE — 93283 PRGRMG EVAL IMPLANTABLE DFB: CPT

## 2023-08-04 ENCOUNTER — NON-APPOINTMENT (OUTPATIENT)
Age: 73
End: 2023-08-04

## 2023-08-04 ENCOUNTER — APPOINTMENT (OUTPATIENT)
Dept: HEART FAILURE | Facility: CLINIC | Age: 73
End: 2023-08-04

## 2023-08-04 ENCOUNTER — LABORATORY RESULT (OUTPATIENT)
Age: 73
End: 2023-08-04

## 2023-08-04 ENCOUNTER — APPOINTMENT (OUTPATIENT)
Dept: HEART FAILURE | Facility: CLINIC | Age: 73
End: 2023-08-04
Payer: MEDICARE

## 2023-08-04 VITALS
OXYGEN SATURATION: 91 % | SYSTOLIC BLOOD PRESSURE: 86 MMHG | HEIGHT: 66 IN | BODY MASS INDEX: 36.8 KG/M2 | DIASTOLIC BLOOD PRESSURE: 61 MMHG | WEIGHT: 229 LBS | HEART RATE: 69 BPM

## 2023-08-04 VITALS — SYSTOLIC BLOOD PRESSURE: 113 MMHG | DIASTOLIC BLOOD PRESSURE: 75 MMHG

## 2023-08-04 DIAGNOSIS — I49.9 CARDIAC ARRHYTHMIA, UNSPECIFIED: ICD-10-CM

## 2023-08-04 PROCEDURE — 93000 ELECTROCARDIOGRAM COMPLETE: CPT

## 2023-08-04 PROCEDURE — 36415 COLL VENOUS BLD VENIPUNCTURE: CPT

## 2023-08-04 PROCEDURE — 99214 OFFICE O/P EST MOD 30 MIN: CPT | Mod: 25

## 2023-08-04 NOTE — PHYSICAL EXAM
[Well Developed] : well developed [Well Nourished] : well nourished [No Acute Distress] : no acute distress [No Carotid Bruit] : no carotid bruit [Normal Conjunctiva] : normal conjunctiva [Normal S1, S2] : normal S1, S2 [No Murmur] : no murmur [No Rub] : no rub [No Gallop] : no gallop [Clear Lung Fields] : clear lung fields [Good Air Entry] : good air entry [No Respiratory Distress] : no respiratory distress  [Soft] : abdomen soft [Non Tender] : non-tender [No Masses/organomegaly] : no masses/organomegaly [Normal Bowel Sounds] : normal bowel sounds [No Cyanosis] : no cyanosis [No Clubbing] : no clubbing [No Varicosities] : no varicosities [No Rash] : no rash [No Skin Lesions] : no skin lesions [Moves all extremities] : moves all extremities [No Focal Deficits] : no focal deficits [Normal Speech] : normal speech [Normal] : alert and oriented, normal memory [Alert and Oriented] : alert and oriented [Normal memory] : normal memory [de-identified] : overweight [de-identified] : Left facial decreased tone secondary to trigeminal neuralgia [de-identified] : JVP approx 8 cm without HJR [de-identified] : Left chest ICD [de-identified] : slow gait  [de-identified] : trace lower extremity edema bilaterally R>L due to prior right knee incision  [de-identified] : ecchymoses on arms

## 2023-08-04 NOTE — CARDIOLOGY SUMMARY
[de-identified] : 8/4/23 AF75, RBBB, LAFB, low voltage, NSST 2/3/23  NSR with 1 st degree AVB , low voltage, PRWP 4/1/22 NSR with 1 st degree  ms, low voltage, PRWP 12/7/21 - A paced, long NV interval, PVC,  9/21/21 - A paced, long NV interval, PRWP, low voltage  7/19/21 - ? afib (per Dr. Apple in sinus); HR 70, RV paced   10/1/20 - afib, V paced, HR 51  8/24/20 - AFib/, HR 69 12/31/19 - AFib, HR 73, low voltage, PRWP  11/8/17 - NSR, HR 76, APC, limb lead reversal, PRWP 6/29/17  Aflutter,HR 79 [de-identified] : \par  11/18/21 - Tc-Pyp - heart-to-contralateral ratio 1.3 (borderline/suggestive of ATTR); grade 2 (myocardial uptake to rib; borderline/suggestive of ATTR); equivocal for cardiac amyloidosis  [de-identified] : 5/26/2023 that showed EF 20 to 25%, mild mitral regurgitation, mild left atrial enlargement with left atrial volume index 36.7 cc per metered square.  10/1/20 - LVEDD 6 cm, EF 20% (segmental; inferoseptal, apical, anterolateral wall akinetic; basal lateral function preserved), mild MR; TAVR in place; AALIYAH 2.2; LVOT VTi 12.9 cm, IVC nl sized  TTE 8/21/20 - LVEDD 5.4 cm, EF 25%, nl RV size with mildly reduced function, mild-mod MR, well seated Evolut Pro+ THV-in-valve with nl function (peak 11, mean 6), mild TR, est RAP 15 mmHg, est RVSP 71 mmHg.  TTE 6/18/19 - LVEDD 5.8 cm, EF 20-25%, mild MR, nl functioning bioAVR (peak 19, mean 9), mod LA dilatation,   ANDREY 7/10/17 - EF 10-15%, LVEDD 5.9 cm, mild MR, bioprosthetic Ao valve, no aortic insufficiency, severe segmental LV dysfunction (akinesis of mid-apical septum, aneurysmal and dyskinetic LV apex, akinesis of anterior wall)  TTE 12/16 LVIDd 5.8 cm. severe global LVSD. Right ventricle mildly dilated with global hypokinesis.  Normal prosthetic aortic valve: IVS 1.0 cm, severe global LVSD, normal diastolic functioning. Right ventricle mildly dilated with global hypokinesis. Normal prosthetic aortic valve, mild MR, mild pulmonic insufficiency, normal Tricuspid valve. [de-identified] : \par   [de-identified] : \par  9/16/21 - RHC/MEMS implant - RA 12, RV 50/13, PA 51/20/33, PCWP 11, CO/CI 4.7/2.08 (PA sat 59%, Ao sat 93%, Hb 11.5), PVR 4.68 REYNA\par  \par  8/20/20 - HECTOR; /36, Ao 101/63/80\par  \par  [facility] 6/4/20 - Ao 126/77/92, RA 8, RV 71/10, PA 66/24/40, PCW 20, PaSat 61%, AoSat 98%, Jan CO/CI 3.85/1.7, PVR 5.19 Reyna, SVR 1744 dsc\par  \par  [facility] 6/4/20 - LM mild CAD, mLAD 10% stenosis at site of prior stent, LCx/RCA mod CAD\par   \par  [facility] ([place]) - 3 stents placed\par

## 2023-08-04 NOTE — HISTORY OF PRESENT ILLNESS
[FreeTextEntry1] : Mr. Encinas is a very pleasant 74 y/o M w/ h/o CAD s/p late-presenting MI 2005 s/p PCI LAD, ICM/HFrEF (EF 15-20%, LVEDD 6 cm) s/p single chamber ICD with lead revision in 2018 with upgrade to dual-chamber device (8/17/21) s/p MEMS (9/16/21), bioprosthetic AVR (2015) 2/2 AI s/p 26 mm EVOLUT HECTOR for severe bioprosthetic AS (8/20/20), aflutter ablation 7/17 w/ persistent afib, s/p DCCV x2 (8/12/20, 7/7/21), Trigeminal neuralgia ( 4/2022) who presents for follow-up for his cardiomyopathy with recurrent AF and elevated CardioMEMS readings, last seen 2/3/23 by Dr. Quiñones. Followed by Dr. Pressley.   HPI: Diagnosed with Trigeminal Neuralgia spring ( 2022) but worsened around October-November 2022 with increased pain on left face with difficult taking and eating. He follwed up with accupuncturist in [place] office with resolution of symptoms and has continued the lyrica and tegretol. He had a RTKR 1/2022 and has recovered well after that surgery. He is being treated with Lyrica and Tegretol.  He had a follow up with EP 7/5/23 due to recurrent AF and is maintained on dofetilide 250 mg bid and anticoagulation with Eliquis. He had a remote transmission on 8/3/23 with AF 67% and with stable Optivol and is not above threshold. ( see scanned data).  Last TTE 5/26/2023 with EF 20 to 25%, mild mitral regurgitation, mild left atrial enlargement. EKG today with AF 75.  Pt was called due to increase in CardioMEMS on 8/1 to 34 wth recent range 30-36 and goal 24. Weight was 229 lbs with range at home 228-231 lbs and goal is 227 lbs or less. He was instructed to take additional torsemide 20 mg for 2 days and will repeat MEMS with goal 24 or less. Home B/P range 100/60 and was 109/73 in office today on losartan 12.5 mg bid, farxiga 10 mg daily, marcelino 25 mg daily, metoprolol 25 mg in am and 12.5 mg in evening and torsemide 60 mg daily. He is scheduled for a RHC and CardioMEMS re calibration at [facility] on 8/22/23 and will discuss AF ablation at [place] after hemodynamics are obtained.   Currently, His activity is not limited by shortness of breath but has some back issues. States he was able to walk the length of airport terminals at [facility] and [place] without dyspnea. Reports he can walk several blocks and work on his boat including bending without dyspnea. He sleeps with 2 pillows with no orthopnea/PND.   He previously had 2 episodes of syncope (May and June 2021) unrelated to arrhythmias with no further episodes of syncope. Admits to diet indiscretions especially over the summer and admits to drinking > 1.5-2 liters of fluid during warm weather.   Currently, he denies LH/dizziness and denies CP, palpitations, orthopnea, PND, cough, abdominal distention and LE swelling and no ICD shocks.   Previously underwent Tc-Pyp study to evaluate for amyloidosis which was equivocal. For definitive purposes, would require a cardiac biopsy which we will defer for now.  .  Received Covid (Moderna) vaccine x2 (last in February).and booster without adverse reaction. He had Covid 2022, mild symptoms

## 2023-08-04 NOTE — ASSESSMENT
[FreeTextEntry1] : Mr. Encinas is a very pleasant 72 y/o M w/ h/o CAD s/p late-presenting MI 2005 s/p PCI LAD, HFrEF/ICM (EF 15-20%, LVEDD 6 cm) s/p single chamber ICD with lead revision in 2018 with upgrade to dual-chamber, s/p MEMS, bioprosthetic AVR (2015) 2/2 AI c/b prosthetic stenosis s/p 26 mm EVOLUT HECTOR (8/20/20), aflutter ablation 7/17 w/ afib s/p DCCV x 2 (most recent 7/7/21; on AC), trigeminal neuralgia ( 4/2022)  S/P Right total knee arthroplasty 1/31/22  with improvement in his exercise tolerance, improvement in symptoms from left trigeminal neuralgia with acupuncture who presents for routine follow-up for his cardiomyopathy with recurrent AF and elevated CardioMEMS readings to 34 on 8/1 and range 30-36 and goal 24 or less. Scheduled for RHC 8/22/23 with MEMS re-calibration.   Currently appears mildly volume overloaded with weight gain due to diet indiscretions and increased fluids and increase in MEMS to 34 ( 30-36) with goal 24-25.  EKG with AF. Optivol stable.  Endorses NYHA class II symptoms and is low normotensive with no dizziness/LH..   1. ICM, HFrEF  - take additional  torsemide 20 mg  x 1 today and then continue torsemide 40 mg in am and 20 mg in pm with additional 20 mg as needed for weight gain of 2-3 lbs in 2-3 days; goal weight 227-228 and is 229 lbs today in office with   MEMS 34 on 8/1 with goal 24-25. - continue Farxiga 10 mg daily  - does not tolerate Entresto as has significant pre-syncopal episodes (were occurring even prior to Entresto many years ago) - c/w losartan 12.5 mg twice/day - Continue Spirolactone 25 mg QD - Continue Toprol XL 25 mg in am and 12.5 mg in the evening; Device set at Set MVP  with AF 67% - lab work work today including CMP, CBC, serum pro BNP, TSH  - Dr. Quiñones previously discussed if things were to worsen in future of possible LVAD as an option but no current indication for this  2. Aflutter/afib- in AF tody from last prior NSR  - will have RHC 8/22/23 and will decide afther hemodynamics about AF ablation  - continue eliquis 5 mg bid - off digoxin since his DCCV 8/2020 - now off amiodarone and is on Tikosyn  3. 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 CAD - s/p MI - continue ASA 81 mg daily and Lipitor 40 mg daily  5. RLL nodule 1.8x1.4 cm opacity  - underwent PET/CT on 9/16 which was non-FDG avid and previously reviewed with Dr. Waggoner which felt it as inflammatory, and no further intervention was warranted - he will also follow-up with his pulmonologist, Dr. Guerrero in [place]  6. Syncope - no recent episodes - given low voltage and atrial myopathy, evaluated for TTR amyloidosis which was equivocal; would require cardiac biopsy for definitive diagnosis but will defer as low suspicion  - recommend compression stockings - carotid dopplers 12/27/21 moderate non-obstructive disease  7. Trigeminal Neuralgia- improved - will follow up with neurology for treatment   RTC with Dr. Quiñones in 9/2023, will call with labs

## 2023-08-07 LAB
ALBUMIN SERPL ELPH-MCNC: 4.7 G/DL
ALP BLD-CCNC: 142 U/L
ALT SERPL-CCNC: 21 U/L
ANION GAP SERPL CALC-SCNC: 16 MMOL/L
AST SERPL-CCNC: 22 U/L
BILIRUB SERPL-MCNC: 0.8 MG/DL
BUN SERPL-MCNC: 30 MG/DL
CALCIUM SERPL-MCNC: 9.2 MG/DL
CHLORIDE SERPL-SCNC: 98 MMOL/L
CO2 SERPL-SCNC: 28 MMOL/L
CREAT SERPL-MCNC: 1.26 MG/DL
EGFR: 60 ML/MIN/1.73M2
ESTIMATED AVERAGE GLUCOSE: 143 MG/DL
HBA1C MFR BLD HPLC: 6.6 %
NT-PROBNP SERPL-MCNC: 1593 PG/ML
POTASSIUM SERPL-SCNC: 4 MMOL/L
PROT SERPL-MCNC: 6.9 G/DL
SODIUM SERPL-SCNC: 142 MMOL/L
TSH SERPL-ACNC: 1.85 UIU/ML
URATE SERPL-MCNC: 9 MG/DL

## 2023-08-14 ENCOUNTER — APPOINTMENT (OUTPATIENT)
Dept: ELECTROPHYSIOLOGY | Facility: CLINIC | Age: 73
End: 2023-08-14

## 2023-08-16 ENCOUNTER — NON-APPOINTMENT (OUTPATIENT)
Age: 73
End: 2023-08-16

## 2023-08-17 ENCOUNTER — APPOINTMENT (OUTPATIENT)
Dept: CARDIOLOGY | Facility: CLINIC | Age: 73
End: 2023-08-17

## 2023-08-17 RX ORDER — APIXABAN 2.5 MG/1
1 TABLET, FILM COATED ORAL
Qty: 0 | Refills: 0 | DISCHARGE

## 2023-08-17 RX ORDER — DAPAGLIFLOZIN 10 MG/1
1 TABLET, FILM COATED ORAL
Qty: 0 | Refills: 0 | DISCHARGE

## 2023-08-17 RX ORDER — PREGABALIN 200 MG/1
200 CAPSULE ORAL
Refills: 0 | Status: COMPLETED | OUTPATIENT
Start: 2023-08-17

## 2023-08-17 RX ORDER — METOPROLOL TARTRATE 50 MG
0.5 TABLET ORAL
Qty: 0 | Refills: 0 | DISCHARGE

## 2023-08-17 RX ORDER — LOSARTAN POTASSIUM 100 MG/1
1 TABLET, FILM COATED ORAL
Qty: 0 | Refills: 0 | DISCHARGE

## 2023-08-17 RX ORDER — CARBAMAZEPINE 100 MG/1
100 TABLET, EXTENDED RELEASE ORAL
Refills: 0 | Status: DISCONTINUED | COMMUNITY
End: 2023-08-17

## 2023-08-17 RX ORDER — PREGABALIN 300 MG/1
300 CAPSULE ORAL
Refills: 0 | Status: DISCONTINUED | COMMUNITY
End: 2023-08-17

## 2023-08-17 RX ORDER — TORSEMIDE 20 MG/1
20 TABLET ORAL
Refills: 0 | Status: COMPLETED | OUTPATIENT
Start: 2023-08-17

## 2023-08-17 RX ORDER — METOPROLOL SUCCINATE 25 MG/1
25 TABLET, EXTENDED RELEASE ORAL
Refills: 0 | Status: COMPLETED | OUTPATIENT
Start: 2023-08-17

## 2023-08-17 RX ORDER — METOPROLOL SUCCINATE 25 MG/1
25 TABLET, EXTENDED RELEASE ORAL
Qty: 180 | Refills: 3 | Status: DISCONTINUED | COMMUNITY
Start: 2021-11-08 | End: 2023-08-17

## 2023-08-17 RX ORDER — LOSARTAN POTASSIUM 25 MG/1
25 TABLET, FILM COATED ORAL
Refills: 0 | Status: COMPLETED | OUTPATIENT
Start: 2023-08-17

## 2023-08-17 RX ORDER — LOSARTAN POTASSIUM 25 MG/1
25 TABLET, FILM COATED ORAL
Qty: 180 | Refills: 1 | Status: DISCONTINUED | COMMUNITY
Start: 2020-10-01 | End: 2023-08-17

## 2023-08-17 RX ORDER — DOCUSATE SODIUM 100 MG
1 CAPSULE ORAL
Qty: 0 | Refills: 0 | DISCHARGE

## 2023-08-17 RX ORDER — LEVOTHYROXINE SODIUM 88 UG/1
88 CAPSULE ORAL DAILY
Refills: 0 | Status: DISCONTINUED | COMMUNITY
End: 2023-08-17

## 2023-08-17 RX ORDER — METOPROLOL TARTRATE 50 MG
1 TABLET ORAL
Refills: 0 | DISCHARGE

## 2023-08-17 RX ORDER — TORSEMIDE 20 MG/1
20 TABLET ORAL
Qty: 200 | Refills: 2 | Status: DISCONTINUED | COMMUNITY
Start: 2020-07-10 | End: 2023-08-17

## 2023-08-17 NOTE — DISCUSSION/SUMMARY
[FreeTextEntry1] :  73 year old M w/ h/o CAD s/p late-presenting MI 2005 s/p PCI LAD, ICM s/p mems, s/p single chamber ICD with lead revision in 2018 then dual chamber (failed CRT), bioprosthetic AVR (2015, s/p HECTOR for severe bioprosthetic AS (8/20/20), aflutter ablation 7/17 w/ persistent afib on Eliquis, s/p DCCV 8/12/20 then 7/21 now on tikosyn, VT mendez, CRI, TAVR 8/20, recent TTE  ejection fraction of 20 to 25%. Dilated cardiomyopathy and mooderate pulmonary hypertension. Pt to undergo RHC and CardioMEMs calibration in the setting of recent fatigue, dyspnea, and elevated CardioMems readings (no weight gain) who underwent telehealth visit today. The following instructions were given:  Discussion was had with the patient that he will need to be NPO for 6 hours prior to scheduled procedure.  Patient verbalized understanding of above and all pre procedural questions answered. Discussed with patient he will need to hold Eliquis for 3 doses prior to scheduled procedure (last dose 8/20 PM) Discussed with patient that he will need to hold Farxiga for 3 days prior to procedure (last dose Friday 8/18).

## 2023-08-17 NOTE — REASON FOR VISIT
[Spouse] : spouse [FreeTextEntry1] :  73 year old M w/ h/o CAD s/p late-presenting MI 2005 s/p PCI LAD, ICM s/p mems, s/p single chamber ICD with lead revision in 2018 then dual chamber (failed CRT), bioprosthetic AVR (2015, s/p HECTOR for severe bioprosthetic AS (8/20/20), aflutter ablation 7/17 w/ persistent afib on Eliquis, s/p DCCV 8/12/20 then 7/21 now on tikosyn, VT mendez, CRI, TAVR 8/20, recent TTE  ejection fraction of 20 to 25%. Dilated cardiomyopathy and mooderate pulmonary hypertension. Pt to undergo RHC and CardioMEMs calibration in the setting of recent fatigue, dyspnea, and elevated CardioMems readings (no weight gain).

## 2023-08-17 NOTE — H&P CARDIOLOGY - HISTORY OF PRESENT ILLNESS
73 year old M w/ h/o CAD s/p late-presenting MI 2005 s/p PCI LAD, ICM s/p mems, s/p single chamber ICD with lead revision in 2018 then dual chamber (failed CRT), bioprosthetic AVR (2015, s/p HECTOR for severe bioprosthetic AS (8/20/20), aflutter ablation 7/17 w/ persistent afib on Eliquis, s/p DCCV 8/12/20 then 7/21 now on tikosyn, VT mendez, CRI, TAVR 8/20, recent TTE  ejection fraction of 20 to 25%. Dilated cardiomyopathy and mooderate pulmonary hypertension. Pt to undergo RHC and CardioMEMs calibration in the setting of recent fatigue, dyspnea, and elevated CardioMems readings (no weight gain).     73 year old M w/ h/o CAD s/p late-presenting MI 2005 s/p PCI LAD, ICM s/p mems, s/p single chamber ICD with lead revision in 2018 then dual chamber (failed CRT), bioprosthetic AVR (2015, s/p HECTOR for severe bioprosthetic AS (8/20/20), aflutter ablation 7/17 w/ persistent afib on Eliquis, s/p DCCV 8/12/20 then 7/21 now on tikosyn, VT mendez, CRI, TAVR 8/20, recent TTE  ejection fraction of 20 to 25%. Dilated cardiomyopathy and moderate pulmonary hypertension. Pt to undergo RHC and CardioMEMs calibration in the setting of recent fatigue, dyspnea, and elevated CardioMems readings (no weight gain).     73 year old M w/ h/o CAD s/p late-presenting MI 2005 s/p PCI LAD, ICM s/p mems, s/p single chamber ICD with lead revision in 2018 then dual chamber (failed CRT), bioprosthetic AVR (2015, s/p HECTOR for severe bioprosthetic AS (8/20/20), aflutter ablation 7/17 w/ persistent afib on Eliquis, s/p DCCV 8/12/20 then 7/21 now on tikosyn, VT mendez, CRI, TAVR 8/20, recent TTE revealed ejection fraction of 20 to 25%, dilated cardiomyopathy and moderate pulmonary hypertension. Pt to undergo RHC and CardioMEMs calibration in the setting of recent fatigue, dyspnea, and elevated CardioMems readings (no weight gain).     73 year old M w/ h/o CAD s/p late-presenting MI 2005 s/p PCI LAD, ICM s/p mems, s/p single chamber ICD with lead revision in 2018 then dual chamber (failed CRT), bioprosthetic AVR (2015, s/p HECTOR for severe bioprosthetic AS (8/20/20), aflutter ablation 7/17 w/ persistent afib on Eliquis, s/p DCCV 8/12/20 then 7/21 now on tikosyn, VT mendez, CRI, TAVR 8/20, recent TTE revealed ejection fraction of 20 to 25%, dilated cardiomyopathy and moderate pulmonary hypertension. Pt to undergo RHC and CardioMEMs calibration in the setting of recent fatigue, dyspnea, and elevated CardioMems readings (no weight gain).     Card: Dr Froilan Quiñones

## 2023-08-22 ENCOUNTER — OUTPATIENT (OUTPATIENT)
Dept: OUTPATIENT SERVICES | Facility: HOSPITAL | Age: 73
LOS: 1 days | Discharge: HOME | End: 2023-08-22
Payer: MEDICARE

## 2023-08-22 ENCOUNTER — APPOINTMENT (OUTPATIENT)
Dept: CARDIOLOGY | Facility: CLINIC | Age: 73
End: 2023-08-22

## 2023-08-22 ENCOUNTER — TRANSCRIPTION ENCOUNTER (OUTPATIENT)
Age: 73
End: 2023-08-22

## 2023-08-22 VITALS
DIASTOLIC BLOOD PRESSURE: 64 MMHG | HEIGHT: 73 IN | WEIGHT: 229.06 LBS | OXYGEN SATURATION: 95 % | SYSTOLIC BLOOD PRESSURE: 112 MMHG | HEART RATE: 85 BPM

## 2023-08-22 VITALS
DIASTOLIC BLOOD PRESSURE: 70 MMHG | RESPIRATION RATE: 16 BRPM | OXYGEN SATURATION: 95 % | SYSTOLIC BLOOD PRESSURE: 118 MMHG | HEART RATE: 78 BPM

## 2023-08-22 DIAGNOSIS — I50.23 ACUTE ON CHRONIC SYSTOLIC (CONGESTIVE) HEART FAILURE: ICD-10-CM

## 2023-08-22 DIAGNOSIS — Z98.89 OTHER SPECIFIED POSTPROCEDURAL STATES: Chronic | ICD-10-CM

## 2023-08-22 DIAGNOSIS — Z95.4 PRESENCE OF OTHER HEART-VALVE REPLACEMENT: Chronic | ICD-10-CM

## 2023-08-22 DIAGNOSIS — I50.22 CHRONIC SYSTOLIC (CONGESTIVE) HEART FAILURE: ICD-10-CM

## 2023-08-22 DIAGNOSIS — Z95.5 PRESENCE OF CORONARY ANGIOPLASTY IMPLANT AND GRAFT: Chronic | ICD-10-CM

## 2023-08-22 DIAGNOSIS — Z98.890 OTHER SPECIFIED POSTPROCEDURAL STATES: Chronic | ICD-10-CM

## 2023-08-22 DIAGNOSIS — Z95.810 PRESENCE OF AUTOMATIC (IMPLANTABLE) CARDIAC DEFIBRILLATOR: Chronic | ICD-10-CM

## 2023-08-22 LAB
ANION GAP SERPL CALC-SCNC: 13 MMOL/L — SIGNIFICANT CHANGE UP (ref 5–17)
BUN SERPL-MCNC: 28 MG/DL — HIGH (ref 7–23)
CALCIUM SERPL-MCNC: 9.4 MG/DL — SIGNIFICANT CHANGE UP (ref 8.4–10.5)
CHLORIDE SERPL-SCNC: 102 MMOL/L — SIGNIFICANT CHANGE UP (ref 96–108)
CO2 SERPL-SCNC: 26 MMOL/L — SIGNIFICANT CHANGE UP (ref 22–31)
CREAT SERPL-MCNC: 1.28 MG/DL — SIGNIFICANT CHANGE UP (ref 0.5–1.3)
EGFR: 59 ML/MIN/1.73M2 — LOW
GLUCOSE BLDC GLUCOMTR-MCNC: 116 MG/DL — HIGH (ref 70–99)
GLUCOSE SERPL-MCNC: 125 MG/DL — HIGH (ref 70–99)
HCT VFR BLD CALC: 46.7 % — SIGNIFICANT CHANGE UP (ref 39–50)
HGB BLD-MCNC: 15.2 G/DL — SIGNIFICANT CHANGE UP (ref 13–17)
HGB FLD-MCNC: 15.2 G/DL — SIGNIFICANT CHANGE UP (ref 12.6–17.4)
MCHC RBC-ENTMCNC: 30.3 PG — SIGNIFICANT CHANGE UP (ref 27–34)
MCHC RBC-ENTMCNC: 32.5 GM/DL — SIGNIFICANT CHANGE UP (ref 32–36)
MCV RBC AUTO: 93.2 FL — SIGNIFICANT CHANGE UP (ref 80–100)
NRBC # BLD: 0 /100 WBCS — SIGNIFICANT CHANGE UP (ref 0–0)
OXYHGB MFR BLDMV: 58 % — LOW (ref 90–95)
PLATELET # BLD AUTO: 161 K/UL — SIGNIFICANT CHANGE UP (ref 150–400)
POTASSIUM SERPL-MCNC: 3.6 MMOL/L — SIGNIFICANT CHANGE UP (ref 3.5–5.3)
POTASSIUM SERPL-SCNC: 3.6 MMOL/L — SIGNIFICANT CHANGE UP (ref 3.5–5.3)
RBC # BLD: 5.01 M/UL — SIGNIFICANT CHANGE UP (ref 4.2–5.8)
RBC # FLD: 14.4 % — SIGNIFICANT CHANGE UP (ref 10.3–14.5)
SAO2 % BLD: 59.1 % — LOW (ref 60–90)
SODIUM SERPL-SCNC: 141 MMOL/L — SIGNIFICANT CHANGE UP (ref 135–145)
WBC # BLD: 8.9 K/UL — SIGNIFICANT CHANGE UP (ref 3.8–10.5)
WBC # FLD AUTO: 8.9 K/UL — SIGNIFICANT CHANGE UP (ref 3.8–10.5)

## 2023-08-22 PROCEDURE — 99152 MOD SED SAME PHYS/QHP 5/>YRS: CPT | Mod: 59

## 2023-08-22 PROCEDURE — 93451 RIGHT HEART CATH: CPT | Mod: 26,59

## 2023-08-22 NOTE — ASU PATIENT PROFILE, ADULT - NSICDXPASTSURGICALHX_GEN_ALL_CORE_FT
PAST SURGICAL HISTORY:  AICD (automatic cardioverter/defibrillator) present single chamber 2005, replaced with dual chamber 08/17/2021    H/O prior ablation treatment afib - 2017, 07/2021    S/P AVR 2015, complicated by Asystole/Syncope with 1 shock, Transcatheter valve-in-valve aortic valve replacement utilizing a right common femoral arterial percutaneous approach utilizing a 26 Medtronic Evolut core valve on 08/20/2020    S/P hernia repair - right inguinal, 1982    S/P knee surgery - Right knee arthroscopy, 2000    Stented coronary artery LAD x 3 (Nov 2004)

## 2023-08-22 NOTE — H&P CARDIOLOGY - HISTORY OF PRESENT ILLNESS
This is a 74 yo man with history of sysHF, s/p AICD Medronic single chamber ICD implanted 2005 upgraded to dual chamber, CardioMems implant, AS s/p AVR, pulm HTN, HTN, HLD, MI/CAD s/p PCI LAD, Type 2 DM, Afib s/p ablation on Eliquis last dose was 8/19 PM, cardioversion presents today for RHC and cardiomems recalibration.   ECHO 5/2023 showed EF of 20-25% mild MR   referrimg MD Dr. Froilan Quiñones     73 year old M w/ h/o CAD s/p late-presenting MI 2005 s/p PCI LAD, ICM s/p mems, s/p single chamber ICD with lead revision in 2018 then dual chamber (failed CRT), bioprosthetic AVR (2015, s/p HECTOR for severe bioprosthetic AS (8/20/20), aflutter ablation 7/17 w/ persistent afib on Eliquis, s/p DCCV 8/12/20 then 7/21 now on tikosyn, VT mendez, CRI, TAVR 8/20, recent TTE revealed ejection fraction of 20 to 25%, dilated cardiomyopathy and moderate pulmonary hypertension. Pt to undergo RHC and CardioMEMs calibration in the setting of recent fatigue, dyspnea, and elevated CardioMems readings (no weight gain).   Last dose Eliquis was 8/19 PM  ECHO 5/2023 showed EF of 20-25% mild MR   referrimg MD Dr. Froilan Quiñones

## 2023-08-22 NOTE — ASU DISCHARGE PLAN (ADULT/PEDIATRIC) - CARE PROVIDER_API CALL
Froilan Quiñones  [facility]  [address] [place], [place] [address]  Phone: [phone]  Fax: [phone]  Follow Up Time:

## 2023-08-22 NOTE — ASU DISCHARGE PLAN (ADULT/PEDIATRIC) - ASU DC SPECIAL INSTRUCTIONSFT
Wound Care:   the day AFTER your procedure remove bandage GENTLY, and clean using  mild soap and gentle warm, water stream, pat dry. leave OPEN to air. YOU MAY SHOWER   DO NOT apply lotions, creams, ointments, powder, perfumes to your incision site  DO NOT SOAK your site for 1 week ( no baths, no pools, no tubs, etc...)  Check  your groin and /or wrist daily.A small amount of bruising, and soarness are normal    ACTIVITY: for 24 hours   - DO NOT DRIVE  - DO NOT make any important decisions or sign legal documents   - DO NOT operate heavy machineries   - you may resume sexual activity in 48 hours, unless otherwise instructed by your cardiologist     If your procedure was done through the WRIST: for the NEXT 3DAYS:  - avoid pushing, pulling, with that affected wrist   - avoid repeated movement of that hand and wrist ( eg: typing, hammering)  - DO NOT LIFT anything more than 5 lbs     If your procedure was done through the GROIN: for the NEXT 5 DAYS  - Limit climbing stairs, DO NOT soak in bathtub or pool  - no strenuous activities, pushing, pulling, straining  - Do not lift anything 10lbs or heavier     MEDICATION:   take your medications as explained ( see discharge paperwork)   If you received a STENT, you will be taking antiplatelet medications to KEEP YOUR STENT OPEN ( eg: Aspirin, Plavix, Brilinta, Effient, etc).  Take as prescribed DO NOT STOP taking them without consulting with your cardiologist first.     Follow heart healthy diet recommended by your doctor, , if you smoke STOP SMOKING ( may call [phone] for center of tobacco control if you need assistance)     CALL your doctor to make appointment in 2 WEEKS     ***CALL YOUR DOCTOR***  if you experience: fever, chills, body aches, or severe pain, swelling, redness, heat or yellow discharge at incision site  If you experience Bleeding or excruciating pain at the procedural site, swelling ( golf ball size) at your procedural site  If you experience CHEST PAIN  If you experience extremity numbness, tingling, temperature change ( of your procedural site)   If you are unable to reach your doctor, you may contact:   -Cardiology Office at [facility] at [phone] or   - [facility] [phone]  - [facility] [phone]

## 2023-08-22 NOTE — H&P CARDIOLOGY - NSICDXPASTMEDICALHX_GEN_ALL_CORE_FT
PAST MEDICAL HISTORY:  AICD (automatic cardioverter/defibrillator) present     Aortic stenosis severe, s/p AVR    CAD (coronary artery disease) 2004    Carotid stenosis - moderate, b/l (Doppler 12/27/21)    CHF (congestive heart failure)     H/O emphysema - moderately severe, CT chest 06/10/21    H/O pulmonary hypertension     HLD (hyperlipidemia)     HTN (hypertension)     Ischemic cardiomyopathy     Longstanding persistent atrial fibrillation     MI (myocardial infarction) Nov 2004    Neuralgia     NSVT (nonsustained ventricular tachycardia)     Osteoarthritis of right knee     Shingles 04/2020    Syncope epidsode in 11/14, was found to have been an episode of V-Fib with sucsessfull AICD shock    Systolic heart failure EF 15%    Type 2 diabetes mellitus     Valvular cardiomyopathy     
PAST MEDICAL HISTORY:  AICD (automatic cardioverter/defibrillator) present     Aortic stenosis severe, s/p AVR    CAD (coronary artery disease) 2004    Carotid stenosis - moderate, b/l (Doppler 12/27/21)    CHF (congestive heart failure)     H/O emphysema - moderately severe, CT chest 06/10/21    H/O pulmonary hypertension     HLD (hyperlipidemia)     HTN (hypertension)     Ischemic cardiomyopathy     Longstanding persistent atrial fibrillation     MI (myocardial infarction) Nov 2004    Neuralgia     NSVT (nonsustained ventricular tachycardia)     Osteoarthritis of right knee     Shingles 04/2020    Syncope epidsode in 11/14, was found to have been an episode of V-Fib with sucsessfull AICD shock    Systolic heart failure EF 15%    Type 2 diabetes mellitus     Valvular cardiomyopathy

## 2023-08-22 NOTE — ASU DISCHARGE PLAN (ADULT/PEDIATRIC) - NS MD DC FALL RISK RISK
For information on Fall & Injury Prevention, visit: [email or web address] OR  [email or web address] OR  https://www.cdc.gov/steadi/patient.html

## 2023-08-22 NOTE — H&P CARDIOLOGY - NSICDXPASTSURGICALHX_GEN_ALL_CORE_FT
PAST SURGICAL HISTORY:  AICD (automatic cardioverter/defibrillator) present single chamber 2005, replaced with dual chamber 08/17/2021    H/O prior ablation treatment afib - 2017, 07/2021    S/P AVR 2015, complicated by Asystole/Syncope with 1 shock, Transcatheter valve-in-valve aortic valve replacement utilizing a right common femoral arterial percutaneous approach utilizing a 26 Medtronic Evolut core valve on 08/20/2020    S/P hernia repair - right inguinal, 1982    S/P knee surgery - Right knee arthroscopy, 2000    Stented coronary artery LAD x 3 (Nov 2004)    
PAST SURGICAL HISTORY:  AICD (automatic cardioverter/defibrillator) present single chamber 2005, replaced with dual chamber 08/17/2021    H/O prior ablation treatment afib - 2017, 07/2021    S/P AVR 2015, complicated by Asystole/Syncope with 1 shock, Transcatheter valve-in-valve aortic valve replacement utilizing a right common femoral arterial percutaneous approach utilizing a 26 Medtronic Evolut core valve on 08/20/2020    S/P hernia repair - right inguinal, 1982    S/P knee surgery - Right knee arthroscopy, 2000    Stented coronary artery LAD x 3 (Nov 2004)

## 2023-08-22 NOTE — ASU PATIENT PROFILE, ADULT - NSICDXPASTMEDICALHX_GEN_ALL_CORE_FT
PAST MEDICAL HISTORY:  AICD (automatic cardioverter/defibrillator) present     Aortic stenosis severe, s/p AVR    CAD (coronary artery disease) 2004    Carotid stenosis - moderate, b/l (Doppler 12/27/21)    CHF (congestive heart failure)     H/O emphysema - moderately severe, CT chest 06/10/21    H/O pulmonary hypertension     HLD (hyperlipidemia)     HTN (hypertension)     Ischemic cardiomyopathy     Longstanding persistent atrial fibrillation     MI (myocardial infarction) Nov 2004    Neuralgia     NSVT (nonsustained ventricular tachycardia)     Osteoarthritis of right knee     Shingles 04/2020    Syncope epidsode in 11/14, was found to have been an episode of V-Fib with sucsessfull AICD shock    Systolic heart failure EF 15%    Type 2 diabetes mellitus     Valvular cardiomyopathy

## 2023-08-22 NOTE — H&P CARDIOLOGY - NSICDXFAMILYHX_GEN_ALL_CORE_FT
FAMILY HISTORY:  Chronic obstructive pulmonary disease    Father  Still living? No  Family history of colon cancer, Age at diagnosis: Age Unknown    
FAMILY HISTORY:  Chronic obstructive pulmonary disease    Father  Still living? No  Family history of colon cancer, Age at diagnosis: Age Unknown

## 2023-08-28 ENCOUNTER — APPOINTMENT (OUTPATIENT)
Dept: ELECTROPHYSIOLOGY | Facility: CLINIC | Age: 73
End: 2023-08-28
Payer: MEDICARE

## 2023-08-28 ENCOUNTER — NON-APPOINTMENT (OUTPATIENT)
Age: 73
End: 2023-08-28

## 2023-08-28 VITALS
SYSTOLIC BLOOD PRESSURE: 98 MMHG | OXYGEN SATURATION: 96 % | HEART RATE: 92 BPM | DIASTOLIC BLOOD PRESSURE: 64 MMHG | WEIGHT: 228 LBS | HEIGHT: 66 IN | BODY MASS INDEX: 36.64 KG/M2

## 2023-08-28 PROCEDURE — 99214 OFFICE O/P EST MOD 30 MIN: CPT

## 2023-08-28 RX ORDER — TORSEMIDE 20 MG/1
20 TABLET ORAL EVERY MORNING
Refills: 0 | Status: DISCONTINUED | COMMUNITY
End: 2023-08-28

## 2023-08-28 NOTE — DISCUSSION/SUMMARY
[EKG obtained to assist in diagnosis and management of assessed problem(s)] : EKG obtained to assist in diagnosis and management of assessed problem(s) [FreeTextEntry1] : Atrial fibrillation: now in SR. Continue on Dofetilide 250 bid. Considering AF ablation if recurrent AF\par \par Dual-chamber ICD: Interrogated and functioning normally.  No VT VF.\par \par Ischemic cardiomyopathy: EF approximately 20 to 25% by last echo on 5/26/23.  s/p CardioMEMs device.  Normal readings.  Follow-up evaluation of his heart failure regimen.\par \par  Follow-up electrophysiology evaluation in  4 months\par

## 2023-08-28 NOTE — HISTORY OF PRESENT ILLNESS
[FreeTextEntry1] : F/u eval for AF. In SR today. He does not have symptoms and is not aware of the AF.  He has had no increase in swelling/edema or shortness of breath.  He feels at his baseline.   The patient has been on dofetilide 250 mcg's twice daily.  He is also maintained on anticoagulation with Eliquis in addition to his other medications.  Patient had an echocardiogram performed on 5/26/2023 that showed EF 20 to 25%, mild mitral regurgitation, mild left atrial enlargement with left atrial volume index 36.7 cc per metered square.    Previous history: S/P  TAV in [place].  He has had prior  cardioversion for A. fib.  Prior  history of myocardial infarction 2005 status post previous PCI to the LAD,  dilated cardiomyopathy ejection fraction 15 to 20%, s/p Medtronic single-chamber ICD implant 2005, status post appropriate shocks for VT VF 2015.  And prior atrial flutter ablation in 2017.  He underwent extraction of Medtronic [place] lead October 2018.  Previous bioprosthetic aortic valve replacement with subsequent premature failure noted on ANDREY.he underwent upgrade of his single-chamber ICD to a dual-chamber ICD.  He has a CardioMEMS device.Patient was previously on amiodarone and this was discontinued because of tremors.  The tremors had resolved after discontinuation. After upgrade to dual-chamber device the patient has felt well and had remained in sinus rhythm.    Echocardiogram from 4/Lasix 22 showed EF 20%.  He has mild mitral regurgitation.  He had a low mean transaortic valve gradient 8 mmHg.  Patient has severe left atrial enlargement with left atrial volume index 54 cc per metered square. Previous history:    Cardiac catheterization performed 6/4/2020 showed nonobstructive coronary arteries. Echocardiogram from 6/3/2020 showed severely reduced ejection fraction 15 to 20%, dilated LV diameter 6.5 cm, moderate to severe diastolic dysfunction  Device: Medtronic cobalt XT DR dual-chamber remaining longevity 10.8 years.  Programmed lower rate 60 upper rate 130 with VF as well as VT zone.  Paced and sensed AV delay set long at 350 ms.  Rate sensor was programmed on.

## 2023-08-30 ENCOUNTER — NON-APPOINTMENT (OUTPATIENT)
Age: 73
End: 2023-08-30

## 2023-09-03 ENCOUNTER — NON-APPOINTMENT (OUTPATIENT)
Age: 73
End: 2023-09-03

## 2023-09-06 ENCOUNTER — NON-APPOINTMENT (OUTPATIENT)
Age: 73
End: 2023-09-06

## 2023-09-06 ENCOUNTER — APPOINTMENT (OUTPATIENT)
Dept: HEART FAILURE | Facility: CLINIC | Age: 73
End: 2023-09-06
Payer: MEDICARE

## 2023-09-06 VITALS
HEART RATE: 89 BPM | OXYGEN SATURATION: 94 % | BODY MASS INDEX: 36.96 KG/M2 | DIASTOLIC BLOOD PRESSURE: 73 MMHG | HEIGHT: 66 IN | SYSTOLIC BLOOD PRESSURE: 106 MMHG | WEIGHT: 230 LBS

## 2023-09-06 PROCEDURE — 93000 ELECTROCARDIOGRAM COMPLETE: CPT

## 2023-09-06 PROCEDURE — 99214 OFFICE O/P EST MOD 30 MIN: CPT | Mod: 25

## 2023-09-06 NOTE — HISTORY OF PRESENT ILLNESS
[FreeTextEntry1] : Mr. Encinas is a very pleasant 72 y/o M w/ h/o CAD s/p late-presenting MI 2005 s/p PCI LAD, ICM/HFrEF (EF 15-20%, LVEDD 6 cm) s/p single chamber ICD with lead revision in 2018 with upgrade to dual-chamber device (8/17/21) s/p MEMS (9/16/21), bioprosthetic AVR (2015) 2/2 AI s/p 26 mm EVOLUT HECTOR for severe bioprosthetic AS (8/20/20), aflutter ablation 7/17 w/ persistent afib, s/p DCCV x2 (8/12/20, 7/7/21), Trigeminal neuralgia (4/2022) who presents for follow-up for his cardiomyopathy. Followed by Dr. Pressley.   He has followed up with Dr. Apple  due to recurrent AF and is maintained on dofetilide 250 mg bid and anticoagulation with Eliquis. Has had elevated CardioMEMS and has taken additional torsemide as directed without much improvement in MEMS readings. Had a calibration study on 8/22 which showed MEMS to be accurate (full report below) with elevated filling pressures and CI of 2 (BP not reported). Being considered for AF ablation at [place].   Weight at home has ranged 227-229 lbs and goal is 227 lbs or less. Home B/P range 100-110.   Currently, his activity is not limited by shortness of breath but has some back issues. Reports he can walk several blocks and work on his boat including bending without dyspnea. Limited by back pain. He sleeps with 2 pillows with no orthopnea/PND.   He previously had 2 episodes of syncope (May and June 2021) unrelated to arrhythmias with no further episodes of syncope.   Currently, he denies LH/dizziness and denies CP, palpitations, orthopnea, PND, cough, abdominal distention and LE swelling and no ICD shocks.   Previously underwent Tc-Pyp study to evaluate for amyloidosis which was equivocal. For definitive purposes, would require a cardiac biopsy which we will defer for now.  .  Received Covid (Moderna) vaccine x2 (last in February).and booster without adverse reaction. He had Covid 2022, mild symptoms

## 2023-09-06 NOTE — CARDIOLOGY SUMMARY
[de-identified] : 9/6/23 afib, low voltage, RBBB 8/4/23 AF75, RBBB, LAFB, low voltage, NSST 2/3/23  NSR with 1 st degree AVB , low voltage, PRWP 4/1/22 NSR with 1 st degree  ms, low voltage, PRWP 12/7/21 - A paced, long IN interval, PVC,  9/21/21 - A paced, long IN interval, PRWP, low voltage  7/19/21 - ? afib (per Dr. Apple in sinus); HR 70, RV paced   10/1/20 - afib, V paced, HR 51  8/24/20 - AFib/, HR 69 12/31/19 - AFib, HR 73, low voltage, PRWP  11/8/17 - NSR, HR 76, APC, limb lead reversal, PRWP 6/29/17  Aflutter,HR 79 [de-identified] : \par  11/18/21 - Tc-Pyp - heart-to-contralateral ratio 1.3 (borderline/suggestive of ATTR); grade 2 (myocardial uptake to rib; borderline/suggestive of ATTR); equivocal for cardiac amyloidosis  [de-identified] : 5/26/2023 EF 20 to 25%, mild mitral regurgitation, mild left atrial enlargement, mild TR, RVSP 56, E/e' 21, LVOT VTI 14.8, IVC 2.2 cm; AALIYAH 1.4 (peak 10; mean 6)  10/1/20 - LVEDD 6 cm, EF 20% (segmental; inferoseptal, apical, anterolateral wall akinetic; basal lateral function preserved), mild MR; TAVR in place; AALIYAH 2.2; LVOT VTi 12.9 cm, IVC nl sized  TTE 8/21/20 - LVEDD 5.4 cm, EF 25%, nl RV size with mildly reduced function, mild-mod MR, well seated Evolut Pro+ THV-in-valve with nl function (peak 11, mean 6), mild TR, est RAP 15 mmHg, est RVSP 71 mmHg.  TTE 6/18/19 - LVEDD 5.8 cm, EF 20-25%, mild MR, nl functioning bioAVR (peak 19, mean 9), mod LA dilatation,   ANDREY 7/10/17 - EF 10-15%, LVEDD 5.9 cm, mild MR, bioprosthetic Ao valve, no aortic insufficiency, severe segmental LV dysfunction (akinesis of mid-apical septum, aneurysmal and dyskinetic LV apex, akinesis of anterior wall)  TTE 12/16 LVIDd 5.8 cm. severe global LVSD. Right ventricle mildly dilated with global hypokinesis.  Normal prosthetic aortic valve: IVS 1.0 cm, severe global LVSD, normal diastolic functioning. Right ventricle mildly dilated with global hypokinesis. Normal prosthetic aortic valve, mild MR, mild pulmonic insufficiency, normal Tricuspid valve. [de-identified] : \par   [de-identified] : 8/22/23 - RHC - RA 16, RV 60/20, PA 61/36/43, PCWP 22 (v 27), CO/CI 4.6/2.03 (SBP missing) 9/16/21 - RHC/MEMS implant - RA 12, RV 50/13, PA 51/20/33, PCWP 11, CO/CI 4.7/2.08 (PA sat 59%, Ao sat 93%, Hb 11.5), PVR 4.68 REYNA  8/20/20 - HECTOR; /36, Ao 101/63/80  C 6/4/20 - Ao 126/77/92, RA 8, RV 71/10, PA 66/24/40, PCW 20, PaSat 61%, AoSat 98%, Jan CO/CI 3.85/1.7, PVR 5.19 Reyna, SVR 1744 dsc  [facility] 6/4/20 - LM mild CAD, mLAD 10% stenosis at site of prior stent, LCx/RCA mod CAD   [facility] ([place]) - 3 stents placed

## 2023-09-06 NOTE — ASSESSMENT
[FreeTextEntry1] : Mr. Encinas is a very pleasant 72 y/o M w/ h/o CAD s/p late-presenting MI 2005 s/p PCI LAD, HFrEF/ICM (EF 15-20%, LVEDD 6 cm) s/p single chamber ICD with lead revision in 2018 with upgrade to dual-chamber, s/p MEMS, bioprosthetic AVR (2015) 2/2 AI c/b prosthetic stenosis s/p 26 mm EVOLUT HECTOR (8/20/20), aflutter ablation 7/17 w/ afib s/p DCCV x 2 (most recent 7/7/21; on AC), trigeminal neuralgia ( 4/2022)  S/P Right total knee arthroplasty 1/31/22  with improvement in his exercise tolerance, improvement in symptoms from left trigeminal neuralgia with acupuncture who presents for routine follow-up for his cardiomyopathy with recurrent AF and elevated CardioMEMS readings to 34 on 8/1 and range 30-36 and goal 24 or less. Scheduled for RHC 8/22/23 with MEMS re-calibration.   Currently appears mildly volume overloaded with weight gain due to diet indiscretions and increased fluids and increase in MEMS to 34 ( 30-36) with goal 24-25.  EKG with AF. Optivol stable.  Endorses NYHA class II symptoms and is low normotensive with no dizziness/LH..   1. ICM, HFrEF  - take additional  torsemide 20 mg  x 1 today and then continue torsemide 40 mg in am and 20 mg in pm with additional 20 mg as needed for weight gain of 2-3 lbs in 2-3 days; goal weight 227-228 and is 229 lbs today in office with   MEMS 34 on 8/1 with goal 24-25. - continue Farxiga 10 mg daily  - does not tolerate Entresto as has significant pre-syncopal episodes (were occurring even prior to Entresto many years ago) - c/w losartan 12.5 mg twice/day - Continue Spirolactone 25 mg QD - Continue Toprol XL 25 mg in am and 12.5 mg in the evening; Device set at Set MVP  with AF 67% - lab work work today including CMP, CBC, serum pro BNP, TSH  - Dr. Quiñones previously discussed if things were to worsen in future of possible LVAD as an option but no current indication for this  2. Aflutter/afib- in AF tody from last prior NSR  - will have RHC 8/22/23 and will decide afther hemodynamics about AF ablation  - continue eliquis 5 mg bid - off digoxin since his DCCV 8/2020 - now off amiodarone and is on Tikosyn  3. 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 CAD - s/p MI - continue ASA 81 mg daily and Lipitor 40 mg daily  5. RLL nodule 1.8x1.4 cm opacity  - underwent PET/CT on 9/16 which was non-FDG avid and previously reviewed with Dr. Waggoner which felt it as inflammatory, and no further intervention was warranted - he will also follow-up with his pulmonologist, Dr. Guerrero in [place]  6. Syncope - no recent episodes - given low voltage and atrial myopathy, evaluated for TTR amyloidosis which was equivocal; would require cardiac biopsy for definitive diagnosis but will defer as low suspicion  - recommend compression stockings - carotid dopplers 12/27/21 moderate non-obstructive disease  7. Trigeminal Neuralgia- improved - will follow up with neurology for treatment   RTC with Dr. Quiñones in 9/2023, will call with labs

## 2023-09-06 NOTE — PHYSICAL EXAM
[Well Developed] : well developed [Well Nourished] : well nourished [No Acute Distress] : no acute distress [Normal Conjunctiva] : normal conjunctiva [No Carotid Bruit] : no carotid bruit [Normal S1, S2] : normal S1, S2 [No Murmur] : no murmur [No Rub] : no rub [No Gallop] : no gallop [Clear Lung Fields] : clear lung fields [Good Air Entry] : good air entry [No Respiratory Distress] : no respiratory distress  [Soft] : abdomen soft [Non Tender] : non-tender [No Masses/organomegaly] : no masses/organomegaly [Normal Bowel Sounds] : normal bowel sounds [No Cyanosis] : no cyanosis [No Clubbing] : no clubbing [No Varicosities] : no varicosities [No Rash] : no rash [No Skin Lesions] : no skin lesions [Moves all extremities] : moves all extremities [No Focal Deficits] : no focal deficits [Normal Speech] : normal speech [Normal] : alert and oriented, normal memory [Alert and Oriented] : alert and oriented [Normal memory] : normal memory [de-identified] : overweight [de-identified] : Left facial decreased tone secondary to trigeminal neuralgia [de-identified] : JVP approx 8 cm without HJR [de-identified] : Left chest ICD [de-identified] : slow gait  [de-identified] : trace lower extremity edema bilaterally R>L due to prior right knee incision  [de-identified] : ecchymoses on arms

## 2023-09-06 NOTE — HISTORY OF PRESENT ILLNESS
[FreeTextEntry1] : F/u eval for AF. In SR today. He does not have symptoms and is not aware of the AF.  He has had no increase in swelling/edema or shortness of breath.  He feels at his baseline.   The patient has been on dofetilide 250 mcg's twice daily.  He is also maintained on anticoagulation with Eliquis in addition to his other medications.  Patient had an echocardiogram performed on 5/26/2023 that showed EF 20 to 25%, mild mitral regurgitation, mild left atrial enlargement with left atrial volume index 36.7 cc per metered square.  Previous history: S/P  TAV in [place].  He has had prior  cardioversion for A. fib.  Prior  history of myocardial infarction 2005 status post previous PCI to the LAD,  dilated cardiomyopathy ejection fraction 15 to 20%, s/p Medtronic single-chamber ICD implant 2005, status post appropriate shocks for VT VF 2015.  And prior atrial flutter ablation in 2017.  He underwent extraction of Medtronic [place] lead October 2018.  Previous bioprosthetic aortic valve replacement with subsequent premature failure noted on ANDREY.he underwent upgrade of his single-chamber ICD to a dual-chamber ICD.  He has a CardioMEMS device.Patient was previously on amiodarone and this was discontinued because of tremors.  The tremors had resolved after discontinuation. After upgrade to dual-chamber device the patient has felt well and had remained in sinus rhythm.    Echocardiogram from 4/Lasix 22 showed EF 20%.  He has mild mitral regurgitation.  He had a low mean transaortic valve gradient 8 mmHg.  Patient has severe left atrial enlargement with left atrial volume index 54 cc per metered square. Previous history:    Cardiac catheterization performed 6/4/2020 showed nonobstructive coronary arteries. Echocardiogram from 6/3/2020 showed severely reduced ejection fraction 15 to 20%, dilated LV diameter 6.5 cm, moderate to severe diastolic dysfunction  Device: Medtronic cobalt XT DR dual-chamber remaining longevity 10.8 years.  Programmed lower rate 60 upper rate 130 with VF as well as VT zone.  Paced and sensed AV delay set long at 350 ms.  Rate sensor was programmed on.

## 2023-09-06 NOTE — DISCUSSION/SUMMARY
[FreeTextEntry1] : Atrial fibrillation: now in SR. Continue on Dofetilide 250 bid.  Considering AF ablation if recurrent AF  Dual-chamber ICD: Interrogated and functioning normally.  No VT VF. AF burden 31%  Ischemic cardiomyopathy: EF approximately 20 to 25% by last echo on 5/26/23.  s/p CardioMEMs device.  Normal readings.  Follow-up evaluation of his heart failure regimen.  He is deciding on whether to proceed with catheter ablation He will follow up with Dr. Pressley and Dr. Quiñones and will discussed thereafter

## 2023-09-08 ENCOUNTER — APPOINTMENT (OUTPATIENT)
Dept: ORTHOPEDIC SURGERY | Facility: CLINIC | Age: 73
End: 2023-09-08

## 2023-09-11 ENCOUNTER — NON-APPOINTMENT (OUTPATIENT)
Age: 73
End: 2023-09-11

## 2023-09-15 ENCOUNTER — NON-APPOINTMENT (OUTPATIENT)
Age: 73
End: 2023-09-15

## 2023-09-26 ENCOUNTER — NON-APPOINTMENT (OUTPATIENT)
Age: 73
End: 2023-09-26

## 2023-09-27 ENCOUNTER — NON-APPOINTMENT (OUTPATIENT)
Age: 73
End: 2023-09-27

## 2023-09-29 ENCOUNTER — APPOINTMENT (OUTPATIENT)
Dept: CARDIOLOGY | Facility: CLINIC | Age: 73
End: 2023-09-29
Payer: MEDICARE

## 2023-09-29 VITALS
HEART RATE: 79 BPM | OXYGEN SATURATION: 97 % | DIASTOLIC BLOOD PRESSURE: 66 MMHG | BODY MASS INDEX: 36.8 KG/M2 | WEIGHT: 229 LBS | HEIGHT: 66 IN | SYSTOLIC BLOOD PRESSURE: 96 MMHG

## 2023-09-29 DIAGNOSIS — Z79.899 OTHER LONG TERM (CURRENT) DRUG THERAPY: ICD-10-CM

## 2023-09-29 LAB
HBA1C MFR BLD HPLC: 6.8
LDLC SERPL DIRECT ASSAY-MCNC: 85

## 2023-09-29 PROCEDURE — 99215 OFFICE O/P EST HI 40 MIN: CPT

## 2023-10-09 NOTE — REVIEW OF SYSTEMS
[Joint Pain] : joint pain [Dizziness] : dizziness [Negative] : Heme/Lymph Cimetidine Counseling:  I discussed with the patient the risks of Cimetidine including but not limited to gynecomastia, headache, diarrhea, nausea, drowsiness, arrhythmias, pancreatitis, skin rashes, psychosis, bone marrow suppression and kidney toxicity.

## 2023-10-10 ENCOUNTER — LABORATORY RESULT (OUTPATIENT)
Age: 73
End: 2023-10-10

## 2023-10-10 ENCOUNTER — APPOINTMENT (OUTPATIENT)
Dept: CARDIOLOGY | Facility: CLINIC | Age: 73
End: 2023-10-10
Payer: MEDICARE

## 2023-10-10 ENCOUNTER — NON-APPOINTMENT (OUTPATIENT)
Age: 73
End: 2023-10-10

## 2023-10-10 VITALS
OXYGEN SATURATION: 98 % | HEIGHT: 66 IN | DIASTOLIC BLOOD PRESSURE: 60 MMHG | HEART RATE: 86 BPM | WEIGHT: 234 LBS | BODY MASS INDEX: 37.61 KG/M2 | SYSTOLIC BLOOD PRESSURE: 102 MMHG

## 2023-10-10 DIAGNOSIS — I35.0 NONRHEUMATIC AORTIC (VALVE) STENOSIS: ICD-10-CM

## 2023-10-10 DIAGNOSIS — Z95.5 PRESENCE OF CORONARY ANGIOPLASTY IMPLANT AND GRAFT: ICD-10-CM

## 2023-10-10 DIAGNOSIS — E11.9 TYPE 2 DIABETES MELLITUS W/OUT COMPLICATIONS: ICD-10-CM

## 2023-10-10 PROCEDURE — 99215 OFFICE O/P EST HI 40 MIN: CPT

## 2023-10-11 ENCOUNTER — NON-APPOINTMENT (OUTPATIENT)
Age: 73
End: 2023-10-11

## 2023-10-16 ENCOUNTER — RX RENEWAL (OUTPATIENT)
Age: 73
End: 2023-10-16

## 2023-10-17 ENCOUNTER — APPOINTMENT (OUTPATIENT)
Dept: CARDIOLOGY | Facility: CLINIC | Age: 73
End: 2023-10-17
Payer: MEDICARE

## 2023-10-17 VITALS
WEIGHT: 230 LBS | OXYGEN SATURATION: 96 % | DIASTOLIC BLOOD PRESSURE: 62 MMHG | SYSTOLIC BLOOD PRESSURE: 100 MMHG | HEIGHT: 66 IN | HEART RATE: 74 BPM | BODY MASS INDEX: 36.96 KG/M2

## 2023-10-17 DIAGNOSIS — R25.1 TREMOR, UNSPECIFIED: ICD-10-CM

## 2023-10-17 PROCEDURE — 99215 OFFICE O/P EST HI 40 MIN: CPT

## 2023-10-17 RX ORDER — PREGABALIN 200 MG/1
200 CAPSULE ORAL TWICE DAILY
Refills: 0 | Status: ACTIVE | COMMUNITY

## 2023-10-17 RX ORDER — METOPROLOL SUCCINATE 25 MG/1
25 TABLET, EXTENDED RELEASE ORAL EVERY MORNING
Refills: 0 | Status: DISCONTINUED | COMMUNITY
End: 2023-10-17

## 2023-10-18 ENCOUNTER — NON-APPOINTMENT (OUTPATIENT)
Age: 73
End: 2023-10-18

## 2023-10-20 ENCOUNTER — NON-APPOINTMENT (OUTPATIENT)
Age: 73
End: 2023-10-20

## 2023-10-24 ENCOUNTER — APPOINTMENT (OUTPATIENT)
Dept: CARDIOLOGY | Facility: CLINIC | Age: 73
End: 2023-10-24
Payer: MEDICARE

## 2023-10-24 PROCEDURE — 93306 TTE W/DOPPLER COMPLETE: CPT

## 2023-10-25 ENCOUNTER — NON-APPOINTMENT (OUTPATIENT)
Age: 73
End: 2023-10-25

## 2023-10-27 ENCOUNTER — NON-APPOINTMENT (OUTPATIENT)
Age: 73
End: 2023-10-27

## 2023-10-30 ENCOUNTER — NON-APPOINTMENT (OUTPATIENT)
Age: 73
End: 2023-10-30

## 2023-11-01 ENCOUNTER — NON-APPOINTMENT (OUTPATIENT)
Age: 73
End: 2023-11-01

## 2023-11-03 ENCOUNTER — NON-APPOINTMENT (OUTPATIENT)
Age: 73
End: 2023-11-03

## 2023-11-07 ENCOUNTER — APPOINTMENT (OUTPATIENT)
Dept: CARDIOLOGY | Facility: CLINIC | Age: 73
End: 2023-11-07
Payer: MEDICARE

## 2023-11-07 ENCOUNTER — NON-APPOINTMENT (OUTPATIENT)
Age: 73
End: 2023-11-07

## 2023-11-07 VITALS
SYSTOLIC BLOOD PRESSURE: 106 MMHG | HEART RATE: 72 BPM | WEIGHT: 227 LBS | OXYGEN SATURATION: 94 % | BODY MASS INDEX: 36.48 KG/M2 | HEIGHT: 66 IN | DIASTOLIC BLOOD PRESSURE: 60 MMHG

## 2023-11-07 DIAGNOSIS — T82.857A STENOSIS OF OTHER CARDIAC PROSTHETIC, INITIAL ENCOUNTER: ICD-10-CM

## 2023-11-07 DIAGNOSIS — I25.10 ATHEROSCLEROTIC HEART DISEASE OF NATIVE CORONARY ARTERY W/OUT ANGINA PECTORIS: ICD-10-CM

## 2023-11-07 DIAGNOSIS — E78.00 PURE HYPERCHOLESTEROLEMIA, UNSPECIFIED: ICD-10-CM

## 2023-11-07 PROCEDURE — 99215 OFFICE O/P EST HI 40 MIN: CPT

## 2023-11-07 RX ORDER — EZETIMIBE 10 MG/1
10 TABLET ORAL
Qty: 90 | Refills: 3 | Status: DISCONTINUED | COMMUNITY
Start: 2023-09-29 | End: 2023-11-07

## 2023-11-10 ENCOUNTER — NON-APPOINTMENT (OUTPATIENT)
Age: 73
End: 2023-11-10

## 2023-11-10 ENCOUNTER — APPOINTMENT (OUTPATIENT)
Dept: ORTHOPEDIC SURGERY | Facility: CLINIC | Age: 73
End: 2023-11-10
Payer: MEDICARE

## 2023-11-10 VITALS — HEIGHT: 73 IN | BODY MASS INDEX: 30.22 KG/M2 | WEIGHT: 228 LBS

## 2023-11-10 DIAGNOSIS — Z96.651 PRESENCE OF RIGHT ARTIFICIAL KNEE JOINT: ICD-10-CM

## 2023-11-10 PROCEDURE — 20610 DRAIN/INJ JOINT/BURSA W/O US: CPT | Mod: LT

## 2023-11-10 PROCEDURE — 99214 OFFICE O/P EST MOD 30 MIN: CPT | Mod: 25

## 2023-11-10 PROCEDURE — 73564 X-RAY EXAM KNEE 4 OR MORE: CPT | Mod: 50

## 2023-11-15 ENCOUNTER — NON-APPOINTMENT (OUTPATIENT)
Age: 73
End: 2023-11-15

## 2023-11-15 VITALS
HEIGHT: 73 IN | HEART RATE: 68 BPM | DIASTOLIC BLOOD PRESSURE: 56 MMHG | TEMPERATURE: 97 F | WEIGHT: 223.11 LBS | RESPIRATION RATE: 16 BRPM | SYSTOLIC BLOOD PRESSURE: 83 MMHG | OXYGEN SATURATION: 95 %

## 2023-11-15 NOTE — H&P PST ADULT - COMMENTS
Denies fevers, chills, CP, palp, SOB, abdominal pain, N/V/D/C, hematuria, bloody and/or dark stools    + Dizziness (positional) Denies fevers, chills, CP, palp, SOB, abdominal pain, N/V/D/C, hematuria, bloody and/or dark stools    + Dizziness (positional)  + Syncope (last episose 9/2023; spoke with cardiologist and attributed to orthostasis)

## 2023-11-15 NOTE — H&P PST ADULT - HISTORY OF PRESENT ILLNESS
Department of Cardiology                                                                  [facility]/[place] [address] [address] [facility]                                                            Telephone: [phone]. Fax:[phone]                                                                             Pre-EP Procedure H and P      Chief complaint:    Patient is a 73y old  Male who presents with a chief complaint of dizziness tremors and persistent AF with heart failure.    HPI:DUDLEY BERMUDEZ is a 73 year old M w/ h/o CAD s/p late-presenting MI 2005 s/p PCI LAD, ICM s/p mems, s/p single chamber ICD with lead revision in 2018 then dual chamber (failed CRT), bioprosthetic AVR (2015, s/p HECTOR for severe bioprosthetic AS (8/20/20), aflutter ablation 7/17 w/ persistent afib, s/p DCCV 8/12/20 then 7/21 now on tikosyn, VT storm, CRI  ?Seen 10/10/23 for concerns about ongoing dizziness and tremors. This is longstanding hx but over the past week there has been worsening. Two days after his last OV took out garbage and wife found him on the kitchen floor did not sustain injury or seek medical attention. He noticed his tremors have gotten much worse since then barely able to hold a pen or a cup to drink.  He does see neurology re trigeminal neuralgia and is tapering dose of lyrica.  Last labs 9/11/23 K 3.2, creat 1.54  ICD/CMEMS transmission and labs.  Near persistent rate controlled afib since 9/28/23.  PAD 41mmHg, significantly above goal.  BNP >3200, previously 1600 in August.  ?  Sent to [facility] ER admitted until 10/13/23 for IV diuresis and close lab/BP monitoring. On discharge K 3.5, creat 1.1, BNP 2957.  On 10/17/23 in followup his PAD is 37 mmHg.  Remains in rate controlled persistent AF.  Echo 10/24/23 akinetic apex, EF 20-25% TAVR well seated no AI, mild PH  ?        Heart Failure:        Systolic/Diastolic/Combined:        NYHA Class (within 2 weeks):     Echo: 10/24/23  1. [place] is akinetic and endocardium is not well visualzed, cannot rule out presence of thrombus. False tendon is visualized in apex. Consider designated LV contrast study for further evaluation.  2. Multiple segmental abnormalities exist. See findings. EF 20-25% visually  3. Afib with diastolic dysfunction.  4. Mild left ventricular hypertrophy.  5. The left atrium is severely dilated in size.  6. The right atrium is mildly dilated in size.  7. Mildly enlarged right ventricular cavity size and reduced systolic function.  8. Device lead is visualized in the right ventricle.  9. A transcatheter aortic valve replacement present in the aortic position. is well seated with normal function. No prosthetic aortic stenosis. No intravalvular regurgitation No paravalvular regurgitation.  10. Moderate tricuspid regurgitation. Estimated pulmonary artery systolic pressure is 46 mmHg, consistent with mild pulmonary hypertension.  11. Mild mitral regurgitation.  12. Mild pulmonic regurgitation.  13. Ascending aorta diameter is dilated, measuring 3.90 cm (indexed 1.84 cm/m²).  14. Compared to prior done on 5/26/2023, mild RVE.        Prior EP Procedures: Aflutter ablation 2017, persistent AF    Prior IC Procedures: MI 2005, PCI LAD, ICMs/p MEMS, AICD(failed CRT)    Prior CTS: HECTOR 2015, valve in valve TAVR 2020.         Antiarrhythmic Therapies: Tikosyn     Anticoagulation Therapies:    Eliquis  	  MEDICATIONS:                    ROS: as stated above, otherwise negative    PHYSICAL EXAM:  Constitutional: A & O x 3  HEENT:   Normal oral mucosa, PERRL, EOMI	  Cardiovascular: Normal S1 S2, No JVD, *****No murmurs  Respiratory: Lungs clear to auscultation	****  Gastrointestinal:  Soft, Non-tender, + BS	  Skin: No rashes, No ecchymoses, No cyanosis  Neurologic: Non-focal  Extremities: Normal range of motion, no edema****  Vascular: Peripheral pulses palpable + bilaterally ****      T(C): --  HR: --  BP: --  RR: --  SpO2: --  Wt(kg): --        ECG:  	    LABS:	 	                             72yo male with PMH with PMH of CRI, CAD s/p late-presenting MI 2005 s/p PCI LAD, ICM s/p mems, s/p single chamber ICD with lead revision in 2018 then dual chamber (failed CRT), bioprosthetic AVR (2015), severe bioprosthetic AS s/p valve in valve TAVR ([facility] 8/20/20), VT storm, and Atrial flutter s/p ablation 7/17 w/ persistent afib s/p DCCV 8/12/20 then 7/21 now on tikosyn. He now presents electively for ANDREY, AF ablation        Cardiology summary:   Echo 10/24/23; akinetic apex, EF 20-25% TAVR well seated no AI, mild PH   Nuclear stress test; 1/6/22 large anterior, apical , inferior, inferolateral fixed defects s/o infarct, TID 1.05, EF 19%  PYP study was equivocal for cardiac amyloid                                                                             72yo male with PMH with PMH of DM, HTN, HLD, hypothyroidism, CRI, CAD s/p late-presenting MI 2005 s/p PCI LAD, ICM s/p mems, s/p single chamber ICD with lead revision in 2018 then dual chamber (failed CRT), bioprosthetic AVR (2015), severe bioprosthetic AS s/p valve in valve TAVR ([facility] 8/20/20), VT storm, and Atrial flutter s/p ablation 7/17 w/ persistent afib s/p DCCV 8/12/20 then 7/21 now on tikosyn. He now presents electively for ANDREY, AF ablation        Cardiology summary:   Echo 10/24/23; akinetic apex, EF 20-25% TAVR well seated no AI, mild PH   Nuclear stress test; 1/6/22 large anterior, apical , inferior, inferolateral fixed defects s/o infarct, TID 1.05, EF 19%  PYP study was equivocal for cardiac amyloid                                                                             72yo male with PMH with PMH of HTN, HLD, hypothyroidism, CRI, CAD s/p late-presenting MI 2005 s/p PCI LAD, ICM s/p mems, s/p MDT single chamber ICD with lead revision in 2018 then dual chamber (failed CRT), bioprosthetic AVR (2015), severe bioprosthetic AS s/p valve in valve TAVR ([facility] 8/20/20), VT storm 6/2022 in the setting of GI infection and dehydration, and Atrial flutter s/p ablation 7/17 w/ persistent afib s/p DCCV 8/12/20 then 7/21 now on Tikosyn. He now presents electively for ANDREY, AF ablation        Cardiology summary:   Echo 10/24/23; akinetic apex, EF 20-25% TAVR well seated no AI, mild PH   Nuclear stress test; 1/6/22 large anterior, apical , inferior, inferolateral fixed defects s/o infarct, TID 1.05, EF 19%  PYP study was equivocal for cardiac amyloid

## 2023-11-15 NOTE — H&P PST ADULT - NSICDXPASTMEDICALHX_GEN_ALL_CORE_FT
PAST MEDICAL HISTORY:  AICD (automatic cardioverter/defibrillator) present     Aortic stenosis severe, s/p AVR    CAD (coronary artery disease) 2004    Carotid stenosis - moderate, b/l (Doppler 12/27/21)    CHF (congestive heart failure)     H/O emphysema - moderately severe, CT chest 06/10/21    H/O pulmonary hypertension     HLD (hyperlipidemia)     HTN (hypertension)     Ischemic cardiomyopathy     Longstanding persistent atrial fibrillation     MI (myocardial infarction) Nov 2004    Neuralgia     NSVT (nonsustained ventricular tachycardia)     Osteoarthritis of right knee     Shingles 04/2020    Syncope epidsode in 11/14, was found to have been an episode of V-Fib with sucsessfull AICD shock    Systolic heart failure EF 15%    Type 2 diabetes mellitus     Valvular cardiomyopathy      PAST MEDICAL HISTORY:  AICD (automatic cardioverter/defibrillator) present     Aortic stenosis severe, s/p AVR    CAD (coronary artery disease) 2004    Carotid stenosis - moderate, b/l (Doppler 12/27/21)    CRI (chronic renal insufficiency)     H/O emphysema - moderately severe, CT chest 06/10/21    H/O pulmonary hypertension     HLD (hyperlipidemia)     HTN (hypertension)     Ischemic cardiomyopathy     Longstanding persistent atrial fibrillation     MI (myocardial infarction) Nov 2004    Neuralgia     Osteoarthritis of right knee     Systolic heart failure EF 15%    Type 2 diabetes mellitus

## 2023-11-15 NOTE — H&P PST ADULT - ASSESSMENT
Indication: 74 yo male with c/o dizziness, syncope and tremors with persistent AF and HF for ANDREY, AF ablation biosense mapping    Risk Stratification:  ASA:  Mallampati:      Plan/Recommendations:   -plan for **** on ***  -patient seen and examined in PST on ***  -ECG and Labs reviewed  -NPO after midnight prior with exception of sip of water with morning medications  -Anticoagulation Instructions: ***  -SGLT-2 Inhibitor Instructions: ***  -GLP-1 Agonist Instructions: ***  -Pre-procedure instructions provided (verbal & written)   -Supplies and verbal/written Instructions for pre-surgical chlorhexadine shower provided*****  -Consent to be obtained by attending electrophysiologist on the scheduled procedure date     Plan:  Consent w/ Attending  Stat labs & ECG  NPO confirmed > 10 hours   Last eliquis dose yesterday morning  Last Farxiga dose Sunday          74yo male with PMH with PMH of HTN, HLD, hypothyroidism, CRI, CAD s/p late-presenting MI 2005 s/p PCI LAD, ICM s/p mems, s/p MDT single chamber ICD with lead revision in 2018 then dual chamber (failed CRT), bioprosthetic AVR (2015), severe bioprosthetic AS s/p valve in valve TAVR ([facility] 8/20/20), VT storm 6/2022 in the setting of GI infection and dehydration, and Atrial flutter s/p ablation 7/17 w/ persistent afib s/p DCCV 8/12/20 then 7/21 now on Tikosyn. He now presents electively for ANDREY, AF ablation .    Preop labs notable for FEDERICO (Cr. 2.27; baseline 1.3) and hyponatremia 128. BP soft (SPB 80s)     Plan:  Procedure cancelled   Will admit to the hospital  Telemetry monitoring  Discontinue Tikosyn in leui of FEDERICO  Heart failure consulted   Consider ANDREY/DCCV prior to discharge

## 2023-11-15 NOTE — ED PROVIDER NOTE - PMH
"[address]  [address] [place] [id number]  Phone: [phone]  Fax: [phone]  Primary Provider: Chandrakant Arana  Pre-op Performing Provider: CHANDRAKANT ARANA    PREOPERATIVE EVALUATION:  Today's date: 11/15/2023    Pat is a 27 year old, presenting for the following:  Pre-Op Exam (Pt here for pre op)        11/15/2023     3:51 PM   Additional Questions   Roomed by Doua   Accompanied by Wife         11/15/2023     3:51 PM   Patient Reported Additional Medications   Patient reports taking the following new medications n/a       Surgical Information:  Surgery/Procedure: Vaginoplasty  Surgery Location: [facility] PeriOp [facility]  Surgeon: Dr. Morales   Surgery Date: 12/4/2023  Time of Surgery: 7:30am  Where patient plans to recover: At home with family  Fax number for surgical facility: N/A (within [facility])    Assessment & Plan     The proposed surgical procedure is considered INTERMEDIATE risk.    Preop general physical exam  Healthcare maintenance  Patient shares family history of no congenital bleeding disorders but did have father with bleeding and clotting after prolonged ICU stay. No CBC or BMP on file for > 1 year. Thus, will evaluate pre-op hemoglobin and platelets with CBC and BMP to confirm  - CBC with platelets differential; Future  - Basic metabolic panel; Future        - No identified additional risk factors other than previously addressed    Antiplatelet or Anticoagulation Medication Instructions:   - Patient is on no antiplatelet or anticoagulation medications.    Additional Medication Instructions:  Patient is to take all scheduled medications on the day of surgery EXCEPT for modifications listed below: stop estrogen 14 days before procedure   Per Dr. Botello, note from 5/23/23 \"We also discussed the need to stop hormones yareli-procedurally for 2 weeks before and after surgery. \"  Per [facility] Preop guidelines: For those at high risk of VTE " (Caprini Score > =10) consider holding estrogen 2 weeks prior to surgery   Pat's Caprini Score =1 (for gender affirming care with estrogen)    RECOMMENDATION:  APPROVAL GIVEN to proceed with proposed procedure, without further diagnostic evaluation.      Subjective       HPI related to upcoming procedure: Pat is feeling well and looking forward to proceeding with vaginoplasty for gender affirming care. Patient previously tolerated general anesthesia with orchiectomy without issue.        11/15/2023     3:54 PM   Preop Questions   1. Have you ever had a heart attack or stroke? No   2. Have you ever had surgery on your heart or blood vessels, such as a stent placement, a coronary artery bypass, or surgery on an artery in your head, neck, heart, or legs? No   3. Do you have chest pain with activity? No   4. Do you have a history of  heart failure? No   5. Do you currently have a cold, bronchitis or symptoms of other infection? No   6. Do you have a cough, shortness of breath, or wheezing? No   7. Do you or anyone in your family have previous history of blood clots? No   8. Do you or does anyone in your family have a serious bleeding problem such as prolonged bleeding following surgeries or cuts? No   9. Have you ever had problems with anemia or been told to take iron pills? No   10. Have you had any abnormal blood loss such as black, tarry or bloody stools, or abnormal vaginal bleeding? No   11. Have you ever had a blood transfusion? No   12. Are you willing to have a blood transfusion if it is medically needed before, during, or after your surgery? Yes   13. Have you or any of your relatives ever had problems with anesthesia? No   14. Do you have sleep apnea, excessive snoring or daytime drowsiness? No   15. Do you have any artifical heart valves or other implanted medical devices like a pacemaker, defibrillator, or continuous glucose monitor? No   16. Do you have artificial joints? No   17. Are you allergic to  latex? No   18. Is there any chance that you may be pregnant? No       Health Care Directive:  Patient does not have a Health Care Directive or Living Will: Discussed advance care planning with patient; information given to patient to review.    Preoperative Review of :   reviewed - controlled substances reflected in medication list. Takes lacosamide for epilepsy      Status of Chronic Conditions:  See problem list for active medical problems.  Problems all longstanding and stable, except as noted/documented.  See ROS for pertinent symptoms related to these conditions.    Patient's epilepsy well controlled on lacosamide and sees neurology yearly. Many years since last seizure. 2-3 years since last seizure.    Review of Systems  CONSTITUTIONAL: NEGATIVE for fever, chills, change in weight  INTEGUMENTARY/SKIN: NEGATIVE for worrisome rashes, moles or lesions  EYES: NEGATIVE for vision changes or irritation  ENT/MOUTH: NEGATIVE for ear, mouth and throat problems  RESP: NEGATIVE for significant cough or SOB  CV: NEGATIVE for chest pain, palpitations or peripheral edema  GI: NEGATIVE for nausea, abdominal pain, heartburn, or change in bowel habits  : NEGATIVE for frequency, dysuria, or hematuria  MUSCULOSKELETAL: NEGATIVE for significant arthralgias or myalgia  NEURO: NEGATIVE for weakness, dizziness or paresthesias  ENDOCRINE: NEGATIVE for temperature intolerance, skin/hair changes  HEME: NEGATIVE for bleeding problems  PSYCHIATRIC: NEGATIVE for changes in mood or affect    Patient Active Problem List    Diagnosis Date Noted    Dermatitis 03/13/2021     Priority: Medium    Class 1 obesity due to excess calories without serious comorbidity with body mass index (BMI) of 32.0 to 32.9 in adult 03/13/2021     Priority: Medium    Anxiety      Priority: Medium     Class: Chronic    Depression      Priority: Medium     Class: Chronic    Epilepsy 03/13/2019     Priority: Medium    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 09/10/2017     Priority: Medium      Past Medical History:   Diagnosis Date    Anxiety     Asthma     Depression     Seizures (H)     Sleep apnea      Past Surgical History:   Procedure Laterality Date    C EACH ADD TOOTH EXTRACTION      ORCHIECTOMY SCROTAL Bilateral 11/22/2019    Procedure: BILATERAL SIMPLE SCROTAL ORCHIECTOMY;  Surgeon: Toribio Morales MD;  Location: [facility] OR    TONSILLECTOMY      9 years old    TONSILLECTOMY, ADENOIDECTOMY, COMBINED       Current Outpatient Medications   Medication Sig Dispense Refill    estradiol (ESTRACE) 2 MG tablet Take 1 tablet (2 mg) by mouth 2 times daily 180 tablet 3    lacosamide (VIMPAT) 200 MG TABS tablet Take 200 mg by mouth 2 times daily      UNABLE TO FIND Take by mouth 2 times daily MEDICATION NAME: Vinpat.      FLUoxetine (PROZAC) 20 MG capsule Take 1 capsule (20 mg) by mouth daily (Patient not taking: Reported on 11/15/2023) 90 capsule 0    triamcinolone (KENALOG) 0.1 % external ointment Apply twice daily to rash on abdomen (Patient not taking: Reported on 11/15/2023) 80 g 0       Allergies   Allergen Reactions    Codeine Hives     Difficulty breathing, throat swelling..Happen approximately 14 years ago     Codeine Swelling    Nickel Rash        Social History     Tobacco Use    Smoking status: Never    Smokeless tobacco: Never   Substance Use Topics    Alcohol use: Yes     Comment: occasional, socially     Family History   Problem Relation Age of Onset    Breast Cancer Mother     Bleeding Disorder Father    Father had history of bleeding and blood clots. No known specific diagnosis was given. Was in ICU and then hospice when this happened. Possible blood clots could have been due to severe illness that led to ICU hospitalization.  No family history of known heritable blood conditions. Patient has never been told that she or anyone else in her family has an issue with bleeding or clotting.      History   Drug Use No         Objective     /79 (BP  "Location: Left arm, Patient Position: Sitting, Cuff Size: Adult Large)   Pulse 71   Resp 18   Ht 1.727 m (5' 8\")   Wt 97.6 kg (215 lb 3.2 oz)   SpO2 99%   BMI 32.72 kg/m      Physical Exam    GENERAL APPEARANCE: healthy, alert and no distress     EYES: EOMI, PERRL     HENT: ear canals and TM's normal and nose and mouth without ulcers or lesions     NECK: no adenopathy, no asymmetry, masses, or scars and thyroid normal to palpation     RESP: lungs clear to auscultation - no rales, rhonchi or wheezes     CV: regular rates and rhythm, normal S1 S2, no S3 or S4 and no murmur, click or rub     ABDOMEN:  soft, nontender, no HSM or masses and bowel sounds normal     MS: extremities normal- no gross deformities noted, no evidence of inflammation in joints, FROM in all extremities.     SKIN: no suspicious lesions or rashes     NEURO: Normal strength and tone, sensory exam grossly normal, mentation intact and speech normal     PSYCH: mentation appears normal. and affect normal/bright     LYMPHATICS: No cervical adenopathy    No results for input(s): \"HGB\", \"PLT\", \"INR\", \"NA\", \"POTASSIUM\", \"CR\", \"A1C\" in the last [id number] hours.     Diagnostics:  Labs pending at this time.  Results will be reviewed when available.   No EKG required, no history of coronary heart disease, significant arrhythmia, peripheral arterial disease or other structural heart disease.    Revised Cardiac Risk Index (RCRI):  The patient has the following serious cardiovascular risks for perioperative complications:   - No serious cardiac risks = 0 points     RCRI Interpretation: 0 points: Class I (very low risk - 0.4% complication rate)         Signed Electronically by: Cecilia Garza MD  Copy of this evaluation report is provided to requesting physician.      " AICD (automatic cardioverter/defibrillator) present    Aortic stenosis    CAD (coronary artery disease)  2004  CHF (congestive heart failure)    HLD (hyperlipidemia)    HTN (hypertension)    MI (myocardial infarction)  Nov 2004  Syncope  epidsode in 11/14, was found to have been an episode of V-Fib with sucsessfull AICD shock  Systolic heart failure    Valvular cardiomyopathy

## 2023-11-16 ENCOUNTER — INPATIENT (INPATIENT)
Facility: HOSPITAL | Age: 73
LOS: 2 days | Discharge: HOME | DRG: 683 | End: 2023-11-19
Attending: STUDENT IN AN ORGANIZED HEALTH CARE EDUCATION/TRAINING PROGRAM | Admitting: STUDENT IN AN ORGANIZED HEALTH CARE EDUCATION/TRAINING PROGRAM
Payer: MEDICARE

## 2023-11-16 DIAGNOSIS — Z98.890 OTHER SPECIFIED POSTPROCEDURAL STATES: Chronic | ICD-10-CM

## 2023-11-16 DIAGNOSIS — Z95.810 PRESENCE OF AUTOMATIC (IMPLANTABLE) CARDIAC DEFIBRILLATOR: Chronic | ICD-10-CM

## 2023-11-16 DIAGNOSIS — I48.20 CHRONIC ATRIAL FIBRILLATION, UNSPECIFIED: ICD-10-CM

## 2023-11-16 DIAGNOSIS — Z98.89 OTHER SPECIFIED POSTPROCEDURAL STATES: Chronic | ICD-10-CM

## 2023-11-16 DIAGNOSIS — I48.19 OTHER PERSISTENT ATRIAL FIBRILLATION: ICD-10-CM

## 2023-11-16 DIAGNOSIS — Z95.5 PRESENCE OF CORONARY ANGIOPLASTY IMPLANT AND GRAFT: Chronic | ICD-10-CM

## 2023-11-16 DIAGNOSIS — N17.9 ACUTE KIDNEY FAILURE, UNSPECIFIED: ICD-10-CM

## 2023-11-16 DIAGNOSIS — Z95.4 PRESENCE OF OTHER HEART-VALVE REPLACEMENT: Chronic | ICD-10-CM

## 2023-11-16 DIAGNOSIS — I50.22 CHRONIC SYSTOLIC (CONGESTIVE) HEART FAILURE: ICD-10-CM

## 2023-11-16 LAB
ANION GAP SERPL CALC-SCNC: 16 MMOL/L — SIGNIFICANT CHANGE UP (ref 5–17)
ANION GAP SERPL CALC-SCNC: 16 MMOL/L — SIGNIFICANT CHANGE UP (ref 5–17)
APPEARANCE UR: CLEAR — SIGNIFICANT CHANGE UP
APPEARANCE UR: CLEAR — SIGNIFICANT CHANGE UP
BILIRUB UR-MCNC: NEGATIVE — SIGNIFICANT CHANGE UP
BILIRUB UR-MCNC: NEGATIVE — SIGNIFICANT CHANGE UP
BLD GP AB SCN SERPL QL: SIGNIFICANT CHANGE UP
BLD GP AB SCN SERPL QL: SIGNIFICANT CHANGE UP
BUN SERPL-MCNC: 75 MG/DL — HIGH (ref 8–20)
BUN SERPL-MCNC: 75 MG/DL — HIGH (ref 8–20)
CALCIUM SERPL-MCNC: 10 MG/DL — SIGNIFICANT CHANGE UP (ref 8.4–10.5)
CALCIUM SERPL-MCNC: 10 MG/DL — SIGNIFICANT CHANGE UP (ref 8.4–10.5)
CHLORIDE SERPL-SCNC: 81 MMOL/L — LOW (ref 96–108)
CHLORIDE SERPL-SCNC: 81 MMOL/L — LOW (ref 96–108)
CO2 SERPL-SCNC: 31 MMOL/L — HIGH (ref 22–29)
CO2 SERPL-SCNC: 31 MMOL/L — HIGH (ref 22–29)
COLOR SPEC: YELLOW — SIGNIFICANT CHANGE UP
COLOR SPEC: YELLOW — SIGNIFICANT CHANGE UP
CREAT ?TM UR-MCNC: 60 MG/DL — SIGNIFICANT CHANGE UP
CREAT ?TM UR-MCNC: 60 MG/DL — SIGNIFICANT CHANGE UP
CREAT SERPL-MCNC: 2.27 MG/DL — HIGH (ref 0.5–1.3)
CREAT SERPL-MCNC: 2.27 MG/DL — HIGH (ref 0.5–1.3)
DIFF PNL FLD: NEGATIVE — SIGNIFICANT CHANGE UP
DIFF PNL FLD: NEGATIVE — SIGNIFICANT CHANGE UP
EGFR: 30 ML/MIN/1.73M2 — LOW
EGFR: 30 ML/MIN/1.73M2 — LOW
GLUCOSE SERPL-MCNC: 135 MG/DL — HIGH (ref 70–99)
GLUCOSE SERPL-MCNC: 135 MG/DL — HIGH (ref 70–99)
GLUCOSE UR QL: NEGATIVE MG/DL — SIGNIFICANT CHANGE UP
GLUCOSE UR QL: NEGATIVE MG/DL — SIGNIFICANT CHANGE UP
HCT VFR BLD CALC: 45.5 % — SIGNIFICANT CHANGE UP (ref 39–50)
HCT VFR BLD CALC: 45.5 % — SIGNIFICANT CHANGE UP (ref 39–50)
HGB BLD-MCNC: 16.3 G/DL — SIGNIFICANT CHANGE UP (ref 13–17)
HGB BLD-MCNC: 16.3 G/DL — SIGNIFICANT CHANGE UP (ref 13–17)
KETONES UR-MCNC: NEGATIVE MG/DL — SIGNIFICANT CHANGE UP
KETONES UR-MCNC: NEGATIVE MG/DL — SIGNIFICANT CHANGE UP
LEUKOCYTE ESTERASE UR-ACNC: NEGATIVE — SIGNIFICANT CHANGE UP
LEUKOCYTE ESTERASE UR-ACNC: NEGATIVE — SIGNIFICANT CHANGE UP
MAGNESIUM SERPL-MCNC: 2.4 MG/DL — SIGNIFICANT CHANGE UP (ref 1.6–2.6)
MAGNESIUM SERPL-MCNC: 2.4 MG/DL — SIGNIFICANT CHANGE UP (ref 1.6–2.6)
MCHC RBC-ENTMCNC: 32.3 PG — SIGNIFICANT CHANGE UP (ref 27–34)
MCHC RBC-ENTMCNC: 32.3 PG — SIGNIFICANT CHANGE UP (ref 27–34)
MCHC RBC-ENTMCNC: 35.8 GM/DL — SIGNIFICANT CHANGE UP (ref 32–36)
MCHC RBC-ENTMCNC: 35.8 GM/DL — SIGNIFICANT CHANGE UP (ref 32–36)
MCV RBC AUTO: 90.1 FL — SIGNIFICANT CHANGE UP (ref 80–100)
MCV RBC AUTO: 90.1 FL — SIGNIFICANT CHANGE UP (ref 80–100)
NITRITE UR-MCNC: NEGATIVE — SIGNIFICANT CHANGE UP
NITRITE UR-MCNC: NEGATIVE — SIGNIFICANT CHANGE UP
PH UR: 5.5 — SIGNIFICANT CHANGE UP (ref 5–8)
PH UR: 5.5 — SIGNIFICANT CHANGE UP (ref 5–8)
PLATELET # BLD AUTO: 196 K/UL — SIGNIFICANT CHANGE UP (ref 150–400)
PLATELET # BLD AUTO: 196 K/UL — SIGNIFICANT CHANGE UP (ref 150–400)
POTASSIUM SERPL-MCNC: 3.5 MMOL/L — SIGNIFICANT CHANGE UP (ref 3.5–5.3)
POTASSIUM SERPL-MCNC: 3.5 MMOL/L — SIGNIFICANT CHANGE UP (ref 3.5–5.3)
POTASSIUM SERPL-SCNC: 3.5 MMOL/L — SIGNIFICANT CHANGE UP (ref 3.5–5.3)
POTASSIUM SERPL-SCNC: 3.5 MMOL/L — SIGNIFICANT CHANGE UP (ref 3.5–5.3)
PROT UR-MCNC: NEGATIVE MG/DL — SIGNIFICANT CHANGE UP
PROT UR-MCNC: NEGATIVE MG/DL — SIGNIFICANT CHANGE UP
RBC # BLD: 5.05 M/UL — SIGNIFICANT CHANGE UP (ref 4.2–5.8)
RBC # BLD: 5.05 M/UL — SIGNIFICANT CHANGE UP (ref 4.2–5.8)
RBC # FLD: 13.6 % — SIGNIFICANT CHANGE UP (ref 10.3–14.5)
RBC # FLD: 13.6 % — SIGNIFICANT CHANGE UP (ref 10.3–14.5)
SODIUM SERPL-SCNC: 128 MMOL/L — LOW (ref 135–145)
SODIUM SERPL-SCNC: 128 MMOL/L — LOW (ref 135–145)
SODIUM UR-SCNC: <30 MMOL/L — SIGNIFICANT CHANGE UP
SODIUM UR-SCNC: <30 MMOL/L — SIGNIFICANT CHANGE UP
SP GR SPEC: 1.01 — SIGNIFICANT CHANGE UP (ref 1–1.03)
SP GR SPEC: 1.01 — SIGNIFICANT CHANGE UP (ref 1–1.03)
UROBILINOGEN FLD QL: 1 MG/DL — SIGNIFICANT CHANGE UP (ref 0.2–1)
UROBILINOGEN FLD QL: 1 MG/DL — SIGNIFICANT CHANGE UP (ref 0.2–1)
UUN UR-MCNC: 481 MG/DL — SIGNIFICANT CHANGE UP
UUN UR-MCNC: 481 MG/DL — SIGNIFICANT CHANGE UP
WBC # BLD: 10.96 K/UL — HIGH (ref 3.8–10.5)
WBC # BLD: 10.96 K/UL — HIGH (ref 3.8–10.5)
WBC # FLD AUTO: 10.96 K/UL — HIGH (ref 3.8–10.5)
WBC # FLD AUTO: 10.96 K/UL — HIGH (ref 3.8–10.5)

## 2023-11-16 PROCEDURE — 76775 US EXAM ABDO BACK WALL LIM: CPT | Mod: 26

## 2023-11-16 PROCEDURE — 99232 SBSQ HOSP IP/OBS MODERATE 35: CPT

## 2023-11-16 PROCEDURE — 93010 ELECTROCARDIOGRAM REPORT: CPT

## 2023-11-16 RX ORDER — ATORVASTATIN CALCIUM 80 MG/1
40 TABLET, FILM COATED ORAL AT BEDTIME
Refills: 0 | Status: DISCONTINUED | OUTPATIENT
Start: 2023-11-16 | End: 2023-11-19

## 2023-11-16 RX ORDER — ACETAMINOPHEN 500 MG
650 TABLET ORAL EVERY 6 HOURS
Refills: 0 | Status: DISCONTINUED | OUTPATIENT
Start: 2023-11-16 | End: 2023-11-19

## 2023-11-16 RX ORDER — LEVOTHYROXINE SODIUM 125 MCG
88 TABLET ORAL DAILY
Refills: 0 | Status: DISCONTINUED | OUTPATIENT
Start: 2023-11-16 | End: 2023-11-19

## 2023-11-16 RX ORDER — ONDANSETRON 8 MG/1
4 TABLET, FILM COATED ORAL EVERY 8 HOURS
Refills: 0 | Status: DISCONTINUED | OUTPATIENT
Start: 2023-11-16 | End: 2023-11-19

## 2023-11-16 RX ORDER — ALBUMIN HUMAN 25 %
100 VIAL (ML) INTRAVENOUS ONCE
Refills: 0 | Status: COMPLETED | OUTPATIENT
Start: 2023-11-16 | End: 2023-11-16

## 2023-11-16 RX ORDER — METOPROLOL TARTRATE 50 MG
0.5 TABLET ORAL
Refills: 0 | DISCHARGE

## 2023-11-16 RX ORDER — LANOLIN ALCOHOL/MO/W.PET/CERES
3 CREAM (GRAM) TOPICAL AT BEDTIME
Refills: 0 | Status: DISCONTINUED | OUTPATIENT
Start: 2023-11-16 | End: 2023-11-19

## 2023-11-16 RX ORDER — ASPIRIN/CALCIUM CARB/MAGNESIUM 324 MG
81 TABLET ORAL DAILY
Refills: 0 | Status: DISCONTINUED | OUTPATIENT
Start: 2023-11-16 | End: 2023-11-19

## 2023-11-16 RX ORDER — CARBAMAZEPINE 200 MG
100 TABLET ORAL
Refills: 0 | Status: DISCONTINUED | OUTPATIENT
Start: 2023-11-16 | End: 2023-11-19

## 2023-11-16 RX ORDER — APIXABAN 2.5 MG/1
5 TABLET, FILM COATED ORAL
Refills: 0 | Status: DISCONTINUED | OUTPATIENT
Start: 2023-11-16 | End: 2023-11-19

## 2023-11-16 RX ORDER — SODIUM CHLORIDE 9 MG/ML
250 INJECTION INTRAMUSCULAR; INTRAVENOUS; SUBCUTANEOUS ONCE
Refills: 0 | Status: COMPLETED | OUTPATIENT
Start: 2023-11-16 | End: 2023-11-16

## 2023-11-16 RX ORDER — SPIRONOLACTONE 25 MG/1
1 TABLET, FILM COATED ORAL
Qty: 0 | Refills: 0 | DISCHARGE

## 2023-11-16 RX ORDER — METOPROLOL TARTRATE 50 MG
25 TABLET ORAL DAILY
Refills: 0 | Status: DISCONTINUED | OUTPATIENT
Start: 2023-11-16 | End: 2023-11-19

## 2023-11-16 RX ADMIN — APIXABAN 5 MILLIGRAM(S): 2.5 TABLET, FILM COATED ORAL at 18:51

## 2023-11-16 RX ADMIN — Medication 225 MILLIGRAM(S): at 20:01

## 2023-11-16 RX ADMIN — ATORVASTATIN CALCIUM 40 MILLIGRAM(S): 80 TABLET, FILM COATED ORAL at 21:36

## 2023-11-16 RX ADMIN — Medication 81 MILLIGRAM(S): at 12:37

## 2023-11-16 RX ADMIN — SODIUM CHLORIDE 250 MILLILITER(S): 9 INJECTION INTRAMUSCULAR; INTRAVENOUS; SUBCUTANEOUS at 16:50

## 2023-11-16 RX ADMIN — Medication 100 MILLIGRAM(S): at 19:56

## 2023-11-16 NOTE — CONSULT NOTE ADULT - ASSESSMENT
Patient is a 73 year old male with history of HTN, HLD, hypothyroidism, CAD ICM s/p mems, TAVR ([facility] 8/20/20), VT storm 6/2022 and Afib who came in electively for a ANDREY and ablation. The procedure was cancelled due to labs which showed hyponatremia and FEDERICO. Of note the patient has elevated cardiomems readings and he had been receiving extra doses of metolazone in the days leading up the ablation

## 2023-11-16 NOTE — CONSULT NOTE ADULT - PROBLEM SELECTOR RECOMMENDATION 9
The patient is currently not in heart failure and if anything appears dry on exam. His PAD today was 28 (the day prior it was 34). He has recently taken 2 doses of metolazone in the last couple of days which has likely lead to his FEDERICO and hyponatremia  Hold further diuretics today  Hold losartan for today  C/w Toprol XL 25mg daily  If patient end organ function does not improve with holding his meds, will consider a RHC

## 2023-11-16 NOTE — H&P ADULT - HISTORY OF PRESENT ILLNESS
72 yo male with PMH with PMH of HTN, HLD, hypothyroidism, CAD s/p PCI, ICM s/p mems and ICD, h/o AVR (2015) and then TAVR ([facility] 8/20/20), VT storm 6/2022, and Atrial flutter s/p ablation 7/17 w/ persistent afib s/p DCCV 8/12/20 then 7/21 now on Tikosyn. Initially presented for elective ANDREY, AF ablation.Noted on lab with FEDERICO Scr up to 2.27, procedure canceled. Of note, pt was started on Metolazone 2.5mg and he took 3 doses this week. Also increased aldactone to 50mg as well lyrica to 1000mg QD. Also reports increased urination overnight for the past week.     ED vitals notable for mildly low BP 83/56, labs with Scr of 2.27, admitted for further care.

## 2023-11-16 NOTE — H&P ADULT - NSICDXPASTMEDICALHX_GEN_ALL_CORE_FT
PAST MEDICAL HISTORY:  AICD (automatic cardioverter/defibrillator) present     Aortic stenosis severe, s/p AVR    CAD (coronary artery disease) 2004    Carotid stenosis - moderate, b/l (Doppler 12/27/21)    CRI (chronic renal insufficiency)     H/O emphysema - moderately severe, CT chest 06/10/21    H/O pulmonary hypertension     HLD (hyperlipidemia)     HTN (hypertension)     Ischemic cardiomyopathy     Longstanding persistent atrial fibrillation     MI (myocardial infarction) Nov 2004    Neuralgia     Osteoarthritis of right knee     Systolic heart failure EF 15%    Type 2 diabetes mellitus

## 2023-11-16 NOTE — H&P ADULT - NSHPREVIEWOFSYSTEMS_GEN_ALL_CORE
REVIEW OF SYSTEMS:    CONSTITUTIONAL: No weakness, fevers or chills  EYES: No vertigo or throat pain  ENT: No visual changes, eye pain  MOUTH: moist angelito mucosal, no mouth ulcers  NECK: No pain or stiffness  RESPIRATORY: No cough, wheezing, hemoptysis; No shortness of breath  CARDIOVASCULAR: No chest pain or palpitations  GASTROINTESTINAL: No abdominal or epigastric pain. No nausea, vomiting, or hematemesis; No diarrhea or constipation. No melena or hematochezia.  GENITOURINARY: No dysuria, +increased urinary frequency  NEUROLOGICAL: No numbness or weakness  SKIN: No itching, rashes  PSYCH: No anxiety or depression

## 2023-11-16 NOTE — H&P ADULT - NSHPPHYSICALEXAM_GEN_ALL_CORE
VITALS:   T(C): 36.3 (11-16-23 @ 07:44), Max: 36.3 (11-15-23 @ 10:44)  HR: 71 (11-16-23 @ 07:44) (68 - 71)  BP: 83/56 (11-16-23 @ 07:44) (83/56 - 83/56)  RR: 16 (11-16-23 @ 07:44) (16 - 16)  SpO2: 94% (11-16-23 @ 07:44) (94% - 95%)    GENERAL: NAD, lying in bed comfortably  HEAD:  Atraumatic, Normocephalic  EYES: EOMI, PERRLA, conjunctiva and sclera clear  ENT: Moist mucous membranes  NECK: Supple, No JVD  CHEST/LUNG: Clear to auscultation bilaterally; Unlabored respirations  HEART: Regular rate and rhythm; No murmurs, rubs, or gallops  ABDOMEN: BSx4; Soft, nontender, nondistended  NERVOUS SYSTEM:  A&Ox3, no focal deficits   SKIN: No rashes or lesions  PSYCH: Normal affect, euthymic mood

## 2023-11-16 NOTE — H&P ADULT - NSHPSOCIALHISTORY_GEN_ALL_CORE
Former smoker  denies excessive ETOH nor illicit drug use Purse String (Intermediate) Text: Given the location of the defect and the characteristics of the surrounding skin a pursestring intermediate closure was deemed most appropriate.  Undermining was performed circumfirentially around the surgical defect.  A purstring suture was then placed and tightened.

## 2023-11-16 NOTE — H&P ADULT - ASSESSMENT
72 yo male with PMH with PMH of HTN, HLD, neuralgia, hypothyroidism, CAD s/p PCI, ICM s/p mems and ICD, h/o AVR (2015) and then TAVR ([facility] 8/20/20), VT storm 6/2022, and Atrial flutter s/p ablation 7/17 w/ persistent afib s/p DCCV 8/12/20 then 7/21 now on Tikosyn, admitted for FEDERICO.     #FEDERICO  Baseline Scr 1.3, now up to 2.27, suspect 2/2 excessive diuresis & ATN from hypotension  hold Farxiga, Aldactone, Losartan, Torsemide  check bladder scan and renal ultrasound  f/u urine studies and UA  trend BMP, renally dose meds and avoid nephrotoxic medications   if continues to worsen, will obtain renal consult    #Afib/flutter  #h/o VT storm  Tele   Eliquis on hold pending cards intervention   c/w metoprolol with hold parameters  dc ikosyn per cards given FEDERICO  eventual cardioversion once renal function improves    #h/o CAD s/p PCI  #ICM s/p mems and ICD  admit to tele  cards to call HF, recs pending   hold Farxiga, Aldactone, Losartan, Torsemide given FEDERICO  c/w ASA and BB    #HTN  #HLD  c/w home Lipitor  Metoprolol with hold parameters    #Hypothyroidism  check TSH  c/w home synthroid 88    #Neuralgia   c/w home lyrical (reduced dose) and tegretol    DVT ppx: SCD  Diet: DASH/TLC

## 2023-11-16 NOTE — CONSULT NOTE ADULT - SUBJECTIVE AND OBJECTIVE BOX
Patient is a 73y old  Male who presents with a chief complaint of FEDERICO (16 Nov 2023 17:51)      HPI: Patient is a 73 year old male with history of HTN, HLD, hypothyroidism, CAD ICM s/p mems, TAVR ([facility] 8/20/20), VT storm 6/2022 and Afib who came in electively for a ANDREY and ablation. The procedure was cancelled due to labs which showed hyponatremia and FEDERICO. Of note the patient has elevated cardiomems readings and he had been receiving extra doses of metolazone in the days leading up the ablation     PAST MEDICAL & SURGICAL HISTORY:  CAD (coronary artery disease)  2004      MI (myocardial infarction)  Nov 2004      Systolic heart failure  EF 15%      HLD (hyperlipidemia)      HTN (hypertension)      AICD (automatic cardioverter/defibrillator) present      Aortic stenosis  severe, s/p AVR      H/O emphysema  - moderately severe, CT chest 06/10/21      Carotid stenosis  - moderate, b/l (Doppler 12/27/21)      Ischemic cardiomyopathy      H/O pulmonary hypertension      Longstanding persistent atrial fibrillation      Type 2 diabetes mellitus      Osteoarthritis of right knee      Neuralgia      CRI (chronic renal insufficiency)      Stented coronary artery  LAD x 3 (Nov 2004)      S/P knee surgery  - Right knee arthroscopy, 2000      S/P hernia repair  - right inguinal, 1982      S/P AVR  2015, complicated by Asystole/Syncope with 1 shock, Transcatheter valve-in-valve aortic valve replacement utilizing a right common femoral arterial percutaneous approach utilizing a 26 Medtronic Evolut core valve on 08/20/2020      AICD (automatic cardioverter/defibrillator) present  single chamber 2005, replaced with dual chamber 08/17/2021      H/O prior ablation treatment  afib - 2017, 07/2021          FAMILY HISTORY:  Chronic obstructive pulmonary disease    Family history of colon cancer (Father)        Home Medications:  aspirin 81 mg oral delayed release capsule: 1 cap(s) orally once a day (16 Nov 2023 07:43)  atorvastatin 40 mg oral tablet: 1 tab(s) orally once a day (at bedtime) (16 Nov 2023 07:43)  dofetilide 250 mcg oral capsule: 1 orally 2 times a day (16 Nov 2023 07:43)  Eliquis 5 mg oral tablet: 1 tablet orally 2 times a day Resume tonight on 8/22/23 at 9PM (16 Nov 2023 07:43)  Farxiga 10 mg oral tablet: 1 orally once a day (16 Nov 2023 07:43)  levothyroxine 88 mcg (0.088 mg) oral capsule: 1 orally once a day (16 Nov 2023 07:43)  losartan 25 mg oral tablet: 0.5 orally 2 times a day (16 Nov 2023 07:43)  Lyrica 200 mg oral capsule: 2.5 cap(s) orally 2 times a day 500 mg twice daily (16 Nov 2023 15:33)  metOLazone 2.5 mg oral tablet: 1 tab(s) orally prn prn 30min prior to furosemide (16 Nov 2023 15:33)  metoprolol succinate 25 mg oral capsule, extended release: 1 orally once a day (at bedtime) (16 Nov 2023 07:43)  spironolactone 50 mg oral tablet: 1 tab(s) orally 2 times a day (16 Nov 2023 15:33)  Tegretol  mg oral tablet, extended release: 1 orally 2 times a day (16 Nov 2023 07:43)  torsemide 40 mg oral tablet: 1 tab(s) orally 2 times a day (16 Nov 2023 15:33)      Medications:  acetaminophen     Tablet .. 650 milliGRAM(s) Oral every 6 hours PRN  aluminum hydroxide/magnesium hydroxide/simethicone Suspension 30 milliLiter(s) Oral every 4 hours PRN  apixaban 5 milliGRAM(s) Oral two times a day  aspirin  chewable 81 milliGRAM(s) Oral daily  atorvastatin 40 milliGRAM(s) Oral at bedtime  carBAMazepine ER Tablet 100 milliGRAM(s) Oral two times a day  levothyroxine 88 MICROGram(s) Oral daily  melatonin 3 milliGRAM(s) Oral at bedtime PRN  metoprolol succinate ER 25 milliGRAM(s) Oral daily  ondansetron Injectable 4 milliGRAM(s) IV Push every 8 hours PRN  pregabalin 225 milliGRAM(s) Oral two times a day      Review of systems:  10 point review of systems completed and are negative unless noted in HPI    Vitals:  T(C): 36.7 (11-16-23 @ 22:41), Max: 36.7 (11-16-23 @ 22:41)  HR: 71 (11-16-23 @ 22:41) (69 - 71)  BP: 82/59 (11-16-23 @ 22:41) (82/59 - 100/65)  BP(mean): 77 (11-16-23 @ 18:00) (77 - 77)  RR: 17 (11-16-23 @ 22:41) (15 - 17)  SpO2: 95% (11-16-23 @ 18:00) (93% - 95%)    Daily Height in cm: 182.88 (16 Nov 2023 07:44)    Daily     Weight (kg): 101.2 (11-16 @ 07:54)    I&O's Summary    16 Nov 2023 07:01  -  16 Nov 2023 23:14  --------------------------------------------------------  IN: 0 mL / OUT: 1950 mL / NET: -1950 mL        Physical Exam:  Appearance: No Acute Distress  HEENT: PERRL  Neck:   Cardiovascular: Normal S1 S2, No murmurs/rubs/gallops  Respiratory: Clear to auscultation bilaterally  Gastrointestinal: Soft, Non-tender	  Skin: No cyanosis	  Neurologic: Non-focal  Extremities: No LE edema  Psychiatry: A & O x 3, Mood & affect appropriate    Labs:                        16.3   10.96 )-----------( 196      ( 16 Nov 2023 07:30 )             45.5     11-16    128<L>  |  81<L>  |  75.0<H>  ----------------------------<  135<H>  3.5   |  31.0<H>  |  2.27<H>    Ca    10.0      16 Nov 2023 07:30  Mg     2.4     11-16

## 2023-11-16 NOTE — H&P ADULT - NSHPLABSRESULTS_GEN_ALL_CORE
16.3   10.96 )-----------( 196      ( 16 Nov 2023 07:30 )             45.5       11-16    128<L>  |  81<L>  |  75.0<H>  ----------------------------<  135<H>  3.5   |  31.0<H>  |  2.27<H>    Ca    10.0      16 Nov 2023 07:30  Mg     2.4     11-16          Urinalysis Basic - ( 16 Nov 2023 07:30 )    Color: x / Appearance: x / SG: x / pH: x  Gluc: 135 mg/dL / Ketone: x  / Bili: x / Urobili: x   Blood: x / Protein: x / Nitrite: x   Leuk Esterase: x / RBC: x / WBC x   Sq Epi: x / Non Sq Epi: x / Bacteria: x      Lactate Trend    CAPILLARY BLOOD GLUCOSE

## 2023-11-17 LAB
ANION GAP SERPL CALC-SCNC: 14 MMOL/L — SIGNIFICANT CHANGE UP (ref 5–17)
ANION GAP SERPL CALC-SCNC: 14 MMOL/L — SIGNIFICANT CHANGE UP (ref 5–17)
BUN SERPL-MCNC: 72 MG/DL — HIGH (ref 8–20)
BUN SERPL-MCNC: 72 MG/DL — HIGH (ref 8–20)
CALCIUM SERPL-MCNC: 9.5 MG/DL — SIGNIFICANT CHANGE UP (ref 8.4–10.5)
CALCIUM SERPL-MCNC: 9.5 MG/DL — SIGNIFICANT CHANGE UP (ref 8.4–10.5)
CHLORIDE SERPL-SCNC: 87 MMOL/L — LOW (ref 96–108)
CHLORIDE SERPL-SCNC: 87 MMOL/L — LOW (ref 96–108)
CO2 SERPL-SCNC: 32 MMOL/L — HIGH (ref 22–29)
CO2 SERPL-SCNC: 32 MMOL/L — HIGH (ref 22–29)
CREAT SERPL-MCNC: 1.53 MG/DL — HIGH (ref 0.5–1.3)
CREAT SERPL-MCNC: 1.53 MG/DL — HIGH (ref 0.5–1.3)
EGFR: 47 ML/MIN/1.73M2 — LOW
EGFR: 47 ML/MIN/1.73M2 — LOW
GLUCOSE SERPL-MCNC: 122 MG/DL — HIGH (ref 70–99)
GLUCOSE SERPL-MCNC: 122 MG/DL — HIGH (ref 70–99)
HCT VFR BLD CALC: 41.1 % — SIGNIFICANT CHANGE UP (ref 39–50)
HCT VFR BLD CALC: 41.1 % — SIGNIFICANT CHANGE UP (ref 39–50)
HCV AB S/CO SERPL IA: 0.32 S/CO — SIGNIFICANT CHANGE UP (ref 0–0.99)
HCV AB S/CO SERPL IA: 0.32 S/CO — SIGNIFICANT CHANGE UP (ref 0–0.99)
HCV AB SERPL-IMP: SIGNIFICANT CHANGE UP
HCV AB SERPL-IMP: SIGNIFICANT CHANGE UP
HGB BLD-MCNC: 14.5 G/DL — SIGNIFICANT CHANGE UP (ref 13–17)
HGB BLD-MCNC: 14.5 G/DL — SIGNIFICANT CHANGE UP (ref 13–17)
MAGNESIUM SERPL-MCNC: 2.4 MG/DL — SIGNIFICANT CHANGE UP (ref 1.6–2.6)
MAGNESIUM SERPL-MCNC: 2.4 MG/DL — SIGNIFICANT CHANGE UP (ref 1.6–2.6)
MCHC RBC-ENTMCNC: 31.9 PG — SIGNIFICANT CHANGE UP (ref 27–34)
MCHC RBC-ENTMCNC: 31.9 PG — SIGNIFICANT CHANGE UP (ref 27–34)
MCHC RBC-ENTMCNC: 35.3 GM/DL — SIGNIFICANT CHANGE UP (ref 32–36)
MCHC RBC-ENTMCNC: 35.3 GM/DL — SIGNIFICANT CHANGE UP (ref 32–36)
MCV RBC AUTO: 90.5 FL — SIGNIFICANT CHANGE UP (ref 80–100)
MCV RBC AUTO: 90.5 FL — SIGNIFICANT CHANGE UP (ref 80–100)
PHOSPHATE SERPL-MCNC: 4 MG/DL — SIGNIFICANT CHANGE UP (ref 2.4–4.7)
PHOSPHATE SERPL-MCNC: 4 MG/DL — SIGNIFICANT CHANGE UP (ref 2.4–4.7)
PLATELET # BLD AUTO: 164 K/UL — SIGNIFICANT CHANGE UP (ref 150–400)
PLATELET # BLD AUTO: 164 K/UL — SIGNIFICANT CHANGE UP (ref 150–400)
POTASSIUM SERPL-MCNC: 3.4 MMOL/L — LOW (ref 3.5–5.3)
POTASSIUM SERPL-MCNC: 3.4 MMOL/L — LOW (ref 3.5–5.3)
POTASSIUM SERPL-SCNC: 3.4 MMOL/L — LOW (ref 3.5–5.3)
POTASSIUM SERPL-SCNC: 3.4 MMOL/L — LOW (ref 3.5–5.3)
RAPID RVP RESULT: SIGNIFICANT CHANGE UP
RAPID RVP RESULT: SIGNIFICANT CHANGE UP
RBC # BLD: 4.54 M/UL — SIGNIFICANT CHANGE UP (ref 4.2–5.8)
RBC # BLD: 4.54 M/UL — SIGNIFICANT CHANGE UP (ref 4.2–5.8)
RBC # FLD: 13.6 % — SIGNIFICANT CHANGE UP (ref 10.3–14.5)
RBC # FLD: 13.6 % — SIGNIFICANT CHANGE UP (ref 10.3–14.5)
SARS-COV-2 RNA SPEC QL NAA+PROBE: SIGNIFICANT CHANGE UP
SARS-COV-2 RNA SPEC QL NAA+PROBE: SIGNIFICANT CHANGE UP
SODIUM SERPL-SCNC: 133 MMOL/L — LOW (ref 135–145)
SODIUM SERPL-SCNC: 133 MMOL/L — LOW (ref 135–145)
TSH SERPL-MCNC: 1.56 UIU/ML — SIGNIFICANT CHANGE UP (ref 0.27–4.2)
TSH SERPL-MCNC: 1.56 UIU/ML — SIGNIFICANT CHANGE UP (ref 0.27–4.2)
WBC # BLD: 9.36 K/UL — SIGNIFICANT CHANGE UP (ref 3.8–10.5)
WBC # BLD: 9.36 K/UL — SIGNIFICANT CHANGE UP (ref 3.8–10.5)
WBC # FLD AUTO: 9.36 K/UL — SIGNIFICANT CHANGE UP (ref 3.8–10.5)
WBC # FLD AUTO: 9.36 K/UL — SIGNIFICANT CHANGE UP (ref 3.8–10.5)

## 2023-11-17 PROCEDURE — 99233 SBSQ HOSP IP/OBS HIGH 50: CPT

## 2023-11-17 PROCEDURE — 99232 SBSQ HOSP IP/OBS MODERATE 35: CPT

## 2023-11-17 RX ORDER — POTASSIUM CHLORIDE 20 MEQ
40 PACKET (EA) ORAL ONCE
Refills: 0 | Status: COMPLETED | OUTPATIENT
Start: 2023-11-17 | End: 2023-11-17

## 2023-11-17 RX ORDER — SPIRONOLACTONE 25 MG/1
25 TABLET, FILM COATED ORAL DAILY
Refills: 0 | Status: DISCONTINUED | OUTPATIENT
Start: 2023-11-17 | End: 2023-11-19

## 2023-11-17 RX ADMIN — APIXABAN 5 MILLIGRAM(S): 2.5 TABLET, FILM COATED ORAL at 17:21

## 2023-11-17 RX ADMIN — Medication 40 MILLIEQUIVALENT(S): at 08:55

## 2023-11-17 RX ADMIN — Medication 40 MILLIGRAM(S): at 17:21

## 2023-11-17 RX ADMIN — Medication 225 MILLIGRAM(S): at 17:20

## 2023-11-17 RX ADMIN — ATORVASTATIN CALCIUM 40 MILLIGRAM(S): 80 TABLET, FILM COATED ORAL at 22:35

## 2023-11-17 RX ADMIN — Medication 88 MICROGRAM(S): at 06:10

## 2023-11-17 RX ADMIN — SPIRONOLACTONE 25 MILLIGRAM(S): 25 TABLET, FILM COATED ORAL at 15:47

## 2023-11-17 RX ADMIN — Medication 81 MILLIGRAM(S): at 12:10

## 2023-11-17 RX ADMIN — Medication 225 MILLIGRAM(S): at 06:10

## 2023-11-17 RX ADMIN — Medication 100 MILLIGRAM(S): at 17:21

## 2023-11-17 RX ADMIN — Medication 100 MILLIGRAM(S): at 06:16

## 2023-11-17 RX ADMIN — APIXABAN 5 MILLIGRAM(S): 2.5 TABLET, FILM COATED ORAL at 06:12

## 2023-11-17 RX ADMIN — Medication 50 MILLILITER(S): at 00:12

## 2023-11-17 NOTE — PROGRESS NOTE ADULT - SUBJECTIVE AND OBJECTIVE BOX
Subjective: Pt seen and examined, resting comfortably in bed. Morning labs with notable improvement, Cr 1.53 and Na 133. SBP ~.     TELE: AF, intermittent Vpacing; no events     Cardiology summary:   Echo 10/24/23; akinetic apex, EF 20-25% TAVR well seated no AI, mild PH   Nuclear stress test; 1/6/22 large anterior, apical , inferior, inferolateral fixed defects s/o infarct, TID 1.05, EF 19%  PYP study was equivocal for cardiac amyloid                                                              MEDICATIONS  (STANDING):  apixaban 5 milliGRAM(s) Oral two times a day  aspirin  chewable 81 milliGRAM(s) Oral daily  atorvastatin 40 milliGRAM(s) Oral at bedtime  carBAMazepine ER Tablet 100 milliGRAM(s) Oral two times a day  levothyroxine 88 MICROGram(s) Oral daily  metoprolol succinate ER 25 milliGRAM(s) Oral daily  pregabalin 225 milliGRAM(s) Oral two times a day    MEDICATIONS  (PRN):  acetaminophen     Tablet .. 650 milliGRAM(s) Oral every 6 hours PRN Temp greater or equal to 38C (100.4F), Mild Pain (1 - 3)  aluminum hydroxide/magnesium hydroxide/simethicone Suspension 30 milliLiter(s) Oral every 4 hours PRN Dyspepsia  melatonin 3 milliGRAM(s) Oral at bedtime PRN Insomnia  ondansetron Injectable 4 milliGRAM(s) IV Push every 8 hours PRN Nausea and/or Vomiting      Allergies  No Known Allergies    Intolerances  Entresto (Other)  amiodarone (Other)      Vital Signs Last 24 Hrs  T(C): 36.7 (17 Nov 2023 12:07), Max: 36.7 (16 Nov 2023 22:41)  T(F): 98 (17 Nov 2023 12:07), Max: 98.1 (16 Nov 2023 22:41)  HR: 74 (17 Nov 2023 12:07) (62 - 74)  BP: 99/69 (17 Nov 2023 12:07) (82/59 - 101/60)  BP(mean): 77 (16 Nov 2023 18:00) (77 - 77)  RR: 16 (17 Nov 2023 12:07) (15 - 18)  SpO2: 96% (17 Nov 2023 12:07) (90% - 96%)    Parameters below as of 17 Nov 2023 12:07  Patient On (Oxygen Delivery Method): room air    Physical Exam:  Constitutional: NAD, AAOx3  Cardiovascular: +S1S2 RRR  Pulmonary: CTA b/l, unlabored  GI: soft NTND   Extremities: no pedal edema  Neuro: non focal, TERRAZAS x4    LABS:                        14.5   9.36  )-----------( 164      ( 17 Nov 2023 04:40 )             41.1     11-17    133<L>  |  87<L>  |  72.0<H>  ----------------------------<  122<H>  3.4<L>   |  32.0<H>  |  1.53<H>    Ca    9.5      17 Nov 2023 04:40  Phos  4.0     11-17  Mg     2.4     11-17        Urinalysis Basic - ( 17 Nov 2023 04:40 )    Color: x / Appearance: x / SG: x / pH: x  Gluc: 122 mg/dL / Ketone: x  / Bili: x / Urobili: x   Blood: x / Protein: x / Nitrite: x   Leuk Esterase: x / RBC: x / WBC x   Sq Epi: x / Non Sq Epi: x / Bacteria: x        RADIOLOGY & ADDITIONAL TESTS:

## 2023-11-17 NOTE — PROGRESS NOTE ADULT - SUBJECTIVE AND OBJECTIVE BOX
Interval History: no acute events overnight    Medications:  acetaminophen     Tablet .. 650 milliGRAM(s) Oral every 6 hours PRN  aluminum hydroxide/magnesium hydroxide/simethicone Suspension 30 milliLiter(s) Oral every 4 hours PRN  apixaban 5 milliGRAM(s) Oral two times a day  aspirin  chewable 81 milliGRAM(s) Oral daily  atorvastatin 40 milliGRAM(s) Oral at bedtime  carBAMazepine ER Tablet 100 milliGRAM(s) Oral two times a day  levothyroxine 88 MICROGram(s) Oral daily  melatonin 3 milliGRAM(s) Oral at bedtime PRN  metoprolol succinate ER 25 milliGRAM(s) Oral daily  ondansetron Injectable 4 milliGRAM(s) IV Push every 8 hours PRN  pregabalin 225 milliGRAM(s) Oral two times a day  spironolactone 25 milliGRAM(s) Oral daily  torsemide 40 milliGRAM(s) Oral daily      Vitals:  T(C): 36.7 (23 @ 12:07), Max: 36.7 (23 @ 22:41)  HR: 74 (23 @ 12:07) (62 - 74)  BP: 99/69 (23 @ 12:07) (82/59 - 101/60)  BP(mean): 77 (23 @ 18:00) (77 - 77)  RR: 16 (23 @ 12:07) (15 - 18)  SpO2: 96% (23 @ 12:07) (90% - 96%)    Daily     Daily Weight in k.4 (2023 05:55)    Weight (kg): 101.2 ( @ 07:54)    I&O's Summary    2023 07:  -  2023 07:00  --------------------------------------------------------  IN: 0 mL / OUT: 2750 mL / NET: -2750 mL    2023 07:01  -  2023 14:58  --------------------------------------------------------  IN: 0 mL / OUT: 900 mL / NET: -900 mL        Physical Exam:  Appearance: No Acute Distress  HEENT: PERRL  Neck: No JVD  Cardiovascular: Normal S1 S2, No murmurs/rubs/gallops  Respiratory: Clear to auscultation bilaterally  Gastrointestinal: Soft, Non-tender	  Skin: No cyanosis	  Neurologic: Non-focal  Extremities: No LE edema  Psychiatry: A & O x 3, Mood & affect appropriate    Labs:                        14.5   9.36  )-----------( 164      ( 2023 04:40 )             41.1         133<L>  |  87<L>  |  72.0<H>  ----------------------------<  122<H>  3.4<L>   |  32.0<H>  |  1.53<H>    Ca    9.5      2023 04:40  Phos  4.0       Mg     2.4

## 2023-11-17 NOTE — PROGRESS NOTE ADULT - ASSESSMENT
74yo male with PMH with PMH of HTN, HLD, hypothyroidism, CRI, CAD s/p late-presenting MI 2005 s/p PCI LAD, ICM s/p mems, s/p MDT single chamber ICD with lead revision in 2018 then dual chamber (failed CRT), bioprosthetic AVR (2015), severe bioprosthetic AS s/p valve in valve TAVR ([facility] 8/20/20), VT storm 6/2022 in the setting of GI infection and dehydration, and Atrial flutter s/p ablation 7/17 w/ persistent afib s/p DCCV 8/12/20 then 7/21 and placed on Tikosyn. He presented electively for a ANDREY, AF ablation with Dr. Apple, however, on preprocedure labs he was noted to have an FEDERICO (Cr. 2.27; baseline 1.3) and hyponatremia 128 with soft BP (SPB 80s). He does report recent elevated cardiomems readings and he had been receiving extra doses of metolazone in the days leading up the ablation.  He was admitted to the hospital and advanced heart failure team consulted.        Recommendations:    1. Atrial fibrillation  - Continue with Toprol 25mg daily  - Continue with Eliquis for stroke prevention  - Will plan on ANDREY guided cardioversion Monday; please keep NPO p Midnight    2. FEDERICO  - likely secondary to overdiuresis   - improving- diuretics and losartan on hold   - HF with plan for trial back on Losartan and Torsemide     3. HFrEF  - management per HF team

## 2023-11-17 NOTE — PROGRESS NOTE ADULT - SUBJECTIVE AND OBJECTIVE BOX
SUBJECTIVE / OVERNIGHT EVENTS: Seen and examined.  Feels well, sitting comfortably in chair. Denies chest pain, SOB, abd pain, N/V, fever, chills, dysuria or any other complaints. All remainder ROS negative.     MEDICATIONS  (STANDING):  apixaban 5 milliGRAM(s) Oral two times a day  aspirin  chewable 81 milliGRAM(s) Oral daily  atorvastatin 40 milliGRAM(s) Oral at bedtime  carBAMazepine ER Tablet 100 milliGRAM(s) Oral two times a day  levothyroxine 88 MICROGram(s) Oral daily  metoprolol succinate ER 25 milliGRAM(s) Oral daily  pregabalin 225 milliGRAM(s) Oral two times a day    MEDICATIONS  (PRN):  acetaminophen     Tablet .. 650 milliGRAM(s) Oral every 6 hours PRN Temp greater or equal to 38C (100.4F), Mild Pain (1 - 3)  aluminum hydroxide/magnesium hydroxide/simethicone Suspension 30 milliLiter(s) Oral every 4 hours PRN Dyspepsia  melatonin 3 milliGRAM(s) Oral at bedtime PRN Insomnia  ondansetron Injectable 4 milliGRAM(s) IV Push every 8 hours PRN Nausea and/or Vomiting        I&O's Summary    16 Nov 2023 07:01  -  17 Nov 2023 07:00  --------------------------------------------------------  IN: 0 mL / OUT: 2750 mL / NET: -2750 mL    17 Nov 2023 07:01  -  17 Nov 2023 12:40  --------------------------------------------------------  IN: 0 mL / OUT: 900 mL / NET: -900 mL        PHYSICAL EXAM:  Vital Signs Last 24 Hrs  T(C): 36.7 (17 Nov 2023 12:07), Max: 36.7 (16 Nov 2023 22:41)  T(F): 98 (17 Nov 2023 12:07), Max: 98.1 (16 Nov 2023 22:41)  HR: 74 (17 Nov 2023 12:07) (62 - 74)  BP: 99/69 (17 Nov 2023 12:07) (82/59 - 101/60)  BP(mean): 77 (16 Nov 2023 18:00) (77 - 77)  RR: 16 (17 Nov 2023 12:07) (15 - 18)  SpO2: 96% (17 Nov 2023 12:07) (90% - 96%)    Parameters below as of 17 Nov 2023 12:07  Patient On (Oxygen Delivery Method): room air          GENERAL: NAD, lying in bed comfortably  HEAD:  Atraumatic, Normocephalic  EYES: EOMI, PERRLA, conjunctiva and sclera clear  ENT: Moist mucous membranes  NECK: Supple, No JVD  CHEST/LUNG: Clear to auscultation bilaterally; Unlabored respirations  HEART: Regular rate and rhythm; No murmurs, rubs, or gallops  ABDOMEN: BSx4; Soft, nontender, nondistended  NERVOUS SYSTEM:  A&Ox3, no focal deficits   SKIN: No rashes or lesions  PSYCH: Normal affect, euthymic mood    LABS:                        14.5   9.36  )-----------( 164      ( 17 Nov 2023 04:40 )             41.1     11-17    133<L>  |  87<L>  |  72.0<H>  ----------------------------<  122<H>  3.4<L>   |  32.0<H>  |  1.53<H>    Ca    9.5      17 Nov 2023 04:40  Phos  4.0     11-17  Mg     2.4     11-17            Urinalysis Basic - ( 17 Nov 2023 04:40 )    Color: x / Appearance: x / SG: x / pH: x  Gluc: 122 mg/dL / Ketone: x  / Bili: x / Urobili: x   Blood: x / Protein: x / Nitrite: x   Leuk Esterase: x / RBC: x / WBC x   Sq Epi: x / Non Sq Epi: x / Bacteria: x        CAPILLARY BLOOD GLUCOSE            RADIOLOGY & ADDITIONAL TESTS:  Results Reviewed:   Imaging Personally Reviewed:  Electrocardiogram Personally Reviewed:

## 2023-11-17 NOTE — PROGRESS NOTE ADULT - PROBLEM SELECTOR PLAN 1
Patient feels better after taking in more oral fluids overnight and holding his diuretics. Creatinine and blood pressure also improved  C/w Toprol XL 25mg daily  Will resume spironolactone 25mg daily today  Will resume torsemide 40mg daily tomorrow  If HD stable and creatinine continues to normalize, will add losartan 12.5mg daily  Will hold off on adding farxiga because patient will be NPO for cardioversion on Monday    Home meds (TOprol 25mg daily, spironolactone 50mg BID, farxiga 10mg and losartan 12.5mg BID)

## 2023-11-17 NOTE — PROGRESS NOTE ADULT - ASSESSMENT
Patient is a 73 year old male with history of HTN, HLD, hypothyroidism, CAD ICM s/p mems, TAVR ([facility] 8/20/20), VT storm 6/2022 and Afib who came in electively for a ANDREY and ablation. The procedure was cancelled due to labs which showed hyponatremia and FEDERICO. Of note the patient has elevated cardiomems readings and he had been receiving extra doses of metolazone in the days leading up the ablation

## 2023-11-17 NOTE — PROGRESS NOTE ADULT - ASSESSMENT
74 yo male with PMH with PMH of HTN, HLD, neuralgia, hypothyroidism, CAD s/p PCI, ICM s/p mems and ICD, h/o AVR (2015) and then TAVR ([facility] 8/20/20), VT storm 6/2022, and Atrial flutter s/p ablation 7/17 w/ persistent afib s/p DCCV 8/12/20 then 7/21 now on Tikosyn, admitted for FEDERICO.     #FEDERICO (Improving today  Baseline Scr 1.3, now up to 2.27, suspect 2/2 excessive diuresis & ATN from hypotension  Crea 1.53 today  hold Farxiga, Aldactone, Losartan, Torsemide  Renal ultrasound- negative for acute, bladder distended. patient endorses urinating without issues.  f/u urine studies and UA- negative  trend BMP, renally dose meds and avoid nephrotoxic medications   if continues to worsen, will obtain renal consult    #Afib/flutter  #h/o VT storm  Tele   C/w Eliquis  c/w metoprolol with hold parameters  dc ikosyn per cards given FEDERICO  eventual cardioversion once renal function improves  Spoke with EP- will plan for DCCV on Monday, NPO midnight sunday    #h/o CAD s/p PCI  #ICM s/p mems and ICD  C/w Tele  HF team following, will await recs   hold Farxiga, Aldactone, Losartan, Torsemide given FEDERICO  c/w ASA and BB    #HTN  #HLD  c/w home Lipitor  Metoprolol with hold parameters    #Hypothyroidism  TSH 1.56  c/w home synthroid 88    #Neuralgia   c/w home lyrical (reduced dose) and tegretol    DVT ppx: SCD  Diet: DASH/TLC

## 2023-11-18 DIAGNOSIS — I48.19 OTHER PERSISTENT ATRIAL FIBRILLATION: ICD-10-CM

## 2023-11-18 LAB
ALBUMIN SERPL ELPH-MCNC: 4.5 G/DL — SIGNIFICANT CHANGE UP (ref 3.3–5.2)
ALBUMIN SERPL ELPH-MCNC: 4.5 G/DL — SIGNIFICANT CHANGE UP (ref 3.3–5.2)
ALP SERPL-CCNC: 182 U/L — HIGH (ref 40–120)
ALP SERPL-CCNC: 182 U/L — HIGH (ref 40–120)
ALT FLD-CCNC: 30 U/L — SIGNIFICANT CHANGE UP
ALT FLD-CCNC: 30 U/L — SIGNIFICANT CHANGE UP
ANION GAP SERPL CALC-SCNC: 12 MMOL/L — SIGNIFICANT CHANGE UP (ref 5–17)
ANION GAP SERPL CALC-SCNC: 12 MMOL/L — SIGNIFICANT CHANGE UP (ref 5–17)
AST SERPL-CCNC: 31 U/L — SIGNIFICANT CHANGE UP
AST SERPL-CCNC: 31 U/L — SIGNIFICANT CHANGE UP
BILIRUB SERPL-MCNC: 0.6 MG/DL — SIGNIFICANT CHANGE UP (ref 0.4–2)
BILIRUB SERPL-MCNC: 0.6 MG/DL — SIGNIFICANT CHANGE UP (ref 0.4–2)
BUN SERPL-MCNC: 58.7 MG/DL — HIGH (ref 8–20)
BUN SERPL-MCNC: 58.7 MG/DL — HIGH (ref 8–20)
CALCIUM SERPL-MCNC: 9.6 MG/DL — SIGNIFICANT CHANGE UP (ref 8.4–10.5)
CALCIUM SERPL-MCNC: 9.6 MG/DL — SIGNIFICANT CHANGE UP (ref 8.4–10.5)
CHLORIDE SERPL-SCNC: 92 MMOL/L — LOW (ref 96–108)
CHLORIDE SERPL-SCNC: 92 MMOL/L — LOW (ref 96–108)
CO2 SERPL-SCNC: 32 MMOL/L — HIGH (ref 22–29)
CO2 SERPL-SCNC: 32 MMOL/L — HIGH (ref 22–29)
CREAT SERPL-MCNC: 1.2 MG/DL — SIGNIFICANT CHANGE UP (ref 0.5–1.3)
CREAT SERPL-MCNC: 1.2 MG/DL — SIGNIFICANT CHANGE UP (ref 0.5–1.3)
EGFR: 63 ML/MIN/1.73M2 — SIGNIFICANT CHANGE UP
EGFR: 63 ML/MIN/1.73M2 — SIGNIFICANT CHANGE UP
GLUCOSE SERPL-MCNC: 137 MG/DL — HIGH (ref 70–99)
GLUCOSE SERPL-MCNC: 137 MG/DL — HIGH (ref 70–99)
HCT VFR BLD CALC: 45.6 % — SIGNIFICANT CHANGE UP (ref 39–50)
HCT VFR BLD CALC: 45.6 % — SIGNIFICANT CHANGE UP (ref 39–50)
HGB BLD-MCNC: 15.1 G/DL — SIGNIFICANT CHANGE UP (ref 13–17)
HGB BLD-MCNC: 15.1 G/DL — SIGNIFICANT CHANGE UP (ref 13–17)
MCHC RBC-ENTMCNC: 30.9 PG — SIGNIFICANT CHANGE UP (ref 27–34)
MCHC RBC-ENTMCNC: 30.9 PG — SIGNIFICANT CHANGE UP (ref 27–34)
MCHC RBC-ENTMCNC: 33.1 GM/DL — SIGNIFICANT CHANGE UP (ref 32–36)
MCHC RBC-ENTMCNC: 33.1 GM/DL — SIGNIFICANT CHANGE UP (ref 32–36)
MCV RBC AUTO: 93.3 FL — SIGNIFICANT CHANGE UP (ref 80–100)
MCV RBC AUTO: 93.3 FL — SIGNIFICANT CHANGE UP (ref 80–100)
PLATELET # BLD AUTO: 162 K/UL — SIGNIFICANT CHANGE UP (ref 150–400)
PLATELET # BLD AUTO: 162 K/UL — SIGNIFICANT CHANGE UP (ref 150–400)
POTASSIUM SERPL-MCNC: 3.3 MMOL/L — LOW (ref 3.5–5.3)
POTASSIUM SERPL-MCNC: 3.3 MMOL/L — LOW (ref 3.5–5.3)
POTASSIUM SERPL-SCNC: 3.3 MMOL/L — LOW (ref 3.5–5.3)
POTASSIUM SERPL-SCNC: 3.3 MMOL/L — LOW (ref 3.5–5.3)
PROT SERPL-MCNC: 7.3 G/DL — SIGNIFICANT CHANGE UP (ref 6.6–8.7)
PROT SERPL-MCNC: 7.3 G/DL — SIGNIFICANT CHANGE UP (ref 6.6–8.7)
RBC # BLD: 4.89 M/UL — SIGNIFICANT CHANGE UP (ref 4.2–5.8)
RBC # BLD: 4.89 M/UL — SIGNIFICANT CHANGE UP (ref 4.2–5.8)
RBC # FLD: 13.8 % — SIGNIFICANT CHANGE UP (ref 10.3–14.5)
RBC # FLD: 13.8 % — SIGNIFICANT CHANGE UP (ref 10.3–14.5)
SODIUM SERPL-SCNC: 136 MMOL/L — SIGNIFICANT CHANGE UP (ref 135–145)
SODIUM SERPL-SCNC: 136 MMOL/L — SIGNIFICANT CHANGE UP (ref 135–145)
WBC # BLD: 10.93 K/UL — HIGH (ref 3.8–10.5)
WBC # BLD: 10.93 K/UL — HIGH (ref 3.8–10.5)
WBC # FLD AUTO: 10.93 K/UL — HIGH (ref 3.8–10.5)
WBC # FLD AUTO: 10.93 K/UL — HIGH (ref 3.8–10.5)

## 2023-11-18 PROCEDURE — 99232 SBSQ HOSP IP/OBS MODERATE 35: CPT

## 2023-11-18 PROCEDURE — 99232 SBSQ HOSP IP/OBS MODERATE 35: CPT | Mod: 25

## 2023-11-18 RX ORDER — POTASSIUM CHLORIDE 20 MEQ
40 PACKET (EA) ORAL EVERY 4 HOURS
Refills: 0 | Status: COMPLETED | OUTPATIENT
Start: 2023-11-18 | End: 2023-11-18

## 2023-11-18 RX ORDER — BENZOCAINE AND MENTHOL 5; 1 G/100ML; G/100ML
1 LIQUID ORAL EVERY 4 HOURS
Refills: 0 | Status: DISCONTINUED | OUTPATIENT
Start: 2023-11-18 | End: 2023-11-19

## 2023-11-18 RX ADMIN — Medication 40 MILLIEQUIVALENT(S): at 13:33

## 2023-11-18 RX ADMIN — Medication 81 MILLIGRAM(S): at 10:16

## 2023-11-18 RX ADMIN — APIXABAN 5 MILLIGRAM(S): 2.5 TABLET, FILM COATED ORAL at 05:06

## 2023-11-18 RX ADMIN — Medication 88 MICROGRAM(S): at 05:04

## 2023-11-18 RX ADMIN — APIXABAN 5 MILLIGRAM(S): 2.5 TABLET, FILM COATED ORAL at 17:28

## 2023-11-18 RX ADMIN — Medication 225 MILLIGRAM(S): at 17:28

## 2023-11-18 RX ADMIN — Medication 25 MILLIGRAM(S): at 05:06

## 2023-11-18 RX ADMIN — Medication 40 MILLIGRAM(S): at 05:04

## 2023-11-18 RX ADMIN — Medication 40 MILLIEQUIVALENT(S): at 10:15

## 2023-11-18 RX ADMIN — SPIRONOLACTONE 25 MILLIGRAM(S): 25 TABLET, FILM COATED ORAL at 05:04

## 2023-11-18 RX ADMIN — BENZOCAINE AND MENTHOL 1 LOZENGE: 5; 1 LIQUID ORAL at 11:12

## 2023-11-18 RX ADMIN — Medication 100 MILLIGRAM(S): at 17:28

## 2023-11-18 RX ADMIN — Medication 225 MILLIGRAM(S): at 05:11

## 2023-11-18 RX ADMIN — ATORVASTATIN CALCIUM 40 MILLIGRAM(S): 80 TABLET, FILM COATED ORAL at 20:32

## 2023-11-18 RX ADMIN — Medication 100 MILLIGRAM(S): at 05:04

## 2023-11-18 NOTE — PROGRESS NOTE ADULT - ASSESSMENT
74 yo male with PMH with PMH of HTN, HLD, neuralgia, hypothyroidism, CAD s/p PCI, ICM s/p mems and ICD, h/o AVR (2015) and then TAVR ([facility] 8/20/20), VT storm 6/2022, and Atrial flutter s/p ablation 7/17 w/ persistent afib s/p DCCV 8/12/20 then 7/21 now on Tikosyn, admitted for FEDERICO.     #FEDERICO  Baseline Scr 1.3, now up to 2.27, suspect 2/2 excessive diuresis & ATN from hypotension  Crea 1.2 today  Renal ultrasound- negative for acute, bladder distended. patient endorses urinating without issues.  trend BMP, renally dose meds and avoid nephrotoxic medications   Improving     #Afib/flutter  #h/o VT storm  Tele   C/w Eliquis  c/w metoprolol with hold parameters  Resume Tikosyn if renal function remains stable  Outpatient follow up with Dr. Apple    #h/o CAD s/p PCI  #ICM s/p mems and ICD  Continue Torsemide, Aldactone and Metoprolol   Resume Losartan tomorrow if renal function remains stable   c/w ASA and Lipitor     #HTN  #HLD  c/w home Lipitor  Metoprolol with hold parameters    #Hypothyroidism  TSH 1.56  c/w home synthroid 88    #Neuralgia   c/w continue Lyrica and Carbamazepine     DVT Prophylaxis -- Patient is on Eliquis    Dispo: Home likely tomorrow if renal function remains stable.      74 yo male with PMH with PMH of HTN, HLD, neuralgia, hypothyroidism, CAD s/p PCI, ICM s/p mems and ICD, h/o AVR (2015) and then TAVR ([facility] 8/20/20), VT storm 6/2022, and Atrial flutter s/p ablation 7/17 w/ persistent afib s/p DCCV 8/12/20 then 7/21 now on Tikosyn, admitted for FEDERICO.     #FEDERICO  Baseline Scr 1.3, now up to 2.27, suspect 2/2 excessive diuresis & ATN from hypotension  Crea 1.2 today  Renal ultrasound- negative for acute, bladder distended. patient endorses urinating without issues.  trend BMP, renally dose meds and avoid nephrotoxic medications   Improving     #Afib/flutter  #h/o VT storm  Tele   C/w Eliquis  c/w metoprolol with hold parameters  Resume Tikosyn if renal function remains stable  Outpatient follow up with Dr. Apple    #h/o CAD s/p PCI  #ICM s/p mems and ICD  Continue Torsemide, Aldactone and Metoprolol   Resume Losartan tomorrow if renal function remains stable   c/w ASA and Lipitor   HF recs appreciated     #HTN  #HLD  c/w home Lipitor  Metoprolol with hold parameters    #Hypothyroidism  TSH 1.56  c/w home synthroid 88    #Neuralgia   c/w continue Lyrica and Carbamazepine     DVT Prophylaxis -- Patient is on Eliquis    Dispo: Home likely tomorrow if renal function remains stable.      74 yo male with PMH with PMH of HTN, HLD, neuralgia, hypothyroidism, CAD s/p PCI, ICM s/p mems and ICD, h/o AVR (2015) and then TAVR ([facility] 8/20/20), VT storm 6/2022, and Atrial flutter s/p ablation 7/17 w/ persistent afib s/p DCCV 8/12/20 then 7/21 now on Tikosyn, admitted for FEDERICO.     #FEDERICO  Baseline Scr 1.3, now up to 2.27, suspect 2/2 excessive diuresis & ATN from hypotension  Crea 1.2 today  Renal ultrasound- negative for acute, bladder distended. patient endorses urinating without issues.  trend BMP, renally dose meds and avoid nephrotoxic medications   Improving     #Hypokalemia  Repleted     #Afib/flutter  #h/o VT storm  Tele   C/w Eliquis  c/w metoprolol with hold parameters  Resume Tikosyn if renal function remains stable  Outpatient follow up with Dr. Apple    #h/o CAD s/p PCI  #ICM s/p mems and ICD  Continue Torsemide, Aldactone and Metoprolol   Resume Losartan tomorrow if renal function remains stable   c/w ASA and Lipitor   HF recs appreciated     #HTN  #HLD  c/w home Lipitor  Metoprolol with hold parameters    #Hypothyroidism  TSH 1.56  c/w home synthroid 88    #Neuralgia   c/w continue Lyrica and Carbamazepine     DVT Prophylaxis -- Patient is on Eliquis    Dispo: Home likely tomorrow if renal function remains stable.

## 2023-11-18 NOTE — PROGRESS NOTE ADULT - PROBLEM SELECTOR PLAN 1
Euvolemic on exam.    GDTM:   Toprol XL 25mg daily  Spironolactone 25mg daily today  Torsemide 40mg daily    add losartan 12.5mg daily  Will hold off on adding farxiga because patient will be NPO for cardioversion on Monday  Strict I and O'  -2160ml/24 hour  Weight daily

## 2023-11-18 NOTE — PROGRESS NOTE ADULT - SUBJECTIVE AND OBJECTIVE BOX
Feeling much better. Wife at bedside  Creatinine back to baseline  AF with ventricular pacing. Controlled rate       EKG: AF/V paced    MEDICATIONS  (STANDING):  apixaban 5 milliGRAM(s) Oral two times a day  aspirin  chewable 81 milliGRAM(s) Oral daily  atorvastatin 40 milliGRAM(s) Oral at bedtime  carBAMazepine ER Tablet 100 milliGRAM(s) Oral two times a day  levothyroxine 88 MICROGram(s) Oral daily  metoprolol succinate ER 25 milliGRAM(s) Oral daily  pregabalin 225 milliGRAM(s) Oral two times a day  spironolactone 25 milliGRAM(s) Oral daily  torsemide 40 milliGRAM(s) Oral daily    MEDICATIONS  (PRN):  acetaminophen     Tablet .. 650 milliGRAM(s) Oral every 6 hours PRN Temp greater or equal to 38C (100.4F), Mild Pain (1 - 3)  aluminum hydroxide/magnesium hydroxide/simethicone Suspension 30 milliLiter(s) Oral every 4 hours PRN Dyspepsia  benzocaine/menthol Lozenge 1 Lozenge Oral every 4 hours PRN Sore Throat  melatonin 3 milliGRAM(s) Oral at bedtime PRN Insomnia  ondansetron Injectable 4 milliGRAM(s) IV Push every 8 hours PRN Nausea and/or Vomiting      Allergies    No Known Allergies    Intolerances    Entresto (Other)  amiodarone (Other)    PAST MEDICAL & SURGICAL HISTORY:  CAD (coronary artery disease)  2004      MI (myocardial infarction)  Nov 2004      Systolic heart failure  EF 15%      HLD (hyperlipidemia)      HTN (hypertension)      AICD (automatic cardioverter/defibrillator) present      Aortic stenosis  severe, s/p AVR      H/O emphysema  - moderately severe, CT chest 06/10/21      Carotid stenosis  - moderate, b/l (Doppler 12/27/21)      Ischemic cardiomyopathy      H/O pulmonary hypertension      Longstanding persistent atrial fibrillation      Type 2 diabetes mellitus      Osteoarthritis of right knee      Neuralgia      CRI (chronic renal insufficiency)      Stented coronary artery  LAD x 3 (Nov 2004)      S/P knee surgery  - Right knee arthroscopy, 2000      S/P hernia repair  - right inguinal, 1982      S/P AVR  2015, complicated by Asystole/Syncope with 1 shock, Transcatheter valve-in-valve aortic valve replacement utilizing a right common femoral arterial percutaneous approach utilizing a 26 [employer]onic Evolut core valve on 08/20/2020      AICD (automatic cardioverter/defibrillator) present  single chamber 2005, replaced with dual chamber 08/17/2021      H/O prior ablation treatment  afib - 2017, 07/2021          Vital Signs Last 24 Hrs  T(C): 37 (18 Nov 2023 12:08), Max: 37 (18 Nov 2023 12:08)  T(F): 98.6 (18 Nov 2023 12:08), Max: 98.6 (18 Nov 2023 12:08)  HR: 77 (18 Nov 2023 12:08) (70 - 79)  BP: 106/71 (18 Nov 2023 12:08) (97/63 - 112/65)  BP(mean): --  RR: 18 (18 Nov 2023 12:08) (17 - 18)  SpO2: 98% (18 Nov 2023 12:08) (97% - 98%)    Parameters below as of 18 Nov 2023 12:08  Patient On (Oxygen Delivery Method): room air        Physical Exam:  Constitutional: NAD, AAOx3  Cardiovascular: irreg  Pulmonary: CTA b/l, unlabored  Abd: soft NTND +BS  Extremities: trace pedal edema, +distal pulses b/l  Neuro: non focal    LABS:                        15.1   10.93 )-----------( 162      ( 18 Nov 2023 05:33 )             45.6     11-18    136  |  92<L>  |  58.7<H>  ----------------------------<  137<H>  3.3<L>   |  32.0<H>  |  1.20    Ca    9.6      18 Nov 2023 05:33  Phos  4.0     11-17  Mg     2.4     11-17    TPro  7.3  /  Alb  4.5  /  TBili  0.6  /  DBili  x   /  AST  31  /  ALT  30  /  AlkPhos  182<H>  11-18      Urinalysis Basic - ( 18 Nov 2023 05:33 )    Color: x / Appearance: x / SG: x / pH: x  Gluc: 137 mg/dL / Ketone: x  / Bili: x / Urobili: x   Blood: x / Protein: x / Nitrite: x   Leuk Esterase: x / RBC: x / WBC x   Sq Epi: x / Non Sq Epi: x / Bacteria: x

## 2023-11-18 NOTE — PROGRESS NOTE ADULT - ASSESSMENT
73 year old male with history of HTN, HLD, hypothyroidism, CAD ICM s/p mems, TAVR ([facility] 8/20/20), VT storm 6/2022 and Afib who came in electively for a ANDREY and ablation. The procedure was cancelled due to labs which showed hyponatremia and FEDERICO.  Beging followed by Heart failure and EP.

## 2023-11-18 NOTE — PROGRESS NOTE ADULT - NS ATTEND AMEND GEN_ALL_CORE FT
.  .  CHF team augmenting GDMT. Plan for ANDREY/CV Monday and likely will need to have AF ablation rescheduled.    Karthik Smith MD  Clinical Cardiac Electrophysiology
Patient seen and examined by me.    T(C): 36.8 (11-18-23 @ 20:31), Max: 37 (11-18-23 @ 12:08)  HR: 70 (11-18-23 @ 20:31) (70 - 77)  BP: 107/67 (11-18-23 @ 20:31) (97/63 - 127/62)  RR: 16 (11-18-23 @ 20:31) (16 - 18)  SpO2: 95% (11-18-23 @ 20:31) (94% - 98%)  Patient alert and awake.  Chest- Bilateral Clear BS  Cardiac- S1 and S2  Abdomen- Soft    Assessment/Plan:  1. CHF  2. Afib  I have discussed my recommendation with the PA which are outlined above.  Will follow.

## 2023-11-18 NOTE — PROGRESS NOTE ADULT - PROBLEM SELECTOR PLAN 2
Planned for maxine/cardioversion on Monday.  NPO Sunday night  EP following  V paced at times and afib 60's at times.

## 2023-11-18 NOTE — PROGRESS NOTE ADULT - SUBJECTIVE AND OBJECTIVE BOX
Other persistent atrial fibrillation    HPI:  72 yo male with PMH with PMH of HTN, HLD, hypothyroidism, CAD s/p PCI, ICM s/p mems and ICD, h/o AVR (2015) and then TAVR ([facility] 8/20/20), VT storm 6/2022, and Atrial flutter s/p ablation 7/17 w/ persistent afib s/p DCCV 8/12/20 then 7/21 now on Tikosyn. Initially presented for elective ANDREY, AF ablation.Noted on lab with FEDERICO Scr up to 2.27, procedure canceled. Of note, pt was started on Metolazone 2.5mg and he took 3 doses this week. Also increased aldactone to 50mg as well lyrica to 1000mg QD. Also reports increased urination overnight for the past week.     ED vitals notable for mildly low BP 83/56, labs with Scr of 2.27, admitted for further care. (16 Nov 2023 09:37)    Interval History:  Patient was seen and examined at bedside around 12 noon.  Feels better.  Has some sore throat.  Denies fever, cough or nasal congestion.   Denies chest pain, palpitations, shortness of breath, nausea, vomiting or abdominal pain.    ROS:  As per interval history otherwise unremarkable.    PHYSICAL EXAM:  Vital Signs  T(C): 36.3 (18 Nov 2023 16:43), Max: 37 (18 Nov 2023 12:08)  T(F): 97.4 (18 Nov 2023 16:43), Max: 98.6 (18 Nov 2023 12:08)  HR: 70 (18 Nov 2023 16:43) (70 - 77)  BP: 127/62 (18 Nov 2023 16:43) (97/63 - 127/62)  RR: 18 (18 Nov 2023 16:43) (18 - 18)  SpO2: 94% (18 Nov 2023 16:43) (94% - 98%)  Parameters below as of 18 Nov 2023 16:43  Patient On (Oxygen Delivery Method): room air  General: Elderly male sitting in chair comfortably. No acute distress  HEENT: EOMI. Clear conjunctivae. Moist mucus membrane  Neck: Supple.   Chest: Good air entry. No wheezing, rales or rhonchi.   Heart: S1 & S2 with irregular rhythm.   Abdomen: Non distended. Soft. Non-tender. + BS  Ext: No pedal edema. No calf tenderness   Neuro: AAO x 3. No focal deficit. No speech disorder  Skin: Warm and Dry  Psychiatry: Normal mood and affect    I&O's Summary    17 Nov 2023 07:01  -  18 Nov 2023 07:00  --------------------------------------------------------  IN: 840 mL / OUT: 3000 mL / NET: -2160 mL    LABS:                        15.1   10.93 )-----------( 162      ( 18 Nov 2023 05:33 )             45.6     11-18    136  |  92<L>  |  58.7<H>  ----------------------------<  137<H>  3.3<L>   |  32.0<H>  |  1.20    Ca    9.6      18 Nov 2023 05:33  Phos  4.0     11-17  Mg     2.4     11-17    TPro  7.3  /  Alb  4.5  /  TBili  0.6  /  DBili  x   /  AST  31  /  ALT  30  /  AlkPhos  182<H>  11-18    Urinalysis Basic - ( 18 Nov 2023 05:33 )    Color: x / Appearance: x / SG: x / pH: x  Gluc: 137 mg/dL / Ketone: x  / Bili: x / Urobili: x   Blood: x / Protein: x / Nitrite: x   Leuk Esterase: x / RBC: x / WBC x   Sq Epi: x / Non Sq Epi: x / Bacteria: x    RADIOLOGY & ADDITIONAL STUDIES:  Reviewed     MEDICATIONS  (STANDING):  apixaban 5 milliGRAM(s) Oral two times a day  aspirin  chewable 81 milliGRAM(s) Oral daily  atorvastatin 40 milliGRAM(s) Oral at bedtime  carBAMazepine ER Tablet 100 milliGRAM(s) Oral two times a day  levothyroxine 88 MICROGram(s) Oral daily  metoprolol succinate ER 25 milliGRAM(s) Oral daily  pregabalin 225 milliGRAM(s) Oral two times a day  spironolactone 25 milliGRAM(s) Oral daily  torsemide 40 milliGRAM(s) Oral daily    MEDICATIONS  (PRN):  acetaminophen     Tablet .. 650 milliGRAM(s) Oral every 6 hours PRN Temp greater or equal to 38C (100.4F), Mild Pain (1 - 3)  aluminum hydroxide/magnesium hydroxide/simethicone Suspension 30 milliLiter(s) Oral every 4 hours PRN Dyspepsia  benzocaine/menthol Lozenge 1 Lozenge Oral every 4 hours PRN Sore Throat  melatonin 3 milliGRAM(s) Oral at bedtime PRN Insomnia  ondansetron Injectable 4 milliGRAM(s) IV Push every 8 hours PRN Nausea and/or Vomiting

## 2023-11-18 NOTE — PROGRESS NOTE ADULT - ASSESSMENT
Patient is a 73 year old male with history of HTN, HLD, hypothyroidism, CAD ICM s/p mems, TAVR ([facility] 8/20/20), prior VT  6/2022 and persistent  Afib   He was maintaining SR on Tikosyn until recently   Planned ablation cancelled because of decompensated HF and FEDERICO - Now improving  Creatinine now 1.2 and BUN 58  Losartan 12.5 started  Will restart Tikosyn 250 mcg bid and plan for CV  Follow renal function and QTc  Plan for AF ablation at later date.       Patient is a 73 year old male with history of HTN, HLD, hypothyroidism, CAD ICM s/p mems, TAVR ([facility] 8/20/20), prior VT  6/2022 and persistent  Afib   Echo 10/24/23; akinetic apex, EF 20-25% TAVR well seated no AI, mild PH   Nuclear stress test; 1/6/22 large anterior, apical , inferior, inferolateral fixed defects s/o infarct, TID 1.05, EF 19%  PYP study was equivocal for cardiac amyloid      He was maintaining SR on Tikosyn until recently   Planned ablation cancelled because of decompensated HF and FEDERICO - Now improving  Creatinine now 1.2 and BUN 58  Losartan 12.5 started  Will restart Tikosyn 250 mcg bid and plan for CV  Follow renal function and QTc.  Plan for AF ablation at later date.       Patient is a 73 year old male with history of HTN, HLD, hypothyroidism, CAD ICM s/p mems, TAVR ([facility] 8/20/20), prior VT  6/2022 and persistent  Afib   Echo 10/24/23; akinetic apex, EF 20-25% TAVR well seated no AI, mild PH   Nuclear stress test; 1/6/22 large anterior, apical , inferior, inferolateral fixed defects s/o infarct, TID 1.05, EF 19%  PYP study was equivocal for cardiac amyloid      He was maintaining SR on Tikosyn until recently   Planned ablation cancelled because of decompensated HF and FEDERICO - Now improving  Creatinine now 1.2 and BUN 58  Losartan 12.5 started  Will restart Tikosyn 250 mcg bid ( likely tomorrow)  once we have establish renal function stable on restarting Losartan and Potassium > 3.8 and plan for CV  Follow renal function and QTc.  Plan for AF ablation at later date.

## 2023-11-18 NOTE — PATIENT PROFILE ADULT - FALL HARM RISK - UNIVERSAL INTERVENTIONS
Bed in lowest position, wheels locked, appropriate side rails in place/Call bell, personal items and telephone in reach/Instruct patient to call for assistance before getting out of bed or chair/Non-slip footwear when patient is out of bed/[place] to call system/Physically safe environment - no spills, clutter or unnecessary equipment/Purposeful Proactive Rounding/Room/bathroom lighting operational, light cord in reach

## 2023-11-18 NOTE — PROGRESS NOTE ADULT - SUBJECTIVE AND OBJECTIVE BOX
[facility]                                                         CARDIOLOGY AT [facility]                                                                  [address], [place] [address]                                                         Telephone: [phone]. Fax:[phone]                                                                             PROGRESS NOTE    Reason for follow up:   Update:       Review of symptoms:   Cardiac:  No chest pain. No dyspnea. No palpitations.  Respiratory: no cough. No dyspnea  Gastrointestinal: No diarrhea. No abdominal pain. No bleeding.   Neuro: No focal neuro complaints.    Vitals:  T(C): 36.6 (11-18-23 @ 07:38), Max: 36.9 (11-18-23 @ 04:51)  HR: 73 (11-18-23 @ 07:38) (70 - 79)  BP: 97/63 (11-18-23 @ 07:38) (97/63 - 112/65)  RR: 18 (11-18-23 @ 07:38) (16 - 18)  SpO2: 98% (11-18-23 @ 07:38) (96% - 98%)  Wt(kg): --  I&O's Summary    17 Nov 2023 07:01  -  18 Nov 2023 07:00  --------------------------------------------------------  IN: 840 mL / OUT: 3000 mL / NET: -2160 mL      Weight (kg): 101.2 (11-16 @ 07:54)    PHYSICAL EXAM:  Appearance: Comfortable. No acute distress  HEENT:  Atraumatic. Normocephalic.  Normal oral mucosa  Neurologic: A & O x 3, no gross focal deficits.  Cardiovascular: RRR S1 S2, No murmur, no rubs/gallops. No JVD  Respiratory: Lungs clear to auscultation, unlabored   Gastrointestinal:  Soft, Non-tender, + BS  Lower Extremities: 2+ Peripheral Pulses, No clubbing, cyanosis, or edema  Psychiatry: Patient is calm. No agitation.   Skin: warm and dry.    CURRENT CARDIAC MEDICATIONS:  metoprolol succinate ER 25 milliGRAM(s) Oral daily  spironolactone 25 milliGRAM(s) Oral daily  torsemide 40 milliGRAM(s) Oral daily      CURRENT OTHER MEDICATIONS:  acetaminophen     Tablet .. 650 milliGRAM(s) Oral every 6 hours PRN Temp greater or equal to 38C (100.4F), Mild Pain (1 - 3)  carBAMazepine ER Tablet 100 milliGRAM(s) Oral two times a day  melatonin 3 milliGRAM(s) Oral at bedtime PRN Insomnia  ondansetron Injectable 4 milliGRAM(s) IV Push every 8 hours PRN Nausea and/or Vomiting  pregabalin 225 milliGRAM(s) Oral two times a day  aluminum hydroxide/magnesium hydroxide/simethicone Suspension 30 milliLiter(s) Oral every 4 hours PRN Dyspepsia  atorvastatin 40 milliGRAM(s) Oral at bedtime  levothyroxine 88 MICROGram(s) Oral daily  apixaban 5 milliGRAM(s) Oral two times a day  aspirin  chewable 81 milliGRAM(s) Oral daily  benzocaine/menthol Lozenge 1 Lozenge Oral every 4 hours PRN Sore Throat  potassium chloride    Tablet ER 40 milliEquivalent(s) Oral every 4 hours, Stop order after: 2 Doses      LABS:	 	                            15.1   10.93 )-----------( 162      ( 18 Nov 2023 05:33 )             45.6     11-18    136  |  92<L>  |  58.7<H>  ----------------------------<  137<H>  3.3<L>   |  32.0<H>  |  1.20    Ca    9.6      18 Nov 2023 05:33  Phos  4.0     11-17  Mg     2.4     11-17    TPro  7.3  /  Alb  4.5  /  TBili  0.6  /  DBili  x   /  AST  31  /  ALT  30  /  AlkPhos  182<H>  11-18       TSH: Thyroid Stimulating Hormone, Serum: 1.56 uIU/mL    TELEMETRY:   V- paced at times, underling afib 60's with PVC's.                                                                  [facility]                                                         CARDIOLOGY AT [address], [place] [address]                                                         Telephone: [phone]. Fax:[phone]                                                                             PROGRESS NOTE    Reason for follow up:   Chronic systolic heart failure  Update:   If creatinine is stable continue with GDMT  Afib - pending  ANDREY/CV Monday, NPO sunday night.      Review of symptoms:   Cardiac:  No chest pain. No dyspnea. No palpitations.  Respiratory: no cough. No dyspnea  Gastrointestinal: No diarrhea. No abdominal pain. No bleeding.   Neuro: No focal neuro complaints.    Vitals:  T(C): 36.6 (11-18-23 @ 07:38), Max: 36.9 (11-18-23 @ 04:51)  HR: 73 (11-18-23 @ 07:38) (70 - 79)  BP: 97/63 (11-18-23 @ 07:38) (97/63 - 112/65)  RR: 18 (11-18-23 @ 07:38) (16 - 18)  SpO2: 98% (11-18-23 @ 07:38) (96% - 98%)  I&O's Summary  17 Nov 2023 07:01  -  18 Nov 2023 07:00  --------------------------------------------------------  IN: 840 mL / OUT: 3000 mL / NET: -2160 mL  Weight (kg): 101.2 (11-16 @ 07:54)    PHYSICAL EXAM:  Appearance: Comfortable. No acute distress  HEENT:  Atraumatic. Normocephalic.  Normal oral mucosa  Neurologic: A & O x 3, no gross focal deficits.  Cardiovascular: RRR S1 S2, No murmur, no rubs/gallops. No JVD  Respiratory: Lungs clear to auscultation, unlabored   Gastrointestinal:  Soft, Non-tender, + BS  Lower Extremities: 2+ Peripheral Pulses, No clubbing, cyanosis, or edema  Psychiatry: Patient is calm. No agitation.   Skin: warm and dry.    CURRENT CARDIAC MEDICATIONS:  metoprolol succinate ER 25 milliGRAM(s) Oral daily  spironolactone 25 milliGRAM(s) Oral daily  torsemide 40 milliGRAM(s) Oral daily    CURRENT OTHER MEDICATIONS:  acetaminophen     Tablet .. 650 milliGRAM(s) Oral every 6 hours PRN Temp greater or equal to 38C (100.4F), Mild Pain (1 - 3)  carBAMazepine ER Tablet 100 milliGRAM(s) Oral two times a day  melatonin 3 milliGRAM(s) Oral at bedtime PRN Insomnia  ondansetron Injectable 4 milliGRAM(s) IV Push every 8 hours PRN Nausea and/or Vomiting  pregabalin 225 milliGRAM(s) Oral two times a day  aluminum hydroxide/magnesium hydroxide/simethicone Suspension 30 milliLiter(s) Oral every 4 hours PRN Dyspepsia  atorvastatin 40 milliGRAM(s) Oral at bedtime  levothyroxine 88 MICROGram(s) Oral daily  apixaban 5 milliGRAM(s) Oral two times a day  aspirin  chewable 81 milliGRAM(s) Oral daily  benzocaine/menthol Lozenge 1 Lozenge Oral every 4 hours PRN Sore Throat  potassium chloride    Tablet ER 40 milliEquivalent(s) Oral every 4 hours, Stop order after: 2 Doses      LABS:	 	                            15.1   10.93 )-----------( 162      ( 18 Nov 2023 05:33 )             45.6     11-18    136  |  92<L>  |  58.7<H>  ----------------------------<  137<H>  3.3<L>   |  32.0<H>  |  1.20    Ca    9.6      18 Nov 2023 05:33  Phos  4.0     11-17  Mg     2.4     11-17    TPro  7.3  /  Alb  4.5  /  TBili  0.6  /  DBili  x   /  AST  31  /  ALT  30  /  AlkPhos  182<H>  11-18       TSH: Thyroid Stimulating Hormone, Serum: 1.56 uIU/mL    TELEMETRY:   V- paced at times, underling afib 60's with PVC's.

## 2023-11-19 ENCOUNTER — TRANSCRIPTION ENCOUNTER (OUTPATIENT)
Age: 73
End: 2023-11-19

## 2023-11-19 VITALS
TEMPERATURE: 98 F | DIASTOLIC BLOOD PRESSURE: 72 MMHG | RESPIRATION RATE: 19 BRPM | SYSTOLIC BLOOD PRESSURE: 102 MMHG | HEART RATE: 80 BPM | OXYGEN SATURATION: 97 %

## 2023-11-19 LAB
ANION GAP SERPL CALC-SCNC: 12 MMOL/L — SIGNIFICANT CHANGE UP (ref 5–17)
ANION GAP SERPL CALC-SCNC: 12 MMOL/L — SIGNIFICANT CHANGE UP (ref 5–17)
BUN SERPL-MCNC: 55.8 MG/DL — HIGH (ref 8–20)
BUN SERPL-MCNC: 55.8 MG/DL — HIGH (ref 8–20)
CALCIUM SERPL-MCNC: 9.6 MG/DL — SIGNIFICANT CHANGE UP (ref 8.4–10.5)
CALCIUM SERPL-MCNC: 9.6 MG/DL — SIGNIFICANT CHANGE UP (ref 8.4–10.5)
CHLORIDE SERPL-SCNC: 94 MMOL/L — LOW (ref 96–108)
CHLORIDE SERPL-SCNC: 94 MMOL/L — LOW (ref 96–108)
CO2 SERPL-SCNC: 31 MMOL/L — HIGH (ref 22–29)
CO2 SERPL-SCNC: 31 MMOL/L — HIGH (ref 22–29)
CREAT SERPL-MCNC: 1.01 MG/DL — SIGNIFICANT CHANGE UP (ref 0.5–1.3)
CREAT SERPL-MCNC: 1.01 MG/DL — SIGNIFICANT CHANGE UP (ref 0.5–1.3)
EGFR: 78 ML/MIN/1.73M2 — SIGNIFICANT CHANGE UP
EGFR: 78 ML/MIN/1.73M2 — SIGNIFICANT CHANGE UP
GLUCOSE SERPL-MCNC: 134 MG/DL — HIGH (ref 70–99)
GLUCOSE SERPL-MCNC: 134 MG/DL — HIGH (ref 70–99)
HCT VFR BLD CALC: 44.5 % — SIGNIFICANT CHANGE UP (ref 39–50)
HCT VFR BLD CALC: 44.5 % — SIGNIFICANT CHANGE UP (ref 39–50)
HGB BLD-MCNC: 14.8 G/DL — SIGNIFICANT CHANGE UP (ref 13–17)
HGB BLD-MCNC: 14.8 G/DL — SIGNIFICANT CHANGE UP (ref 13–17)
INR BLD: 1.47 RATIO — HIGH (ref 0.85–1.18)
INR BLD: 1.47 RATIO — HIGH (ref 0.85–1.18)
MAGNESIUM SERPL-MCNC: 2.1 MG/DL — SIGNIFICANT CHANGE UP (ref 1.6–2.6)
MAGNESIUM SERPL-MCNC: 2.1 MG/DL — SIGNIFICANT CHANGE UP (ref 1.6–2.6)
MCHC RBC-ENTMCNC: 31.3 PG — SIGNIFICANT CHANGE UP (ref 27–34)
MCHC RBC-ENTMCNC: 31.3 PG — SIGNIFICANT CHANGE UP (ref 27–34)
MCHC RBC-ENTMCNC: 33.3 GM/DL — SIGNIFICANT CHANGE UP (ref 32–36)
MCHC RBC-ENTMCNC: 33.3 GM/DL — SIGNIFICANT CHANGE UP (ref 32–36)
MCV RBC AUTO: 94.1 FL — SIGNIFICANT CHANGE UP (ref 80–100)
MCV RBC AUTO: 94.1 FL — SIGNIFICANT CHANGE UP (ref 80–100)
PLATELET # BLD AUTO: 151 K/UL — SIGNIFICANT CHANGE UP (ref 150–400)
PLATELET # BLD AUTO: 151 K/UL — SIGNIFICANT CHANGE UP (ref 150–400)
POTASSIUM SERPL-MCNC: 3.7 MMOL/L — SIGNIFICANT CHANGE UP (ref 3.5–5.3)
POTASSIUM SERPL-MCNC: 3.7 MMOL/L — SIGNIFICANT CHANGE UP (ref 3.5–5.3)
POTASSIUM SERPL-SCNC: 3.7 MMOL/L — SIGNIFICANT CHANGE UP (ref 3.5–5.3)
POTASSIUM SERPL-SCNC: 3.7 MMOL/L — SIGNIFICANT CHANGE UP (ref 3.5–5.3)
PROTHROM AB SERPL-ACNC: 16.1 SEC — HIGH (ref 9.5–13)
PROTHROM AB SERPL-ACNC: 16.1 SEC — HIGH (ref 9.5–13)
RBC # BLD: 4.73 M/UL — SIGNIFICANT CHANGE UP (ref 4.2–5.8)
RBC # BLD: 4.73 M/UL — SIGNIFICANT CHANGE UP (ref 4.2–5.8)
RBC # FLD: 13.9 % — SIGNIFICANT CHANGE UP (ref 10.3–14.5)
RBC # FLD: 13.9 % — SIGNIFICANT CHANGE UP (ref 10.3–14.5)
SODIUM SERPL-SCNC: 137 MMOL/L — SIGNIFICANT CHANGE UP (ref 135–145)
SODIUM SERPL-SCNC: 137 MMOL/L — SIGNIFICANT CHANGE UP (ref 135–145)
WBC # BLD: 9.43 K/UL — SIGNIFICANT CHANGE UP (ref 3.8–10.5)
WBC # BLD: 9.43 K/UL — SIGNIFICANT CHANGE UP (ref 3.8–10.5)
WBC # FLD AUTO: 9.43 K/UL — SIGNIFICANT CHANGE UP (ref 3.8–10.5)
WBC # FLD AUTO: 9.43 K/UL — SIGNIFICANT CHANGE UP (ref 3.8–10.5)

## 2023-11-19 PROCEDURE — 83735 ASSAY OF MAGNESIUM: CPT

## 2023-11-19 PROCEDURE — 81003 URINALYSIS AUTO W/O SCOPE: CPT

## 2023-11-19 PROCEDURE — 93005 ELECTROCARDIOGRAM TRACING: CPT

## 2023-11-19 PROCEDURE — 86900 BLOOD TYPING SEROLOGIC ABO: CPT

## 2023-11-19 PROCEDURE — 86850 RBC ANTIBODY SCREEN: CPT

## 2023-11-19 PROCEDURE — 36415 COLL VENOUS BLD VENIPUNCTURE: CPT

## 2023-11-19 PROCEDURE — 84300 ASSAY OF URINE SODIUM: CPT

## 2023-11-19 PROCEDURE — 85610 PROTHROMBIN TIME: CPT

## 2023-11-19 PROCEDURE — 82570 ASSAY OF URINE CREATININE: CPT

## 2023-11-19 PROCEDURE — 0225U NFCT DS DNA&RNA 21 SARSCOV2: CPT

## 2023-11-19 PROCEDURE — 86803 HEPATITIS C AB TEST: CPT

## 2023-11-19 PROCEDURE — 84100 ASSAY OF PHOSPHORUS: CPT

## 2023-11-19 PROCEDURE — 80048 BASIC METABOLIC PNL TOTAL CA: CPT

## 2023-11-19 PROCEDURE — P9047: CPT

## 2023-11-19 PROCEDURE — 85027 COMPLETE CBC AUTOMATED: CPT

## 2023-11-19 PROCEDURE — 80053 COMPREHEN METABOLIC PANEL: CPT

## 2023-11-19 PROCEDURE — 86901 BLOOD TYPING SEROLOGIC RH(D): CPT

## 2023-11-19 PROCEDURE — 84540 ASSAY OF URINE/UREA-N: CPT

## 2023-11-19 PROCEDURE — 84443 ASSAY THYROID STIM HORMONE: CPT

## 2023-11-19 PROCEDURE — 99239 HOSP IP/OBS DSCHRG MGMT >30: CPT

## 2023-11-19 PROCEDURE — 76775 US EXAM ABDO BACK WALL LIM: CPT

## 2023-11-19 RX ORDER — DOFETILIDE 0.25 MG/1
1 CAPSULE ORAL
Refills: 0 | DISCHARGE

## 2023-11-19 RX ORDER — SPIRONOLACTONE 25 MG/1
1 TABLET, FILM COATED ORAL
Refills: 0 | DISCHARGE

## 2023-11-19 RX ORDER — LOSARTAN POTASSIUM 100 MG/1
12.5 TABLET, FILM COATED ORAL DAILY
Refills: 0 | Status: DISCONTINUED | OUTPATIENT
Start: 2023-11-19 | End: 2023-11-19

## 2023-11-19 RX ORDER — LOSARTAN POTASSIUM 100 MG/1
12.5 TABLET, FILM COATED ORAL EVERY 12 HOURS
Refills: 0 | Status: DISCONTINUED | OUTPATIENT
Start: 2023-11-19 | End: 2023-11-19

## 2023-11-19 RX ORDER — SPIRONOLACTONE 25 MG/1
1 TABLET, FILM COATED ORAL
Qty: 30 | Refills: 0
Start: 2023-11-19 | End: 2023-12-18

## 2023-11-19 RX ORDER — METOLAZONE 5 MG/1
1 TABLET ORAL
Refills: 0 | DISCHARGE

## 2023-11-19 RX ADMIN — Medication 81 MILLIGRAM(S): at 11:01

## 2023-11-19 RX ADMIN — APIXABAN 5 MILLIGRAM(S): 2.5 TABLET, FILM COATED ORAL at 06:31

## 2023-11-19 RX ADMIN — Medication 225 MILLIGRAM(S): at 06:32

## 2023-11-19 RX ADMIN — Medication 25 MILLIGRAM(S): at 06:31

## 2023-11-19 RX ADMIN — Medication 100 MILLIGRAM(S): at 06:31

## 2023-11-19 RX ADMIN — Medication 88 MICROGRAM(S): at 06:31

## 2023-11-19 RX ADMIN — SPIRONOLACTONE 25 MILLIGRAM(S): 25 TABLET, FILM COATED ORAL at 06:32

## 2023-11-19 RX ADMIN — LOSARTAN POTASSIUM 12.5 MILLIGRAM(S): 100 TABLET, FILM COATED ORAL at 11:45

## 2023-11-19 RX ADMIN — Medication 40 MILLIGRAM(S): at 06:32

## 2023-11-19 NOTE — PROGRESS NOTE ADULT - PROBLEM SELECTOR PROBLEM 2
Chronic atrial fibrillation
FEDERICO (acute kidney injury)

## 2023-11-19 NOTE — DISCHARGE NOTE PROVIDER - ATTENDING DISCHARGE PHYSICAL EXAMINATION:
General: Elderly male sitting in chair comfortably. No acute distress  HEENT: EOMI. Clear conjunctivae. Moist mucus membrane  Neck: Supple.   Chest: Good air entry. No wheezing, rales or rhonchi.   Heart: S1 & S2 with irregular rhythm.   Abdomen: Non distended. Soft. Non-tender. + BS  Ext: No pedal edema. No calf tenderness   Neuro: AAO x 3. No focal deficit. No speech disorder  Skin: Warm and Dry  Psychiatry: Normal mood and affect

## 2023-11-19 NOTE — PROGRESS NOTE ADULT - PROBLEM SELECTOR PLAN 2
Planned for maxine/cardioversion on Monday.  EP following  NPO Sunday night  V paced at times and afib 60's at times. Heart rate controlled  V paced at times and afib 60's at times.  Ct Eliquis 5mg po bid  As discussed with Dr. Apple will d.c home and follow up outpatient.

## 2023-11-19 NOTE — PROGRESS NOTE ADULT - ASSESSMENT
73 year old male with history of HTN, HLD, hypothyroidism, CAD ICM s/p mems, TAVR ([facility] 8/20/20), VT storm 6/2022 and Afib who came in electively for a ANDREY and ablation. The procedure was cancelled due to labs which showed hyponatremia and FEDERICO.  Beging followed by Heart failure and EP.

## 2023-11-19 NOTE — PROGRESS NOTE ADULT - SUBJECTIVE AND OBJECTIVE BOX
[facility]                                                         CARDIOLOGY AT [address], [place] [address]                                                         Telephone: [phone]. Fax:[phone]                                                                             PROGRESS NOTE    Reason for follow up:     Chronic systolic heart failure  Update:   Sitting up in bed eating breakfast and feeling well.      Review of symptoms:   Cardiac:  No chest pain. No dyspnea. No palpitations.  Respiratory: no cough. No dyspnea  Gastrointestinal: No diarrhea. No abdominal pain. No bleeding.   Neuro: No focal neuro complaints.    Vitals:  T(C): 36.8 (11-19-23 @ 07:38), Max: 37 (11-18-23 @ 12:08)  HR: 81 (11-19-23 @ 07:38) (70 - 81)  BP: 112/75 (11-19-23 @ 07:38) (106/71 - 127/62)  RR: 18 (11-19-23 @ 07:38) (14 - 18)  SpO2: 96% (11-19-23 @ 07:38) (94% - 98%)  I&O's Summary  18 Nov 2023 07:01  -  19 Nov 2023 07:00  --------------------------------------------------------  IN: 120 mL / OUT: 1500 mL / NET: -1380 mL    Weight (kg): 101.2 (11-16 @ 07:54)    PHYSICAL EXAM:  Appearance: Comfortable. No acute distress  HEENT:  Atraumatic. Normocephalic.  Normal oral mucosa  Neurologic: A & O x 3, no gross focal deficits.  Cardiovascular: RRR S1 S2, No murmur, no rubs/gallops. No JVD  Respiratory: Lungs clear to auscultation, unlabored   Gastrointestinal:  Soft, Non-tender, + BS  Lower Extremities: 2+ Peripheral Pulses, No clubbing, cyanosis, or edema  Psychiatry: Patient is calm. No agitation.   Skin: warm and dry.    CURRENT CARDIAC MEDICATIONS:  losartan 12.5 milliGRAM(s) Oral every 12 hours  metoprolol succinate ER 25 milliGRAM(s) Oral daily  spironolactone 25 milliGRAM(s) Oral daily  torsemide 40 milliGRAM(s) Oral daily    CURRENT OTHER MEDICATIONS:  acetaminophen     Tablet .. 650 milliGRAM(s) Oral every 6 hours PRN Temp greater or equal to 38C (100.4F), Mild Pain (1 - 3)  carBAMazepine ER Tablet 100 milliGRAM(s) Oral two times a day  melatonin 3 milliGRAM(s) Oral at bedtime PRN Insomnia  ondansetron Injectable 4 milliGRAM(s) IV Push every 8 hours PRN Nausea and/or Vomiting  pregabalin 225 milliGRAM(s) Oral two times a day  aluminum hydroxide/magnesium hydroxide/simethicone Suspension 30 milliLiter(s) Oral every 4 hours PRN Dyspepsia  atorvastatin 40 milliGRAM(s) Oral at bedtime  levothyroxine 88 MICROGram(s) Oral daily  apixaban 5 milliGRAM(s) Oral two times a day  aspirin  chewable 81 milliGRAM(s) Oral daily  benzocaine/menthol Lozenge 1 Lozenge Oral every 4 hours PRN Sore Throat    LABS:	 	                        14.8   9.43  )-----------( 151      ( 19 Nov 2023 05:30 )             44.5     11-19    137  |  94<L>  |  55.8<H>  ----------------------------<  134<H>  3.7   |  31.0<H>  |  1.01    Ca    9.6      19 Nov 2023 05:30  Mg     2.1     11-19    TPro  7.3  /  Alb  4.5  /  TBili  0.6  /  DBili  x   /  AST  31  /  ALT  30  /  AlkPhos  182<H>  11-18    PT/INR/PTT ( 19 Nov 2023 05:30 )                       :                       :      16.1         :       X                     .        .                   .              .           .       1.47        .                                         TSH: Thyroid Stimulating Hormone, Serum: 1.56 uIU/mL    TELEMETRY: A paced, underling afib rates controlled.

## 2023-11-19 NOTE — PROGRESS NOTE ADULT - PROBLEM SELECTOR PROBLEM 3
FEDERICO (acute kidney injury)
FEDERICO (acute kidney injury)
Persistent atrial fibrillation
FEDERICO (acute kidney injury)

## 2023-11-19 NOTE — DISCHARGE NOTE NURSING/CASE MANAGEMENT/SOCIAL WORK - NSDCPEFALRISK_GEN_ALL_CORE
For information on Fall & Injury Prevention, visit: [email or web address] OR  [email or web address] OR  https://www.cdc.gov/steadi/patient.html

## 2023-11-19 NOTE — DISCHARGE NOTE PROVIDER - NSDCMRMEDTOKEN_GEN_ALL_CORE_FT
aspirin 81 mg oral delayed release capsule: 1 cap(s) orally once a day  atorvastatin 40 mg oral tablet: 1 tab(s) orally once a day (at bedtime)  Eliquis 5 mg oral tablet: 1 tablet orally 2 times a day Resume tonight on 8/22/23 at 9PM  Farxiga 10 mg oral tablet: 1 orally once a day  levothyroxine 88 mcg (0.088 mg) oral capsule: 1 orally once a day  losartan 25 mg oral tablet: 0.5 orally 2 times a day  Lyrica 200 mg oral capsule: 2.5 cap(s) orally 2 times a day 500 mg twice daily  metoprolol succinate 25 mg oral capsule, extended release: 1 orally once a day (at bedtime)  spironolactone 25 mg oral tablet: 1 tab(s) orally once a day  Tegretol  mg oral tablet, extended release: 1 orally 2 times a day  torsemide 40 mg oral tablet: 1 tab(s) orally once a day

## 2023-11-19 NOTE — DISCHARGE NOTE PROVIDER - NSDCCPCAREPLAN_GEN_ALL_CORE_FT
PRINCIPAL DISCHARGE DIAGNOSIS  Diagnosis: Atrial flutter  Assessment and Plan of Treatment: please take meds as prescribed and follow up with Electrophysiology outpatient      SECONDARY DISCHARGE DIAGNOSES  Diagnosis: Chronic systolic congestive heart failure  Assessment and Plan of Treatment: please take meds as prescribed and follow up cardiologist outpatient    Diagnosis: FEDERICO (acute kidney injury)  Assessment and Plan of Treatment: resolved. please take meds as prescribed and follow up with primary care doctor

## 2023-11-19 NOTE — DISCHARGE NOTE PROVIDER - HOSPITAL COURSE
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 improved w/ mgmt of medication, Tikosyn held, discussed w/ patient's EP, restarted on losartan today, deemed fit for DC home w/ outpatient EP follow up.

## 2023-11-19 NOTE — DISCHARGE NOTE PROVIDER - NSDCFUSCHEDAPPT_GEN_ALL_CORE_FT
Cade Apple  [facility]  ELECTROPH [address]  Scheduled Appointment: 11/24/2023    [facility]  CARDIOLOGY [address]  Scheduled Appointment: 11/27/2023    Froilan Quiñones  [facility]  HEARTFAIL [address]  Scheduled Appointment: 12/06/2023    Harvey Pressley  [facility]  CARDIOLOGY [address]  Scheduled Appointment: 01/12/2024

## 2023-11-19 NOTE — DISCHARGE NOTE NURSING/CASE MANAGEMENT/SOCIAL WORK - PATIENT PORTAL LINK FT
You can access the FollowMyHealth Patient Portal offered by [facility] by registering at the following website: [email or web address]. By joining [facility]’s FollowMyHealth portal, you will also be able to view your health information using other applications (apps) compatible with our system.

## 2023-11-20 RX ORDER — METOPROLOL SUCCINATE 25 MG/1
25 TABLET, EXTENDED RELEASE ORAL
Qty: 90 | Refills: 1 | Status: ACTIVE | COMMUNITY
Start: 1900-01-01 | End: 1900-01-01

## 2023-11-20 RX ORDER — DOFETILIDE 0.25 MG/1
250 CAPSULE ORAL TWICE DAILY
Qty: 180 | Refills: 0 | Status: DISCONTINUED | COMMUNITY
Start: 2022-11-11 | End: 2023-11-20

## 2023-11-21 ENCOUNTER — NON-APPOINTMENT (OUTPATIENT)
Age: 73
End: 2023-11-21

## 2023-11-24 ENCOUNTER — APPOINTMENT (OUTPATIENT)
Dept: ELECTROPHYSIOLOGY | Facility: CLINIC | Age: 73
End: 2023-11-24
Payer: MEDICARE

## 2023-11-24 VITALS
HEART RATE: 83 BPM | WEIGHT: 228 LBS | SYSTOLIC BLOOD PRESSURE: 90 MMHG | HEIGHT: 73 IN | BODY MASS INDEX: 30.22 KG/M2 | DIASTOLIC BLOOD PRESSURE: 60 MMHG | OXYGEN SATURATION: 95 %

## 2023-11-24 PROBLEM — N18.9 CHRONIC KIDNEY DISEASE, UNSPECIFIED: Chronic | Status: ACTIVE | Noted: 2023-11-16

## 2023-11-24 PROCEDURE — 99214 OFFICE O/P EST MOD 30 MIN: CPT | Mod: 25

## 2023-11-24 PROCEDURE — 93000 ELECTROCARDIOGRAM COMPLETE: CPT

## 2023-11-27 ENCOUNTER — APPOINTMENT (OUTPATIENT)
Dept: CARDIOLOGY | Facility: CLINIC | Age: 73
End: 2023-11-27
Payer: MEDICARE

## 2023-11-27 ENCOUNTER — NON-APPOINTMENT (OUTPATIENT)
Age: 73
End: 2023-11-27

## 2023-11-27 PROCEDURE — 93295 DEV INTERROG REMOTE 1/2/MLT: CPT

## 2023-11-27 PROCEDURE — 93296 REM INTERROG EVL PM/IDS: CPT

## 2023-11-28 NOTE — ANESTHESIA FOLLOW-UP NOTE - NSCONDITION_GEN_ALL_CORE
pregnancy 3 years ago Stable

## 2023-11-29 ENCOUNTER — NON-APPOINTMENT (OUTPATIENT)
Age: 73
End: 2023-11-29

## 2023-12-04 ENCOUNTER — LABORATORY RESULT (OUTPATIENT)
Age: 73
End: 2023-12-04

## 2023-12-04 ENCOUNTER — NON-APPOINTMENT (OUTPATIENT)
Age: 73
End: 2023-12-04

## 2023-12-06 ENCOUNTER — APPOINTMENT (OUTPATIENT)
Dept: HEART FAILURE | Facility: CLINIC | Age: 73
End: 2023-12-06
Payer: MEDICARE

## 2023-12-06 VITALS
OXYGEN SATURATION: 95 % | TEMPERATURE: 97 F | HEART RATE: 77 BPM | HEIGHT: 73 IN | SYSTOLIC BLOOD PRESSURE: 103 MMHG | WEIGHT: 228 LBS | BODY MASS INDEX: 30.22 KG/M2 | DIASTOLIC BLOOD PRESSURE: 63 MMHG

## 2023-12-06 PROCEDURE — 99214 OFFICE O/P EST MOD 30 MIN: CPT

## 2023-12-08 ENCOUNTER — APPOINTMENT (OUTPATIENT)
Dept: HEART FAILURE | Facility: CLINIC | Age: 73
End: 2023-12-08

## 2023-12-19 ENCOUNTER — NON-APPOINTMENT (OUTPATIENT)
Age: 73
End: 2023-12-19

## 2023-12-21 ENCOUNTER — APPOINTMENT (OUTPATIENT)
Dept: ORTHOPEDIC SURGERY | Facility: CLINIC | Age: 73
End: 2023-12-21
Payer: MEDICARE

## 2023-12-21 VITALS — BODY MASS INDEX: 30.48 KG/M2 | HEIGHT: 72 IN | WEIGHT: 225 LBS

## 2023-12-21 DIAGNOSIS — M17.12 UNILATERAL PRIMARY OSTEOARTHRITIS, LEFT KNEE: ICD-10-CM

## 2023-12-21 PROCEDURE — 99214 OFFICE O/P EST MOD 30 MIN: CPT | Mod: 25

## 2023-12-21 PROCEDURE — 20610 DRAIN/INJ JOINT/BURSA W/O US: CPT | Mod: LT

## 2023-12-21 NOTE — PHYSICAL EXAM
[de-identified] : Well developed, well nourished in no apparent distress, awake, alert and orientated to person, place and time. with appropriate mood and affect.  Respirations are even and unlabored. Gait evaluation does reveal a limp. There is no inguinal adenopathy.  The affected limb is well-perfused, without skin lesions, shows a grossly normal motor and sensory examination.  Knee motion does cause pain. ROM of the knee is 0-120 degrees.  5 degrees varus The knee is stable within that range-of-motion to AP and ML stress.  Muscle strength is normal. Pedal pulses are palpable.

## 2023-12-21 NOTE — DISCUSSION/SUMMARY
[de-identified] : The patient has left knee osteoarthritis. They are not an appropriate candidate for surgical intervention at this time. An extensive discussion was conducted on the natural history of the disease and the variety of surgical and non-surgical options available to the patient including, but not limited to non-steroidal anti-inflammatory medications, steroid injections, physical therapy, maintenance of ideal body weight, and reduction of activity. I recommended a prescription of Mobic but the patient would prefer to use OTC NSAIDs at this time. The patient will schedule an appointment as needed.  Informed consent for the left knee injection was obtained. All questions were answered. A time out was performed. The  left knee was prepped and draped in sterile fashion. Using sterile technique, 80mg of Kenalog, 4cc of 1% lidocaine, 4cc of 0.25% marcaine using a 21-gauge needle. A sterile dressing was applied. Post injection instructions were reviewed. The patient tolerated the procedure well.

## 2023-12-22 ENCOUNTER — NON-APPOINTMENT (OUTPATIENT)
Age: 73
End: 2023-12-22

## 2023-12-22 ENCOUNTER — APPOINTMENT (OUTPATIENT)
Dept: CARDIOLOGY | Facility: CLINIC | Age: 73
End: 2023-12-22
Payer: MEDICARE

## 2023-12-22 VITALS
SYSTOLIC BLOOD PRESSURE: 96 MMHG | BODY MASS INDEX: 29.13 KG/M2 | HEIGHT: 74 IN | WEIGHT: 227 LBS | OXYGEN SATURATION: 98 % | HEART RATE: 36 BPM | DIASTOLIC BLOOD PRESSURE: 58 MMHG

## 2023-12-22 DIAGNOSIS — I25.2 OLD MYOCARDIAL INFARCTION: ICD-10-CM

## 2023-12-22 PROCEDURE — 99215 OFFICE O/P EST HI 40 MIN: CPT | Mod: 25

## 2023-12-22 PROCEDURE — 93000 ELECTROCARDIOGRAM COMPLETE: CPT

## 2023-12-22 RX ORDER — METOLAZONE 2.5 MG/1
2.5 TABLET ORAL
Qty: 5 | Refills: 2 | Status: DISCONTINUED | COMMUNITY
Start: 2023-09-06 | End: 2023-12-22

## 2023-12-22 NOTE — REASON FOR VISIT
[Cardiac Failure] : cardiac failure [Arrhythmia/ECG Abnorrmalities] : arrhythmia/ECG abnormalities [Structural Heart and Valve Disease] : structural heart and valve disease [Coronary Artery Disease] : coronary artery disease

## 2024-01-03 ENCOUNTER — NON-APPOINTMENT (OUTPATIENT)
Age: 74
End: 2024-01-03

## 2024-01-08 ENCOUNTER — NON-APPOINTMENT (OUTPATIENT)
Age: 74
End: 2024-01-08

## 2024-01-10 ENCOUNTER — INPATIENT (INPATIENT)
Facility: HOSPITAL | Age: 74
LOS: 7 days | Discharge: HOME | DRG: 291 | End: 2024-01-18
Attending: HOSPITALIST | Admitting: STUDENT IN AN ORGANIZED HEALTH CARE EDUCATION/TRAINING PROGRAM
Payer: MEDICARE

## 2024-01-10 VITALS
RESPIRATION RATE: 18 BRPM | OXYGEN SATURATION: 96 % | TEMPERATURE: 97 F | DIASTOLIC BLOOD PRESSURE: 79 MMHG | HEART RATE: 77 BPM | WEIGHT: 226.19 LBS | SYSTOLIC BLOOD PRESSURE: 113 MMHG

## 2024-01-10 DIAGNOSIS — R09.89 OTHER SPECIFIED SYMPTOMS AND SIGNS INVOLVING THE CIRCULATORY AND RESPIRATORY SYSTEMS: ICD-10-CM

## 2024-01-10 DIAGNOSIS — I48.11 LONGSTANDING PERSISTENT ATRIAL FIBRILLATION: ICD-10-CM

## 2024-01-10 DIAGNOSIS — I50.22 CHRONIC SYSTOLIC (CONGESTIVE) HEART FAILURE: ICD-10-CM

## 2024-01-10 DIAGNOSIS — Z98.89 OTHER SPECIFIED POSTPROCEDURAL STATES: Chronic | ICD-10-CM

## 2024-01-10 DIAGNOSIS — Z98.890 OTHER SPECIFIED POSTPROCEDURAL STATES: Chronic | ICD-10-CM

## 2024-01-10 DIAGNOSIS — G50.0 TRIGEMINAL NEURALGIA: ICD-10-CM

## 2024-01-10 DIAGNOSIS — Z95.5 PRESENCE OF CORONARY ANGIOPLASTY IMPLANT AND GRAFT: Chronic | ICD-10-CM

## 2024-01-10 DIAGNOSIS — E03.9 HYPOTHYROIDISM, UNSPECIFIED: ICD-10-CM

## 2024-01-10 DIAGNOSIS — Z95.4 PRESENCE OF OTHER HEART-VALVE REPLACEMENT: Chronic | ICD-10-CM

## 2024-01-10 DIAGNOSIS — I25.10 ATHEROSCLEROTIC HEART DISEASE OF NATIVE CORONARY ARTERY WITHOUT ANGINA PECTORIS: ICD-10-CM

## 2024-01-10 DIAGNOSIS — I50.23 ACUTE ON CHRONIC SYSTOLIC (CONGESTIVE) HEART FAILURE: ICD-10-CM

## 2024-01-10 DIAGNOSIS — Z95.810 PRESENCE OF AUTOMATIC (IMPLANTABLE) CARDIAC DEFIBRILLATOR: Chronic | ICD-10-CM

## 2024-01-10 DIAGNOSIS — Z29.9 ENCOUNTER FOR PROPHYLACTIC MEASURES, UNSPECIFIED: ICD-10-CM

## 2024-01-10 PROCEDURE — 99223 1ST HOSP IP/OBS HIGH 75: CPT

## 2024-01-10 PROCEDURE — 99223 1ST HOSP IP/OBS HIGH 75: CPT | Mod: GC

## 2024-01-10 RX ORDER — ACETAMINOPHEN 500 MG
650 TABLET ORAL EVERY 6 HOURS
Refills: 0 | Status: DISCONTINUED | OUTPATIENT
Start: 2024-01-10 | End: 2024-01-18

## 2024-01-10 RX ORDER — CARBAMAZEPINE 200 MG
100 TABLET ORAL
Refills: 0 | Status: DISCONTINUED | OUTPATIENT
Start: 2024-01-10 | End: 2024-01-16

## 2024-01-10 RX ORDER — LANOLIN ALCOHOL/MO/W.PET/CERES
3 CREAM (GRAM) TOPICAL AT BEDTIME
Refills: 0 | Status: DISCONTINUED | OUTPATIENT
Start: 2024-01-10 | End: 2024-01-18

## 2024-01-10 RX ORDER — METOPROLOL TARTRATE 50 MG
25 TABLET ORAL AT BEDTIME
Refills: 0 | Status: DISCONTINUED | OUTPATIENT
Start: 2024-01-10 | End: 2024-01-11

## 2024-01-10 RX ORDER — LEVOTHYROXINE SODIUM 125 MCG
88 TABLET ORAL DAILY
Refills: 0 | Status: DISCONTINUED | OUTPATIENT
Start: 2024-01-10 | End: 2024-01-18

## 2024-01-10 RX ORDER — APIXABAN 2.5 MG/1
5 TABLET, FILM COATED ORAL
Refills: 0 | Status: DISCONTINUED | OUTPATIENT
Start: 2024-01-10 | End: 2024-01-18

## 2024-01-10 RX ORDER — ATORVASTATIN CALCIUM 80 MG/1
40 TABLET, FILM COATED ORAL AT BEDTIME
Refills: 0 | Status: DISCONTINUED | OUTPATIENT
Start: 2024-01-10 | End: 2024-01-18

## 2024-01-10 RX ORDER — ASPIRIN/CALCIUM CARB/MAGNESIUM 324 MG
81 TABLET ORAL DAILY
Refills: 0 | Status: DISCONTINUED | OUTPATIENT
Start: 2024-01-10 | End: 2024-01-18

## 2024-01-10 RX ORDER — LOSARTAN POTASSIUM 100 MG/1
12.5 TABLET, FILM COATED ORAL
Refills: 0 | Status: DISCONTINUED | OUTPATIENT
Start: 2024-01-10 | End: 2024-01-11

## 2024-01-10 RX ORDER — SPIRONOLACTONE 25 MG/1
25 TABLET, FILM COATED ORAL DAILY
Refills: 0 | Status: DISCONTINUED | OUTPATIENT
Start: 2024-01-10 | End: 2024-01-11

## 2024-01-10 RX ORDER — LOSARTAN POTASSIUM 100 MG/1
0.5 TABLET, FILM COATED ORAL
Qty: 0 | Refills: 0 | DISCHARGE

## 2024-01-10 NOTE — H&P ADULT - PROBLEM SELECTOR PLAN 2
Also w/ hx of a-flutter and VT storm  -Telemetry  -Cont. Eliquis BID  -Cont. Toprol  -Reportedly plans for possible plans for ablation with EP, f/u HF recommendations

## 2024-01-10 NOTE — H&P ADULT - HISTORY OF PRESENT ILLNESS
73M w/ h/o CAD s/p late-presenting MI 2005 s/p PCI LAD, ICM/HFrEF (EF 15-20%, LVEDD 6 cm) s/p single chamber ICD with lead revision in 2018 with upgrade to dual-chamber device (8/17/21) s/p MEMS (9/16/21), bioprosthetic AVR (2015) 2/2 AI s/p 26 mm EVOLUT HECTOR for severe bioprosthetic AS (8/20/20), aflutter ablation 7/17 w/ persistent afib, s/p DCCV x2 (8/12/20, 7/7/21), Trigeminal neuralgia (4/2022) p/w elevated filling pressure (PAD 40) on cardiomems.  73M w/ h/o CAD s/p late-presenting MI 2005 s/p PCI LAD, ICM/HFrEF (EF 15-20%, LVEDD 6 cm) s/p single chamber ICD with lead revision in 2018 with upgrade to dual-chamber device (8/17/21) s/p MEMS (9/16/21), bioprosthetic AVR (2015) 2/2 AI s/p 26 mm EVOLUT HECTOR for severe bioprosthetic AS (8/20/20), aflutter ablation 7/17 w/ persistent afib, s/p DCCV x2 (8/12/20, 7/7/21), Trigeminal neuralgia (4/2022) p/w elevated filling pressure (PAD 40) on cardiomems. Patient was in his usual state of health but for past several days has elevated cardiomems PAD 40. Denies any significant change in symptoms. Doubled home torsemide dose 40mg to BID Wed, Th, Fri and then again Sunday. Pt endorsed increased urination but still elevated reads. Weight close to baseline. Advised to come to hospital for direct admit yesterday by HF Dr. Quiñones. Declined to come given storm and living far away last night. Came today. Denies any chest pain, palpitations, fevers, chills, cough, increased LE edema, orthopnea.

## 2024-01-10 NOTE — PATIENT PROFILE ADULT - FALL HARM RISK - UNIVERSAL INTERVENTIONS
Bed in lowest position, wheels locked, appropriate side rails in place/Call bell, personal items and telephone in reach/Instruct patient to call for assistance before getting out of bed or chair/Non-slip footwear when patient is out of bed/[place] to call system/Physically safe environment - no spills, clutter or unnecessary equipment/Purposeful Proactive Rounding/Room/bathroom lighting operational, light cord in reach Bed in lowest position, wheels locked, appropriate side rails in place/Call bell, personal items and telephone in reach/Instruct patient to call for assistance before getting out of bed or chair/Non-slip footwear when patient is out of bed/[place] to call system/Physically safe environment - no spills, clutter or unnecessary equipment/Purposeful Proactive Rounding/Room/bathroom lighting operational, light cord in reach
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
## 2024-01-10 NOTE — CONSULT NOTE ADULT - ATTENDING COMMENTS
Mr. 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 Of note, has been feeling fairly well despite elevated MEMs which was unable to be managed with outpatient diuretics. His MEMs has been elevated since February/March 2023 when he went into afib and has had increasing V pacing. Given IV lasix 80 mg x1 with good effect. On exam, JVP approx 10-12 with HJR, RRR, no m/r/g, CTAB, nontender abdomen, no pedal edema. Labs reivewed - K 3.3, BUN/Cr 40/1.22, BNP 2k.   - increase marcelino to 25 mg twice/day  - c/w losartan 12.5 mg twice/day  - c/w toprol 25 mg qhs  - give lasix 40 mg IV q12  - d/w EP to interrogate device and evaluate for BiV upgrade vs. afib ablation

## 2024-01-10 NOTE — H&P ADULT - PROBLEM SELECTOR PLAN 1
HFrEF (EF 15-20%, LVEDD 6 cm) s/p single chamber ICD with lead revision in 2018 with upgrade to dual-chamber device (8/17/21) s/p MEMS (9/16/21)    PAD 40, pt endorsing minimal symptoms or weight gain. Case discussed with HF overnight, recommendations greatly appreciated. Cannot order HF token due to EMR line limitations    -Plan to order Lasix 80mg IV if renal function acceptable  -TTE  -CXR  -Telemetry  -Daily weight and I/Os  -Off Farxiga with IV diuresis for now  -Cont. Toprol QHS  -Cont. Losartan BID  -Cont. Spironolactone daily  -F/u HF recommendations  -F/u BNP

## 2024-01-10 NOTE — CONSULT NOTE ADULT - ASSESSMENT
73M w/ h/o CAD s/p late-presenting MI 2005 s/p PCI LAD, ICM/HFrEF (EF 15-20%, LVEDD 6 cm) s/p single chamber ICD with lead revision in 2018 with upgrade to dual-chamber device (8/17/21) s/p MEMS (9/16/21), bioprosthetic AVR (2015) 2/2 AI s/p 26 mm EVOLUT HECTOR for severe bioprosthetic AS (8/20/20), aflutter ablation 7/17 w/ persistent afib, s/p DCCV x2 (8/12/20, 7/7/21), Trigeminal neuralgia (4/2022) p/w elevated filling pressure (PAD 40) on cardiomems.

## 2024-01-10 NOTE — H&P ADULT - TIME BILLING
Need to interview and examine patient, discuss care with family, consulting cardiology, provide counseling, coordinate care, place orders, document, personally review imaging, ekg and review prior medical records

## 2024-01-10 NOTE — PATIENT PROFILE ADULT - WHEN WAS YOUR LAST VACCINATION? MONTH
CC: PT PRESENTING WITH A FB SENSATION OU  PT HAD PUNCTAL PLUGS INSERTED YESTERDAY     Medications reviewed and updated.  Denies known Latex allergy or symptoms of Latex sensitivity.  Tobacco verified.      PCP:Karla Mooney MD  POH: DRY EYE, CATARACTS, FLOATERS       December

## 2024-01-10 NOTE — H&P ADULT - NSHPPHYSICALEXAM_GEN_ALL_CORE
Vital Signs Last 24 Hrs  T(C): 36.8 (01-10-24 @ 20:14), Max: 36.8 (01-10-24 @ 20:14)  T(F): 98.3 (01-10-24 @ 20:14), Max: 98.3 (01-10-24 @ 20:14)  HR: 77 (01-10-24 @ 20:14) (77 - 77)  BP: 105/72 (01-10-24 @ 20:14) (105/72 - 113/79)  BP(mean): --  RR: 18 (01-10-24 @ 20:14) (18 - 18)  SpO2: 96% (01-10-24 @ 20:14) (96% - 96%)

## 2024-01-10 NOTE — H&P ADULT - ASSESSMENT
73M w/ h/o CAD s/p late-presenting MI 2005 s/p PCI LAD, ICM/HFrEF (EF 15-20%, LVEDD 6 cm) s/p single chamber ICD with lead revision in 2018 with upgrade to dual-chamber device (8/17/21) s/p MEMS (9/16/21), bioprosthetic AVR (2015) 2/2 AI s/p 26 mm EVOLUT HECTOR for severe bioprosthetic AS (8/20/20), aflutter ablation 7/17 w/ persistent afib, s/p DCCV x2 (8/12/20, 7/7/21), Trigeminal neuralgia (4/2022) p/w elevated filling pressure (PAD 40) on cardiomems as direct admit for acute on chronic HFrEF exacerbation

## 2024-01-10 NOTE — CONSULT NOTE ADULT - PROBLEM SELECTOR RECOMMENDATION 9
Most recent TTE 10/2023 with EF 20-25%  Elevated outpatient MEMs readings with PAD ~40  Recent calibration test in 2023 with correlating readings  GDMT:  BB- c/w Toprol 25mg qd  ACE/ARB/ARNI- c/w losartan 12.5mg BID. unable to tolerate Entresto due to hypotension/syncope  MRA: c/w spironolactone 25mg qd  SGLT2: c/w home Farxiga  Device: ICD in place    - c/w IV diuresis for goal net neg 1-2L qd  - will check MEMs numbers in AM  - Tele, K>4 Mg>2, STRICT I/O, Daily weights  - Repeat TTE  - f/u iron studies

## 2024-01-11 LAB
ALBUMIN SERPL ELPH-MCNC: 4.9 G/DL — SIGNIFICANT CHANGE UP (ref 3.3–5)
ALBUMIN SERPL ELPH-MCNC: 4.9 G/DL — SIGNIFICANT CHANGE UP (ref 3.3–5)
ALP SERPL-CCNC: 159 U/L — HIGH (ref 40–120)
ALP SERPL-CCNC: 159 U/L — HIGH (ref 40–120)
ALT FLD-CCNC: 27 U/L — SIGNIFICANT CHANGE UP (ref 10–45)
ALT FLD-CCNC: 27 U/L — SIGNIFICANT CHANGE UP (ref 10–45)
ANION GAP SERPL CALC-SCNC: 12 MMOL/L — SIGNIFICANT CHANGE UP (ref 5–17)
ANION GAP SERPL CALC-SCNC: 12 MMOL/L — SIGNIFICANT CHANGE UP (ref 5–17)
ANION GAP SERPL CALC-SCNC: 13 MMOL/L — SIGNIFICANT CHANGE UP (ref 5–17)
ANION GAP SERPL CALC-SCNC: 13 MMOL/L — SIGNIFICANT CHANGE UP (ref 5–17)
APTT BLD: 34.3 SEC — SIGNIFICANT CHANGE UP (ref 24.5–35.6)
APTT BLD: 34.3 SEC — SIGNIFICANT CHANGE UP (ref 24.5–35.6)
AST SERPL-CCNC: 24 U/L — SIGNIFICANT CHANGE UP (ref 10–40)
AST SERPL-CCNC: 24 U/L — SIGNIFICANT CHANGE UP (ref 10–40)
BASOPHILS # BLD AUTO: 0.13 K/UL — SIGNIFICANT CHANGE UP (ref 0–0.2)
BASOPHILS # BLD AUTO: 0.13 K/UL — SIGNIFICANT CHANGE UP (ref 0–0.2)
BASOPHILS NFR BLD AUTO: 1.5 % — SIGNIFICANT CHANGE UP (ref 0–2)
BASOPHILS NFR BLD AUTO: 1.5 % — SIGNIFICANT CHANGE UP (ref 0–2)
BILIRUB SERPL-MCNC: 0.7 MG/DL — SIGNIFICANT CHANGE UP (ref 0.2–1.2)
BILIRUB SERPL-MCNC: 0.7 MG/DL — SIGNIFICANT CHANGE UP (ref 0.2–1.2)
BUN SERPL-MCNC: 40 MG/DL — HIGH (ref 7–23)
BUN SERPL-MCNC: 40 MG/DL — HIGH (ref 7–23)
BUN SERPL-MCNC: 47 MG/DL — HIGH (ref 7–23)
BUN SERPL-MCNC: 47 MG/DL — HIGH (ref 7–23)
CALCIUM SERPL-MCNC: 9 MG/DL — SIGNIFICANT CHANGE UP (ref 8.4–10.5)
CALCIUM SERPL-MCNC: 9 MG/DL — SIGNIFICANT CHANGE UP (ref 8.4–10.5)
CALCIUM SERPL-MCNC: 9.6 MG/DL — SIGNIFICANT CHANGE UP (ref 8.4–10.5)
CALCIUM SERPL-MCNC: 9.6 MG/DL — SIGNIFICANT CHANGE UP (ref 8.4–10.5)
CHLORIDE SERPL-SCNC: 96 MMOL/L — SIGNIFICANT CHANGE UP (ref 96–108)
CHLORIDE SERPL-SCNC: 96 MMOL/L — SIGNIFICANT CHANGE UP (ref 96–108)
CHLORIDE SERPL-SCNC: 98 MMOL/L — SIGNIFICANT CHANGE UP (ref 96–108)
CHLORIDE SERPL-SCNC: 98 MMOL/L — SIGNIFICANT CHANGE UP (ref 96–108)
CO2 SERPL-SCNC: 30 MMOL/L — SIGNIFICANT CHANGE UP (ref 22–31)
CREAT SERPL-MCNC: 1.22 MG/DL — SIGNIFICANT CHANGE UP (ref 0.5–1.3)
CREAT SERPL-MCNC: 1.22 MG/DL — SIGNIFICANT CHANGE UP (ref 0.5–1.3)
CREAT SERPL-MCNC: 1.47 MG/DL — HIGH (ref 0.5–1.3)
CREAT SERPL-MCNC: 1.47 MG/DL — HIGH (ref 0.5–1.3)
EGFR: 50 ML/MIN/1.73M2 — LOW
EGFR: 50 ML/MIN/1.73M2 — LOW
EGFR: 62 ML/MIN/1.73M2 — SIGNIFICANT CHANGE UP
EGFR: 62 ML/MIN/1.73M2 — SIGNIFICANT CHANGE UP
EOSINOPHIL # BLD AUTO: 0.12 K/UL — SIGNIFICANT CHANGE UP (ref 0–0.5)
EOSINOPHIL # BLD AUTO: 0.12 K/UL — SIGNIFICANT CHANGE UP (ref 0–0.5)
EOSINOPHIL NFR BLD AUTO: 1.4 % — SIGNIFICANT CHANGE UP (ref 0–6)
EOSINOPHIL NFR BLD AUTO: 1.4 % — SIGNIFICANT CHANGE UP (ref 0–6)
GLUCOSE SERPL-MCNC: 118 MG/DL — HIGH (ref 70–99)
GLUCOSE SERPL-MCNC: 118 MG/DL — HIGH (ref 70–99)
GLUCOSE SERPL-MCNC: 190 MG/DL — HIGH (ref 70–99)
GLUCOSE SERPL-MCNC: 190 MG/DL — HIGH (ref 70–99)
HCT VFR BLD CALC: 46.4 % — SIGNIFICANT CHANGE UP (ref 39–50)
HCT VFR BLD CALC: 46.4 % — SIGNIFICANT CHANGE UP (ref 39–50)
HGB BLD-MCNC: 15.3 G/DL — SIGNIFICANT CHANGE UP (ref 13–17)
HGB BLD-MCNC: 15.3 G/DL — SIGNIFICANT CHANGE UP (ref 13–17)
IMM GRANULOCYTES NFR BLD AUTO: 0.4 % — SIGNIFICANT CHANGE UP (ref 0–0.9)
IMM GRANULOCYTES NFR BLD AUTO: 0.4 % — SIGNIFICANT CHANGE UP (ref 0–0.9)
INR BLD: 1.63 RATIO — HIGH (ref 0.85–1.18)
INR BLD: 1.63 RATIO — HIGH (ref 0.85–1.18)
LYMPHOCYTES # BLD AUTO: 1.86 K/UL — SIGNIFICANT CHANGE UP (ref 1–3.3)
LYMPHOCYTES # BLD AUTO: 1.86 K/UL — SIGNIFICANT CHANGE UP (ref 1–3.3)
LYMPHOCYTES # BLD AUTO: 21.9 % — SIGNIFICANT CHANGE UP (ref 13–44)
LYMPHOCYTES # BLD AUTO: 21.9 % — SIGNIFICANT CHANGE UP (ref 13–44)
MAGNESIUM SERPL-MCNC: 2.3 MG/DL — SIGNIFICANT CHANGE UP (ref 1.6–2.6)
MAGNESIUM SERPL-MCNC: 2.3 MG/DL — SIGNIFICANT CHANGE UP (ref 1.6–2.6)
MCHC RBC-ENTMCNC: 31.4 PG — SIGNIFICANT CHANGE UP (ref 27–34)
MCHC RBC-ENTMCNC: 31.4 PG — SIGNIFICANT CHANGE UP (ref 27–34)
MCHC RBC-ENTMCNC: 33 GM/DL — SIGNIFICANT CHANGE UP (ref 32–36)
MCHC RBC-ENTMCNC: 33 GM/DL — SIGNIFICANT CHANGE UP (ref 32–36)
MCV RBC AUTO: 95.1 FL — SIGNIFICANT CHANGE UP (ref 80–100)
MCV RBC AUTO: 95.1 FL — SIGNIFICANT CHANGE UP (ref 80–100)
MONOCYTES # BLD AUTO: 1.04 K/UL — HIGH (ref 0–0.9)
MONOCYTES # BLD AUTO: 1.04 K/UL — HIGH (ref 0–0.9)
MONOCYTES NFR BLD AUTO: 12.2 % — SIGNIFICANT CHANGE UP (ref 2–14)
MONOCYTES NFR BLD AUTO: 12.2 % — SIGNIFICANT CHANGE UP (ref 2–14)
NEUTROPHILS # BLD AUTO: 5.32 K/UL — SIGNIFICANT CHANGE UP (ref 1.8–7.4)
NEUTROPHILS # BLD AUTO: 5.32 K/UL — SIGNIFICANT CHANGE UP (ref 1.8–7.4)
NEUTROPHILS NFR BLD AUTO: 62.6 % — SIGNIFICANT CHANGE UP (ref 43–77)
NEUTROPHILS NFR BLD AUTO: 62.6 % — SIGNIFICANT CHANGE UP (ref 43–77)
NRBC # BLD: 0 /100 WBCS — SIGNIFICANT CHANGE UP (ref 0–0)
NRBC # BLD: 0 /100 WBCS — SIGNIFICANT CHANGE UP (ref 0–0)
NT-PROBNP SERPL-SCNC: 2394 PG/ML — HIGH (ref 0–300)
NT-PROBNP SERPL-SCNC: 2394 PG/ML — HIGH (ref 0–300)
PLATELET # BLD AUTO: 161 K/UL — SIGNIFICANT CHANGE UP (ref 150–400)
PLATELET # BLD AUTO: 161 K/UL — SIGNIFICANT CHANGE UP (ref 150–400)
POTASSIUM SERPL-MCNC: 3.3 MMOL/L — LOW (ref 3.5–5.3)
POTASSIUM SERPL-MCNC: 3.3 MMOL/L — LOW (ref 3.5–5.3)
POTASSIUM SERPL-MCNC: 3.8 MMOL/L — SIGNIFICANT CHANGE UP (ref 3.5–5.3)
POTASSIUM SERPL-MCNC: 3.8 MMOL/L — SIGNIFICANT CHANGE UP (ref 3.5–5.3)
POTASSIUM SERPL-SCNC: 3.3 MMOL/L — LOW (ref 3.5–5.3)
POTASSIUM SERPL-SCNC: 3.3 MMOL/L — LOW (ref 3.5–5.3)
POTASSIUM SERPL-SCNC: 3.8 MMOL/L — SIGNIFICANT CHANGE UP (ref 3.5–5.3)
POTASSIUM SERPL-SCNC: 3.8 MMOL/L — SIGNIFICANT CHANGE UP (ref 3.5–5.3)
PROT SERPL-MCNC: 7.5 G/DL — SIGNIFICANT CHANGE UP (ref 6–8.3)
PROT SERPL-MCNC: 7.5 G/DL — SIGNIFICANT CHANGE UP (ref 6–8.3)
PROTHROM AB SERPL-ACNC: 16.9 SEC — HIGH (ref 9.5–13)
PROTHROM AB SERPL-ACNC: 16.9 SEC — HIGH (ref 9.5–13)
RBC # BLD: 4.88 M/UL — SIGNIFICANT CHANGE UP (ref 4.2–5.8)
RBC # BLD: 4.88 M/UL — SIGNIFICANT CHANGE UP (ref 4.2–5.8)
RBC # FLD: 14.2 % — SIGNIFICANT CHANGE UP (ref 10.3–14.5)
RBC # FLD: 14.2 % — SIGNIFICANT CHANGE UP (ref 10.3–14.5)
SODIUM SERPL-SCNC: 138 MMOL/L — SIGNIFICANT CHANGE UP (ref 135–145)
SODIUM SERPL-SCNC: 138 MMOL/L — SIGNIFICANT CHANGE UP (ref 135–145)
SODIUM SERPL-SCNC: 141 MMOL/L — SIGNIFICANT CHANGE UP (ref 135–145)
SODIUM SERPL-SCNC: 141 MMOL/L — SIGNIFICANT CHANGE UP (ref 135–145)
TROPONIN T, HIGH SENSITIVITY RESULT: 39 NG/L — SIGNIFICANT CHANGE UP (ref 0–51)
TROPONIN T, HIGH SENSITIVITY RESULT: 39 NG/L — SIGNIFICANT CHANGE UP (ref 0–51)
TSH SERPL-MCNC: 2.41 UIU/ML — SIGNIFICANT CHANGE UP (ref 0.27–4.2)
TSH SERPL-MCNC: 2.41 UIU/ML — SIGNIFICANT CHANGE UP (ref 0.27–4.2)
WBC # BLD: 8.5 K/UL — SIGNIFICANT CHANGE UP (ref 3.8–10.5)
WBC # BLD: 8.5 K/UL — SIGNIFICANT CHANGE UP (ref 3.8–10.5)
WBC # FLD AUTO: 8.5 K/UL — SIGNIFICANT CHANGE UP (ref 3.8–10.5)
WBC # FLD AUTO: 8.5 K/UL — SIGNIFICANT CHANGE UP (ref 3.8–10.5)

## 2024-01-11 PROCEDURE — 71045 X-RAY EXAM CHEST 1 VIEW: CPT | Mod: 26

## 2024-01-11 PROCEDURE — 93010 ELECTROCARDIOGRAM REPORT: CPT

## 2024-01-11 PROCEDURE — 99233 SBSQ HOSP IP/OBS HIGH 50: CPT

## 2024-01-11 PROCEDURE — 93306 TTE W/DOPPLER COMPLETE: CPT | Mod: 26

## 2024-01-11 RX ORDER — FUROSEMIDE 40 MG
80 TABLET ORAL ONCE
Refills: 0 | Status: COMPLETED | OUTPATIENT
Start: 2024-01-11 | End: 2024-01-11

## 2024-01-11 RX ORDER — METOPROLOL TARTRATE 50 MG
25 TABLET ORAL AT BEDTIME
Refills: 0 | Status: DISCONTINUED | OUTPATIENT
Start: 2024-01-11 | End: 2024-01-18

## 2024-01-11 RX ORDER — POTASSIUM CHLORIDE 20 MEQ
20 PACKET (EA) ORAL ONCE
Refills: 0 | Status: COMPLETED | OUTPATIENT
Start: 2024-01-11 | End: 2024-01-11

## 2024-01-11 RX ORDER — FUROSEMIDE 40 MG
40 TABLET ORAL EVERY 12 HOURS
Refills: 0 | Status: DISCONTINUED | OUTPATIENT
Start: 2024-01-11 | End: 2024-01-12

## 2024-01-11 RX ORDER — SPIRONOLACTONE 25 MG/1
25 TABLET, FILM COATED ORAL EVERY 12 HOURS
Refills: 0 | Status: DISCONTINUED | OUTPATIENT
Start: 2024-01-11 | End: 2024-01-13

## 2024-01-11 RX ORDER — LOSARTAN POTASSIUM 100 MG/1
12.5 TABLET, FILM COATED ORAL
Refills: 0 | Status: DISCONTINUED | OUTPATIENT
Start: 2024-01-11 | End: 2024-01-14

## 2024-01-11 RX ORDER — POTASSIUM CHLORIDE 20 MEQ
40 PACKET (EA) ORAL ONCE
Refills: 0 | Status: COMPLETED | OUTPATIENT
Start: 2024-01-11 | End: 2024-01-11

## 2024-01-11 RX ADMIN — Medication 40 MILLIEQUIVALENT(S): at 17:07

## 2024-01-11 RX ADMIN — Medication 25 MILLIGRAM(S): at 21:17

## 2024-01-11 RX ADMIN — Medication 100 MILLIGRAM(S): at 17:08

## 2024-01-11 RX ADMIN — Medication 40 MILLIGRAM(S): at 17:10

## 2024-01-11 RX ADMIN — APIXABAN 5 MILLIGRAM(S): 2.5 TABLET, FILM COATED ORAL at 17:07

## 2024-01-11 RX ADMIN — ATORVASTATIN CALCIUM 40 MILLIGRAM(S): 80 TABLET, FILM COATED ORAL at 21:18

## 2024-01-11 RX ADMIN — SPIRONOLACTONE 25 MILLIGRAM(S): 25 TABLET, FILM COATED ORAL at 18:26

## 2024-01-11 RX ADMIN — APIXABAN 5 MILLIGRAM(S): 2.5 TABLET, FILM COATED ORAL at 05:25

## 2024-01-11 RX ADMIN — Medication 400 MILLIGRAM(S): at 18:26

## 2024-01-11 RX ADMIN — Medication 100 MILLIGRAM(S): at 05:25

## 2024-01-11 RX ADMIN — Medication 81 MILLIGRAM(S): at 11:54

## 2024-01-11 RX ADMIN — Medication 80 MILLIGRAM(S): at 01:36

## 2024-01-11 RX ADMIN — LOSARTAN POTASSIUM 12.5 MILLIGRAM(S): 100 TABLET, FILM COATED ORAL at 20:14

## 2024-01-11 RX ADMIN — Medication 20 MILLIEQUIVALENT(S): at 09:00

## 2024-01-11 RX ADMIN — Medication 88 MICROGRAM(S): at 05:25

## 2024-01-11 RX ADMIN — LOSARTAN POTASSIUM 12.5 MILLIGRAM(S): 100 TABLET, FILM COATED ORAL at 05:25

## 2024-01-11 RX ADMIN — Medication 400 MILLIGRAM(S): at 05:25

## 2024-01-11 NOTE — DIETITIAN INITIAL EVALUATION ADULT - OTHER CALCULATIONS
Fluid needs deferred to team. Energy and protein needs based on adjusted IBW of 202lb in consideration of Increased nutrient needs.

## 2024-01-11 NOTE — PROGRESS NOTE ADULT - SUBJECTIVE AND OBJECTIVE BOX
[facility] Division of Hospital Medicine  Anabela Pena DO   Available via Microsoft Teams      Patient is a 73y old  Male who presents with a chief complaint of HF Exacerbation (10 Jabari 2024 23:46)      SUBJECTIVE / OVERNIGHT EVENTS:  Overall feels well. No SOB, CP, dizziness. Tolerated travel to [place] for the holiday     MEDICATIONS  (STANDING):  apixaban 5 milliGRAM(s) Oral two times a day  aspirin enteric coated 81 milliGRAM(s) Oral daily  atorvastatin 40 milliGRAM(s) Oral at bedtime  carBAMazepine ER Tablet 100 milliGRAM(s) Oral two times a day  furosemide   Injectable 40 milliGRAM(s) IV Push every 12 hours  levothyroxine 88 MICROGram(s) Oral daily  losartan 12.5 milliGRAM(s) Oral two times a day  metoprolol succinate ER 25 milliGRAM(s) Oral at bedtime  pregabalin 400 milliGRAM(s) Oral two times a day  spironolactone 25 milliGRAM(s) Oral every 12 hours    MEDICATIONS  (PRN):  acetaminophen     Tablet .. 650 milliGRAM(s) Oral every 6 hours PRN Temp greater or equal to 38C (100.4F), Mild Pain (1 - 3)  melatonin 3 milliGRAM(s) Oral at bedtime PRN Insomnia      CAPILLARY BLOOD GLUCOSE        I&O's Summary    10 Jabari 2024 07:01  -  11 Jan 2024 07:00  --------------------------------------------------------  IN: 0 mL / OUT: 850 mL / NET: -850 mL    11 Jan 2024 07:01  -  11 Jan 2024 14:08  --------------------------------------------------------  IN: 600 mL / OUT: 750 mL / NET: -150 mL        PHYSICAL EXAM:  Vital Signs Last 24 Hrs  T(C): 36.9 (11 Jan 2024 11:38), Max: 36.9 (11 Jan 2024 11:38)  T(F): 98.5 (11 Jan 2024 11:38), Max: 98.5 (11 Jan 2024 11:38)  HR: 90 (11 Jan 2024 11:38) (71 - 90)  BP: 106/70 (11 Jan 2024 11:38) (74/53 - 113/79)  BP(mean): --  RR: 17 (11 Jan 2024 11:38) (17 - 18)  SpO2: 96% (11 Jan 2024 11:38) (94% - 96%)    Parameters below as of 11 Jan 2024 11:38  Patient On (Oxygen Delivery Method): room air      CONSTITUTIONAL: NAD, well-developed, well-groomed  EYES: conjunctiva and sclera clear  ENMT: Moist oral mucosa  RESPIRATORY: Normal respiratory effort; lungs are clear to auscultation bilaterally  CARDIOVASCULAR: Regular rate and rhythm, normal S1 and S2; No lower extremity edema  ABDOMEN: Nontender to palpation, normoactive bowel sounds, no rebound/guarding  MUSCULOSKELETAL: no clubbing or cyanosis of digits; no joint swelling or tenderness to palpation  PSYCH: A+O to person, place, and time; affect appropriate  SKIN: No rashes; no palpable lesions    LABS:                        15.3   8.50  )-----------( 161      ( 10 Jabari 2024 23:59 )             46.4     01-11    138  |  96  |  40<H>  ----------------------------<  190<H>  3.3<L>   |  30  |  1.22    Ca    9.0      11 Jan 2024 11:14  Mg     2.3     01-10    TPro  7.5  /  Alb  4.9  /  TBili  0.7  /  DBili  x   /  AST  24  /  ALT  27  /  AlkPhos  159<H>  01-10    PT/INR - ( 10 Jabari 2024 23:59 )   PT: 16.9 sec;   INR: 1.63 ratio         PTT - ( 10 Jabari 2024 23:59 )  PTT:34.3 sec      Urinalysis Basic - ( 11 Jan 2024 11:14 )    Color: x / Appearance: x / SG: x / pH: x  Gluc: 190 mg/dL / Ketone: x  / Bili: x / Urobili: x   Blood: x / Protein: x / Nitrite: x   Leuk Esterase: x / RBC: x / WBC x   Sq Epi: x / Non Sq Epi: x / Bacteria: x          RADIOLOGY & ADDITIONAL TESTS:  Results Reviewed:   Imaging Personally Reviewed:  Electrocardiogram Personally Reviewed:    COORDINATION OF CARE:  Care Discussed with Consultants/Other Providers [Y/N]:  Prior or Outpatient Records Reviewed [Y/N]:   [facility] Division of Hospital Medicine  Anabela Pena DO   Available via Microsoft Teams      Patient is a 73y old  Male who presents with a chief complaint of HF Exacerbation (10 Jabari 2024 23:46)      SUBJECTIVE / OVERNIGHT EVENTS:  Overall feels well. No SOB, CP, dizziness. Tolerated travel to [place] for the holiday     MEDICATIONS  (STANDING):  apixaban 5 milliGRAM(s) Oral two times a day  aspirin enteric coated 81 milliGRAM(s) Oral daily  atorvastatin 40 milliGRAM(s) Oral at bedtime  carBAMazepine ER Tablet 100 milliGRAM(s) Oral two times a day  furosemide   Injectable 40 milliGRAM(s) IV Push every 12 hours  levothyroxine 88 MICROGram(s) Oral daily  losartan 12.5 milliGRAM(s) Oral two times a day  metoprolol succinate ER 25 milliGRAM(s) Oral at bedtime  pregabalin 400 milliGRAM(s) Oral two times a day  spironolactone 25 milliGRAM(s) Oral every 12 hours    MEDICATIONS  (PRN):  acetaminophen     Tablet .. 650 milliGRAM(s) Oral every 6 hours PRN Temp greater or equal to 38C (100.4F), Mild Pain (1 - 3)  melatonin 3 milliGRAM(s) Oral at bedtime PRN Insomnia      CAPILLARY BLOOD GLUCOSE        I&O's Summary    10 Jabari 2024 07:01  -  11 Jan 2024 07:00  --------------------------------------------------------  IN: 0 mL / OUT: 850 mL / NET: -850 mL    11 Jan 2024 07:01  -  11 Jan 2024 14:08  --------------------------------------------------------  IN: 600 mL / OUT: 750 mL / NET: -150 mL        PHYSICAL EXAM:  Vital Signs Last 24 Hrs  T(C): 36.9 (11 Jan 2024 11:38), Max: 36.9 (11 Jan 2024 11:38)  T(F): 98.5 (11 Jan 2024 11:38), Max: 98.5 (11 Jan 2024 11:38)  HR: 90 (11 Jan 2024 11:38) (71 - 90)  BP: 106/70 (11 Jan 2024 11:38) (74/53 - 113/79)  BP(mean): --  RR: 17 (11 Jan 2024 11:38) (17 - 18)  SpO2: 96% (11 Jan 2024 11:38) (94% - 96%)    Parameters below as of 11 Jan 2024 11:38  Patient On (Oxygen Delivery Method): room air      CONSTITUTIONAL: NAD, well-developed, well-groomed  EYES: conjunctiva and sclera clear  ENMT: Moist oral mucosa  RESPIRATORY: Normal respiratory effort; lungs are clear to auscultation bilaterally  CARDIOVASCULAR: Regular rate and rhythm, normal S1 and S2; No lower extremity edema  ABDOMEN: Nontender to palpation, normoactive bowel sounds, no rebound/guarding  MUSCULOSKELETAL: no clubbing or cyanosis of digits; no joint swelling or tenderness to palpation  PSYCH: A+O to person, place, and time; affect appropriate  SKIN: No rashes; no palpable lesions    LABS:                        15.3   8.50  )-----------( 161      ( 10 Jabari 2024 23:59 )             46.4     01-11    138  |  96  |  40<H>  ----------------------------<  190<H>  3.3<L>   |  30  |  1.22    Ca    9.0      11 Jan 2024 11:14  Mg     2.3     01-10    TPro  7.5  /  Alb  4.9  /  TBili  0.7  /  DBili  x   /  AST  24  /  ALT  27  /  AlkPhos  159<H>  01-10    PT/INR - ( 10 Jabari 2024 23:59 )   PT: 16.9 sec;   INR: 1.63 ratio         PTT - ( 10 Jabari 2024 23:59 )  PTT:34.3 sec      Urinalysis Basic - ( 11 Jan 2024 11:14 )    Color: x / Appearance: x / SG: x / pH: x  Gluc: 190 mg/dL / Ketone: x  / Bili: x / Urobili: x   Blood: x / Protein: x / Nitrite: x   Leuk Esterase: x / RBC: x / WBC x   Sq Epi: x / Non Sq Epi: x / Bacteria: x          RADIOLOGY & ADDITIONAL TESTS:  Results Reviewed:   Imaging Personally Reviewed:  Electrocardiogram Personally Reviewed:    COORDINATION OF CARE:  Care Discussed with Consultants/Other Providers [Y/N]:  Prior or Outpatient Records Reviewed [Y/N]:

## 2024-01-11 NOTE — DIETITIAN INITIAL EVALUATION ADULT - ORAL INTAKE PTA/DIET HISTORY
Pt was eating well with no changes in appetite. Wife cooks at home, pt and wife reports they are aware of therapeutic restriction, has not been using salt in foods, also reports avoiding foods with concentrated sweets due to pre-DM. Confirms no known food allergies. Denies Hx of chewing or swallowing issues. Denies GI distress. Denies micronutrient or nutrient supplement use.

## 2024-01-11 NOTE — DIETITIAN INITIAL EVALUATION ADULT - LITERATURE/VIDEOS GIVEN
Heart Failure Nutrition Therapy  Heart Healthy Label Reading  Heart Healthy Shopping Tips  Sodium (salt) Content of Foods

## 2024-01-11 NOTE — DIETITIAN INITIAL EVALUATION ADULT - ADD RECOMMEND
-- Monitor PO intake, GI tolerance, skin integrity, labs, weight, and bowel movement regularity.   -- Will continue to honor food and beverage preferences within diet restriction of patient in an effort to maximize level of nutrient intake.  -- Assist with meals PRN.

## 2024-01-11 NOTE — PROGRESS NOTE ADULT - PROBLEM SELECTOR PLAN 1
HFrEF (EF 15-20%, LVEDD 6 cm) s/p single chamber ICD with lead revision in 2018 with upgrade to dual-chamber device (8/17/21) s/p MEMS (9/16/21)  MEMs level 40     - HF following   GDMT:  BB- c/w Toprol 25mg qd  ACE/ARB/ARNI- c/w losartan - unable to tolerate Entresto due to hypotension/syncope  MRA: c/w spironolactone    SGLT2: resume home Farxiga on dc     on IV lasix

## 2024-01-11 NOTE — DIETITIAN INITIAL EVALUATION ADULT - CONTINUE CURRENT NUTRITION CARE PLAN
DATE OF PROCEDURE:  2/22/2023    PROCEDURE:    Colonoscopy via rectal stump and end descending colostomy    INDICATION:    S/p Jay's procedure for perforated sigmoid colon    SURGEON:    Mariana Beard MD    ASSISTANT:    None    ANESTHESIA:   MAC     EBL:     None    COMPLICATIONS:   None    SPECIMENS:    None     PROCEDURE: After identification of the patient and confirmation of the procedure, the patient was brought to the endoscopy suite and placed in the supine position. The patient was administered deep conscious sedation by the anesthesia staff. A digital rectal exam was performed, which was unremarkable. A lubricated adult colonoscope was inserted through the anus into the rectum and advanced into the proximal end of the pouch which was approximately 30 cm long. The scope was retrieved. The scope was then inserted via the stoma and advanced to the cecum. The bowel prep was excellent. The scope was then slowly withdrawn. No biopsies were obtained. The colon was decompressed. The scope was removed.       FINDINGS:  Moderate sigmoid diverticulosis in distal segment  Scattered, few right sided diverticulosis    ASSESSMENT:  Diverticulosis     RECOMMENDATIONS:   Plan for colostomy reversal in near future  FU in office to discuss surgery
Continue low Na diet as tolerated. RD remains available to adjust diet as needed./yes

## 2024-01-11 NOTE — DIETITIAN INITIAL EVALUATION ADULT - PHYSCIAL ASSESSMENT
Ht per pt: 73"    - Drug Dosing Weight (kg): 102.6 (10 Jabari 2024 18:53)- ED standing wt   - Daily wt (standing): 101.7kg (1/11)     - UBW: unable to recall dry weight but pt reports wt fluctuation due to Congestive Heart Failure with fluid shift.  Wt history from previous RD notes: 90.2kg (4/6/15)   [facility]      **Wt fluctuation expected with diuresis for dx Congestive Heart Failure. RD will continue to monitor wt trends as available/able.     IBW: 202lb (adjusted for high BMI), 111% IBW Ht per pt: 73"    - Drug Dosing Weight (kg): 102.6 (10 Jabari 2024 18:53)- ED standing wt   - Daily wt (standing): 101.7kg (1/11)     - UBW: unable to recall dry weight but pt reports wt fluctuation due to Congestive Heart Failure with fluid shift.  Wt history from previous RD notes: 90.2kg (4/6/15)   [facility]      **Wt fluctuation expected with diuresis for dx Congestive Heart Failure. RD will continue to monitor wt trends as available/able.     IBW: 202lb (adjusted for high BMI), 111% IBW

## 2024-01-11 NOTE — DIETITIAN INITIAL EVALUATION ADULT - NSFNSADHERENCEPTAFT_GEN_A_CORE
-- Pt with dx pre-DM, was on Farxiga to regulate blood glucose PTA. No recent HbA1c available at present.   -- dx CHF, on Spironolactone and Torsemide PTA

## 2024-01-11 NOTE — DIETITIAN INITIAL EVALUATION ADULT - PERTINENT LABORATORY DATA
01-11    138  |  96  |  40<H>  ----------------------------<  190<H>  3.3<L>   |  30  |  1.22    Ca    9.0      11 Jan 2024 11:14  Mg     2.3     01-10    TPro  7.5  /  Alb  4.9  /  TBili  0.7  /  DBili  x   /  AST  24  /  ALT  27  /  AlkPhos  159<H>  01-10

## 2024-01-11 NOTE — DIETITIAN INITIAL EVALUATION ADULT - OTHER INFO
-- CHF: on Lasix and Spironolactone   -- On Synthroid   -- Most recent lab on 1/11 revealed abnormal BUN 40H and K 3.3L, s/p KCl today

## 2024-01-11 NOTE — DIETITIAN INITIAL EVALUATION ADULT - ENERGY INTAKE
Pt reports good PO intake >75% of foods provided since admission. Pt and wife made aware of menu ordering procedure in house with menu provided, encourage pt and wife to order preferred foods as needed to optimize PO intake while in house.  Adequate (%)

## 2024-01-11 NOTE — DIETITIAN INITIAL EVALUATION ADULT - REASON INDICATOR FOR ASSESSMENT
Pt seen for consult for Congestive Heart Failure. Information obtained from pt, RN, electronic medical record, wife (Megan) at bedside. Chart reviewed, events noted.

## 2024-01-12 LAB
ANION GAP SERPL CALC-SCNC: 15 MMOL/L — SIGNIFICANT CHANGE UP (ref 5–17)
ANION GAP SERPL CALC-SCNC: 15 MMOL/L — SIGNIFICANT CHANGE UP (ref 5–17)
BUN SERPL-MCNC: 42 MG/DL — HIGH (ref 7–23)
BUN SERPL-MCNC: 42 MG/DL — HIGH (ref 7–23)
CALCIUM SERPL-MCNC: 9.5 MG/DL — SIGNIFICANT CHANGE UP (ref 8.4–10.5)
CALCIUM SERPL-MCNC: 9.5 MG/DL — SIGNIFICANT CHANGE UP (ref 8.4–10.5)
CHLORIDE SERPL-SCNC: 100 MMOL/L — SIGNIFICANT CHANGE UP (ref 96–108)
CHLORIDE SERPL-SCNC: 100 MMOL/L — SIGNIFICANT CHANGE UP (ref 96–108)
CO2 SERPL-SCNC: 25 MMOL/L — SIGNIFICANT CHANGE UP (ref 22–31)
CO2 SERPL-SCNC: 25 MMOL/L — SIGNIFICANT CHANGE UP (ref 22–31)
CREAT SERPL-MCNC: 1.27 MG/DL — SIGNIFICANT CHANGE UP (ref 0.5–1.3)
CREAT SERPL-MCNC: 1.27 MG/DL — SIGNIFICANT CHANGE UP (ref 0.5–1.3)
EGFR: 59 ML/MIN/1.73M2 — LOW
EGFR: 59 ML/MIN/1.73M2 — LOW
GLUCOSE SERPL-MCNC: 119 MG/DL — HIGH (ref 70–99)
GLUCOSE SERPL-MCNC: 119 MG/DL — HIGH (ref 70–99)
HCT VFR BLD CALC: 46.5 % — SIGNIFICANT CHANGE UP (ref 39–50)
HCT VFR BLD CALC: 46.5 % — SIGNIFICANT CHANGE UP (ref 39–50)
HGB BLD-MCNC: 15.1 G/DL — SIGNIFICANT CHANGE UP (ref 13–17)
HGB BLD-MCNC: 15.1 G/DL — SIGNIFICANT CHANGE UP (ref 13–17)
MAGNESIUM SERPL-MCNC: 2.5 MG/DL — SIGNIFICANT CHANGE UP (ref 1.6–2.6)
MAGNESIUM SERPL-MCNC: 2.5 MG/DL — SIGNIFICANT CHANGE UP (ref 1.6–2.6)
MCHC RBC-ENTMCNC: 31 PG — SIGNIFICANT CHANGE UP (ref 27–34)
MCHC RBC-ENTMCNC: 31 PG — SIGNIFICANT CHANGE UP (ref 27–34)
MCHC RBC-ENTMCNC: 32.5 GM/DL — SIGNIFICANT CHANGE UP (ref 32–36)
MCHC RBC-ENTMCNC: 32.5 GM/DL — SIGNIFICANT CHANGE UP (ref 32–36)
MCV RBC AUTO: 95.5 FL — SIGNIFICANT CHANGE UP (ref 80–100)
MCV RBC AUTO: 95.5 FL — SIGNIFICANT CHANGE UP (ref 80–100)
NRBC # BLD: 0 /100 WBCS — SIGNIFICANT CHANGE UP (ref 0–0)
NRBC # BLD: 0 /100 WBCS — SIGNIFICANT CHANGE UP (ref 0–0)
PHOSPHATE SERPL-MCNC: 3.1 MG/DL — SIGNIFICANT CHANGE UP (ref 2.5–4.5)
PHOSPHATE SERPL-MCNC: 3.1 MG/DL — SIGNIFICANT CHANGE UP (ref 2.5–4.5)
PLATELET # BLD AUTO: 154 K/UL — SIGNIFICANT CHANGE UP (ref 150–400)
PLATELET # BLD AUTO: 154 K/UL — SIGNIFICANT CHANGE UP (ref 150–400)
POTASSIUM SERPL-MCNC: 4.1 MMOL/L — SIGNIFICANT CHANGE UP (ref 3.5–5.3)
POTASSIUM SERPL-MCNC: 4.1 MMOL/L — SIGNIFICANT CHANGE UP (ref 3.5–5.3)
POTASSIUM SERPL-SCNC: 4.1 MMOL/L — SIGNIFICANT CHANGE UP (ref 3.5–5.3)
POTASSIUM SERPL-SCNC: 4.1 MMOL/L — SIGNIFICANT CHANGE UP (ref 3.5–5.3)
RBC # BLD: 4.87 M/UL — SIGNIFICANT CHANGE UP (ref 4.2–5.8)
RBC # BLD: 4.87 M/UL — SIGNIFICANT CHANGE UP (ref 4.2–5.8)
RBC # FLD: 14.2 % — SIGNIFICANT CHANGE UP (ref 10.3–14.5)
RBC # FLD: 14.2 % — SIGNIFICANT CHANGE UP (ref 10.3–14.5)
SODIUM SERPL-SCNC: 140 MMOL/L — SIGNIFICANT CHANGE UP (ref 135–145)
SODIUM SERPL-SCNC: 140 MMOL/L — SIGNIFICANT CHANGE UP (ref 135–145)
WBC # BLD: 8.4 K/UL — SIGNIFICANT CHANGE UP (ref 3.8–10.5)
WBC # BLD: 8.4 K/UL — SIGNIFICANT CHANGE UP (ref 3.8–10.5)
WBC # FLD AUTO: 8.4 K/UL — SIGNIFICANT CHANGE UP (ref 3.8–10.5)
WBC # FLD AUTO: 8.4 K/UL — SIGNIFICANT CHANGE UP (ref 3.8–10.5)

## 2024-01-12 PROCEDURE — 99233 SBSQ HOSP IP/OBS HIGH 50: CPT

## 2024-01-12 PROCEDURE — 93010 ELECTROCARDIOGRAM REPORT: CPT

## 2024-01-12 RX ORDER — AMIODARONE HYDROCHLORIDE 400 MG/1
200 TABLET ORAL DAILY
Refills: 0 | Status: CANCELLED | OUTPATIENT
Start: 2024-01-20 | End: 2024-01-18

## 2024-01-12 RX ORDER — AMIODARONE HYDROCHLORIDE 400 MG/1
TABLET ORAL
Refills: 0 | Status: DISCONTINUED | OUTPATIENT
Start: 2024-01-12 | End: 2024-01-18

## 2024-01-12 RX ORDER — FUROSEMIDE 40 MG
80 TABLET ORAL ONCE
Refills: 0 | Status: COMPLETED | OUTPATIENT
Start: 2024-01-12 | End: 2024-01-12

## 2024-01-12 RX ORDER — AMIODARONE HYDROCHLORIDE 400 MG/1
400 TABLET ORAL EVERY 8 HOURS
Refills: 0 | Status: DISCONTINUED | OUTPATIENT
Start: 2024-01-12 | End: 2024-01-18

## 2024-01-12 RX ORDER — FUROSEMIDE 40 MG
15 TABLET ORAL
Qty: 500 | Refills: 0 | Status: DISCONTINUED | OUTPATIENT
Start: 2024-01-12 | End: 2024-01-18

## 2024-01-12 RX ADMIN — Medication 88 MICROGRAM(S): at 05:00

## 2024-01-12 RX ADMIN — Medication 5 MG/HR: at 18:31

## 2024-01-12 RX ADMIN — Medication 100 MILLIGRAM(S): at 04:58

## 2024-01-12 RX ADMIN — Medication 81 MILLIGRAM(S): at 17:49

## 2024-01-12 RX ADMIN — Medication 400 MILLIGRAM(S): at 04:59

## 2024-01-12 RX ADMIN — SPIRONOLACTONE 25 MILLIGRAM(S): 25 TABLET, FILM COATED ORAL at 04:58

## 2024-01-12 RX ADMIN — Medication 80 MILLIGRAM(S): at 16:50

## 2024-01-12 RX ADMIN — Medication 25 MILLIGRAM(S): at 21:41

## 2024-01-12 RX ADMIN — LOSARTAN POTASSIUM 12.5 MILLIGRAM(S): 100 TABLET, FILM COATED ORAL at 17:51

## 2024-01-12 RX ADMIN — ATORVASTATIN CALCIUM 40 MILLIGRAM(S): 80 TABLET, FILM COATED ORAL at 21:41

## 2024-01-12 RX ADMIN — AMIODARONE HYDROCHLORIDE 400 MILLIGRAM(S): 400 TABLET ORAL at 15:51

## 2024-01-12 RX ADMIN — Medication 5 MG/HR: at 21:42

## 2024-01-12 RX ADMIN — LOSARTAN POTASSIUM 12.5 MILLIGRAM(S): 100 TABLET, FILM COATED ORAL at 04:59

## 2024-01-12 RX ADMIN — Medication 100 MILLIGRAM(S): at 17:49

## 2024-01-12 RX ADMIN — APIXABAN 5 MILLIGRAM(S): 2.5 TABLET, FILM COATED ORAL at 04:58

## 2024-01-12 RX ADMIN — SPIRONOLACTONE 25 MILLIGRAM(S): 25 TABLET, FILM COATED ORAL at 17:50

## 2024-01-12 RX ADMIN — APIXABAN 5 MILLIGRAM(S): 2.5 TABLET, FILM COATED ORAL at 17:49

## 2024-01-12 RX ADMIN — AMIODARONE HYDROCHLORIDE 400 MILLIGRAM(S): 400 TABLET ORAL at 21:42

## 2024-01-12 RX ADMIN — Medication 40 MILLIGRAM(S): at 04:58

## 2024-01-12 RX ADMIN — Medication 400 MILLIGRAM(S): at 17:56

## 2024-01-12 NOTE — PROGRESS NOTE ADULT - ASSESSMENT
Mr. Encinas is a 73M h/o CAD s/p late-presenting MI 2005 s/p PCI LAD, ICM/HFrEF (EF 15-20%, LVEDD 6 cm) s/p single chamber ICD with lead revision in 2018 with upgrade to dual-chamber device (8/17/21) s/p MEMS (9/16/21), bioprosthetic AVR (2015) 2/2 AI s/p 26 mm EVOLUT HECTOR for severe bioprosthetic AS (8/20/20), aflutter ablation 7/17 w/ persistent afib, s/p DCCV x2 (8/12/20, 7/7/21), Trigeminal neuralgia (4/2022) who p/w elevated filling pressure (PAD 40) on cardiomems. Of note, has been feeling fairly well despite elevated MEMs which was unable to be managed with outpatient diuretics. His MEMs has been elevated since February/March 2023 when he went into afib and has had increasing V pacing. Labs reviewed - K 3.3, BUN/Cr 40/1.22, BNP 2k. Diuresing well. EP lowered rate and started on amio for possible cardioversion Tuesday

## 2024-01-12 NOTE — PROGRESS NOTE ADULT - PROBLEM SELECTOR PLAN 1
HFrEF (EF 15-20%, LVEDD 6 cm) s/p single chamber ICD with lead revision in 2018 with upgrade to dual-chamber device (8/17/21) s/p MEMS (9/16/21)  MEMs level 40     - HF following   GDMT:  BB- c/w Toprol 25mg qd  ACE/ARB/ARNI- c/w losartan - unable to tolerate Entresto due to hypotension/syncope  MRA: c/w spironolactone    SGLT2: resume home Farxiga on dc     on IV lasix HFrEF (EF 15-20%, LVEDD 6 cm) s/p single chamber ICD with lead revision in 2018 with upgrade to dual-chamber device (8/17/21) s/p MEMS (9/16/21)  MEMs level 40     - HF following   GDMT:  BB- c/w Toprol 25mg qd  ACE/ARB/ARNI- c/w losartan BID - unable to tolerate Entresto due to hypotension/syncope  MRA: c/w spironolactone BID  SGLT2: resume home Farxiga on dc     on IV lasix BID

## 2024-01-12 NOTE — PROGRESS NOTE ADULT - SUBJECTIVE AND OBJECTIVE BOX
[facility] Division of Hospital Medicine  Anabelaryan Pena DO   Available via Microsoft Teams      Patient is a 73y old  Male who presents with a chief complaint of HF Exacerbation (12 Jan 2024 11:26)      SUBJECTIVE / OVERNIGHT EVENTS:  No SOB, CP, palpitations. Urinating well     MEDICATIONS  (STANDING):  apixaban 5 milliGRAM(s) Oral two times a day  aspirin enteric coated 81 milliGRAM(s) Oral daily  atorvastatin 40 milliGRAM(s) Oral at bedtime  carBAMazepine ER Tablet 100 milliGRAM(s) Oral two times a day  furosemide   Injectable 40 milliGRAM(s) IV Push every 12 hours  levothyroxine 88 MICROGram(s) Oral daily  losartan 12.5 milliGRAM(s) Oral two times a day  metoprolol succinate ER 25 milliGRAM(s) Oral at bedtime  pregabalin 400 milliGRAM(s) Oral two times a day  spironolactone 25 milliGRAM(s) Oral every 12 hours    MEDICATIONS  (PRN):  acetaminophen     Tablet .. 650 milliGRAM(s) Oral every 6 hours PRN Temp greater or equal to 38C (100.4F), Mild Pain (1 - 3)  melatonin 3 milliGRAM(s) Oral at bedtime PRN Insomnia      CAPILLARY BLOOD GLUCOSE        I&O's Summary    11 Jan 2024 07:01  -  12 Jan 2024 07:00  --------------------------------------------------------  IN: 1500 mL / OUT: 2650 mL / NET: -1150 mL    12 Jan 2024 07:01  -  12 Jan 2024 15:02  --------------------------------------------------------  IN: 600 mL / OUT: 1300 mL / NET: -700 mL        PHYSICAL EXAM:  Vital Signs Last 24 Hrs  T(C): 36.8 (12 Jan 2024 12:22), Max: 36.8 (12 Jan 2024 12:22)  T(F): 98.2 (12 Jan 2024 12:22), Max: 98.2 (12 Jan 2024 12:22)  HR: 73 (12 Jan 2024 12:22) (73 - 80)  BP: 106/70 (12 Jan 2024 12:22) (97/67 - 106/70)  BP(mean): --  RR: 18 (12 Jan 2024 12:22) (18 - 18)  SpO2: 96% (12 Jan 2024 12:22) (96% - 98%)    Parameters below as of 12 Jan 2024 12:22  Patient On (Oxygen Delivery Method): room air      CONSTITUTIONAL: NAD, well-developed, well-groomed, sitting in the chair   EYES: conjunctiva and sclera clear  ENMT: Moist oral mucosa  RESPIRATORY: Normal respiratory effort; lungs are clear to auscultation bilaterally  CARDIOVASCULAR: Regular rate and rhythm, normal S1 and S2; No lower extremity edema  ABDOMEN: Nontender to palpation, normoactive bowel sounds, no rebound/guarding  MUSCULOSKELETAL: no clubbing or cyanosis of digits; no joint swelling or tenderness to palpation  PSYCH: A+O to person, place, and time; affect appropriate  SKIN: No rashes; no palpable lesions    LABS:                        15.1   8.40  )-----------( 154      ( 12 Jan 2024 06:41 )             46.5     01-12    140  |  100  |  42<H>  ----------------------------<  119<H>  4.1   |  25  |  1.27    Ca    9.5      12 Jan 2024 06:39  Phos  3.1     01-12  Mg     2.5     01-12    TPro  7.5  /  Alb  4.9  /  TBili  0.7  /  DBili  x   /  AST  24  /  ALT  27  /  AlkPhos  159<H>  01-10    PT/INR - ( 10 Jabari 2024 23:59 )   PT: 16.9 sec;   INR: 1.63 ratio         PTT - ( 10 Jabari 2024 23:59 )  PTT:34.3 sec      Urinalysis Basic - ( 12 Jan 2024 06:39 )    Color: x / Appearance: x / SG: x / pH: x  Gluc: 119 mg/dL / Ketone: x  / Bili: x / Urobili: x   Blood: x / Protein: x / Nitrite: x   Leuk Esterase: x / RBC: x / WBC x   Sq Epi: x / Non Sq Epi: x / Bacteria: x          RADIOLOGY & ADDITIONAL TESTS:  Results Reviewed:   Imaging Personally Reviewed:  Electrocardiogram Personally Reviewed:    COORDINATION OF CARE:  Care Discussed with Consultants/Other Providers [Y/N]:  Prior or Outpatient Records Reviewed [Y/N]:   [facility] Division of Hospital Medicine  Anabelaryan Pena DO   Available via Microsoft Teams      Patient is a 73y old  Male who presents with a chief complaint of HF Exacerbation (12 Jan 2024 11:26)      SUBJECTIVE / OVERNIGHT EVENTS:  No SOB, CP, palpitations. Urinating well     MEDICATIONS  (STANDING):  apixaban 5 milliGRAM(s) Oral two times a day  aspirin enteric coated 81 milliGRAM(s) Oral daily  atorvastatin 40 milliGRAM(s) Oral at bedtime  carBAMazepine ER Tablet 100 milliGRAM(s) Oral two times a day  furosemide   Injectable 40 milliGRAM(s) IV Push every 12 hours  levothyroxine 88 MICROGram(s) Oral daily  losartan 12.5 milliGRAM(s) Oral two times a day  metoprolol succinate ER 25 milliGRAM(s) Oral at bedtime  pregabalin 400 milliGRAM(s) Oral two times a day  spironolactone 25 milliGRAM(s) Oral every 12 hours    MEDICATIONS  (PRN):  acetaminophen     Tablet .. 650 milliGRAM(s) Oral every 6 hours PRN Temp greater or equal to 38C (100.4F), Mild Pain (1 - 3)  melatonin 3 milliGRAM(s) Oral at bedtime PRN Insomnia      CAPILLARY BLOOD GLUCOSE        I&O's Summary    11 Jan 2024 07:01  -  12 Jan 2024 07:00  --------------------------------------------------------  IN: 1500 mL / OUT: 2650 mL / NET: -1150 mL    12 Jan 2024 07:01  -  12 Jan 2024 15:02  --------------------------------------------------------  IN: 600 mL / OUT: 1300 mL / NET: -700 mL        PHYSICAL EXAM:  Vital Signs Last 24 Hrs  T(C): 36.8 (12 Jan 2024 12:22), Max: 36.8 (12 Jan 2024 12:22)  T(F): 98.2 (12 Jan 2024 12:22), Max: 98.2 (12 Jan 2024 12:22)  HR: 73 (12 Jan 2024 12:22) (73 - 80)  BP: 106/70 (12 Jan 2024 12:22) (97/67 - 106/70)  BP(mean): --  RR: 18 (12 Jan 2024 12:22) (18 - 18)  SpO2: 96% (12 Jan 2024 12:22) (96% - 98%)    Parameters below as of 12 Jan 2024 12:22  Patient On (Oxygen Delivery Method): room air      CONSTITUTIONAL: NAD, well-developed, well-groomed, sitting in the chair   EYES: conjunctiva and sclera clear  ENMT: Moist oral mucosa  RESPIRATORY: Normal respiratory effort; lungs are clear to auscultation bilaterally  CARDIOVASCULAR: Regular rate and rhythm, normal S1 and S2; No lower extremity edema  ABDOMEN: Nontender to palpation, normoactive bowel sounds, no rebound/guarding  MUSCULOSKELETAL: no clubbing or cyanosis of digits; no joint swelling or tenderness to palpation  PSYCH: A+O to person, place, and time; affect appropriate  SKIN: No rashes; no palpable lesions    LABS:                        15.1   8.40  )-----------( 154      ( 12 Jan 2024 06:41 )             46.5     01-12    140  |  100  |  42<H>  ----------------------------<  119<H>  4.1   |  25  |  1.27    Ca    9.5      12 Jan 2024 06:39  Phos  3.1     01-12  Mg     2.5     01-12    TPro  7.5  /  Alb  4.9  /  TBili  0.7  /  DBili  x   /  AST  24  /  ALT  27  /  AlkPhos  159<H>  01-10    PT/INR - ( 10 Jabari 2024 23:59 )   PT: 16.9 sec;   INR: 1.63 ratio         PTT - ( 10 Jabari 2024 23:59 )  PTT:34.3 sec      Urinalysis Basic - ( 12 Jan 2024 06:39 )    Color: x / Appearance: x / SG: x / pH: x  Gluc: 119 mg/dL / Ketone: x  / Bili: x / Urobili: x   Blood: x / Protein: x / Nitrite: x   Leuk Esterase: x / RBC: x / WBC x   Sq Epi: x / Non Sq Epi: x / Bacteria: x          RADIOLOGY & ADDITIONAL TESTS:  Results Reviewed:   Imaging Personally Reviewed:  Electrocardiogram Personally Reviewed:    COORDINATION OF CARE:  Care Discussed with Consultants/Other Providers [Y/N]:  Prior or Outpatient Records Reviewed [Y/N]:

## 2024-01-12 NOTE — CONSULT NOTE ADULT - ASSESSMENT
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 EP consulted for possible upgrade to CRT-D due to increased V pacing burden.     Persistent Afib in the setting of HFrEF  -ECG revealed Afib with QRSd 126ms, pt would not benefit from BiV pacing.   -ICD interrogation showed PAF between April 2023 to October 2023 and has been in persistent atrial fibrillation since October 2023 with average VHR 70-92 bpm, V pace 73.4% since 11/24/23 in the setting of lower rate pacing of 70 bpm.  -Patient was schedule for AF ablation with Dr. Apple November 2023 and was canceled due to worsening heart failure and FEDERICO with creatinine of 2.5. Dofetilide was discontinued at that time. Of note, pt was briefly on amiodarone shortly post bioprosthetic AVR in 2015 and it was stopped due to tremors. Patient states his tremors still persists and is not sure if tremors was related to amiodarone at all.  -Recommend start amiodarone load with 400mg TID through the weekend   -I discussed with patient and his daughter plans for amiodarone including risks/side effects. Discussed need for outpatient monitoring of PFT/TFT/LFT/opthalmology monitoring while on amiodarone. TSH 2.41. AST/ALT 24/27.   -Continue eliquis 5mg BID  -Will decrease pacing rate from 70 bpm to 50 bpm to avoid RV pacing.   -Plan for Cardioversion on Tuesday (1/16/24). ANDREY not needed as pt has been on un-interrupted AC.   -Will need AF ablation as outpatient with Dr. Apple.    BRANDI Diaz, NP-C  [phone]   73 year old male with PMH of CAD s/p late-presenting MI 2005 s/p PCI LAD, ICM/HFrEF (EF 15-20%, LVEDD 6 cm) s/p single chamber ICD with Estevan lead 2011 s/p lead extraction and upgrade to Medtronic dual-chamber ICD (8/17/21), has hx of appropriate ICD shock for VT/VF, s/p MEMS (9/16/21), bioprosthetic AVR (2015) 2/2 AI s/p 26 mm EVOLUT HECTOR for severe bioprosthetic AS (8/20/20), prior aflutter ablation 7/2017, persistent afib, s/p DCCV x2 (8/12/20, 7/7/21), Trigeminal neuralgia (4/2022) presented as direct admit due to persistently elevated filling pressure (PAD 40) on cardiomems. EP consulted for possible upgrade to CRT-D due to increased V pacing burden.     Persistent Afib in the setting of HFrEF  -ECG revealed Afib with QRSd 126ms, pt would not benefit from BiV pacing.   -ICD interrogation showed PAF between April 2023 to October 2023 and has been in persistent atrial fibrillation since October 2023 with average VHR 70-92 bpm, V pace 73.4% since 11/24/23 in the setting of lower rate pacing of 70 bpm.  -Patient was schedule for AF ablation with Dr. Apple November 2023 and was canceled due to worsening heart failure and FEDERICO with creatinine of 2.5. Dofetilide was discontinued at that time. Of note, pt was briefly on amiodarone shortly post bioprosthetic AVR in 2015 and it was stopped due to tremors. Patient states his tremors still persists and is not sure if tremors was related to amiodarone at all.  -Recommend start amiodarone load with 400mg TID through the weekend   -I discussed with patient and his daughter plans for amiodarone including risks/side effects. Discussed need for outpatient monitoring of PFT/TFT/LFT/opthalmology monitoring while on amiodarone. TSH 2.41. AST/ALT 24/27.   -Continue eliquis 5mg BID  -Will decrease pacing rate from 70 bpm to 50 bpm to avoid RV pacing.   -Plan for Cardioversion on Tuesday (1/16/24). ANDREY not needed as pt has been on un-interrupted AC.   -Will need AF ablation as outpatient with Dr. Apple.    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 EP consulted for possible upgrade to CRT-D due to increased V pacing burden.     Persistent Afib in the setting of HFrEF  -ECG revealed Afib with QRSd 126ms, pt would not benefit from BiV pacing.   -ICD interrogation showed PAF between April 2023 to October 2023 and has been in persistent atrial fibrillation since October 2023 with average VHR 70-92 bpm, V pace 73.4% since 11/24/23 in the setting of lower rate pacing of 70 bpm.  -Patient was schedule for AF ablation with Dr. Apple November 2023 and was canceled due to worsening heart failure and FEDERICO with creatinine of 2.5. Dofetilide was discontinued at that time. Of note, pt was briefly on amiodarone shortly post bioprosthetic AVR in 2015 and it was stopped due to tremors. Patient states his tremors still persists and is not sure if tremors was related to amiodarone at all.  -Recommend start amiodarone load with 400mg TID through the weekend   -I discussed with patient and his daughter plans for amiodarone including risks/side effects. Discussed need for outpatient monitoring of PFT/TFT/LFT/opthalmology monitoring while on amiodarone. TSH 2.41. AST/ALT 24/27.   -Continue eliquis 5mg BID  -Pacing rate decreased from 70 bpm to 50 bpm to avoid RV pacing.   -Plan for Cardioversion on Tuesday (1/16/24). ANDREY not needed as pt has been on un-interrupted AC (pt took his own eliquis on the day of admission on 1/10/24)  -Keep pt NPO except meds MN Monday into Tuesday.  -Will need AF ablation as outpatient with Dr. Apple.  -Plan discussed with Dr. Whitman and Medicine ACP.    BRANDI Diaz NP-C  [phone]   73 year old male with PMH of CAD s/p late-presenting MI 2005 s/p PCI LAD, ICM/HFrEF (EF 15-20%, LVEDD 6 cm) s/p single chamber ICD with Estevan lead 2011 s/p lead extraction and upgrade to Medtronic dual-chamber ICD (8/17/21), has hx of appropriate ICD shock for VT/VF, s/p MEMS (9/16/21), bioprosthetic AVR (2015) 2/2 AI s/p 26 mm EVOLUT HECTOR for severe bioprosthetic AS (8/20/20), prior aflutter ablation 7/2017, persistent afib, s/p DCCV x2 (8/12/20, 7/7/21), Trigeminal neuralgia (4/2022) presented as direct admit due to persistently elevated filling pressure (PAD 40) on cardiomems. EP consulted for possible upgrade to CRT-D due to increased V pacing burden.     Persistent Afib in the setting of HFrEF  -ECG revealed Afib with QRSd 126ms, pt would not benefit from BiV pacing.   -ICD interrogation showed PAF between April 2023 to October 2023 and has been in persistent atrial fibrillation since October 2023 with average VHR 70-92 bpm, V pace 73.4% since 11/24/23 in the setting of lower rate pacing of 70 bpm.  -Patient was schedule for AF ablation with Dr. Apple November 2023 and was canceled due to worsening heart failure and FEDERICO with creatinine of 2.5. Dofetilide was discontinued at that time. Of note, pt was briefly on amiodarone shortly post bioprosthetic AVR in 2015 and it was stopped due to tremors. Patient states his tremors still persists and is not sure if tremors was related to amiodarone at all.  -Recommend start amiodarone load with 400mg TID through the weekend   -I discussed with patient and his daughter plans for amiodarone including risks/side effects. Discussed need for outpatient monitoring of PFT/TFT/LFT/opthalmology monitoring while on amiodarone. TSH 2.41. AST/ALT 24/27.   -Continue eliquis 5mg BID  -Pacing rate decreased from 70 bpm to 50 bpm to avoid RV pacing.   -Plan for Cardioversion on Tuesday (1/16/24). ANDREY not needed as pt has been on un-interrupted AC (pt took his own eliquis on the day of admission on 1/10/24)  -Keep pt NPO except meds MN Monday into Tuesday.  -Will need AF ablation as outpatient with Dr. Apple.  -Plan discussed with Dr. Whitman and Medicine ACP.    BRANDI Diaz NP-C  [phone]

## 2024-01-12 NOTE — CONSULT NOTE ADULT - SUBJECTIVE AND OBJECTIVE BOX
CHIEF COMPLAINT:    HISTORY OF PRESENT ILLNESS:      Allergies    No Known Allergies    Intolerances    amiodarone (Other)  Entresto (Other)  	    MEDICATIONS:  apixaban 5 milliGRAM(s) Oral two times a day  aspirin enteric coated 81 milliGRAM(s) Oral daily  furosemide   Injectable 40 milliGRAM(s) IV Push every 12 hours  losartan 12.5 milliGRAM(s) Oral two times a day  metoprolol succinate ER 25 milliGRAM(s) Oral at bedtime  spironolactone 25 milliGRAM(s) Oral every 12 hours        acetaminophen     Tablet .. 650 milliGRAM(s) Oral every 6 hours PRN  carBAMazepine ER Tablet 100 milliGRAM(s) Oral two times a day  melatonin 3 milliGRAM(s) Oral at bedtime PRN  pregabalin 400 milliGRAM(s) Oral two times a day      atorvastatin 40 milliGRAM(s) Oral at bedtime  levothyroxine 88 MICROGram(s) Oral daily        PAST MEDICAL & SURGICAL HISTORY:  CAD (coronary artery disease)  2004      MI (myocardial infarction)  Nov 2004      Systolic heart failure  EF 15%      HLD (hyperlipidemia)      HTN (hypertension)      AICD (automatic cardioverter/defibrillator) present      Aortic stenosis  severe, s/p AVR      H/O emphysema  - moderately severe, CT chest 06/10/21      Carotid stenosis  - moderate, b/l (Doppler 12/27/21)      Ischemic cardiomyopathy      H/O pulmonary hypertension      Longstanding persistent atrial fibrillation      Type 2 diabetes mellitus      Osteoarthritis of right knee      Neuralgia      CRI (chronic renal insufficiency)      Stented coronary artery  LAD x 3 (Nov 2004)      S/P knee surgery  - Right knee arthroscopy, 2000      S/P hernia repair  - right inguinal, 1982      S/P AVR  2015, complicated by Asystole/Syncope with 1 shock, Transcatheter valve-in-valve aortic valve replacement utilizing a right common femoral arterial percutaneous approach utilizing a 26 Medtronic Evolut core valve on 08/20/2020      AICD (automatic cardioverter/defibrillator) present  single chamber 2005, replaced with dual chamber 08/17/2021      H/O prior ablation treatment  afib - 2017, 07/2021          FAMILY HISTORY:  Chronic obstructive pulmonary disease    Family history of colon cancer (Father)        SOCIAL HISTORY:    [ ] Non-smoker  [ ] Smoker  [ ] Alcohol      REVIEW OF SYSTEMS:  See HPI. Otherwise, 12 point ROS done and otherwise negative.    PHYSICAL EXAM:  T(C): 36.2 (01-12-24 @ 05:01), Max: 36.9 (01-11-24 @ 11:38)  HR: 74 (01-12-24 @ 05:01) (74 - 90)  BP: 105/75 (01-12-24 @ 05:01) (97/67 - 106/70)  RR: 18 (01-12-24 @ 05:01) (17 - 18)  SpO2: 98% (01-12-24 @ 05:01) (96% - 98%)  Wt(kg): --  I&O's Summary    11 Jan 2024 07:01  -  12 Jan 2024 07:00  --------------------------------------------------------  IN: 1500 mL / OUT: 2650 mL / NET: -1150 mL    12 Jan 2024 07:01  -  12 Jan 2024 11:27  --------------------------------------------------------  IN: 240 mL / OUT: 800 mL / NET: -560 mL        Appearance: Normal	  HEENT: PERRL, EOMI	  Cardiovascular: Normal S1 S2, No JVD, No murmurs, No edema  Respiratory: Lungs clear to auscultation	  Psychiatry: A & O x 3, Mood & affect appropriate  Gastrointestinal:  Soft, Non-tender, + BS	  Skin: No rashes, No ecchymoses, No cyanosis	  Neurologic: Grossly intact  Extremities: No clubbing, cyanosis or edema  Vascular: Peripheral pulses palpable 2+ bilaterally        LABS:	 	    CBC Full  -  ( 12 Jan 2024 06:41 )  WBC Count : 8.40 K/uL  Hemoglobin : 15.1 g/dL  Hematocrit : 46.5 %  Platelet Count - Automated : 154 K/uL  Mean Cell Volume : 95.5 fl  Mean Cell Hemoglobin : 31.0 pg  Mean Cell Hemoglobin Concentration : 32.5 gm/dL  Auto Neutrophil # : x  Auto Lymphocyte # : x  Auto Monocyte # : x  Auto Eosinophil # : x  Auto Basophil # : x  Auto Neutrophil % : x  Auto Lymphocyte % : x  Auto Monocyte % : x  Auto Eosinophil % : x  Auto Basophil % : x    01-12    140  |  100  |  42<H>  ----------------------------<  119<H>  4.1   |  25  |  1.27  01-11    138  |  96  |  40<H>  ----------------------------<  190<H>  3.3<L>   |  30  |  1.22    Ca    9.5      12 Jan 2024 06:39  Ca    9.0      11 Jan 2024 11:14  Phos  3.1     01-12  Mg     2.5     01-12  Mg     2.3     01-10    TPro  7.5  /  Alb  4.9  /  TBili  0.7  /  DBili  x   /  AST  24  /  ALT  27  /  AlkPhos  159<H>  01-10      proBNP:   Lipid Profile:   HgA1c:   TSH:       CARDIAC MARKERS:                TELEMETRY: 	    ECG:  	  RADIOLOGY:  OTHER: 	    PREVIOUS DIAGNOSTIC TESTING:    [ ] Echocardiogram:  [ ]  Catheterization:  [ ] Stress Test:  	  	  ASSESSMENT/PLAN: 	     CHIEF COMPLAINT: HF exacerbation     HISTORY OF PRESENT ILLNESS:  73 year old male with PMH of CAD s/p late-presenting MI 2005 s/p PCI LAD, ICM/HFrEF (EF 15-20%, LVEDD 6 cm) s/p single chamber ICD with [place] lead 2011 s/p lead extraction and upgrade to Medtronic dual-chamber ICD (8/17/21), has hx of appropriate ICD shock for VT/VF, s/p MEMS (9/16/21), bioprosthetic AVR (2015) 2/2 AI s/p 26 mm EVOLUT HECTOR for severe bioprosthetic AS (8/20/20), prior aflutter ablation 7/2017, persistent afib, s/p DCCV x2 (8/12/20, 7/7/21), Trigeminal neuralgia (4/2022) presented as direct admit due to persistently elevated filling pressure (PAD 40) on cardiomems. Denies any chest pain, palpitations, fevers, chills, cough, increased LE edema, orthopnea. Patient reports generalized weakness, MATHEWS and episodes of dizziness (mainly with positional change).     Patient was schedule for AF ablation with Dr. Apple November 2023 and was canceled due to worsening heart failure and FEDER[place] with creatinine of 2.5. Dofetilide was discontinued at that time. Of note, pt was briefly on amiodarone shortly post bioprosthetic AVR in 2015 and it was stopped due to tremors. Patient states his tremors still persists and is not sure if tremors was related to amiodarone at all.      Note that pacing rate (LDL) is set at 70 bpm (probably due to he was on dofetilide but has been off dofetilide since 11/2023 due to persistent AF and FEDERICO with creatinine of 2.5).        Allergies    No Known Allergies    Intolerances    amiodarone (Other)  Entresto (Other)  	    MEDICATIONS:  apixaban 5 milliGRAM(s) Oral two times a day  aspirin enteric coated 81 milliGRAM(s) Oral daily  furosemide   Injectable 40 milliGRAM(s) IV Push every 12 hours  losartan 12.5 milliGRAM(s) Oral two times a day  metoprolol succinate ER 25 milliGRAM(s) Oral at bedtime  spironolactone 25 milliGRAM(s) Oral every 12 hours  acetaminophen     Tablet .. 650 milliGRAM(s) Oral every 6 hours PRN  carBAMazepine ER Tablet 100 milliGRAM(s) Oral two times a day  melatonin 3 milliGRAM(s) Oral at bedtime PRN  pregabalin 400 milliGRAM(s) Oral two times a day  atorvastatin 40 milliGRAM(s) Oral at bedtime  levothyroxine 88 MICROGram(s) Oral daily      PAST MEDICAL & SURGICAL HISTORY:  CAD (coronary artery disease)  2004      MI (myocardial infarction)  Nov 2004      Systolic heart failure  EF 15%      HLD (hyperlipidemia)      HTN (hypertension)      AICD (automatic cardioverter/defibrillator) present      Aortic stenosis  severe, s/p AVR      H/O emphysema  - moderately severe, CT chest 06/10/21      Carotid stenosis  - moderate, b/l (Doppler 12/27/21)      Ischemic cardiomyopathy      H/O pulmonary hypertension      Longstanding persistent atrial fibrillation      Type 2 diabetes mellitus      Osteoarthritis of right knee      Neuralgia      CRI (chronic renal insufficiency)      Stented coronary artery  LAD x 3 (Nov 2004)      S/P knee surgery  - Right knee arthroscopy, 2000      S/P hernia repair  - right inguinal, 1982      S/P AVR  2015, complicated by Asystole/Syncope with 1 shock, Transcatheter valve-in-valve aortic valve replacement utilizing a right common femoral arterial percutaneous approach utilizing a 26 Medtronic Evolut core valve on 08/20/2020      AICD (automatic cardioverter/defibrillator) present  single chamber 2005, replaced with dual chamber 08/17/2021      H/O prior ablation treatment  afib - 2017, 07/2021          FAMILY HISTORY:  Chronic obstructive pulmonary disease    Family history of colon cancer (Father)        SOCIAL HISTORY: Non-smoker, social ETOH, independent with ADL        REVIEW OF SYSTEMS:  See HPI. Otherwise, 12 point ROS done and otherwise negative.    PHYSICAL EXAM:  T(C): 36.2 (01-12-24 @ 05:01), Max: 36.9 (01-11-24 @ 11:38)  HR: 74 (01-12-24 @ 05:01) (74 - 90)  BP: 105/75 (01-12-24 @ 05:01) (97/67 - 106/70)  RR: 18 (01-12-24 @ 05:01) (17 - 18)  SpO2: 98% (01-12-24 @ 05:01) (96% - 98%)  Wt(kg): --  I&O's Summary    11 Jan 2024 07:01  -  12 Jan 2024 07:00  --------------------------------------------------------  IN: 1500 mL / OUT: 2650 mL / NET: -1150 mL    12 Jan 2024 07:01  -  12 Jan 2024 11:27  --------------------------------------------------------  IN: 240 mL / OUT: 800 mL / NET: -560 mL        Appearance: Normal	  HEENT: PERRL, EOMI	  Cardiovascular: Normal S1 S2, No JVD, No murmurs  Respiratory: Lungs clear to auscultation	  Psychiatry: A & O x 3, Mood & affect appropriate  Gastrointestinal: Soft, Non-tender, + BS	  Skin: No rashes, No ecchymoses, No cyanosis	  Neurologic: Grossly intact  Extremities: No clubbing, cyanosis or edema  Vascular: Peripheral pulses palpable 2+ bilaterally        LABS:	 	    CBC Full  -  ( 12 Jan 2024 06:41 )  WBC Count : 8.40 K/uL  Hemoglobin : 15.1 g/dL  Hematocrit : 46.5 %  Platelet Count - Automated : 154 K/uL  Mean Cell Volume : 95.5 fl  Mean Cell Hemoglobin : 31.0 pg  Mean Cell Hemoglobin Concentration : 32.5 gm/dL  Auto Neutrophil # : x  Auto Lymphocyte # : x  Auto Monocyte # : x  Auto Eosinophil # : x  Auto Basophil # : x  Auto Neutrophil % : x  Auto Lymphocyte % : x  Auto Monocyte % : x  Auto Eosinophil % : x  Auto Basophil % : x    01-12    140  |  100  |  42<H>  ----------------------------<  119<H>  4.1   |  25  |  1.27  01-11    138  |  96  |  40<H>  ----------------------------<  190<H>  3.3<L>   |  30  |  1.22    Ca    9.5      12 Jan 2024 06:39  Ca    9.0      11 Jan 2024 11:14  Phos  3.1     01-12  Mg     2.5     01-12  Mg     2.3     01-10    TPro  7.5  /  Alb  4.9  /  TBili  0.7  /  DBili  x   /  AST  24  /  ALT  27  /  AlkPhos  159<H>  01-10      proBNP: 2394     Thyroid Stimulating Hormone, Serum (01.10.24 @ 23:59)    Thyroid Stimulating Hormone, Serum: 2.41 uIU/mL          TELEMETRY: Afib with intermittent V pacing at HR 70-80's, occasional VPCs 	      ECG: Afib at 70 bpm, QRSd 126ms    < from: TTE W or WO Ultrasound Enhancing Agent (01.11.24 @ 13:59) >  CONCLUSIONS:      1. Left ventricular cavity is mildly dilated. Left ventricular wall thickness is normal. Left ventricular systolic function is severely decreased with an ejection fraction of 15 % by Whitfield's method of disks. Global left ventricular hypokinesis.   2. There is severe (grade 3) left ventricular diastolic dysfunction.   3. Severely enlarged right ventricular cavity size, wall thickness, and reduced systolic function.   4. The left atrium is mildly dilated.   5. The right atrium is severely dilated in size.   6. Device lead is visualized in the right heart.   7. A TAVR valve replacement is present in the aortic position. is well seated with normal function.. No intravalvular regurgitation No paravalvular regurgitation.   8. No pericardial effusion seen.   9. Compared to the transthoracic echocardiogram performed on 10/1/2020, there have been no significant interval changes.  10. Technically difficult image quality.    ________________________________________________________________________________________  FINDINGS:     Left Ventricle:  After obtaining consent, Definity ultrasound enhancing agent was given for enhanced left ventricular opacification and improved delineation of the left ventricular endocardial borders. The left ventricular cavity is mildly dilated. Left ventricular wall thickness is normal. Left ventricular systolic functionis severely decreased with a calculated ejection fraction of 15 % by the Whitfield's biplane method of disks. There is global left ventricular hypokinesis. There is severe (grade 3) left ventricular diastolic dysfunction, with elevated filling pressure. There is no evidence of a left ventricular thrombus.     Right Ventricle:  The right ventricular cavity is severely enlarged in size, normal wall thickness and reduced systolic function. Tricuspid annular plane systolic excursion (TAPSE) is 1.2 cm (normal >=1.7 cm). Tricuspid annular tissue Doppler S' is 4.2 cm/s (normal >10 cm/s). A device lead is visualized in the right heart.     Left Atrium:  The left atrium is mildly dilated with an indexed volume of 38.54 ml/m².     Right Atrium:  The right atrium is severely dilated in size with an indexed volume of 47.58 ml/m².     Interatrial Septum:  The interatrial septum appears intact.     Aortic Valve:  A TAVR valve replacement is present in the aortic position ( a transcatheter deployed (TAVR). The prosthetic valve is well seated with normal function. There is no intravalvular regurgitation. There is no paravalvular regurgitation. The peak transaortic velocity is 1.29 m/s, peak transaortic gradient is 6.7 mmHg and mean transaortic gradientis 3.0 mmHg with an LVOT/aortic valve VTI ratio of 0.58. The aortic valve area is estimated at 2.01 cm² by the continuity equation.     Mitral Valve:  There is calcification of the mitral valve annulus. There is no mitral valve stenosis. There is trace mitral regurgitation.     Tricuspid Valve:  Structurally normal tricuspid valve with normal leaflet excursion. There is moderate tricuspid regurgitation. Estimated pulmonary artery systolic pressure is 38 mmHg.     Pulmonic Valve:  Structurally normal pulmonic valve with normal leaflet excursion. There is trace pulmonic regurgitation.     Aorta:  The aortic annulus and aortic root appear normal in size.     Pericardium:  No pericardial effusion seen.     Systemic Veins:  The inferior vena cava is dilated measuring 2.56 cm in diameter, (dilated >2.1cm) with normal inspiratory collapse (normal >50%) consistent with mildly elevated right atrial pressure (~8, range 5-10mmHg).  ____________________________________________________________________  QUANTITATIVE DATA:  Left Ventricle Measurements: (Indexed to BSA)     IVSd (2D):   0.8 cm  LVPWd (2D):  1.0 cm  LVIDd (2D):  6.2 cm  LVIDs (2D):  5.8 cm  LV Mass:     229 g  108.1 g/m²  LV Vol d, MOD A2C: 199.0 ml 93.75 ml/m²  LV Vol d, MOD A4C: 212.0 ml 99.88 ml/m²  LV Vol d, MOD BP:  205.1 ml 96.61 ml/m²  LV Vol s, MOD A2C: 154.0 ml 72.55 ml/m²  LV Vol s, MOD A4C: 177.0 ml 83.39 ml/m²  LV Vol s, MOD BP:  173.4 ml 81.71 ml/m²  LVOT SV MOD BP:    31.6 ml  LV EF% MOD BP:     15 %     MV E Vmax: 1.04 m/s  MV A Vmax:    0.26 m/s  MV E/A:       3.92  e' lateral:   8.27 cm/s  e' medial:    6.47 cm/s  E/e' lateral: 12.58  E/e' medial:  16.07  E/e' Average: 14.11    Aorta Measurements: (normal range) (Indexed to BSA)     Sinuses of Valsalva: 4.00 cm (3.1 - 3.7 cm)  Ao Asc prox:         4.10 cm       Left Atrium Measurements: (Indexed to BSA)  LA Diam 2D:        6.40 cm  LA Vol s, MOD A4C: 88.80 ml.  LA Vol s, MOD A2C: 73.70 ml.  LA Vol s, MOD BP:  81.80 ml  38.54 ml/m²    Right Ventricle Measurements: Right Atrial Measurements:     TAPSE:           1.2 cm       RA Vol:       101.00 ml  RV Base (RVID1): 6.0 cm       RA Vol Index: 47.58 ml/m²  RV Mid (RVID2):  4.3 cm       LVOT / RVOT/ Qp/Qs Data: (Indexed to BSA)  LVOT Diameter: 2.10 cm  LVOT Vmax:     0.74 m/s  LVOT VTI:      14.20 cm  LVOT SV:       49.2 ml  23.17 ml/m²    Aortic Valve Measurements:  AV Vmax:                1.3 m/s  AV Peak Gradient:       6.7 mmHg  AV Mean Gradient:       3.0 mmHg  AV VTI:                 24.5 cm  AV VTI Ratio:           0.58  AoV EOA, Contin:        2.01 cm²  AoV EOA, Contin i:      0.95 cm²/m²  AoV Dimensionless Index 0.58    Mitral Valve Measurements:     MV E Vmax: 1.0 m/s  MV A Vmax: 0.3 m/s  MV E/A:    3.9       Tricuspid Valve Measurements:     TR Vmax:          2.7 m/s  TR Peak Gradient: 29.6 mmHg  RA Pressure:      8 mmHg  PASP:             38 mmHg      < end of copied text >    	       CHIEF COMPLAINT: HF exacerbation     HISTORY OF PRESENT ILLNESS:  73 year old male with PMH of CAD s/p late-presenting MI 2005 s/p PCI LAD, ICM/HFrEF (EF 15-20%, LVEDD 6 cm) s/p single chamber ICD with [place] lead 2011 s/p lead extraction and upgrade to Medtronic dual-chamber ICD (8/17/21), has hx of appropriate ICD shock for VT/VF, s/p MEMS (9/16/21), bioprosthetic AVR (2015) 2/2 AI s/p 26 mm EVOLUT HECTOR for severe bioprosthetic AS (8/20/20), prior aflutter ablation 7/2017, persistent afib, s/p DCCV x2 (8/12/20, 7/7/21), Trigeminal neuralgia (4/2022) presented as direct admit due to persistently elevated filling pressure (PAD 40) on cardiomems. Denies any chest pain, palpitations, fevers, chills, cough, increased LE edema, orthopnea. Patient reports generalized weakness, MATHEWS and episodes of dizziness (mainly with positional change).     Patient was schedule for AF ablation with Dr. Apple November 2023 and was canceled due to worsening heart failure and [place] with creatinine of 2.5. Dofetilide was discontinued at that time. Of note, pt was briefly on amiodarone shortly post bioprosthetic AVR in 2015 and it was stopped due to tremors. Patient states his tremors still persists and is not sure if tremors was related to amiodarone at all.      Note that pacing rate (LDL) is set at 70 bpm (probably due to he was on dofetilide but has been off dofetilide since 11/2023 due to persistent AF and [place] with creatinine of 2.5).        Allergies    No Known Allergies    Intolerances    amiodarone (Other)  Entresto (Other)  	    MEDICATIONS:  apixaban 5 milliGRAM(s) Oral two times a day  aspirin enteric coated 81 milliGRAM(s) Oral daily  furosemide   Injectable 40 milliGRAM(s) IV Push every 12 hours  losartan 12.5 milliGRAM(s) Oral two times a day  metoprolol succinate ER 25 milliGRAM(s) Oral at bedtime  spironolactone 25 milliGRAM(s) Oral every 12 hours  acetaminophen     Tablet .. 650 milliGRAM(s) Oral every 6 hours PRN  carBAMazepine ER Tablet 100 milliGRAM(s) Oral two times a day  melatonin 3 milliGRAM(s) Oral at bedtime PRN  pregabalin 400 milliGRAM(s) Oral two times a day  atorvastatin 40 milliGRAM(s) Oral at bedtime  levothyroxine 88 MICROGram(s) Oral daily      PAST MEDICAL & SURGICAL HISTORY:  CAD (coronary artery disease)  2004      MI (myocardial infarction)  Nov 2004      Systolic heart failure  EF 15%      HLD (hyperlipidemia)      HTN (hypertension)      AICD (automatic cardioverter/defibrillator) present      Aortic stenosis  severe, s/p AVR      H/O emphysema  - moderately severe, CT chest 06/10/21      Carotid stenosis  - moderate, b/l (Doppler 12/27/21)      Ischemic cardiomyopathy      H/O pulmonary hypertension      Longstanding persistent atrial fibrillation      Type 2 diabetes mellitus      Osteoarthritis of right knee      Neuralgia      CRI (chronic renal insufficiency)      Stented coronary artery  LAD x 3 (Nov 2004)      S/P knee surgery  - Right knee arthroscopy, 2000      S/P hernia repair  - right inguinal, 1982      S/P AVR  2015, complicated by Asystole/Syncope with 1 shock, Transcatheter valve-in-valve aortic valve replacement utilizing a right common femoral arterial percutaneous approach utilizing a 26 Medtronic Evolut core valve on 08/20/2020      AICD (automatic cardioverter/defibrillator) present  single chamber 2005, replaced with dual chamber 08/17/2021      H/O prior ablation treatment  afib - 2017, 07/2021          FAMILY HISTORY:  Chronic obstructive pulmonary disease    Family history of colon cancer (Father)        SOCIAL HISTORY: Non-smoker, social ETOH, independent with ADL        REVIEW OF SYSTEMS:  See HPI. Otherwise, 12 point ROS done and otherwise negative.    PHYSICAL EXAM:  T(C): 36.2 (01-12-24 @ 05:01), Max: 36.9 (01-11-24 @ 11:38)  HR: 74 (01-12-24 @ 05:01) (74 - 90)  BP: 105/75 (01-12-24 @ 05:01) (97/67 - 106/70)  RR: 18 (01-12-24 @ 05:01) (17 - 18)  SpO2: 98% (01-12-24 @ 05:01) (96% - 98%)  Wt(kg): --  I&O's Summary    11 Jan 2024 07:01  -  12 Jan 2024 07:00  --------------------------------------------------------  IN: 1500 mL / OUT: 2650 mL / NET: -1150 mL    12 Jan 2024 07:01  -  12 Jan 2024 11:27  --------------------------------------------------------  IN: 240 mL / OUT: 800 mL / NET: -560 mL        Appearance: Normal	  HEENT: PERRL, EOMI	  Cardiovascular: Normal S1 S2, No JVD, No murmurs  Respiratory: Lungs clear to auscultation	  Psychiatry: A & O x 3, Mood & affect appropriate  Gastrointestinal: Soft, Non-tender, + BS	  Skin: No rashes, No ecchymoses, No cyanosis	  Neurologic: Grossly intact  Extremities: No clubbing, cyanosis or edema  Vascular: Peripheral pulses palpable 2+ bilaterally        LABS:	 	    CBC Full  -  ( 12 Jan 2024 06:41 )  WBC Count : 8.40 K/uL  Hemoglobin : 15.1 g/dL  Hematocrit : 46.5 %  Platelet Count - Automated : 154 K/uL  Mean Cell Volume : 95.5 fl  Mean Cell Hemoglobin : 31.0 pg  Mean Cell Hemoglobin Concentration : 32.5 gm/dL  Auto Neutrophil # : x  Auto Lymphocyte # : x  Auto Monocyte # : x  Auto Eosinophil # : x  Auto Basophil # : x  Auto Neutrophil % : x  Auto Lymphocyte % : x  Auto Monocyte % : x  Auto Eosinophil % : x  Auto Basophil % : x    01-12    140  |  100  |  42<H>  ----------------------------<  119<H>  4.1   |  25  |  1.27  01-11    138  |  96  |  40<H>  ----------------------------<  190<H>  3.3<L>   |  30  |  1.22    Ca    9.5      12 Jan 2024 06:39  Ca    9.0      11 Jan 2024 11:14  Phos  3.1     01-12  Mg     2.5     01-12  Mg     2.3     01-10    TPro  7.5  /  Alb  4.9  /  TBili  0.7  /  DBili  x   /  AST  24  /  ALT  27  /  AlkPhos  159<H>  01-10      proBNP: 2394     Thyroid Stimulating Hormone, Serum (01.10.24 @ 23:59)    Thyroid Stimulating Hormone, Serum: 2.41 uIU/mL          TELEMETRY: Afib with intermittent V pacing at HR 70-80's, occasional VPCs 	      ECG: Afib at 70 bpm, QRSd 126ms    < from: TTE W or WO Ultrasound Enhancing Agent (01.11.24 @ 13:59) >  CONCLUSIONS:      1. Left ventricular cavity is mildly dilated. Left ventricular wall thickness is normal. Left ventricular systolic function is severely decreased with an ejection fraction of 15 % by Whitfield's method of disks. Global left ventricular hypokinesis.   2. There is severe (grade 3) left ventricular diastolic dysfunction.   3. Severely enlarged right ventricular cavity size, wall thickness, and reduced systolic function.   4. The left atrium is mildly dilated.   5. The right atrium is severely dilated in size.   6. Device lead is visualized in the right heart.   7. A TAVR valve replacement is present in the aortic position. is well seated with normal function.. No intravalvular regurgitation No paravalvular regurgitation.   8. No pericardial effusion seen.   9. Compared to the transthoracic echocardiogram performed on 10/1/2020, there have been no significant interval changes.  10. Technically difficult image quality.    ________________________________________________________________________________________  FINDINGS:     Left Ventricle:  After obtaining consent, Definity ultrasound enhancing agent was given for enhanced left ventricular opacification and improved delineation of the left ventricular endocardial borders. The left ventricular cavity is mildly dilated. Left ventricular wall thickness is normal. Left ventricular systolic functionis severely decreased with a calculated ejection fraction of 15 % by the Whitfield's biplane method of disks. There is global left ventricular hypokinesis. There is severe (grade 3) left ventricular diastolic dysfunction, with elevated filling pressure. There is no evidence of a left ventricular thrombus.     Right Ventricle:  The right ventricular cavity is severely enlarged in size, normal wall thickness and reduced systolic function. Tricuspid annular plane systolic excursion (TAPSE) is 1.2 cm (normal >=1.7 cm). Tricuspid annular tissue Doppler S' is 4.2 cm/s (normal >10 cm/s). A device lead is visualized in the right heart.     Left Atrium:  The left atrium is mildly dilated with an indexed volume of 38.54 ml/m².     Right Atrium:  The right atrium is severely dilated in size with an indexed volume of 47.58 ml/m².     Interatrial Septum:  The interatrial septum appears intact.     Aortic Valve:  A TAVR valve replacement is present in the aortic position ( a transcatheter deployed (TAVR). The prosthetic valve is well seated with normal function. There is no intravalvular regurgitation. There is no paravalvular regurgitation. The peak transaortic velocity is 1.29 m/s, peak transaortic gradient is 6.7 mmHg and mean transaortic gradientis 3.0 mmHg with an LVOT/aortic valve VTI ratio of 0.58. The aortic valve area is estimated at 2.01 cm² by the continuity equation.     Mitral Valve:  There is calcification of the mitral valve annulus. There is no mitral valve stenosis. There is trace mitral regurgitation.     Tricuspid Valve:  Structurally normal tricuspid valve with normal leaflet excursion. There is moderate tricuspid regurgitation. Estimated pulmonary artery systolic pressure is 38 mmHg.     Pulmonic Valve:  Structurally normal pulmonic valve with normal leaflet excursion. There is trace pulmonic regurgitation.     Aorta:  The aortic annulus and aortic root appear normal in size.     Pericardium:  No pericardial effusion seen.     Systemic Veins:  The inferior vena cava is dilated measuring 2.56 cm in diameter, (dilated >2.1cm) with normal inspiratory collapse (normal >50%) consistent with mildly elevated right atrial pressure (~8, range 5-10mmHg).  ____________________________________________________________________  QUANTITATIVE DATA:  Left Ventricle Measurements: (Indexed to BSA)     IVSd (2D):   0.8 cm  LVPWd (2D):  1.0 cm  LVIDd (2D):  6.2 cm  LVIDs (2D):  5.8 cm  LV Mass:     229 g  108.1 g/m²  LV Vol d, MOD A2C: 199.0 ml 93.75 ml/m²  LV Vol d, MOD A4C: 212.0 ml 99.88 ml/m²  LV Vol d, MOD BP:  205.1 ml 96.61 ml/m²  LV Vol s, MOD A2C: 154.0 ml 72.55 ml/m²  LV Vol s, MOD A4C: 177.0 ml 83.39 ml/m²  LV Vol s, MOD BP:  173.4 ml 81.71 ml/m²  LVOT SV MOD BP:    31.6 ml  LV EF% MOD BP:     15 %     MV E Vmax: 1.04 m/s  MV A Vmax:    0.26 m/s  MV E/A:       3.92  e' lateral:   8.27 cm/s  e' medial:    6.47 cm/s  E/e' lateral: 12.58  E/e' medial:  16.07  E/e' Average: 14.11    Aorta Measurements: (normal range) (Indexed to BSA)     Sinuses of Valsalva: 4.00 cm (3.1 - 3.7 cm)  Ao Asc prox:         4.10 cm       Left Atrium Measurements: (Indexed to BSA)  LA Diam 2D:        6.40 cm  LA Vol s, MOD A4C: 88.80 ml.  LA Vol s, MOD A2C: 73.70 ml.  LA Vol s, MOD BP:  81.80 ml  38.54 ml/m²    Right Ventricle Measurements: Right Atrial Measurements:     TAPSE:           1.2 cm       RA Vol:       101.00 ml  RV Base (RVID1): 6.0 cm       RA Vol Index: 47.58 ml/m²  RV Mid (RVID2):  4.3 cm       LVOT / RVOT/ Qp/Qs Data: (Indexed to BSA)  LVOT Diameter: 2.10 cm  LVOT Vmax:     0.74 m/s  LVOT VTI:      14.20 cm  LVOT SV:       49.2 ml  23.17 ml/m²    Aortic Valve Measurements:  AV Vmax:                1.3 m/s  AV Peak Gradient:       6.7 mmHg  AV Mean Gradient:       3.0 mmHg  AV VTI:                 24.5 cm  AV VTI Ratio:           0.58  AoV EOA, Contin:        2.01 cm²  AoV EOA, Contin i:      0.95 cm²/m²  AoV Dimensionless Index 0.58    Mitral Valve Measurements:     MV E Vmax: 1.0 m/s  MV A Vmax: 0.3 m/s  MV E/A:    3.9       Tricuspid Valve Measurements:     TR Vmax:          2.7 m/s  TR Peak Gradient: 29.6 mmHg  RA Pressure:      8 mmHg  PASP:             38 mmHg      < end of copied text >

## 2024-01-12 NOTE — PROGRESS NOTE ADULT - SUBJECTIVE AND OBJECTIVE BOX
Interval History:  feels well  urinating well but also had excessive fluid intake  ICD interrogated with 74% V pacing so rate lowered; started on amio with plan for cardioversion     Medications:  acetaminophen     Tablet .. 650 milliGRAM(s) Oral every 6 hours PRN  aMIOdarone    Tablet 400 milliGRAM(s) Oral every 8 hours  aMIOdarone    Tablet   Oral   apixaban 5 milliGRAM(s) Oral two times a day  aspirin enteric coated 81 milliGRAM(s) Oral daily  atorvastatin 40 milliGRAM(s) Oral at bedtime  carBAMazepine ER Tablet 100 milliGRAM(s) Oral two times a day  furosemide   Injectable 80 milliGRAM(s) IV Push once  furosemide Infusion 10 mG/Hr IV Continuous <Continuous>  levothyroxine 88 MICROGram(s) Oral daily  losartan 12.5 milliGRAM(s) Oral two times a day  melatonin 3 milliGRAM(s) Oral at bedtime PRN  metoprolol succinate ER 25 milliGRAM(s) Oral at bedtime  pregabalin 400 milliGRAM(s) Oral two times a day  spironolactone 25 milliGRAM(s) Oral every 12 hours      Vitals:  T(C): 36.8 (24 @ 12:22), Max: 36.8 (24 @ 12:22)  HR: 73 (24 @ 12:22) (73 - 80)  BP: 106/70 (24 @ 12:22) (97/67 - 106/70)  BP(mean): --  RR: 18 (24 @ 12:22) (18 - 18)  SpO2: 96% (24 @ 12:22) (96% - 98%)    Daily     Daily Weight in k.4 (2024 05:01)    Weight (kg): 102.6 (01-10 @ 18:53)    I&O's Summary    2024 07:01  -  2024 07:00  --------------------------------------------------------  IN: 1500 mL / OUT: 2650 mL / NET: -1150 mL    2024 07:01  -  2024 16:50  --------------------------------------------------------  IN: 600 mL / OUT: 1800 mL / NET: -1200 mL    Physical Exam:  Appearance: No Acute Distress  HEENT: PERRL  Neck: JVP elevated approx 12-14 with HJR  Cardiovascular: irreg irreg rhythm  Respiratory: Clear to auscultation bilaterally  Gastrointestinal: Soft, Non-tender	  Skin: No cyanosis	  Neurologic: Non-focal  Extremities: trace LE edema  Psychiatry: A & O x 3, Mood & affect appropriate    Labs:                        15.1   8.40  )-----------( 154      ( 2024 06:41 )             46.5         140  |  100  |  42<H>  ----------------------------<  119<H>  4.1   |  25  |  1.27    Ca    9.5      2024 06:39  Phos  3.1     -  Mg     2.5         TPro  7.5  /  Alb  4.9  /  TBili  0.7  /  DBili  x   /  AST  24  /  ALT  27  /  AlkPhos  159<H>  01-10    PT/INR - ( 10 Jabari 2024 23:59 )   PT: 16.9 sec;   INR: 1.63 ratio         PTT - ( 10 Jabari 2024 23:59 )  PTT:34.3 sec        TELEMETRY:    Echocardiogram:

## 2024-01-12 NOTE — PROGRESS NOTE ADULT - PROBLEM SELECTOR PLAN 2
c/w Eliquis  pacing rate reduced to 50 to reduce V pacing  plan for DCCV on Tuesday; ablation as outpatient

## 2024-01-12 NOTE — PROGRESS NOTE ADULT - PROBLEM SELECTOR PLAN 1
fairly intolerant to neurohormonal meds d/t dizziness/syncope in past  c/w losartan 12.5 mg twice/day  c/w toprol 25 mg qhs  c/w marcelino 25 mg twice/day  start lasix gtt at 10 mg/hr; goal net negative  counseled about sodium/fluid restriction

## 2024-01-12 NOTE — PROCEDURE NOTE - ADDITIONAL PROCEDURE DETAILS
1) Indication for interrogation: HF exacerbation   2) Presenting rhythm: Afib with intermittent V pacing at HR 70-80's  3) Measured data WNL, normal ICD function, Pt is not pacemaker dependent  4) Since 11/24/2023: AP 1.8%, V pace 74.3%, AT/AF burden 100%  5) Stored data revealed PAF between April 2023 to October 2023 and has been in persistent atrial fibrillation since October 2023 with average VHR 70-92 bpm  6) OptiVol fluid index remains elevated since 12/7/2023 and its ongoing but downtrending   7) Note that pacing rate (LDL) is set at 70 bpm (probably due to he was on dofetilide but has been off dofetilide since 11/2023 due to persistent AF and FEDERICO with creatinine of 2.5)  8) See EP consult note     BRANDI Diaz NP-C  [phone] 1) Indication for interrogation: HF exacerbation   2) Presenting rhythm: Afib with intermittent V pacing at HR 70-80's  3) Measured data WNL, normal ICD function, Pt is not pacemaker dependent  4) Since 11/24/2023: AP 1.8%, V pace 74.3%, AT/AF burden 100%  5) Stored data revealed PAF between April 2023 to October 2023 and has been in persistent atrial fibrillation since October 2023 with average VHR 70-92 bpm  6) OptiVol fluid index remains elevated since 12/7/2023 and its ongoing but downtrending   7) Note that pacing rate (LDL) is set at 70 bpm (probably due to he was on dofetilide but has been off dofetilide since 11/2023 due to persistent AF and FEDERICO with creatinine of 2.5)  8) See EP consult note     BRANDI Diaz NP-C  [phone] 1) Indication for interrogation: HF exacerbation   2) Presenting rhythm: Afib with intermittent V pacing at HR 70-80's  3) Measured data WNL, normal ICD function, Pt is not pacemaker dependent  4) Since 11/24/2023: AP 1.8%, V pace 74.3%, AT/AF burden 100%  5) Stored data revealed PAF between April 2023 to October 2023 and has been in persistent atrial fibrillation since October 2023 with average VHR 70-92 bpm  6) OptiVol fluid index remains elevated since 12/7/2023 and its ongoing but downtrending   7) Note that pacing rate (LDL) is set at 70 bpm (probably due to he was on dofetilide but has been off dofetilide since 11/2023 due to persistent AF and FEDERICO with creatinine of 2.5)  8) Pacing rate decreased from 70 bpm to 50 bpm  9) See EP consult note     BRANDI Diaz, NP-C  [phone] 1) Indication for interrogation: HF exacerbation   2) Presenting rhythm: Afib with intermittent V pacing at HR 70-80's  3) Measured data WNL, normal ICD function, Pt is not pacemaker dependent  4) Since 11/24/2023: AP 1.8%, V pace 74.3%, AT/AF burden 100%  5) Stored data revealed PAF between April 2023 to October 2023 and has been in persistent atrial fibrillation since October 2023 with average VHR 70-92 bpm  6) OptiVol fluid index remains elevated since 12/7/2023 and its ongoing but downtrending   7) Note that pacing rate (LDL) is set at 70 bpm (probably due to he was on dofetilide but has been off dofetilide since 11/2023 due to persistent AF and FEDERICO with creatinine of 2.5)  8) Pacing rate decreased from 70 bpm to 50 bpm  9) See EP consult note     BRANDI Diaz, NP-C  [phone]

## 2024-01-12 NOTE — PROGRESS NOTE ADULT - PROBLEM SELECTOR PLAN 2
Also w/ hx of a-flutter and VT storm  -Telemetry  -Cont. Eliquis BID  -Cont. Toprol Also w/ hx of a-flutter and VT storm  -Telemetry  -Cont. Eliquis BID  -Cont. Toprol  - EP consulted to interrogate device and evaluate for BiV upgrade vs. afib ablation

## 2024-01-12 NOTE — CONSULT NOTE ADULT - NS ATTEND AMEND GEN_ALL_CORE FT
Severe ischemic cardiomyopathy s/p dual chamber ICD. Converted to AF several months ago with a high burden of pacing set in DDI 70 mode. Plan for amiodarone load and DCCV with setup of outpatient ablation. Device reprogrammed to LRL of 50.

## 2024-01-13 LAB
ALBUMIN SERPL ELPH-MCNC: 4.4 G/DL — SIGNIFICANT CHANGE UP (ref 3.3–5)
ALBUMIN SERPL ELPH-MCNC: 4.4 G/DL — SIGNIFICANT CHANGE UP (ref 3.3–5)
ALBUMIN SERPL ELPH-MCNC: 4.9 G/DL — SIGNIFICANT CHANGE UP (ref 3.3–5)
ALBUMIN SERPL ELPH-MCNC: 4.9 G/DL — SIGNIFICANT CHANGE UP (ref 3.3–5)
ALP SERPL-CCNC: 122 U/L — HIGH (ref 40–120)
ALP SERPL-CCNC: 122 U/L — HIGH (ref 40–120)
ALP SERPL-CCNC: 149 U/L — HIGH (ref 40–120)
ALP SERPL-CCNC: 149 U/L — HIGH (ref 40–120)
ALT FLD-CCNC: 26 U/L — SIGNIFICANT CHANGE UP (ref 10–45)
ALT FLD-CCNC: 26 U/L — SIGNIFICANT CHANGE UP (ref 10–45)
ALT FLD-CCNC: 31 U/L — SIGNIFICANT CHANGE UP (ref 10–45)
ALT FLD-CCNC: 31 U/L — SIGNIFICANT CHANGE UP (ref 10–45)
ANION GAP SERPL CALC-SCNC: 12 MMOL/L — SIGNIFICANT CHANGE UP (ref 5–17)
ANION GAP SERPL CALC-SCNC: 13 MMOL/L — SIGNIFICANT CHANGE UP (ref 5–17)
ANION GAP SERPL CALC-SCNC: 13 MMOL/L — SIGNIFICANT CHANGE UP (ref 5–17)
AST SERPL-CCNC: 23 U/L — SIGNIFICANT CHANGE UP (ref 10–40)
AST SERPL-CCNC: 23 U/L — SIGNIFICANT CHANGE UP (ref 10–40)
AST SERPL-CCNC: 28 U/L — SIGNIFICANT CHANGE UP (ref 10–40)
AST SERPL-CCNC: 28 U/L — SIGNIFICANT CHANGE UP (ref 10–40)
BILIRUB SERPL-MCNC: 0.9 MG/DL — SIGNIFICANT CHANGE UP (ref 0.2–1.2)
BILIRUB SERPL-MCNC: 0.9 MG/DL — SIGNIFICANT CHANGE UP (ref 0.2–1.2)
BILIRUB SERPL-MCNC: 1 MG/DL — SIGNIFICANT CHANGE UP (ref 0.2–1.2)
BILIRUB SERPL-MCNC: 1 MG/DL — SIGNIFICANT CHANGE UP (ref 0.2–1.2)
BUN SERPL-MCNC: 35 MG/DL — HIGH (ref 7–23)
BUN SERPL-MCNC: 35 MG/DL — HIGH (ref 7–23)
BUN SERPL-MCNC: 38 MG/DL — HIGH (ref 7–23)
CALCIUM SERPL-MCNC: 8.8 MG/DL — SIGNIFICANT CHANGE UP (ref 8.4–10.5)
CALCIUM SERPL-MCNC: 8.8 MG/DL — SIGNIFICANT CHANGE UP (ref 8.4–10.5)
CALCIUM SERPL-MCNC: 9.6 MG/DL — SIGNIFICANT CHANGE UP (ref 8.4–10.5)
CHLORIDE SERPL-SCNC: 100 MMOL/L — SIGNIFICANT CHANGE UP (ref 96–108)
CHLORIDE SERPL-SCNC: 101 MMOL/L — SIGNIFICANT CHANGE UP (ref 96–108)
CHLORIDE SERPL-SCNC: 101 MMOL/L — SIGNIFICANT CHANGE UP (ref 96–108)
CO2 SERPL-SCNC: 28 MMOL/L — SIGNIFICANT CHANGE UP (ref 22–31)
CREAT SERPL-MCNC: 1.13 MG/DL — SIGNIFICANT CHANGE UP (ref 0.5–1.3)
CREAT SERPL-MCNC: 1.13 MG/DL — SIGNIFICANT CHANGE UP (ref 0.5–1.3)
CREAT SERPL-MCNC: 1.52 MG/DL — HIGH (ref 0.5–1.3)
EGFR: 48 ML/MIN/1.73M2 — LOW
EGFR: 68 ML/MIN/1.73M2 — SIGNIFICANT CHANGE UP
EGFR: 68 ML/MIN/1.73M2 — SIGNIFICANT CHANGE UP
GLUCOSE SERPL-MCNC: 126 MG/DL — HIGH (ref 70–99)
GLUCOSE SERPL-MCNC: 126 MG/DL — HIGH (ref 70–99)
GLUCOSE SERPL-MCNC: 173 MG/DL — HIGH (ref 70–99)
HCT VFR BLD CALC: 45.2 % — SIGNIFICANT CHANGE UP (ref 39–50)
HCT VFR BLD CALC: 45.2 % — SIGNIFICANT CHANGE UP (ref 39–50)
HGB BLD-MCNC: 14.4 G/DL — SIGNIFICANT CHANGE UP (ref 13–17)
HGB BLD-MCNC: 14.4 G/DL — SIGNIFICANT CHANGE UP (ref 13–17)
MAGNESIUM SERPL-MCNC: 2.2 MG/DL — SIGNIFICANT CHANGE UP (ref 1.6–2.6)
MAGNESIUM SERPL-MCNC: 2.2 MG/DL — SIGNIFICANT CHANGE UP (ref 1.6–2.6)
MAGNESIUM SERPL-MCNC: 2.3 MG/DL — SIGNIFICANT CHANGE UP (ref 1.6–2.6)
MAGNESIUM SERPL-MCNC: 2.3 MG/DL — SIGNIFICANT CHANGE UP (ref 1.6–2.6)
MCHC RBC-ENTMCNC: 30.6 PG — SIGNIFICANT CHANGE UP (ref 27–34)
MCHC RBC-ENTMCNC: 30.6 PG — SIGNIFICANT CHANGE UP (ref 27–34)
MCHC RBC-ENTMCNC: 31.9 GM/DL — LOW (ref 32–36)
MCHC RBC-ENTMCNC: 31.9 GM/DL — LOW (ref 32–36)
MCV RBC AUTO: 96.2 FL — SIGNIFICANT CHANGE UP (ref 80–100)
MCV RBC AUTO: 96.2 FL — SIGNIFICANT CHANGE UP (ref 80–100)
NRBC # BLD: 0 /100 WBCS — SIGNIFICANT CHANGE UP (ref 0–0)
NRBC # BLD: 0 /100 WBCS — SIGNIFICANT CHANGE UP (ref 0–0)
PHOSPHATE SERPL-MCNC: 3 MG/DL — SIGNIFICANT CHANGE UP (ref 2.5–4.5)
PHOSPHATE SERPL-MCNC: 3 MG/DL — SIGNIFICANT CHANGE UP (ref 2.5–4.5)
PHOSPHATE SERPL-MCNC: 3.4 MG/DL — SIGNIFICANT CHANGE UP (ref 2.5–4.5)
PHOSPHATE SERPL-MCNC: 3.4 MG/DL — SIGNIFICANT CHANGE UP (ref 2.5–4.5)
PLATELET # BLD AUTO: 158 K/UL — SIGNIFICANT CHANGE UP (ref 150–400)
PLATELET # BLD AUTO: 158 K/UL — SIGNIFICANT CHANGE UP (ref 150–400)
POTASSIUM SERPL-MCNC: 2.8 MMOL/L — CRITICAL LOW (ref 3.5–5.3)
POTASSIUM SERPL-MCNC: 2.8 MMOL/L — CRITICAL LOW (ref 3.5–5.3)
POTASSIUM SERPL-MCNC: 4.4 MMOL/L — SIGNIFICANT CHANGE UP (ref 3.5–5.3)
POTASSIUM SERPL-SCNC: 2.8 MMOL/L — CRITICAL LOW (ref 3.5–5.3)
POTASSIUM SERPL-SCNC: 2.8 MMOL/L — CRITICAL LOW (ref 3.5–5.3)
POTASSIUM SERPL-SCNC: 4.4 MMOL/L — SIGNIFICANT CHANGE UP (ref 3.5–5.3)
PROT SERPL-MCNC: 6.8 G/DL — SIGNIFICANT CHANGE UP (ref 6–8.3)
PROT SERPL-MCNC: 6.8 G/DL — SIGNIFICANT CHANGE UP (ref 6–8.3)
PROT SERPL-MCNC: 7.6 G/DL — SIGNIFICANT CHANGE UP (ref 6–8.3)
PROT SERPL-MCNC: 7.6 G/DL — SIGNIFICANT CHANGE UP (ref 6–8.3)
RBC # BLD: 4.7 M/UL — SIGNIFICANT CHANGE UP (ref 4.2–5.8)
RBC # BLD: 4.7 M/UL — SIGNIFICANT CHANGE UP (ref 4.2–5.8)
RBC # FLD: 14.1 % — SIGNIFICANT CHANGE UP (ref 10.3–14.5)
RBC # FLD: 14.1 % — SIGNIFICANT CHANGE UP (ref 10.3–14.5)
SODIUM SERPL-SCNC: 140 MMOL/L — SIGNIFICANT CHANGE UP (ref 135–145)
SODIUM SERPL-SCNC: 142 MMOL/L — SIGNIFICANT CHANGE UP (ref 135–145)
SODIUM SERPL-SCNC: 142 MMOL/L — SIGNIFICANT CHANGE UP (ref 135–145)
WBC # BLD: 7.76 K/UL — SIGNIFICANT CHANGE UP (ref 3.8–10.5)
WBC # BLD: 7.76 K/UL — SIGNIFICANT CHANGE UP (ref 3.8–10.5)
WBC # FLD AUTO: 7.76 K/UL — SIGNIFICANT CHANGE UP (ref 3.8–10.5)
WBC # FLD AUTO: 7.76 K/UL — SIGNIFICANT CHANGE UP (ref 3.8–10.5)

## 2024-01-13 PROCEDURE — 99233 SBSQ HOSP IP/OBS HIGH 50: CPT

## 2024-01-13 RX ORDER — POTASSIUM CHLORIDE 20 MEQ
20 PACKET (EA) ORAL
Refills: 0 | Status: DISCONTINUED | OUTPATIENT
Start: 2024-01-13 | End: 2024-01-13

## 2024-01-13 RX ORDER — SPIRONOLACTONE 25 MG/1
50 TABLET, FILM COATED ORAL EVERY 12 HOURS
Refills: 0 | Status: DISCONTINUED | OUTPATIENT
Start: 2024-01-13 | End: 2024-01-14

## 2024-01-13 RX ORDER — POTASSIUM CHLORIDE 20 MEQ
10 PACKET (EA) ORAL
Refills: 0 | Status: COMPLETED | OUTPATIENT
Start: 2024-01-13 | End: 2024-01-13

## 2024-01-13 RX ORDER — POTASSIUM CHLORIDE 20 MEQ
40 PACKET (EA) ORAL EVERY 4 HOURS
Refills: 0 | Status: COMPLETED | OUTPATIENT
Start: 2024-01-13 | End: 2024-01-13

## 2024-01-13 RX ORDER — POTASSIUM CHLORIDE 20 MEQ
40 PACKET (EA) ORAL ONCE
Refills: 0 | Status: DISCONTINUED | OUTPATIENT
Start: 2024-01-13 | End: 2024-01-13

## 2024-01-13 RX ADMIN — Medication 100 MILLIEQUIVALENT(S): at 09:51

## 2024-01-13 RX ADMIN — AMIODARONE HYDROCHLORIDE 400 MILLIGRAM(S): 400 TABLET ORAL at 21:09

## 2024-01-13 RX ADMIN — APIXABAN 5 MILLIGRAM(S): 2.5 TABLET, FILM COATED ORAL at 18:18

## 2024-01-13 RX ADMIN — APIXABAN 5 MILLIGRAM(S): 2.5 TABLET, FILM COATED ORAL at 05:40

## 2024-01-13 RX ADMIN — Medication 40 MILLIEQUIVALENT(S): at 18:22

## 2024-01-13 RX ADMIN — Medication 100 MILLIEQUIVALENT(S): at 08:20

## 2024-01-13 RX ADMIN — Medication 5 MG/HR: at 19:42

## 2024-01-13 RX ADMIN — Medication 400 MILLIGRAM(S): at 18:40

## 2024-01-13 RX ADMIN — Medication 40 MILLIEQUIVALENT(S): at 14:37

## 2024-01-13 RX ADMIN — SPIRONOLACTONE 50 MILLIGRAM(S): 25 TABLET, FILM COATED ORAL at 18:41

## 2024-01-13 RX ADMIN — Medication 100 MILLIGRAM(S): at 05:40

## 2024-01-13 RX ADMIN — AMIODARONE HYDROCHLORIDE 400 MILLIGRAM(S): 400 TABLET ORAL at 14:38

## 2024-01-13 RX ADMIN — Medication 81 MILLIGRAM(S): at 14:37

## 2024-01-13 RX ADMIN — SPIRONOLACTONE 25 MILLIGRAM(S): 25 TABLET, FILM COATED ORAL at 05:40

## 2024-01-13 RX ADMIN — AMIODARONE HYDROCHLORIDE 400 MILLIGRAM(S): 400 TABLET ORAL at 05:40

## 2024-01-13 RX ADMIN — Medication 100 MILLIEQUIVALENT(S): at 13:39

## 2024-01-13 RX ADMIN — Medication 100 MILLIGRAM(S): at 18:41

## 2024-01-13 RX ADMIN — Medication 88 MICROGRAM(S): at 05:39

## 2024-01-13 RX ADMIN — LOSARTAN POTASSIUM 12.5 MILLIGRAM(S): 100 TABLET, FILM COATED ORAL at 05:39

## 2024-01-13 RX ADMIN — Medication 400 MILLIGRAM(S): at 05:39

## 2024-01-13 RX ADMIN — ATORVASTATIN CALCIUM 40 MILLIGRAM(S): 80 TABLET, FILM COATED ORAL at 21:09

## 2024-01-13 RX ADMIN — Medication 25 MILLIGRAM(S): at 21:09

## 2024-01-13 RX ADMIN — Medication 40 MILLIEQUIVALENT(S): at 08:09

## 2024-01-13 RX ADMIN — LOSARTAN POTASSIUM 12.5 MILLIGRAM(S): 100 TABLET, FILM COATED ORAL at 18:22

## 2024-01-13 NOTE — PROGRESS NOTE ADULT - PROBLEM SELECTOR PLAN 1
HFrEF (EF 15-20%, LVEDD 6 cm) s/p single chamber ICD with lead revision in 2018 with upgrade to dual-chamber device (8/17/21) s/p MEMS (9/16/21)  MEMs level 40   HF following   GDMT:  BB- c/w Toprol 25mg qd  ACE/ARB/ARNI- c/w losartan BID - unable to tolerate Entresto due to hypotension/syncope  MRA: c/w spironolactone BID  SGLT2: resume home Farxiga on dc   on Lasix gtt per HF as of 1/12 -- monitor electrolytes closely

## 2024-01-13 NOTE — PROGRESS NOTE ADULT - ASSESSMENT
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

## 2024-01-13 NOTE — PROGRESS NOTE ADULT - PROBLEM SELECTOR PLAN 2
Also w/ hx of a-flutter and VT storm  -Telemetry  -Cont. Eliquis BID  -Cont. Toprol  -EP consulted to interrogate device and evaluate for BiV upgrade vs. afib ablation - they recommend to start amiodarone load with 400mg TID to 10G load and then 200mg QD thereafter (received 1.2G as of 6am today) and will plan for Cardioversion on Tuesday (1/16/24). ANDREY not needed as pt has been on un-interrupted AC (pt took his own eliquis on the day of admission on 1/10/24)  -Keep pt NPO except meds MN Monday into Tuesday.  -Will need AF ablation as outpatient with Dr. Apple.  -Maintain K>4.0 and Mg>2.0

## 2024-01-13 NOTE — PROGRESS NOTE ADULT - SUBJECTIVE AND OBJECTIVE BOX
Sony Almanza M.D.  Division of Hospital Medicine  Available on [employer] TEAMS    SUBJECTIVE / ACUTE INTERVAL EVENTS: Patient seen and examined. No overnight events. No subjective complaints.     OBJECTIVE:   Vital Signs Last 24 Hrs  T(F): 97.6 (13 Jan 2024 11:54), Max: 97.6 (13 Jan 2024 04:59)  HR: 58 (13 Jan 2024 11:54) (58 - 80)  BP: 92/58 (13 Jan 2024 11:54) (92/58 - 117/74)  RR: 18 (13 Jan 2024 11:54) (18 - 18)  SpO2: 97% (13 Jan 2024 11:54) (95% - 97%)  Parameters below as of 13 Jan 2024 11:54  Patient On (Oxygen Delivery Method): room air    I&O's Summary  12 Jan 2024 07:01  -  13 Jan 2024 07:00  --------------------------------------------------------  IN: 1140 mL / OUT: 4775 mL / NET: -3635 mL    13 Jan 2024 07:01  -  13 Jan 2024 16:38  --------------------------------------------------------  IN: 720 mL / OUT: 1400 mL / NET: -680 mL    Physical Examination:  GEN: elderly man, sitting up at side of bed in NAD  PSYCH: A&Ox3, mood and affect appear appropriate   NEURO: no focal neurologic deficits appreciated  RESPI: no accessory muscle use, B/L air entry  CARDIO: regular rate/rhythm, no LE edema B/L  ABD: soft, NT, ND  EXT: patient able to move all extremities spontaneously  VASC: peripheral pulses palpated    Labs:                      14.4   7.76  )-----------( 158      ( 13 Jan 2024 06:42 )             45.2     01-13  142  |  101  |  35<H>  ----------------------------<  126<H>  2.8<LL>   |  28  |  1.13    Ca    8.8      13 Jan 2024 06:41  Phos  3.0     01-13  Mg     2.2     01-13    TPro  6.8  /  Alb  4.4  /  TBili  0.9  /  DBili  x   /  AST  23  /  ALT  26  /  AlkPhos  122<H>  01-13    Urinalysis Basic - ( 13 Jan 2024 06:41 )  Color: x / Appearance: x / SG: x / pH: x  Gluc: 126 mg/dL / Ketone: x  / Bili: x / Urobili: x   Blood: x / Protein: x / Nitrite: x   Leuk Esterase: x / RBC: x / WBC x   Sq Epi: x / Non Sq Epi: x / Bacteria: x    Consultant(s) Notes Reviewed: Electrophysiology, Heart Failure  Care Discussed with Consultants/Other Providers: STALIN Concepcion    MEDICATIONS  (STANDING):  aMIOdarone    Tablet 400 milliGRAM(s) Oral every 8 hours  aMIOdarone    Tablet   Oral   apixaban 5 milliGRAM(s) Oral two times a day  aspirin enteric coated 81 milliGRAM(s) Oral daily  atorvastatin 40 milliGRAM(s) Oral at bedtime  carBAMazepine ER Tablet 100 milliGRAM(s) Oral two times a day  furosemide Infusion 10 mG/Hr (5 mL/Hr) IV Continuous <Continuous>  levothyroxine 88 MICROGram(s) Oral daily  losartan 12.5 milliGRAM(s) Oral two times a day  metoprolol succinate ER 25 milliGRAM(s) Oral at bedtime  potassium chloride    Tablet ER 40 milliEquivalent(s) Oral every 4 hours  pregabalin 400 milliGRAM(s) Oral two times a day  spironolactone 50 milliGRAM(s) Oral every 12 hours    MEDICATIONS  (PRN):  acetaminophen     Tablet .. 650 milliGRAM(s) Oral every 6 hours PRN Temp greater or equal to 38C (100.4F), Mild Pain (1 - 3)  melatonin 3 milliGRAM(s) Oral at bedtime PRN Insomnia

## 2024-01-13 NOTE — PROGRESS NOTE ADULT - ASSESSMENT
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 EP consulted for possible upgrade to CRT-D due to increased V pacing burden.     Persistent Afib in the setting of HFrEF  -ECG revealed Afib with QRSd 126ms, pt would not benefit from BiV pacing.   -ICD interrogation showed PAF between April 2023 to October 2023 and has been in persistent atrial fibrillation since October 2023 with average VHR 70-92 bpm, V pace 73.4% since 11/24/23 in the setting of lower rate pacing of 70 bpm.  -Patient was schedule for AF ablation with Dr. Apple November 2023 and was canceled due to worsening heart failure and FEDERICO with creatinine of 2.5. Dofetilide was discontinued at that time. Of note, pt was briefly on amiodarone shortly post bioprosthetic AVR in 2015 and it was stopped due to tremors. Patient states his tremors still persists and is not sure if tremors was related to amiodarone at all.  -Recommend start amiodarone load with 400mg TID to 10G load and then 200mg QD thereafter (received 1.2G as of 6am today)  -I discussed with patient and his daughter plans for amiodarone including risks/side effects. Discussed need for outpatient monitoring of PFT/TFT/LFT/opthalmology monitoring while on amiodarone. TSH 2.41. AST/ALT 24/27.   -Continue eliquis 5mg BID  -Pacing rate decreased from 70 bpm to 50 bpm to avoid RV pacing (done on 1/12)  -Plan for Cardioversion on Tuesday (1/16/24). ANDREY not needed as pt has been on un-interrupted AC (pt took his own eliquis on the day of admission on 1/10/24)  -Keep pt NPO except meds MN Monday into Tuesday.  -Will need AF ablation as outpatient with Dr. Apple.    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 EP consulted for possible upgrade to CRT-D due to increased V pacing burden.     Persistent Afib in the setting of HFrEF  -ECG revealed Afib with QRSd 126ms, pt would not benefit from BiV pacing.   -ICD interrogation showed PAF between April 2023 to October 2023 and has been in persistent atrial fibrillation since October 2023 with average VHR 70-92 bpm, V pace 73.4% since 11/24/23 in the setting of lower rate pacing of 70 bpm.  -Patient was schedule for AF ablation with Dr. Apple November 2023 and was canceled due to worsening heart failure and FEDERICO with creatinine of 2.5. Dofetilide was discontinued at that time. Of note, pt was briefly on amiodarone shortly post bioprosthetic AVR in 2015 and it was stopped due to tremors. Patient states his tremors still persists and is not sure if tremors was related to amiodarone at all.  -Recommend start amiodarone load with 400mg TID to 10G load and then 200mg QD thereafter (received 1.2G as of 6am today)  -I discussed with patient and his daughter plans for amiodarone including risks/side effects. Discussed need for outpatient monitoring of PFT/TFT/LFT/opthalmology monitoring while on amiodarone. TSH 2.41. AST/ALT 24/27.   -Continue eliquis 5mg BID  -Pacing rate decreased from 70 bpm to 50 bpm to avoid RV pacing (done on 1/12)  -Plan for Cardioversion on Tuesday (1/16/24). ANDREY not needed as pt has been on un-interrupted AC (pt took his own eliquis on the day of admission on 1/10/24)  -Keep pt NPO except meds MN Monday into Tuesday.  -Will need AF ablation as outpatient with Dr. Apple.    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 EP consulted for possible upgrade to CRT-D due to increased V pacing burden.     Persistent Afib in the setting of HFrEF  -ECG revealed Afib with QRSd 126ms, pt would not benefit from BiV pacing.   -ICD interrogation showed PAF between April 2023 to October 2023 and has been in persistent atrial fibrillation since October 2023 with average VHR 70-92 bpm, V pace 73.4% since 11/24/23 in the setting of lower rate pacing of 70 bpm.  -Patient was schedule for AF ablation with Dr. Apple November 2023 and was canceled due to worsening heart failure and FEDERICO with creatinine of 2.5. Dofetilide was discontinued at that time. Of note, pt was briefly on amiodarone shortly post bioprosthetic AVR in 2015 and it was stopped due to tremors. Patient states his tremors still persists and is not sure if tremors was related to amiodarone at all.  -Recommend start amiodarone load with 400mg TID to 10G load and then 200mg QD thereafter (received 1.2G as of 6am today)  -I discussed with patient and his daughter plans for amiodarone including risks/side effects. Discussed need for outpatient monitoring of PFT/TFT/LFT/opthalmology monitoring while on amiodarone. TSH 2.41. AST/ALT 24/27.   -Continue eliquis 5mg BID  -Pacing rate decreased from 70 bpm to 50 bpm to avoid RV pacing (done on 1/12)  -Plan for Cardioversion on Tuesday (1/16/24). ANDREY not needed as pt has been on un-interrupted AC (pt took his own eliquis on the day of admission on 1/10/24)  -Keep pt NPO except meds MN Monday into Tuesday.  -Will need AF ablation as outpatient with Dr. Apple.  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 EP consulted for possible upgrade to CRT-D due to increased V pacing burden.     Persistent Afib in the setting of HFrEF  -ECG revealed Afib with QRSd 126ms, pt would not benefit from BiV pacing.   -ICD interrogation showed PAF between April 2023 to October 2023 and has been in persistent atrial fibrillation since October 2023 with average VHR 70-92 bpm, V pace 73.4% since 11/24/23 in the setting of lower rate pacing of 70 bpm.  -Patient was schedule for AF ablation with Dr. Apple November 2023 and was canceled due to worsening heart failure and FEDERICO with creatinine of 2.5. Dofetilide was discontinued at that time. Of note, pt was briefly on amiodarone shortly post bioprosthetic AVR in 2015 and it was stopped due to tremors. Patient states his tremors still persists and is not sure if tremors was related to amiodarone at all.  -Recommend start amiodarone load with 400mg TID to 10G load and then 200mg QD thereafter (received 1.2G as of 6am today)  -I discussed with patient and his daughter plans for amiodarone including risks/side effects. Discussed need for outpatient monitoring of PFT/TFT/LFT/opthalmology monitoring while on amiodarone. TSH 2.41. AST/ALT 24/27.   -Continue eliquis 5mg BID  -Pacing rate decreased from 70 bpm to 50 bpm to avoid RV pacing (done on 1/12)  -Plan for Cardioversion on Tuesday (1/16/24). ANDREY not needed as pt has been on un-interrupted AC (pt took his own eliquis on the day of admission on 1/10/24)  -Keep pt NPO except meds MN Monday into Tuesday.  -Will need AF ablation as outpatient with Dr. Apple.  -Maintain K>4.0 and Mg>2.0    BRANDI Diaz, NP-C  [phone]

## 2024-01-13 NOTE — PROGRESS NOTE ADULT - SUBJECTIVE AND OBJECTIVE BOX
Interval History:  urinating well to lasix gtt  denies complaints    Medications:  acetaminophen     Tablet .. 650 milliGRAM(s) Oral every 6 hours PRN  aMIOdarone    Tablet 400 milliGRAM(s) Oral every 8 hours  aMIOdarone    Tablet   Oral   apixaban 5 milliGRAM(s) Oral two times a day  aspirin enteric coated 81 milliGRAM(s) Oral daily  atorvastatin 40 milliGRAM(s) Oral at bedtime  carBAMazepine ER Tablet 100 milliGRAM(s) Oral two times a day  furosemide Infusion 10 mG/Hr IV Continuous <Continuous>  levothyroxine 88 MICROGram(s) Oral daily  losartan 12.5 milliGRAM(s) Oral two times a day  melatonin 3 milliGRAM(s) Oral at bedtime PRN  metoprolol succinate ER 25 milliGRAM(s) Oral at bedtime  pregabalin 400 milliGRAM(s) Oral two times a day  spironolactone 50 milliGRAM(s) Oral every 12 hours      Vitals:  T(C): 36.4 (24 @ 19:06), Max: 36.4 (24 @ 04:59)  HR: 82 (24 @ 19:06) (58 - 82)  BP: 113/77 (24 @ 19:06) (92/58 - 113/77)  BP(mean): --  RR: 18 (24 @ 19:06) (18 - 18)  SpO2: 95% (24 @ 19:06) (95% - 97%)    Daily     Daily Weight in k.3 (2024 06:26)        I&O's Summary    2024 07:  -  2024 07:00  --------------------------------------------------------  IN: 1140 mL / OUT: 4775 mL / NET: -3635 mL    2024 07:01  -  2024 20:59  --------------------------------------------------------  IN: 1740 mL / OUT: 2000 mL / NET: -260 mL    Physical Exam:  Appearance: No Acute Distress  HEENT: PERRL  Neck: JVD approx 12 cm w/ HJR  Cardiovascular: Normal S1 S2, No murmurs/rubs/gallops  Respiratory: Clear to auscultation bilaterally  Gastrointestinal: Soft, Non-tender	  Skin: No cyanosis	  Neurologic: Non-focal  Extremities: trace LE edema  Psychiatry: A & O x 3, Mood & affect appropriate    Labs:                        14.4   7.76  )-----------( 158      ( 2024 06:42 )             45.2         140  |  100  |  38<H>  ----------------------------<  173<H>  4.4   |  28  |  1.52<H>    Ca    9.6      2024 18:48  Phos  3.4       Mg     2.3         TPro  7.6  /  Alb  4.9  /  TBili  1.0  /  DBili  x   /  AST  28  /  ALT  31  /  AlkPhos  149<H>

## 2024-01-13 NOTE — PROGRESS NOTE ADULT - SUBJECTIVE AND OBJECTIVE BOX
24H hour events:     MEDICATIONS:  aMIOdarone    Tablet 400 milliGRAM(s) Oral every 8 hours  aMIOdarone    Tablet   Oral   apixaban 5 milliGRAM(s) Oral two times a day  aspirin enteric coated 81 milliGRAM(s) Oral daily  furosemide Infusion 10 mG/Hr IV Continuous <Continuous>  losartan 12.5 milliGRAM(s) Oral two times a day  metoprolol succinate ER 25 milliGRAM(s) Oral at bedtime  spironolactone 25 milliGRAM(s) Oral every 12 hours        acetaminophen     Tablet .. 650 milliGRAM(s) Oral every 6 hours PRN  carBAMazepine ER Tablet 100 milliGRAM(s) Oral two times a day  melatonin 3 milliGRAM(s) Oral at bedtime PRN  pregabalin 400 milliGRAM(s) Oral two times a day      atorvastatin 40 milliGRAM(s) Oral at bedtime  levothyroxine 88 MICROGram(s) Oral daily    potassium chloride    Tablet ER 40 milliEquivalent(s) Oral every 4 hours  potassium chloride  10 mEq/100 mL IVPB 10 milliEquivalent(s) IV Intermittent every 1 hour      REVIEW OF SYSTEMS:  Complete 12 point ROS negative.    PHYSICAL EXAM:  T(C): 36.4 (01-13-24 @ 04:59), Max: 36.8 (01-12-24 @ 12:22)  HR: 61 (01-13-24 @ 04:59) (61 - 80)  BP: 94/66 (01-13-24 @ 04:59) (94/66 - 117/74)  RR: 18 (01-13-24 @ 04:59) (18 - 18)  SpO2: 95% (01-13-24 @ 04:59) (95% - 96%)  Wt(kg): --  I&O's Summary    12 Jan 2024 07:01  -  13 Jan 2024 07:00  --------------------------------------------------------  IN: 1140 mL / OUT: 4775 mL / NET: -3635 mL        Appearance: Normal	  HEENT: PERRL, EOMI	  Cardiovascular: Normal S1 S2, No JVD, No murmurs  Respiratory: Lungs clear to auscultation	  Psychiatry: A & O x 3, Mood & affect appropriate  Gastrointestinal:  Soft, Non-tender, + BS	  Skin: No rashes, No ecchymoses, No cyanosis	  Neurologic: Grossly intact  Extremities: No clubbing, cyanosis or edema  Vascular: Peripheral pulses palpable 2+ bilaterally        LABS:	 	    CBC Full  -  ( 13 Jan 2024 06:42 )  WBC Count : 7.76 K/uL  Hemoglobin : 14.4 g/dL  Hematocrit : 45.2 %  Platelet Count - Automated : 158 K/uL  Mean Cell Volume : 96.2 fl  Mean Cell Hemoglobin : 30.6 pg  Mean Cell Hemoglobin Concentration : 31.9 gm/dL  Auto Neutrophil # : x  Auto Lymphocyte # : x  Auto Monocyte # : x  Auto Eosinophil # : x  Auto Basophil # : x  Auto Neutrophil % : x  Auto Lymphocyte % : x  Auto Monocyte % : x  Auto Eosinophil % : x  Auto Basophil % : x    01-13    142  |  101  |  35<H>  ----------------------------<  126<H>  2.8<LL>   |  28  |  1.13  01-12    140  |  100  |  42<H>  ----------------------------<  119<H>  4.1   |  25  |  1.27    Ca    8.8      13 Jan 2024 06:41  Ca    9.5      12 Jan 2024 06:39  Phos  3.0     01-13  Phos  3.1     01-12  Mg     2.2     01-13  Mg     2.5     01-12    TPro  6.8  /  Alb  4.4  /  TBili  0.9  /  DBili  x   /  AST  23  /  ALT  26  /  AlkPhos  122<H>  01-13      proBNP:   Lipid Profile:   HgA1c:   TSH:       CARDIAC MARKERS:          TELEMETRY: 	    ECG:  	  RADIOLOGY:  OTHER: 	    PREVIOUS DIAGNOSTIC TESTING:    [ ] Echocardiogram:    [ ]  Catheterization:  [ ] Stress Test:  	  	  ASSESSMENT/PLAN: 	     24H hour events: Pt without complaint, no acute events overnight, Tele: Afib with VHR 60-80's (occasional V pacing at 50 bpm)    MEDICATIONS:  aMIOdarone    Tablet 400 milliGRAM(s) Oral every 8 hours  apixaban 5 milliGRAM(s) Oral two times a day  aspirin enteric coated 81 milliGRAM(s) Oral daily  furosemide Infusion 10 mG/Hr IV Continuous <Continuous>  losartan 12.5 milliGRAM(s) Oral two times a day  metoprolol succinate ER 25 milliGRAM(s) Oral at bedtime  spironolactone 25 milliGRAM(s) Oral every 12 hours  acetaminophen     Tablet .. 650 milliGRAM(s) Oral every 6 hours PRN  carBAMazepine ER Tablet 100 milliGRAM(s) Oral two times a day  melatonin 3 milliGRAM(s) Oral at bedtime PRN  pregabalin 400 milliGRAM(s) Oral two times a day  atorvastatin 40 milliGRAM(s) Oral at bedtime  levothyroxine 88 MICROGram(s) Oral daily  potassium chloride    Tablet ER 40 milliEquivalent(s) Oral every 4 hours  potassium chloride  10 mEq/100 mL IVPB 10 milliEquivalent(s) IV Intermittent every 1 hour      REVIEW OF SYSTEMS:  Complete 12 point ROS negative.    PHYSICAL EXAM:  T(C): 36.4 (01-13-24 @ 04:59), Max: 36.8 (01-12-24 @ 12:22)  HR: 61 (01-13-24 @ 04:59) (61 - 80)  BP: 94/66 (01-13-24 @ 04:59) (94/66 - 117/74)  RR: 18 (01-13-24 @ 04:59) (18 - 18)  SpO2: 95% (01-13-24 @ 04:59) (95% - 96%)  Wt(kg): --  I&O's Summary    12 Jan 2024 07:01  -  13 Jan 2024 07:00  --------------------------------------------------------  IN: 1140 mL / OUT: 4775 mL / NET: -3635 mL        Appearance: Normal	  HEENT: PERRL, EOMI	  Cardiovascular: Normal S1 S2, No JVD, No murmurs  Respiratory: Lungs clear to auscultation	  Psychiatry: A & O x 3, Mood & affect appropriate  Gastrointestinal: Soft, Non-tender, + BS	  Skin: No rashes, No ecchymoses, No cyanosis	  Neurologic: Grossly intact  Extremities: No clubbing, cyanosis or edema  Vascular: Peripheral pulses palpable 2+ bilaterally        LABS:	 	    CBC Full  -  ( 13 Jan 2024 06:42 )  WBC Count : 7.76 K/uL  Hemoglobin : 14.4 g/dL  Hematocrit : 45.2 %  Platelet Count - Automated : 158 K/uL  Mean Cell Volume : 96.2 fl  Mean Cell Hemoglobin : 30.6 pg  Mean Cell Hemoglobin Concentration : 31.9 gm/dL  Auto Neutrophil # : x  Auto Lymphocyte # : x  Auto Monocyte # : x  Auto Eosinophil # : x  Auto Basophil # : x  Auto Neutrophil % : x  Auto Lymphocyte % : x  Auto Monocyte % : x  Auto Eosinophil % : x  Auto Basophil % : x    01-13    142  |  101  |  35<H>  ----------------------------<  126<H>  2.8<LL>   |  28  |  1.13  01-12    140  |  100  |  42<H>  ----------------------------<  119<H>  4.1   |  25  |  1.27    Ca    8.8      13 Jan 2024 06:41  Ca    9.5      12 Jan 2024 06:39  Phos  3.0     01-13  Phos  3.1     01-12  Mg     2.2     01-13  Mg     2.5     01-12    TPro  6.8  /  Alb  4.4  /  TBili  0.9  /  DBili  x   /  AST  23  /  ALT  26  /  AlkPhos  122<H>  01-13    Thyroid Stimulating Hormone, Serum (01.10.24 @ 23:59)    Thyroid Stimulating Hormone, Serum: 2.41 uIU/mL        TELEMETRY: Afib with VHR 60-80's (occasional V pacing at 50 bpm)	      < from: TTE W or WO Ultrasound Enhancing Agent (01.11.24 @ 13:59) >  CONCLUSIONS:      1. Left ventricular cavity is mildly dilated. Left ventricular wall thickness is normal. Left ventricular systolic function is severely decreased with an ejection fraction of 15 % by Whitfield's method of disks. Global left ventricular hypokinesis.   2. There is severe (grade 3) left ventricular diastolic dysfunction.   3. Severely enlarged right ventricular cavity size, wall thickness, and reduced systolic function.   4. The left atrium is mildly dilated.   5. The right atrium is severely dilated in size.   6. Device lead is visualized in the right heart.   7. A TAVR valve replacement is present in the aortic position. is well seated with normal function.. No intravalvular regurgitation No paravalvular regurgitation.   8. No pericardial effusion seen.   9. Compared to the transthoracic echocardiogram performed on 10/1/2020, there have been no significant interval changes.  10. Technically difficult image quality.    ________________________________________________________________________________________  FINDINGS:     Left Ventricle:  After obtaining consent, Definity ultrasound enhancing agent was given for enhanced left ventricular opacification and improved delineation of the left ventricular endocardial borders. The left ventricular cavity is mildly dilated. Left ventricular wall thickness is normal. Left ventricular systolic functionis severely decreased with a calculated ejection fraction of 15 % by the Whitfield's biplane method of disks. There is global left ventricular hypokinesis. There is severe (grade 3) left ventricular diastolic dysfunction, with elevated filling pressure. There is no evidence of a left ventricular thrombus.     Right Ventricle:  The right ventricular cavity is severely enlarged in size, normal wall thickness and reduced systolic function. Tricuspid annular plane systolic excursion (TAPSE) is 1.2 cm (normal >=1.7 cm). Tricuspid annular tissue Doppler S' is 4.2 cm/s (normal >10 cm/s). A device lead is visualized in the right heart.     Left Atrium:  The left atrium is mildly dilated with an indexed volume of 38.54 ml/m².     Right Atrium:  The right atrium is severely dilated in size with an indexed volume of 47.58 ml/m².     Interatrial Septum:  The interatrial septum appears intact.     Aortic Valve:  A TAVR valve replacement is present in the aortic position ( a transcatheter deployed (TAVR). The prosthetic valve is well seated with normal function. There is no intravalvular regurgitation. There is no paravalvular regurgitation. The peak transaortic velocity is 1.29 m/s, peak transaortic gradient is 6.7 mmHg and mean transaortic gradientis 3.0 mmHg with an LVOT/aortic valve VTI ratio of 0.58. The aortic valve area is estimated at 2.01 cm² by the continuity equation.     Mitral Valve:  There is calcification of the mitral valve annulus. There is no mitral valve stenosis. There is trace mitral regurgitation.     Tricuspid Valve:  Structurally normal tricuspid valve with normal leaflet excursion. There is moderate tricuspid regurgitation. Estimated pulmonary artery systolic pressure is 38 mmHg.     Pulmonic Valve:  Structurally normal pulmonic valve with normal leaflet excursion. There is trace pulmonic regurgitation.     Aorta:  The aortic annulus and aortic root appear normal in size.     Pericardium:  No pericardial effusion seen.     Systemic Veins:  The inferior vena cava is dilated measuring 2.56 cm in diameter, (dilated >2.1cm) with normal inspiratory collapse (normal >50%) consistent with mildly elevated right atrial pressure (~8, range 5-10mmHg).  ____________________________________________________________________  QUANTITATIVE DATA:  Left Ventricle Measurements: (Indexed to BSA)     IVSd (2D):   0.8 cm  LVPWd (2D):  1.0 cm  LVIDd (2D):  6.2 cm  LVIDs (2D):  5.8 cm  LV Mass:     229 g  108.1 g/m²  LV Vol d, MOD A2C: 199.0 ml 93.75 ml/m²  LV Vol d, MOD A4C: 212.0 ml 99.88 ml/m²  LV Vol d, MOD BP:  205.1 ml 96.61 ml/m²  LV Vol s, MOD A2C: 154.0 ml 72.55 ml/m²  LV Vol s, MOD A4C: 177.0 ml 83.39 ml/m²  LV Vol s, MOD BP:  173.4 ml 81.71 ml/m²  LVOT SV MOD BP:    31.6 ml  LV EF% MOD BP:     15 %     MV E Vmax: 1.04 m/s  MV A Vmax:    0.26 m/s  MV E/A:       3.92  e' lateral:   8.27 cm/s  e' medial:    6.47 cm/s  E/e' lateral: 12.58  E/e' medial:  16.07  E/e' Average: 14.11    Aorta Measurements: (normal range) (Indexed to BSA)     Sinuses of Valsalva: 4.00 cm (3.1 - 3.7 cm)  Ao Asc prox:         4.10 cm       Left Atrium Measurements: (Indexed to BSA)  LA Diam 2D:        6.40 cm  LA Vol s, MOD A4C: 88.80 ml.  LA Vol s, MOD A2C: 73.70 ml.  LA Vol s, MOD BP:  81.80 ml  38.54 ml/m²    Right Ventricle Measurements: Right Atrial Measurements:     TAPSE:           1.2 cm       RA Vol:       101.00 ml  RV Base (RVID1): 6.0 cm       RA Vol Index: 47.58 ml/m²  RV Mid (RVID2):  4.3 cm       LVOT / RVOT/ Qp/Qs Data: (Indexed to BSA)  LVOT Diameter: 2.10 cm  LVOT Vmax:     0.74 m/s  LVOT VTI:      14.20 cm  LVOT SV:       49.2 ml  23.17 ml/m²    Aortic Valve Measurements:  AV Vmax:                1.3 m/s  AV Peak Gradient:       6.7 mmHg  AV Mean Gradient:       3.0 mmHg  AV VTI:                 24.5 cm  AV VTI Ratio:           0.58  AoV EOA, Contin:        2.01 cm²  AoV EOA, Contin i:      0.95 cm²/m²  AoV Dimensionless Index 0.58    Mitral Valve Measurements:     MV E Vmax: 1.0 m/s  MV A Vmax: 0.3 m/s  MV E/A:    3.9       Tricuspid Valve Measurements:     TR Vmax:          2.7 m/s  TR Peak Gradient: 29.6 mmHg  RA Pressure:      8 mmHg  PASP:             38 mmHg    < end of copied text >

## 2024-01-13 NOTE — PROGRESS NOTE ADULT - ASSESSMENT
Mr. 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 Of note, has been feeling fairly well despite elevated MEMs which was unable to be managed with outpatient diuretics. His MEMs has been elevated since February/March 2023 when he went into afib and has had increasing V pacing. Given IV lasix 80 mg x1 with good effect and now on lasix gtt. Seen by EP and device LRL reduced to minimize V pacing. Was amio loaded for plan for DCCV on Tuesday.

## 2024-01-13 NOTE — PROGRESS NOTE ADULT - PROBLEM SELECTOR PLAN 1
- increase marcelino to 50 mg twice/day  - c/w losartan 12.5 mg twice/day  - c/w toprol 25 mg qhs  - c/w lasix gtt at 10 mg/hr

## 2024-01-14 LAB
ANION GAP SERPL CALC-SCNC: 12 MMOL/L — SIGNIFICANT CHANGE UP (ref 5–17)
ANION GAP SERPL CALC-SCNC: 12 MMOL/L — SIGNIFICANT CHANGE UP (ref 5–17)
ANION GAP SERPL CALC-SCNC: 14 MMOL/L — SIGNIFICANT CHANGE UP (ref 5–17)
ANION GAP SERPL CALC-SCNC: 14 MMOL/L — SIGNIFICANT CHANGE UP (ref 5–17)
BUN SERPL-MCNC: 38 MG/DL — HIGH (ref 7–23)
BUN SERPL-MCNC: 38 MG/DL — HIGH (ref 7–23)
BUN SERPL-MCNC: 49 MG/DL — HIGH (ref 7–23)
BUN SERPL-MCNC: 49 MG/DL — HIGH (ref 7–23)
CALCIUM SERPL-MCNC: 9 MG/DL — SIGNIFICANT CHANGE UP (ref 8.4–10.5)
CALCIUM SERPL-MCNC: 9 MG/DL — SIGNIFICANT CHANGE UP (ref 8.4–10.5)
CALCIUM SERPL-MCNC: 9.4 MG/DL — SIGNIFICANT CHANGE UP (ref 8.4–10.5)
CALCIUM SERPL-MCNC: 9.4 MG/DL — SIGNIFICANT CHANGE UP (ref 8.4–10.5)
CHLORIDE SERPL-SCNC: 103 MMOL/L — SIGNIFICANT CHANGE UP (ref 96–108)
CHLORIDE SERPL-SCNC: 103 MMOL/L — SIGNIFICANT CHANGE UP (ref 96–108)
CHLORIDE SERPL-SCNC: 99 MMOL/L — SIGNIFICANT CHANGE UP (ref 96–108)
CHLORIDE SERPL-SCNC: 99 MMOL/L — SIGNIFICANT CHANGE UP (ref 96–108)
CO2 SERPL-SCNC: 24 MMOL/L — SIGNIFICANT CHANGE UP (ref 22–31)
CO2 SERPL-SCNC: 24 MMOL/L — SIGNIFICANT CHANGE UP (ref 22–31)
CO2 SERPL-SCNC: 25 MMOL/L — SIGNIFICANT CHANGE UP (ref 22–31)
CO2 SERPL-SCNC: 25 MMOL/L — SIGNIFICANT CHANGE UP (ref 22–31)
CREAT SERPL-MCNC: 1.43 MG/DL — HIGH (ref 0.5–1.3)
CREAT SERPL-MCNC: 1.43 MG/DL — HIGH (ref 0.5–1.3)
CREAT SERPL-MCNC: 1.79 MG/DL — HIGH (ref 0.5–1.3)
CREAT SERPL-MCNC: 1.79 MG/DL — HIGH (ref 0.5–1.3)
EGFR: 39 ML/MIN/1.73M2 — LOW
EGFR: 39 ML/MIN/1.73M2 — LOW
EGFR: 51 ML/MIN/1.73M2 — LOW
EGFR: 51 ML/MIN/1.73M2 — LOW
GLUCOSE SERPL-MCNC: 127 MG/DL — HIGH (ref 70–99)
GLUCOSE SERPL-MCNC: 127 MG/DL — HIGH (ref 70–99)
GLUCOSE SERPL-MCNC: 202 MG/DL — HIGH (ref 70–99)
GLUCOSE SERPL-MCNC: 202 MG/DL — HIGH (ref 70–99)
HCT VFR BLD CALC: 44.9 % — SIGNIFICANT CHANGE UP (ref 39–50)
HCT VFR BLD CALC: 44.9 % — SIGNIFICANT CHANGE UP (ref 39–50)
HGB BLD-MCNC: 14.6 G/DL — SIGNIFICANT CHANGE UP (ref 13–17)
HGB BLD-MCNC: 14.6 G/DL — SIGNIFICANT CHANGE UP (ref 13–17)
LACTATE SERPL-SCNC: 1.9 MMOL/L — SIGNIFICANT CHANGE UP (ref 0.5–2)
LACTATE SERPL-SCNC: 1.9 MMOL/L — SIGNIFICANT CHANGE UP (ref 0.5–2)
MAGNESIUM SERPL-MCNC: 2.2 MG/DL — SIGNIFICANT CHANGE UP (ref 1.6–2.6)
MAGNESIUM SERPL-MCNC: 2.2 MG/DL — SIGNIFICANT CHANGE UP (ref 1.6–2.6)
MAGNESIUM SERPL-MCNC: 2.3 MG/DL — SIGNIFICANT CHANGE UP (ref 1.6–2.6)
MAGNESIUM SERPL-MCNC: 2.3 MG/DL — SIGNIFICANT CHANGE UP (ref 1.6–2.6)
MCHC RBC-ENTMCNC: 31.6 PG — SIGNIFICANT CHANGE UP (ref 27–34)
MCHC RBC-ENTMCNC: 31.6 PG — SIGNIFICANT CHANGE UP (ref 27–34)
MCHC RBC-ENTMCNC: 32.5 GM/DL — SIGNIFICANT CHANGE UP (ref 32–36)
MCHC RBC-ENTMCNC: 32.5 GM/DL — SIGNIFICANT CHANGE UP (ref 32–36)
MCV RBC AUTO: 97.2 FL — SIGNIFICANT CHANGE UP (ref 80–100)
MCV RBC AUTO: 97.2 FL — SIGNIFICANT CHANGE UP (ref 80–100)
NRBC # BLD: 0 /100 WBCS — SIGNIFICANT CHANGE UP (ref 0–0)
NRBC # BLD: 0 /100 WBCS — SIGNIFICANT CHANGE UP (ref 0–0)
PHOSPHATE SERPL-MCNC: 3.2 MG/DL — SIGNIFICANT CHANGE UP (ref 2.5–4.5)
PHOSPHATE SERPL-MCNC: 3.2 MG/DL — SIGNIFICANT CHANGE UP (ref 2.5–4.5)
PHOSPHATE SERPL-MCNC: 3.7 MG/DL — SIGNIFICANT CHANGE UP (ref 2.5–4.5)
PHOSPHATE SERPL-MCNC: 3.7 MG/DL — SIGNIFICANT CHANGE UP (ref 2.5–4.5)
PLATELET # BLD AUTO: 143 K/UL — LOW (ref 150–400)
PLATELET # BLD AUTO: 143 K/UL — LOW (ref 150–400)
POTASSIUM SERPL-MCNC: 3.8 MMOL/L — SIGNIFICANT CHANGE UP (ref 3.5–5.3)
POTASSIUM SERPL-MCNC: 3.8 MMOL/L — SIGNIFICANT CHANGE UP (ref 3.5–5.3)
POTASSIUM SERPL-MCNC: 4.5 MMOL/L — SIGNIFICANT CHANGE UP (ref 3.5–5.3)
POTASSIUM SERPL-MCNC: 4.5 MMOL/L — SIGNIFICANT CHANGE UP (ref 3.5–5.3)
POTASSIUM SERPL-SCNC: 3.8 MMOL/L — SIGNIFICANT CHANGE UP (ref 3.5–5.3)
POTASSIUM SERPL-SCNC: 3.8 MMOL/L — SIGNIFICANT CHANGE UP (ref 3.5–5.3)
POTASSIUM SERPL-SCNC: 4.5 MMOL/L — SIGNIFICANT CHANGE UP (ref 3.5–5.3)
POTASSIUM SERPL-SCNC: 4.5 MMOL/L — SIGNIFICANT CHANGE UP (ref 3.5–5.3)
RBC # BLD: 4.62 M/UL — SIGNIFICANT CHANGE UP (ref 4.2–5.8)
RBC # BLD: 4.62 M/UL — SIGNIFICANT CHANGE UP (ref 4.2–5.8)
RBC # FLD: 14.3 % — SIGNIFICANT CHANGE UP (ref 10.3–14.5)
RBC # FLD: 14.3 % — SIGNIFICANT CHANGE UP (ref 10.3–14.5)
SODIUM SERPL-SCNC: 137 MMOL/L — SIGNIFICANT CHANGE UP (ref 135–145)
SODIUM SERPL-SCNC: 137 MMOL/L — SIGNIFICANT CHANGE UP (ref 135–145)
SODIUM SERPL-SCNC: 140 MMOL/L — SIGNIFICANT CHANGE UP (ref 135–145)
SODIUM SERPL-SCNC: 140 MMOL/L — SIGNIFICANT CHANGE UP (ref 135–145)
WBC # BLD: 8.87 K/UL — SIGNIFICANT CHANGE UP (ref 3.8–10.5)
WBC # BLD: 8.87 K/UL — SIGNIFICANT CHANGE UP (ref 3.8–10.5)
WBC # FLD AUTO: 8.87 K/UL — SIGNIFICANT CHANGE UP (ref 3.8–10.5)
WBC # FLD AUTO: 8.87 K/UL — SIGNIFICANT CHANGE UP (ref 3.8–10.5)

## 2024-01-14 PROCEDURE — 99233 SBSQ HOSP IP/OBS HIGH 50: CPT

## 2024-01-14 RX ORDER — POLYETHYLENE GLYCOL 3350 17 G/17G
17 POWDER, FOR SOLUTION ORAL DAILY
Refills: 0 | Status: DISCONTINUED | OUTPATIENT
Start: 2024-01-14 | End: 2024-01-18

## 2024-01-14 RX ORDER — POTASSIUM CHLORIDE 20 MEQ
40 PACKET (EA) ORAL ONCE
Refills: 0 | Status: COMPLETED | OUTPATIENT
Start: 2024-01-14 | End: 2024-01-14

## 2024-01-14 RX ORDER — SPIRONOLACTONE 25 MG/1
25 TABLET, FILM COATED ORAL EVERY 12 HOURS
Refills: 0 | Status: DISCONTINUED | OUTPATIENT
Start: 2024-01-14 | End: 2024-01-18

## 2024-01-14 RX ADMIN — Medication 88 MICROGRAM(S): at 05:29

## 2024-01-14 RX ADMIN — Medication 400 MILLIGRAM(S): at 05:33

## 2024-01-14 RX ADMIN — AMIODARONE HYDROCHLORIDE 400 MILLIGRAM(S): 400 TABLET ORAL at 05:29

## 2024-01-14 RX ADMIN — Medication 5 MG/HR: at 10:18

## 2024-01-14 RX ADMIN — Medication 400 MILLIGRAM(S): at 18:16

## 2024-01-14 RX ADMIN — Medication 100 MILLIGRAM(S): at 05:46

## 2024-01-14 RX ADMIN — Medication 5 MG/HR: at 21:12

## 2024-01-14 RX ADMIN — POLYETHYLENE GLYCOL 3350 17 GRAM(S): 17 POWDER, FOR SOLUTION ORAL at 12:14

## 2024-01-14 RX ADMIN — AMIODARONE HYDROCHLORIDE 400 MILLIGRAM(S): 400 TABLET ORAL at 21:11

## 2024-01-14 RX ADMIN — Medication 100 MILLIGRAM(S): at 18:16

## 2024-01-14 RX ADMIN — Medication 40 MILLIEQUIVALENT(S): at 10:17

## 2024-01-14 RX ADMIN — SPIRONOLACTONE 25 MILLIGRAM(S): 25 TABLET, FILM COATED ORAL at 18:16

## 2024-01-14 RX ADMIN — APIXABAN 5 MILLIGRAM(S): 2.5 TABLET, FILM COATED ORAL at 05:29

## 2024-01-14 RX ADMIN — LOSARTAN POTASSIUM 12.5 MILLIGRAM(S): 100 TABLET, FILM COATED ORAL at 05:28

## 2024-01-14 RX ADMIN — SPIRONOLACTONE 50 MILLIGRAM(S): 25 TABLET, FILM COATED ORAL at 05:29

## 2024-01-14 RX ADMIN — AMIODARONE HYDROCHLORIDE 400 MILLIGRAM(S): 400 TABLET ORAL at 15:21

## 2024-01-14 RX ADMIN — ATORVASTATIN CALCIUM 40 MILLIGRAM(S): 80 TABLET, FILM COATED ORAL at 21:11

## 2024-01-14 RX ADMIN — Medication 25 MILLIGRAM(S): at 21:11

## 2024-01-14 RX ADMIN — APIXABAN 5 MILLIGRAM(S): 2.5 TABLET, FILM COATED ORAL at 18:16

## 2024-01-14 RX ADMIN — Medication 81 MILLIGRAM(S): at 10:17

## 2024-01-14 NOTE — PROGRESS NOTE ADULT - SUBJECTIVE AND OBJECTIVE BOX
Patient seen and examined at bedside.    Overnight Events:  no events overnight    Review Of Systems: No chest pain, shortness of breath, or palpitations            Current Meds:  acetaminophen     Tablet .. 650 milliGRAM(s) Oral every 6 hours PRN  aMIOdarone    Tablet 400 milliGRAM(s) Oral every 8 hours  aMIOdarone    Tablet   Oral   apixaban 5 milliGRAM(s) Oral two times a day  aspirin enteric coated 81 milliGRAM(s) Oral daily  atorvastatin 40 milliGRAM(s) Oral at bedtime  carBAMazepine ER Tablet 100 milliGRAM(s) Oral two times a day  furosemide Infusion 10 mG/Hr IV Continuous <Continuous>  levothyroxine 88 MICROGram(s) Oral daily  losartan 12.5 milliGRAM(s) Oral two times a day  melatonin 3 milliGRAM(s) Oral at bedtime PRN  metoprolol succinate ER 25 milliGRAM(s) Oral at bedtime  pregabalin 400 milliGRAM(s) Oral two times a day  spironolactone 50 milliGRAM(s) Oral every 12 hours      Vitals:  T(F): 97.9 (01-14), Max: 97.9 (01-14)  HR: 60 (01-14) (58 - 82)  BP: 86/56 (01-14) (86/56 - 113/77)  RR: 18 (01-14)  SpO2: 98% (01-14)  I&O's Summary    13 Jan 2024 07:01  -  14 Jan 2024 07:00  --------------------------------------------------------  IN: 1800 mL / OUT: 2820 mL / NET: -1020 mL    14 Jan 2024 07:01  -  14 Jan 2024 11:06  --------------------------------------------------------  IN: 0 mL / OUT: 350 mL / NET: -350 mL        Physical Exam:  Appearance: No acute distress; well appearing  Eyes: PERRL, EOMI, pink conjunctiva  HEENT: Normal oral mucosa  Cardiovascular: irregular, S1, S2, no murmurs, rubs, or gallops; no edema; no JVD  Respiratory: Clear to auscultation bilaterally  Gastrointestinal: soft, non-tender, non-distended with normal bowel sounds  Musculoskeletal: No clubbing; no joint deformity   Neurologic: Non-focal  Lymphatic: No lymphadenopathy  Psychiatry: AAOx3, mood & affect appropriate  Skin: No rashes, ecchymoses, or cyanosis                          14.6   8.87  )-----------( 143      ( 14 Jan 2024 06:16 )             44.9     01-14    140  |  103  |  38<H>  ----------------------------<  127<H>  3.8   |  25  |  1.43<H>    Ca    9.0      14 Jan 2024 06:16  Phos  3.2     01-14  Mg     2.3     01-14    TPro  7.6  /  Alb  4.9  /  TBili  1.0  /  DBili  x   /  AST  28  /  ALT  31  /  AlkPhos  149<H>  01-13      CARDIAC MARKERS ( 10 Jabari 2024 23:59 )  39 ng/L / x     / x     / x     / x     / x                  Interpretation of Telemetry: AF 50-70s

## 2024-01-14 NOTE — PROGRESS NOTE ADULT - SUBJECTIVE AND OBJECTIVE BOX
Interval History:  feels well  urinating well     Medications:  acetaminophen     Tablet .. 650 milliGRAM(s) Oral every 6 hours PRN  aMIOdarone    Tablet 400 milliGRAM(s) Oral every 8 hours  aMIOdarone    Tablet   Oral   apixaban 5 milliGRAM(s) Oral two times a day  aspirin enteric coated 81 milliGRAM(s) Oral daily  atorvastatin 40 milliGRAM(s) Oral at bedtime  carBAMazepine ER Tablet 100 milliGRAM(s) Oral two times a day  furosemide Infusion 10 mG/Hr IV Continuous <Continuous>  levothyroxine 88 MICROGram(s) Oral daily  melatonin 3 milliGRAM(s) Oral at bedtime PRN  metoprolol succinate ER 25 milliGRAM(s) Oral at bedtime  polyethylene glycol 3350 17 Gram(s) Oral daily  pregabalin 400 milliGRAM(s) Oral two times a day  spironolactone 25 milliGRAM(s) Oral every 12 hours      Vitals:  T(C): 36.6 (24 @ 13:04), Max: 36.6 (24 @ 04:54)  HR: 57 (24 @ 13:04) (57 - 82)  BP: 96/62 (24 @ 13:04) (86/56 - 113/77)  BP(mean): --  RR: 18 (24 @ 13:04) (18 - 18)  SpO2: 98% (24 @ 13:04) (95% - 98%)    Daily     Daily Weight in k (2024 06:29)        I&O's Summary    2024 07:01  -  2024 07:00  --------------------------------------------------------  IN: 1800 mL / OUT: 2820 mL / NET: -1020 mL    2024 07:01  -  2024 13:14  --------------------------------------------------------  IN: 480 mL / OUT: 700 mL / NET: -220 mL    Physical Exam:  Appearance: No Acute Distress  HEENT: PERRL  Neck: JVP approx 12 cm w/ mild HJR  Cardiovascular: Normal S1 S2, No murmurs/rubs/gallops  Respiratory: Clear to auscultation bilaterally  Gastrointestinal: Soft, Non-tender	  Skin: No cyanosis	  Neurologic: Non-focal  Extremities: No LE edema  Psychiatry: A & O x 3, Mood & affect appropriate    Labs:                        14.6   8.87  )-----------( 143      ( 2024 06:16 )             44.9     -    140  |  103  |  38<H>  ----------------------------<  127<H>  3.8   |  25  |  1.43<H>    Ca    9.0      2024 06:16  Phos  3.2       Mg     2.3         TPro  7.6  /  Alb  4.9  /  TBili  1.0  /  DBili  x   /  AST  28  /  ALT  31  /  AlkPhos  149<H>

## 2024-01-14 NOTE — PROGRESS NOTE ADULT - PROBLEM SELECTOR PLAN 1
HFrEF (EF 15-20%, LVEDD 6 cm) s/p single chamber ICD with lead revision in 2018 with upgrade to dual-chamber device (8/17/21) s/p MEMS (9/16/21)  MEMs level 40   HF following   GDMT:  BB- c/w Toprol 25mg qd  ACE/ARB/ARNI- c/w losartan BID - holding as of 1/14  MRA: c/w spironolactone BID - decreased dose on 1/14  SGLT2: resume home Farxiga on dc   on Lasix gtt per HF as of 1/12 -- monitor electrolytes closely

## 2024-01-14 NOTE — PROGRESS NOTE ADULT - SUBJECTIVE AND OBJECTIVE BOX
Sony Almanza M.D.  Division of Hospital Medicine  Available on [employer] TE[employer]    SUBJECTIVE / ACUTE INTERVAL EVENTS: Patient seen and examined with wife at bedside. No overnight events. No subjective complaints. Uptrending Cr, will hold Losartan and decrease Spironolactone.     OBJECTIVE:   Vital Signs Last 24 Hrs  T(F): 97.9 (14 Jan 2024 13:04), Max: 97.9 (14 Jan 2024 10:22)  HR: 67 (14 Jan 2024 14:59) (57 - 82)  BP: 106/73 (14 Jan 2024 14:59) (86/56 - 113/77)  RR: 18 (14 Jan 2024 13:04) (18 - 18)  SpO2: 98% (14 Jan 2024 13:04) (95% - 98%)  Parameters below as of 14 Jan 2024 13:04  Patient On (Oxygen Delivery Method): room air    I&O's Summary  13 Jan 2024 07:01  -  14 Jan 2024 07:00  --------------------------------------------------------  IN: 1800 mL / OUT: 2820 mL / NET: -1020 mL    14 Jan 2024 07:01  -  14 Jan 2024 15:41  --------------------------------------------------------  IN: 480 mL / OUT: 700 mL / NET: -220 mL    Physical Examination:  GEN: elderly man, sitting up at side of bed in NAD  PSYCH: A&Ox3, mood and affect appear appropriate   NEURO: no focal neurologic deficits appreciated  RESPI: no accessory muscle use, B/L air entry  CARDIO: regular rate/rhythm, no LE edema B/L  ABD: soft, NT, ND  EXT: patient able to move all extremities spontaneously  VASC: peripheral pulses palpated    Labs:                     14.6   8.87  )-----------( 143      ( 14 Jan 2024 06:16 )             44.9     01-14  140  |  103  |  38<H>  ----------------------------<  127<H>  3.8   |  25  |  1.43<H>    Ca    9.0      14 Jan 2024 06:16  Phos  3.2     01-14  Mg     2.3     01-14    TPro  7.6  /  Alb  4.9  /  TBili  1.0  /  DBili  x   /  AST  28  /  ALT  31  /  AlkPhos  149<H>  01-13    Urinalysis Basic - ( 14 Jan 2024 06:16 )  Color: x / Appearance: x / SG: x / pH: x  Gluc: 127 mg/dL / Ketone: x  / Bili: x / Urobili: x   Blood: x / Protein: x / Nitrite: x   Leuk Esterase: x / RBC: x / WBC x   Sq Epi: x / Non Sq Epi: x / Bacteria: x    Consultant(s) Notes Reviewed: Heart Failure, Electrophysiology  Care Discussed with Consultants/Other Providers: STALIN Berrios    MEDICATIONS  (STANDING):  aMIOdarone    Tablet 400 milliGRAM(s) Oral every 8 hours  aMIOdarone    Tablet   Oral   apixaban 5 milliGRAM(s) Oral two times a day  aspirin enteric coated 81 milliGRAM(s) Oral daily  atorvastatin 40 milliGRAM(s) Oral at bedtime  carBAMazepine ER Tablet 100 milliGRAM(s) Oral two times a day  furosemide Infusion 10 mG/Hr (5 mL/Hr) IV Continuous <Continuous>  levothyroxine 88 MICROGram(s) Oral daily  metoprolol succinate ER 25 milliGRAM(s) Oral at bedtime  polyethylene glycol 3350 17 Gram(s) Oral daily  pregabalin 400 milliGRAM(s) Oral two times a day  spironolactone 25 milliGRAM(s) Oral every 12 hours    MEDICATIONS  (PRN):  acetaminophen     Tablet .. 650 milliGRAM(s) Oral every 6 hours PRN Temp greater or equal to 38C (100.4F), Mild Pain (1 - 3)  melatonin 3 milliGRAM(s) Oral at bedtime PRN Insomnia

## 2024-01-14 NOTE — PROGRESS NOTE ADULT - PROBLEM SELECTOR PLAN 1
- reduce marcelino to 25 mg twice/day  - c/w losartan 12.5 mg twice/day  - c/w toprol 25 mg qhs  - c/w lasix gtt at 10 mg/hr  - standing weights  - labs twice/day

## 2024-01-14 NOTE — PROGRESS NOTE ADULT - ASSESSMENT
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 EP consulted for possible upgrade to CRT-D due to increased V pacing burden.     #Persistent Afib  -persistent since October 2023, paroxysmal prior with VHR 70-92bpm  -suspect high pacing burden due to LRL set at 70BPM  -ultimately would not benefit from CRT upgrade as native QRSd~126ms  -reduced LRL 50bpm, AF rates 50-80s in hospital  -amiodarone 400mg TID to 10g load, then 200mg daily (~2.4g this AM)  -outpatient monitoring of PFT/TFT/LFT/opthalmology monitoring while on amiodarone. TSH 2.41. AST/ALT 24/27.   -Continue eliquis 5mg BID  -Plan for Cardioversion on Tuesday (1/16/24). ANDREY not needed as pt has been on un-interrupted AC (pt took his own eliquis on the day of admission on 1/10/24)  -Keep pt NPO except meds MN Monday into Tuesday.  -Will need AF ablation as outpatient with Dr. Apple.      Slim Lynn MD  EP Fellow

## 2024-01-14 NOTE — PROGRESS NOTE ADULT - ASSESSMENT
Mr. 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 Of note, has been feeling fairly well despite elevated MEMs which was unable to be managed with outpatient diuretics. His MEMs has been elevated since February/March 2023 when he went into afib and has had increasing V pacing. Given IV lasix 80 mg x1 with good effect and now on lasix gtt. Seen by EP and device LRL reduced to minimize V pacing. Was amio loaded for plan for DCCV on Tuesday.     8/22/23 - C BP missing; RA 16, RV 60/20, PA 61/36/43, PCWP 22 (v 27), CO/CI 4.6/2.03 (PA sat 59%; Ao sat 91%; Hb 15)  1/11/24 - TTE - EF 15%, LVEDD 6.2 cm, severe RV dysfunction, LVOT VTI 14 cm, trace MR, TAVR (peak 6, mean 3), dilated IVC

## 2024-01-15 LAB
ANION GAP SERPL CALC-SCNC: 14 MMOL/L — SIGNIFICANT CHANGE UP (ref 5–17)
ANION GAP SERPL CALC-SCNC: 18 MMOL/L — HIGH (ref 5–17)
ANION GAP SERPL CALC-SCNC: 18 MMOL/L — HIGH (ref 5–17)
BUN SERPL-MCNC: 49 MG/DL — HIGH (ref 7–23)
CALCIUM SERPL-MCNC: 9.3 MG/DL — SIGNIFICANT CHANGE UP (ref 8.4–10.5)
CALCIUM SERPL-MCNC: 9.3 MG/DL — SIGNIFICANT CHANGE UP (ref 8.4–10.5)
CALCIUM SERPL-MCNC: 9.6 MG/DL — SIGNIFICANT CHANGE UP (ref 8.4–10.5)
CHLORIDE SERPL-SCNC: 100 MMOL/L — SIGNIFICANT CHANGE UP (ref 96–108)
CO2 SERPL-SCNC: 22 MMOL/L — SIGNIFICANT CHANGE UP (ref 22–31)
CO2 SERPL-SCNC: 22 MMOL/L — SIGNIFICANT CHANGE UP (ref 22–31)
CO2 SERPL-SCNC: 28 MMOL/L — SIGNIFICANT CHANGE UP (ref 22–31)
CREAT SERPL-MCNC: 1.54 MG/DL — HIGH (ref 0.5–1.3)
CREAT SERPL-MCNC: 1.54 MG/DL — HIGH (ref 0.5–1.3)
CREAT SERPL-MCNC: 1.58 MG/DL — HIGH (ref 0.5–1.3)
EGFR: 46 ML/MIN/1.73M2 — LOW
EGFR: 47 ML/MIN/1.73M2 — LOW
EGFR: 47 ML/MIN/1.73M2 — LOW
GLUCOSE SERPL-MCNC: 138 MG/DL — HIGH (ref 70–99)
GLUCOSE SERPL-MCNC: 138 MG/DL — HIGH (ref 70–99)
GLUCOSE SERPL-MCNC: 149 MG/DL — HIGH (ref 70–99)
HCT VFR BLD CALC: 48.3 % — SIGNIFICANT CHANGE UP (ref 39–50)
HCT VFR BLD CALC: 48.3 % — SIGNIFICANT CHANGE UP (ref 39–50)
HGB BLD-MCNC: 15.5 G/DL — SIGNIFICANT CHANGE UP (ref 13–17)
HGB BLD-MCNC: 15.5 G/DL — SIGNIFICANT CHANGE UP (ref 13–17)
MAGNESIUM SERPL-MCNC: 2.2 MG/DL — SIGNIFICANT CHANGE UP (ref 1.6–2.6)
MAGNESIUM SERPL-MCNC: 2.2 MG/DL — SIGNIFICANT CHANGE UP (ref 1.6–2.6)
MAGNESIUM SERPL-MCNC: 2.3 MG/DL — SIGNIFICANT CHANGE UP (ref 1.6–2.6)
MCHC RBC-ENTMCNC: 31 PG — SIGNIFICANT CHANGE UP (ref 27–34)
MCHC RBC-ENTMCNC: 31 PG — SIGNIFICANT CHANGE UP (ref 27–34)
MCHC RBC-ENTMCNC: 32.1 GM/DL — SIGNIFICANT CHANGE UP (ref 32–36)
MCHC RBC-ENTMCNC: 32.1 GM/DL — SIGNIFICANT CHANGE UP (ref 32–36)
MCV RBC AUTO: 96.6 FL — SIGNIFICANT CHANGE UP (ref 80–100)
MCV RBC AUTO: 96.6 FL — SIGNIFICANT CHANGE UP (ref 80–100)
NRBC # BLD: 0 /100 WBCS — SIGNIFICANT CHANGE UP (ref 0–0)
NRBC # BLD: 0 /100 WBCS — SIGNIFICANT CHANGE UP (ref 0–0)
PHOSPHATE SERPL-MCNC: 3.2 MG/DL — SIGNIFICANT CHANGE UP (ref 2.5–4.5)
PHOSPHATE SERPL-MCNC: 3.2 MG/DL — SIGNIFICANT CHANGE UP (ref 2.5–4.5)
PHOSPHATE SERPL-MCNC: 4.3 MG/DL — SIGNIFICANT CHANGE UP (ref 2.5–4.5)
PLATELET # BLD AUTO: 166 K/UL — SIGNIFICANT CHANGE UP (ref 150–400)
PLATELET # BLD AUTO: 166 K/UL — SIGNIFICANT CHANGE UP (ref 150–400)
POTASSIUM SERPL-MCNC: 3.8 MMOL/L — SIGNIFICANT CHANGE UP (ref 3.5–5.3)
POTASSIUM SERPL-MCNC: 4.1 MMOL/L — SIGNIFICANT CHANGE UP (ref 3.5–5.3)
POTASSIUM SERPL-MCNC: 4.1 MMOL/L — SIGNIFICANT CHANGE UP (ref 3.5–5.3)
POTASSIUM SERPL-SCNC: 3.8 MMOL/L — SIGNIFICANT CHANGE UP (ref 3.5–5.3)
POTASSIUM SERPL-SCNC: 4.1 MMOL/L — SIGNIFICANT CHANGE UP (ref 3.5–5.3)
POTASSIUM SERPL-SCNC: 4.1 MMOL/L — SIGNIFICANT CHANGE UP (ref 3.5–5.3)
RBC # BLD: 5 M/UL — SIGNIFICANT CHANGE UP (ref 4.2–5.8)
RBC # BLD: 5 M/UL — SIGNIFICANT CHANGE UP (ref 4.2–5.8)
RBC # FLD: 14.2 % — SIGNIFICANT CHANGE UP (ref 10.3–14.5)
RBC # FLD: 14.2 % — SIGNIFICANT CHANGE UP (ref 10.3–14.5)
SODIUM SERPL-SCNC: 140 MMOL/L — SIGNIFICANT CHANGE UP (ref 135–145)
SODIUM SERPL-SCNC: 140 MMOL/L — SIGNIFICANT CHANGE UP (ref 135–145)
SODIUM SERPL-SCNC: 142 MMOL/L — SIGNIFICANT CHANGE UP (ref 135–145)
WBC # BLD: 10.5 K/UL — SIGNIFICANT CHANGE UP (ref 3.8–10.5)
WBC # BLD: 10.5 K/UL — SIGNIFICANT CHANGE UP (ref 3.8–10.5)
WBC # FLD AUTO: 10.5 K/UL — SIGNIFICANT CHANGE UP (ref 3.8–10.5)
WBC # FLD AUTO: 10.5 K/UL — SIGNIFICANT CHANGE UP (ref 3.8–10.5)

## 2024-01-15 PROCEDURE — 99232 SBSQ HOSP IP/OBS MODERATE 35: CPT

## 2024-01-15 PROCEDURE — 99233 SBSQ HOSP IP/OBS HIGH 50: CPT

## 2024-01-15 RX ORDER — SODIUM CHLORIDE 5 G/100ML
150 INJECTION, SOLUTION INTRAVENOUS ONCE
Refills: 0 | Status: COMPLETED | OUTPATIENT
Start: 2024-01-15 | End: 2024-01-15

## 2024-01-15 RX ORDER — FUROSEMIDE 40 MG
80 TABLET ORAL ONCE
Refills: 0 | Status: COMPLETED | OUTPATIENT
Start: 2024-01-15 | End: 2024-01-15

## 2024-01-15 RX ADMIN — Medication 5 MG/HR: at 11:37

## 2024-01-15 RX ADMIN — AMIODARONE HYDROCHLORIDE 400 MILLIGRAM(S): 400 TABLET ORAL at 21:28

## 2024-01-15 RX ADMIN — Medication 7.5 MG/HR: at 13:49

## 2024-01-15 RX ADMIN — Medication 25 MILLIGRAM(S): at 21:29

## 2024-01-15 RX ADMIN — APIXABAN 5 MILLIGRAM(S): 2.5 TABLET, FILM COATED ORAL at 05:13

## 2024-01-15 RX ADMIN — AMIODARONE HYDROCHLORIDE 400 MILLIGRAM(S): 400 TABLET ORAL at 13:50

## 2024-01-15 RX ADMIN — Medication 81 MILLIGRAM(S): at 11:38

## 2024-01-15 RX ADMIN — SODIUM CHLORIDE 300 MILLILITER(S): 5 INJECTION, SOLUTION INTRAVENOUS at 13:50

## 2024-01-15 RX ADMIN — Medication 7.5 MG/HR: at 21:28

## 2024-01-15 RX ADMIN — ATORVASTATIN CALCIUM 40 MILLIGRAM(S): 80 TABLET, FILM COATED ORAL at 21:29

## 2024-01-15 RX ADMIN — SPIRONOLACTONE 25 MILLIGRAM(S): 25 TABLET, FILM COATED ORAL at 17:23

## 2024-01-15 RX ADMIN — Medication 80 MILLIGRAM(S): at 13:51

## 2024-01-15 RX ADMIN — APIXABAN 5 MILLIGRAM(S): 2.5 TABLET, FILM COATED ORAL at 17:23

## 2024-01-15 RX ADMIN — AMIODARONE HYDROCHLORIDE 400 MILLIGRAM(S): 400 TABLET ORAL at 05:13

## 2024-01-15 RX ADMIN — Medication 100 MILLIGRAM(S): at 05:13

## 2024-01-15 RX ADMIN — Medication 400 MILLIGRAM(S): at 05:11

## 2024-01-15 RX ADMIN — Medication 88 MICROGRAM(S): at 05:13

## 2024-01-15 RX ADMIN — Medication 100 MILLIGRAM(S): at 17:23

## 2024-01-15 RX ADMIN — Medication 400 MILLIGRAM(S): at 17:27

## 2024-01-15 RX ADMIN — SPIRONOLACTONE 25 MILLIGRAM(S): 25 TABLET, FILM COATED ORAL at 05:13

## 2024-01-15 NOTE — PROGRESS NOTE ADULT - SUBJECTIVE AND OBJECTIVE BOX
INCOMPLETE NOTE    Sony Almanza M.D.  Division of Hospital Medicine  Available on [employer] TEAMS    SUBJECTIVE / ACUTE INTERVAL EVENTS: Patient seen and examined. Overnight patient hypotensive, improved this AM (pt on lowered dose of Spironolactone, Losartan D/Immanuel for now, Lasix gtt continued); Cr uptrending but lactic acid yesterday WNL. No subjective complaints.     OBJECTIVE:   Vital Signs Last 24 Hrs  T(F): 97.3 (15 Jabari 2024 04:32), Max: 97.9 (14 Jan 2024 10:22)  HR: 55 (15 Jabari 2024 04:32) (55 - 67)  BP: 114/76 (15 Jabari 2024 04:32) (86/56 - 114/76)  RR: 18 (15 Jabari 2024 04:32) (18 - 18)  SpO2: 98% (15 Jabari 2024 04:32) (98% - 99%)  Parameters below as of 15 Jabari 2024 04:32  Patient On (Oxygen Delivery Method): room air    I&O's Summary  14 Jan 2024 07:01  -  15 Jabari 2024 07:00  --------------------------------------------------------  IN: 1020 mL / OUT: 2040 mL / NET: -1020 mL    Physical Examination:  GEN: elderly man, sitting up at side of bed in NAD  PSYCH: A&Ox3, mood and affect appear appropriate   NEURO: no focal neurologic deficits appreciated  RESPI: no accessory muscle use, B/L air entry  CARDIO: regular rate/rhythm, no LE edema B/L  ABD: soft, NT, ND  EXT: patient able to move all extremities spontaneously  VASC: peripheral pulses palpated    Telemetry:     Labs:                      15.5   10.50 )-----------( 166      ( 15 Jabari 2024 07:01 )             48.3     01-14  137  |  99  |  49<H>  ----------------------------<  202<H>  4.5   |  24  |  1.79<H>    Ca    9.4      14 Jan 2024 19:11  Phos  3.7     01-14  Mg     2.2     01-14    TPro  7.6  /  Alb  4.9  /  TBili  1.0  /  DBili  x   /  AST  28  /  ALT  31  /  AlkPhos  149<H>  01-13    Urinalysis Basic - ( 14 Jan 2024 19:11 )  Color: x / Appearance: x / SG: x / pH: x  Gluc: 202 mg/dL / Ketone: x  / Bili: x / Urobili: x   Blood: x / Protein: x / Nitrite: x   Leuk Esterase: x / RBC: x / WBC x   Sq Epi: x / Non Sq Epi: x / Bacteria: x    Consultant(s) Notes Reviewed: Heart Failure, Electrophysiology  Care Discussed with Consultants/Other Providers: STALIN Berrios    MEDICATIONS  (STANDING):  aMIOdarone    Tablet 400 milliGRAM(s) Oral every 8 hours  aMIOdarone    Tablet   Oral   apixaban 5 milliGRAM(s) Oral two times a day  aspirin enteric coated 81 milliGRAM(s) Oral daily  atorvastatin 40 milliGRAM(s) Oral at bedtime  carBAMazepine ER Tablet 100 milliGRAM(s) Oral two times a day  furosemide Infusion 10 mG/Hr (5 mL/Hr) IV Continuous <Continuous>  levothyroxine 88 MICROGram(s) Oral daily  metoprolol succinate ER 25 milliGRAM(s) Oral at bedtime  polyethylene glycol 3350 17 Gram(s) Oral daily  pregabalin 400 milliGRAM(s) Oral two times a day  spironolactone 25 milliGRAM(s) Oral every 12 hours    MEDICATIONS  (PRN):  acetaminophen     Tablet .. 650 milliGRAM(s) Oral every 6 hours PRN Temp greater or equal to 38C (100.4F), Mild Pain (1 - 3)  melatonin 3 milliGRAM(s) Oral at bedtime PRN Insomnia Sony Almanza M.D.  Division of Hospital Medicine  Available on [employer] TEAMS    SUBJECTIVE / ACUTE INTERVAL EVENTS: Patient seen and examined. Overnight patient hypotensive, improved this AM (pt on lowered dose of Spironolactone, Losartan D/Immanuel for now, Lasix gtt continued); Cr uptrending but lactic acid yesterday WNL. No subjective complaints.     OBJECTIVE:   Vital Signs Last 24 Hrs  T(F): 97.3 (15 Jabari 2024 04:32), Max: 97.9 (14 Jan 2024 10:22)  HR: 55 (15 Jabari 2024 04:32) (55 - 67)  BP: 114/76 (15 Jabari 2024 04:32) (86/56 - 114/76)  RR: 18 (15 Jabari 2024 04:32) (18 - 18)  SpO2: 98% (15 Jabari 2024 04:32) (98% - 99%)  Parameters below as of 15 Jabari 2024 04:32  Patient On (Oxygen Delivery Method): room air    I&O's Summary  14 Jan 2024 07:01  -  15 Jabari 2024 07:00  --------------------------------------------------------  IN: 1020 mL / OUT: 2040 mL / NET: -1020 mL    Physical Examination:  GEN: elderly man, sitting up at side of bed in NAD  PSYCH: A&Ox3, mood and affect appear appropriate   NEURO: no focal neurologic deficits appreciated  RESPI: no accessory muscle use, B/L air entry  CARDIO: regular rate/rhythm, no LE edema B/L  ABD: soft, NT, ND  EXT: patient able to move all extremities spontaneously  VASC: peripheral pulses palpated    Labs:                      15.5   10.50 )-----------( 166      ( 15 Jabari 2024 07:01 )             48.3     01-14  137  |  99  |  49<H>  ----------------------------<  202<H>  4.5   |  24  |  1.79<H>    Ca    9.4      14 Jan 2024 19:11  Phos  3.7     01-14  Mg     2.2     01-14    TPro  7.6  /  Alb  4.9  /  TBili  1.0  /  DBili  x   /  AST  28  /  ALT  31  /  AlkPhos  149<H>  01-13    Urinalysis Basic - ( 14 Jan 2024 19:11 )  Color: x / Appearance: x / SG: x / pH: x  Gluc: 202 mg/dL / Ketone: x  / Bili: x / Urobili: x   Blood: x / Protein: x / Nitrite: x   Leuk Esterase: x / RBC: x / WBC x   Sq Epi: x / Non Sq Epi: x / Bacteria: x    Consultant(s) Notes Reviewed: Heart Failure, Electrophysiology  Care Discussed with Consultants/Other Providers: STALIN Berrios    MEDICATIONS  (STANDING):  aMIOdarone    Tablet 400 milliGRAM(s) Oral every 8 hours  aMIOdarone    Tablet   Oral   apixaban 5 milliGRAM(s) Oral two times a day  aspirin enteric coated 81 milliGRAM(s) Oral daily  atorvastatin 40 milliGRAM(s) Oral at bedtime  carBAMazepine ER Tablet 100 milliGRAM(s) Oral two times a day  furosemide Infusion 10 mG/Hr (5 mL/Hr) IV Continuous <Continuous>  levothyroxine 88 MICROGram(s) Oral daily  metoprolol succinate ER 25 milliGRAM(s) Oral at bedtime  polyethylene glycol 3350 17 Gram(s) Oral daily  pregabalin 400 milliGRAM(s) Oral two times a day  spironolactone 25 milliGRAM(s) Oral every 12 hours    MEDICATIONS  (PRN):  acetaminophen     Tablet .. 650 milliGRAM(s) Oral every 6 hours PRN Temp greater or equal to 38C (100.4F), Mild Pain (1 - 3)  melatonin 3 milliGRAM(s) Oral at bedtime PRN Insomnia

## 2024-01-15 NOTE — PROGRESS NOTE ADULT - PROBLEM SELECTOR PLAN 1
HFrEF (EF 15-20%, LVEDD 6 cm) s/p single chamber ICD with lead revision in 2018 with upgrade to dual-chamber device (8/17/21) s/p MEMS (9/16/21)  GDMT:  BB- c/w Toprol 25mg qd  ACE/ARB/ARNI- c/w losartan BID - holding as of 1/14  MRA: c/w spironolactone BID - decreased dose on 1/14  SGLT2: resume home Farxiga on dc   on Lasix gtt per HF - increased dosing of gtt as of 1/15 per attending w/ additional IV dose x1  monitor fluid status, renal function, and electrolytes closely

## 2024-01-15 NOTE — PROGRESS NOTE ADULT - PROBLEM SELECTOR PLAN 1
- c/w marcelino 25 mg twice/day  - c/w losartan 12.5 mg twice/day  - c/w toprol 25 mg qhs  - on lasix gtt at 10 mg/hr; give bolus of 80 nad increase to 15/hr  - give 3%saline x 1  - standing weights  - labs twice/day

## 2024-01-15 NOTE — ASSESSMENT
[FreeTextEntry1] : prior acute kidney injury likely related to metolazone  there is improvement from creatinine over 2 to baseline  There has been persistent atrial fibrillation with a rise in his CardioMEMS with increased ventricular pacing Follow-up amyloid SPECT scan.   Restart cardiac rehab.   f/u HF and EP.   Role for repeat CPET? HF follow-up of CardioMEMS  monitor daily weghts.   Continue angiotensin receptor blocker, SGLT2 inhibitor, MRA, beta-blocker  Continue anticoagulation.   h/o Tremors baseline low BP recent hf s/p IV diuresis  persistent AF AF ablation cancelled off tikosyn  AF follow-up device check cont AC, monitor CBC  s/p ICD VT storm. Multiple ICD shocks. Monitor electrolytes. Follow-up with EP  [facility] dilated ischemic cardiomyopathy HFrEF CRI s/p dual chamber PPM not CRT increased AV delay to minimize RV pacing s/p valve in valve TAVR 8/20 s/p mems, follows w/ heart failure Dr. Quiñones specialist baseline low blood pressure continue ARB beta-blocker SGLT2i diuretic and aldosterone antagonist intolerant of Entresto SBE ppx prn Monitor heart rate blood pressure and weights follow-up echo monitor prosthetic valve  Coronary artery disease, prior MI antiplatelet and lipid-lowering  continue beta-blocker therapy  followup with neuro also consider GLP agonist for glucose control weight loss and cardiovascular risk reduction. He remains at high risk of major adverse cardiovascular event.  Reviewed red flag symptoms which would warrant emergent medical evaluation. Any questions and concerns were addressed and resolved.

## 2024-01-15 NOTE — PROGRESS NOTE ADULT - PROBLEM SELECTOR PLAN 2
Also w/ hx of a-flutter and VT storm  -Telemetry  -Cont. Eliquis BID  -Cont. Toprol  -EP consulted to interrogate device and evaluate for BiV upgrade vs. afib ablation - they recommend to start amiodarone load with 400mg TID to 10G load and then 200mg QD thereafter (received 1.2G as of 6am today) and will plan for Cardioversion on Tuesday (1/16/24). ANDREY not needed as pt has been on un-interrupted AC (pt took his own eliquis on the day of admission on 1/10/24)  -Keep pt NPO except meds MN tonight, 1/15  -Will need AF ablation as outpatient with Dr. Apple.  -Maintain K>4.0 and Mg>2.0

## 2024-01-15 NOTE — PROGRESS NOTE ADULT - SUBJECTIVE AND OBJECTIVE BOX
Interval History:  feels well  had BM  urinating but weight unchanged    Medications:  acetaminophen     Tablet .. 650 milliGRAM(s) Oral every 6 hours PRN  aMIOdarone    Tablet 400 milliGRAM(s) Oral every 8 hours  aMIOdarone    Tablet   Oral   apixaban 5 milliGRAM(s) Oral two times a day  aspirin enteric coated 81 milliGRAM(s) Oral daily  atorvastatin 40 milliGRAM(s) Oral at bedtime  carBAMazepine ER Tablet 100 milliGRAM(s) Oral two times a day  furosemide Infusion 15 mG/Hr IV Continuous <Continuous>  levothyroxine 88 MICROGram(s) Oral daily  melatonin 3 milliGRAM(s) Oral at bedtime PRN  metoprolol succinate ER 25 milliGRAM(s) Oral at bedtime  polyethylene glycol 3350 17 Gram(s) Oral daily  pregabalin 400 milliGRAM(s) Oral two times a day  spironolactone 25 milliGRAM(s) Oral every 12 hours      Vitals:  T(C): 36.4 (01-15-24 @ 12:11), Max: 36.4 (01-15-24 @ 12:11)  HR: 61 (01-15-24 @ 12:11) (55 - 62)  BP: 123/86 (01-15-24 @ 12:11) (98/65 - 123/86)  BP(mean): --  RR: 18 (01-15-24 @ 12:11) (18 - 18)  SpO2: 97% (01-15-24 @ 12:11) (97% - 99%)    Daily     Daily Weight in k.5 (15 Jabari 2024 04:32)        I&O's Summary    2024 07:  -  15 Jabari 2024 07:00  --------------------------------------------------------  IN: 1020 mL / OUT: 2040 mL / NET: -1020 mL    15 Jabari 2024 07:01  -  15 Jabari 2024 15:35  --------------------------------------------------------  IN: 480 mL / OUT: 1750 mL / NET: -1270 mL    Physical Exam:  Appearance: No Acute Distress  HEENT: PERRL  Neck: JVD elevated  Cardiovascular: Normal S1 S2, No murmurs/rubs/gallops  Respiratory: Clear to auscultation bilaterally  Gastrointestinal: Soft, Non-tender	  Skin: No cyanosis	  Neurologic: Non-focal  Extremities: trace LE edema  Psychiatry: A & O x 3, Mood & affect appropriate    Labs:                        15.5   10.50 )-----------( 166      ( 15 Jabari 2024 07:01 )             48.3     01-15    140  |  100  |  49<H>  ----------------------------<  138<H>  4.1   |  22  |  1.54<H>    Ca    9.3      15 Jabari 2024 07:47  Phos  3.2     01-15  Mg     2.2     01-15    TPro  7.6  /  Alb  4.9  /  TBili  1.0  /  DBili  x   /  AST  28  /  ALT  31  /  AlkPhos  149<H>

## 2024-01-15 NOTE — HISTORY OF PRESENT ILLNESS
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 for 6 months due to worsening SOB and fatigue Underwent angiogram with Dr. Nino which showed 10% stenosis at the site of prior stent Severe [facility] valve TAVR 8/20. Of note, incidentally found to have a new RLL nodule on cardiac CT, had thoracic f/u and PET/CT  p/w VT storm He reports having gastroenteritis. Dyspepsia, nausea, loose bowel movements. Felt wiped out. ICD discharges started at home. Total of 26 shocks. Cardiac catheterization demonstrated low filling pressures Echocardiogram demonstrated severe left ventricular dysfunction Loaded with amiodarone. Had balloon pump and [place]-Jian catheter. Received IV fluids.  Presented with MATHEWS increasing AF. Inpatient TIkosyn load.  Previous intolerance to Entresto. Symptoms of dizziness and hypotension on medication prior tremors with amio Previously given extra doses of torsemide for elevated mems readings  He remains physically active. He enjoys spending time with his grandchildren.  There is easy bruising CardioMEMS followed by advanced heart failure clinic. has adjustment of his diuretics with heart failure team. There has been use of metolazone PRN.  Denies exertional chest pain or discomfort. Denies orthopnea, weight gain, or LE edema. Denies any unusual bleeding  Seen 10/10/23 for concerns about ongoing dizziness and tremors.  He does see neurology re trigeminal neuralgia and is tapering dose of lyrica. labs 9/11/23 K 3.2, creat 1.54 Near persistent rate controlled afib since 9/28/23. PAD significantly above goal. BNP >3200, previously 1600 in August.  [place] [facility] [place] until 10/13/23 for IV diuresis and close lab/BP monitoring. On discharge K 3.5, creat 1.1, BNP 2957.  On 10/17/23 in followup his PAD is 37 mmHg.  Remains in rate controlled persistent AF.  Echo 10/24/23 akinetic apex, EF 20-25% TAVR well seated no AI, mild PH   Now Toprol takes 25mg in PM.  intermittent rise in PAD managed by HF  planned for AF ablation on 11/16/23 with Dr. Apple [facility] [place] Device shows persistent AF controlled rates.  Saw neuro re tremors  His ablation procedure was canceled because of worsening heart failure.  He was directly admitted to the hospital.   Tikosyn was held.   There was associated renal dysfunction.  There was rapid clinical improvement.  Last BNP 2183 which is reduced from prior creatinine 1.5 hemoglobin 14.4  last saw Dr. Quiñones about 2 weeks ago Saw Dr. PEREZ after discharge.  He takes torsemide 40 mg a day.  At times he takes it twice daily based on his mems. previously he was given higher doses of loop diuretics and metolazone.   He also has subcutaneous furosemide available  his weight has been steady. back pain.   Overall he reports feeling okay.  He recently traveled to [place].  His wife reports he is his usual self.  EKG demonstrates V paced with a PVC.  last echocardiogram has severe LV dysfunction Nuclear stress test 1/6/22 large anterior, apical , inferior, inferolateral fixed defects s/o infarct, TID 1.05, EF 19% PYP study was equivocal for cardiac amyloid

## 2024-01-16 LAB
ANION GAP SERPL CALC-SCNC: 13 MMOL/L — SIGNIFICANT CHANGE UP (ref 5–17)
ANION GAP SERPL CALC-SCNC: 14 MMOL/L — SIGNIFICANT CHANGE UP (ref 5–17)
ANION GAP SERPL CALC-SCNC: 14 MMOL/L — SIGNIFICANT CHANGE UP (ref 5–17)
BUN SERPL-MCNC: 43 MG/DL — HIGH (ref 7–23)
BUN SERPL-MCNC: 44 MG/DL — HIGH (ref 7–23)
BUN SERPL-MCNC: 47 MG/DL — HIGH (ref 7–23)
CALCIUM SERPL-MCNC: 9.2 MG/DL — SIGNIFICANT CHANGE UP (ref 8.4–10.5)
CALCIUM SERPL-MCNC: 9.2 MG/DL — SIGNIFICANT CHANGE UP (ref 8.4–10.5)
CALCIUM SERPL-MCNC: 9.3 MG/DL — SIGNIFICANT CHANGE UP (ref 8.4–10.5)
CHLORIDE SERPL-SCNC: 100 MMOL/L — SIGNIFICANT CHANGE UP (ref 96–108)
CHLORIDE SERPL-SCNC: 100 MMOL/L — SIGNIFICANT CHANGE UP (ref 96–108)
CHLORIDE SERPL-SCNC: 99 MMOL/L — SIGNIFICANT CHANGE UP (ref 96–108)
CO2 SERPL-SCNC: 28 MMOL/L — SIGNIFICANT CHANGE UP (ref 22–31)
CO2 SERPL-SCNC: 28 MMOL/L — SIGNIFICANT CHANGE UP (ref 22–31)
CO2 SERPL-SCNC: 30 MMOL/L — SIGNIFICANT CHANGE UP (ref 22–31)
CREAT SERPL-MCNC: 1.4 MG/DL — HIGH (ref 0.5–1.3)
CREAT SERPL-MCNC: 1.48 MG/DL — HIGH (ref 0.5–1.3)
CREAT SERPL-MCNC: 1.58 MG/DL — HIGH (ref 0.5–1.3)
EGFR: 46 ML/MIN/1.73M2 — LOW
EGFR: 49 ML/MIN/1.73M2 — LOW
EGFR: 53 ML/MIN/1.73M2 — LOW
GLUCOSE SERPL-MCNC: 127 MG/DL — HIGH (ref 70–99)
GLUCOSE SERPL-MCNC: 140 MG/DL — HIGH (ref 70–99)
GLUCOSE SERPL-MCNC: 172 MG/DL — HIGH (ref 70–99)
MAGNESIUM SERPL-MCNC: 2.3 MG/DL — SIGNIFICANT CHANGE UP (ref 1.6–2.6)
MAGNESIUM SERPL-MCNC: 2.3 MG/DL — SIGNIFICANT CHANGE UP (ref 1.6–2.6)
PHOSPHATE SERPL-MCNC: 3 MG/DL — SIGNIFICANT CHANGE UP (ref 2.5–4.5)
PHOSPHATE SERPL-MCNC: 3.6 MG/DL — SIGNIFICANT CHANGE UP (ref 2.5–4.5)
POTASSIUM SERPL-MCNC: 3.2 MMOL/L — LOW (ref 3.5–5.3)
POTASSIUM SERPL-MCNC: 3.3 MMOL/L — LOW (ref 3.5–5.3)
POTASSIUM SERPL-MCNC: 3.9 MMOL/L — SIGNIFICANT CHANGE UP (ref 3.5–5.3)
POTASSIUM SERPL-SCNC: 3.2 MMOL/L — LOW (ref 3.5–5.3)
POTASSIUM SERPL-SCNC: 3.3 MMOL/L — LOW (ref 3.5–5.3)
POTASSIUM SERPL-SCNC: 3.9 MMOL/L — SIGNIFICANT CHANGE UP (ref 3.5–5.3)
SODIUM SERPL-SCNC: 141 MMOL/L — SIGNIFICANT CHANGE UP (ref 135–145)
SODIUM SERPL-SCNC: 142 MMOL/L — SIGNIFICANT CHANGE UP (ref 135–145)
SODIUM SERPL-SCNC: 143 MMOL/L — SIGNIFICANT CHANGE UP (ref 135–145)

## 2024-01-16 PROCEDURE — 99233 SBSQ HOSP IP/OBS HIGH 50: CPT

## 2024-01-16 RX ORDER — APIXABAN 2.5 MG/1
1 TABLET, FILM COATED ORAL
Qty: 0 | Refills: 0 | DISCHARGE

## 2024-01-16 RX ORDER — POTASSIUM CHLORIDE 20 MEQ
40 PACKET (EA) ORAL ONCE
Refills: 0 | Status: COMPLETED | OUTPATIENT
Start: 2024-01-16 | End: 2024-01-16

## 2024-01-16 RX ORDER — LOSARTAN POTASSIUM 100 MG/1
12.5 TABLET, FILM COATED ORAL DAILY
Refills: 0 | Status: DISCONTINUED | OUTPATIENT
Start: 2024-01-16 | End: 2024-01-18

## 2024-01-16 RX ORDER — POTASSIUM CHLORIDE 20 MEQ
10 PACKET (EA) ORAL
Refills: 0 | Status: COMPLETED | OUTPATIENT
Start: 2024-01-16 | End: 2024-01-16

## 2024-01-16 RX ADMIN — Medication 100 MILLIEQUIVALENT(S): at 13:00

## 2024-01-16 RX ADMIN — Medication 40 MILLIEQUIVALENT(S): at 08:49

## 2024-01-16 RX ADMIN — ATORVASTATIN CALCIUM 40 MILLIGRAM(S): 80 TABLET, FILM COATED ORAL at 21:30

## 2024-01-16 RX ADMIN — Medication 40 MILLIEQUIVALENT(S): at 20:23

## 2024-01-16 RX ADMIN — AMIODARONE HYDROCHLORIDE 400 MILLIGRAM(S): 400 TABLET ORAL at 21:29

## 2024-01-16 RX ADMIN — Medication 25 MILLIGRAM(S): at 21:30

## 2024-01-16 RX ADMIN — Medication 100 MILLIEQUIVALENT(S): at 11:22

## 2024-01-16 RX ADMIN — LOSARTAN POTASSIUM 12.5 MILLIGRAM(S): 100 TABLET, FILM COATED ORAL at 17:30

## 2024-01-16 RX ADMIN — SPIRONOLACTONE 25 MILLIGRAM(S): 25 TABLET, FILM COATED ORAL at 08:39

## 2024-01-16 RX ADMIN — Medication 88 MICROGRAM(S): at 05:38

## 2024-01-16 RX ADMIN — Medication 81 MILLIGRAM(S): at 11:22

## 2024-01-16 RX ADMIN — Medication 100 MILLIGRAM(S): at 05:38

## 2024-01-16 RX ADMIN — Medication 400 MILLIGRAM(S): at 17:08

## 2024-01-16 RX ADMIN — APIXABAN 5 MILLIGRAM(S): 2.5 TABLET, FILM COATED ORAL at 05:38

## 2024-01-16 RX ADMIN — Medication 100 MILLIEQUIVALENT(S): at 13:04

## 2024-01-16 RX ADMIN — Medication 7.5 MG/HR: at 20:23

## 2024-01-16 RX ADMIN — AMIODARONE HYDROCHLORIDE 400 MILLIGRAM(S): 400 TABLET ORAL at 13:33

## 2024-01-16 RX ADMIN — AMIODARONE HYDROCHLORIDE 400 MILLIGRAM(S): 400 TABLET ORAL at 05:38

## 2024-01-16 RX ADMIN — SPIRONOLACTONE 25 MILLIGRAM(S): 25 TABLET, FILM COATED ORAL at 17:10

## 2024-01-16 RX ADMIN — APIXABAN 5 MILLIGRAM(S): 2.5 TABLET, FILM COATED ORAL at 17:06

## 2024-01-16 RX ADMIN — Medication 7.5 MG/HR: at 08:39

## 2024-01-16 NOTE — PROGRESS NOTE ADULT - SUBJECTIVE AND OBJECTIVE BOX
the right femoral vein was succesfully accessed using a Sheath Intrd Hemo Sl1 8.5f60cm.  Snoy Almanza M.D.  Division of Hospital Medicine  Available on [employer] TEAMS    SUBJECTIVE / ACUTE INTERVAL EVENTS: Patient seen and examined. No overnight events. No subjective complaints. Repleting electrolytes PRN and continuing to closely monitor; per EP to D/C Carbamazepine for now as it interacts with Eliquis and patient needs to be fully anticoagulated prior to DCCV - now postponed to 1/17.     OBJECTIVE:   Vital Signs Last 24 Hrs  T(F): 97.3 (16 Jan 2024 12:21), Max: 97.5 (15 Jabari 2024 20:31)  HR: 52 (16 Jan 2024 12:21) (52 - 74)  BP: 112/77 (16 Jan 2024 12:21) (100/63 - 112/77)  RR: 18 (16 Jan 2024 12:21) (18 - 18)  SpO2: 98% (16 Jan 2024 12:21) (95% - 98%)  Parameters below as of 16 Jan 2024 12:21  Patient On (Oxygen Delivery Method): room air    I&O's Summary  15 Jabari 2024 07:01  -  16 Jan 2024 07:00  --------------------------------------------------------  IN: 1152 mL / OUT: 3151 mL / NET: -1999 mL    16 Jan 2024 07:01  -  16 Jan 2024 12:40  --------------------------------------------------------  IN: 0 mL / OUT: 350 mL / NET: -350 mL    Physical Examination:  GEN: elderly man, standing by bed in NAD  PSYCH: A&Ox3, mood and affect appear appropriate   NEURO: no focal neurologic deficits appreciated  RESPI: no accessory muscle use, B/L air entry  CARDIO: regular rate/rhythm, no LE edema B/L  ABD: soft, NT, ND  EXT: patient able to move all extremities spontaneously  VASC: peripheral pulses palpated    Labs:                      15.5   10.50 )-----------( 166      ( 15 Jabari 2024 07:01 )             48.3     01-16  143  |  99  |  43<H>  ----------------------------<  127<H>  3.2<L>   |  30  |  1.40<H>    Ca    9.3      16 Jan 2024 10:13  Phos  3.6     01-16  Mg     2.3     01-16    Urinalysis Basic - ( 16 Jan 2024 10:13 )  Color: x / Appearance: x / SG: x / pH: x  Gluc: 127 mg/dL / Ketone: x  / Bili: x / Urobili: x   Blood: x / Protein: x / Nitrite: x   Leuk Esterase: x / RBC: x / WBC x   Sq Epi: x / Non Sq Epi: x / Bacteria: x    Consultant(s) Notes Reviewed: EP  Care Discussed with Consultants/Other Providers: STALIN Berrios    MEDICATIONS  (STANDING):  aMIOdarone    Tablet 400 milliGRAM(s) Oral every 8 hours  aMIOdarone    Tablet   Oral   apixaban 5 milliGRAM(s) Oral two times a day  aspirin enteric coated 81 milliGRAM(s) Oral daily  atorvastatin 40 milliGRAM(s) Oral at bedtime  carBAMazepine ER Tablet 100 milliGRAM(s) Oral two times a day  furosemide Infusion 15 mG/Hr (7.5 mL/Hr) IV Continuous <Continuous>  levothyroxine 88 MICROGram(s) Oral daily  metoprolol succinate ER 25 milliGRAM(s) Oral at bedtime  polyethylene glycol 3350 17 Gram(s) Oral daily  potassium chloride  10 mEq/100 mL IVPB 10 milliEquivalent(s) IV Intermittent every 1 hour  pregabalin 400 milliGRAM(s) Oral two times a day  spironolactone 25 milliGRAM(s) Oral every 12 hours    MEDICATIONS  (PRN):  acetaminophen     Tablet .. 650 milliGRAM(s) Oral every 6 hours PRN Temp greater or equal to 38C (100.4F), Mild Pain (1 - 3)  melatonin 3 milliGRAM(s) Oral at bedtime PRN Insomnia Sony Almanza M.D.  Division of Hospital Medicine  Available on [employer] TEAMS    SUBJECTIVE / ACUTE INTERVAL EVENTS: Patient seen and examined. No overnight events. No subjective complaints. Repleting electrolytes PRN and continuing to closely monitor; per EP to D/C Carbamazepine for now as it interacts with Eliquis and patient needs to be fully anticoagulated prior to DCCV - now postponed to 1/17.     OBJECTIVE:   Vital Signs Last 24 Hrs  T(F): 97.3 (16 Jan 2024 12:21), Max: 97.5 (15 Jabari 2024 20:31)  HR: 52 (16 Jan 2024 12:21) (52 - 74)  BP: 112/77 (16 Jan 2024 12:21) (100/63 - 112/77)  RR: 18 (16 Jan 2024 12:21) (18 - 18)  SpO2: 98% (16 Jan 2024 12:21) (95% - 98%)  Parameters below as of 16 Jan 2024 12:21  Patient On (Oxygen Delivery Method): room air    I&O's Summary  15 Jabari 2024 07:01  -  16 Jan 2024 07:00  --------------------------------------------------------  IN: 1152 mL / OUT: 3151 mL / NET: -1999 mL    16 Jan 2024 07:01  -  16 Jan 2024 12:40  --------------------------------------------------------  IN: 0 mL / OUT: 350 mL / NET: -350 mL    Physical Examination:  GEN: elderly man, standing by bed in NAD  PSYCH: A&Ox3, mood and affect appear appropriate   NEURO: no focal neurologic deficits appreciated  RESPI: no accessory muscle use, B/L air entry  CARDIO: regular rate/rhythm, no LE edema B/L  ABD: soft, NT, ND  EXT: patient able to move all extremities spontaneously  VASC: peripheral pulses palpated    Labs:                      15.5   10.50 )-----------( 166      ( 15 Jabari 2024 07:01 )             48.3     01-16  143  |  99  |  43<H>  ----------------------------<  127<H>  3.2<L>   |  30  |  1.40<H>    Ca    9.3      16 Jan 2024 10:13  Phos  3.6     01-16  Mg     2.3     01-16    Urinalysis Basic - ( 16 Jan 2024 10:13 )  Color: x / Appearance: x / SG: x / pH: x  Gluc: 127 mg/dL / Ketone: x  / Bili: x / Urobili: x   Blood: x / Protein: x / Nitrite: x   Leuk Esterase: x / RBC: x / WBC x   Sq Epi: x / Non Sq Epi: x / Bacteria: x    Consultant(s) Notes Reviewed: EP  Care Discussed with Consultants/Other Providers: STALIN Berrios    MEDICATIONS  (STANDING):  aMIOdarone    Tablet 400 milliGRAM(s) Oral every 8 hours  aMIOdarone    Tablet   Oral   apixaban 5 milliGRAM(s) Oral two times a day  aspirin enteric coated 81 milliGRAM(s) Oral daily  atorvastatin 40 milliGRAM(s) Oral at bedtime  carBAMazepine ER Tablet 100 milliGRAM(s) Oral two times a day  furosemide Infusion 15 mG/Hr (7.5 mL/Hr) IV Continuous <Continuous>  levothyroxine 88 MICROGram(s) Oral daily  metoprolol succinate ER 25 milliGRAM(s) Oral at bedtime  polyethylene glycol 3350 17 Gram(s) Oral daily  potassium chloride  10 mEq/100 mL IVPB 10 milliEquivalent(s) IV Intermittent every 1 hour  pregabalin 400 milliGRAM(s) Oral two times a day  spironolactone 25 milliGRAM(s) Oral every 12 hours    MEDICATIONS  (PRN):  acetaminophen     Tablet .. 650 milliGRAM(s) Oral every 6 hours PRN Temp greater or equal to 38C (100.4F), Mild Pain (1 - 3)  melatonin 3 milliGRAM(s) Oral at bedtime PRN Insomnia

## 2024-01-16 NOTE — PROGRESS NOTE ADULT - PROBLEM SELECTOR PLAN 1
- c/w marcelino 25 mg twice/day  - start losartan 12.5 mg daily, hold for SBP < 90  - c/w toprol 25 mg qhs  - continue lasix gtt at 15/hr  - s/p 3%saline x 1 on 1/15  - standing weights  - will check cardiomems intermittently  - labs twice/day. Maintain K 4-5, Mg > 2  - Tc-Pyp scan to evaluate for cardiac amyloidosis

## 2024-01-16 NOTE — PROGRESS NOTE ADULT - PROBLEM SELECTOR PLAN 2
Also w/ hx of a-flutter and VT storm  -Telemetry  -Cont. Eliquis BID - holding home Carbamazepine for now as of 1/16 per EP as it interferes w/ Eliquis and pt needs to be fully anticoagulated for DCCV to be postponed to 1/17  -Cont. Toprol  -EP consulted to interrogate device and evaluate for BiV upgrade vs. afib ablation - they recommend to start amiodarone load with 400mg TID to 10G load and then 200mg QD thereafter (received 1.2G as of 6am today) and will plan for Cardioversion on Tuesday (1/16/24). ANDREY not needed as pt has been on un-interrupted AC (pt took his own eliquis on the day of admission on 1/10/24)  -Keep pt NPO except meds MN tonight, 1/16  -Will need AF ablation as outpatient with Dr. Apple.  -Maintain K>4.0 and Mg>2.0

## 2024-01-16 NOTE — PROGRESS NOTE ADULT - ASSESSMENT
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 EP consulted for possible upgrade to CRT-D due to increased V pacing burden.     1. Persistent AF  2. ICM s/p MDT ICD    - Persistent AF since 10/2023  - NPO for DCCV today  - Continue PO Amiodarone load 400 mg Q8H to 10 mg load, then 200 mg daily - received 4.8 gm as of this AM  - Will need outpatient monitoring of PFT/TFT/LFT/opthalmology monitoring while on Amiodarone  - Continue Eliquis 5mg Q12H (Age<80, Weight>60 kg) - has been on uninterrupted AC (pt took his own eliquis on the day of admission on 1/10/24)  - Suspect high pacing burden due to LRL set at 70 BPM - now lowered to 50 bpm  - If pacing burden decreases in SR would not benefit from CRT upgrade as native QRSd~126ms  - Will need AF ablation as outpatient with Dr. Apple  - Close telemetry monitoring  - Maintain K>4.0 and Mg>2.0 73 year old male with PMH of CAD s/p late-presenting MI 2005 s/p PCI LAD, ICM/HFrEF (EF 15-20%, LVEDD 6 cm) s/p single chamber ICD with Estevan lead 2011 s/p lead extraction and upgrade to Medtronic dual-chamber ICD (8/17/21), has hx of appropriate ICD shock for VT/VF, s/p MEMS (9/16/21), bioprosthetic AVR (2015) 2/2 AI s/p 26 mm EVOLUT HECTOR for severe bioprosthetic AS (8/20/20), prior aflutter ablation 7/2017, persistent afib, s/p DCCV x2 (8/12/20, 7/7/21), Trigeminal neuralgia (4/2022) presented as direct admit due to persistently elevated filling pressure (PAD 40) on cardiomems. EP consulted for possible upgrade to CRT-D due to increased V pacing burden.     1. Persistent AF  2. ICM s/p MDT ICD    - Persistent AF since 10/2023  - NPO for DCCV today  - Remains on Lasix gtt - not on oxygen and able to lie flat  - Continue PO Amiodarone load 400 mg Q8H to 10 mg load, then 200 mg daily - received 4.8 gm as of this AM  - Will need outpatient monitoring of PFT/TFT/LFT/opthalmology monitoring while on Amiodarone  - Continue Eliquis 5mg Q12H (Age<80, Weight>60 kg) - has been on uninterrupted AC (pt took his own eliquis on the day of admission on 1/10/24)  - Suspect high pacing burden due to LRL set at 70 BPM - now lowered to 50 bpm  - If pacing burden decreases in SR would not benefit from CRT upgrade as native QRSd~126ms  - Will need AF ablation as outpatient with Dr. Apple  - Close telemetry monitoring  - Maintain K>4.0 and Mg>2.0 73 year old male with PMH of CAD s/p late-presenting MI 2005 s/p PCI LAD, ICM/HFrEF (EF 15-20%, LVEDD 6 cm) s/p single chamber ICD with Estevan lead 2011 s/p lead extraction and upgrade to Medtronic dual-chamber ICD (8/17/21), has hx of appropriate ICD shock for VT/VF, s/p MEMS (9/16/21), bioprosthetic AVR (2015) 2/2 AI s/p 26 mm EVOLUT HECTOR for severe bioprosthetic AS (8/20/20), prior aflutter ablation 7/2017, persistent afib, s/p DCCV x2 (8/12/20, 7/7/21), Trigeminal neuralgia (4/2022) presented as direct admit due to persistently elevated filling pressure (PAD 40) on cardiomems. EP consulted for possible upgrade to CRT-D due to increased V pacing burden.     1. Persistent AF  2. ICM s/p MDT ICD    - Persistent AF since 10/2023  - Patient on Carbamazepine, which can lower serum Eliquis levels - plan for ANDREY prior to DCCV  - NPO after MN for ANDREY/DCCV tomorrow  - Remains on Lasix gtt - not on oxygen and able to lie flat  - Continue PO Amiodarone load 400 mg Q8H to 10 mg load, then 200 mg daily - received 4.8 gm as of this AM  - Will need outpatient monitoring of PFT/TFT/LFT/opthalmology monitoring while on Amiodarone  - Continue Eliquis 5mg Q12H (Age<80, Weight>60 kg) - has been on uninterrupted AC (pt took his own Eliquis on the day of admission on 1/10/24)  - Suspect high pacing burden due to LRL set at 70 BPM - now lowered to 50 bpm  - If pacing burden decreases in SR would not benefit from CRT upgrade as native QRSd~126ms  - Will need AF ablation as outpatient with Dr. Apple  - Close telemetry monitoring  - Maintain K>4.0 and Mg>2.0  - Discussed with team

## 2024-01-16 NOTE — PHARMACOTHERAPY INTERVENTION NOTE - COMMENTS
Confirmed home medications with patient and [facility] pharmacy, updated in Outpatient Medication Review.     Home Medications:  aspirin 81 mg oral delayed release capsule: 1 cap(s) orally once a day   atorvastatin 40 mg oral tablet: 1 tab(s) orally once a day (at bedtime)  Eliquis 5 mg oral tablet: 1 tab(s) orally 2 times a day   Farxiga 10 mg oral tablet: 1 orally once a day  levothyroxine 88 mcg (0.088 mg) oral capsule: 1 orally once a day   losartan 25 mg oral tablet: 0.5 tab(s) orally 2 times a day   Lyrica 200 mg oral capsule: 2 cap(s) orally 2 times a day  metoprolol succinate 25 mg oral capsule, extended release: 1 orally once a day (at bedtime)  Tegretol  mg oral tablet, extended release: 1 orally 2 times a day   torsemide 20 mg oral tablet: 2 tab(s) orally once a day     Alma Gupta, PharmD, BCPS  Clinical Pharmacy Specialist  [phone] or Teams    Confirmed home medications with patient and [facility] pharmacy, updated in Outpatient Medication Review.     Home Medications:  aspirin 81 mg oral delayed release capsule: 1 cap(s) orally once a day   atorvastatin 40 mg oral tablet: 1 tab(s) orally once a day (at bedtime)  Eliquis 5 mg oral tablet: 1 tab(s) orally 2 times a day   Farxiga 10 mg oral tablet: 1 orally once a day  levothyroxine 88 mcg (0.088 mg) oral capsule: 1 orally once a day   losartan 25 mg oral tablet: 0.5 tab(s) orally 2 times a day   Lyrica 200 mg oral capsule: 2 cap(s) orally 2 times a day  metoprolol succinate 25 mg oral capsule, extended release: 1 orally once a day (at bedtime)  Tegretol  mg oral tablet, extended release: 1 orally 2 times a day   torsemide 20 mg oral tablet: 2 tab(s) orally once a day     Alma Gupta, PharmD, BCPS  Clinical Pharmacy Specialist  [phone] or Teams

## 2024-01-16 NOTE — PROGRESS NOTE ADULT - ASSESSMENT
Mr. 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 Of note, has been feeling fairly well despite elevated MEMs which was unable to be managed with outpatient diuretics. His MEMs has been elevated since February/March 2023 when he went into afib and has had increasing V pacing. Seen by EP and device LRL reduced to minimize V pacing. He's diuresing on a lasix gtt with 3lb weight loss over the past 24 hours and 3.2L UOP. His cardiomems remains elevated with PAD 35-37 today, with goal 24-25. Was amio loaded with plan for ANDREY/DCCV 1/17.     8/22/23 - C BP missing; RA 16, RV 60/20, PA 61/36/43, PCWP 22 (v 27), CO/CI 4.6/2.03 (PA sat 59%; Ao sat 91%; Hb 15)  1/11/24 - TTE - EF 15%, LVEDD 6.2 cm, severe RV dysfunction, LVOT VTI 14 cm, trace MR, TAVR (peak 6, mean 3), dilated IVC    Mems  1/16 PAD 35, 37  1/10 38  1/9 38  last at goal mid November

## 2024-01-16 NOTE — PROGRESS NOTE ADULT - SUBJECTIVE AND OBJECTIVE BOX
ADVANCED HEART FAILURE & TRANSPLANT  - PROGRESS NOTE  *To reach the NS2 Team from 8am to 5pm, please call [phone]   ___________________________________________________________________________  Subjective:    Medications:  acetaminophen     Tablet .. 650 milliGRAM(s) Oral every 6 hours PRN  aMIOdarone    Tablet   Oral   aMIOdarone    Tablet 400 milliGRAM(s) Oral every 8 hours  apixaban 5 milliGRAM(s) Oral two times a day  aspirin enteric coated 81 milliGRAM(s) Oral daily  atorvastatin 40 milliGRAM(s) Oral at bedtime  furosemide Infusion 15 mG/Hr IV Continuous <Continuous>  levothyroxine 88 MICROGram(s) Oral daily  losartan 12.5 milliGRAM(s) Oral daily  melatonin 3 milliGRAM(s) Oral at bedtime PRN  metoprolol succinate ER 25 milliGRAM(s) Oral at bedtime  polyethylene glycol 3350 17 Gram(s) Oral daily  potassium chloride  10 mEq/100 mL IVPB 10 milliEquivalent(s) IV Intermittent every 1 hour  pregabalin 400 milliGRAM(s) Oral two times a day  spironolactone 25 milliGRAM(s) Oral every 12 hours      Physical Exam:    Vitals:  Vital Signs Last 24 Hours  T(C): 36.3 (24 @ 12:21), Max: 36.4 (01-15-24 @ 20:31)  HR: 52 (24 @ 12:21) (52 - 74)  BP: 112/77 (24 @ 12:21) (100/63 - 112/77)  RR: 18 (24 @ 12:21) (18 - 18)  SpO2: 98% (24 @ 12:21) (95% - 98%)    Weight in k.2 ( @ 06:07)    I&O's Summary    15 Jabari 2024 07:01  -  2024 07:00  --------------------------------------------------------  IN: 1152 mL / OUT: 3151 mL / NET: - mL    2024 07:01  -  2024 17:34  --------------------------------------------------------  IN: 240 mL / OUT: 750 mL / NET: -510 mL        Tele:    General: No distress. Comfortable.  HEENT: EOM intact.  Neck: Neck supple. JVP not elevated. No masses  Chest: Clear to auscultation bilaterally  CV: Normal S1 and S2. No murmurs, rub, or gallops. Radial pulses normal.  Abdomen: Soft, non-distended, non-tender  Skin: No rashes or skin breakdown  Neurology: Alert and oriented times three. Sensation intact  Psych: Affect normal    Labs:                        15.5   10.50 )-----------( 166      ( 15 Jabari 2024 07:01 )             48.3     01-16    143  |  99  |  43<H>  ----------------------------<  127<H>  3.2<L>   |  30  |  1.40<H>    Ca    9.3      2024 10:13  Phos  3.6       Mg     2.3                       Lactate, Blood: 1.9 mmol/L ( @ 12:41)     ADVANCED HEART FAILURE & TRANSPLANT  - PROGRESS NOTE  *To reach the NS2 Team from 8am to 5pm, please call [phone]   ___________________________________________________________________________  Subjective:  - walking around the nursing unit without MATHEWS  - overall reports feeling well  - no acute events  - ANDREY/DCCV planned for     Medications:  acetaminophen     Tablet .. 650 milliGRAM(s) Oral every 6 hours PRN  aMIOdarone    Tablet   Oral   aMIOdarone    Tablet 400 milliGRAM(s) Oral every 8 hours  apixaban 5 milliGRAM(s) Oral two times a day  aspirin enteric coated 81 milliGRAM(s) Oral daily  atorvastatin 40 milliGRAM(s) Oral at bedtime  furosemide Infusion 15 mG/Hr IV Continuous <Continuous>  levothyroxine 88 MICROGram(s) Oral daily  losartan 12.5 milliGRAM(s) Oral daily  melatonin 3 milliGRAM(s) Oral at bedtime PRN  metoprolol succinate ER 25 milliGRAM(s) Oral at bedtime  polyethylene glycol 3350 17 Gram(s) Oral daily  potassium chloride  10 mEq/100 mL IVPB 10 milliEquivalent(s) IV Intermittent every 1 hour  pregabalin 400 milliGRAM(s) Oral two times a day  spironolactone 25 milliGRAM(s) Oral every 12 hours      Physical Exam:    Vitals:  Vital Signs Last 24 Hours  T(C): 36.3 (24 @ 12:21), Max: 36.4 (01-15-24 @ 20:31)  HR: 52 (24 @ 12:21) (52 - 74)  BP: 112/77 (24 @ 12:21) (100/63 - 112/77)  RR: 18 (24 @ 12:21) (18 - 18)  SpO2: 98% (24 @ 12:21) (95% - 98%)    Weight in k.2 ( @ 06:07)    I&O's Summary    15 Jabari 2024 07:01  -  2024 07:00  --------------------------------------------------------  IN: 1152 mL / OUT: 3151 mL / NET: -1999 mL    2024 07:  -  2024 17:34  --------------------------------------------------------  IN: 240 mL / OUT: 750 mL / NET: -510 mL    Tele: afib intermittent v pacing    General: No distress. Comfortable.  HEENT: EOM intact.  Neck: Neck supple. JVP moderately  elevated. No masses  Chest: Clear to auscultation bilaterally  CV: Normal S1 and S2. No murmurs, rub, or gallops. Radial pulses normal. trace LE edema  Abdomen: Soft, non-distended, non-tender  Skin: No rashes or skin breakdown  Neurology: Alert and oriented times three. Sensation intact  Psych: Affect normal    Labs:                        15.5   10.50 )-----------( 166      ( 15 Jabari 2024 07:01 )             48.3     -16    143  |  99  |  43<H>  ----------------------------<  127<H>  3.2<L>   |  30  |  1.40<H>    Ca    9.3      2024 10:13  Phos  3.6       Mg     2.3             Lactate, Blood: 1.9 mmol/L ( @ 12:41)

## 2024-01-16 NOTE — PROGRESS NOTE ADULT - SUBJECTIVE AND OBJECTIVE BOX
24H hour events: No acute events    MEDICATIONS:  aMIOdarone    Tablet 400 milliGRAM(s) Oral every 8 hours  aMIOdarone    Tablet   Oral   apixaban 5 milliGRAM(s) Oral two times a day  aspirin enteric coated 81 milliGRAM(s) Oral daily  furosemide Infusion 15 mG/Hr IV Continuous <Continuous>  metoprolol succinate ER 25 milliGRAM(s) Oral at bedtime  spironolactone 25 milliGRAM(s) Oral every 12 hours  acetaminophen     Tablet .. 650 milliGRAM(s) Oral every 6 hours PRN  carBAMazepine ER Tablet 100 milliGRAM(s) Oral two times a day  melatonin 3 milliGRAM(s) Oral at bedtime PRN  pregabalin 400 milliGRAM(s) Oral two times a day  polyethylene glycol 3350 17 Gram(s) Oral daily  atorvastatin 40 milliGRAM(s) Oral at bedtime  levothyroxine 88 MICROGram(s) Oral daily        REVIEW OF SYSTEMS:  Complete 12 point ROS negative.    PHYSICAL EXAM:  T(C): 36.3 (01-16-24 @ 05:33), Max: 36.4 (01-15-24 @ 12:11)  HR: 65 (01-16-24 @ 05:33) (57 - 74)  BP: 103/69 (01-16-24 @ 05:33) (100/63 - 123/86)  RR: 18 (01-16-24 @ 05:33) (18 - 18)  SpO2: 95% (01-16-24 @ 05:33) (95% - 97%)  Wt(kg): --  I&O's Summary    15 Jabari 2024 07:01  -  16 Jan 2024 07:00  --------------------------------------------------------  IN: 1152 mL / OUT: 3151 mL / NET: -1999 mL        Appearance: Normal	  HEENT:  PERRL, EOMI	  Cardiovascular: Normal S1 S2, irreg, No murmurs, No edema  Respiratory: Lungs clear to auscultation	  Psychiatry: A & O x 3, Mood & affect appropriate  Gastrointestinal:  Soft, Non-tender, + BS	  Skin: No rashes, No ecchymoses, No cyanosis	  Neurologic: Non-focal  Extremities: trace LE edema  Vascular: Peripheral pulses palpable 2+ bilaterally        LABS:	 	    CBC Full  -  ( 15 Jabari 2024 07:01 )  WBC Count : 10.50 K/uL  Hemoglobin : 15.5 g/dL  Hematocrit : 48.3 %  Platelet Count - Automated : 166 K/uL  Mean Cell Volume : 96.6 fl  Mean Cell Hemoglobin : 31.0 pg  Mean Cell Hemoglobin Concentration : 32.1 gm/dL  Auto Neutrophil # : x  Auto Lymphocyte # : x  Auto Monocyte # : x  Auto Eosinophil # : x  Auto Basophil # : x  Auto Neutrophil % : x  Auto Lymphocyte % : x  Auto Monocyte % : x  Auto Eosinophil % : x  Auto Basophil % : x    01-16    142  |  100  |  47<H>  ----------------------------<  140<H>  3.3<L>   |  28  |  1.48<H>  01-15    142  |  100  |  49<H>  ----------------------------<  149<H>  3.8   |  28  |  1.58<H>    Ca    9.2      16 Jan 2024 06:32  Ca    9.6      15 Jabari 2024 19:13  Phos  3.6     01-16  Phos  4.3     01-15  Mg     2.3     01-16  Mg     2.3     01-15      TELEMETRY: AF 50s/occ Vpacing	      < from: TTE W or WO Ultrasound Enhancing Agent (01.11.24 @ 13:59) >  TRANSTHORACIC ECHOCARDIOGRAM REPORT  ________________________________________________________________________________                                      _______       Pt. Name:       DUDLEY BERMUDEZ Study Date:    1/11/2024  MRN:            [id number]      YOB: 1950  Accession #:    [id number]       Age:           73 years  Account#:       [id number]    Gender:        M  Heart Rate:     77 bpm          Height:        66.00 in (167.64 cm)  Rhythm:                         Weight:        230.00 lb (104.33 kg)  Blood Pressure: 96/62 mmHg      BSA/BMI:       2.12 m² / 37.12 kg/m²  ________________________________________________________________________________________  Referring Physician:    [id number] Anh March  Interpreting Physician: Rafi Martin M.D.  Primary Sonographer:    Brandan Merino RDCS    CPT:                ECHO TTE WITH CON COMP W DOPP - .m;DEFINITY ECHO                      CONTRAST PER ML - .m;DEFINITY ECHO CONTRAST PER ML                      WASTED - .m  Indication(s):      Heart failure, unspecified - I50.9  Procedure:          Transthoracic echocardiogram with 2-D, M-mode and complete                      spectral and color flow Doppler.  Ordering Location:  [facility]  Admission Status:   Inpatient  Contrast Injection: Verbal consent was obtained for injection of Ultrasonic                      Enhancing Agent following a discussion of risks and                      benefits.                      Endocardial visualization enhanced with 2 ml of Definity                      Ultrasound enhancing agent ( Discarded Dose:8ml).  UEA Reaction:       Patient had no adverse reaction after injection of                      Ultrasound Enhancing Agent.  Study Information:  Image quality for this study is technically difficult.    _______________________________________________________________________________________     CONCLUSIONS:      1. Left ventricular cavity is mildly dilated. Left ventricular wall thickness is normal. Left ventricular systolic function is severely decreased with an ejection fraction of 15 % by Whitfield's method of disks. Global left ventricular hypokinesis.   2. There is severe (grade 3) left ventricular diastolic dysfunction.   3. Severely enlarged right ventricular cavity size, wall thickness, and reduced systolic function.   4. The left atrium is mildly dilated.   5. The right atrium is severely dilated in size.   6. Device lead is visualized in the right heart.   7. A TAVR valve replacement is present in the aortic position. is well seated with normal function.. No intravalvular regurgitation No paravalvular regurgitation.   8. No pericardial effusion seen.   9. Compared to the transthoracic echocardiogram performed on 10/1/2020, there have been no significant interval changes.  10. Technically difficult image quality.    ________________________________________________________________________________________  FINDINGS:     Left Ventricle:  After obtaining consent, Definity ultrasound enhancing agent was given for enhanced left ventricular opacification and improved delineation of the left ventricular endocardial borders. The left ventricular cavity is mildly dilated. Left ventricular wall thickness is normal. Left ventricular systolic functionis severely decreased with a calculated ejection fraction of 15 % by the Whtifield's biplane method of disks. There is global left ventricular hypokinesis. There is severe (grade 3) left ventricular diastolic dysfunction, with elevated filling pressure. There is no evidence of a left ventricular thrombus.     Right Ventricle:  The right ventricular cavity is severely enlarged in size, normal wall thickness and reduced systolic function. Tricuspid annular plane systolic excursion (TAPSE) is 1.2 cm (normal >=1.7 cm). Tricuspid annular tissue Doppler S' is 4.2 cm/s (normal >10 cm/s). A device lead is visualized in the right heart.     Left Atrium:  The left atrium is mildly dilated with an indexed volume of 38.54 ml/m².     Right Atrium:  The right atrium is severely dilated in size with an indexed volume of 47.58 ml/m².     Interatrial Septum:  The interatrial septum appears intact.     Aortic Valve:  A TAVR valve replacement is present in the aortic position ( a transcatheter deployed (TAVR). The prosthetic valve is well seated with normal function. There is no intravalvular regurgitation. There is no paravalvular regurgitation. The peak transaortic velocity is 1.29 m/s, peak transaortic gradient is 6.7 mmHg and mean transaortic gradientis 3.0 mmHg with an LVOT/aortic valve VTI ratio of 0.58. The aortic valve area is estimated at 2.01 cm² by the continuity equation.     Mitral Valve:  There is calcification of the mitral valve annulus. There is no mitral valve stenosis. There is trace mitral regurgitation.     Tricuspid Valve:  Structurally normal tricuspid valve with normal leaflet excursion. There is moderate tricuspid regurgitation. Estimated pulmonary artery systolic pressure is 38 mmHg.     Pulmonic Valve:  Structurally normal pulmonic valve with normal leaflet excursion. There is trace pulmonic regurgitation.     Aorta:  The aortic annulus and aortic root appear normal in size.     Pericardium:  No pericardial effusion seen.     Systemic Veins:  The inferior vena cava is dilated measuring 2.56 cm in diameter, (dilated >2.1cm) with normal inspiratory collapse (normal >50%) consistent with mildly elevated right atrial pressure (~8, range 5-10mmHg).  ____________________________________________________________________  QUANTITATIVE DATA:  Left Ventricle Measurements: (Indexed to BSA)     IVSd (2D):   0.8 cm  LVPWd (2D):  1.0 cm  LVIDd (2D):  6.2 cm  LVIDs (2D):  5.8 cm  LV Mass:     229 g  108.1 g/m²  LV Vol d, MOD A2C: 199.0 ml 93.75 ml/m²  LV Vol d, MOD A4C: 212.0 ml 99.88 ml/m²  LV Vol d, MOD BP:  205.1 ml 96.61 ml/m²  LV Vol s, MOD A2C: 154.0 ml 72.55 ml/m²  LV Vol s, MOD A4C: 177.0 ml 83.39 ml/m²  LV Vol s, MOD BP:  173.4 ml 81.71 ml/m²  LVOT SV MOD BP:    31.6 ml  LV EF% MOD BP:     15 %     MV E Vmax: 1.04 m/s  MV A Vmax:    0.26 m/s  MV E/A:       3.92  e' lateral:   8.27 cm/s  e' medial:    6.47 cm/s  E/e' lateral: 12.58  E/e' medial:  16.07  E/e' Average: 14.11    Aorta Measurements: (normal range) (Indexed to BSA)     Sinuses of Valsalva: 4.00 cm (3.1 - 3.7 cm)  Ao Asc prox:         4.10 cm       Left Atrium Measurements: (Indexed to BSA)  LA Diam 2D:        6.40 cm  LA Vol s, MOD A4C: 88.80 ml.  LA Vol s, MOD A2C: 73.70 ml.  LA Vol s, MOD BP:  81.80 ml  38.54 ml/m²    Right Ventricle Measurements: Right Atrial Measurements:     TAPSE:           1.2 cm       RA Vol:       101.00 ml  RV Base (RVID1): 6.0 cm       RA Vol Index: 47.58 ml/m²  RV Mid (RVID2):  4.3 cm       LVOT / RVOT/ Qp/Qs Data: (Indexed to BSA)  LVOT Diameter: 2.10 cm  LVOT Vmax:     0.74 m/s  LVOT VTI:      14.20 cm  LVOT SV:       49.2 ml  23.17 ml/m²    Aortic Valve Measurements:  AV Vmax:                1.3 m/s  AV Peak Gradient:       6.7 mmHg  AV Mean Gradient:       3.0 mmHg  AV VTI:                 24.5 cm  AV VTI Ratio:           0.58  AoV EOA, Contin:        2.01 cm²  AoV EOA, Contin i:      0.95 cm²/m²  AoV Dimensionless Index 0.58    Mitral Valve Measurements:     MV E Vmax: 1.0 m/s  MV A Vmax: 0.3 m/s  MV E/A:    3.9       Tricuspid Valve Measurements:     TR Vmax:          2.7 m/s  TR Peak Gradient: 29.6 mmHg  RA Pressure:      8 mmHg  PASP:             38 mmHg    < end of copied text >       24H hour events: No acute events    MEDICATIONS:  aMIOdarone    Tablet 400 milliGRAM(s) Oral every 8 hours  aMIOdarone    Tablet   Oral   apixaban 5 milliGRAM(s) Oral two times a day  aspirin enteric coated 81 milliGRAM(s) Oral daily  furosemide Infusion 15 mG/Hr IV Continuous <Continuous>  metoprolol succinate ER 25 milliGRAM(s) Oral at bedtime  spironolactone 25 milliGRAM(s) Oral every 12 hours  acetaminophen     Tablet .. 650 milliGRAM(s) Oral every 6 hours PRN  carBAMazepine ER Tablet 100 milliGRAM(s) Oral two times a day  melatonin 3 milliGRAM(s) Oral at bedtime PRN  pregabalin 400 milliGRAM(s) Oral two times a day  polyethylene glycol 3350 17 Gram(s) Oral daily  atorvastatin 40 milliGRAM(s) Oral at bedtime  levothyroxine 88 MICROGram(s) Oral daily        REVIEW OF SYSTEMS:  Complete 12 point ROS negative.    PHYSICAL EXAM:  T(C): 36.3 (01-16-24 @ 05:33), Max: 36.4 (01-15-24 @ 12:11)  HR: 65 (01-16-24 @ 05:33) (57 - 74)  BP: 103/69 (01-16-24 @ 05:33) (100/63 - 123/86)  RR: 18 (01-16-24 @ 05:33) (18 - 18)  SpO2: 95% (01-16-24 @ 05:33) (95% - 97%)  Wt(kg): --  I&O's Summary    15 Jabari 2024 07:01  -  16 Jan 2024 07:00  --------------------------------------------------------  IN: 1152 mL / OUT: 3151 mL / NET: -1999 mL        Appearance: Normal	  HEENT:  PERRL, EOMI	  Cardiovascular: Normal S1 S2, irreg, No murmurs, No edema  Respiratory: Lungs clear to auscultation	  Psychiatry: A & O x 3, Mood & affect appropriate  Gastrointestinal:  Soft, Non-tender, + BS	  Skin: No rashes, No ecchymoses, No cyanosis	  Neurologic: Non-focal  Extremities: trace LE edema  Vascular: Peripheral pulses palpable 2+ bilaterally        LABS:	 	    CBC Full  -  ( 15 Jabari 2024 07:01 )  WBC Count : 10.50 K/uL  Hemoglobin : 15.5 g/dL  Hematocrit : 48.3 %  Platelet Count - Automated : 166 K/uL  Mean Cell Volume : 96.6 fl  Mean Cell Hemoglobin : 31.0 pg  Mean Cell Hemoglobin Concentration : 32.1 gm/dL  Auto Neutrophil # : x  Auto Lymphocyte # : x  Auto Monocyte # : x  Auto Eosinophil # : x  Auto Basophil # : x  Auto Neutrophil % : x  Auto Lymphocyte % : x  Auto Monocyte % : x  Auto Eosinophil % : x  Auto Basophil % : x    01-16    142  |  100  |  47<H>  ----------------------------<  140<H>  3.3<L>   |  28  |  1.48<H>  01-15    142  |  100  |  49<H>  ----------------------------<  149<H>  3.8   |  28  |  1.58<H>    Ca    9.2      16 Jan 2024 06:32  Ca    9.6      15 Jabari 2024 19:13  Phos  3.6     01-16  Phos  4.3     01-15  Mg     2.3     01-16  Mg     2.3     01-15      TELEMETRY: AF 50s/occ Vpacing	      < from: TTE W or WO Ultrasound Enhancing Agent (01.11.24 @ 13:59) >  TRANSTHORACIC ECHOCARDIOGRAM REPORT  ________________________________________________________________________________                                      _______       Pt. Name:       DUDLEY BERMUDEZ Study Date:    1/11/2024  MRN:            [id number]      YOB: 1950  Accession #:    [id number]       Age:           73 years  Account#:       [id number]    Gender:        M  Heart Rate:     77 bpm          Height:        66.00 in (167.64 cm)  Rhythm:                         Weight:        230.00 lb (104.33 kg)  Blood Pressure: 96/62 mmHg      BSA/BMI:       2.12 m² / 37.12 kg/m²  ________________________________________________________________________________________  Referring Physician:    [id number] Anh March  Interpreting Physician: Rafi Martin M.D.  Primary Sonographer:    Brandan Merino RDCS    CPT:                ECHO TTE WITH CON COMP W DOPP - .m;DEFINITY ECHO                      CONTRAST PER ML - .m;DEFINITY ECHO CONTRAST PER ML                      WASTED - .m  Indication(s):      Heart failure, unspecified - I50.9  Procedure:          Transthoracic echocardiogram with 2-D, M-mode and complete                      spectral and color flow Doppler.  Ordering Location:  [facility]  Admission Status:   Inpatient  Contrast Injection: Verbal consent was obtained for injection of Ultrasonic                      Enhancing Agent following a discussion of risks and                      benefits.                      Endocardial visualization enhanced with 2 ml of Definity                      Ultrasound enhancing agent ( Discarded Dose:8ml).  UEA Reaction:       Patient had no adverse reaction after injection of                      Ultrasound Enhancing Agent.  Study Information:  Image quality for this study is technically difficult.    _______________________________________________________________________________________     CONCLUSIONS:      1. Left ventricular cavity is mildly dilated. Left ventricular wall thickness is normal. Left ventricular systolic function is severely decreased with an ejection fraction of 15 % by Whitfield's method of disks. Global left ventricular hypokinesis.   2. There is severe (grade 3) left ventricular diastolic dysfunction.   3. Severely enlarged right ventricular cavity size, wall thickness, and reduced systolic function.   4. The left atrium is mildly dilated.   5. The right atrium is severely dilated in size.   6. Device lead is visualized in the right heart.   7. A TAVR valve replacement is present in the aortic position. is well seated with normal function.. No intravalvular regurgitation No paravalvular regurgitation.   8. No pericardial effusion seen.   9. Compared to the transthoracic echocardiogram performed on 10/1/2020, there have been no significant interval changes.  10. Technically difficult image quality.    ________________________________________________________________________________________  FINDINGS:     Left Ventricle:  After obtaining consent, Definity ultrasound enhancing agent was given for enhanced left ventricular opacification and improved delineation of the left ventricular endocardial borders. The left ventricular cavity is mildly dilated. Left ventricular wall thickness is normal. Left ventricular systolic functionis severely decreased with a calculated ejection fraction of 15 % by the Whitfield's biplane method of disks. There is global left ventricular hypokinesis. There is severe (grade 3) left ventricular diastolic dysfunction, with elevated filling pressure. There is no evidence of a left ventricular thrombus.     Right Ventricle:  The right ventricular cavity is severely enlarged in size, normal wall thickness and reduced systolic function. Tricuspid annular plane systolic excursion (TAPSE) is 1.2 cm (normal >=1.7 cm). Tricuspid annular tissue Doppler S' is 4.2 cm/s (normal >10 cm/s). A device lead is visualized in the right heart.     Left Atrium:  The left atrium is mildly dilated with an indexed volume of 38.54 ml/m².     Right Atrium:  The right atrium is severely dilated in size with an indexed volume of 47.58 ml/m².     Interatrial Septum:  The interatrial septum appears intact.     Aortic Valve:  A TAVR valve replacement is present in the aortic position ( a transcatheter deployed (TAVR). The prosthetic valve is well seated with normal function. There is no intravalvular regurgitation. There is no paravalvular regurgitation. The peak transaortic velocity is 1.29 m/s, peak transaortic gradient is 6.7 mmHg and mean transaortic gradientis 3.0 mmHg with an LVOT/aortic valve VTI ratio of 0.58. The aortic valve area is estimated at 2.01 cm² by the continuity equation.     Mitral Valve:  There is calcification of the mitral valve annulus. There is no mitral valve stenosis. There is trace mitral regurgitation.     Tricuspid Valve:  Structurally normal tricuspid valve with normal leaflet excursion. There is moderate tricuspid regurgitation. Estimated pulmonary artery systolic pressure is 38 mmHg.     Pulmonic Valve:  Structurally normal pulmonic valve with normal leaflet excursion. There is trace pulmonic regurgitation.     Aorta:  The aortic annulus and aortic root appear normal in size.     Pericardium:  No pericardial effusion seen.     Systemic Veins:  The inferior vena cava is dilated measuring 2.56 cm in diameter, (dilated >2.1cm) with normal inspiratory collapse (normal >50%) consistent with mildly elevated right atrial pressure (~8, range 5-10mmHg).  ____________________________________________________________________  QUANTITATIVE DATA:  Left Ventricle Measurements: (Indexed to BSA)     IVSd (2D):   0.8 cm  LVPWd (2D):  1.0 cm  LVIDd (2D):  6.2 cm  LVIDs (2D):  5.8 cm  LV Mass:     229 g  108.1 g/m²  LV Vol d, MOD A2C: 199.0 ml 93.75 ml/m²  LV Vol d, MOD A4C: 212.0 ml 99.88 ml/m²  LV Vol d, MOD BP:  205.1 ml 96.61 ml/m²  LV Vol s, MOD A2C: 154.0 ml 72.55 ml/m²  LV Vol s, MOD A4C: 177.0 ml 83.39 ml/m²  LV Vol s, MOD BP:  173.4 ml 81.71 ml/m²  LVOT SV MOD BP:    31.6 ml  LV EF% MOD BP:     15 %     MV E Vmax: 1.04 m/s  MV A Vmax:    0.26 m/s  MV E/A:       3.92  e' lateral:   8.27 cm/s  e' medial:    6.47 cm/s  E/e' lateral: 12.58  E/e' medial:  16.07  E/e' Average: 14.11    Aorta Measurements: (normal range) (Indexed to BSA)     Sinuses of Valsalva: 4.00 cm (3.1 - 3.7 cm)  Ao Asc prox:         4.10 cm       Left Atrium Measurements: (Indexed to BSA)  LA Diam 2D:        6.40 cm  LA Vol s, MOD A4C: 88.80 ml.  LA Vol s, MOD A2C: 73.70 ml.  LA Vol s, MOD BP:  81.80 ml  38.54 ml/m²    Right Ventricle Measurements: Right Atrial Measurements:     TAPSE:           1.2 cm       RA Vol:       101.00 ml  RV Base (RVID1): 6.0 cm       RA Vol Index: 47.58 ml/m²  RV Mid (RVID2):  4.3 cm       LVOT / RVOT/ Qp/Qs Data: (Indexed to BSA)  LVOT Diameter: 2.10 cm  LVOT Vmax:     0.74 m/s  LVOT VTI:      14.20 cm  LVOT SV:       49.2 ml  23.17 ml/m²    Aortic Valve Measurements:  AV Vmax:                1.3 m/s  AV Peak Gradient:       6.7 mmHg  AV Mean Gradient:       3.0 mmHg  AV VTI:                 24.5 cm  AV VTI Ratio:           0.58  AoV EOA, Contin:        2.01 cm²  AoV EOA, Contin i:      0.95 cm²/m²  AoV Dimensionless Index 0.58    Mitral Valve Measurements:     MV E Vmax: 1.0 m/s  MV A Vmax: 0.3 m/s  MV E/A:    3.9       Tricuspid Valve Measurements:     TR Vmax:          2.7 m/s  TR Peak Gradient: 29.6 mmHg  RA Pressure:      8 mmHg  PASP:             38 mmHg    < end of copied text >

## 2024-01-16 NOTE — PROGRESS NOTE ADULT - PROBLEM SELECTOR PLAN 1
HFrEF (EF 15-20%, LVEDD 6 cm) s/p single chamber ICD with lead revision in 2018 with upgrade to dual-chamber device (8/17/21) s/p MEMS (9/16/21)  GDMT:  BB- c/w Toprol 25mg qd  ACE/ARB/ARNI- c/w losartan BID - holding as of 1/14  MRA: c/w spironolactone BID - decreased dose on 1/14  SGLT2: resume home Farxiga on dc   on Lasix gtt per HF - increased dosing of gtt as of 1/15 per HF attending w/ additional IV dose x1  monitor fluid status, renal function, and electrolytes closely

## 2024-01-17 ENCOUNTER — APPOINTMENT (OUTPATIENT)
Dept: ELECTROPHYSIOLOGY | Facility: CLINIC | Age: 74
End: 2024-01-17

## 2024-01-17 LAB
ANION GAP SERPL CALC-SCNC: 13 MMOL/L — SIGNIFICANT CHANGE UP (ref 5–17)
ANION GAP SERPL CALC-SCNC: 14 MMOL/L — SIGNIFICANT CHANGE UP (ref 5–17)
BUN SERPL-MCNC: 43 MG/DL — HIGH (ref 7–23)
BUN SERPL-MCNC: 44 MG/DL — HIGH (ref 7–23)
CALCIUM SERPL-MCNC: 9.2 MG/DL — SIGNIFICANT CHANGE UP (ref 8.4–10.5)
CALCIUM SERPL-MCNC: 9.8 MG/DL — SIGNIFICANT CHANGE UP (ref 8.4–10.5)
CHLORIDE SERPL-SCNC: 102 MMOL/L — SIGNIFICANT CHANGE UP (ref 96–108)
CHLORIDE SERPL-SCNC: 99 MMOL/L — SIGNIFICANT CHANGE UP (ref 96–108)
CO2 SERPL-SCNC: 26 MMOL/L — SIGNIFICANT CHANGE UP (ref 22–31)
CO2 SERPL-SCNC: 29 MMOL/L — SIGNIFICANT CHANGE UP (ref 22–31)
CREAT SERPL-MCNC: 1.5 MG/DL — HIGH (ref 0.5–1.3)
CREAT SERPL-MCNC: 1.73 MG/DL — HIGH (ref 0.5–1.3)
EGFR: 41 ML/MIN/1.73M2 — LOW
EGFR: 49 ML/MIN/1.73M2 — LOW
GLUCOSE SERPL-MCNC: 126 MG/DL — HIGH (ref 70–99)
GLUCOSE SERPL-MCNC: 127 MG/DL — HIGH (ref 70–99)
MAGNESIUM SERPL-MCNC: 2.2 MG/DL — SIGNIFICANT CHANGE UP (ref 1.6–2.6)
MAGNESIUM SERPL-MCNC: 2.3 MG/DL — SIGNIFICANT CHANGE UP (ref 1.6–2.6)
PHOSPHATE SERPL-MCNC: 3.4 MG/DL — SIGNIFICANT CHANGE UP (ref 2.5–4.5)
PHOSPHATE SERPL-MCNC: 3.5 MG/DL — SIGNIFICANT CHANGE UP (ref 2.5–4.5)
POTASSIUM SERPL-MCNC: 3.8 MMOL/L — SIGNIFICANT CHANGE UP (ref 3.5–5.3)
POTASSIUM SERPL-MCNC: 4.2 MMOL/L — SIGNIFICANT CHANGE UP (ref 3.5–5.3)
POTASSIUM SERPL-SCNC: 3.8 MMOL/L — SIGNIFICANT CHANGE UP (ref 3.5–5.3)
POTASSIUM SERPL-SCNC: 4.2 MMOL/L — SIGNIFICANT CHANGE UP (ref 3.5–5.3)
SODIUM SERPL-SCNC: 141 MMOL/L — SIGNIFICANT CHANGE UP (ref 135–145)
SODIUM SERPL-SCNC: 142 MMOL/L — SIGNIFICANT CHANGE UP (ref 135–145)

## 2024-01-17 PROCEDURE — 99233 SBSQ HOSP IP/OBS HIGH 50: CPT

## 2024-01-17 PROCEDURE — 92960 CARDIOVERSION ELECTRIC EXT: CPT

## 2024-01-17 PROCEDURE — C1887: CPT

## 2024-01-17 PROCEDURE — 93010 ELECTROCARDIOGRAM REPORT: CPT

## 2024-01-17 PROCEDURE — C1894: CPT

## 2024-01-17 PROCEDURE — 93005 ELECTROCARDIOGRAM TRACING: CPT

## 2024-01-17 PROCEDURE — 85027 COMPLETE CBC AUTOMATED: CPT

## 2024-01-17 PROCEDURE — 93451 RIGHT HEART CATH: CPT

## 2024-01-17 PROCEDURE — 93325 DOPPLER ECHO COLOR FLOW MAPG: CPT | Mod: 26

## 2024-01-17 PROCEDURE — 82803 BLOOD GASES ANY COMBINATION: CPT

## 2024-01-17 PROCEDURE — 78830 RP LOCLZJ TUM SPECT W/CT 1: CPT | Mod: 26

## 2024-01-17 PROCEDURE — 93312 ECHO TRANSESOPHAGEAL: CPT | Mod: 26

## 2024-01-17 PROCEDURE — 82962 GLUCOSE BLOOD TEST: CPT

## 2024-01-17 PROCEDURE — 93320 DOPPLER ECHO COMPLETE: CPT | Mod: 26

## 2024-01-17 PROCEDURE — C1769: CPT

## 2024-01-17 PROCEDURE — 80048 BASIC METABOLIC PNL TOTAL CA: CPT

## 2024-01-17 RX ORDER — POTASSIUM CHLORIDE 20 MEQ
40 PACKET (EA) ORAL ONCE
Refills: 0 | Status: COMPLETED | OUTPATIENT
Start: 2024-01-17 | End: 2024-01-17

## 2024-01-17 RX ADMIN — Medication 40 MILLIEQUIVALENT(S): at 06:12

## 2024-01-17 RX ADMIN — Medication 25 MILLIGRAM(S): at 21:19

## 2024-01-17 RX ADMIN — Medication 7.5 MG/HR: at 17:23

## 2024-01-17 RX ADMIN — LOSARTAN POTASSIUM 12.5 MILLIGRAM(S): 100 TABLET, FILM COATED ORAL at 05:32

## 2024-01-17 RX ADMIN — Medication 400 MILLIGRAM(S): at 17:23

## 2024-01-17 RX ADMIN — AMIODARONE HYDROCHLORIDE 400 MILLIGRAM(S): 400 TABLET ORAL at 13:11

## 2024-01-17 RX ADMIN — Medication 7.5 MG/HR: at 13:11

## 2024-01-17 RX ADMIN — AMIODARONE HYDROCHLORIDE 400 MILLIGRAM(S): 400 TABLET ORAL at 21:19

## 2024-01-17 RX ADMIN — Medication 81 MILLIGRAM(S): at 12:14

## 2024-01-17 RX ADMIN — SPIRONOLACTONE 25 MILLIGRAM(S): 25 TABLET, FILM COATED ORAL at 05:31

## 2024-01-17 RX ADMIN — Medication 88 MICROGRAM(S): at 05:31

## 2024-01-17 RX ADMIN — ATORVASTATIN CALCIUM 40 MILLIGRAM(S): 80 TABLET, FILM COATED ORAL at 21:19

## 2024-01-17 RX ADMIN — APIXABAN 5 MILLIGRAM(S): 2.5 TABLET, FILM COATED ORAL at 17:24

## 2024-01-17 RX ADMIN — SPIRONOLACTONE 25 MILLIGRAM(S): 25 TABLET, FILM COATED ORAL at 17:23

## 2024-01-17 RX ADMIN — Medication 400 MILLIGRAM(S): at 05:30

## 2024-01-17 RX ADMIN — AMIODARONE HYDROCHLORIDE 400 MILLIGRAM(S): 400 TABLET ORAL at 05:31

## 2024-01-17 RX ADMIN — APIXABAN 5 MILLIGRAM(S): 2.5 TABLET, FILM COATED ORAL at 05:32

## 2024-01-17 NOTE — PROGRESS NOTE ADULT - PROBLEM SELECTOR PLAN 4
-Cont. levothyroxine  -TSH 2.4

## 2024-01-17 NOTE — PROGRESS NOTE ADULT - PROBLEM SELECTOR PLAN 5
-Cont. Tegretol BID  -Cont. Lyrica BID
-Cont. Tegretol BID - holding as of 1/16 as it interferes w/ Eliquis and patient needs to be fully anticoagulated before DCCV -- discussed w/ patient  -Cont. Lyrica BID
-Cont. Tegretol BID  -Cont. Lyrica BID
-Cont. Tegretol BID  -Cont. Lyrica BID
-Cont. Tegretol BID - holding as of 1/16 as it interferes w/ Eliquis and patient needs to be fully anticoagulated before DCCV -- discussed w/ patient, to be resumed on discharge  -Cont. Lyrica BID
-Cont. Tegretol BID  -Cont. Lyrica BID
-Cont. Tegretol BID  -Cont. Lyrica BID

## 2024-01-17 NOTE — PROGRESS NOTE ADULT - PROBLEM SELECTOR PROBLEM 2
Longstanding persistent atrial fibrillation

## 2024-01-17 NOTE — PRE-ANESTHESIA EVALUATION ADULT - NSANTHPMHFT_GEN_ALL_CORE
73M with PMH s/f CAD, MI 2005 (s/p PCI LAD), ICM/HFrEF (EF 15-20%), hx bioprosthetic AVR 2015 for AI c/b severe bioprosthetic AS (s/p TAVR 2020), DC-ICD ([employer] 2021 with hx appropriate ICD shock for VT/VF 2021), a-fib/flutter s/p ablation 2017 and DCCV x2 who was admitted for persistently elevated filling pressure (PAD 40) on cardiomems. Presents for ANDREY Cardioversion

## 2024-01-17 NOTE — PROGRESS NOTE ADULT - PROBLEM SELECTOR PLAN 1
HFrEF (EF 15-20%, LVEDD 6 cm) s/p single chamber ICD with lead revision in 2018 with upgrade to dual-chamber device (8/17/21) s/p MEMS (9/16/21)  GDMT:  BB- c/w Toprol 25mg qd  ACE/ARB/ARNI- c/w losartan BID - holding as of 1/14  MRA: c/w spironolactone BID - decreased dose on 1/14  SGLT2: resume home Farxiga on dc   on Lasix gtt per HF - increased dosing of gtt as of 1/15 per HF attending w/ additional IV dose x1  monitor fluid status, renal function, and electrolytes closely - f/u HF on 1/17 for diuretics plan in anticipation of discharge on 1/18  Per HF, PyP scan to r/o TTR amyloid ordered on 1/16, to be performed on 1/17

## 2024-01-17 NOTE — PRE-ANESTHESIA EVALUATION ADULT - NSRADCARDRESULTSFT_GEN_ALL_CORE
TTE 1/11/2024  CONCLUSIONS:      1. Left ventricular cavity is mildly dilated. Left ventricular wall thickness is normal. Left ventricular systolic function is severely decreased with an ejection fraction of 15 % by Whitfield's method of disks. Global left ventricular hypokinesis.   2. There is severe (grade 3) left ventricular diastolic dysfunction.   3. Severely enlarged right ventricular cavity size, wall thickness, and reduced systolic function.   4. The left atrium is mildly dilated.   5. The right atrium is severely dilated in size.   6. Device lead is visualized in the right heart.   7. A TAVR valve replacement is present in the aortic position. is well seated with normal function.. No intravalvular regurgitation No paravalvular regurgitation.   8. No pericardial effusion seen.   9. Compared to the transthoracic echocardiogram performed on 10/1/2020, there have been no significant interval changes.  10. Technically difficult image quality.

## 2024-01-17 NOTE — PROGRESS NOTE ADULT - SUBJECTIVE AND OBJECTIVE BOX
24H hour events: No acute events    MEDICATIONS:  aMIOdarone    Tablet 400 milliGRAM(s) Oral every 8 hours  aMIOdarone    Tablet   Oral   apixaban 5 milliGRAM(s) Oral two times a day  aspirin enteric coated 81 milliGRAM(s) Oral daily  furosemide Infusion 15 mG/Hr IV Continuous <Continuous>  losartan 12.5 milliGRAM(s) Oral daily  metoprolol succinate ER 25 milliGRAM(s) Oral at bedtime  spironolactone 25 milliGRAM(s) Oral every 12 hours  acetaminophen     Tablet .. 650 milliGRAM(s) Oral every 6 hours PRN  melatonin 3 milliGRAM(s) Oral at bedtime PRN  pregabalin 400 milliGRAM(s) Oral two times a day  polyethylene glycol 3350 17 Gram(s) Oral daily  atorvastatin 40 milliGRAM(s) Oral at bedtime  levothyroxine 88 MICROGram(s) Oral daily        REVIEW OF SYSTEMS:  Complete 12 point ROS negative.    PHYSICAL EXAM:  T(C): 36.3 (01-17-24 @ 04:31), Max: 36.4 (01-16-24 @ 20:51)  HR: 68 (01-17-24 @ 05:29) (51 - 68)  BP: 110/73 (01-17-24 @ 05:29) (97/62 - 132/89)  RR: 18 (01-17-24 @ 04:31) (18 - 18)  SpO2: 98% (01-17-24 @ 04:31) (98% - 99%)  Wt(kg): --  I&O's Summary    16 Jan 2024 07:01  -  17 Jan 2024 07:00  --------------------------------------------------------  IN: 750 mL / OUT: 2450 mL / NET: -1700 mL        Appearance: Normal	  HEENT:  PERRL, EOMI	  Cardiovascular: Normal S1 S2, irreg, No JVD, No murmurs, No edema  Respiratory: Lungs clear to auscultation	  Psychiatry: A & O x 3, Mood & affect appropriate  Gastrointestinal:  Soft, Non-tender, + BS	  Skin: No rashes, No ecchymoses, No cyanosis	  Neurologic: Non-focal  Extremities: No clubbing, cyanosis or edema  Vascular: Peripheral pulses palpable 2+ bilaterally        LABS:	 	      01-17    142  |  102  |  43<H>  ----------------------------<  126<H>  3.8   |  26  |  1.50<H>  01-16    141  |  100  |  44<H>  ----------------------------<  172<H>  3.9   |  28  |  1.58<H>    Ca    9.2      17 Jan 2024 05:33  Ca    9.2      16 Jan 2024 18:20  Phos  3.5     01-17  Phos  3.0     01-16  Mg     2.2     01-17  Mg     2.3     01-16      TELEMETRY: AF with occasional ventricular pacing 50-70s	    ECG:  	  RADIOLOGY:  OTHER: 	    PREVIOUS DIAGNOSTIC TESTING:    [ ] Echocardiogram:    [ ]  Catheterization:  [ ] Stress Test:  	  	  ASSESSMENT/PLAN: 	     24H hour events: No acute events    MEDICATIONS:  aMIOdarone    Tablet 400 milliGRAM(s) Oral every 8 hours  aMIOdarone    Tablet   Oral   apixaban 5 milliGRAM(s) Oral two times a day  aspirin enteric coated 81 milliGRAM(s) Oral daily  furosemide Infusion 15 mG/Hr IV Continuous <Continuous>  losartan 12.5 milliGRAM(s) Oral daily  metoprolol succinate ER 25 milliGRAM(s) Oral at bedtime  spironolactone 25 milliGRAM(s) Oral every 12 hours  acetaminophen     Tablet .. 650 milliGRAM(s) Oral every 6 hours PRN  melatonin 3 milliGRAM(s) Oral at bedtime PRN  pregabalin 400 milliGRAM(s) Oral two times a day  polyethylene glycol 3350 17 Gram(s) Oral daily  atorvastatin 40 milliGRAM(s) Oral at bedtime  levothyroxine 88 MICROGram(s) Oral daily        REVIEW OF SYSTEMS:  Complete 12 point ROS negative.    PHYSICAL EXAM:  T(C): 36.3 (01-17-24 @ 04:31), Max: 36.4 (01-16-24 @ 20:51)  HR: 68 (01-17-24 @ 05:29) (51 - 68)  BP: 110/73 (01-17-24 @ 05:29) (97/62 - 132/89)  RR: 18 (01-17-24 @ 04:31) (18 - 18)  SpO2: 98% (01-17-24 @ 04:31) (98% - 99%)  Wt(kg): --  I&O's Summary    16 Jan 2024 07:01  -  17 Jan 2024 07:00  --------------------------------------------------------  IN: 750 mL / OUT: 2450 mL / NET: -1700 mL        Appearance: Normal	  HEENT:  PERRL, EOMI	  Cardiovascular: Normal S1 S2, irreg, No JVD, No murmurs, No edema  Respiratory: Lungs clear to auscultation	  Psychiatry: A & O x 3, Mood & affect appropriate  Gastrointestinal:  Soft, Non-tender, + BS	  Skin: No rashes, No ecchymoses, No cyanosis	  Neurologic: Non-focal  Extremities: No clubbing, cyanosis or edema  Vascular: Peripheral pulses palpable 2+ bilaterally        LABS:	 	      01-17    142  |  102  |  43<H>  ----------------------------<  126<H>  3.8   |  26  |  1.50<H>  01-16    141  |  100  |  44<H>  ----------------------------<  172<H>  3.9   |  28  |  1.58<H>    Ca    9.2      17 Jan 2024 05:33  Ca    9.2      16 Jan 2024 18:20  Phos  3.5     01-17  Phos  3.0     01-16  Mg     2.2     01-17  Mg     2.3     01-16      TELEMETRY: AF with occasional ventricular pacing 50-70s	      < from: TTE W or WO Ultrasound Enhancing Agent (01.11.24 @ 13:59) >  TRANSTHORACIC ECHOCARDIOGRAM REPORT  ________________________________________________________________________________                                      _______       Pt. Name:       DUDLEY BERMUDEZ Study Date:    1/11/2024  MRN:            [id number]      YOB: 1950  Accession #:    [id number]       Age:           73 years  Account#:       [id number]    Gender:        M  Heart Rate:     77 bpm          Height:        66.00 in (167.64 cm)  Rhythm:                         Weight:        230.00 lb (104.33 kg)  Blood Pressure: 96/62 mmHg      BSA/BMI:       2.12 m² / 37.12 kg/m²  ________________________________________________________________________________________  Referring Physician:    [id number] Anh March  Interpreting Physician: Rafi Martin M.D.  Primary Sonographer:    Brandan Merino RDCS    CPT:                ECHO TTE WITH CON COMP W DOPP - .m;DEFINITY ECHO                      CONTRAST PER ML - .m;DEFINITY ECHO CONTRAST PER ML                      WASTED - .m  Indication(s):      Heart failure, unspecified - I50.9  Procedure:          Transthoracic echocardiogram with 2-D, M-mode and complete                      spectral and color flow Doppler.  Ordering Location:  [facility] [place]  Admission Status:   Inpatient  Contrast Injection: Verbal consent was obtained for injection of Ultrasonic                      Enhancing Agent following a discussion of risks and                      benefits.                      Endocardial visualization enhanced with 2 ml of Definity                      Ultrasound enhancing agent ( Discarded Dose:8ml).  UEA Reaction:       Patient had no adverse reaction after injection of                      Ultrasound Enhancing Agent.  Study Information:  Image quality for this study is technically difficult.    _______________________________________________________________________________________     CONCLUSIONS:      1. Left ventricular cavity is mildly dilated. Left ventricular wall thickness is normal. Left ventricular systolic function is severely decreased with an ejection fraction of 15 % by Whitfield's method of disks. Global left ventricular hypokinesis.   2. There is severe (grade 3) left ventricular diastolic dysfunction.   3. Severely enlarged right ventricular cavity size, wall thickness, and reduced systolic function.   4. The left atrium is mildly dilated.   5. The right atrium is severely dilated in size.   6. Device lead is visualized in the right heart.   7. A TAVR valve replacement is present in the aortic position. is well seated with normal function.. No intravalvular regurgitation No paravalvular regurgitation.   8. No pericardial effusion seen.   9. Compared to the transthoracic echocardiogram performed on 10/1/2020, there have been no significant interval changes.  10. Technically difficult image quality.    ________________________________________________________________________________________  FINDINGS:     Left Ventricle:  After obtaining consent, Definity ultrasound enhancing agent was given for enhanced left ventricular opacification and improved delineation of the left ventricular endocardial borders. The left ventricular cavity is mildly dilated. Left ventricular wall thickness is normal. Left ventricular systolic functionis severely decreased with a calculated ejection fraction of 15 % by the Whitfield's biplane method of disks. There is global left ventricular hypokinesis. There is severe (grade 3) left ventricular diastolic dysfunction, with elevated filling pressure. There is no evidence of a left ventricular thrombus.     Right Ventricle:  The right ventricular cavity is severely enlarged in size, normal wall thickness and reduced systolic function. Tricuspid annular plane systolic excursion (TAPSE) is 1.2 cm (normal >=1.7 cm). Tricuspid annular tissue Doppler S' is 4.2 cm/s (normal >10 cm/s). A device lead is visualized in the right heart.     Left Atrium:  The left atrium is mildly dilated with an indexed volume of 38.54 ml/m².     Right Atrium:  The right atrium is severely dilated in size with an indexed volume of 47.58 ml/m².     Interatrial Septum:  The interatrial septum appears intact.     Aortic Valve:  A TAVR valve replacement is present in the aortic position ( a transcatheter deployed (TAVR). The prosthetic valve is well seated with normal function. There is no intravalvular regurgitation. There is no paravalvular regurgitation. The peak transaortic velocity is 1.29 m/s, peak transaortic gradient is 6.7 mmHg and mean transaortic gradientis 3.0 mmHg with an LVOT/aortic valve VTI ratio of 0.58. The aortic valve area is estimated at 2.01 cm² by the continuity equation.     Mitral Valve:  There is calcification of the mitral valve annulus. There is no mitral valve stenosis. There is trace mitral regurgitation.     Tricuspid Valve:  Structurally normal tricuspid valve with normal leaflet excursion. There is moderate tricuspid regurgitation. Estimated pulmonary artery systolic pressure is 38 mmHg.     Pulmonic Valve:  Structurally normal pulmonic valve with normal leaflet excursion. There is trace pulmonic regurgitation.     Aorta:  The aortic annulus and aortic root appear normal in size.     Pericardium:  No pericardial effusion seen.     Systemic Veins:  The inferior vena cava is dilated measuring 2.56 cm in diameter, (dilated >2.1cm) with normal inspiratory collapse (normal >50%) consistent with mildly elevated right atrial pressure (~8, range 5-10mmHg).  ____________________________________________________________________  QUANTITATIVE DATA:  Left Ventricle Measurements: (Indexed to BSA)     IVSd (2D):   0.8 cm  LVPWd (2D):  1.0 cm  LVIDd (2D):  6.2 cm  LVIDs (2D):  5.8 cm  LV Mass:     229 g  108.1 g/m²  LV Vol d, MOD A2C: 199.0 ml 93.75 ml/m²  LV Vol d, MOD A4C: 212.0 ml 99.88 ml/m²  LV Vol d, MOD BP:  205.1 ml 96.61 ml/m²  LV Vol s, MOD A2C: 154.0 ml 72.55 ml/m²  LV Vol s, MOD A4C: 177.0 ml 83.39 ml/m²  LV Vol s, MOD BP:  173.4 ml 81.71 ml/m²  LVOT SV MOD BP:    31.6 ml  LV EF% MOD BP:     15 %     MV E Vmax: 1.04 m/s  MV A Vmax:    0.26 m/s  MV E/A:       3.92  e' lateral:   8.27 cm/s  e' medial:    6.47 cm/s  E/e' lateral: 12.58  E/e' medial:  16.07  E/e' Average: 14.11    Aorta Measurements: (normal range) (Indexed to BSA)     Sinuses of Valsalva: 4.00 cm (3.1 - 3.7 cm)  Ao Asc prox:         4.10 cm       Left Atrium Measurements: (Indexed to BSA)  LA Diam 2D:        6.40 cm  LA Vol s, MOD A4C: 88.80 ml.  LA Vol s, MOD A2C: 73.70 ml.  LA Vol s, MOD BP:  81.80 ml  38.54 ml/m²    Right Ventricle Measurements: Right Atrial Measurements:     TAPSE:           1.2 cm       RA Vol:       101.00 ml  RV Base (RVID1): 6.0 cm       RA Vol Index: 47.58 ml/m²  RV Mid (RVID2):  4.3 cm       LVOT / RVOT/ Qp/Qs Data: (Indexed to BSA)  LVOT Diameter: 2.10 cm  LVOT Vmax:     0.74 m/s  LVOT VTI:      14.20 cm  LVOT SV:       49.2 ml  23.17 ml/m²    Aortic Valve Measurements:  AV Vmax:                1.3 m/s  AV Peak Gradient:       6.7 mmHg  AV Mean Gradient:       3.0 mmHg  AV VTI:                 24.5 cm  AV VTI Ratio:           0.58  AoV EOA, Contin:        2.01 cm²  AoV EOA, Contin i:      0.95 cm²/m²  AoV Dimensionless Index 0.58    Mitral Valve Measurements:     MV E Vmax: 1.0 m/s  MV A Vmax: 0.3 m/s  MV E/A:    3.9       Tricuspid Valve Measurements:     TR Vmax:          2.7 m/s  TR Peak Gradient: 29.6 mmHg  RA Pressure:      8 mmHg  PASP:             38 mmHg    < end of copied text >

## 2024-01-17 NOTE — PROGRESS NOTE ADULT - TIME BILLING
Review of tests, imaging, labs, documents, medical management, coordination of care and counseling.
conducting history and physical examination, reviewing and ordering diagnostic workup as above, discussing with consultants, determining acute diagnoses / plan for continued monitoring and management, and communication with patient.
Review of tests, imaging, labs, documents, medical management, coordination of care and counseling.
conducting history and physical examination, reviewing and ordering diagnostic workup as above, discussing with consultants, determining acute diagnoses / plan for continued monitoring and management, and communication with patient.

## 2024-01-17 NOTE — PROGRESS NOTE ADULT - PROBLEM SELECTOR PLAN 2
Also w/ hx of a-flutter and VT storm  -Telemetry  -Cont. Eliquis BID - holding home Carbamazepine for now as of 1/16 per EP as it interferes w/ Eliquis and pt needs to be fully anticoagulated for DCCV to be postponed to 1/17  -Cont. Toprol  -EP consulted to interrogate device and evaluate for BiV upgrade vs. afib ablation - they recommend to start amiodarone load with 400mg TID to 10G load and then 200mg QD thereafter (received 1.2G as of 6am today)   -s/p ANDREY/DCCV on 1/17  -Will need AF ablation as outpatient with Dr. Apple.  -Maintain K>4.0 and Mg>2.0

## 2024-01-17 NOTE — PROGRESS NOTE ADULT - ASSESSMENT
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 EP consulted for possible upgrade to CRT-D due to increased V pacing burden.     1. Persistent AF  2. ICM s/p MDT ICD    - Persistent AF since 10/2023  - Patient on Carbamazepine, which can lower serum Eliquis levels, now discontinued  - NPO for ANDRYE/DCCV today  - Remains on Lasix gtt - not on oxygen and able to lie flat  - Continue PO Amiodarone load 400 mg Q8H to 10 mg load, then 200 mg daily - received 6.0 gm as of this AM  - Will need outpatient monitoring of PFT/TFT/LFT/opthalmology monitoring while on Amiodarone  - Continue Eliquis 5mg Q12H (Age<80, Weight>60 kg) - has been on uninterrupted AC (pt took his own Eliquis on the day of admission on 1/10/24)  - Suspect high pacing burden due to LRL set at 70 BPM - now lowered to 50 bpm  - If pacing burden decreases in SR would not benefit from CRT upgrade as native QRSd~126ms  - Will need AF ablation as outpatient with Dr. Apple  - Close telemetry monitoring  - Maintain K>4.0 and Mg>2.0 73 year old male with PMH of CAD s/p late-presenting MI 2005 s/p PCI LAD, ICM/HFrEF (EF 15-20%, LVEDD 6 cm) s/p single chamber ICD with Estevan lead 2011 s/p lead extraction and upgrade to Medtronic dual-chamber ICD (8/17/21), has hx of appropriate ICD shock for VT/VF, s/p MEMS (9/16/21), bioprosthetic AVR (2015) 2/2 AI s/p 26 mm EVOLUT HECTOR for severe bioprosthetic AS (8/20/20), prior aflutter ablation 7/2017, persistent afib, s/p DCCV x2 (8/12/20, 7/7/21), Trigeminal neuralgia (4/2022) presented as direct admit due to persistently elevated filling pressure (PAD 40) on cardiomems. EP consulted for possible upgrade to CRT-D due to increased V pacing burden.     1. Persistent AF  2. ICM s/p MDT ICD    - Persistent AF since 10/2023  - Patient on Carbamazepine, which can lower serum Eliquis levels, now discontinued  - NPO for ANDREY/DCCV today  - Remains on Lasix gtt - not on oxygen and able to lie flat  - Continue PO Amiodarone load 400 mg Q8H to 10 mg load, then 200 mg daily - received 6.0 gm as of this AM  - Will need outpatient monitoring of PFT/TFT/LFT/opthalmology monitoring while on Amiodarone  - Continue Eliquis 5mg Q12H (Age<80, Weight>60 kg) - has been on uninterrupted AC (pt took his own Eliquis on the day of admission on 1/10/24)  - PYP scan pending  - Suspect high pacing burden due to LRL set at 70 BPM - now lowered to 50 bpm  - If pacing burden decreases in SR would not benefit from CRT upgrade as native QRSd~126ms  - Will need AF ablation as outpatient with Dr. Apple  - Close telemetry monitoring  - Maintain K>4.0 and Mg>2.0

## 2024-01-17 NOTE — PROGRESS NOTE ADULT - PROBLEM SELECTOR PLAN 3
c/w ASA and statin
S/p PCI  -Cont. asa and lipitor  -Telemetry
c/w ASA and statin
S/p PCI  -Cont. asa and lipitor  -Telemetry
c/w ASA and statin
c/w ASA and statin
S/p PCI  -Cont. asa and lipitor  -Telemetry
c/w ASA 81 mg daily and statin
S/p PCI  -Cont. asa and lipitor  -Telemetry
S/p PCI  -Cont. asa and lipitor  -Telemetry

## 2024-01-17 NOTE — PROGRESS NOTE ADULT - SUBJECTIVE AND OBJECTIVE BOX
INCOMPLETE NOTE    Sony Almanza M.D.  Division of Hospital Medicine  Available on [employer] TEAMS    SUBJECTIVE / ACUTE INTERVAL EVENTS: Patient seen and examined. No overnight events. No subjective complaints. Plan for ANDREY/DCCV per EP today. Per HF, PyP scan to r/o TTR amyloid ordered on 1/16, pending.     OBJECTIVE:   Vital Signs Last 24 Hrs  T(F): 97.3 (17 Jan 2024 04:31), Max: 97.5 (16 Jan 2024 20:51)  HR: 68 (17 Jan 2024 05:29) (51 - 68)  BP: 110/73 (17 Jan 2024 05:29) (97/62 - 132/89)  RR: 18 (17 Jan 2024 04:31) (18 - 18)  SpO2: 98% (17 Jan 2024 04:31) (98% - 99%)  Parameters below as of 17 Jan 2024 04:31  Patient On (Oxygen Delivery Method): room air    I&O's Summary  16 Jan 2024 07:01  -  17 Jan 2024 07:00  --------------------------------------------------------  IN: 750 mL / OUT: 2450 mL / NET: -1700 mL    Physical Examination:  GEN: elderly man, sitting up in chair in NAD  PSYCH: A&Ox3, mood and affect appear appropriate   NEURO: no focal neurologic deficits appreciated  RESPI: no accessory muscle use, B/L air entry  CARDIO: regular rate/rhythm, no LE edema B/L  ABD: soft, NT, ND  EXT: patient able to move all extremities spontaneously  VASC: peripheral pulses palpated    Labs:  01-17  142  |  102  |  43<H>  ----------------------------<  126<H>  3.8   |  26  |  1.50<H>    Ca    9.2      17 Jan 2024 05:33  Phos  3.5     01-17  Mg     2.2     01-17    Urinalysis Basic - ( 17 Jan 2024 05:33 )  Color: x / Appearance: x / SG: x / pH: x  Gluc: 126 mg/dL / Ketone: x  / Bili: x / Urobili: x   Blood: x / Protein: x / Nitrite: x   Leuk Esterase: x / RBC: x / WBC x   Sq Epi: x / Non Sq Epi: x / Bacteria: x    Consultant(s) Notes Reviewed: Heart Failure, Electrophysiology  Care Discussed with Consultants/Other Providers: STALIN Riggs    MEDICATIONS  (STANDING):  aMIOdarone    Tablet 400 milliGRAM(s) Oral every 8 hours  aMIOdarone    Tablet   Oral   apixaban 5 milliGRAM(s) Oral two times a day  aspirin enteric coated 81 milliGRAM(s) Oral daily  atorvastatin 40 milliGRAM(s) Oral at bedtime  furosemide Infusion 15 mG/Hr (7.5 mL/Hr) IV Continuous <Continuous>  levothyroxine 88 MICROGram(s) Oral daily  losartan 12.5 milliGRAM(s) Oral daily  metoprolol succinate ER 25 milliGRAM(s) Oral at bedtime  polyethylene glycol 3350 17 Gram(s) Oral daily  pregabalin 400 milliGRAM(s) Oral two times a day  spironolactone 25 milliGRAM(s) Oral every 12 hours    MEDICATIONS  (PRN):  acetaminophen     Tablet .. 650 milliGRAM(s) Oral every 6 hours PRN Temp greater or equal to 38C (100.4F), Mild Pain (1 - 3)  melatonin 3 milliGRAM(s) Oral at bedtime PRN Insomnia   INCOMPLETE NOTE    Sony Almanza M.D.  Division of Hospital Medicine  Available on [employer] TEAMS    SUBJECTIVE / ACUTE INTERVAL EVENTS: Patient seen and examined. No overnight events. No subjective complaints. Plan for ANDREY/DCCV per EP today. Per HF, PyP scan to r/o TTR amyloid ordered on 1/16, pending.     OBJECTIVE:   Vital Signs Last 24 Hrs  T(F): 97.3 (17 Jan 2024 04:31), Max: 97.5 (16 Jan 2024 20:51)  HR: 68 (17 Jan 2024 05:29) (51 - 68)  BP: 110/73 (17 Jan 2024 05:29) (97/62 - 132/89)  RR: 18 (17 Jan 2024 04:31) (18 - 18)  SpO2: 98% (17 Jan 2024 04:31) (98% - 99%)  Parameters below as of 17 Jan 2024 04:31  Patient On (Oxygen Delivery Method): room air    I&O's Summary  16 Jan 2024 07:01  -  17 Jan 2024 07:00  --------------------------------------------------------  IN: 750 mL / OUT: 2450 mL / NET: -1700 mL    Physical Examination:  GEN: elderly man, sitting up in chair in NAD  PSYCH: A&Ox3, mood and affect appear appropriate   NEURO: no focal neurologic deficits appreciated  RESPI: no accessory muscle use, B/L air entry  CARDIO: irregular rate/rhythm, no LE edema B/L  ABD: soft, NT, ND  EXT: patient able to move all extremities spontaneously  VASC: peripheral pulses palpated    Labs:  01-17  142  |  102  |  43<H>  ----------------------------<  126<H>  3.8   |  26  |  1.50<H>    Ca    9.2      17 Jan 2024 05:33  Phos  3.5     01-17  Mg     2.2     01-17    Urinalysis Basic - ( 17 Jan 2024 05:33 )  Color: x / Appearance: x / SG: x / pH: x  Gluc: 126 mg/dL / Ketone: x  / Bili: x / Urobili: x   Blood: x / Protein: x / Nitrite: x   Leuk Esterase: x / RBC: x / WBC x   Sq Epi: x / Non Sq Epi: x / Bacteria: x    Consultant(s) Notes Reviewed: Heart Failure, Electrophysiology  Care Discussed with Consultants/Other Providers: STALIN Riggs    MEDICATIONS  (STANDING):  aMIOdarone    Tablet 400 milliGRAM(s) Oral every 8 hours  aMIOdarone    Tablet   Oral   apixaban 5 milliGRAM(s) Oral two times a day  aspirin enteric coated 81 milliGRAM(s) Oral daily  atorvastatin 40 milliGRAM(s) Oral at bedtime  furosemide Infusion 15 mG/Hr (7.5 mL/Hr) IV Continuous <Continuous>  levothyroxine 88 MICROGram(s) Oral daily  losartan 12.5 milliGRAM(s) Oral daily  metoprolol succinate ER 25 milliGRAM(s) Oral at bedtime  polyethylene glycol 3350 17 Gram(s) Oral daily  pregabalin 400 milliGRAM(s) Oral two times a day  spironolactone 25 milliGRAM(s) Oral every 12 hours    MEDICATIONS  (PRN):  acetaminophen     Tablet .. 650 milliGRAM(s) Oral every 6 hours PRN Temp greater or equal to 38C (100.4F), Mild Pain (1 - 3)  melatonin 3 milliGRAM(s) Oral at bedtime PRN Insomnia Sony Almanza M.D.  Division of Hospital Medicine  Available on [employer] TEAMS    SUBJECTIVE / ACUTE INTERVAL EVENTS: Patient seen and examined with his wife at bedside. No overnight events. No subjective complaints. Plan for ANDREY/DCCV per EP today. Per HF, PyP scan to r/o TTR amyloid ordered on 1/16, to be performed on 1/17.     OBJECTIVE:   Vital Signs Last 24 Hrs  T(F): 97.3 (17 Jan 2024 04:31), Max: 97.5 (16 Jan 2024 20:51)  HR: 68 (17 Jan 2024 05:29) (51 - 68)  BP: 110/73 (17 Jan 2024 05:29) (97/62 - 132/89)  RR: 18 (17 Jan 2024 04:31) (18 - 18)  SpO2: 98% (17 Jan 2024 04:31) (98% - 99%)  Parameters below as of 17 Jan 2024 04:31  Patient On (Oxygen Delivery Method): room air    I&O's Summary  16 Jan 2024 07:01  -  17 Jan 2024 07:00  --------------------------------------------------------  IN: 750 mL / OUT: 2450 mL / NET: -1700 mL    Physical Examination:  GEN: elderly man, sitting up in chair in NAD  PSYCH: A&Ox3, mood and affect appear appropriate   NEURO: no focal neurologic deficits appreciated  RESPI: no accessory muscle use, B/L air entry  CARDIO: irregular rate/rhythm, no LE edema B/L  ABD: soft, NT, ND  EXT: patient able to move all extremities spontaneously  VASC: peripheral pulses palpated    Labs:  01-17  142  |  102  |  43<H>  ----------------------------<  126<H>  3.8   |  26  |  1.50<H>    Ca    9.2      17 Jan 2024 05:33  Phos  3.5     01-17  Mg     2.2     01-17    Urinalysis Basic - ( 17 Jan 2024 05:33 )  Color: x / Appearance: x / SG: x / pH: x  Gluc: 126 mg/dL / Ketone: x  / Bili: x / Urobili: x   Blood: x / Protein: x / Nitrite: x   Leuk Esterase: x / RBC: x / WBC x   Sq Epi: x / Non Sq Epi: x / Bacteria: x    Consultant(s) Notes Reviewed: Heart Failure, Electrophysiology  Care Discussed with Consultants/Other Providers: STALIN Riggs    MEDICATIONS  (STANDING):  aMIOdarone    Tablet 400 milliGRAM(s) Oral every 8 hours  aMIOdarone    Tablet   Oral   apixaban 5 milliGRAM(s) Oral two times a day  aspirin enteric coated 81 milliGRAM(s) Oral daily  atorvastatin 40 milliGRAM(s) Oral at bedtime  furosemide Infusion 15 mG/Hr (7.5 mL/Hr) IV Continuous <Continuous>  levothyroxine 88 MICROGram(s) Oral daily  losartan 12.5 milliGRAM(s) Oral daily  metoprolol succinate ER 25 milliGRAM(s) Oral at bedtime  polyethylene glycol 3350 17 Gram(s) Oral daily  pregabalin 400 milliGRAM(s) Oral two times a day  spironolactone 25 milliGRAM(s) Oral every 12 hours    MEDICATIONS  (PRN):  acetaminophen     Tablet .. 650 milliGRAM(s) Oral every 6 hours PRN Temp greater or equal to 38C (100.4F), Mild Pain (1 - 3)  melatonin 3 milliGRAM(s) Oral at bedtime PRN Insomnia

## 2024-01-17 NOTE — PROGRESS NOTE ADULT - PROBLEM SELECTOR PROBLEM 5
Trigeminal neuralgia

## 2024-01-17 NOTE — CHART NOTE - NSCHARTNOTEFT_GEN_A_CORE
Pre Procedure Note:    Procedure Service:  Cardiology   Procedure Name: Transesophageal Echo  Pre Procedure Evaluation: DCCV    HPI: 73 year old male with PMH of CAD s/p late-presenting MI 2005 s/p PCI LAD, ICM/HFrEF (EF 15-20%, LVEDD 6 cm) s/p single chamber ICD with [place] lead 2011 s/p lead extraction and upgrade to Medtronic dual-chamber ICD (8/17/21), has hx of appropriate ICD shock for VT/VF, s/p MEMS (9/16/21), bioprosthetic AVR (2015) 2/2 AI s/p 26 mm EVOLUT HECTOR for severe bioprosthetic AS (8/20/20), prior aflutter ablation 7/2017, persistent afib, s/p DCCV x2 (8/12/20, 7/7/21), Trigeminal neuralgia (4/2022) presented as direct admit due to persistently elevated filling pressure (PAD 40) on cardiomems.   In persistent AF, for which he is NPO for ANDREY/DCCV today     Any respiratory problems: Asthma, COPD, CONNER, recent respiratory illness? NO    Any history of Atlantoaxial disease and severe generalized cervical arthritis? NO    Oral/Dental history: Any dentures, removable, loose, broken tooth/teeth, mouth sores? NO    Significant dysphagia and odynophagia? NO    Any GI history of: Esophageal surgeries, strictures, diverticula, masses, varices, diaphragmatic hernia, stomach ulcers, stomach surgeries, bariatric surgery, GI bleed? NO    SOCIAL HISTORY:   [ ] Non-smoker [ ] Smoker [ ] Alcohol PRIOR SMOKER (QUIT 20 years ago)  ALLERGIES: No Known Allergies  MEDICATION: REVIEWED  REVIEW OF SYSTEMS:  CONSTITUTIONAL: No fever, weight loss, or fatigue  HEENT: No eye issues, No sinus or throat pain; No pain or stiffness at neck  RESPIRATORY: No cough, wheezing, chills or hemoptysis; Shortness of Breath  CARDIOVASCULAR: No chest pain, palpitations, passing out, dizziness, or leg swelling  GASTROINTESTINAL: No abd pain, nausea, vomiting diarrhea constipation. No melena or hematochezia.  NEUROLOGICAL: No headaches, memory loss, acute change in mental status  MUSCULOSKELETAL: No significant muscle, back, or extremity pain  HEME/LYMPH: No easy bruising, or bleeding gums    PHYSICAL EXAM:  Vital Signs:  BP    /      HR       O2 sat     Height      weight   Appearance: Normal	  HEENT:   Normal oral mucosa, PERRL, EOMI	  Cardiovascular: Regular, No JVD, No murmurs, No edema  Respiratory: Lungs clear to auscultation	  Gastrointestinal:  Soft, Non-tender, + BS	  Neurologic/PSY: Non-focal, A&O x 3  Extremities: No clubbing, cyanosis or edema    LABS: reviewed  CARDIAC TESTING/STUDIES:    [x] ECG  [x] Echocardiogram: EF 15%, grade 3DD  [x] Catheterization:  [ ] Stress Test:  	  [x] PPM/AICD: Afib with intermittent V pacing, AT/AF burden 100%     Plan: 	  ANDREY Procedure Candidate	YES    Day of Procedure Attestation:  I have personally seen, examined, and participated in the care of this patient. I have reviewed all pertinent information, including history, physical exam, sedation plan, and agree except as noted.    Attestation Statements:  I have personally seen and examined the patient.  I fully participated in the care of this patient.  I have made amendments to the documentation where necessary, and agree with the history, physical exam, and plan as documented by the Fellow.

## 2024-01-18 ENCOUNTER — TRANSCRIPTION ENCOUNTER (OUTPATIENT)
Age: 74
End: 2024-01-18

## 2024-01-18 ENCOUNTER — NON-APPOINTMENT (OUTPATIENT)
Age: 74
End: 2024-01-18

## 2024-01-18 VITALS — DIASTOLIC BLOOD PRESSURE: 72 MMHG | SYSTOLIC BLOOD PRESSURE: 106 MMHG | HEART RATE: 70 BPM

## 2024-01-18 LAB
ANION GAP SERPL CALC-SCNC: 12 MMOL/L — SIGNIFICANT CHANGE UP (ref 5–17)
BUN SERPL-MCNC: 45 MG/DL — HIGH (ref 7–23)
CALCIUM SERPL-MCNC: 9.2 MG/DL — SIGNIFICANT CHANGE UP (ref 8.4–10.5)
CHLORIDE SERPL-SCNC: 101 MMOL/L — SIGNIFICANT CHANGE UP (ref 96–108)
CO2 SERPL-SCNC: 28 MMOL/L — SIGNIFICANT CHANGE UP (ref 22–31)
CREAT SERPL-MCNC: 1.58 MG/DL — HIGH (ref 0.5–1.3)
EGFR: 46 ML/MIN/1.73M2 — LOW
GLUCOSE SERPL-MCNC: 124 MG/DL — HIGH (ref 70–99)
MAGNESIUM SERPL-MCNC: 2.3 MG/DL — SIGNIFICANT CHANGE UP (ref 1.6–2.6)
PHOSPHATE SERPL-MCNC: 3.5 MG/DL — SIGNIFICANT CHANGE UP (ref 2.5–4.5)
POTASSIUM SERPL-MCNC: 3.7 MMOL/L — SIGNIFICANT CHANGE UP (ref 3.5–5.3)
POTASSIUM SERPL-SCNC: 3.7 MMOL/L — SIGNIFICANT CHANGE UP (ref 3.5–5.3)
SODIUM SERPL-SCNC: 141 MMOL/L — SIGNIFICANT CHANGE UP (ref 135–145)

## 2024-01-18 PROCEDURE — 93320 DOPPLER ECHO COMPLETE: CPT

## 2024-01-18 PROCEDURE — 85610 PROTHROMBIN TIME: CPT

## 2024-01-18 PROCEDURE — 92960 CARDIOVERSION ELECTRIC EXT: CPT

## 2024-01-18 PROCEDURE — 36415 COLL VENOUS BLD VENIPUNCTURE: CPT

## 2024-01-18 PROCEDURE — 85730 THROMBOPLASTIN TIME PARTIAL: CPT

## 2024-01-18 PROCEDURE — 80053 COMPREHEN METABOLIC PANEL: CPT

## 2024-01-18 PROCEDURE — 93005 ELECTROCARDIOGRAM TRACING: CPT

## 2024-01-18 PROCEDURE — 84100 ASSAY OF PHOSPHORUS: CPT

## 2024-01-18 PROCEDURE — 99233 SBSQ HOSP IP/OBS HIGH 50: CPT

## 2024-01-18 PROCEDURE — 83735 ASSAY OF MAGNESIUM: CPT

## 2024-01-18 PROCEDURE — 93325 DOPPLER ECHO COLOR FLOW MAPG: CPT

## 2024-01-18 PROCEDURE — 83605 ASSAY OF LACTIC ACID: CPT

## 2024-01-18 PROCEDURE — 93312 ECHO TRANSESOPHAGEAL: CPT

## 2024-01-18 PROCEDURE — 85027 COMPLETE CBC AUTOMATED: CPT

## 2024-01-18 PROCEDURE — 85025 COMPLETE CBC W/AUTO DIFF WBC: CPT

## 2024-01-18 PROCEDURE — C8929: CPT

## 2024-01-18 PROCEDURE — 78830 RP LOCLZJ TUM SPECT W/CT 1: CPT

## 2024-01-18 PROCEDURE — 93288 INTERROG EVL PM/LDLS PM IP: CPT | Mod: 26

## 2024-01-18 PROCEDURE — 84484 ASSAY OF TROPONIN QUANT: CPT

## 2024-01-18 PROCEDURE — 99239 HOSP IP/OBS DSCHRG MGMT >30: CPT

## 2024-01-18 PROCEDURE — 80048 BASIC METABOLIC PNL TOTAL CA: CPT

## 2024-01-18 PROCEDURE — 83880 ASSAY OF NATRIURETIC PEPTIDE: CPT

## 2024-01-18 PROCEDURE — A9538: CPT

## 2024-01-18 PROCEDURE — 71045 X-RAY EXAM CHEST 1 VIEW: CPT

## 2024-01-18 PROCEDURE — 84443 ASSAY THYROID STIM HORMONE: CPT

## 2024-01-18 RX ORDER — SPIRONOLACTONE 25 MG/1
1 TABLET, FILM COATED ORAL
Qty: 60 | Refills: 0
Start: 2024-01-18 | End: 2024-02-16

## 2024-01-18 RX ORDER — DAPAGLIFLOZIN 10 MG/1
1 TABLET, FILM COATED ORAL
Qty: 30 | Refills: 0
Start: 2024-01-18 | End: 2024-02-16

## 2024-01-18 RX ORDER — POTASSIUM CHLORIDE 20 MEQ
40 PACKET (EA) ORAL ONCE
Refills: 0 | Status: DISCONTINUED | OUTPATIENT
Start: 2024-01-18 | End: 2024-01-18

## 2024-01-18 RX ORDER — POTASSIUM CHLORIDE 20 MEQ
40 PACKET (EA) ORAL ONCE
Refills: 0 | Status: COMPLETED | OUTPATIENT
Start: 2024-01-18 | End: 2024-01-18

## 2024-01-18 RX ORDER — DAPAGLIFLOZIN 10 MG/1
1 TABLET, FILM COATED ORAL
Refills: 0 | DISCHARGE

## 2024-01-18 RX ORDER — CARBAMAZEPINE 200 MG
1 TABLET ORAL
Refills: 0 | DISCHARGE

## 2024-01-18 RX ORDER — LOSARTAN POTASSIUM 100 MG/1
0.5 TABLET, FILM COATED ORAL
Refills: 0 | DISCHARGE

## 2024-01-18 RX ORDER — AMIODARONE HYDROCHLORIDE 400 MG/1
1 TABLET ORAL
Qty: 42 | Refills: 0
Start: 2024-01-18 | End: 2024-02-28

## 2024-01-18 RX ORDER — LOSARTAN POTASSIUM 100 MG/1
25 TABLET, FILM COATED ORAL DAILY
Refills: 0 | Status: DISCONTINUED | OUTPATIENT
Start: 2024-01-18 | End: 2024-01-18

## 2024-01-18 RX ORDER — LOSARTAN POTASSIUM 100 MG/1
1 TABLET, FILM COATED ORAL
Qty: 30 | Refills: 0
Start: 2024-01-18 | End: 2024-02-16

## 2024-01-18 RX ADMIN — Medication 400 MILLIGRAM(S): at 16:58

## 2024-01-18 RX ADMIN — APIXABAN 5 MILLIGRAM(S): 2.5 TABLET, FILM COATED ORAL at 05:39

## 2024-01-18 RX ADMIN — AMIODARONE HYDROCHLORIDE 400 MILLIGRAM(S): 400 TABLET ORAL at 13:31

## 2024-01-18 RX ADMIN — Medication 400 MILLIGRAM(S): at 05:38

## 2024-01-18 RX ADMIN — Medication 81 MILLIGRAM(S): at 11:22

## 2024-01-18 RX ADMIN — APIXABAN 5 MILLIGRAM(S): 2.5 TABLET, FILM COATED ORAL at 16:58

## 2024-01-18 RX ADMIN — Medication 40 MILLIEQUIVALENT(S): at 11:22

## 2024-01-18 RX ADMIN — Medication 88 MICROGRAM(S): at 05:38

## 2024-01-18 RX ADMIN — SPIRONOLACTONE 25 MILLIGRAM(S): 25 TABLET, FILM COATED ORAL at 05:38

## 2024-01-18 RX ADMIN — Medication 60 MILLIGRAM(S): at 16:59

## 2024-01-18 RX ADMIN — LOSARTAN POTASSIUM 12.5 MILLIGRAM(S): 100 TABLET, FILM COATED ORAL at 05:39

## 2024-01-18 RX ADMIN — AMIODARONE HYDROCHLORIDE 400 MILLIGRAM(S): 400 TABLET ORAL at 05:38

## 2024-01-18 NOTE — PHARMACOTHERAPY INTERVENTION NOTE - COMMENTS
Counseled patient on discharge medication doses, indications, and possible side effects. Provided and reviewed medication card for new medication amiodarone. Answered all of the patient's questions to the best of my ability. Patient exhibited understanding of amiodarone dosing schedule and discharge medication regimen. Discussed the importance of taking daily standing weights and contacting provider if there is significant weight gain in a short period of time.    Contacted [facility] to change prescription for torsemide 60 mg BID (not available) to 20 mg, 3 tabs BID. Patient aware of change.    Alma Gupta, PharmD, [facility]  Clinical Pharmacy Specialist  [phone] or Teams

## 2024-01-18 NOTE — DISCHARGE NOTE PROVIDER - ATTENDING DISCHARGE PHYSICAL EXAMINATION:
Patient seen and examined. Plan as discussed w/ HF, VALERIE Rausch, and patient along w/ his wife via telephone. He is stable and eager for discharge, with plan to obtain repeat blood work as ordered by HF on 1/19 AM at [facility] in order to monitor potassium levels and renal function. Medication adjustments made by HF communicated to patient and his wife along with follow-up appointments as scheduled and to be scheduled. Details documented thoroughly in hospital course.   Vital Signs Last 24 Hrs  T(F): 98.1 (18 Jan 2024 11:22), Max: 98.1 (18 Jan 2024 11:22)  HR: 68 (18 Jan 2024 13:10) (65 - 87)  BP: 98/64 (18 Jan 2024 13:10) (98/60 - 122/83)  RR: 18 (18 Jan 2024 11:22) (18 - 18)  SpO2: 98% (18 Jan 2024 11:22) (95% - 98%)  Parameters below as of 18 Jan 2024 11:22  Patient On (Oxygen Delivery Method): room air  Physical Examination:  GEN: elderly man, sitting up in chair in NAD  PSYCH: A&Ox3, mood and affect appear appropriate   NEURO: no focal neurologic deficits appreciated  RESPI: no accessory muscle use, B/L air entry  CARDIO: irregular rate/rhythm, no LE edema B/L  ABD: soft, NT, ND  EXT: patient able to move all extremities spontaneously  VASC: peripheral pulses palpated

## 2024-01-18 NOTE — PROGRESS NOTE ADULT - PROVIDER SPECIALTY LIST ADULT
Electrophysiology
Hospitalist
Hospitalist
Electrophysiology
Electrophysiology
Heart Failure
Hospitalist
Hospitalist
Electrophysiology
Electrophysiology
Hospitalist
Heart Failure
Heart Failure
Hospitalist
Heart Failure

## 2024-01-18 NOTE — PROGRESS NOTE ADULT - SUBJECTIVE AND OBJECTIVE BOX
24H hour events: Pt feeling well. Successful ANDREY DCCV yesterday. Now AP-VS with very long MA.     MEDICATIONS:  aMIOdarone    Tablet 400 milliGRAM(s) Oral every 8 hours  aMIOdarone    Tablet   Oral   apixaban 5 milliGRAM(s) Oral two times a day  aspirin enteric coated 81 milliGRAM(s) Oral daily  furosemide Infusion 15 mG/Hr IV Continuous <Continuous>  losartan 12.5 milliGRAM(s) Oral daily  metoprolol succinate ER 25 milliGRAM(s) Oral at bedtime  spironolactone 25 milliGRAM(s) Oral every 12 hours        acetaminophen     Tablet .. 650 milliGRAM(s) Oral every 6 hours PRN  melatonin 3 milliGRAM(s) Oral at bedtime PRN  pregabalin 400 milliGRAM(s) Oral two times a day    polyethylene glycol 3350 17 Gram(s) Oral daily    atorvastatin 40 milliGRAM(s) Oral at bedtime  levothyroxine 88 MICROGram(s) Oral daily    potassium chloride    Tablet ER 40 milliEquivalent(s) Oral once      REVIEW OF SYSTEMS:  See HPI, otherwise ROS negative.    PHYSICAL EXAM:  T(C): 36.6 (01-18-24 @ 04:12), Max: 36.6 (01-17-24 @ 20:56)  HR: 65 (01-18-24 @ 04:12) (61 - 87)  BP: 122/83 (01-18-24 @ 04:12) (102/61 - 122/83)  RR: 18 (01-18-24 @ 04:12) (18 - 18)  SpO2: 97% (01-18-24 @ 04:12) (95% - 100%)  Wt(kg): --  I&O's Summary    17 Jan 2024 07:01  -  18 Jan 2024 07:00  --------------------------------------------------------  IN: 660 mL / OUT: 2550 mL / NET: -1890 mL    18 Jan 2024 07:01  -  18 Jan 2024 11:22  --------------------------------------------------------  IN: 360 mL / OUT: 0 mL / NET: 360 mL        Appearance: Alert. NAD	  HEENT:   NC/AT	  Cardiovascular: +S1S2 RRR no m/g/r  Respiratory: CTA B/L	  Psychiatry: A & O x 3, Mood & affect appropriate  Gastrointestinal:  Soft	  Skin: No rashes	  Neurologic: Non-focal  Extremities: No edema BLE      LABS:	 	      01-18    141  |  101  |  45<H>  ----------------------------<  124<H>  3.7   |  28  |  1.58<H>  01-17    141  |  99  |  44<H>  ----------------------------<  127<H>  4.2   |  29  |  1.73<H>    Ca    9.2      18 Jan 2024 06:20  Ca    9.8      17 Jan 2024 17:50  Phos  3.5     01-18  Phos  3.4     01-17  Mg     2.3     01-18  Mg     2.3     01-17              TELEMETRY: AP-VS 60-70s, 3 1 minute long episodes of V-pacing overnight, otherwise AP-VS  	    ECG:  AP-VS	      	  ASSESSMENT/PLAN:

## 2024-01-18 NOTE — DISCHARGE NOTE PROVIDER - CARE PROVIDERS DIRECT ADDRESSES
[email or web address]

## 2024-01-18 NOTE — PROGRESS NOTE ADULT - ASSESSMENT
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 EP consulted for possible upgrade to CRT-D due to increased V pacing burden.     1. Persistent AF  2. ICM s/p MDT ICD    - Persistent AF since 10/2023  - Patient on Carbamazepine, which can lower serum Eliquis levels, now discontinued  - s/p successful ANDREY/DCCV 1/17/27. Now he is AP-VS with very long CA interval (~490ms)  - Remains on Lasix gtt - not on oxygen and able to lie flat  - Continue PO Amiodarone load 400 mg Q8H to 10 mg load, then 200 mg daily - received 7.2 gm as of 1/18 AM. Continue 400mg q8h for 7 more doses then decrease to 200mg daily (start 200mg daily 1/21 AM).   - Will need outpatient monitoring of PFT/TFT/LFT/opthalmology monitoring while on Amiodarone  - Continue Eliquis 5mg Q12H (Age<80, Weight>60 kg) - has been on uninterrupted AC (pt took his own Eliquis on the day of admission on 1/10/24)  - PYP scan pending  - Suspect high pacing burden due to LRL set at 70 BPM - now lowered to 50 bpm  - If pacing burden decreases in SR would not benefit from CRT upgrade as native QRSd~126ms  - Depending on ventricular pacing burden moving forward, consider upgrade to CRT-D. Scheduled f/u appt with Dr Rodriguez 2/12/24  - Will need AF ablation as outpatient with Dr. Apple  - Close telemetry monitoring  - Maintain K>4.0 and Mg>2.0 73 year old male with PMH of CAD s/p late-presenting MI 2005 s/p PCI LAD, ICM/HFrEF (EF 15-20%, LVEDD 6 cm) s/p single chamber ICD with [place] lead 2011 s/p lead extraction and upgrade to Medtronic dual-chamber ICD (8/17/21), has hx of appropriate ICD shock for VT/VF, s/p MEMS (9/16/21), bioprosthetic AVR (2015) 2/2 AI s/p 26 mm EVOLUT HECTOR for severe bioprosthetic AS (8/20/20), prior aflutter ablation 7/2017, persistent afib, s/p DCCV x2 (8/12/20, 7/7/21), Trigeminal neuralgia (4/2022) presented as direct admit due to persistently elevated filling pressure (PAD 40) on cardiomems. EP consulted for possible upgrade to CRT-D due to increased V pacing burden.     1. Persistent AF  2. ICM s/p MDT ICD    - Persistent AF since 10/2023  - Patient on Carbamazepine, which can lower serum Eliquis levels, now discontinued  - s/p successful ANDREY/DCCV 1/17/27. Now he is AP-VS with very long CT interval (~490ms)  - Remains on Lasix gtt - not on oxygen and able to lie flat  - Continue PO Amiodarone load 400 mg Q8H to 10 mg load, then 200 mg daily - received 7.2 gm as of 1/18 AM. Continue 400mg q8h for 7 more doses then decrease to 200mg daily (start 200mg daily 1/21 AM).   - Will need outpatient monitoring of PFT/TFT/LFT/opthalmology monitoring while on Amiodarone  - Continue Eliquis 5mg Q12H (Age<80, Weight>60 kg) - has been on uninterrupted AC (pt took his own Eliquis on the day of admission on 1/10/24)  - PYP scan pending  - Suspect high pacing burden due to LRL set at 70 BPM - now lowered to 50 bpm  - EP will sign off, please call back with questions  - If pacing burden decreases in SR would not benefit from CRT upgrade as native QRSd~126ms  - Depending on ventricular pacing burden moving forward, consider upgrade to CRT-D. Scheduled f/u appt with Dr Rodriguez 2/12/24  - Will need AF ablation as outpatient with Dr. Apple  - Close telemetry monitoring  - Maintain K>4.0 and Mg>2.0 73 year old male with PMH of CAD s/p late-presenting MI 2005 s/p PCI LAD, ICM/HFrEF (EF 15-20%, LVEDD 6 cm) s/p single chamber ICD with [place] lead 2011 s/p lead extraction and upgrade to Medtronic dual-chamber ICD (8/17/21), has hx of appropriate ICD shock for VT/VF, s/p MEMS (9/16/21), bioprosthetic AVR (2015) 2/2 AI s/p 26 mm EVOLUT HECTOR for severe bioprosthetic AS (8/20/20), prior aflutter ablation 7/2017, persistent afib, s/p DCCV x2 (8/12/20, 7/7/21), Trigeminal neuralgia (4/2022) presented as direct admit due to persistently elevated filling pressure (PAD 40) on cardiomems. EP consulted for possible upgrade to CRT-D due to increased V pacing burden.     1. Persistent AF  2. ICM s/p MDT ICD    - Persistent AF since 10/2023  - Patient on Carbamazepine, which can lower serum Eliquis levels, now discontinued  - s/p successful ANDREY/DCCV 1/17/27. Now he is AP-VS with very long KS interval (~490ms)  - Remains on Lasix gtt - not on oxygen and able to lie flat  - Continue PO Amiodarone load 400 mg Q8H to 10 mg load, then 200 mg daily - received 7.2 gm as of 1/18 AM. Continue 400mg q8h for 7 more doses then decrease to 200mg daily (start 200mg daily 1/21 AM).   - Will need outpatient monitoring of PFT/TFT/LFT/opthalmology monitoring while on Amiodarone  - Continue Eliquis 5mg Q12H (Age<80, Weight>60 kg) - has been on uninterrupted AC (pt took his own Eliquis on the day of admission on 1/10/24)  - PYP scan pending  - Pt reprogrammed today from AAIR<=>DDDR with lower rate of 60 to 70bpm per Heart Failure request. Still AP-VS at this rate  - EP will sign off, please call back with questions  - If pacing burden decreases in SR would not benefit from CRT upgrade as native QRSd~126ms  - Depending on ventricular pacing burden moving forward, consider upgrade to CRT-D. Scheduled f/u appt with Dr Rodriguez 2/12/24  - Will need AF ablation as outpatient with Dr. Apple  - Close telemetry monitoring  - Maintain K>4.0 and Mg>2.0

## 2024-01-18 NOTE — DISCHARGE NOTE PROVIDER - NSDCMRMEDTOKEN_GEN_ALL_CORE_FT
aspirin 81 mg oral delayed release capsule: 1 cap(s) orally once a day  atorvastatin 40 mg oral tablet: 1 tab(s) orally once a day (at bedtime)  Eliquis 5 mg oral tablet: 1 tab(s) orally 2 times a day  Farxiga 10 mg oral tablet: 1 orally once a day  levothyroxine 88 mcg (0.088 mg) oral capsule: 1 orally once a day  losartan 25 mg oral tablet: 0.5 tab(s) orally 2 times a day  Lyrica 200 mg oral capsule: 2 cap(s) orally 2 times a day  metoprolol succinate 25 mg oral capsule, extended release: 1 orally once a day (at bedtime)  spironolactone 25 mg oral tablet: 1 tab(s) orally once a day  Tegretol  mg oral tablet, extended release: 1 orally 2 times a day  torsemide 20 mg oral tablet: 2 tab(s) orally once a day   amiodarone 200 mg oral tablet: 1 tab(s) orally once a day Please take 400mg every 8hrs till Saturday evening(1/20/24), then 200mg daily starting Sunday morning (1/21/24)  aspirin 81 mg oral delayed release capsule: 1 cap(s) orally once a day  atorvastatin 40 mg oral tablet: 1 tab(s) orally once a day (at bedtime)  Eliquis 5 mg oral tablet: 1 tab(s) orally 2 times a day  Farxiga 5 mg oral tablet: 1 tab(s) orally once a day  levothyroxine 88 mcg (0.088 mg) oral capsule: 1 orally once a day  losartan 25 mg oral tablet: 1 tab(s) orally once a day  Lyrica 200 mg oral capsule: 2 cap(s) orally 2 times a day  metoprolol succinate 25 mg oral capsule, extended release: 1 orally once a day (at bedtime)  spironolactone 25 mg oral tablet: 1 tab(s) orally every 12 hours  torsemide 60 mg oral tablet: 1 tab(s) orally 2 times a day

## 2024-01-18 NOTE — DISCHARGE NOTE PROVIDER - HOSPITAL COURSE
HPI: 73M w/ h/o CAD s/p late-presenting MI 2005 s/p PCI LAD, ICM/HFrEF (EF 15-20%, LVEDD 6 cm) s/p single chamber ICD with lead revision in 2018 with upgrade to dual-chamber device (8/17/21) s/p MEMS (9/16/21), bioprosthetic AVR (2015) 2/2 AI s/p 26 mm EVOLUT HECTOR for severe bioprosthetic AS (8/20/20), aflutter ablation 7/17 w/ persistent afib, s/p DCCV x2 (8/12/20, 7/7/21), Trigeminal neuralgia (4/2022) p/w elevated filling pressure (PAD 40) on cardiomems. Patient was in his usual state of health but for past several days has elevated cardiomems PAD 40. Denies any significant change in symptoms. Doubled home torsemide dose 40mg to BID Wed, Th, Fri and then again Sunday. Pt endorsed increased urination but still elevated reads. Weight close to baseline. Advised to come to hospital for direct admit yesterday by HF Dr. Quiñones. Declined to come given storm and living far away last night. Came today. Denies any chest pain, palpitations, fevers, chills, cough, increased LE edema, orthopnea. (10 Jabari 2024 21:59)    Hospital Course:   HFrEF (EF 15-20%, LVEDD 6 cm) s/p single chamber ICD with lead revision in 2018 with upgrade to dual-chamber device (8/17/21) s/p MEMS (9/16/21)  GDMT:  BB- c/w Toprol 25mg qd  ACE/ARB/ARNI- c/w losartan BID - holding as of 1/14  MRA: c/w spironolactone BID - decreased dose on 1/14  SGLT2: resume home Farxiga on dc   on Lasix gtt per HF - increased dosing of gtt as of 1/15 per HF attending w/ additional IV dose x1  monitor fluid status, renal function, and electrolytes closely - f/u HF on 1/17 for diuretics plan in anticipation of discharge on 1/18  PyP scan to r/o TTR amyloid performed on 1/17: as reported = Cardiac amyloid Imaging study is  not suggestive of transthyretin cardiac amyloidosis.    Persistent atrial fibrillation w/ hx of a-flutter and VT storm  Cont. Eliquis BID  Cont. Toprol  EP consulted to interrogate device and evaluate for BiV upgrade vs. afib ablation - they recommend to start amiodarone load with 400mg TID to 10G load and then 200mg QD thereafter (received 1.2G as of 6am today)   s/p ANDREY/DCCV on 1/17  Will need AF ablation as outpatient with Dr. Apple.  Maintain K>4.0 and Mg>2.0.    CAD (coronary artery disease).   S/p PCI  Cont. asa and lipitor    Hypothyroid.   -Cont. levothyroxine  -TSH 2.4.    Trigeminal neuralgia.    -Cont. Tegretol BID - holding as of 1/16 as it interferes w/ Eliquis and patient needs to be fully anticoagulated before DCCV -- discussed w/ patient, to be resumed on discharge  -Cont. Lyrica BID.    Important Medication Changes and Reason:  ?Losartan  ?Spironolactone  ?Diuretic  ?Amiodarone  Resume home Tegretol on discharge.     Active or Pending Issues Requiring Follow-up:  F/U Dr. Froilan Quiñones / HF  ?Will need AF ablation as outpatient with Dr. Apple.    Advanced Directives:   [X] Full code  [ ] DNR  [ ] Hospice    Discharge Diagnoses:  Acute on chronic systolic congestive heart failure  Chronic atrial fibrillation  s/p ANDREY/DCCV  CAD w/ stable angina  Trigeminal neuralgia  Hypothyroidism           HPI: 73M w/ h/o CAD s/p late-presenting MI 2005 s/p PCI LAD, ICM/HFrEF (EF 15-20%, LVEDD 6 cm) s/p single chamber ICD with lead revision in 2018 with upgrade to dual-chamber device (8/17/21) s/p MEMS (9/16/21), bioprosthetic AVR (2015) 2/2 AI s/p 26 mm EVOLUT HECTOR for severe bioprosthetic AS (8/20/20), aflutter ablation 7/17 w/ persistent afib, s/p DCCV x2 (8/12/20, 7/7/21), Trigeminal neuralgia (4/2022) p/w elevated filling pressure (PAD 40) on cardiomems. Patient was in his usual state of health but for past several days has elevated cardiomems PAD 40. Denies any significant change in symptoms. Doubled home torsemide dose 40mg to BID Wed, Th, Fri and then again Sunday. Pt endorsed increased urination but still elevated reads. Weight close to baseline. Advised to come to hospital for direct admit yesterday by HF Dr. Quiñones. Declined to come given storm and living far away last night. Came today. Denies any chest pain, palpitations, fevers, chills, cough, increased LE edema, orthopnea. (10 Jabari 2024 21:59)    Hospital Course:   HFrEF (EF 15-20%, LVEDD 6 cm) s/p single chamber ICD with lead revision in 2018 with upgrade to dual-chamber device (8/17/21) s/p MEMS (9/16/21)  GDMT:  BB- c/w Toprol 25mg qd  ACE/ARB/ARNI- c/w losartan BID - holding as of 1/14  MRA: c/w spironolactone BID - decreased dose on 1/14  SGLT2: resume home Farxiga on dc   on Lasix gtt per HF - increased dosing of gtt as of 1/15 per HF attending w/ additional IV dose x1  monitor fluid status, renal function, and electrolytes closely - f/u HF on 1/17 for diuretics plan in anticipation of discharge on 1/18  PyP scan to r/o TTR amyloid performed on 1/17: as reported = Cardiac amyloid Imaging study is  not suggestive of transthyretin cardiac amyloidosis.    Persistent atrial fibrillation w/ hx of a-flutter and VT storm  Cont. Eliquis BID  Cont. Toprol  Appreciate EP:   Continue PO Amiodarone load 400 mg Q8H to 10 mg load, then 200 mg daily - received 7.2 gm as of 1/18 AM. Continue 400mg q8h for 7 more doses then decrease to 200mg daily (start 200mg daily 1/21 AM).   s/p successful ANDREY/DCCV 1/17/27. Now he is AP-VS with very long HI interval (~490ms)  Suspect high pacing burden due to LRL set at 70 BPM - now lowered to 50 bpm  If pacing burden decreases in SR would not benefit from CRT upgrade as native QRSd~126ms  Depending on ventricular pacing burden moving forward, consider upgrade to CRT-D. Scheduled f/u appt with Dr Rodriguez 2/12/24  Will need AF ablation as outpatient with Dr. Apple    CAD (coronary artery disease).   S/p PCI  Cont. asa and lipitor    Hypothyroid.   -Cont. levothyroxine  -TSH 2.4.    Trigeminal neuralgia.   -Cont. Tegretol BID - holding as of 1/16 as it interferes w/ Eliquis and patient needs to be fully anticoagulated before DCCV -- discussed w/ patient, to be resumed on discharge  -Cont. Lyrica BID.    Important Medication Changes and Reason:  ?Losartan  ?Spironolactone  ?Diuretic  - Continue PO Amiodarone 400mg q8h for 7 more doses (as of 1/18) then decrease to 200mg daily (start 200mg daily 1/21 AM).   - Resume home Tegretol on discharge.     Active or Pending Issues Requiring Follow-up:  F/U Dr. Froilan Quiñones / HF  Scheduled f/u appt with Dr Rodriguez 2/12/24  Will need outpatient monitoring of PFT/TFT/LFT/opthalmology monitoring while on Amiodarone - close follow-up with PMD and cardiology  Will need AF ablation as outpatient with Dr. Apple.    Advanced Directives:   [X] Full code  [ ] DNR  [ ] Hospice    Discharge Diagnoses:  Acute on chronic systolic congestive heart failure  Persistent atrial fibrillation  s/p ANDREY/DCCV  CAD w/ stable angina  Trigeminal neuralgia  Hypothyroidism           HPI: 73M w/ h/o CAD s/p late-presenting MI 2005 s/p PCI LAD, ICM/HFrEF (EF 15-20%, LVEDD 6 cm) s/p single chamber ICD with lead revision in 2018 with upgrade to dual-chamber device (8/17/21) s/p MEMS (9/16/21), bioprosthetic AVR (2015) 2/2 AI s/p 26 mm EVOLUT HECTOR for severe bioprosthetic AS (8/20/20), aflutter ablation 7/17 w/ persistent afib, s/p DCCV x2 (8/12/20, 7/7/21), Trigeminal neuralgia (4/2022) p/w elevated filling pressure (PAD 40) on cardiomems. Patient was in his usual state of health but for past several days has elevated cardiomems PAD 40. Denies any significant change in symptoms. Doubled home torsemide dose 40mg to BID Wed, Th, Fri and then again Sunday. Pt endorsed increased urination but still elevated reads. Weight close to baseline. Advised to come to hospital for direct admit yesterday by HF Dr. Quiñones. Declined to come given storm and living far away last night. Came today. Denies any chest pain, palpitations, fevers, chills, cough, increased LE edema, orthopnea. (10 Jabari 2024 21:59)    Hospital Course:   HFrEF (EF 15-20%, LVEDD 6 cm) s/p single chamber ICD with lead revision in 2018 with upgrade to dual-chamber device (8/17/21) s/p MEMS (9/16/21)  GDMT:  BB- c/w Toprol 25mg qd  ACE/ARB/ARNI- c/w losartan BID - held as of 1/14, to be resumed on discharge  MRA: c/w spironolactone BID   SGLT2: resume home Farxiga on dc at lower dose  on Lasix gtt per HF - increased dosing of gtt as of 1/15 per HF attending w/ additional IV dose x1 - on discharge patient to increase home Torsemide dose as below  monitor fluid status, renal function, and electrolytes closely - f/u HF on 1/17 for diuretics plan in anticipation of discharge on 1/18  PyP scan to r/o TTR amyloid performed on 1/17: as reported = Cardiac amyloid Imaging study is  not suggestive of transthyretin cardiac amyloidosis.    Persistent atrial fibrillation w/ hx of a-flutter and VT storm  Cont. Eliquis BID  Cont. Toprol  Appreciate EP:   Continue PO Amiodarone load 400 mg Q8H to 10 mg load, then 200 mg daily - received 7.2 gm as of 1/18 AM. Continue 400mg q8h for 7 more doses then decrease to 200mg daily (start 200mg daily 1/21 AM).   s/p successful ANDREY/DCCV 1/17/27. Now he is AP-VS with very long [place] interval (~490ms)  Suspect high pacing burden due to LRL set at 70 BPM - now lowered to 50 bpm  If pacing burden decreases in SR would not benefit from CRT upgrade as native QRSd~126ms  Depending on ventricular pacing burden moving forward, consider upgrade to CRT-D. Scheduled f/u appt with Dr Rodriguez 2/12/24  Will need AF ablation as outpatient with Dr. Apple    CAD (coronary artery disease).   S/p PCI  Cont. asa and lipitor    Hypothyroid.   -Cont. levothyroxine  -TSH 2.4.    Trigeminal neuralgia.   -Cont. Tegretol BID - holding as of 1/16 as it interferes w/ Eliquis and patient needs to be fully anticoagulated before DCCV -- to be followed up by O/P PCP  -Cont. Lyrica BID.    Important Medication Changes and Reason:  - NEW MEDICATIONS: Continue PO Amiodarone 400mg q8h for 7 more doses (as of 1/18) then decrease to 200mg daily (start 200mg daily 1/21 AM).   - CHANGE HOME MEDICATIONS:   	- Increase Torsemide to 60mg twice a day,   	- Decrease Farxiga to 5mg daily  	- Switch Losartan to 25mg daily   	- Maintain Spironolactone 25mg twice a day  - STOP HOME MEDICATIONS: Tegretol until cleared by HF/PCP (as it interferes with Eliquis and you underwent cardioversion procedure on 1/17)    Active or Pending Issues Requiring Follow-up:  PLEASE OBTAIN LABS AS OUTPATIENT AT [facility] on 1/19/24 as ordered by Heart Failure  F/U with Dr. Froilan Quiñones / HF - appointment scheduled by Heart Failure  F/U with Dr Rodriguez on 2/12/24 - appointment scheduled by Electrophysiology  Will need outpatient monitoring of PFT/TFT/LFT/opthalmology monitoring while on Amiodarone - close follow-up with your primary care doctor within 7 days of discharge  Will need AF ablation as outpatient with Dr. Apple - please follow-up with him in 2 weeks    Advanced Directives:   [X] Full code  [ ] DNR  [ ] Hospice    Discharge Diagnoses:  Acute on chronic systolic congestive heart failure  Persistent atrial fibrillation  s/p ANDREY/DCCV  CAD w/ stable angina  Trigeminal neuralgia  Hypothyroidism           HPI: 73M w/ h/o CAD s/p late-presenting MI 2005 s/p PCI LAD, ICM/HFrEF (EF 15-20%, LVEDD 6 cm) s/p single chamber ICD with lead revision in 2018 with upgrade to dual-chamber device (8/17/21) s/p MEMS (9/16/21), bioprosthetic AVR (2015) 2/2 AI s/p 26 mm EVOLUT HECTOR for severe bioprosthetic AS (8/20/20), aflutter ablation 7/17 w/ persistent afib, s/p DCCV x2 (8/12/20, 7/7/21), Trigeminal neuralgia (4/2022) p/w elevated filling pressure (PAD 40) on cardiomems. Patient was in his usual state of health but for past several days has elevated cardiomems PAD 40. Denies any significant change in symptoms. Doubled home torsemide dose 40mg to BID Wed, Th, Fri and then again Sunday. Pt endorsed increased urination but still elevated reads. Weight close to baseline. Advised to come to hospital for direct admit yesterday by HF Dr. Quiñones. Declined to come given storm and living far away last night. Came today. Denies any chest pain, palpitations, fevers, chills, cough, increased LE edema, orthopnea. (10 Jabari 2024 21:59)    Hospital Course:   HFrEF (EF 15-20%, LVEDD 6 cm) s/p single chamber ICD with lead revision in 2018 with upgrade to dual-chamber device (8/17/21) s/p MEMS (9/16/21)  GDMT:  BB- c/w Toprol 25mg qd  ACE/ARB/ARNI- c/w losartan BID - held as of 1/14, to be resumed on discharge  MRA: c/w spironolactone BID   SGLT2: resume home Farxiga on dc at lower dose  on Lasix gtt per HF - increased dosing of gtt as of 1/15 per HF attending w/ additional IV dose x1 - on discharge patient to increase home Torsemide dose as below  monitor fluid status, renal function, and electrolytes closely - f/u HF on 1/17 for diuretics plan in anticipation of discharge on 1/18  PyP scan to r/o TTR amyloid performed on 1/17: as reported = Cardiac amyloid Imaging study is  not suggestive of transthyretin cardiac amyloidosis.    Persistent atrial fibrillation w/ hx of a-flutter and VT storm  Cont. Eliquis BID  Cont. Toprol  Appreciate EP:   Continue PO Amiodarone load 400 mg Q8H to 10 mg load, then 200 mg daily - received 7.2 gm as of 1/18 AM. Continue 400mg q8h for 7 more doses then decrease to 200mg daily (start 200mg daily 1/21 AM).   s/p successful ANDREY/DCCV 1/17/27. Now he is AP-VS with very long HI interval (~490ms)  Scheduled f/u appt with Dr Rodriguez 2/12/24  Will need AF ablation as outpatient with Dr. Apple    CAD (coronary artery disease).   S/p PCI  Cont. asa and lipitor    Hypothyroid.   -Cont. levothyroxine  -TSH 2.4.    Trigeminal neuralgia.   -Cont. Tegretol BID - holding as of 1/16 as it interferes w/ Eliquis and patient needs to be fully anticoagulated before DCCV -- to be followed up by O/P PCP  -Cont. Lyrica BID.    Important Medication Changes and Reason:  - NEW MEDICATIONS: Continue PO Amiodarone 400mg q8h for 7 more doses (as of 1/18) then decrease to 200mg daily (start 200mg daily 1/21 AM).   - CHANGE HOME MEDICATIONS:   	- Increase Torsemide to 60mg twice a day,   	- Decrease Farxiga to 5mg daily  	- Switch Losartan to 25mg daily   	- Maintain Spironolactone 25mg twice a day  - STOP HOME MEDICATIONS: Tegretol until cleared by HF/PCP (as it interferes with Eliquis and you underwent cardioversion procedure on 1/17)    Active or Pending Issues Requiring Follow-up:  PLEASE OBTAIN LABS AS OUTPATIENT AT [facility] on 1/19/24 as ordered by Heart Failure  F/U with Dr. Froilan Quiñones / HF - appointment scheduled by Heart Failure  F/U with Dr Rodriguez on 2/12/24 - appointment scheduled by Electrophysiology  Will need outpatient monitoring of PFT/TFT/LFT/opthalmology monitoring while on Amiodarone - close follow-up with your primary care doctor within 7 days of discharge  Will need AF ablation as outpatient with Dr. Apple - please follow-up with him in 2 weeks    Advanced Directives:   [X] Full code  [ ] DNR  [ ] Hospice    Discharge Diagnoses:  Acute on chronic systolic congestive heart failure  Persistent atrial fibrillation  s/p ANDREY/DCCV  CAD w/ stable angina  Trigeminal neuralgia  Hypothyroidism

## 2024-01-18 NOTE — DISCHARGE NOTE NURSING/CASE MANAGEMENT/SOCIAL WORK - PATIENT PORTAL LINK FT
You can access the FollowMyHealth Patient Portal offered by [facility] by registering at the following website: [email or web address]. By joining [employer]’s FollowMyHealth portal, you will also be able to view your health information using other applications (apps) compatible with our system.

## 2024-01-18 NOTE — DISCHARGE NOTE PROVIDER - NSDCFUADDINST_GEN_ALL_CORE_FT
Important Medication Changes and Reason:  - NEW MEDICATIONS: Continue PO Amiodarone 400mg q8h for 7 more doses (as of 1/18) then decrease to 200mg daily (start 200mg daily 1/21 AM).   - CHANGE HOME MEDICATIONS:   	- Increase Torsemide to 60mg twice a day,   	- Decrease Farxiga to 5mg daily  	- Switch Losartan to 25mg daily   	- Maintain Spironolactone 25mg twice a day  - STOP HOME MEDICATIONS: Tegretol until cleared by HF/PCP (as it interferes with Eliquis and you underwent cardioversion procedure on 1/17)

## 2024-01-18 NOTE — PROGRESS NOTE ADULT - NS ATTEND AMEND GEN_ALL_CORE FT
73M w/ h/o CAD s/p late-presenting MI 2005 s/p PCI LAD, ICM/HFrEF (EF 15-20%, LVEDD 6 cm) s/p single chamber ICD with lead revision in 2018 with upgrade to dual-chamber device (8/17/21) s/p MEMS (9/16/21), bioprosthetic AVR (2015) 2/2 AI s/p 26 mm EVOLUT HECTOR for severe bioprosthetic AS (8/20/20), aflutter ablation 7/17 w/ persistent afib, s/p DCCV x2 (8/12/20, 7/7/21), Trigeminal neuralgia (4/2022) p/w elevated filling pressure (PAD 40) on CardioMEMS.    Feels well. On Lasix gtt at 15 mg/hr.  S/p ANDREY/DCCV. Now Ap-Vs. On Eliquis.  CardioMEMS PAD 31 (improved)  PyP scan (-).  D/c home on torsemide 60 bid.  LRL of PPM changed to 70.  D/c home today w f/u w Dr. Quiñones.
seen and agree  S/P DC CV  in sinus rhythm/A pacing with very long [place] but conduction  would continue Amiodarone load and consider upgrade to BiV
seen and agree
73M w/ h/o CAD s/p late-presenting MI 2005 s/p PCI LAD, ICM/HFrEF (EF 15-20%, LVEDD 6 cm) s/p single chamber ICD with lead revision in 2018 with upgrade to dual-chamber device (8/17/21) s/p MEMS (9/16/21), bioprosthetic AVR (2015) 2/2 AI s/p 26 mm EVOLUT HECTOR for severe bioprosthetic AS (8/20/20), aflutter ablation 7/17 w/ persistent afib, s/p DCCV x2 (8/12/20, 7/7/21), Trigeminal neuralgia (4/2022) p/w elevated filling pressure (PAD 40) on CardioMEMS.    Feels well. On Lasix gtt at 15 mg/hr.  ANDREY/DCCV planned for tomorrow (spoke w Dr. Rodriguez). On Eliquis.  Check CardioMEMS today.  PyP scan to r/o TTR amyloid (requested by Dr. Quiñones).
Seen and agree  high amount of RV pacing with patient in AF with forced RV pacing at 70  underlying QRS is narrow  Therefore plan for ANDREY/CV and amiodarone to try to maintain SR
Severe ischemic cardiomyopathy s/p dual chamber ICD. Converted to AF several months ago with a high burden of pacing set in DDI 70 mode. Plan for amiodarone load and DCCV with setup of outpatient ablation. Device reprogrammed to LRL of 50.

## 2024-01-18 NOTE — PROGRESS NOTE ADULT - SUBJECTIVE AND OBJECTIVE BOX
INCOMPLETE NOTE    Sony Almanza M.D.  Division of Hospital Medicine  Available on [employer] TEAMS    SUBJECTIVE / ACUTE INTERVAL EVENTS: Patient seen and examined. No overnight events. No subjective complaints.     OBJECTIVE:   Vital Signs Last 24 Hrs  T(F): 97.9 (18 Jan 2024 04:12), Max: 98 (17 Jan 2024 10:10)  HR: 65 (18 Jan 2024 04:12) (60 - 87)  BP: 122/83 (18 Jan 2024 04:12) (94/63 - 122/83)  RR: 18 (18 Jan 2024 04:12) (16 - 18)  SpO2: 97% (18 Jan 2024 04:12) (91% - 100%)  Parameters below as of 18 Jan 2024 04:12  Patient On (Oxygen Delivery Method): room air    I&O's Summary  17 Jan 2024 07:01  -  18 Jan 2024 07:00  --------------------------------------------------------  IN: 660 mL / OUT: 2550 mL / NET: -1890 mL    Physical Examination:  GEN: elderly man, sitting up in chair in NAD  PSYCH: A&Ox3, mood and affect appear appropriate   NEURO: no focal neurologic deficits appreciated  RESPI: no accessory muscle use, B/L air entry  CARDIO: irregular rate/rhythm, no LE edema B/L  ABD: soft, NT, ND  EXT: patient able to move all extremities spontaneously  VASC: peripheral pulses palpated    Telemetry:     Labs:  01-18  141  |  101  |  45<H>  ----------------------------<  124<H>  3.7   |  28  |  1.58<H>    Ca    9.2      18 Jan 2024 06:20  Phos  3.5     01-18  Mg     2.3     01-18    Urinalysis Basic - ( 18 Jan 2024 06:20 )  Color: x / Appearance: x / SG: x / pH: x  Gluc: 124 mg/dL / Ketone: x  / Bili: x / Urobili: x   Blood: x / Protein: x / Nitrite: x   Leuk Esterase: x / RBC: x / WBC x   Sq Epi: x / Non Sq Epi: x / Bacteria: x    Imaging Personally Reviewed:  - PyP scan to r/o TTR amyloid performed on 1/17: as reported = Cardiac amyloid Imaging study is  not suggestive of transthyretin cardiac amyloidosis.    Consultant(s) Notes Reviewed: Heart Failure, EP  Care Discussed with Consultants/Other Providers: Heart Failure, ACP Cecilia Jack    MEDICATIONS  (STANDING):  aMIOdarone    Tablet 400 milliGRAM(s) Oral every 8 hours  aMIOdarone    Tablet   Oral   apixaban 5 milliGRAM(s) Oral two times a day  aspirin enteric coated 81 milliGRAM(s) Oral daily  atorvastatin 40 milliGRAM(s) Oral at bedtime  furosemide Infusion 15 mG/Hr (7.5 mL/Hr) IV Continuous <Continuous>  levothyroxine 88 MICROGram(s) Oral daily  losartan 12.5 milliGRAM(s) Oral daily  metoprolol succinate ER 25 milliGRAM(s) Oral at bedtime  polyethylene glycol 3350 17 Gram(s) Oral daily  potassium chloride    Tablet ER 40 milliEquivalent(s) Oral once  pregabalin 400 milliGRAM(s) Oral two times a day  spironolactone 25 milliGRAM(s) Oral every 12 hours    MEDICATIONS  (PRN):  acetaminophen     Tablet .. 650 milliGRAM(s) Oral every 6 hours PRN Temp greater or equal to 38C (100.4F), Mild Pain (1 - 3)  melatonin 3 milliGRAM(s) Oral at bedtime PRN Insomnia

## 2024-01-18 NOTE — DISCHARGE NOTE PROVIDER - CARE PROVIDER_API CALL
Chava Diaz  Internal Medicine  [address], [address]  [place] [address]  Phone: [phone]  Fax: [phone]  Follow Up Time: 1 week    Froilan Quiñones  Adv Heart Fail Trnsplnt Cardio  [address] [place] [address] - [place] [address]  Phone: [phone]  Fax: [phone]  Follow Up Time: 2 weeks    Cade Apple.  Cardiac Electrophysiology  [address] [place] [address]  Phone: [phone]  Fax: [phone]  Follow Up Time: 2 weeks   Chava Diaz  Internal Medicine  [address] [facility], [address]  [place] [address]  Phone: [phone]  Fax: [phone]  Follow Up Time: 1 week    Froilan Quiñones  Adv Heart Fail Trnsplnt Cardio  [address] [place] [address] - [place] [address]  Phone: [phone]  Fax: [phone]  Scheduled Appointment: 02/01/2024 08:00 AM    Cade Apple  Cardiac Electrophysiology  [address] [place] [id number]  Phone: [phone]  Fax: [phone]  Follow Up Time: 2 weeks

## 2024-01-18 NOTE — DISCHARGE NOTE NURSING/CASE MANAGEMENT/SOCIAL WORK - NSDCFUADDAPPT_GEN_ALL_CORE_FT
Follow up with your PMD as an outpatient within 1 week of discharge.  Follow up with Dr. Quiñones, Heart failure as an outpatient.  Follow up with Dr. Apple as an outpatient.   Continue with Amiodarone load, continue 400 mg every 8hrs for 7 more doses (till Saturday 1/20/24, then decrease to 200mg daily (start 200mg daily Sunday 1/21 AM).   APPTS ARE READY TO BE MADE: [X] YES    Best Family or Patient Contact (if needed):    Additional Information about above appointments (if needed):    1:   2:   3:     Other comments or requests:

## 2024-01-18 NOTE — PROGRESS NOTE ADULT - REASON FOR ADMISSION
HF Exacerbation

## 2024-01-18 NOTE — DISCHARGE NOTE PROVIDER - NSDCFUSCHEDAPPT_GEN_ALL_CORE_FT
Bradly Edmonds  [facility]  ORTHOSURG [address] [facility]  Scheduled Appointment: 02/02/2024    Harvey Pressley  [facility]  CARDIOLOGY [address] [facility]  Scheduled Appointment: 02/27/2024    [facility]  HEARTFAIL [address]  Scheduled Appointment: 02/28/2024    [facility]  CARDIOLOGY [address]  Scheduled Appointment: 04/17/2024     Bradly Edmonds  [facility]  ORTHOSURG [address] [facility]  Scheduled Appointment: 02/02/2024    Chen Rodriguez  [facility]  ELECTROPH [address]  Scheduled Appointment: 02/12/2024    Harvey Pressley  [facility]  CARDIOLOGY [address] [facility]  Scheduled Appointment: 02/27/2024    [facility]  HEARTFAIL [address]  Scheduled Appointment: 02/28/2024    [facility]  CARDIOLOGY [address]  Scheduled Appointment: 04/17/2024     [facility]  HEARTFAIL [address]  Scheduled Appointment: 02/01/2024    Bradly Edmonds  [facility]  ORTHOSURG [address]  Scheduled Appointment: 02/02/2024    Chen Rodriguez  [facility]  ELECTROPH [address]  Scheduled Appointment: 02/12/2024    Harvey Pressley  [facility]  CARDIOLOGY [address]  Scheduled Appointment: 02/27/2024    [facility]  HEARTFAIL [address]  Scheduled Appointment: 02/28/2024    [facility]  CARDIOLOGY [address]  Scheduled Appointment: 04/17/2024

## 2024-01-18 NOTE — DISCHARGE NOTE PROVIDER - NSDCCPCAREPLAN_GEN_ALL_CORE_FT
PRINCIPAL DISCHARGE DIAGNOSIS  Diagnosis: Acute on chronic systolic congestive heart failure  Assessment and Plan of Treatment: Follow up with your Heart Failure team as an outpatient.   Weigh yourself daily.  If you gain 3lbs in 3 days, or 5lbs in a week call your Health Care Provider.  Do not eat or drink foods containing more than 2000mg of salt (sodium) in your diet every day.  Call your Health Care Provider if you have any swelling or increased swelling in your feet, ankles, and/or stomach.  Take all of your medication as directed.  If you become dizzy call your Health Care Provider.        SECONDARY DISCHARGE DIAGNOSES  Diagnosis: Longstanding persistent atrial fibrillation  Assessment and Plan of Treatment: You underwent cardioversion during this admission. Please continue with Amiodarone load 400 mg every 8hr for 7 more doses (till Saturday 1/20/24), then decrease to 200mg daily (start 200mg daily from Sunday 1/21 AM).     Atrial fibrillation is the most common heart rhythm problem & has the risk of stroke & heart attack  It helps if you control your blood pressure, not drink more than 1-2 alcohol drinks per day, cut down on caffeine, getting treatment for over active thyroid gland, & getting exercise  Call your doctor if you feel your heart racing or beating unusually, chest tightness or pain, lightheaded, faint, shortness of breath especially with exercise  It is important to take your heart medication as prescribed  You may be on anticoagulation which is very important to take as directed - you may need blood work to monitor drug levels      Diagnosis: CAD (coronary artery disease)  Assessment and Plan of Treatment: Coronary artery disease is a condition where the arteries the supply the heart muscle get clogges with fatty deposits & puts you at risk for a heart attack  Call your doctor if you have any new pain, pressure, or discomfort in the center of your chest, pain, tingling or discomfort in arms, back, neck, jaw, or stomach, shortness of breath, nausea, vomiting, burping or heartburn, sweating, cold and clammy skin, racing or abnormal heartbeat for more than 10 minutes or if they keep coming & going.  Call 911 and do not tr to get to hospital by care  You can help yourself with lefestyle changes (quitting smoking if you smoke), eat lots of fruits & vegetables & low fat dairy products, not a lot of meat & fatty foods, walk or some form of physical activity most days of the week, lose weight if you are overweight  Take your cardiac medication as prescribed to lower cholesterol, to lower blood pressure, aspirin to prevent blood clots, and diabetes control  Make sure to keep appointments with doctor for cardiac follow up care

## 2024-01-18 NOTE — DISCHARGE NOTE PROVIDER - PROVIDER TOKENS
PROVIDER:[TOKEN:[[id number]],FOLLOWUP:[1 week]],PROVIDER:[TOKEN:[[id number]],FOLLOWUP:[2 weeks]],PROVIDER:[TOKEN:[[id number]],FOLLOWUP:[2 weeks]] PROVIDER:[TOKEN:[[id number]],FOLLOWUP:[1 week]],PROVIDER:[TOKEN:[[id number]],SCHEDULEDAPPT:[02/01/2024],SCHEDULEDAPPTTIME:[08:00 AM]],PROVIDER:[TOKEN:[[id number]],FOLLOWUP:[2 weeks]]

## 2024-01-18 NOTE — DISCHARGE NOTE PROVIDER - NSDCFUADDAPPT_GEN_ALL_CORE_FT
Follow up with your PMD as an outpatient within 1 week of discharge.  Follow up with Dr. Quiñones, Heart failure as an outpatient.  Follow up with Dr. Apple as an outpatient.   Continue with Amiodarone load, continue 400 mg every 8hrs for 7 more doses (till Saturday 1/20/24, then decrease to 200mg daily (start 200mg daily Sunday 1/21 AM).   APPTS ARE READY TO BE MADE: [X] YES    Best Family or Patient Contact (if needed):    Additional Information about above appointments (if needed):    1:   2:   3:     Other comments or requests:    Follow up with your PMD as an outpatient within 1 week of discharge.  Follow up with Dr. Quiñones, Heart failure as an outpatient.  Follow up with Dr. Apple as an outpatient.   Continue with Amiodarone load, continue 400 mg every 8hrs for 7 more doses (till Saturday 1/20/24, then decrease to 200mg daily (start 200mg daily Sunday 1/21 AM).   Follow up with Heart Failure for repeat labs. Follow up at [facility] at [facility] (Heart Failure Clinic) on February 1st at 8am.   APPTS ARE READY TO BE MADE: [X] YES    Best Family or Patient Contact (if needed):    Additional Information about above appointments (if needed):    1:   2:   3:     Other comments or requests:    PLEASE OBTAIN LABS AS OUTPATIENT AT [facility] on 1/19/24 as ordered by Heart Failure  Follow up at [facility] at [facility] (Heart Failure Clinic) on February 1st at 8am.   F/U with Dr Rodriguez on 2/12/24 - appointment scheduled by Electrophysiology  Will need outpatient monitoring of PFT/TFT/LFT/opthalmology monitoring while on Amiodarone - close follow-up with your primary care doctor within 7 days of discharge  Will need AF ablation as outpatient with Dr. Apple - please follow-up with him in 2 weeks      APPTS ARE READY TO BE MADE: [X] YES    Best Family or Patient Contact (if needed):    Additional Information about above appointments (if needed):    1: PCP within 5-7 days of discharge  2:   3:     Other comments or requests:

## 2024-01-18 NOTE — PROGRESS NOTE ADULT - SUBJECTIVE AND OBJECTIVE BOX
ADVANCED HEART FAILURE & TRANSPLANT  - PROGRESS NOTE  *To reach the NS2 Team from 8am to 5pm, please call [phone]   ___________________________________________________________________________  Subjective:  - s/p cardioversion, now in SR with 1st deg AVB  - reports feeling well. Denies complaints including SOB, lightheadedness, ortopnea  - trace LE edema, which he feels is baseline for him    Medications:  acetaminophen     Tablet .. 650 milliGRAM(s) Oral every 6 hours PRN  aMIOdarone    Tablet 400 milliGRAM(s) Oral every 8 hours  aMIOdarone    Tablet   Oral   apixaban 5 milliGRAM(s) Oral two times a day  aspirin enteric coated 81 milliGRAM(s) Oral daily  atorvastatin 40 milliGRAM(s) Oral at bedtime  levothyroxine 88 MICROGram(s) Oral daily  losartan 25 milliGRAM(s) Oral daily  melatonin 3 milliGRAM(s) Oral at bedtime PRN  metoprolol succinate ER 25 milliGRAM(s) Oral at bedtime  polyethylene glycol 3350 17 Gram(s) Oral daily  pregabalin 400 milliGRAM(s) Oral two times a day  spironolactone 25 milliGRAM(s) Oral every 12 hours  torsemide 60 milliGRAM(s) Oral two times a day      Physical Exam:    Vitals:  Vital Signs Last 24 Hours  T(C): 36.7 (24 @ 11:22), Max: 36.7 (24 @ 11:22)  HR: 70 (24 @ 16:50) (65 - 87)  BP: 106/72 (24 @ 16:50) (98/60 - 122/83)  RR: 18 (24 @ 11:22) (18 - 18)  SpO2: 98% (24 @ 11:22) (95% - 98%)    Weight in k.1 ( @ 06:26)    I&O's Summary    2024 07: 07:00  --------------------------------------------------------  IN: 660 mL / OUT: 2550 mL / NET: -1890 mL    2024 07: 18:25  --------------------------------------------------------  IN: 720 mL / OUT: 700 mL / NET: 20 mL        Tele: SR 1st deg AVB    General: No distress. Comfortable.  HEENT: EOM intact.  Neck: Neck supple. JVP ~12cm H2O. No masses  Chest: Clear to auscultation bilaterally  CV: Normal S1 and S2. No murmurs, rub, or gallops. Radial pulses normal. Trace BLE edema  Abdomen: Soft, non-distended, non-tender  Skin: No rashes or skin breakdown. warm peripherally  Neurology: Alert and oriented times three. Sensation intact  Psych: Affect normal    Labs:        141  |  101  |  45<H>  ----------------------------<  124<H>  3.7   |  28  |  1.58<H>    Ca    9.2      2024 06:20  Phos  3.5       Mg     2.3

## 2024-01-18 NOTE — DISCHARGE NOTE NURSING/CASE MANAGEMENT/SOCIAL WORK - NSDCPEFALRISK_GEN_ALL_CORE
For information on Fall & Injury Prevention, visit: [email or web address] OR  [email or web address] OR  https://www.cdc.gov/steadi/patient.html

## 2024-01-22 ENCOUNTER — NON-APPOINTMENT (OUTPATIENT)
Age: 74
End: 2024-01-22

## 2024-01-22 LAB
ANION GAP SERPL CALC-SCNC: 18 MMOL/L
BUN SERPL-MCNC: 52 MG/DL
CALCIUM SERPL-MCNC: 9.1 MG/DL
CHLORIDE SERPL-SCNC: 99 MMOL/L
CO2 SERPL-SCNC: 22 MMOL/L
CREAT SERPL-MCNC: 1.82 MG/DL
EGFR: 38 ML/MIN/1.73M2
GLUCOSE SERPL-MCNC: 147 MG/DL
MAGNESIUM SERPL-MCNC: 2.2 MG/DL
NT-PROBNP SERPL-MCNC: 1421 PG/ML
POTASSIUM SERPL-SCNC: 4.1 MMOL/L
SODIUM SERPL-SCNC: 139 MMOL/L

## 2024-01-22 RX ORDER — DAPAGLIFLOZIN 5 MG/1
5 TABLET, FILM COATED ORAL DAILY
Refills: 0 | Status: ACTIVE | COMMUNITY
Start: 2021-12-02

## 2024-01-22 RX ORDER — LEVOTHYROXINE SODIUM 0.09 MG/1
88 TABLET ORAL DAILY
Refills: 0 | Status: ACTIVE | COMMUNITY
Start: 2024-01-22

## 2024-01-24 ENCOUNTER — APPOINTMENT (OUTPATIENT)
Dept: ELECTROPHYSIOLOGY | Facility: CLINIC | Age: 74
End: 2024-01-24

## 2024-01-26 ENCOUNTER — APPOINTMENT (OUTPATIENT)
Dept: CARDIOLOGY | Facility: CLINIC | Age: 74
End: 2024-01-26
Payer: MEDICARE

## 2024-01-26 ENCOUNTER — NON-APPOINTMENT (OUTPATIENT)
Age: 74
End: 2024-01-26

## 2024-01-26 PROCEDURE — 93264 REM MNTR WRLS P-ART PRS SNR: CPT

## 2024-01-26 NOTE — DISCUSSION/SUMMARY
[FreeTextEntry1] : Continue daily readings. Medications will be adjusted as per [facility] and provider Follow up appointment scheduled.

## 2024-01-26 NOTE — HISTORY OF PRESENT ILLNESS
[de-identified] : 12/21/23- 1/26/2024 Date of implant: 9/16/21 Goal PAD: 24 Avg PAD: 36 Compliant with readings: 46%   SUMMARY: During the period indicated above, the patient's pulmonary artery pressures were monitored at least weekly and based on the pressures, the pts medications were adjusted. Medication changes were communicated to the patient.   The daily data reports are in the CardioMEMs flowsheet and are also archived in the [email or web address] system. [See [facility] flowsheet for Trends].   PMH and medications reviewed.

## 2024-01-31 ENCOUNTER — NON-APPOINTMENT (OUTPATIENT)
Age: 74
End: 2024-01-31

## 2024-02-01 ENCOUNTER — APPOINTMENT (OUTPATIENT)
Dept: HEART FAILURE | Facility: CLINIC | Age: 74
End: 2024-02-01

## 2024-02-01 ENCOUNTER — NON-APPOINTMENT (OUTPATIENT)
Age: 74
End: 2024-02-01

## 2024-02-01 LAB
ALBUMIN SERPL ELPH-MCNC: 4.6 G/DL
ALP BLD-CCNC: 164 U/L
ALT SERPL-CCNC: 41 U/L
ANION GAP SERPL CALC-SCNC: 13 MMOL/L
AST SERPL-CCNC: 33 U/L
BILIRUB SERPL-MCNC: 0.9 MG/DL
BUN SERPL-MCNC: 43 MG/DL
CALCIUM SERPL-MCNC: 9.2 MG/DL
CHLORIDE SERPL-SCNC: 95 MMOL/L
CO2 SERPL-SCNC: 30 MMOL/L
CREAT SERPL-MCNC: 1.85 MG/DL
EGFR: 38 ML/MIN/1.73M2
ESTIMATED AVERAGE GLUCOSE: 146 MG/DL
GLUCOSE SERPL-MCNC: 82 MG/DL
HBA1C MFR BLD HPLC: 6.7 %
HCT VFR BLD CALC: 47.9 %
HGB BLD-MCNC: 15.2 G/DL
MAGNESIUM SERPL-MCNC: 2.2 MG/DL
MCHC RBC-ENTMCNC: 31 PG
MCHC RBC-ENTMCNC: 31.7 GM/DL
MCV RBC AUTO: 97.6 FL
NT-PROBNP SERPL-MCNC: 1590 PG/ML
PLATELET # BLD AUTO: 178 K/UL
POTASSIUM SERPL-SCNC: 4.1 MMOL/L
PROT SERPL-MCNC: 7.1 G/DL
RBC # BLD: 4.91 M/UL
RBC # FLD: 14.1 %
SODIUM SERPL-SCNC: 138 MMOL/L
TSH SERPL-ACNC: 2.18 UIU/ML
URATE SERPL-MCNC: 6.8 MG/DL
WBC # FLD AUTO: 9.39 K/UL

## 2024-02-02 ENCOUNTER — APPOINTMENT (OUTPATIENT)
Dept: ORTHOPEDIC SURGERY | Facility: CLINIC | Age: 74
End: 2024-02-02

## 2024-02-02 ENCOUNTER — APPOINTMENT (OUTPATIENT)
Dept: ELECTROPHYSIOLOGY | Facility: CLINIC | Age: 74
End: 2024-02-02
Payer: MEDICARE

## 2024-02-02 VITALS
SYSTOLIC BLOOD PRESSURE: 78 MMHG | DIASTOLIC BLOOD PRESSURE: 58 MMHG | OXYGEN SATURATION: 95 % | HEART RATE: 76 BPM | HEIGHT: 74 IN | BODY MASS INDEX: 29.13 KG/M2 | WEIGHT: 227 LBS

## 2024-02-02 VITALS — DIASTOLIC BLOOD PRESSURE: 54 MMHG | SYSTOLIC BLOOD PRESSURE: 68 MMHG

## 2024-02-02 DIAGNOSIS — I50.22 CHRONIC SYSTOLIC (CONGESTIVE) HEART FAILURE: ICD-10-CM

## 2024-02-02 DIAGNOSIS — I25.5 ISCHEMIC CARDIOMYOPATHY: ICD-10-CM

## 2024-02-02 PROCEDURE — 99215 OFFICE O/P EST HI 40 MIN: CPT | Mod: 25

## 2024-02-02 PROCEDURE — 93000 ELECTROCARDIOGRAM COMPLETE: CPT

## 2024-02-03 LAB — HBA1C MFR BLD HPLC: 6.7

## 2024-02-03 NOTE — HISTORY OF PRESENT ILLNESS
[FreeTextEntry1] : Chava is 74 yo and is seen in follow up  to discuss AF and associated issues.  He was recently Electively admitted to [facility] under the care of heart failure as well as electrophysiology.  I discussed the case with them.  Initially we discussed a possible upgrade to a biventricular device but because of his previous anatomy and his AV delay was able to increased prevent him to decrease his RV pacing it was decided not to pursue an LV upgrade. The AF ablation procedure was also discussed.  It was elected to start on amiodarone and he underwent a cardioversion.  He was discharged home not clear whether he had had any significant improvement in his symptoms. He presented to [facility] approximate 2 days later after falling secondary to dizziness/lightheadedness.  He mistakenly took extra doses of diuretic and was noted to have very low blood pressure. His device was interrogated while at [facility] emergency room and he was still noted to be in sinus rhythm with atrial pacing.No ventricular tachycardia was noted.   In follow-up today the patient feels some increase in energy and less shortness of breath but he still complains of dizziness lightheadedness and fatigue. He does not feel a difference between being in sinus rhythm now versus when he was in A-fib. Previous history:  He was scheduled for AF ablation but unable procedure he was noted to be in acute kidney injury with creatinine of 2.5.  The procedure was canceled.  He was admitted to [facility] for the management of his acute heart failure and renal insufficiency.  The patient improved rapidly and was discharged home in stable condition with follow-up today to reassess his A-fib and decision regarding ablation.   Echo 10/25/23: EF 20-25%; Severely dilated LA with mild MR> ALY 60 cc/m2 Patient had an echocardiogram performed on 5/26/2023 that showed EF 20 to 25%, mild mitral regurgitation, mild left atrial enlargement with left atrial volume index 36.7 cc per metered square.  Previous history: S/P  TAV in [place].  He has had prior  cardioversion for A. fib.  Prior  history of myocardial infarction 2005 status post previous PCI to the LAD,  dilated cardiomyopathy ejection fraction 15 to 20%, s/p Medtronic single-chamber ICD implant 2005, status post appropriate shocks for VT VF 2015.  And prior atrial flutter ablation in 2017.  He underwent extraction of Medtronic [place] lead October 2018.  Previous bioprosthetic aortic valve replacement with subsequent premature failure noted on ANDREY.he underwent upgrade of his single-chamber ICD to a dual-chamber ICD.  He has a CardioMEMS device.Patient was previously on amiodarone and this was discontinued because of tremors.  The tremors had resolved after discontinuation. After upgrade to dual-chamber device the patient has felt well and had remained in sinus rhythm.    Echocardiogram from 4/Lasix 22 showed EF 20%.  He has mild mitral regurgitation.  He had a low mean transaortic valve gradient 8 mmHg.  Patient has severe left atrial enlargement with left atrial volume index 54 cc per metered square. Previous history:    Cardiac catheterization performed 6/4/2020 showed nonobstructive coronary arteries. Echocardiogram from 6/3/2020 showed severely reduced ejection fraction 15 to 20%, dilated LV diameter 6.5 cm, moderate to severe diastolic dysfunction  Device: Medtronic cobalt XT DR dual-chamber remaining longevity 10.8 years.  Programmed lower rate 60 upper rate 130 with VF as well as VT zone.  Paced and sensed AV delay set long at 350 ms.  Rate sensor was programmed on.

## 2024-02-03 NOTE — REASON FOR VISIT
[Arrhythmia/ECG Abnorrmalities] : arrhythmia/ECG abnormalities [Spouse] : spouse [Family Member] : family member [Other: _____] : [unfilled]

## 2024-02-03 NOTE — REVIEW OF SYSTEMS
[Dyspnea on exertion] : dyspnea during exertion [Feeling Fatigued] : feeling fatigued [Dizziness] : dizziness [Fever] : no fever [Sore Throat] : no sore throat [SOB] : no shortness of breath [Chest Discomfort] : no chest discomfort [Palpitations] : no palpitations [Cough] : no cough [Abdominal Pain] : no abdominal pain [Rash] : no rash [Easy Bleeding] : no tendency for easy bleeding

## 2024-02-03 NOTE — DISCUSSION/SUMMARY
[EKG obtained to assist in diagnosis and management of assessed problem(s)] : EKG obtained to assist in diagnosis and management of assessed problem(s) [FreeTextEntry1] : ICD interrogation atrially paced ventricular sensed.  Ventricular paced 11.5% atrially paced 90%.  No VT VF episodes.  OptiVol fluid index below threshold. Chronic systolic heart failure with NYHA class II-III  Ischemic cardiomyopathy with EF <20%. Previous persistent atrial fibrillation with severe left atrial enlargement he is now maintaining sinus rhythm with atrial pacing on amiodarone 200 mg/day.  He seems to be tolerating amiodarone therapy without any increase in his baseline tremor.  We will monitor this. We will arrange for him to have follow-up liver function testing, thyroid function testing and pulmonary evaluation while on amiodarone.  This will be done in approximately a month from now.  He is very concerned about his frequent dizzy spells which correlates with a decrease in blood pressure.  I discussed this with his heart failure physician Dr. Larsen who recommends that on days when his blood pressure is lower to hold the losartan dosage.  He has a CardioMEMS in his weight and is in the range of 32 this is felt to be appropriate for this patient.  We rediscussed the management of his atrial fibrillation.  This felt that his hemodynamics and symptoms are not much different while he is in A-fib versus atrially paced with ventricular sensed rhythm.  The concern that we have is if she have recurrent A-fib on amiodarone other option would be catheter ablation.  Concerned about the risk of this procedure and what the success rate would be given his severely enlarged left atrium greater than 60 cc/m.  The patient this point states that he would prefer not to have the procedure done.  We had a discussion with the family as well as his cardiologist Dr. Harvey Pressley and Dr. Larsen today and conclusions were to continue dual medical therapy, continue on amiodarone and if he develop recurrent A-fib to management with rate control and anticoagulation.  He will be followed very closely by the heart failure team both at [facility] and with Dr. Quiñones.  He will also follow-up with his cardiologist Dr. Pressley.I

## 2024-02-09 ENCOUNTER — APPOINTMENT (OUTPATIENT)
Dept: CARDIOLOGY | Facility: CLINIC | Age: 74
End: 2024-02-09
Payer: MEDICARE

## 2024-02-09 VITALS
HEART RATE: 78 BPM | SYSTOLIC BLOOD PRESSURE: 84 MMHG | OXYGEN SATURATION: 98 % | BODY MASS INDEX: 27.73 KG/M2 | DIASTOLIC BLOOD PRESSURE: 50 MMHG | WEIGHT: 216 LBS

## 2024-02-09 DIAGNOSIS — R42 DIZZINESS AND GIDDINESS: ICD-10-CM

## 2024-02-09 DIAGNOSIS — I42.9 CARDIOMYOPATHY, UNSPECIFIED: ICD-10-CM

## 2024-02-09 PROCEDURE — 99215 OFFICE O/P EST HI 40 MIN: CPT

## 2024-02-09 PROCEDURE — 99214 OFFICE O/P EST MOD 30 MIN: CPT

## 2024-02-09 RX ORDER — FUROSEMIDE INJECTION 80 MG/ 10 ML 8 MG/ML
80 INJECTION SUBCUTANEOUS
Qty: 4 | Refills: 2 | Status: DISCONTINUED | COMMUNITY
Start: 2023-10-27 | End: 2024-02-09

## 2024-02-12 ENCOUNTER — NON-APPOINTMENT (OUTPATIENT)
Age: 74
End: 2024-02-12

## 2024-02-12 ENCOUNTER — APPOINTMENT (OUTPATIENT)
Dept: ELECTROPHYSIOLOGY | Facility: CLINIC | Age: 74
End: 2024-02-12

## 2024-02-12 NOTE — HISTORY OF PRESENT ILLNESS
[FreeTextEntry1] : Chava is 74 yo and is seen in follow up  to assess BP  Pt states he still feels dizzy when changing position. Especially on days he is taking Torsemide 40 mg BID Cardiomems PAD 29 which is an improvement. Bp today 84/54 Please see below for a complete history.   He was recently Electively admitted to [facility] under the care of heart failure as well as electrophysiology.  I discussed the case with them.  Initially we discussed a possible upgrade to a biventricular device but because of his previous anatomy and his AV delay was able to increased prevent him to decrease his RV pacing it was decided not to pursue an LV upgrade. The AF ablation procedure was also discussed.  It was elected to start on amiodarone and he underwent a cardioversion.  He was discharged home not clear whether he had had any significant improvement in his symptoms. He presented to [facility] approximate 2 days later after falling secondary to dizziness/lightheadedness.  He mistakenly took extra doses of diuretic and was noted to have very low blood pressure. His device was interrogated while at [facility] emergency room and he was still noted to be in sinus rhythm with atrial pacing.No ventricular tachycardia was noted.   In follow-up today the patient feels some increase in energy and less shortness of breath but he still complains of dizziness lightheadedness and fatigue. He does  Previous history:  He was scheduled for AF ablation but unable procedure he was noted to be in acute kidney injury with creatinine of 2.5.  The procedure was canceled.  He was admitted to [facility] for the management of his acute heart failure and renal insufficiency.  The patient improved rapidly and was discharged home in stable condition with follow-up today to reassess his A-fib and decision regarding ablation.   Echo 10/25/23: EF 20-25%; Severely dilated LA with mild MR> ALY 60 cc/m2 Patient had an echocardiogram performed on 5/26/2023 that showed EF 20 to 25%, mild mitral regurgitation, mild left atrial enlargement with left atrial volume index 36.7 cc per metered square.  Previous history: S/P  TAV in [place]ID[place].  He has had prior  cardioversion for A. fib.  Prior  history of myocardial infarction 2005 status post previous PCI to the LAD,  dilated cardiomyopathy ejection fraction 15 to 20%, s/p Medtronic single-chamber ICD implant 2005, status post appropriate shocks for VT VF 2015.  And prior atrial flutter ablation in 2017.  He underwent extraction of Medtronic [place] lead October 2018.  Previous bioprosthetic aortic valve replacement with subsequent premature failure noted on ANDREY.he underwent upgrade of his single-chamber ICD to a dual-chamber ICD.  He has a CardioMEMS device.Patient was previously on amiodarone and this was discontinued because of tremors.  The tremors had resolved after discontinuation. After upgrade to dual-chamber device the patient has felt well and had remained in sinus rhythm.    Echocardiogram from 4/Lasix 22 showed EF 20%.  He has mild mitral regurgitation.  He had a low mean transaortic valve gradient 8 mmHg.  Patient has severe left atrial enlargement with left atrial volume index 54 cc per metered square. Previous history:    Cardiac catheterization performed 6/4/2020 showed nonobstructive coronary arteries. Echocardiogram from 6/3/2020 showed severely reduced ejection fraction 15 to 20%, dilated LV diameter 6.5 cm, moderate to severe diastolic dysfunction  Device: Medtronic cobalt XT DR dual-chamber remaining longevity 10.8 years.  Programmed lower rate 60 upper rate 130 with VF as well as VT zone.  Paced and sensed AV delay set long at 350 ms.  Rate sensor was programmed on.

## 2024-02-12 NOTE — END OF VISIT
[FreeTextEntry3] : Patient care discussed with NP.  I agree with the above with the following exceptions: CardioMEMS improving with Furoscix.  Still struggles with dizziness on positional changes and some hypotension. Will need consideration for risk stratification with maybe CPET and 6MWT. Will ask patient if he prefers to transfer his heart failure care to [place] as he has had difficulties in traveling to [facility] for follow-up.

## 2024-02-12 NOTE — REVIEW OF SYSTEMS
[Dyspnea on exertion] : dyspnea during exertion [Dizziness] : dizziness [Weakness] : weakness [Fever] : no fever [Weight Gain (___ Lbs)] : no recent weight gain [Feeling Fatigued] : not feeling fatigued [Sore Throat] : no sore throat [SOB] : no shortness of breath [Chest Discomfort] : no chest discomfort [Palpitations] : no palpitations [Cough] : no cough [Abdominal Pain] : no abdominal pain [Joint Pain] : no joint pain [Joint Swelling] : no joint swelling [Rash] : no rash [Easy Bleeding] : no tendency for easy bleeding [FreeTextEntry5] : MATHEWS improving

## 2024-02-12 NOTE — PHYSICAL EXAM
[No Acute Distress] : no acute distress [Normal Conjunctiva] : normal conjunctiva [Normal Venous Pressure] : normal venous pressure [Normal S1, S2] : normal S1, S2 [No Rub] : no rub [Clear Lung Fields] : clear lung fields [Non Tender] : non-tender [No Edema] : no edema [Alert and Oriented] : alert and oriented [Normal memory] : normal memory [No Oral Pallor] : no oral pallor [Normal Jugular Venous V Waves Present] : normal jugular venous V waves present [Arterial Pulses Normal] : the arterial pulses were normal [Impaired Insight] : insight and judgment were intact [de-identified] : lips cyonatic however this is normal for pt [de-identified] : discolored trace edema R ankle  [FreeTextEntry1] : Normal prosthetic valve sound; trace bilateral edema

## 2024-02-12 NOTE — DISCUSSION/SUMMARY
[FreeTextEntry1] : Will adjust his medication timing Losartan now will be taken at bedtime. Stop Furosix  Take Demadex at 8 am  and QOD 4 pm to improve nocturia  Chronic systolic heart failure with NYHA class II-III  Ischemic cardiomyopathy with EF <20%. He will follow with Sd in 2 weeeks. Discussed red flag symptoms, which would warrant sooner or emergent medical evaluation. Any questions and concerns were addressed and resolved.

## 2024-02-13 ENCOUNTER — NON-APPOINTMENT (OUTPATIENT)
Age: 74
End: 2024-02-13

## 2024-02-14 ENCOUNTER — NON-APPOINTMENT (OUTPATIENT)
Age: 74
End: 2024-02-14

## 2024-02-14 ENCOUNTER — APPOINTMENT (OUTPATIENT)
Dept: CARDIOLOGY | Facility: CLINIC | Age: 74
End: 2024-02-14
Payer: MEDICARE

## 2024-02-14 VITALS
DIASTOLIC BLOOD PRESSURE: 60 MMHG | WEIGHT: 223 LBS | BODY MASS INDEX: 28.62 KG/M2 | OXYGEN SATURATION: 96 % | HEART RATE: 94 BPM | HEIGHT: 74 IN | SYSTOLIC BLOOD PRESSURE: 86 MMHG

## 2024-02-14 DIAGNOSIS — I48.0 PAROXYSMAL ATRIAL FIBRILLATION: ICD-10-CM

## 2024-02-14 PROCEDURE — 99215 OFFICE O/P EST HI 40 MIN: CPT

## 2024-02-15 ENCOUNTER — LABORATORY RESULT (OUTPATIENT)
Age: 74
End: 2024-02-15

## 2024-02-15 NOTE — DISCUSSION/SUMMARY
[FreeTextEntry1] : # HFrEF  - ETIOOGY: ischemic + valvular CM.      - GMDT: Continue on metoprolol 25 mg XL daily in AM, losartan 12.5 mg daily in PM, spironolactone 25 mg daily, and Farxiga 5 mg daily. -Unable to tolerate Entresto due to hypotension.  Baseline BP 90-100s/60s. - will start on Verquovo 2.5mg daily given intermittent need for subcutaneous diuresis with Furoscix and fluctuating CardioMEMS readings. - Of note, has ongoing syncope for the past 2 years after sitting for prolonged periods of time.   - Labs ordered today.  - CARDIOMEMS:  favor increasing target to 30.  He deos have an elemet of pre-capillary hypertension with PVR 4.6 on last RHC with PCWP of 22.  Wt goal of 222-224lbs.  Below MEMS of 30 he feels worse.  Currently being managed by [facility] team.  Will determine if patient wants to transition over to  CardioMEMS team. -  continue with torsemide 40 mg alt with 80mg daily after one more day of 80mg.  - ADVANCED THERAPIES:  previously discussed if things were to worsen in future of possible LVAD.  Patient has explored this and does not feel that this would be in line with his quality of life.  He very much enjoys boating and would like to continue this which he notes will not be possible with an LVAD.  He is open to going to a LVAD support group to learn more about this.  I am not sure with his current RV dysfunction if he would be a good candidate although last RHC with Alvaro of 1.5.  We did discuss pursuing a CPET for further risk stratification.  Patient is interested and would want to do this.  Can set up a 6MWT at next visit.  # Syncope - last on 2/9/24.   - amyloid ruled out as above.  - recommend compression stockings and possible abdominal binder for longer car rides.   - carotid dopplers 12/27/21 B/L moderate non-obstructive disease.  Will need to consider repeating.  # Aflutter/afib- in AF with dual chamber ICD - was to undergo ablation but aborted d/t FEDERICO.  Last DCCV in 1/17/24.  Difficult to interpret EKG however, appears to be back in atrial flutter.   - On amiodarone 100 mg daily and metoprolol 25 mg XL daily. - AC: continue Eliquis 5 mg bid - Discussed with Dr. Eckert.  He does not appear to get any benefit from being in sinus rhythm versus A-fib in the past.  CardioMEMS readings at [facility] after last DCCV went from 36 to 31 post DCCV.    No clear left bundle branch block on EKG.  Has had attempts at LV pacing however unable to obtain due to anatomy.  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 Waggoner which felt it as inflammatory, and no further intervention was warranted. - follows with his pulmonologist, Dr. Guerrero in   Follow up with me in 1 month ( HF team). Duration Of Freeze Thaw-Cycle (Seconds): 5 Render Note In Bullet Format When Appropriate: No Post-Care Instructions: I reviewed with the patient in detail post-care instructions. Patient is to wear sunprotection, and avoid picking at any of the treated lesions. Pt may apply Vaseline to crusted or scabbing areas. Number Of Freeze-Thaw Cycles: 1 freeze-thaw cycle Consent: The patient's consent was obtained including but not limited to risks of crusting, scabbing, blistering, scarring, darker or lighter pigmentary change, recurrence, incomplete removal and infection. Detail Level: Simple

## 2024-02-15 NOTE — PHYSICAL EXAM
[No Varicosities] : no varicosities [No Rash] : no rash [Moves all extremities] : moves all extremities [Alert and Oriented] : alert and oriented [Normal] : soft, non-tender, no masses/organomegaly, normal bowel sounds [de-identified] : overweight [de-identified] : Left facial decreased tone secondary to trigeminal neuralgia [de-identified] : JVP around 8-10cm sitting upright. [de-identified] : Left chest ICD [de-identified] : slow gait  [de-identified] : trace lower extremity edema bilaterally R>L due to prior right knee incision, warm [de-identified] : ecchymoses on arms

## 2024-02-15 NOTE — HISTORY OF PRESENT ILLNESS
[FreeTextEntry1] : 74 yo M with CAD s/p late-presenting MI 2005 s/p PCI LAD, ICM/HFrEF (EF 15-20%, LVEDD 6 cm) s/p single chamber ICD with lead revision in 2018 with upgrade to dual-chamber device (8/17/21) however attempts at LV lead placement unsuccessful due to anatomy, s/p MEMS (9/16/21), bioprosthetic AVR (2015) 2/2 AI s/p 26 mm EVOLUT HECTOR for severe bioprosthetic AS (8/20/20), aflutter ablation 7/17 w/ persistent afib, s/p DCCV x2 (8/12/20, 7/7/21, 1/17/2024), Trigeminal neuralgia (4/2022) who presents for  HF follow up.   He was last admitted at [facility] 11/16-11/19/23 for afib ablation but noted to have acute kidney injury and hyponatremia likely 2/2 metolazone use prior to ablation so was aborted. Labs improved with cessation of diuretics from BUN/Cr 75/2.2 to 56/1.1.  Device interrogation he has been in 100% A-fib since then with a V-paced burden of 74.3%.  OptiVol fluid level is also elevated since December.  This year, he was hospitalized at [facility] for rising CardioMEMS numbers from 1/10 - 1/17/24.  This is thought to be secondary to atrial fibrillation.  He underwent DCCV on 117 with improvement in CardioMEMS numbers to 31.  LRL on PPM was changed from 60 to 70.  She was discharged on torsemide 60mg BID.  Since hospital discharge, he has been on alternating doses of torsemide 40 mg and 80 mg daily.  He was previously seen by my colleague Dr. Quiñones however, due to travel distance he is transitioning to HF care with me in [place].  He was last seen in office on 2/9/2024.  He was found to be euvolemic on exam.  CardioMEMS at that time was 29.  Furoscix was discontinued.  He was resumed on his home diuretic dose of torsemide 40 alternating with 80 mg daily.  However, he had a syncopal episode over the weekend.  CardioMEMS numbers had gone up to 45 after that.  He was instructed to increase his diuretics to torsemide 80mg daily X 2 days then to go back to 40/80mg alternation.    Today he presents for follow-up.  He is in office weight is 223lbs.  His home weight yesterday with 223.5lbs. he notes he is struggled with syncope/pre-syncope for the past 2 years.  Typically, this occurs after like being in the car for long periods of time or sitting for long periods of time.  He does not develop lower extremity edema.  However he will develop abdominal bloating that he has correlated with increased cardioMEMS readings.  Currently, his activity is not limited by shortness of breath but by back pain. Reports he can walk several blocks and work on his boat including bending without dyspnea. He sleeps with 2 pillows with no orthopnea/PND.  His syncopal episodes are related to the episodes that limit his quality of life currently.   Received Covid (Moderna) vaccine x2 (last in February).and booster without adverse reaction. He had Covid 2022, mild symptoms.

## 2024-02-15 NOTE — CARDIOLOGY SUMMARY
[de-identified] :  PYP 1/17/24: H: CL ratio not noted however, qualitative grade 0 uptake. PYP 11/18/21: H:CL ratio 1.3 (borderline/suggestive of ATTR); grade 2 (myocardial uptake to rib; borderline/suggestive of ATTR); equivocal for cardiac amyloidosis  [de-identified] :  2/9/2024: Low voltage, no clear P waves likely atrial flutter, IVCD, old anterior MI. 11/16/23 - afib, low voltage, RV paced ( msec) 9/6/23 afib, low voltage, RBBB 8/4/23 AF75, RBBB, LAFB, low voltage, NSST 2/3/23  NSR with 1 st degree AVB , low voltage, PRWP 4/1/22 NSR with 1 st degree  ms, low voltage, PRWP 12/7/21 - A paced, long CT interval, PVC,  9/21/21 - A paced, long CT interval, PRWP, low voltage  7/19/21 - ? afib (per Dr. Apple in sinus); HR 70, RV paced  10/1/20 - afib, V paced, HR 51  8/24/20 - AFib/, HR 69 12/31/19 - AFib, HR 73, low voltage, PRWP  11/8/17 - NSR, HR 76, APC, limb lead reversal, PRWP 6/29/17  Aflutter,HR 79 [de-identified] :  1/11/2024: EF 15%, LVEDD 6.2 cm, IVSD 0.8 cm, moderate TR PASP 38, MR, TAVR in place functioning well no paravalvular leak.  Severe JODI, mild LAE, TAPSE 1.2 cm with severe RV enlargement and decreased function. 10/24/23: EF 10%, LVEDD 6.9 cm, mild MR, mod TR, RVSP 37, TAVR nl functioning, LVOT VTI 10 cm, Ao valve velocity 1.5 m/sec, IVC 2.1 cm 5/26/2023: EF 20 to 25%, mild mitral regurgitation, mild left atrial enlargement, mild TR, RVSP 56, E/e' 21, LVOT VTI 14.8, IVC 2.2 cm; AALIYAH 1.4 (peak 10; mean 6) 10/1/20 - LVEDD 6 cm, EF 20% (segmental; inferoseptal, apical, anterolateral wall akinetic; basal lateral function preserved), mild MR; TAVR in place; AALIYAH 2.2; LVOT VTi 12.9 cm, IVC nl sized TTE 8/21/20 - LVEDD 5.4 cm, EF 25%, nl RV size with mildly reduced function, mild-mod MR, well seated Evolut Pro+ THV-in-valve with nl function (peak 11, mean 6), mild TR, est RAP 15 mmHg, est RVSP 71 mmHg. TTE 6/18/19 - LVEDD 5.8 cm, EF 20-25%, mild MR, nl functioning bioAVR (peak 19, mean 9), mod LA dilatation,  ANDREY 7/10/17 - EF 10-15%, LVEDD 5.9 cm, mild MR, bioprosthetic Ao valve, no aortic insufficiency, severe segmental LV dysfunction (akinesis of mid-apical septum, aneurysmal and dyskinetic LV apex, akinesis of anterior wall) TTE 12/16: LVIDd 5.8 cm. severe global LVSD. Right ventricle mildly dilated with global hypokinesis.  Normal prosthetic aortic valve: IVS 1.0 cm, severe global LVSD, mild MR, mild pulmonic insufficiency, normal Tricuspid valve. [de-identified] :  8/22/23 - RHC (MEMS calibration) - RA 16, RV 60/20, PA 61/36/43, PCWP 22 (v 27), CO/CI 4.6/2.03, PVR 4.54WU, (SBP missing) 9/16/21 - RHC/MEMS implant - RA 12, RV 50/13, PA 51/20/33, PCWP 11, CO/CI 4.7/2.08 (PA sat 59%, Ao sat 93%, Hb 11.5), PVR 4.68 REYNA 8/20/20 - HECTOR; /36, Ao 101/63/80 RHC 6/4/20 - Ao 126/77/92, RA 8, RV 71/10, PA 66/24/40, PCW 20, PaSat 61%, AoSat 98%, Jan CO/CI 3.85/1.7, PVR 5.19 Reyna, SVR 1744 dsc [facility] 6/4/20 - LM mild CAD, mLAD 10% stenosis at site of prior stent, LCx/RCA mod CAD [facility] ([place]) - 3 stents placed [de-identified] : \par

## 2024-02-15 NOTE — REASON FOR VISIT
[Cardiac Failure] : cardiac failure [FreeTextEntry1] : Primary Cardiologist:  Dr. Pressley Pulmonologist: Dr. Avel Guerrero

## 2024-02-15 NOTE — ASSESSMENT
[FreeTextEntry1] : 74 yo M with CAD s/p late-presenting MI 2005 s/p PCI LAD, ICM/HFrEF (EF 15-20%, LVEDD 6 cm) s/p single chamber ICD with lead revision in 2018 with upgrade to dual-chamber device (8/17/21) however attempts at LV lead placement unsuccessful due to anatomy, s/p MEMS (9/16/21), bioprosthetic AVR (2015) 2/2 AI s/p 26 mm EVOLUT HECTOR for severe bioprosthetic AS (8/20/20), aflutter ablation 7/17 w/ persistent afib, s/p DCCV x2 (8/12/20, 7/7/21, 1/17/2024), Trigeminal neuralgia (4/2022) who presents for HF follow up.

## 2024-02-15 NOTE — REVIEW OF SYSTEMS
[FreeTextEntry5] : per HPI [FreeTextEntry1] : 10 point review of systems was negative except for that listed in the HPI.

## 2024-02-21 ENCOUNTER — NON-APPOINTMENT (OUTPATIENT)
Age: 74
End: 2024-02-21

## 2024-02-28 ENCOUNTER — APPOINTMENT (OUTPATIENT)
Dept: HEART FAILURE | Facility: CLINIC | Age: 74
End: 2024-02-28

## 2024-02-28 ENCOUNTER — APPOINTMENT (OUTPATIENT)
Dept: CARDIOLOGY | Facility: CLINIC | Age: 74
End: 2024-02-28
Payer: MEDICARE

## 2024-02-28 PROCEDURE — 93264 REM MNTR WRLS P-ART PRS SNR: CPT

## 2024-02-28 NOTE — HISTORY OF PRESENT ILLNESS
[de-identified] :  CardioMEMs Summary 1/26/2024-2/28/2024 Goal PAD: 24 Avg PAD  39-45mmHg  Compliant with readings  70% DOI SUMMARY: During the period indicated above, the patient's pulmonary artery pressures were monitored at least weekly and based on the pressures, the pts medications were adjusted. Medication changes were communicated to the patient.   The daily data reports are in the Cardio Get SatisfactionS flowsheet and are also archived in the [email or web address] PCN system. See [facility] flowsheet for Trends. I use this technology to monitor PA pressures on a weekly basis to ensure patients are within their optimal range to prevent decompensation. PM

## 2024-03-05 ENCOUNTER — APPOINTMENT (OUTPATIENT)
Dept: CARDIOLOGY | Facility: CLINIC | Age: 74
End: 2024-03-05
Payer: MEDICARE

## 2024-03-05 VITALS
DIASTOLIC BLOOD PRESSURE: 54 MMHG | BODY MASS INDEX: 28.76 KG/M2 | SYSTOLIC BLOOD PRESSURE: 78 MMHG | HEART RATE: 70 BPM | OXYGEN SATURATION: 98 % | WEIGHT: 224 LBS

## 2024-03-05 PROCEDURE — G2211 COMPLEX E/M VISIT ADD ON: CPT

## 2024-03-05 PROCEDURE — 99214 OFFICE O/P EST MOD 30 MIN: CPT

## 2024-03-05 RX ORDER — LOSARTAN POTASSIUM 25 MG/1
25 TABLET, FILM COATED ORAL DAILY
Qty: 30 | Refills: 5 | Status: ACTIVE | COMMUNITY
Start: 1900-01-01 | End: 1900-01-01

## 2024-03-05 RX ORDER — TORSEMIDE 20 MG/1
20 TABLET ORAL
Qty: 540 | Refills: 1 | Status: ACTIVE | COMMUNITY
Start: 1900-01-01 | End: 1900-01-01

## 2024-03-06 NOTE — DISCUSSION/SUMMARY
[FreeTextEntry1] : DUDLEY BERMUDEZ is a 73 year old M who presents today Mar 05, 2024 with the above history and the following active issues: NYHA class IIIB HFREF He will continue diuretics as prescribed and will increase as needed. Check labs within a week. CPET 3/22/24. Patient should be risk stratified further after CPET results Consider alternative therapies if not an LVAD candidate

## 2024-03-06 NOTE — HISTORY OF PRESENT ILLNESS
[FreeTextEntry1] : 74 yo M with CAD s/p late-presenting MI 2005 s/p PCI LAD, ICM/HFrEF (EF 15-20%, LVEDD 6 cm) s/p single chamber ICD with lead revision in 2018 with upgrade to dual-chamber device (8/17/21) however attempts at LV lead placement unsuccessful due to anatomy, s/p MEMS (9/16/21), bioprosthetic AVR (2015) 2/2 AI s/p 26 mm EVOLUT HECTOR for severe bioprosthetic AS (8/20/20), aflutter ablation 7/17 w/ persistent afib, s/p DCCV x2 (8/12/20, 7/7/21, 1/17/2024), Trigeminal neuralgia (4/2022) who presents for  HF follow up.  Today Cardomems 37. He feels OK  no PND or orthopnea. He continues to become dizzy ( lightheaded) with standing after sitting for >30 mins. He is taking Torsemide 80 mg BID x 5 days then 80 mg QD x 2 days. MedTest DX has not worked for him in the past. He does continue to c/o resting tremors. HE is taking Losartan 25 mg QHS.  PMH below  He was last admitted at [facility] 11/16-11/19/23 for afib ablation but noted to have acute kidney injury and hyponatremia likely 2/2 metolazone use prior to ablation so was aborted. Labs improved with cessation of diuretics from BUN/Cr 75/2.2 to 56/1.1.  Device interrogation he has been in 100% A-fib since then with a V-paced burden of 74.3%.  OptiVol fluid level is also elevated since December.  This year, he was hospitalized at [facility] for rising CardioMEMS numbers from 1/10 - 1/17/24.  This is thought to be secondary to atrial fibrillation.  He underwent DCCV on 117 with improvement in CardioMEMS numbers to 31.  LRL on PPM was changed from 60 to 70.  She was discharged on torsemide 60mg BID.  Since hospital discharge, he has been on alternating doses of torsemide 40 mg and 80 mg daily.  He was previously seen by my colleague Dr. Quiñones however, due to travel distance he is transitioning to HF care with me in [place].  He was last seen in office on 2/9/2024.  He was found to be euvolemic on exam.  CardioMEMS at that time was 29.  Furoscix was discontinued.  He was resumed on his home diuretic dose of torsemide 40 alternating with 80 mg daily.  However, he had a syncopal episode over the weekend.  CardioMEMS numbers had gone up to 45 after that.  He was instructed to increase his diuretics to torsemide 80mg daily X 2 days then to go back to 40/80mg alternation.       Received Covid (Moderna) vaccine x2 (last in February).and booster without adverse reaction. He had Covid 2022, mild symptoms.

## 2024-03-06 NOTE — REVIEW OF SYSTEMS
[Dizziness] : dizziness [Negative] : Musculoskeletal [FreeTextEntry5] : per HPI [FreeTextEntry1] : 10 point review of systems was negative except for that listed in the HPI.

## 2024-03-06 NOTE — PATIENT PROFILE ADULT - FUNCTIONAL SCREEN CURRENT LEVEL: SWALLOWING (IF SCORE 2 OR MORE FOR ANY ITEM, CONSULT REHAB SERVICES), MLM)
Peripheral Line Insertion Xray Abdomen 1 View Portable, IMMEDIATE Peripheral Line Insertion Midline Insertion 0 = swallows foods/liquids without difficulty

## 2024-03-06 NOTE — PHYSICAL EXAM
[Normal S1, S2] : normal S1, S2 [No Murmur] : no murmur [Clear Lung Fields] : clear lung fields [Good Air Entry] : good air entry [Normal] : soft, non-tender, no masses/organomegaly, normal bowel sounds [No Rash] : no rash [No Varicosities] : no varicosities [Moves all extremities] : moves all extremities [Alert and Oriented] : alert and oriented [de-identified] : overweight [de-identified] : slow gait  [de-identified] : Left chest ICD [de-identified] : Left facial decreased tone secondary to trigeminal neuralgia [de-identified] : ecchymoses on arms, cyanotic lips

## 2024-03-19 RX ORDER — AMIODARONE HYDROCHLORIDE 200 MG/1
200 TABLET ORAL
Qty: 30 | Refills: 0 | Status: ACTIVE | COMMUNITY
Start: 2024-01-22 | End: 1900-01-01

## 2024-03-19 RX ORDER — SPIRONOLACTONE 25 MG/1
25 TABLET ORAL TWICE DAILY
Qty: 180 | Refills: 1 | Status: ACTIVE | COMMUNITY
Start: 2022-04-11 | End: 1900-01-01

## 2024-03-22 ENCOUNTER — APPOINTMENT (OUTPATIENT)
Dept: HEART FAILURE | Facility: CLINIC | Age: 74
End: 2024-03-22
Payer: MEDICARE

## 2024-03-22 PROCEDURE — 94621 CARDIOPULM EXERCISE TESTING: CPT

## 2024-03-22 PROCEDURE — 94375 RESPIRATORY FLOW VOLUME LOOP: CPT

## 2024-03-22 PROCEDURE — 93018 CV STRESS TEST I&R ONLY: CPT | Mod: 59

## 2024-03-22 PROCEDURE — 93000 ELECTROCARDIOGRAM COMPLETE: CPT | Mod: 59

## 2024-03-22 PROCEDURE — 94200 LUNG FUNCTION TEST (MBC/MVV): CPT | Mod: 59

## 2024-03-23 ENCOUNTER — LABORATORY RESULT (OUTPATIENT)
Age: 74
End: 2024-03-23

## 2024-03-25 ENCOUNTER — APPOINTMENT (OUTPATIENT)
Dept: HEART FAILURE | Facility: CLINIC | Age: 74
End: 2024-03-25
Payer: MEDICARE

## 2024-03-25 VITALS
HEART RATE: 79 BPM | WEIGHT: 226 LBS | BODY MASS INDEX: 29 KG/M2 | DIASTOLIC BLOOD PRESSURE: 64 MMHG | HEIGHT: 74 IN | SYSTOLIC BLOOD PRESSURE: 102 MMHG | OXYGEN SATURATION: 96 %

## 2024-03-25 DIAGNOSIS — I47.29 OTHER VENTRICULAR TACHYCARDIA: ICD-10-CM

## 2024-03-25 DIAGNOSIS — I42.0 DILATED CARDIOMYOPATHY: ICD-10-CM

## 2024-03-25 PROCEDURE — 99215 OFFICE O/P EST HI 40 MIN: CPT

## 2024-03-25 NOTE — HISTORY OF PRESENT ILLNESS
[FreeTextEntry1] : 74 yo M with CAD s/p late-presenting MI 2005 s/p PCI LAD, ICM/HFrEF (EF 15-20%, LVEDD 6 cm) s/p single chamber ICD with lead revision in 2018 with upgrade to dual-chamber device (8/17/21) however attempts at LV lead placement unsuccessful due to anatomy, s/p MEMS (9/16/21), bioprosthetic AVR (2015) 2/2 AI s/p 26 mm EVOLUT HECTOR for severe bioprosthetic AS (8/20/20), aflutter ablation 7/17 w/ persistent afib, s/p DCCV x2 (8/12/20, 7/7/21, 1/17/2024), Trigeminal neuralgia (4/2022) who presents for  HF follow up.   He was last admitted at [facility] 11/16-11/19/23 for afib ablation but noted to have acute kidney injury and hyponatremia likely 2/2 metolazone use prior to ablation so was aborted. Labs improved with cessation of diuretics from BUN/Cr 75/2.2 to 56/1.1.  Device interrogation he has been in 100% A-fib since then with a V-paced burden of 74.3%.  OptiVol fluid level is also elevated since December.  This year, he was hospitalized at [facility] for rising CardioMEMS numbers from 1/10 - 1/17/24.  This is thought to be secondary to atrial fibrillation.  He underwent DCCV on 117 with improvement in CardioMEMS numbers to 31.  LRL on PPM was changed from 60 to 70.  She was discharged on torsemide 60mg BID.  Since hospital discharge, he has been on alternating doses of torsemide 40 mg and 80 mg daily.  He was previously seen by my colleague Dr. Quiñones however, due to travel distance he is transitioning to HF care with me in [place].  He was last seen in office on 2/9/2024.  He was found to be euvolemic on exam.  CardioMEMS at that time was 29.  Furoscix was discontinued.  He was resumed on his home diuretic dose of torsemide 40 alternating with 80 mg daily.  However, he had a syncopal episode over the weekend.  CardioMEMS numbers had gone up to 45 after that.  He was instructed to increase his diuretics to torsemide 80mg daily X 2 days then to go back to 40/80mg alternation.    I last saw him on 2/14/24.  He has required lasix adjustment since then with a stretch of torsemide 80mg daily.  He is now back on torsemide 80mg alt 40mg EOD.  I also started him on Verquvo.  He feels worse when his cardioMEMS in < 30.  He did go on a 3-day trip to [place] and was able to participate in many activities there but did not a wheelchair a part of one of the tours.   He did his CPET on 3/22/24 - results pending however, preliminary shows he exercised for 8:44min with pVO2 of 7.6ml/klg/min and a VE/CO2 slope of 47 at RER of 1.08.  He had a blunted HR response of only reaching HR of 81 (55% of MPHR).  Dyspnea score 3/4.  Today, he reports feeling "ok". He is able to do the activities that he wants to do.  He did go to the LVAD support group and is still open to considering this.  He has had no further syncope after starting to use compression stockings. He tried an abdominal binder which he found no relief from.  He has struggled with syncope/pre-syncope for the past 2 years.  Typically, this occurs after like being in the car for long periods of time or sitting for long periods of time.  He does not develop lower extremity edema.  However, he will develop abdominal bloating that he has correlated with increased cardioMEMS readings.  Currently, his activity is not limited by shortness of breath but by back pain. He sleeps with 2 pillows with no orthopnea/PND.  CardioMEMS readings: target PAD 30 (although still reading 24 in [facility]). RAnge 37- 41.    Received Covid (Moderna) vaccine x2 (last in February).and booster without adverse reaction. He had Covid 2022, mild symptoms.

## 2024-03-25 NOTE — CARDIOLOGY SUMMARY
[de-identified] :  2/9/2024: Low voltage, no clear P waves likely atrial flutter, IVCD, old anterior MI. 11/16/23 - afib, low voltage, RV paced ( msec) 9/6/23 afib, low voltage, RBBB 8/4/23 AF75, RBBB, LAFB, low voltage, NSST 2/3/23  NSR with 1 st degree AVB , low voltage, PRWP 4/1/22 NSR with 1 st degree  ms, low voltage, PRWP 12/7/21 - A paced, long [place] interval, PVC,  9/21/21 - A paced, long [place] interval, PRWP, low voltage  7/19/21 - ? afib (per Dr. Apple in sinus); HR 70, RV paced  10/1/20 - afib, V paced, HR 51  8/24/20 - AFib/, HR 69 12/31/19 - AFib, HR 73, low voltage, PRWP  11/8/17 - NSR, HR 76, APC, limb lead reversal, PRWP 6/29/17  Aflutter,HR 79 [de-identified] :  PYP 1/17/24: H: CL ratio not noted however, qualitative grade 0 uptake. PYP 11/18/21: H:CL ratio 1.3 (borderline/suggestive of ATTR); grade 2 (myocardial uptake to rib; borderline/suggestive of ATTR); equivocal for cardiac amyloidosis  [de-identified] : CPET3/22/24 - results pending however, preliminary shows he exercised fro 8:44min with pVO2 of 7.6ml/klg/min and a VE/CO2 slope of 47 at RER of 1.08.  He had a blunted HR response of only reaching HR of 81 (55% of MPHR).    [de-identified] :  1/11/2024: EF 15%, LVEDD 6.2 cm, IVSD 0.8 cm, moderate TR PASP 38, MR, TAVR in place functioning well no paravalvular leak.  Severe JODI, mild LAE, TAPSE 1.2 cm with severe RV enlargement and decreased function. 10/24/23: EF 10%, LVEDD 6.9 cm, mild MR, mod TR, RVSP 37, TAVR nl functioning, LVOT VTI 10 cm, Ao valve velocity 1.5 m/sec, IVC 2.1 cm 5/26/2023: EF 20 to 25%, mild mitral regurgitation, mild left atrial enlargement, mild TR, RVSP 56, E/e' 21, LVOT VTI 14.8, IVC 2.2 cm; AALIYAH 1.4 (peak 10; mean 6) 10/1/20 - LVEDD 6 cm, EF 20% (segmental; inferoseptal, apical, anterolateral wall akinetic; basal lateral function preserved), mild MR; TAVR in place; AALIYAH 2.2; LVOT VTi 12.9 cm, IVC nl sized TTE 8/21/20 - LVEDD 5.4 cm, EF 25%, nl RV size with mildly reduced function, mild-mod MR, well seated Evolut Pro+ THV-in-valve with nl function (peak 11, mean 6), mild TR, est RAP 15 mmHg, est RVSP 71 mmHg. TTE 6/18/19 - LVEDD 5.8 cm, EF 20-25%, mild MR, nl functioning bioAVR (peak 19, mean 9), mod LA dilatation,  ANDREY 7/10/17 - EF 10-15%, LVEDD 5.9 cm, mild MR, bioprosthetic Ao valve, no aortic insufficiency, severe segmental LV dysfunction (akinesis of mid-apical septum, aneurysmal and dyskinetic LV apex, akinesis of anterior wall) TTE 12/16: LVIDd 5.8 cm. severe global LVSD. Right ventricle mildly dilated with global hypokinesis.  Normal prosthetic aortic valve: IVS 1.0 cm, severe global LVSD, mild MR, mild pulmonic insufficiency, normal Tricuspid valve. [de-identified] :  8/22/23 - RHC (MEMS calibration) - RA 16, RV 60/20, PA 61/36/43, PCWP 22 (v 27), CO/CI 4.6/2.03, PVR 4.54WU, (SBP missing) 9/16/21 - RHC/MEMS implant - RA 12, RV 50/13, PA 51/20/33, PCWP 11, CO/CI 4.7/2.08 (PA sat 59%, Ao sat 93%, Hb 11.5), PVR 4.68 REYNA 8/20/20 - HECTOR; /36, Ao 101/63/80 RHC 6/4/20 - Ao 126/77/92, RA 8, RV 71/10, PA 66/24/40, PCW 20, PaSat 61%, AoSat 98%, Jan CO/CI 3.85/1.7, PVR 5.19 Reyna, SVR 1744 dsc [facility] 6/4/20 - LM mild CAD, mLAD 10% stenosis at site of prior stent, LCx/RCA mod CAD [facility] ([place]) - 3 stents placed

## 2024-03-25 NOTE — ASSESSMENT
[FreeTextEntry1] : 72 yo M with CAD s/p late-presenting MI 2005 s/p PCI LAD, ICM/HFrEF (EF 15-20%, LVEDD 6 cm) s/p single chamber ICD with lead revision in 2018 with upgrade to dual-chamber device (8/17/21) however attempts at LV lead placement unsuccessful due to anatomy, s/p MEMS (9/16/21), bioprosthetic AVR (2015) 2/2 AI s/p 26 mm EVOLUT HECTOR for severe bioprosthetic AS (8/20/20), aflutter ablation 7/17 w/ persistent afib, s/p DCCV x2 (8/12/20, 7/7/21, 1/17/2024), Trigeminal neuralgia (4/2022) who presents for HF follow up.   He has ACC/AHA stage D CM, with NYHA class III symtpoms.

## 2024-03-25 NOTE — DISCUSSION/SUMMARY
[FreeTextEntry1] : # HFrEF  - ETIOOGY: ischemic + valvular CM.      - GMDT: Continue on metoprolol 25 mg XL daily in AM, losartan 12.5 mg daily in PM, spironolactone 25 mg daily, Farxiga 5 mg daily, and Verquovo 2.5mg daily. -Unable to tolerate Entresto due to hypotension.  Baseline BP 90-100s/60s. - can increase Verquovo to 5mg daily at next visit.  - Of note, has ongoing syncope for the past 2 years after sitting for prolonged periods of time.    - LABS:  - 1/31/24: pro BNP 1590 - 2/01/24: Cr 1.85 - 2/15/24: Cr 1.66, K 4.4, pro BNP 5942.  - CARDIOMEMS:  Favor increasing target to 34 (range 30-37).  He does have an element of pre-capillary hypertension with PVR 4.6 on last RHC with PCWP of 22.  Wt goal of 222-224lbs.  Below MEMS of 30 he feels worse.    -  continue with torsemide 40 mg alt with 80mg daily.  - ADVANCED THERAPIES:  In past he was against LVAD therapy.  However, he did attend a support group meeting and would like to attend a second one.  He is very much focused on his quality of life and is hesitant that LVAD would disrupt this including his ability to go boating.  I discussed today that with his CPET values (PVO2 of 7.6ml/kg/min and VE/VCO2 slope of 47) that this is the time to consider advanced therapies before he deteriorates.  I am not sure with his current RV dysfunction if he would be a good candidate although last RHC with Alvaro of 1.5.   - Will also address with EP the lack of HR response during CPET. - will need repeat echo.  # Syncope - last on 2/9/24.   - amyloid ruled out as above.  - had good benefit with compression stockings for longer car rides.     - carotid dopplers 12/27/21 B/L moderate non-obstructive disease.  Will need to consider repeating.  # Aflutter/afib- in AF with dual chamber ICD - was to undergo ablation but aborted d/t FEDERICO.  Last DCCV in 1/17/24.  Difficult to interpret EKG however, appears to be back in atrial flutter.   - On amiodarone 100 mg daily and metoprolol 25 mg XL daily. - AC: continue Eliquis 5 mg bid - Will discuss with Dr. Apple if there are any device settings that can be changed to help boost his chronotropic incompetence on CPET.  He does not appear to get any benefit from being in sinus rhythm versus A-fib in the past.  Perhaps stopping amiodarone would be of benefit - defer to EP team.  - Card[facility]MEMS readings at [facility] after last DCCV went from 36 to 31 post DCCV.  No clear left bundle branch block on EKG.  Has had attempts at LV pacing however unable to obtain due to anatomy.  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 Waggoner which felt it as inflammatory, and no further intervention was warranted. - follows with his pulmonologist, Dr. Guerrero in   Follow up with me in 1 month ( HF team).

## 2024-03-25 NOTE — PHYSICAL EXAM
[Normal] : soft, non-tender, no masses/organomegaly, normal bowel sounds [No Varicosities] : no varicosities [No Rash] : no rash [Alert and Oriented] : alert and oriented [Moves all extremities] : moves all extremities [de-identified] : Left facial decreased tone secondary to trigeminal neuralgia [de-identified] : overweight [de-identified] : JVP around 8-10cm sitting upright. [de-identified] : slow gait  [de-identified] : Left chest ICD [de-identified] : ecchymoses on arms [de-identified] : trace lower extremity edema bilaterally R>L due to prior right knee incision, warm

## 2024-03-29 ENCOUNTER — APPOINTMENT (OUTPATIENT)
Dept: CARDIOLOGY | Facility: CLINIC | Age: 74
End: 2024-03-29
Payer: MEDICARE

## 2024-03-29 DIAGNOSIS — Z95.9 PRESENCE OF CARDIAC AND VASCULAR IMPLANT AND GRAFT, UNSPECIFIED: ICD-10-CM

## 2024-03-29 PROCEDURE — 93264 REM MNTR WRLS P-ART PRS SNR: CPT

## 2024-03-29 NOTE — HISTORY OF PRESENT ILLNESS
Medication(s) Requested: Tramadol 50 mg  Last office visit: 10/16/2018  Last refill: 1/4/2019  Is the patient due for refill of this medication(s): Yes  PDMP review: Criteria met. Forwarded to Physician/TERRY for signature.  
[de-identified] :  CardioMEMs Summary 2/29/2024-3/29/2024 Goal PAD: 34 Avg PAD: 36-39mmHg  Compliant with readings: 63% DOI: 09/16/21 SUMMARY: During the period indicated above, the patient's pulmonary artery pressures were monitored at least weekly and based on the pressures, the pts medications were adjusted. Medication changes were communicated to the patient.   The daily data reports are in the [employer] flowsheet and are also archived in the [email or web address] PCN system. See [facility] flowsheet for Trends. I use this technology to monitor PA pressures on a weekly basis to ensure patients are within their optimal range to prevent decompensation.

## 2024-04-03 ENCOUNTER — APPOINTMENT (OUTPATIENT)
Dept: CARDIOLOGY | Facility: CLINIC | Age: 74
End: 2024-04-03

## 2024-04-05 ENCOUNTER — NON-APPOINTMENT (OUTPATIENT)
Age: 74
End: 2024-04-05

## 2024-04-05 ENCOUNTER — APPOINTMENT (OUTPATIENT)
Dept: ELECTROPHYSIOLOGY | Facility: CLINIC | Age: 74
End: 2024-04-05
Payer: MEDICARE

## 2024-04-05 VITALS
HEIGHT: 74 IN | WEIGHT: 226 LBS | SYSTOLIC BLOOD PRESSURE: 84 MMHG | HEART RATE: 74 BPM | DIASTOLIC BLOOD PRESSURE: 60 MMHG | BODY MASS INDEX: 29 KG/M2 | OXYGEN SATURATION: 99 %

## 2024-04-05 DIAGNOSIS — Z95.3 PRESENCE OF XENOGENIC HEART VALVE: ICD-10-CM

## 2024-04-05 DIAGNOSIS — R06.02 SHORTNESS OF BREATH: ICD-10-CM

## 2024-04-05 DIAGNOSIS — Z95.810 PRESENCE OF AUTOMATIC (IMPLANTABLE) CARDIAC DEFIBRILLATOR: ICD-10-CM

## 2024-04-05 DIAGNOSIS — I48.19 OTHER PERSISTENT ATRIAL FIBRILLATION: ICD-10-CM

## 2024-04-05 DIAGNOSIS — I50.9 HEART FAILURE, UNSPECIFIED: ICD-10-CM

## 2024-04-05 PROCEDURE — 99214 OFFICE O/P EST MOD 30 MIN: CPT

## 2024-04-08 ENCOUNTER — RX RENEWAL (OUTPATIENT)
Age: 74
End: 2024-04-08

## 2024-04-10 RX ORDER — VERICIGUAT 2.5 MG/1
2.5 TABLET, FILM COATED ORAL
Qty: 30 | Refills: 0 | Status: DISCONTINUED | COMMUNITY
Start: 2024-02-14 | End: 2024-04-10

## 2024-04-12 ENCOUNTER — INPATIENT (INPATIENT)
Facility: HOSPITAL | Age: 74
LOS: 7 days | Discharge: HOSPICE | DRG: 286 | End: 2024-04-20
Attending: INTERNAL MEDICINE | Admitting: HOSPITALIST
Payer: MEDICARE

## 2024-04-12 ENCOUNTER — APPOINTMENT (OUTPATIENT)
Dept: CT IMAGING | Facility: CLINIC | Age: 74
End: 2024-04-12
Payer: MEDICARE

## 2024-04-12 ENCOUNTER — NON-APPOINTMENT (OUTPATIENT)
Age: 74
End: 2024-04-12

## 2024-04-12 VITALS
OXYGEN SATURATION: 86 % | WEIGHT: 179.9 LBS | TEMPERATURE: 98 F | HEIGHT: 67 IN | HEART RATE: 87 BPM | RESPIRATION RATE: 20 BRPM | SYSTOLIC BLOOD PRESSURE: 92 MMHG | DIASTOLIC BLOOD PRESSURE: 65 MMHG

## 2024-04-12 DIAGNOSIS — Z95.5 PRESENCE OF CORONARY ANGIOPLASTY IMPLANT AND GRAFT: Chronic | ICD-10-CM

## 2024-04-12 DIAGNOSIS — I50.23 ACUTE ON CHRONIC SYSTOLIC (CONGESTIVE) HEART FAILURE: ICD-10-CM

## 2024-04-12 DIAGNOSIS — Z98.890 OTHER SPECIFIED POSTPROCEDURAL STATES: Chronic | ICD-10-CM

## 2024-04-12 DIAGNOSIS — Z98.89 OTHER SPECIFIED POSTPROCEDURAL STATES: Chronic | ICD-10-CM

## 2024-04-12 DIAGNOSIS — Z95.4 PRESENCE OF OTHER HEART-VALVE REPLACEMENT: Chronic | ICD-10-CM

## 2024-04-12 DIAGNOSIS — Z95.810 PRESENCE OF AUTOMATIC (IMPLANTABLE) CARDIAC DEFIBRILLATOR: Chronic | ICD-10-CM

## 2024-04-12 LAB
ALBUMIN SERPL ELPH-MCNC: 4 G/DL — SIGNIFICANT CHANGE UP (ref 3.3–5.2)
ALP SERPL-CCNC: 173 U/L — HIGH (ref 40–120)
ALT FLD-CCNC: 35 U/L — SIGNIFICANT CHANGE UP
ANION GAP SERPL CALC-SCNC: 19 MMOL/L — HIGH (ref 5–17)
AST SERPL-CCNC: 42 U/L — HIGH
BASE EXCESS BLDV CALC-SCNC: 1 MMOL/L — SIGNIFICANT CHANGE UP (ref -2–3)
BASOPHILS # BLD AUTO: 0.08 K/UL — SIGNIFICANT CHANGE UP (ref 0–0.2)
BASOPHILS NFR BLD AUTO: 0.6 % — SIGNIFICANT CHANGE UP (ref 0–2)
BILIRUB SERPL-MCNC: 2 MG/DL — SIGNIFICANT CHANGE UP (ref 0.4–2)
BUN SERPL-MCNC: 50.9 MG/DL — HIGH (ref 8–20)
CA-I SERPL-SCNC: 1 MMOL/L — LOW (ref 1.15–1.33)
CALCIUM SERPL-MCNC: 8.9 MG/DL — SIGNIFICANT CHANGE UP (ref 8.4–10.5)
CHLORIDE BLDV-SCNC: 95 MMOL/L — LOW (ref 96–108)
CHLORIDE SERPL-SCNC: 93 MMOL/L — LOW (ref 96–108)
CO2 SERPL-SCNC: 23 MMOL/L — SIGNIFICANT CHANGE UP (ref 22–29)
CREAT SERPL-MCNC: 1.39 MG/DL — HIGH (ref 0.5–1.3)
EGFR: 53 ML/MIN/1.73M2 — LOW
EOSINOPHIL # BLD AUTO: 0.01 K/UL — SIGNIFICANT CHANGE UP (ref 0–0.5)
EOSINOPHIL NFR BLD AUTO: 0.1 % — SIGNIFICANT CHANGE UP (ref 0–6)
GAS PNL BLDV: 131 MMOL/L — LOW (ref 136–145)
GAS PNL BLDV: SIGNIFICANT CHANGE UP
GLUCOSE BLDV-MCNC: 131 MG/DL — HIGH (ref 70–99)
GLUCOSE SERPL-MCNC: 133 MG/DL — HIGH (ref 70–99)
HCO3 BLDV-SCNC: 26 MMOL/L — SIGNIFICANT CHANGE UP (ref 22–29)
HCT VFR BLD CALC: 47.2 % — SIGNIFICANT CHANGE UP (ref 39–50)
HCT VFR BLDA CALC: 47 % — SIGNIFICANT CHANGE UP
HGB BLD CALC-MCNC: 15.5 G/DL — SIGNIFICANT CHANGE UP (ref 12.6–17.4)
HGB BLD-MCNC: 15.5 G/DL — SIGNIFICANT CHANGE UP (ref 13–17)
IMM GRANULOCYTES NFR BLD AUTO: 0.6 % — SIGNIFICANT CHANGE UP (ref 0–0.9)
LACTATE BLDV-MCNC: 2.7 MMOL/L — HIGH (ref 0.5–2)
LYMPHOCYTES # BLD AUTO: 0.88 K/UL — LOW (ref 1–3.3)
LYMPHOCYTES # BLD AUTO: 7.1 % — LOW (ref 13–44)
MCHC RBC-ENTMCNC: 31.4 PG — SIGNIFICANT CHANGE UP (ref 27–34)
MCHC RBC-ENTMCNC: 32.8 GM/DL — SIGNIFICANT CHANGE UP (ref 32–36)
MCV RBC AUTO: 95.5 FL — SIGNIFICANT CHANGE UP (ref 80–100)
MONOCYTES # BLD AUTO: 1.42 K/UL — HIGH (ref 0–0.9)
MONOCYTES NFR BLD AUTO: 11.5 % — SIGNIFICANT CHANGE UP (ref 2–14)
NEUTROPHILS # BLD AUTO: 9.91 K/UL — HIGH (ref 1.8–7.4)
NEUTROPHILS NFR BLD AUTO: 80.1 % — HIGH (ref 43–77)
NT-PROBNP SERPL-SCNC: 2041 PG/ML — HIGH (ref 0–300)
PCO2 BLDV: 43 MMHG — SIGNIFICANT CHANGE UP (ref 42–55)
PH BLDV: 7.39 — SIGNIFICANT CHANGE UP (ref 7.32–7.43)
PLATELET # BLD AUTO: 190 K/UL — SIGNIFICANT CHANGE UP (ref 150–400)
PO2 BLDV: 49 MMHG — HIGH (ref 25–45)
POTASSIUM BLDV-SCNC: 4.2 MMOL/L — SIGNIFICANT CHANGE UP (ref 3.5–5.1)
POTASSIUM SERPL-MCNC: 4.5 MMOL/L — SIGNIFICANT CHANGE UP (ref 3.5–5.3)
POTASSIUM SERPL-SCNC: 4.5 MMOL/L — SIGNIFICANT CHANGE UP (ref 3.5–5.3)
PROT SERPL-MCNC: 7.1 G/DL — SIGNIFICANT CHANGE UP (ref 6.6–8.7)
RAPID RVP RESULT: SIGNIFICANT CHANGE UP
RBC # BLD: 4.94 M/UL — SIGNIFICANT CHANGE UP (ref 4.2–5.8)
RBC # FLD: 16 % — HIGH (ref 10.3–14.5)
SAO2 % BLDV: 77.2 % — SIGNIFICANT CHANGE UP
SARS-COV-2 RNA SPEC QL NAA+PROBE: SIGNIFICANT CHANGE UP
SODIUM SERPL-SCNC: 135 MMOL/L — SIGNIFICANT CHANGE UP (ref 135–145)
TROPONIN T, HIGH SENSITIVITY RESULT: 35 NG/L — SIGNIFICANT CHANGE UP (ref 0–51)
WBC # BLD: 12.37 K/UL — HIGH (ref 3.8–10.5)
WBC # FLD AUTO: 12.37 K/UL — HIGH (ref 3.8–10.5)

## 2024-04-12 PROCEDURE — 71045 X-RAY EXAM CHEST 1 VIEW: CPT | Mod: 26

## 2024-04-12 PROCEDURE — 99223 1ST HOSP IP/OBS HIGH 75: CPT

## 2024-04-12 PROCEDURE — 93010 ELECTROCARDIOGRAM REPORT: CPT

## 2024-04-12 PROCEDURE — 99285 EMERGENCY DEPT VISIT HI MDM: CPT

## 2024-04-12 PROCEDURE — 70450 CT HEAD/BRAIN W/O DYE: CPT

## 2024-04-12 RX ORDER — ACETAMINOPHEN 500 MG
650 TABLET ORAL EVERY 6 HOURS
Refills: 0 | Status: DISCONTINUED | OUTPATIENT
Start: 2024-04-12 | End: 2024-04-20

## 2024-04-12 RX ORDER — LANOLIN ALCOHOL/MO/W.PET/CERES
3 CREAM (GRAM) TOPICAL AT BEDTIME
Refills: 0 | Status: DISCONTINUED | OUTPATIENT
Start: 2024-04-12 | End: 2024-04-20

## 2024-04-12 RX ORDER — ONDANSETRON 8 MG/1
4 TABLET, FILM COATED ORAL EVERY 8 HOURS
Refills: 0 | Status: DISCONTINUED | OUTPATIENT
Start: 2024-04-12 | End: 2024-04-20

## 2024-04-12 NOTE — ED PROVIDER NOTE - PHYSICAL EXAMINATION
General: well appearing, NAD, cyanotic appearing lips  Head: NC, AT  EENT: EOMI, no scleral icterus  Cardiac: RRR, no apparent murmurs, no lower extremity edema  Respiratory: CTABL, 88-90% on RA, non tachypneic  Abdomen: soft, ND, NT, nonperitonitic  MSK/Vascular: full ROM, soft compartments, warm extremities, 2+ peripheral pulses b/l  Neuro: AAOx3, sensation to light touch intact  Psych: calm, cooperative

## 2024-04-12 NOTE — ED PROVIDER NOTE - OBJECTIVE STATEMENT
73-year-old male with past medical history of CAD s/p PCI, heart failure reduced ejection fraction s/p ICD, mems, bioprosthetic AVR presenting to the ER with worsening shortness of breath for 3 to 4 days.  Told by his heart failure team that his mems numbers have worsened, told to come to the ER for a cath on Monday further management.  Requiring 3 to 4 L nasal cannula oxygen.  Of note, his lips on presentation, states this is normal for him.

## 2024-04-12 NOTE — H&P ADULT - ASSESSMENT
73 year old male with PMH of CAD s/p late-presenting MI 2005 s/p PCI LAD, ICM/HFrEF (EF 15-20%, LVEDD 6 cm) s/p single chamber ICD with [place] lead 2011 s/p lead extraction and upgrade to Medtronic dual-chamber ICD (8/17/21), has hx of appropriate ICD shock for VT/VF, s/p MEMS (9/16/21), bioprosthetic AVR (2015) 2/2 AI s/p 26 mm EVOLUT HECTOR for severe bioprosthetic AS (8/20/20), prior aflutter ablation 7/2017, persistent afib, s/p DCCV x2 (8/12/20, 7/7/21), Trigeminal neuralgia (4/2022) presented for elevated filling pressure on cardiomems.    Acute on systolic chronic HF   -hypoxia requiring 3L NC   -No acute volume overload on exam   -sent in for elevated pressure on cardiomems   -Pt with borderline BP, will attempt to diuresis pt with Lasix 40mg IVP BID  -daily weight, strict I/O   -cont with Losartan, Spironolactone, Farxiga, BB   -Spoke with cardiology PA, pt will be seen by advance HF group in the am     Afib s/p ANDREY/DCCV on 1/17/24  -rated controlled   -cont with Eliquis 5mg po bid  -pt reports he no longer on amiodarone  -cont with Metroprolol 25mg PO daily      CAD s/p PCI  -cont ASA, statin     Hypothyroidism   -cont with synthroid    Trigeminal neurologia   -cont carbamazepine     DVT ppx  -Eliquis       Time-based billing (NON-critical care).     80 minutes spent on total encounter. The necessity of the time spent during the encounter on this date of service was due to:   chart review, patient evaluation, independent history taking, documentation, coordination of care and discussion with patient, nurses, consultants,

## 2024-04-12 NOTE — ED PROVIDER NOTE - CLINICAL SUMMARY MEDICAL DECISION MAKING FREE TEXT BOX
74 y/o male w/hypoxia on presentation, 3-4L NC. Concern for decompensated acute HF given reported worsening MEMS. Will check cardiac labs, CXR. Cards consult. Likely admit for cardiac cath and HF management.

## 2024-04-12 NOTE — ED ADULT TRIAGE NOTE - CHIEF COMPLAINT QUOTE
Pt arrives to ED c/o increased SOB - Hx of CHF and very low EF - 15 %. Pt  had few falls recently - On Eliquis . Had CT of the head done in [place]  today which was negative  . Hypoxic and hypotensive during TRiage - send to [facility] for further eval and treatment

## 2024-04-12 NOTE — ED PROVIDER NOTE - ATTENDING CONTRIBUTION TO CARE
Hx of CAD and HF sp dual chamb ICD and cardiomems. Sent for admission from cardiologist given elevated PA pressures poorly responsive to increased diuretics. On exam, lungs clear to ausc, no dyspnea or severe leg swelling. Labs and xray ordered, cards consulted and med tele admit for cardiac optimization.

## 2024-04-12 NOTE — H&P ADULT - NSHPPHYSICALEXAM_GEN_ALL_CORE
T(C): 36.7 (04-12-24 @ 19:05), Max: 36.7 (04-12-24 @ 19:05)  HR: 73 (04-12-24 @ 22:08) (73 - 87)  BP: 115/93 (04-12-24 @ 22:08) (92/65 - 115/93)  RR: 16 (04-12-24 @ 22:08) (16 - 20)  SpO2: 95% (04-12-24 @ 22:08) (86% - 95%)    GENERAL: patient appears well, no acute distress, appropriate, pleasant  EYES: sclera clear, no exudates  ENMT: oropharynx clear without erythema, no exudates, moist mucous membranes  NECK: supple, soft, no thyromegaly noted  LUNGS: good air entry bilaterally, clear to auscultation, symmetric breath sounds, no wheezing or rhonchi appreciated  HEART: soft S1/S2, regular rate and rhythm, no murmurs noted, no lower extremity edema  GASTROINTESTINAL: abdomen is soft, nontender, nondistended, normoactive bowel sounds, no palpable masses  INTEGUMENT: good skin turgor, warm skin, appears well perfused  MUSCULOSKELETAL: no clubbing or cyanosis, no obvious deformity  NEUROLOGIC: awake, alert, oriented x3, good muscle tone in 4 extremities, no obvious sensory deficits  PSYCHIATRIC: mood is good, affect is congruent, linear and logical thought process  HEME/LYMPH: no palpable supraclavicular nodules, no obvious ecchymosis or petechiae

## 2024-04-12 NOTE — ED ADULT NURSE NOTE - CHIEF COMPLAINT QUOTE
Pt arrives to ED c/o increased SOB - Hx of CHF and very low EF - 15 %. Pt  had few falls recently - On Eliquis . Had CT of the head done in [place]  today which was negative  . Hypoxic and hypotensive during TRiage - send to [facility] for further eval and treatment

## 2024-04-12 NOTE — H&P ADULT - HISTORY OF PRESENT ILLNESS
73 year old male with PMH of CAD s/p late-presenting MI 2005 s/p PCI LAD, ICM/HFrEF (EF 15-20%, LVEDD 6 cm) s/p single chamber ICD with [place] lead 2011 s/p lead extraction and upgrade to Medtronic dual-chamber ICD (8/17/21), has hx of appropriate ICD shock for VT/VF, s/p MEMS (9/16/21), bioprosthetic AVR (2015) 2/2 AI s/p 26 mm EVOLUT HECTOR for severe bioprosthetic AS (8/20/20), prior aflutter ablation 7/2017, persistent afib, s/p DCCV x2 (8/12/20, 7/7/21), Trigeminal neuralgia (4/2022) presented as direct admit due to persistently elevated filling pressure (PAD 40) on cardiomems. Pt seen before midnight.     73 year old male with PMH of CAD s/p late-presenting MI 2005 s/p PCI LAD, ICM/HFrEF (EF 15-20%, LVEDD 6 cm) s/p single chamber ICD with [place] lead 2011 s/p lead extraction and upgrade to Medtronic dual-chamber ICD (8/17/21), has hx of appropriate ICD shock for VT/VF, s/p MEMS (9/16/21), bioprosthetic AVR (2015) 2/2 AI s/p 26 mm EVOLUT HECTOR for severe bioprosthetic AS (8/20/20), prior aflutter ablation 7/2017, persistent afib, s/p DCCV x2 (8/12/20, 7/7/21), Trigeminal neuralgia (4/2022) presented for elevated filling pressure on cardiomems. Pt denies any sob, palpitations, cp, he states that tried to dobule up on his torsemide but still with elevated read, advised to come to ED by Dr. Quiñones Pt was admitted to [facility] for similar finding in Jan and had ANDREY/DCCV. In the ED hypoxic requiring 3L NC and bp borderline.

## 2024-04-12 NOTE — ED ADULT NURSE NOTE - OBJECTIVE STATEMENT
Pt presents to ED from home c/o difficulty breathing, dizziness and multiple falls. Last fall 3 days ago CT at [place] negative. Pt on Eliquis for Afib. PM Hx Afib, Cardioversion Jan 2024, MI. Pt connected to cardiac monitor, SpO2 92% on 3 L NC. Pt does not use home O2. IV flushed intact with blood return. Pt denies chest pain and SOB at this time. AOx3, alert and calm. Abrasion noted to right eyelid. Airway patent respirations even unlabored. Abdomen soft distended. Pt presents to ED from home c/o difficulty breathing, dizziness and multiple falls. Last fall 3 days ago CT at [place] negative. Pt on Eliquis for Afib. PM Hx Afib, Cardioversion Jan 2024, MI. Pt connected to cardiac monitor, SpO2 92% on 3 L NC. Pt does not use home O2. IV flushed intact with blood return. Pt denies chest pain and SOB at this time. AOx3, alert and calm. Bruise noted to right eye. Airway patent respirations even unlabored. Abdomen soft distended. Pt presents to ED from home c/o difficulty breathing, dizziness and multiple falls. Last fall 3 days ago CT at [place] negative. Pt on Eliquis for Afib. PM Hx Afib, Cardioversion Jan 2024, MI. Pt connected to cardiac monitor V-Paced HR 70, SpO2 92% on 3 L NC. Pt does not use home O2. IV flushed intact with blood return. Pt denies chest pain and SOB at this time. AOx3, alert and calm. Bruise noted to right eye. Airway patent respirations even unlabored. Abdomen soft distended.

## 2024-04-13 ENCOUNTER — RESULT REVIEW (OUTPATIENT)
Age: 74
End: 2024-04-13

## 2024-04-13 DIAGNOSIS — I25.10 ATHEROSCLEROTIC HEART DISEASE OF NATIVE CORONARY ARTERY WITHOUT ANGINA PECTORIS: ICD-10-CM

## 2024-04-13 DIAGNOSIS — I50.21 ACUTE SYSTOLIC (CONGESTIVE) HEART FAILURE: ICD-10-CM

## 2024-04-13 DIAGNOSIS — I48.91 UNSPECIFIED ATRIAL FIBRILLATION: ICD-10-CM

## 2024-04-13 LAB
ALBUMIN SERPL ELPH-MCNC: 3.9 G/DL — SIGNIFICANT CHANGE UP (ref 3.3–5.2)
ALP SERPL-CCNC: 131 U/L — HIGH (ref 40–120)
ALT FLD-CCNC: 30 U/L — SIGNIFICANT CHANGE UP
ANION GAP SERPL CALC-SCNC: 16 MMOL/L — SIGNIFICANT CHANGE UP (ref 5–17)
ANION GAP SERPL CALC-SCNC: 17 MMOL/L — SIGNIFICANT CHANGE UP (ref 5–17)
AST SERPL-CCNC: 29 U/L — SIGNIFICANT CHANGE UP
BASE EXCESS BLDV CALC-SCNC: 3.9 MMOL/L — HIGH (ref -2–3)
BILIRUB SERPL-MCNC: 2.6 MG/DL — HIGH (ref 0.4–2)
BUN SERPL-MCNC: 42.5 MG/DL — HIGH (ref 8–20)
BUN SERPL-MCNC: 42.8 MG/DL — HIGH (ref 8–20)
CA-I SERPL-SCNC: 1 MMOL/L — LOW (ref 1.15–1.33)
CALCIUM SERPL-MCNC: 8.3 MG/DL — LOW (ref 8.4–10.5)
CALCIUM SERPL-MCNC: 8.6 MG/DL — SIGNIFICANT CHANGE UP (ref 8.4–10.5)
CHLORIDE BLDV-SCNC: 96 MMOL/L — SIGNIFICANT CHANGE UP (ref 96–108)
CHLORIDE SERPL-SCNC: 95 MMOL/L — LOW (ref 96–108)
CHLORIDE SERPL-SCNC: 97 MMOL/L — SIGNIFICANT CHANGE UP (ref 96–108)
CO2 SERPL-SCNC: 24 MMOL/L — SIGNIFICANT CHANGE UP (ref 22–29)
CO2 SERPL-SCNC: 24 MMOL/L — SIGNIFICANT CHANGE UP (ref 22–29)
CREAT SERPL-MCNC: 1.15 MG/DL — SIGNIFICANT CHANGE UP (ref 0.5–1.3)
CREAT SERPL-MCNC: 1.18 MG/DL — SIGNIFICANT CHANGE UP (ref 0.5–1.3)
EGFR: 65 ML/MIN/1.73M2 — SIGNIFICANT CHANGE UP
EGFR: 67 ML/MIN/1.73M2 — SIGNIFICANT CHANGE UP
GAS PNL BLDV: 130 MMOL/L — LOW (ref 136–145)
GAS PNL BLDV: SIGNIFICANT CHANGE UP
GLUCOSE BLDV-MCNC: 233 MG/DL — HIGH (ref 70–99)
GLUCOSE SERPL-MCNC: 106 MG/DL — HIGH (ref 70–99)
GLUCOSE SERPL-MCNC: 151 MG/DL — HIGH (ref 70–99)
HCO3 BLDV-SCNC: 28 MMOL/L — SIGNIFICANT CHANGE UP (ref 22–29)
HCT VFR BLD CALC: 43.2 % — SIGNIFICANT CHANGE UP (ref 39–50)
HCT VFR BLDA CALC: 45 % — SIGNIFICANT CHANGE UP
HGB BLD CALC-MCNC: 14.9 G/DL — SIGNIFICANT CHANGE UP (ref 12.6–17.4)
HGB BLD-MCNC: 14.1 G/DL — SIGNIFICANT CHANGE UP (ref 13–17)
LACTATE BLDV-MCNC: 2.7 MMOL/L — HIGH (ref 0.5–2)
LACTATE SERPL-SCNC: 1.9 MMOL/L — SIGNIFICANT CHANGE UP (ref 0.5–2)
MCHC RBC-ENTMCNC: 30.6 PG — SIGNIFICANT CHANGE UP (ref 27–34)
MCHC RBC-ENTMCNC: 32.6 GM/DL — SIGNIFICANT CHANGE UP (ref 32–36)
MCV RBC AUTO: 93.7 FL — SIGNIFICANT CHANGE UP (ref 80–100)
PCO2 BLDV: 39 MMHG — LOW (ref 42–55)
PH BLDV: 7.46 — HIGH (ref 7.32–7.43)
PLATELET # BLD AUTO: 144 K/UL — LOW (ref 150–400)
PO2 BLDV: 53 MMHG — HIGH (ref 25–45)
POTASSIUM BLDV-SCNC: 4.3 MMOL/L — SIGNIFICANT CHANGE UP (ref 3.5–5.1)
POTASSIUM SERPL-MCNC: 3.1 MMOL/L — LOW (ref 3.5–5.3)
POTASSIUM SERPL-MCNC: 4.1 MMOL/L — SIGNIFICANT CHANGE UP (ref 3.5–5.3)
POTASSIUM SERPL-SCNC: 3.1 MMOL/L — LOW (ref 3.5–5.3)
POTASSIUM SERPL-SCNC: 4.1 MMOL/L — SIGNIFICANT CHANGE UP (ref 3.5–5.3)
PROT SERPL-MCNC: 6.7 G/DL — SIGNIFICANT CHANGE UP (ref 6.6–8.7)
RBC # BLD: 4.61 M/UL — SIGNIFICANT CHANGE UP (ref 4.2–5.8)
RBC # FLD: 15.9 % — HIGH (ref 10.3–14.5)
SAO2 % BLDV: 81.5 % — SIGNIFICANT CHANGE UP
SODIUM SERPL-SCNC: 135 MMOL/L — SIGNIFICANT CHANGE UP (ref 135–145)
SODIUM SERPL-SCNC: 138 MMOL/L — SIGNIFICANT CHANGE UP (ref 135–145)
WBC # BLD: 11.86 K/UL — HIGH (ref 3.8–10.5)
WBC # FLD AUTO: 11.86 K/UL — HIGH (ref 3.8–10.5)

## 2024-04-13 PROCEDURE — 99233 SBSQ HOSP IP/OBS HIGH 50: CPT

## 2024-04-13 PROCEDURE — 93306 TTE W/DOPPLER COMPLETE: CPT | Mod: 26

## 2024-04-13 PROCEDURE — 99223 1ST HOSP IP/OBS HIGH 75: CPT

## 2024-04-13 RX ORDER — METOPROLOL TARTRATE 50 MG
25 TABLET ORAL DAILY
Refills: 0 | Status: DISCONTINUED | OUTPATIENT
Start: 2024-04-13 | End: 2024-04-13

## 2024-04-13 RX ORDER — CARBAMAZEPINE 200 MG
100 TABLET ORAL
Refills: 0 | Status: DISCONTINUED | OUTPATIENT
Start: 2024-04-13 | End: 2024-04-20

## 2024-04-13 RX ORDER — ASPIRIN/CALCIUM CARB/MAGNESIUM 324 MG
81 TABLET ORAL DAILY
Refills: 0 | Status: DISCONTINUED | OUTPATIENT
Start: 2024-04-13 | End: 2024-04-16

## 2024-04-13 RX ORDER — LOSARTAN POTASSIUM 100 MG/1
25 TABLET, FILM COATED ORAL DAILY
Refills: 0 | Status: DISCONTINUED | OUTPATIENT
Start: 2024-04-13 | End: 2024-04-13

## 2024-04-13 RX ORDER — SPIRONOLACTONE 25 MG/1
25 TABLET, FILM COATED ORAL EVERY 12 HOURS
Refills: 0 | Status: DISCONTINUED | OUTPATIENT
Start: 2024-04-13 | End: 2024-04-20

## 2024-04-13 RX ORDER — LEVOTHYROXINE SODIUM 125 MCG
88 TABLET ORAL DAILY
Refills: 0 | Status: DISCONTINUED | OUTPATIENT
Start: 2024-04-13 | End: 2024-04-20

## 2024-04-13 RX ORDER — ATORVASTATIN CALCIUM 80 MG/1
40 TABLET, FILM COATED ORAL AT BEDTIME
Refills: 0 | Status: DISCONTINUED | OUTPATIENT
Start: 2024-04-13 | End: 2024-04-19

## 2024-04-13 RX ORDER — DAPAGLIFLOZIN 10 MG/1
5 TABLET, FILM COATED ORAL EVERY 24 HOURS
Refills: 0 | Status: DISCONTINUED | OUTPATIENT
Start: 2024-04-13 | End: 2024-04-13

## 2024-04-13 RX ORDER — APIXABAN 2.5 MG/1
5 TABLET, FILM COATED ORAL EVERY 12 HOURS
Refills: 0 | Status: DISCONTINUED | OUTPATIENT
Start: 2024-04-13 | End: 2024-04-13

## 2024-04-13 RX ORDER — POTASSIUM CHLORIDE 20 MEQ
40 PACKET (EA) ORAL ONCE
Refills: 0 | Status: COMPLETED | OUTPATIENT
Start: 2024-04-13 | End: 2024-04-13

## 2024-04-13 RX ORDER — DOBUTAMINE HCL 250MG/20ML
2.5 VIAL (ML) INTRAVENOUS
Qty: 500 | Refills: 0 | Status: DISCONTINUED | OUTPATIENT
Start: 2024-04-13 | End: 2024-04-16

## 2024-04-13 RX ORDER — ENOXAPARIN SODIUM 100 MG/ML
80 INJECTION SUBCUTANEOUS EVERY 12 HOURS
Refills: 0 | Status: DISCONTINUED | OUTPATIENT
Start: 2024-04-13 | End: 2024-04-14

## 2024-04-13 RX ORDER — FUROSEMIDE 40 MG
40 TABLET ORAL
Refills: 0 | Status: DISCONTINUED | OUTPATIENT
Start: 2024-04-13 | End: 2024-04-13

## 2024-04-13 RX ORDER — BUMETANIDE 0.25 MG/ML
0.5 INJECTION INTRAMUSCULAR; INTRAVENOUS
Qty: 20 | Refills: 0 | Status: DISCONTINUED | OUTPATIENT
Start: 2024-04-13 | End: 2024-04-15

## 2024-04-13 RX ORDER — AMIODARONE HYDROCHLORIDE 400 MG/1
200 TABLET ORAL DAILY
Refills: 0 | Status: DISCONTINUED | OUTPATIENT
Start: 2024-04-13 | End: 2024-04-14

## 2024-04-13 RX ADMIN — Medication 6.12 MICROGRAM(S)/KG/MIN: at 21:33

## 2024-04-13 RX ADMIN — Medication 88 MICROGRAM(S): at 06:06

## 2024-04-13 RX ADMIN — ATORVASTATIN CALCIUM 40 MILLIGRAM(S): 80 TABLET, FILM COATED ORAL at 21:34

## 2024-04-13 RX ADMIN — ENOXAPARIN SODIUM 80 MILLIGRAM(S): 100 INJECTION SUBCUTANEOUS at 17:38

## 2024-04-13 RX ADMIN — Medication 81 MILLIGRAM(S): at 12:32

## 2024-04-13 RX ADMIN — Medication 100 MILLIGRAM(S): at 06:06

## 2024-04-13 RX ADMIN — Medication 40 MILLIEQUIVALENT(S): at 09:10

## 2024-04-13 RX ADMIN — Medication 40 MILLIGRAM(S): at 06:05

## 2024-04-13 RX ADMIN — Medication 100 MILLIGRAM(S): at 17:39

## 2024-04-13 RX ADMIN — SPIRONOLACTONE 25 MILLIGRAM(S): 25 TABLET, FILM COATED ORAL at 17:39

## 2024-04-13 RX ADMIN — DAPAGLIFLOZIN 5 MILLIGRAM(S): 10 TABLET, FILM COATED ORAL at 06:06

## 2024-04-13 RX ADMIN — BUMETANIDE 2.5 MG/HR: 0.25 INJECTION INTRAMUSCULAR; INTRAVENOUS at 12:32

## 2024-04-13 RX ADMIN — Medication 6.12 MICROGRAM(S)/KG/MIN: at 13:19

## 2024-04-13 RX ADMIN — APIXABAN 5 MILLIGRAM(S): 2.5 TABLET, FILM COATED ORAL at 06:06

## 2024-04-13 RX ADMIN — BUMETANIDE 2.5 MG/HR: 0.25 INJECTION INTRAMUSCULAR; INTRAVENOUS at 21:33

## 2024-04-13 NOTE — CONSULT NOTE ADULT - NS ATTEND AMEND GEN_ALL_CORE FT
Pt with CHF exacerbation, potentially elicited by recurrence of persistent AF a couple of weeks ago. In the setting of AF he also has a high degree of ventricular pacing. He has very severe baseline CHF with severe LV dysfunction, and AF clearly exacerbates his CMP and CHF.     Would benefit from further efforts for rhythm control. Though he had a recurrence despite amiodarone, favor restarting amiodarone and repeating DCCV this admission.  Alternative AAD that could be considered is Tikosyn.   He has been managed by Dr Apple for several years, and will consult with him about long-term management. AF ablation may be of benefit, but he is elevated risk for complications given severe CHF- nevertheless, this should be considered.   If he does remain in AF despite efforts, would consider upgrade to CRT and AVJ ablation.     He will be getting RHC next week. Following this will plan ANDREY/DCCV, and continue amiodarone for now.
-      73M hx CAD s/p late-presenting MI 2005 s/p PCI LAD, ICM/HFrEF (EF 15-20%, LVEDD 6 cm) s/p single chamber ICD with [place] lead 2011 s/p lead extraction and upgrade to Medtronic dual-chamber ICD (8/17/21), has hx of appropriate ICD shock for VT/VF, s/p MEMS (9/16/21), bioprosthetic AVR (2015) 2/2 AI s/p 26 mm EVOLUT HECTOR for severe bioprosthetic AS (8/20/20), prior aflutter ablation 7/2017, persistent afib, s/p DCCV x2 (8/12/20, 7/7/21, 01/2024), Trigeminal neuralgia (4/2022) who presented to [facility] for shortness of breath and elevated filling pressures on cardiomems. Patient states that he as been feeling weak for the last few days, and has been experiencing dyspnea on exertion with persistent cough. Patient also noted that his feet and hands were becoming progressively colder, and his cardiomems was up in the 40s (goal 30-34). Patient had his torsemide dose increased at home, but symptoms did not improve. Patient was sent to [facility] for further management. Patient states today his symptoms are beginning to improve.      AoC-HFrEF    - Patient with longstanding history of HFrEF (EF 15%) on GDMT.   - Patient with cardiomems with goal 30-34, with last check yesterday with PAD in the 40s.   - PBNP 2041   - Upon arrival patient with some FEDERICO, elevated AST, and lactate of 2.70.   - Obtain TTE  - Recheck lactate, if still elevated will hold Toprol XL.   - Transition lasix 40mg IV BID to bumex gtt at 0.5mg/hr.   - Goal negative 1-2 L daily.   - Hold farxiga for diuresis at this time.   - If hypotensive, will also hold losartan.   - Hold Eliquis for possible RHC on 04/15/23, will give lovenox for AC at this time.    - Will need to contact [employer] to input patient into our cardiomems system 04/15, and check device then.   - If worsening lactate or signs of worsening perfusion, may require dobutamine 2.5mg/hR.     CAD   - Hx of remote MI s/p REINA to mLAD.   - No s/s of ACS.   - Last [facility] 2020 with patent stent and moderate non-obstructive CAD.    Persistent atrial fibrillation   - EP Evalauted 4/13 as per notes  - Resume amiodarone 200mg daily.   - Eliquis 5mg Q 12 hrs held in favor of Lovenox as pt is pending RHC on 4/15. Anticipate resuming Eliquis post procedure.   - AF ablation may be of benefit, but he is elevated risk for complications given severe CHF- nevertheless, this should be considered.   If he does remain in AF despite efforts, would consider upgrade to CRT and AVJ ablation. He will be getting RHC next week. Following this will plan ANDREY/DCCV, and continue amiodarone for now.  - NPO after midnight for ANDREY/DCCV on 4/15 after RHC, if medically optimized.

## 2024-04-13 NOTE — PROGRESS NOTE ADULT - ASSESSMENT
73 year old male with PMH of CAD s/p late-presenting MI 2005 s/p PCI LAD, ICM/HFrEF (EF 15-20%, LVEDD 6 cm) s/p single chamber ICD with [place] lead 2011 s/p lead extraction and upgrade to Medtronic dual-chamber ICD (8/17/21), has hx of appropriate ICD shock for VT/VF, s/p MEMS (9/16/21), bioprosthetic AVR (2015) 2/2 AI s/p 26 mm EVOLUT HECTOR for severe bioprosthetic AS (8/20/20), prior aflutter ablation 7/2017, persistent afib, s/p DCCV x2 (8/12/20, 7/7/21), Trigeminal neuralgia (4/2022) presented for elevated filling pressure on cardiomems. Seen by cardiology and switched to bumex drip and plan for RHC.    Acute on systolic chronic HF   -hypoxia requiring 3L NC --> transitioned to room air, saturating 93%  -No acute volume overload on exam   -sent in for elevated pressure on cardiomems   -Pt with borderline BP, will attempt to diuresis pt with Lasix 40mg IVP BID  -daily weight, strict I/O   -cont with Losartan, Spironolactone, Farxiga, BB   -Cardiology following, Switched to Bumex and lovenox  -RHC plan for monday    Afib s/p ANDREY/DCCV on 1/17/24  -rated controlled   -cont with Eliquis 5mg po bid--> lovenox  -cont with Metroprolol 25mg PO daily      CAD s/p PCI  -cont ASA, statin     Hypothyroidism   -cont with synthroid    Trigeminal neurologia   -cont carbamazepine     DVT ppx  -Eliquis--> lovenox    Dispo: acute. Pending RHC Monday. Diuresis and Cardiology clearance

## 2024-04-13 NOTE — PATIENT PROFILE ADULT - MST SCORE
Pt with swelling above TR Band manual pressure held until soft. Dr Pope Repress notified and manual blood pressure cuff to be placed at above diastolic pressure= 50 . Pt tolerating well. Will monitor. 0

## 2024-04-13 NOTE — CONSULT NOTE ADULT - SUBJECTIVE AND OBJECTIVE BOX
[facility]   [facility]   Office: [address] [place] [address]   Telephone: [phone]  Fax: [phone]                     Electrophysiology Consult Note                                                                                                                                          Consult requested by: Dr. Gupta   Reason for Consultation: AF   History obtained by: pt and EMR    obtained: n/a  Community Cardiologist: Dr. Pressley     HPI:   73 year old male with trigeminal neuralgia, CAD s/p late-presenting MI 2005 s/p PCI LAD, ICM/HFrEF (EF 15-20%, LVEDD 6 cm) s/p single chamber ICD with [place] lead 2011 s/p lead extraction and upgrade to Medtronic dual-chamber ICD (8/17/21), hx of appropriate ICD shock for VT/VF, s/p MEMS (9/16/21), bioprosthetic AVR (2015) 2/2 AI s/p 26 mm EVOLUT HECTOR for severe bioprosthetic AS (8/20/20), AFL s/p ablation 7/2017, persistent AFib s/p prior cardioversions most recently 1/17/24 following amiodarone load. He presented to [facility] with shortness of breath, and worsening fatigue x 2 weeks. Noted to have elevated filling pressures on Cardiomems. Admitted with acute on chronic heart failure exacerbation.   Pt states he was noted to be back in atrial fibrillation several weeks ago and was instructed to discontinue amiodarone at that time. Remains in AFib at this time. EP consult requested.     PAST MEDICAL & SURGICAL HISTORY:  CAD (coronary artery disease) 2004  MI (myocardial infarction) Nov 2004  Systolic heart failure EF 15%  HLD (hyperlipidemia)  HTN (hypertension)  AICD (automatic cardioverter/defibrillator) present  Aortic stenosis severe, s/p AVR  H/O emphysema - moderately severe, CT chest 06/10/21  Carotid stenosis - moderate, b/l (Doppler 12/27/21)  Ischemic cardiomyopathy  H/O pulmonary hypertension  Longstanding persistent atrial fibrillation  Type 2 diabetes mellitus  Osteoarthritis of right knee  Neuralgia  CRI (chronic renal insufficiency)  Stented coronary artery LAD x 3 (Nov 2004)  S/P knee surgery - Right knee arthroscopy, 2000  S/P hernia repair - right inguinal, 1982  S/P AVR 2015, complicated by Asystole/Syncope with 1 shock, Transcatheter valve-in-valve aortic valve replacement utilizing a right common femoral arterial percutaneous approach utilizing a 26 Medtronic Evolut core valve on 08/20/2020  AICD (automatic cardioverter/defibrillator) present single chamber 2005, replaced with dual chamber 08/17/2021    Medications:   acetaminophen     Tablet .. 650 milliGRAM(s) Oral every 6 hours PRN  aluminum hydroxide/magnesium hydroxide/simethicone Suspension 30 milliLiter(s) Oral every 4 hours PRN  aspirin enteric coated 81 milliGRAM(s) Oral daily  atorvastatin 40 milliGRAM(s) Oral at bedtime  buMETAnide Infusion 0.5 mG/Hr IV Continuous <Continuous>  carBAMazepine ER Capsule 100 milliGRAM(s) Oral two times a day  DOBUTamine Infusion 2.5 MICROgram(s)/kG/Min IV Continuous <Continuous>  enoxaparin Injectable 80 milliGRAM(s) SubCutaneous every 12 hours  levothyroxine 88 MICROGram(s) Oral daily  melatonin 3 milliGRAM(s) Oral at bedtime PRN  ondansetron Injectable 4 milliGRAM(s) IV Push every 8 hours PRN  spironolactone 25 milliGRAM(s) Oral every 12 hours    Allergies:  No Known Allergies    Intolerances:  Entresto (Other)    SOCIAL HISTORY: lives w/ spouse     FAMILY HISTORY:  Chronic obstructive pulmonary disease    Family history of colon cancer (Father)    Review of Systems:  CONSTITUTIONAL: No fever, weight loss, + fatigue   EYES: No eye pain, visual disturbances, or discharge  ENMT:  No difficulty hearing, tinnitus, vertigo; No sinus or throat pain  NECK: No pain or stiffness  RESPIRATORY: No cough, wheezing, chills or hemoptysis; + shortness of breath   CARDIOVASCULAR: No chest pain, palpitations, dizziness; + leg swelling  GASTROINTESTINAL: No abdominal or epigastric pain. No nausea, vomiting, or hematemesis; No diarrhea or constipation. No melena or hematochezia.  GENITOURINARY: No dysuria, frequency, hematuria, or incontinence  NEUROLOGICAL: No headaches, memory loss, loss of strength, numbness, or tremors  SKIN: No itching, burning, rashes, or lesions   MUSCULOSKELETAL: No joint pain or swelling; No muscle, back, or extremity pain  PSYCHIATRIC: No depression, anxiety, mood swings, or difficulty sleeping    Vital Signs Last 24 Hrs  T(C): 36.5 (13 Apr 2024 10:46), Max: 36.7 (12 Apr 2024 19:05)  T(F): 97.7 (13 Apr 2024 10:46), Max: 98 (12 Apr 2024 19:05)  HR: 74 (13 Apr 2024 10:46) (71 - 87)  BP: 95/63 (13 Apr 2024 10:46) (92/65 - 115/93)  BP(mean): --  RR: 19 (13 Apr 2024 10:46) (16 - 20)  SpO2: 94% (13 Apr 2024 10:46) (86% - 96%)    Parameters below as of 13 Apr 2024 10:46  Patient On (Oxygen Delivery Method): nasal cannula  O2 Flow (L/min): 3    Physical Exam:  Constitutional: awake, alert, no obvious distress   Neck: supple, No JVD  Cardiovascular: +S1S2  Pulmonary: + rales  Abdomen: soft NTND  Extremities: 1+ edema b/l, +distal pulses b/l  Neuro: non focal, speech clear, TERRAZAS x 4    LABS:                        14.1   11.86 )-----------( 144      ( 13 Apr 2024 06:21 )             43.2   04-13    138  |  97  |  42.5<H>  ----------------------------<  106<H>  3.1<L>   |  24.0  |  1.15    Ca    8.3<L>      13 Apr 2024 06:21    TPro  7.1  /  Alb  4.0  /  TBili  2.0  /  DBili  x   /  AST  42<H>  /  ALT  35  /  AlkPhos  173<H>  04-12  LIVER FUNCTIONS - ( 12 Apr 2024 19:18 )  Alb: 4.0 g/dL / Pro: 7.1 g/dL / ALK PHOS: 173 U/L / ALT: 35 U/L / AST: 42 U/L / GGT: x           Urinalysis Basic - ( 13 Apr 2024 06:21 )    Color: x / Appearance: x / SG: x / pH: x  Gluc: 106 mg/dL / Ketone: x  / Bili: x / Urobili: x   Blood: x / Protein: x / Nitrite: x   Leuk Esterase: x / RBC: x / WBC x   Sq Epi: x / Non Sq Epi: x / Bacteria: x    RADIOLOGY & ADDITIONAL STUDIES:  ANDREY: 1/17/24: CONCLUSIONS:   1. Left ventricular systolic function is severely decreased with an ejection fraction visually estimated at <20 %.   2. Left atrial appendage is bi-lobed with low emptying velocities   3. Dense THONY smoke without definitive thrombus. The left atrial appendage emptying velocity is low.   4. A transcatheter aortic valve replacement is present. No aortic regurgitation is seen.    CXR: 4/12/24:   IMPRESSION: Median sternotomy. Aortic valve replacement. Cardio mems device. Left cardiac pacer. Stable cardiomegaly. No consolidation or effusion.  --- End of Report ---  JULIO ARREOLA MD; Attending Radiologist  This document has been electronically signed. Apr 13 2024  8:49AM

## 2024-04-13 NOTE — CONSULT NOTE ADULT - PROBLEM SELECTOR RECOMMENDATION 2
- Hx of remote MI s/p REINA to mLAD.   - No s/s of ACS.   - Last [facility] 2020 with patent stent and moderate non-obstructive CAD.  - Continue home aspirin and lipitor.

## 2024-04-13 NOTE — CONSULT NOTE ADULT - ASSESSMENT
A/P:  73 year old male with PMH of CAD s/p late-presenting MI 2005 s/p PCI LAD, ICM/HFrEF (EF 15-20%, LVEDD 6 cm) s/p single chamber ICD with [place] lead 2011 s/p lead extraction and upgrade to Medtronic dual-chamber ICD (8/17/21), has hx of appropriate ICD shock for VT/VF, s/p MEMS (9/16/21), bioprosthetic AVR (2015) 2/2 AI s/p 26 mm EVOLUT HECTOR for severe bioprosthetic AS (8/20/20), prior aflutter ablation 7/2017, persistent afib, s/p DCCV x2 (8/12/20, 7/7/21, 01/2024), Trigeminal neuralgia (4/2022) who presented to [facility] for shortness of breath and elevated filling pressures on cardiomems. Patient states that he as been feeling weak for the last few days, and has been experiencing dyspnea on exertion with persistent cough. Patient also noted that his feet and hands were becoming progressively colder, and his cardiomems was up in the 40s (goal 30-34). Patient had his torsemide dose increased at home, but symptoms did not improve. Patient was sent to [facility] for further management. Patient states today his symptoms are beginning to improve, and denies any fevers, chills, CP, syncope, abdominal pain, N/V/D, headache, or dizziness.   pBNP 2041

## 2024-04-13 NOTE — CONSULT NOTE ADULT - SUBJECTIVE AND OBJECTIVE BOX
[facility]                                              CARDIOLOGY AT [address], [place] [address]                                             Telephone: [phone]. Fax:[phone]                                                       CARDIOLOGY CONSULTATION NOTE                                                                                             History obtained by: Patient and medical record  Community Cardiologist: Dr. Sanchez   obtained: Yes [  ] No [ X ]  Reason for Consultation: Acute Decompensated HFrEF  Available out pt records reviewed: Yes [ X ] No [  ]    Chief complaint:    Patient is a 73y old  Male who presents with a chief complaint of acute on chronic HF (12 Apr 2024 22:59)      HPI: 73 year old male with PMH of CAD s/p late-presenting MI 2005 s/p PCI LAD, ICM/HFrEF (EF 15-20%, LVEDD 6 cm) s/p single chamber ICD with [place] lead 2011 s/p lead extraction and upgrade to Medtronic dual-chamber ICD (8/17/21), has hx of appropriate ICD shock for VT/VF, s/p MEMS (9/16/21), bioprosthetic AVR (2015) 2/2 AI s/p 26 mm EVOLUT HECTOR for severe bioprosthetic AS (8/20/20), prior aflutter ablation 7/2017, persistent afib, s/p DCCV x2 (8/12/20, 7/7/21, 01/2024), Trigeminal neuralgia (4/2022) who presented to [facility] for shortness of breath and elevated filling pressures on cardiomems. Patient states that he as been feeling weak for the last few days, and has been experiencing dyspnea on exertion with persistent cough. Patient also noted that his feet and hands were becoming progressively colder, and his cardiomems was up in the 40s (goal 30-34). Patient had his torsemide dose increased at home, but symptoms did not improve. Patient was sent to [facility] for further management. Patient states today his symptoms are beginning to improve, and denies any fevers, chills, CP, syncope, abdominal pain, N/V/D, headache, or dizziness.     CARDIAC TESTING   ECHO:  < from: TTE W or WO Ultrasound Enhancing Agent (01.11.24 @ 13:59) >     CONCLUSIONS:      1. Left ventricular cavity is mildly dilated. Left ventricular wall thickness is normal. Left ventricular systolic function is severely decreased with an ejection fraction of 15 % by Whitfield's method of disks. Global left ventricular hypokinesis.   2. There is severe (grade 3) left ventricular diastolic dysfunction.   3. Severely enlarged right ventricular cavity size, wall thickness, and reduced systolic function.   4. The left atrium is mildly dilated.   5. The right atrium is severely dilated in size.   6. Device lead is visualized in the right heart.   7. A TAVR valve replacement is present in the aortic position. is well seated with normal function.. No intravalvular regurgitation No paravalvular regurgitation.   8. No pericardial effusion seen.   9. Compared to the transthoracic echocardiogram performed on 10/1/2020, there have been no significant interval changes.  10. Technically difficult image quality.      < end of copied text >    STRESS:    CATH:   < from: Cardiac Cath Lab - Adult (12.22.14 @ 14:30) >  CORONARY VESSELS: The coronary circulation is right dominant.  LM:   --  LM: Angiography showed mild atherosclerosis with no flow limiting  lesions.  LAD:   --  LAD: Angiography showed moderate atherosclerosis.  --  Mid LAD: Moderate in-stent restenosis.  CX:   --  Circumflex: Angiography showed moderate atherosclerosis.  RCA:   --  RCA: Angiography showed mild atherosclerosis with no flow  limiting lesions.    < end of copied text >    ELECTROPHYSIOLOGY:     PAST MEDICAL HISTORY  CAD (coronary artery disease)    MI (myocardial infarction)    Systolic heart failure    HLD (hyperlipidemia)    HTN (hypertension)    AICD (automatic cardioverter/defibrillator) present    Pacemaker    Syncope    CHF (congestive heart failure)    Valvular cardiomyopathy    URI (upper respiratory infection)    Aortic stenosis    H/O emphysema    Carotid stenosis    Ischemic cardiomyopathy    H/O pulmonary hypertension    Longstanding persistent atrial fibrillation    NSVT (nonsustained ventricular tachycardia)    Type 2 diabetes mellitus    Osteoarthritis of right knee    Neuralgia    Shingles    CRI (chronic renal insufficiency)        PAST SURGICAL HISTORY  Stented coronary artery    S/P knee surgery    S/P hernia repair    S/P AVR    AICD (automatic cardioverter/defibrillator) present    H/O prior ablation treatment        SOCIAL HISTORY:  Denies smoking/alcohol/drugs  CIGARETTES:     ALCOHOL:  DRUGS:    FAMILY HISTORY:  Chronic obstructive pulmonary disease    Family history of colon cancer (Father)      Family History of Cardiovascular Disease:  Yes [  ] No [  ]  Coronary Artery Disease in first degree relative: Yes [  ] No [  ]  Sudden Cardiac Death in First degree relative: Yes [  ] No [  ]    HOME MEDICATIONS:  aspirin 81 mg oral delayed release capsule: 1 cap(s) orally once a day (16 Jan 2024 16:22)  atorvastatin 40 mg oral tablet: 1 tab(s) orally once a day (at bedtime) (16 Jan 2024 16:22)  carBAMazepine 100 mg oral capsule, extended release: 1 cap(s) orally 2 times a day (13 Apr 2024 01:24)  Eliquis 5 mg oral tablet: 1 tab(s) orally 2 times a day (16 Jan 2024 16:22)  levothyroxine 88 mcg (0.088 mg) oral capsule: 1 orally once a day (16 Jan 2024 16:22)  metoprolol succinate 25 mg oral capsule, extended release: 1 orally once a day (at bedtime) (16 Jan 2024 16:22)      CURRENT CARDIAC MEDICATIONS:  furosemide   Injectable 40 milliGRAM(s) IV Push two times a day  losartan 25 milliGRAM(s) Oral daily  metoprolol succinate ER 25 milliGRAM(s) Oral daily  spironolactone 25 milliGRAM(s) Oral every 12 hours      CURRENT OTHER MEDICATIONS:  acetaminophen     Tablet .. 650 milliGRAM(s) Oral every 6 hours PRN Temp greater or equal to 38C (100.4F), Mild Pain (1 - 3)  carBAMazepine ER Capsule 100 milliGRAM(s) Oral two times a day  melatonin 3 milliGRAM(s) Oral at bedtime PRN Insomnia  ondansetron Injectable 4 milliGRAM(s) IV Push every 8 hours PRN Nausea and/or Vomiting  aluminum hydroxide/magnesium hydroxide/simethicone Suspension 30 milliLiter(s) Oral every 4 hours PRN Dyspepsia  aspirin enteric coated 81 milliGRAM(s) Oral daily  atorvastatin 40 milliGRAM(s) Oral at bedtime  enoxaparin Injectable 80 milliGRAM(s) SubCutaneous every 12 hours  levothyroxine 88 MICROGram(s) Oral daily  potassium chloride   Powder 40 milliEquivalent(s) Oral once, Stop order after: 1 Doses      ALLERGIES:   Entresto (Other)  No Known Allergies      REVIEW OF SYMPTOMS:   CONSTITUTIONAL: No fever, no chills, no weight loss, no weight gain, no fatigue   ENMT:  No vertigo; No sinus or throat pain  NECK: No pain or stiffness  CARDIOVASCULAR: No chest pain, no dyspnea, no syncope/presyncope, no palpitations, no dizziness, no Orthopnea, no Paroxsymal nocturnal dyspnea  RESPIRATORY: no Shortness of breath, no cough, no wheezing  : No dysuria, no hematuria   GI: No dark color stool, no nausea, no diarrhea, no constipation, no abdominal pain   NEURO: No headache, no slurred speech   MUSCULOSKELETAL: No joint pain or swelling; No muscle, back, or extremity pain  PSYCH: No agitation, no anxiety.    ALL OTHER REVIEW OF SYSTEMS ARE NEGATIVE.    VITAL SIGNS:  T(C): 36.7 (04-13-24 @ 04:45), Max: 36.7 (04-12-24 @ 19:05)  T(F): 98 (04-13-24 @ 04:45), Max: 98 (04-12-24 @ 19:05)  HR: 73 (04-13-24 @ 06:00) (71 - 87)  BP: 98/62 (04-13-24 @ 06:00) (92/65 - 115/93)  RR: 19 (04-13-24 @ 04:45) (16 - 20)  SpO2: 95% (04-13-24 @ 04:45) (86% - 96%)    INTAKE AND OUTPUT:     04-12 @ 07:01  -  04-13 @ 07:00  --------------------------------------------------------  IN: 0 mL / OUT: 300 mL / NET: -300 mL        PHYSICAL EXAM:  Constitutional: Comfortable . No acute distress.   HEENT: Atraumatic and normocephalic , neck is supple . no JVD. No carotid bruit.  CNS: A&Ox3. No focal deficits.   Respiratory: CTAB, unlabored   Cardiovascular: RRR normal s1 s2. No murmur. No rubs or gallop.  Gastrointestinal: Soft, non-tender. +Bowel sounds.   Extremities: 2+ Peripheral Pulses, No clubbing, cyanosis, or edema  Psychiatric: Calm . no agitation.   Skin: Warm and dry, no ulcers on extremities     LABS:                            14.1   11.86 )-----------( 144      ( 13 Apr 2024 06:21 )             43.2     04-13    138  |  97  |  42.5<H>  ----------------------------<  106<H>  3.1<L>   |  24.0  |  1.15    Ca    8.3<L>      13 Apr 2024 06:21    TPro  7.1  /  Alb  4.0  /  TBili  2.0  /  DBili  x   /  AST  42<H>  /  ALT  35  /  AlkPhos  173<H>  04-12      Urinalysis Basic - ( 13 Apr 2024 06:21 )    Color: x / Appearance: x / SG: x / pH: x  Gluc: 106 mg/dL / Ketone: x  / Bili: x / Urobili: x   Blood: x / Protein: x / Nitrite: x   Leuk Esterase: x / RBC: x / WBC x   Sq Epi: x / Non Sq Epi: x / Bacteria: x              INTERPRETATION OF TELEMETRY:     ECG:   Prior ECG: Yes [  ] No [  ]    RADIOLOGY & ADDITIONAL STUDIES:    X-ray:    CT scan:   MRI:   US:                                                [facility]                                              CARDIOLOGY AT [address], [place] [address]                                             Telephone: [phone]. Fax:[phone]                                                       CARDIOLOGY CONSULTATION NOTE                                                                                             History obtained by: Patient and medical record  Community Cardiologist: Dr. Sanchez   obtained: Yes [  ] No [ X ]  Reason for Consultation: Acute Decompensated HFrEF  Available out pt records reviewed: Yes [ X ] No [  ]    Chief complaint:    Patient is a 73y old  Male who presents with a chief complaint of acute on chronic HF (12 Apr 2024 22:59)      HPI: 73 year old male with PMH of CAD s/p late-presenting MI 2005 s/p PCI LAD, ICM/HFrEF (EF 15-20%, LVEDD 6 cm) s/p single chamber ICD with [place] lead 2011 s/p lead extraction and upgrade to Medtronic dual-chamber ICD (8/17/21), has hx of appropriate ICD shock for VT/VF, s/p MEMS (9/16/21), bioprosthetic AVR (2015) 2/2 AI s/p 26 mm EVOLUT HECTOR for severe bioprosthetic AS (8/20/20), prior aflutter ablation 7/2017, persistent afib, s/p DCCV x2 (8/12/20, 7/7/21, 01/2024), Trigeminal neuralgia (4/2022) who presented to [facility] for shortness of breath and elevated filling pressures on cardiomems. Patient states that he as been feeling weak for the last few days, and has been experiencing dyspnea on exertion with persistent cough. Patient also noted that his feet and hands were becoming progressively colder, and his cardiomems was up in the 40s (goal 30-34). Patient had his torsemide dose increased at home, but symptoms did not improve. Patient was sent to [facility] for further management. Patient states today his symptoms are beginning to improve, and denies any fevers, chills, CP, syncope, abdominal pain, N/V/D, headache, or dizziness.     CARDIAC TESTING   ECHO:  < from: TTE W or WO Ultrasound Enhancing Agent (01.11.24 @ 13:59) >     CONCLUSIONS:      1. Left ventricular cavity is mildly dilated. Left ventricular wall thickness is normal. Left ventricular systolic function is severely decreased with an ejection fraction of 15 % by Whitfield's method of disks. Global left ventricular hypokinesis.   2. There is severe (grade 3) left ventricular diastolic dysfunction.   3. Severely enlarged right ventricular cavity size, wall thickness, and reduced systolic function.   4. The left atrium is mildly dilated.   5. The right atrium is severely dilated in size.   6. Device lead is visualized in the right heart.   7. A TAVR valve replacement is present in the aortic position. is well seated with normal function.. No intravalvular regurgitation No paravalvular regurgitation.   8. No pericardial effusion seen.   9. Compared to the transthoracic echocardiogram performed on 10/1/2020, there have been no significant interval changes.  10. Technically difficult image quality.      < end of copied text >    STRESS:    CATH:   04/2020  LM: Minor atherosclerosis  mLAD: 10% ISR  LCx: Moderate atherosclerosis  RCA: Moderate atherosclerosis    ELECTROPHYSIOLOGY:     PAST MEDICAL HISTORY  CAD (coronary artery disease)    MI (myocardial infarction)    Systolic heart failure    HLD (hyperlipidemia)    HTN (hypertension)    AICD (automatic cardioverter/defibrillator) present    Pacemaker    Syncope    CHF (congestive heart failure)    Valvular cardiomyopathy    URI (upper respiratory infection)    Aortic stenosis    H/O emphysema    Carotid stenosis    Ischemic cardiomyopathy    H/O pulmonary hypertension    Longstanding persistent atrial fibrillation    NSVT (nonsustained ventricular tachycardia)    Type 2 diabetes mellitus    Osteoarthritis of right knee    Neuralgia    Shingles    CRI (chronic renal insufficiency)        PAST SURGICAL HISTORY  Stented coronary artery    S/P knee surgery    S/P hernia repair    S/P AVR    AICD (automatic cardioverter/defibrillator) present    H/O prior ablation treatment        SOCIAL HISTORY:    CIGARETTES: Former smoker    ALCOHOL: Denies  DRUGS: Denies     FAMILY HISTORY:  Chronic obstructive pulmonary disease    Family history of colon cancer (Father)      Family History of Cardiovascular Disease:  Yes [  ] No [  ]  Coronary Artery Disease in first degree relative: Yes [  ] No [  ]  Sudden Cardiac Death in First degree relative: Yes [  ] No [  ]    HOME MEDICATIONS:  aspirin 81 mg oral delayed release capsule: 1 cap(s) orally once a day (16 Jan 2024 16:22)  atorvastatin 40 mg oral tablet: 1 tab(s) orally once a day (at bedtime) (16 Jan 2024 16:22)  carBAMazepine 100 mg oral capsule, extended release: 1 cap(s) orally 2 times a day (13 Apr 2024 01:24)  Eliquis 5 mg oral tablet: 1 tab(s) orally 2 times a day (16 Jan 2024 16:22)  levothyroxine 88 mcg (0.088 mg) oral capsule: 1 orally once a day (16 Jan 2024 16:22)  metoprolol succinate 25 mg oral capsule, extended release: 1 orally once a day (at bedtime) (16 Jan 2024 16:22)      CURRENT CARDIAC MEDICATIONS:  furosemide   Injectable 40 milliGRAM(s) IV Push two times a day  losartan 25 milliGRAM(s) Oral daily  metoprolol succinate ER 25 milliGRAM(s) Oral daily  spironolactone 25 milliGRAM(s) Oral every 12 hours      CURRENT OTHER MEDICATIONS:  acetaminophen     Tablet .. 650 milliGRAM(s) Oral every 6 hours PRN Temp greater or equal to 38C (100.4F), Mild Pain (1 - 3)  carBAMazepine ER Capsule 100 milliGRAM(s) Oral two times a day  melatonin 3 milliGRAM(s) Oral at bedtime PRN Insomnia  ondansetron Injectable 4 milliGRAM(s) IV Push every 8 hours PRN Nausea and/or Vomiting  aluminum hydroxide/magnesium hydroxide/simethicone Suspension 30 milliLiter(s) Oral every 4 hours PRN Dyspepsia  aspirin enteric coated 81 milliGRAM(s) Oral daily  atorvastatin 40 milliGRAM(s) Oral at bedtime  enoxaparin Injectable 80 milliGRAM(s) SubCutaneous every 12 hours  levothyroxine 88 MICROGram(s) Oral daily  potassium chloride   Powder 40 milliEquivalent(s) Oral once, Stop order after: 1 Doses      ALLERGIES:   Entresto (Other)  No Known Allergies      REVIEW OF SYMPTOMS:   CONSTITUTIONAL: No fever, no chills, no weight loss, no weight gain, no fatigue   ENMT:  No vertigo; No sinus or throat pain  NECK: No pain or stiffness  CARDIOVASCULAR: AS PER HPI  RESPIRATORY: AS PER HPI  : No dysuria, no hematuria   GI: No dark color stool, no nausea, no diarrhea, no constipation, no abdominal pain   NEURO: No headache, no slurred speech   MUSCULOSKELETAL: No joint pain or swelling; No muscle, back, or extremity pain  PSYCH: No agitation, no anxiety.    ALL OTHER REVIEW OF SYSTEMS ARE NEGATIVE.    VITAL SIGNS:  T(C): 36.7 (04-13-24 @ 04:45), Max: 36.7 (04-12-24 @ 19:05)  T(F): 98 (04-13-24 @ 04:45), Max: 98 (04-12-24 @ 19:05)  HR: 73 (04-13-24 @ 06:00) (71 - 87)  BP: 98/62 (04-13-24 @ 06:00) (92/65 - 115/93)  RR: 19 (04-13-24 @ 04:45) (16 - 20)  SpO2: 95% (04-13-24 @ 04:45) (86% - 96%)    INTAKE AND OUTPUT:     04-12 @ 07:01  -  04-13 @ 07:00  --------------------------------------------------------  IN: 0 mL / OUT: 300 mL / NET: -300 mL        PHYSICAL EXAM:  Constitutional: Comfortable . No acute distress.   HEENT: Atraumatic and normocephalic , neck is supple . no JVD. No carotid bruit.  CNS: A&Ox3. No focal deficits.   Respiratory: Bibasilar rales  Cardiovascular: RRR normal s1 s2. No murmur. No rubs or gallop.  Gastrointestinal: Soft, non-tender. +Bowel sounds.   Extremities: 2+ Peripheral Pulses, No clubbing, cyanosis, or edema, warm, well perfused  Psychiatric: Calm . no agitation.   Skin: Warm and dry, no ulcers on extremities     LABS:                            14.1   11.86 )-----------( 144      ( 13 Apr 2024 06:21 )             43.2     04-13    138  |  97  |  42.5<H>  ----------------------------<  106<H>  3.1<L>   |  24.0  |  1.15    Ca    8.3<L>      13 Apr 2024 06:21    TPro  7.1  /  Alb  4.0  /  TBili  2.0  /  DBili  x   /  AST  42<H>  /  ALT  35  /  AlkPhos  173<H>  04-12      Urinalysis Basic - ( 13 Apr 2024 06:21 )    Color: x / Appearance: x / SG: x / pH: x  Gluc: 106 mg/dL / Ketone: x  / Bili: x / Urobili: x   Blood: x / Protein: x / Nitrite: x   Leuk Esterase: x / RBC: x / WBC x   Sq Epi: x / Non Sq Epi: x / Bacteria: x              INTERPRETATION OF TELEMETRY: V-paced    ECG: V-paced, PVCs  Prior ECG: Yes [  ] No [  ]    RADIOLOGY & ADDITIONAL STUDIES:    X-ray:    < from: Xray Chest 1 View- PORTABLE-Urgent (04.12.24 @ 21:15) >  ACC: [id number] EXAM:  XR CHEST PORTABLE URGENT 1V   ORDERED BY: TIFFANIE RUSSELL     PROCEDURE DATE:  04/12/2024          INTERPRETATION:  Shortness of breath.    AP chest. Prior 1/23/2024.    IMPRESSION: Median sternotomy. Aortic valve replacement. Cardio mems   device. Left cardiac pacer. Stable cardiomegaly. No consolidation or   effusion.    < end of copied text >    CT scan:   MRI:   US:

## 2024-04-13 NOTE — PROGRESS NOTE ADULT - SUBJECTIVE AND OBJECTIVE BOX
SUBJECTIVE / OVERNIGHT EVENTS: Seen and examined. Sitting comfortably in chair. Feels well. Denies chest pain, SOB, abd pain, N/V, fever, chills, dysuria or any other complaints. All remainder ROS negative.     MEDICATIONS  (STANDING):  aspirin enteric coated 81 milliGRAM(s) Oral daily  atorvastatin 40 milliGRAM(s) Oral at bedtime  buMETAnide Infusion 0.5 mG/Hr (2.5 mL/Hr) IV Continuous <Continuous>  carBAMazepine ER Capsule 100 milliGRAM(s) Oral two times a day  enoxaparin Injectable 80 milliGRAM(s) SubCutaneous every 12 hours  levothyroxine 88 MICROGram(s) Oral daily  losartan 25 milliGRAM(s) Oral daily  metoprolol succinate ER 25 milliGRAM(s) Oral daily  spironolactone 25 milliGRAM(s) Oral every 12 hours    MEDICATIONS  (PRN):  acetaminophen     Tablet .. 650 milliGRAM(s) Oral every 6 hours PRN Temp greater or equal to 38C (100.4F), Mild Pain (1 - 3)  aluminum hydroxide/magnesium hydroxide/simethicone Suspension 30 milliLiter(s) Oral every 4 hours PRN Dyspepsia  melatonin 3 milliGRAM(s) Oral at bedtime PRN Insomnia  ondansetron Injectable 4 milliGRAM(s) IV Push every 8 hours PRN Nausea and/or Vomiting        I&O's Summary    12 Apr 2024 07:01  -  13 Apr 2024 07:00  --------------------------------------------------------  IN: 0 mL / OUT: 300 mL / NET: -300 mL        PHYSICAL EXAM:  Vital Signs Last 24 Hrs  T(C): 36.5 (13 Apr 2024 10:46), Max: 36.7 (12 Apr 2024 19:05)  T(F): 97.7 (13 Apr 2024 10:46), Max: 98 (12 Apr 2024 19:05)  HR: 74 (13 Apr 2024 10:46) (71 - 87)  BP: 95/63 (13 Apr 2024 10:46) (92/65 - 115/93)  BP(mean): --  RR: 19 (13 Apr 2024 10:46) (16 - 20)  SpO2: 94% (13 Apr 2024 10:46) (86% - 96%)    Parameters below as of 13 Apr 2024 10:46  Patient On (Oxygen Delivery Method): nasal cannula  O2 Flow (L/min): 3          CONSTITUTIONAL: NAD, sitting comfortably in chair  ENMT: Moist oral mucosa, no pharyngeal injection or exudates; normal dentition  RESPIRATORY: Normal respiratory effort; Decreased breath sounds bilaterally, scant crackles  CARDIOVASCULAR: irregularly irregular, normal S1, s2  ABDOMEN: Nontender to palpation, normoactive bowel sounds, no rebound/guarding; No hepatosplenomegaly  PSYCH: A+O to person, place, and time; affect appropriate  NEUROLOGY: CN 2-12 are intact and symmetric; no gross sensory deficits;   SKIN: No rashes; no palpable lesions, 1+ LE edema,    LABS:                        14.1   11.86 )-----------( 144      ( 13 Apr 2024 06:21 )             43.2     04-13    138  |  97  |  42.5<H>  ----------------------------<  106<H>  3.1<L>   |  24.0  |  1.15    Ca    8.3<L>      13 Apr 2024 06:21    TPro  7.1  /  Alb  4.0  /  TBili  2.0  /  DBili  x   /  AST  42<H>  /  ALT  35  /  AlkPhos  173<H>  04-12          Urinalysis Basic - ( 13 Apr 2024 06:21 )    Color: x / Appearance: x / SG: x / pH: x  Gluc: 106 mg/dL / Ketone: x  / Bili: x / Urobili: x   Blood: x / Protein: x / Nitrite: x   Leuk Esterase: x / RBC: x / WBC x   Sq Epi: x / Non Sq Epi: x / Bacteria: x        CAPILLARY BLOOD GLUCOSE            RADIOLOGY & ADDITIONAL TESTS:  Results Reviewed:   Imaging Personally Reviewed:  Electrocardiogram Personally Reviewed:

## 2024-04-13 NOTE — CONSULT NOTE ADULT - ASSESSMENT
Assessment/Recommendations:   73 year old male with trigeminal neuralgia, CAD s/p late-presenting MI 2005 s/p PCI LAD, ICM/HFrEF (EF 15-20%, LVEDD 6 cm) s/p single chamber ICD with [place] lead 2011 s/p lead extraction and upgrade to Medtronic dual-chamber ICD (8/17/21), hx of appropriate ICD shock for VT/VF, s/p MEMS (9/16/21), bioprosthetic AVR (2015) 2/2 AI s/p 26 mm EVOLUT HECTOR for severe bioprosthetic AS (8/20/20), AFL s/p ablation 7/2017, persistent AFib s/p prior cardioversions most recently 1/17/24 following amiodarone load. He presented to [facility] with shortness of breath, and worsening fatigue x 2 weeks. Noted to have elevated filling pressures on Cardiomems. Admitted with acute on chronic heart failure exacerbation.   Pt states he was noted to be back in atrial fibrillation several weeks ago and was instructed to discontinue amiodarone at that time. Remains in AFib at this time. EP consult requested.     # Persistent atrial fibrillation   -  Assessment/Recommendations:   73 year old male with trigeminal neuralgia, CAD s/p late-presenting MI 2005 s/p PCI LAD, ICM/HFrEF (EF 15-20%, LVEDD 6 cm) s/p single chamber ICD with [place] lead 2011 s/p lead extraction and upgrade to Medtronic dual-chamber ICD (8/17/21), hx of appropriate ICD shock for VT/VF, s/p MEMS (9/16/21), bioprosthetic AVR (2015) 2/2 AI s/p 26 mm EVOLUT HECTOR for severe bioprosthetic AS (8/20/20), AFL s/p ablation 7/2017, persistent AFib s/p prior cardioversions most recently 1/17/24 following amiodarone load. He presented to [facility] with shortness of breath, and worsening fatigue x 2 weeks. Noted to have elevated filling pressures on Cardiomems. Admitted with acute on chronic heart failure exacerbation.   Pt states he was noted to be back in atrial fibrillation several weeks ago and was instructed to discontinue amiodarone at that time. Remains in AFib at this time. EP consult requested.     # Persistent atrial fibrillation   - Resume amiodarone 200mg daily.   - Eliquis 5mg Q 12 hrs held in favor of Lovenox as pt is pending RHC on 4/15. Anticipate resuming Eliquis post procedure.   - NPO after midnight for ANDREY/DCCV on 4/15 after RHC, if medically optimized.   - Telemetry monitoring.   - Monitor electrolytes: Keep K > 4, Mg > 2   - Discussed with patient possible outpatient ablation or upgrade to CRT.     # Acute heart failure exacerbation   - LVEF ~ 15%   - Diuresis and GDMT as per general cardiology  - Daily weights   - Strict I/O's     Seen and discussed w/ Dr. Judge

## 2024-04-13 NOTE — CONSULT NOTE ADULT - PROBLEM SELECTOR RECOMMENDATION 9
- Patient with longstanding history of HFrEF (EF 15%) on GDMT.   - Patient with cardiomems with goal 30-34, with last check yesterday with PAD in the 40s.   - Patient also at home with cold hands and feet indicating a low flow state.   - Upon arrival patient with some FEDERICO, elevated AST, and lactate of 2.70.   - Renal function improving with diuresis.   - Obtain TTE>   - Recheck lactate, if still elevated will hold Toprol XL.   - Transition lasix 40mg IV BID to bumex gtt at 0.5mg/hr.   - Goal negative 1-2 L daily.   - Hold farxiga for diuresis at this time.   - If hypotensive, will also hold losartan.   - Plan for RHC on Monday.   - Will need to contact [employer] to input patient into our cardiomems system 04/15, and check device then.   - If worsening lactate or signs of worsening perfusion, may require dobutamine 2.5mg/hR.   - Monitor on telemetry.    - Strict i/o and daily weights.    - Keep K > 4, Mg > 2.    - Monitor renal function with ongoing diuresis.

## 2024-04-14 LAB
ALBUMIN SERPL ELPH-MCNC: 3.7 G/DL — SIGNIFICANT CHANGE UP (ref 3.3–5.2)
ALP SERPL-CCNC: 149 U/L — HIGH (ref 40–120)
ALT FLD-CCNC: 32 U/L — SIGNIFICANT CHANGE UP
ANION GAP SERPL CALC-SCNC: 14 MMOL/L — SIGNIFICANT CHANGE UP (ref 5–17)
ANION GAP SERPL CALC-SCNC: 15 MMOL/L — SIGNIFICANT CHANGE UP (ref 5–17)
AST SERPL-CCNC: 32 U/L — SIGNIFICANT CHANGE UP
BASE EXCESS BLDV CALC-SCNC: 6.1 MMOL/L — HIGH (ref -2–3)
BASOPHILS # BLD AUTO: 0.06 K/UL — SIGNIFICANT CHANGE UP (ref 0–0.2)
BASOPHILS NFR BLD AUTO: 0.7 % — SIGNIFICANT CHANGE UP (ref 0–2)
BILIRUB SERPL-MCNC: 2.1 MG/DL — HIGH (ref 0.4–2)
BUN SERPL-MCNC: 30.8 MG/DL — HIGH (ref 8–20)
BUN SERPL-MCNC: 35.5 MG/DL — HIGH (ref 8–20)
CA-I SERPL-SCNC: 0.83 MMOL/L — LOW (ref 1.15–1.33)
CALCIUM SERPL-MCNC: 8.1 MG/DL — LOW (ref 8.4–10.5)
CALCIUM SERPL-MCNC: 8.6 MG/DL — SIGNIFICANT CHANGE UP (ref 8.4–10.5)
CHLORIDE BLDV-SCNC: 99 MMOL/L — SIGNIFICANT CHANGE UP (ref 96–108)
CHLORIDE SERPL-SCNC: 97 MMOL/L — SIGNIFICANT CHANGE UP (ref 96–108)
CHLORIDE SERPL-SCNC: 98 MMOL/L — SIGNIFICANT CHANGE UP (ref 96–108)
CO2 SERPL-SCNC: 26 MMOL/L — SIGNIFICANT CHANGE UP (ref 22–29)
CO2 SERPL-SCNC: 28 MMOL/L — SIGNIFICANT CHANGE UP (ref 22–29)
CREAT SERPL-MCNC: 0.96 MG/DL — SIGNIFICANT CHANGE UP (ref 0.5–1.3)
CREAT SERPL-MCNC: 1.19 MG/DL — SIGNIFICANT CHANGE UP (ref 0.5–1.3)
EGFR: 64 ML/MIN/1.73M2 — SIGNIFICANT CHANGE UP
EGFR: 83 ML/MIN/1.73M2 — SIGNIFICANT CHANGE UP
EOSINOPHIL # BLD AUTO: 0.13 K/UL — SIGNIFICANT CHANGE UP (ref 0–0.5)
EOSINOPHIL NFR BLD AUTO: 1.5 % — SIGNIFICANT CHANGE UP (ref 0–6)
GAS PNL BLDV: 132 MMOL/L — LOW (ref 136–145)
GAS PNL BLDV: SIGNIFICANT CHANGE UP
GLUCOSE BLDV-MCNC: 107 MG/DL — HIGH (ref 70–99)
GLUCOSE SERPL-MCNC: 108 MG/DL — HIGH (ref 70–99)
GLUCOSE SERPL-MCNC: 108 MG/DL — HIGH (ref 70–99)
HCO3 BLDV-SCNC: 28 MMOL/L — SIGNIFICANT CHANGE UP (ref 22–29)
HCT VFR BLD CALC: 40.3 % — SIGNIFICANT CHANGE UP (ref 39–50)
HCT VFR BLDA CALC: 42 % — SIGNIFICANT CHANGE UP
HGB BLD CALC-MCNC: 14 G/DL — SIGNIFICANT CHANGE UP (ref 12.6–17.4)
HGB BLD-MCNC: 13.3 G/DL — SIGNIFICANT CHANGE UP (ref 13–17)
IMM GRANULOCYTES NFR BLD AUTO: 0.5 % — SIGNIFICANT CHANGE UP (ref 0–0.9)
LACTATE BLDV-MCNC: 1.3 MMOL/L — SIGNIFICANT CHANGE UP (ref 0.5–2)
LYMPHOCYTES # BLD AUTO: 1.04 K/UL — SIGNIFICANT CHANGE UP (ref 1–3.3)
LYMPHOCYTES # BLD AUTO: 12.2 % — LOW (ref 13–44)
MAGNESIUM SERPL-MCNC: 2.2 MG/DL — SIGNIFICANT CHANGE UP (ref 1.6–2.6)
MCHC RBC-ENTMCNC: 30.9 PG — SIGNIFICANT CHANGE UP (ref 27–34)
MCHC RBC-ENTMCNC: 33 GM/DL — SIGNIFICANT CHANGE UP (ref 32–36)
MCV RBC AUTO: 93.7 FL — SIGNIFICANT CHANGE UP (ref 80–100)
MONOCYTES # BLD AUTO: 1.33 K/UL — HIGH (ref 0–0.9)
MONOCYTES NFR BLD AUTO: 15.7 % — HIGH (ref 2–14)
NEUTROPHILS # BLD AUTO: 5.89 K/UL — SIGNIFICANT CHANGE UP (ref 1.8–7.4)
NEUTROPHILS NFR BLD AUTO: 69.4 % — SIGNIFICANT CHANGE UP (ref 43–77)
PCO2 BLDV: 29 MMHG — LOW (ref 42–55)
PH BLDV: 7.59 — HIGH (ref 7.32–7.43)
PHOSPHATE SERPL-MCNC: 2.6 MG/DL — SIGNIFICANT CHANGE UP (ref 2.4–4.7)
PLATELET # BLD AUTO: 140 K/UL — LOW (ref 150–400)
PO2 BLDV: 152 MMHG — HIGH (ref 25–45)
POTASSIUM BLDV-SCNC: 3.1 MMOL/L — LOW (ref 3.5–5.1)
POTASSIUM SERPL-MCNC: 3 MMOL/L — LOW (ref 3.5–5.3)
POTASSIUM SERPL-MCNC: 4.2 MMOL/L — SIGNIFICANT CHANGE UP (ref 3.5–5.3)
POTASSIUM SERPL-SCNC: 3 MMOL/L — LOW (ref 3.5–5.3)
POTASSIUM SERPL-SCNC: 4.2 MMOL/L — SIGNIFICANT CHANGE UP (ref 3.5–5.3)
PROT SERPL-MCNC: 6.5 G/DL — LOW (ref 6.6–8.7)
RBC # BLD: 4.3 M/UL — SIGNIFICANT CHANGE UP (ref 4.2–5.8)
RBC # FLD: 16 % — HIGH (ref 10.3–14.5)
SAO2 % BLDV: 99 % — SIGNIFICANT CHANGE UP
SODIUM SERPL-SCNC: 139 MMOL/L — SIGNIFICANT CHANGE UP (ref 135–145)
SODIUM SERPL-SCNC: 139 MMOL/L — SIGNIFICANT CHANGE UP (ref 135–145)
WBC # BLD: 8.49 K/UL — SIGNIFICANT CHANGE UP (ref 3.8–10.5)
WBC # FLD AUTO: 8.49 K/UL — SIGNIFICANT CHANGE UP (ref 3.8–10.5)

## 2024-04-14 PROCEDURE — 99233 SBSQ HOSP IP/OBS HIGH 50: CPT

## 2024-04-14 PROCEDURE — 99232 SBSQ HOSP IP/OBS MODERATE 35: CPT

## 2024-04-14 RX ORDER — POTASSIUM CHLORIDE 20 MEQ
10 PACKET (EA) ORAL
Refills: 0 | Status: DISCONTINUED | OUTPATIENT
Start: 2024-04-14 | End: 2024-04-14

## 2024-04-14 RX ORDER — POTASSIUM CHLORIDE 20 MEQ
40 PACKET (EA) ORAL EVERY 4 HOURS
Refills: 0 | Status: COMPLETED | OUTPATIENT
Start: 2024-04-14 | End: 2024-04-14

## 2024-04-14 RX ORDER — AMIODARONE HYDROCHLORIDE 400 MG/1
200 TABLET ORAL
Refills: 0 | Status: DISCONTINUED | OUTPATIENT
Start: 2024-04-14 | End: 2024-04-20

## 2024-04-14 RX ORDER — ENOXAPARIN SODIUM 100 MG/ML
80 INJECTION SUBCUTANEOUS ONCE
Refills: 0 | Status: COMPLETED | OUTPATIENT
Start: 2024-04-14 | End: 2024-04-14

## 2024-04-14 RX ADMIN — AMIODARONE HYDROCHLORIDE 200 MILLIGRAM(S): 400 TABLET ORAL at 05:06

## 2024-04-14 RX ADMIN — SPIRONOLACTONE 25 MILLIGRAM(S): 25 TABLET, FILM COATED ORAL at 17:56

## 2024-04-14 RX ADMIN — ENOXAPARIN SODIUM 80 MILLIGRAM(S): 100 INJECTION SUBCUTANEOUS at 05:06

## 2024-04-14 RX ADMIN — Medication 88 MICROGRAM(S): at 05:06

## 2024-04-14 RX ADMIN — BUMETANIDE 2.5 MG/HR: 0.25 INJECTION INTRAMUSCULAR; INTRAVENOUS at 21:44

## 2024-04-14 RX ADMIN — Medication 100 MILLIGRAM(S): at 17:56

## 2024-04-14 RX ADMIN — Medication 100 MILLIGRAM(S): at 05:06

## 2024-04-14 RX ADMIN — Medication 40 MILLIEQUIVALENT(S): at 17:56

## 2024-04-14 RX ADMIN — Medication 81 MILLIGRAM(S): at 12:10

## 2024-04-14 RX ADMIN — Medication 6.12 MICROGRAM(S)/KG/MIN: at 21:44

## 2024-04-14 RX ADMIN — Medication 40 MILLIEQUIVALENT(S): at 08:47

## 2024-04-14 RX ADMIN — AMIODARONE HYDROCHLORIDE 200 MILLIGRAM(S): 400 TABLET ORAL at 17:56

## 2024-04-14 RX ADMIN — SPIRONOLACTONE 25 MILLIGRAM(S): 25 TABLET, FILM COATED ORAL at 05:06

## 2024-04-14 RX ADMIN — ATORVASTATIN CALCIUM 40 MILLIGRAM(S): 80 TABLET, FILM COATED ORAL at 21:43

## 2024-04-14 RX ADMIN — Medication 40 MILLIEQUIVALENT(S): at 12:14

## 2024-04-14 RX ADMIN — ENOXAPARIN SODIUM 80 MILLIGRAM(S): 100 INJECTION SUBCUTANEOUS at 17:57

## 2024-04-14 NOTE — PROGRESS NOTE ADULT - ASSESSMENT
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 Patient states that he as been feeling weak for the last few days, and has been experiencing dyspnea on exertion with persistent cough. Patient also noted that his feet and hands were becoming progressively colder, and his cardiomems was up in the 40s (goal 30-34). Patient had his torsemide dose increased at home, but symptoms did not improve. Patient was sent to [facility] for further management. Patient states today his symptoms are beginning to improve, and denies any fevers, chills, CP, syncope, abdominal pain, N/V/D, headache, or dizziness.   pBNP 2041

## 2024-04-14 NOTE — PROGRESS NOTE ADULT - ASSESSMENT
73 year old male with trigeminal neuralgia, CAD s/p late-presenting MI 2005 s/p PCI LAD, ICM/HFrEF (EF 15-20%, LVEDD 6 cm) s/p single chamber ICD with Estevan lead 2011 s/p lead extraction and upgrade to Medtronic dual-chamber ICD (8/17/21), hx of appropriate ICD shock for VT/VF, s/p MEMS (9/16/21), bioprosthetic AVR (2015) 2/2 AI s/p 26 mm EVOLUT HECTOR for severe bioprosthetic AS (8/20/20), AFL s/p ablation 7/2017, persistent AFib s/p prior cardioversions most recently 1/17/24 following amiodarone load. He presented to [facility] with shortness of breath, and worsening fatigue x 2 weeks. Noted to have elevated filling pressures on Cardiomems. Admitted with acute on chronic heart failure exacerbation.   Pt states he was noted to be back in atrial fibrillation several weeks ago and was instructed to discontinue amiodarone at that time. Remains in AFib at this time. In the setting of AF he also has a high degree of right ventricular pacing. He has very severe baseline CHF with severe LV dysfunction, and AF clearly exacerbates his CMP and CHF.   Amiodarone restarted, with plan for DCCV.      - continue amiodarone. will increase to 200mg BID for now.   - Eliquis 5mg Q 12 hrs held in favor of Lovenox as pt is pending RHC on 4/15. Anticipate resuming Eliquis post procedure.   - NPO after midnight for ANDREY/DCCV on 4/15 after RHC, if medically optimized. Will need to be back on anticoagulation prior to DCCV.   - Telemetry monitoring.   - Monitor electrolytes: Keep K > 4, Mg > 2     -for long-term AF management, will need to consider AF ablation when more medically optimized, vs CRT upgrade with AVJ ablation.

## 2024-04-14 NOTE — PROGRESS NOTE ADULT - SUBJECTIVE AND OBJECTIVE BOX
[facility] Division of Hospital Medicine    Chief Complaint:  Acute on ch HF    SUBJECTIVE / OVERNIGHT EVENTS:  Pt examined lying in bed  States he feels ok  Patient denies chest pain, SOB, abd pain, N/V, fever, chills, dysuria. Complains of ongoing weakness      MEDICATIONS  (STANDING):  aMIOdarone    Tablet 200 milliGRAM(s) Oral daily  aspirin enteric coated 81 milliGRAM(s) Oral daily  atorvastatin 40 milliGRAM(s) Oral at bedtime  buMETAnide Infusion 0.5 mG/Hr (2.5 mL/Hr) IV Continuous <Continuous>  carBAMazepine ER Capsule 100 milliGRAM(s) Oral two times a day  DOBUTamine Infusion 2.5 MICROgram(s)/kG/Min (6.12 mL/Hr) IV Continuous <Continuous>  enoxaparin Injectable 80 milliGRAM(s) SubCutaneous once  levothyroxine 88 MICROGram(s) Oral daily  potassium chloride    Tablet ER 40 milliEquivalent(s) Oral every 4 hours  spironolactone 25 milliGRAM(s) Oral every 12 hours    MEDICATIONS  (PRN):  acetaminophen     Tablet .. 650 milliGRAM(s) Oral every 6 hours PRN Temp greater or equal to 38C (100.4F), Mild Pain (1 - 3)  aluminum hydroxide/magnesium hydroxide/simethicone Suspension 30 milliLiter(s) Oral every 4 hours PRN Dyspepsia  melatonin 3 milliGRAM(s) Oral at bedtime PRN Insomnia  ondansetron Injectable 4 milliGRAM(s) IV Push every 8 hours PRN Nausea and/or Vomiting        I&O's Summary    13 Apr 2024 07:01  -  14 Apr 2024 07:00  --------------------------------------------------------  IN: 612.1 mL / OUT: 2400 mL / NET: -1787.9 mL    14 Apr 2024 07:01  -  14 Apr 2024 12:44  --------------------------------------------------------  IN: 75.8 mL / OUT: 0 mL / NET: 75.8 mL        PHYSICAL EXAM:  Vital Signs Last 24 Hrs  T(C): 36.8 (14 Apr 2024 07:45), Max: 36.8 (13 Apr 2024 20:41)  T(F): 98.2 (14 Apr 2024 07:45), Max: 98.3 (13 Apr 2024 20:41)  HR: 74 (14 Apr 2024 10:00) (69 - 82)  BP: 123/78 (14 Apr 2024 10:00) (97/67 - 123/78)  BP(mean): 86 (14 Apr 2024 08:00) (69 - 92)  RR: 20 (14 Apr 2024 10:00) (17 - 20)  SpO2: 98% (14 Apr 2024 10:00) (85% - 99%)    Parameters below as of 14 Apr 2024 10:00  Patient On (Oxygen Delivery Method): nasal cannula  O2 Flow (L/min): 2          CONSTITUTIONAL: Non toxic appearing, lying in bed  ENMT: Moist oral mucosa, no pharyngeal injection or exudates, central cyanotic appearance of lips and nose (reports ch)  RESPIRATORY: Normal respiratory effort; lungs are clear to auscultation bilaterally  CARDIOVASCULAR: Irregular rate and rhythm, normal S1 and S2, no murmur/rub/gallop; No lower extremity edema; Peripheral pulses are 2+ bilaterally  ABDOMEN: Nontender to palpation, normoactive bowel sounds, no rebound/guarding; No hepatosplenomegaly  MUSCLOSKELETAL:  Normal gait; no clubbing or cyanosis of digits; no joint swelling or tenderness to palpation  PSYCH: A+O to person, place, and time; affect appropriate  NEUROLOGY: CN 2-12 are intact and symmetric; no gross sensory deficits;   SKIN: No rashes; no palpable lesions    LABS:                        13.3   8.49  )-----------( 140      ( 14 Apr 2024 04:37 )             40.3     04-14    139  |  98  |  35.5<H>  ----------------------------<  108<H>  3.0<L>   |  26.0  |  0.96    Ca    8.1<L>      14 Apr 2024 04:37  Phos  2.6     04-14  Mg     2.2     04-14    TPro  6.5<L>  /  Alb  3.7  /  TBili  2.1<H>  /  DBili  x   /  AST  32  /  ALT  32  /  AlkPhos  149<H>  04-14          Urinalysis Basic - ( 14 Apr 2024 04:37 )    Color: x / Appearance: x / SG: x / pH: x  Gluc: 108 mg/dL / Ketone: x  / Bili: x / Urobili: x   Blood: x / Protein: x / Nitrite: x   Leuk Esterase: x / RBC: x / WBC x   Sq Epi: x / Non Sq Epi: x / Bacteria: x        CAPILLARY BLOOD GLUCOSE            RADIOLOGY & ADDITIONAL TESTS:    1/17/24 Cardiac MRI  IMPRESSION: Cardiac amyloid Imaging study is  not suggestive of transthyretin cardiac amyloidosis.

## 2024-04-14 NOTE — PROGRESS NOTE ADULT - SUBJECTIVE AND OBJECTIVE BOX
Subjective:  pt feeling better today. no SOB at rest.   On Bumex gtt.   remains in AF with frequent RV pacing in this setting.     MEDICATIONS  (STANDING):  aMIOdarone    Tablet 200 milliGRAM(s) Oral daily  aspirin enteric coated 81 milliGRAM(s) Oral daily  atorvastatin 40 milliGRAM(s) Oral at bedtime  buMETAnide Infusion 0.5 mG/Hr (2.5 mL/Hr) IV Continuous <Continuous>  carBAMazepine ER Capsule 100 milliGRAM(s) Oral two times a day  DOBUTamine Infusion 2.5 MICROgram(s)/kG/Min (6.12 mL/Hr) IV Continuous <Continuous>  enoxaparin Injectable 80 milliGRAM(s) SubCutaneous once  levothyroxine 88 MICROGram(s) Oral daily  potassium chloride    Tablet ER 40 milliEquivalent(s) Oral every 4 hours  spironolactone 25 milliGRAM(s) Oral every 12 hours    MEDICATIONS  (PRN):  acetaminophen     Tablet .. 650 milliGRAM(s) Oral every 6 hours PRN Temp greater or equal to 38C (100.4F), Mild Pain (1 - 3)  aluminum hydroxide/magnesium hydroxide/simethicone Suspension 30 milliLiter(s) Oral every 4 hours PRN Dyspepsia  melatonin 3 milliGRAM(s) Oral at bedtime PRN Insomnia  ondansetron Injectable 4 milliGRAM(s) IV Push every 8 hours PRN Nausea and/or Vomiting      Allergies    No Known Allergies    Intolerances    Entresto (Other)      Vital Signs Last 24 Hrs  T(C): 36.8 (14 Apr 2024 07:45), Max: 36.8 (13 Apr 2024 20:41)  T(F): 98.2 (14 Apr 2024 07:45), Max: 98.3 (13 Apr 2024 20:41)  HR: 70 (14 Apr 2024 12:00) (69 - 82)  BP: 115/77 (14 Apr 2024 12:00) (97/67 - 123/78)  BP(mean): 86 (14 Apr 2024 08:00) (69 - 92)  RR: 21 (14 Apr 2024 12:00) (17 - 21)  SpO2: 97% (14 Apr 2024 12:00) (85% - 99%)    Parameters below as of 14 Apr 2024 12:00  Patient On (Oxygen Delivery Method): nasal cannula  O2 Flow (L/min): 2      Physical Exam:  Constitutional: NAD, AAOx3  Cardiovascular: +S1S2 RRR  Pulmonary: CTA b/l, unlabored  GI: soft NTND +BS  Extremities: no pedal edema, +distal pulses b/l  Neuro: non focal, TERRAZAS x4    LABS:                        13.3   8.49  )-----------( 140      ( 14 Apr 2024 04:37 )             40.3     04-14    139  |  98  |  35.5<H>  ----------------------------<  108<H>  3.0<L>   |  26.0  |  0.96    Ca    8.1<L>      14 Apr 2024 04:37  Phos  2.6     04-14  Mg     2.2     04-14    TPro  6.5<L>  /  Alb  3.7  /  TBili  2.1<H>  /  DBili  x   /  AST  32  /  ALT  32  /  AlkPhos  149<H>  04-14      Urinalysis Basic - ( 14 Apr 2024 04:37 )    Color: x / Appearance: x / SG: x / pH: x  Gluc: 108 mg/dL / Ketone: x  / Bili: x / Urobili: x   Blood: x / Protein: x / Nitrite: x   Leuk Esterase: x / RBC: x / WBC x   Sq Epi: x / Non Sq Epi: x / Bacteria: x        RADIOLOGY & ADDITIONAL TESTS:  < from: TTE W or WO Ultrasound Enhancing Agent (04.13.24 @ 11:31) >   1. Technically difficult image quality.   2. Left ventricular cavity is severely dilated. Left ventricular systolic function is severely decreased with an ejection fraction visually estimated at <20 %. Global left ventricular hypokinesis.   3. There is increased LV mass and eccentric hypertrophy.   4. There is severe (grade 3) left ventricular diastolic dysfunction, with elevated filling pressure.   5. Mildly enlarged right ventricular cavity size and mildly reduced systolic function.   6. The left atrium is mildly dilated.   7. The right atrium is dilated.   8. Mild mitral regurgitation.   9. A bioprosthetic valve replacement Transcatheter deployed (TAVR) valve replacement is present in the aortic position The prosthetic valve is well seated with normal function.  10. Moderate tricuspid regurgitation.  11. Estimated pulmonary artery systolic pressure is 52mmHg, consistent with elevated pulmonary artery pressure.  12. No prior echocardiogram is available for comparison.    < end of copied text >    < from: Cardiac Catheterization (08.22.23 @ 13:04) >  Baseline      RV                  s      60  ed      20         57  Baseline      PA                  s      61                d      36  m  43         80  Baseline      PCW                 a      27                v      27  m  22         40  Baseline      RA                  a      16                v      18  m  16         82    < end of copied text >

## 2024-04-14 NOTE — PROGRESS NOTE ADULT - ASSESSMENT
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 Seen by cardiology and switched to bumex drip with plan for RHC.    Acute on systolic chronic HF   Comfortable on RA  Continue bumex ggt and Dobutamine 2.5 mg/kg/min  daily weights, strict I/O   cont with Losartan, Spironolactone, Farxiga, BB   Cardiology following, Switched to Bumex and lovenox  RHC plan for monday    Afib s/p ANDREY/DCCV on 1/17/24  rated controlled   cont with Eliquis 5mg po bid--> lovenox  cont with Metroprolol 25mg PO daily      CAD s/p PCI  cont ASA, statin     Hypothyroidism   cont with synthroid    Trigeminal neurologia   cont carbamazepine     DVT ppx  Eliquis--> lovenox    Dispo: acute. Pending RHC Monday.  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 Transferred to SDU for bumex ggt with fixed dose Dobutamine     Acute on systolic chronic HF   Comfortable on RA  Continue Bumex ggt and Dobutamine 2.5 mg/kg/min  daily weights, strict I/O   Resume Farxiga on dsc   cont with Losartan, Spironolactone  Cardiology following  RHC plan for monday    Afib s/p ANDREY/DCCV on 1/17/24  rated controlled   cont with Lovenox for now  On Amio     CAD s/p PCI  cont ASA, statin     Hypothyroidism   cont with synthroid  Send TSH in am     Trigeminal neurologia   cont carbamazepine     DVT ppx  Eliquis--> lovenox    Dispo: acute. Pending RHC Monday.

## 2024-04-14 NOTE — PROGRESS NOTE ADULT - SUBJECTIVE AND OBJECTIVE BOX
[facility]                                                         CARDIOLOGY AT [place] [address]                                                         Telephone: [phone]. Fax:[phone]                                                                             PROGRESS NOTE    Reason for follow up: Acute Decompensated HFrEF, AFib  Update: Patient states his breathing continues to improve, and is planned for a RHC followed by a ANDREY/DCCV tomorrow.       Review of symptoms:   Cardiac:  No chest pain. No dyspnea. No palpitations.  Respiratory: no cough. No dyspnea  Gastrointestinal: No diarrhea. No abdominal pain. No bleeding.   Neuro: No focal neuro complaints.    Vitals:  T(C): 36.8 (04-14-24 @ 07:45), Max: 36.8 (04-13-24 @ 20:41)  HR: 76 (04-14-24 @ 08:00) (69 - 82)  BP: 117/70 (04-14-24 @ 08:00) (97/67 - 122/47)  RR: 18 (04-14-24 @ 08:00) (17 - 20)  SpO2: 94% (04-14-24 @ 08:00) (85% - 99%)  Wt(kg): --  I&O's Summary    13 Apr 2024 07:01  -  14 Apr 2024 07:00  --------------------------------------------------------  IN: 612.1 mL / OUT: 2400 mL / NET: -1787.9 mL    14 Apr 2024 07:01  -  14 Apr 2024 10:51  --------------------------------------------------------  IN: 75.8 mL / OUT: 0 mL / NET: 75.8 mL      Weight (kg): 81.6 (04-12 @ 19:05)    PHYSICAL EXAM:  Constitutional: Comfortable . No acute distress.   HEENT: Atraumatic and normocephalic , neck is supple . no JVD. No carotid bruit.  CNS: A&Ox3. No focal deficits.   Respiratory: Bibasilar rales  Cardiovascular: RRR normal s1 s2. No murmur. No rubs or gallop.  Gastrointestinal: Soft, non-tender. +Bowel sounds.   Extremities: 2+ Peripheral Pulses, No clubbing, cyanosis, or edema, warm, well perfused  Psychiatric: Calm . no agitation.   Skin: Warm and dry, no ulcers on extremities     CURRENT CARDIAC MEDICATIONS:  aMIOdarone    Tablet 200 milliGRAM(s) Oral daily  buMETAnide Infusion 0.5 mG/Hr IV Continuous <Continuous>  DOBUTamine Infusion 2.5 MICROgram(s)/kG/Min IV Continuous <Continuous>  spironolactone 25 milliGRAM(s) Oral every 12 hours      CURRENT OTHER MEDICATIONS:  acetaminophen     Tablet .. 650 milliGRAM(s) Oral every 6 hours PRN Temp greater or equal to 38C (100.4F), Mild Pain (1 - 3)  carBAMazepine ER Capsule 100 milliGRAM(s) Oral two times a day  melatonin 3 milliGRAM(s) Oral at bedtime PRN Insomnia  ondansetron Injectable 4 milliGRAM(s) IV Push every 8 hours PRN Nausea and/or Vomiting  aluminum hydroxide/magnesium hydroxide/simethicone Suspension 30 milliLiter(s) Oral every 4 hours PRN Dyspepsia  atorvastatin 40 milliGRAM(s) Oral at bedtime  levothyroxine 88 MICROGram(s) Oral daily  aspirin enteric coated 81 milliGRAM(s) Oral daily  enoxaparin Injectable 80 milliGRAM(s) SubCutaneous once, Stop order after: 1 Doses  potassium chloride    Tablet ER 40 milliEquivalent(s) Oral every 4 hours, Stop order after: 3 Doses      LABS:	 	                            13.3   8.49  )-----------( 140      ( 14 Apr 2024 04:37 )             40.3     04-14    139  |  98  |  35.5<H>  ----------------------------<  108<H>  3.0<L>   |  26.0  |  0.96    Ca    8.1<L>      14 Apr 2024 04:37  Phos  2.6     04-14  Mg     2.2     04-14    TPro  6.5<L>  /  Alb  3.7  /  TBili  2.1<H>  /  DBili  x   /  AST  32  /  ALT  32  /  AlkPhos  149<H>  04-14      Lipid Profile:   HgA1c:   TSH:     TELEMETRY:   ECG:    DIAGNOSTIC TESTING:  [ ] Echocardiogram:     < from: TTE W or WO Ultrasound Enhancing Agent (04.13.24 @ 11:31) >  CONCLUSIONS:      1. Technically difficult image quality.   2. Left ventricular cavity is severely dilated. Left ventricular systolic function is severely decreased with an ejection fraction visually estimated at <20 %. Global left ventricular hypokinesis.   3. There is increased LV mass and eccentric hypertrophy.   4. There is severe (grade 3) left ventricular diastolic dysfunction, with elevated filling pressure.   5. Mildly enlarged right ventricular cavity size and mildly reduced systolic function.   6. The left atrium is mildly dilated.   7. The right atrium is dilated.   8. Mild mitral regurgitation.   9. A bioprosthetic valve replacement Transcatheter deployed (TAVR) valve replacement is present in the aortic position The prosthetic valve is well seated with normal function.  10. Moderate tricuspid regurgitation.  11. Estimated pulmonary artery systolic pressure is 52mmHg, consistent with elevated pulmonary artery pressure.  12. No prior echocardiogram is available for comparison.    < end of copied text >    [ ]  Catheterization:  CATH:   04/2020  LM: Minor atherosclerosis  mLAD: 10% ISR  LCx: Moderate atherosclerosis  RCA: Moderate atherosclerosis  [ ] Stress Test:    OTHER:

## 2024-04-15 ENCOUNTER — TRANSCRIPTION ENCOUNTER (OUTPATIENT)
Age: 74
End: 2024-04-15

## 2024-04-15 DIAGNOSIS — Z95.2 PRESENCE OF PROSTHETIC HEART VALVE: ICD-10-CM

## 2024-04-15 DIAGNOSIS — I50.43 ACUTE ON CHRONIC COMBINED SYSTOLIC (CONGESTIVE) AND DIASTOLIC (CONGESTIVE) HEART FAILURE: ICD-10-CM

## 2024-04-15 LAB
ALBUMIN SERPL ELPH-MCNC: 3.6 G/DL — SIGNIFICANT CHANGE UP (ref 3.3–5.2)
ALP SERPL-CCNC: 165 U/L — HIGH (ref 40–120)
ALT FLD-CCNC: 58 U/L — HIGH
ANION GAP SERPL CALC-SCNC: 17 MMOL/L — SIGNIFICANT CHANGE UP (ref 5–17)
AST SERPL-CCNC: 61 U/L — HIGH
BILIRUB DIRECT SERPL-MCNC: 1 MG/DL — HIGH (ref 0–0.3)
BILIRUB INDIRECT FLD-MCNC: 0.8 MG/DL — SIGNIFICANT CHANGE UP (ref 0.2–1)
BILIRUB SERPL-MCNC: 1.9 MG/DL — SIGNIFICANT CHANGE UP (ref 0.4–2)
BUN SERPL-MCNC: 27.4 MG/DL — HIGH (ref 8–20)
CALCIUM SERPL-MCNC: 8.4 MG/DL — SIGNIFICANT CHANGE UP (ref 8.4–10.5)
CHLORIDE SERPL-SCNC: 101 MMOL/L — SIGNIFICANT CHANGE UP (ref 96–108)
CO2 SERPL-SCNC: 24 MMOL/L — SIGNIFICANT CHANGE UP (ref 22–29)
CREAT SERPL-MCNC: 1.01 MG/DL — SIGNIFICANT CHANGE UP (ref 0.5–1.3)
EGFR: 78 ML/MIN/1.73M2 — SIGNIFICANT CHANGE UP
GLUCOSE SERPL-MCNC: 132 MG/DL — HIGH (ref 70–99)
HCT VFR BLD CALC: 41.1 % — SIGNIFICANT CHANGE UP (ref 39–50)
HGB BLD-MCNC: 13.5 G/DL — SIGNIFICANT CHANGE UP (ref 13–17)
MAGNESIUM SERPL-MCNC: 2.2 MG/DL — SIGNIFICANT CHANGE UP (ref 1.6–2.6)
MCHC RBC-ENTMCNC: 31 PG — SIGNIFICANT CHANGE UP (ref 27–34)
MCHC RBC-ENTMCNC: 32.8 GM/DL — SIGNIFICANT CHANGE UP (ref 32–36)
MCV RBC AUTO: 94.3 FL — SIGNIFICANT CHANGE UP (ref 80–100)
NT-PROBNP SERPL-SCNC: 2434 PG/ML — HIGH (ref 0–300)
PLATELET # BLD AUTO: 143 K/UL — LOW (ref 150–400)
POTASSIUM SERPL-MCNC: 3.6 MMOL/L — SIGNIFICANT CHANGE UP (ref 3.5–5.3)
POTASSIUM SERPL-SCNC: 3.6 MMOL/L — SIGNIFICANT CHANGE UP (ref 3.5–5.3)
PROT SERPL-MCNC: 6.5 G/DL — LOW (ref 6.6–8.7)
RBC # BLD: 4.36 M/UL — SIGNIFICANT CHANGE UP (ref 4.2–5.8)
RBC # FLD: 15.7 % — HIGH (ref 10.3–14.5)
SODIUM SERPL-SCNC: 142 MMOL/L — SIGNIFICANT CHANGE UP (ref 135–145)
TSH SERPL-MCNC: 0.34 UIU/ML — SIGNIFICANT CHANGE UP (ref 0.27–4.2)
WBC # BLD: 8.77 K/UL — SIGNIFICANT CHANGE UP (ref 3.8–10.5)
WBC # FLD AUTO: 8.77 K/UL — SIGNIFICANT CHANGE UP (ref 3.8–10.5)

## 2024-04-15 PROCEDURE — 93451 RIGHT HEART CATH: CPT | Mod: 26

## 2024-04-15 PROCEDURE — 99233 SBSQ HOSP IP/OBS HIGH 50: CPT

## 2024-04-15 PROCEDURE — 99223 1ST HOSP IP/OBS HIGH 75: CPT | Mod: 24

## 2024-04-15 RX ORDER — POTASSIUM CHLORIDE 20 MEQ
40 PACKET (EA) ORAL
Refills: 0 | Status: COMPLETED | OUTPATIENT
Start: 2024-04-15 | End: 2024-04-15

## 2024-04-15 RX ORDER — APIXABAN 2.5 MG/1
5 TABLET, FILM COATED ORAL EVERY 12 HOURS
Refills: 0 | Status: DISCONTINUED | OUTPATIENT
Start: 2024-04-15 | End: 2024-04-20

## 2024-04-15 RX ORDER — POTASSIUM CHLORIDE 20 MEQ
40 PACKET (EA) ORAL EVERY 6 HOURS
Refills: 0 | Status: DISCONTINUED | OUTPATIENT
Start: 2024-04-15 | End: 2024-04-15

## 2024-04-15 RX ORDER — BUMETANIDE 0.25 MG/ML
1 INJECTION INTRAMUSCULAR; INTRAVENOUS
Qty: 20 | Refills: 0 | Status: DISCONTINUED | OUTPATIENT
Start: 2024-04-15 | End: 2024-04-16

## 2024-04-15 RX ADMIN — Medication 88 MICROGRAM(S): at 05:47

## 2024-04-15 RX ADMIN — Medication 40 MILLIEQUIVALENT(S): at 18:59

## 2024-04-15 RX ADMIN — Medication 40 MILLIEQUIVALENT(S): at 22:34

## 2024-04-15 RX ADMIN — BUMETANIDE 5 MG/HR: 0.25 INJECTION INTRAMUSCULAR; INTRAVENOUS at 20:23

## 2024-04-15 RX ADMIN — Medication 100 MILLIGRAM(S): at 17:35

## 2024-04-15 RX ADMIN — BUMETANIDE 5 MG/HR: 0.25 INJECTION INTRAMUSCULAR; INTRAVENOUS at 17:35

## 2024-04-15 RX ADMIN — Medication 100 MILLIGRAM(S): at 05:47

## 2024-04-15 RX ADMIN — ATORVASTATIN CALCIUM 40 MILLIGRAM(S): 80 TABLET, FILM COATED ORAL at 22:34

## 2024-04-15 RX ADMIN — AMIODARONE HYDROCHLORIDE 200 MILLIGRAM(S): 400 TABLET ORAL at 17:35

## 2024-04-15 RX ADMIN — Medication 81 MILLIGRAM(S): at 10:17

## 2024-04-15 RX ADMIN — AMIODARONE HYDROCHLORIDE 200 MILLIGRAM(S): 400 TABLET ORAL at 05:51

## 2024-04-15 RX ADMIN — SPIRONOLACTONE 25 MILLIGRAM(S): 25 TABLET, FILM COATED ORAL at 17:35

## 2024-04-15 RX ADMIN — Medication 40 MILLIEQUIVALENT(S): at 20:23

## 2024-04-15 RX ADMIN — APIXABAN 5 MILLIGRAM(S): 2.5 TABLET, FILM COATED ORAL at 18:59

## 2024-04-15 RX ADMIN — SPIRONOLACTONE 25 MILLIGRAM(S): 25 TABLET, FILM COATED ORAL at 05:51

## 2024-04-15 RX ADMIN — Medication 6.12 MICROGRAM(S)/KG/MIN: at 20:24

## 2024-04-15 NOTE — PROGRESS NOTE ADULT - ASSESSMENT
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 Transferred to SDU for bumex ggt with fixed dose Dobutamine     Acute on systolic chronic HF   Comfortable on RA  Continue Bumex ggt and Dobutamine 2.5 mg/kg/min  daily weights, strict I/O   Resume Farxiga on dsc   cont with Losartan, Spironolactone  Cardiology following  RHC plan for monday    Afib s/p ANDREY/DCCV on 1/17/24  rated controlled   cont with Lovenox for now  On Amio     CAD s/p PCI  cont ASA, statin     Hypothyroidism   cont with synthroid  Send TSH in am     Trigeminal neurologia   cont carbamazepine     DVT ppx  Eliquis--> lovenox    Dispo: acute. Pending RHC Monday.  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 Transferred to SDU for bumex ggt with fixed dose Dobutamine. Now s/p RHC. d/w HF team, will increase bumex given volume status for today     Acute on systolic chronic HF   Comfortable on RA  Continue Bumex ggt and Dobutamine 2.5 mg/kg/min  daily weights, strict I/O   Resume Farxiga on dsc   cont with Losartan, Spironolactone  Cardiology following  s/p RHC  Pt to f/u with HF team as outpt on dsc. Is now more agreeable to considering LVAD options    Afib s/p ANDREY/DCCV on 1/17/24  rated controlled   cont with Lovenox for now  On Amio     CAD s/p PCI  cont ASA, statin     Hypothyroidism   cont with synthroid  Send TSH in am     Trigeminal neurologia   cont carbamazepine     DVT ppx  Eliquis--> lovenox    Dispo: acute. Pending RHC Monday.  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 Transferred to SDU for bumex ggt with fixed dose Dobutamine. Now s/p RHC. d/w HF team, will increase bumex given volume status for today     Acute on systolic chronic HF   Comfortable on RA  Continue Bumex ggt and Dobutamine 2.5 mg/kg/min  daily weights, strict I/O   Resume Farxiga on dsc   cont with Losartan, Spironolactone  Cardiology following  s/p RHC  Pt to f/u with HF team as outpt on dsc. Is now more agreeable to considering LVAD options    Afib s/p ANDREY/DCCV on 1/17/24  rated controlled   AC now switched back to Eliquis 5 mg bid  On Amio     CAD s/p PCI  cont ASA, statin     Hypothyroidism   cont with synthroid  Send TSH in am     Trigeminal neurologia   cont carbamazepine     DVT ppx  Eliquis--> lovenox    Dispo: acute. Pending EP and Cardio final recs

## 2024-04-15 NOTE — CONSULT NOTE ADULT - PROBLEM SELECTOR RECOMMENDATION 2
Persistent A-fib   C/w amiodarone per EP recs  Plan for possible ANDREY/DCCV today (Eliquis held, lovenox dose given 4/14).   May ultimately need to consider AVJ ablation as output Persistent A-fib   C/w amiodarone per EP recs.   Plan for ANDREY/DCCV likely tomorrow  May ultimately need to consider AVJ ablation as output

## 2024-04-15 NOTE — PROGRESS NOTE ADULT - ASSESSMENT
74 yo M with CAD s/p late-presenting MI 2005 s/p PCI LAD, ICM/HFrEF (EF 15-20%, LVEDD 6 cm) s/p single chamber ICD with lead revision in 2018 with upgrade to dual-chamber device (8/17/21) however attempts at LV lead placement unsuccessful due to anatomy, s/p MEMS (9/16/21), bioprosthetic AVR (2015) 2/2 AI s/p 26 mm EVOLUT HECTOR for severe bioprosthetic AS (8/20/20), aflutter ablation 7/17 w/ persistent afib, s/p DCCV x2 (8/12/20, 7/7/21, 1/17/2024), Trigeminal neuralgia (4/2022) who presented to [facility] for shortness of breath and elevated filling pressures on cardiomems. Patient states that he as been feeling weak for the last few days, and has been experiencing dyspnea on exertion with persistent cough. Patient also noted that his feet and hands were becoming progressively colder, and his cardiomems was up in the 40s (goal 30-34). Patient had his torsemide dose increased at home, but symptoms did not improve. Patient was sent to [facility] for further management. pBNP 2041  ptaient endorses syncopal episodes twice last week, prior to admission, Per patient, dizzy spells occurs prior to th syncopal episodes, Ortho statics were positive at Op office recently.  Of note, this is his second hospitalization this year for ADHF in the setting of Afib. He was hospitalized at [facility] for rising CardioMEMS numbers from 1/10 - 1/17/24. Ablation was aborted due to FEDERICO. He ultimately underwent DCCV on 1/15/24 with improvement in CardioMEMS numbers to 31. Per daughter his C, reeding at home was 41.   Acute on chronic combined systolic and diastolic congestive heart failure, on  Dobutamine @ 2.5 mcg/kg/min for now. Do not titrate,  Bumex gtt. @ 0.5 mg/hr.BB on hold   On DC-ICD w/ elevated RV pacing burden. May benefit from eventual CRT-D upgrade per EP, (Follows w/ Dr. Apple as outpt).  CardioMEMS: Goal PAD 32-34.    Atrial fibrillation, Persistent A-fib on amiodarone per EP recs, iquis held, lovenox dose given 4/14).   H/o late presentation of MI, S/P PCI to LAD  patient  presented   to [facility] CCL for RHC     Intraprocedurally: No iv sedation, or IV heparin received    Findings:     Prelim RHC findings as below  RA: 17/17/15  RV: 66/7/15  PCW; 23/27/23  PA: 68/25/43  CO: 5.16  CI: 2.68    # s/p RHC/ stable CO/CI on dobutamins, elevated PA/RA pressures    -post cardiac cath management per protocol  -Remove RBv sheath in an hour  -Right brachial precautions  -bedrest x 1 hour  post procedure  -NS 0.9% 250ml/hr x 1 bolus: post procedure MARK ppx as pt received no iv contrast and in HF  -Medication management/ Diuretic and ionotropic management  per HF  -follow up outpt in 2 weeks with Cardiologist DR Johnson  -[facility] following during hospitalization  -Lifestyle modifications discussed to reduce cardiovascular risk factors including weight reduction, smoking cessation, medication compliance, and routine follow up with Cardiologist to track your BMI, cholesterol, and glucose levels.   - patient is not a candidate for rehab sec to disgnostic RHC  -fFurther Management per Hospitalist / HF/Cardiology  -transfer pt to tele unit sec to Diagnostic cath   -Discussed with HF team

## 2024-04-15 NOTE — CONSULT NOTE ADULT - NS PANP COMMENT GEN_ALL_CORE FT
Recurrent CHF events.   On bumex gtt.   Will increase to 1 mg.   RHC with mild volume overload.   Overall optimized for planned ANDREY/CV.

## 2024-04-15 NOTE — CONSULT NOTE ADULT - ASSESSMENT
72 yo M with CAD s/p late-presenting MI 2005 s/p PCI LAD, ICM/HFrEF (EF 15-20%, LVEDD 6 cm) s/p single chamber ICD with lead revision in 2018 with upgrade to dual-chamber device (8/17/21) however attempts at LV lead placement unsuccessful due to anatomy, s/p MEMS (9/16/21), bioprosthetic AVR (2015) 2/2 AI s/p 26 mm EVOLUT HECTOR for severe bioprosthetic AS (8/20/20), aflutter ablation 7/17 w/ persistent afib, s/p DCCV x2 (8/12/20, 7/7/21, 1/17/2024), Trigeminal neuralgia (4/2022) who presented to [facility] for shortness of breath and elevated filling pressures on cardiomems. Patient states that he as been feeling weak for the last few days, and has been experiencing dyspnea on exertion with persistent cough. Patient also noted that his feet and hands were becoming progressively colder, and his cardiomems was up in the 40s (goal 30-34). Patient had his torsemide dose increased at home, but symptoms did not improve. Patient was sent to [facility] for further management. pBNP 2041  ptaient endorses syncopal episodes twice last week, prior to admission, Per patient, dizzy spells occurs prior to th syncopal episodes, Ortho statics were positive at Op office recently.  Of note, this is his second hospitalization this year for ADHF in the setting of Afib. He was hospitalized at [facility] for rising CardioMEMS numbers from 1/10 - 1/17/24. Ablation was aborted due to FEDERICO. He ultimately underwent DCCV on 1/15/24 with improvement in CardioMEMS numbers to 31. Per daughter his C, reeding at home was 41.   Acute on chronic combined systolic and diastolic congestive heart failure, on  Dobutamine @ 2.5 mcg/kg/min for now. Do not titrate,  Bumex gtt. @ 0.5 mg/hr.BB on hold   On DC-ICD w/ elevated RV pacing burden. May benefit from eventual CRT-D upgrade per EP, (Follows w/ Dr. Apple as outpt).  CardioMEMS: Goal PAD 32-34.    Atrial fibrillation, Persistent A-fib on amiodarone per EP recs, iquis held, lovenox dose given 4/14).   H/o late presentation of MI, S/P PCI to LAD  patient now presents to [facility] CCL for RHC to evaluate right heart hemodynamics to manage HF  Plan for ANDREY/CV likely tomorrow based on RHc results      Risk Assessments:  ASA: 3  Mallampati: 2  Creat:1.01  GFR: 78  BRA: 4.2%      Indication: CHFrEF, Elevated cardiomems pressure         Plan:    -plan for RHC VIA RBV  -preferred access: RBV  -confirmed appropriate NPO duration  -ECG and Labs reviewed  -Aspirin 81mg po pre-cath  -NS 0.9% 250ml/hr x 1 bolus; pre procedure MARK ppx HELD IN SETTING OF CHF  -procedure discussed with patient; risks and benefits explained, questions answered  -consent obtained by attending DR Aggarwal

## 2024-04-15 NOTE — DISCHARGE NOTE PROVIDER - NSDCFUSCHEDAPPT_GEN_ALL_CORE_FT
Froilan Quiñones  [facility]  HEARTFAIL [address]  Scheduled Appointment: 04/26/2024    Asia Sanchez  [facility]  CARDIOLOGY [address]  Scheduled Appointment: 04/29/2024    Zach Coleman  [facility]  CARDIOLOGY [address]  Scheduled Appointment: 04/30/2024    [facility]  CARDIOLOGY [address]  Scheduled Appointment: 05/13/2024

## 2024-04-15 NOTE — DISCHARGE NOTE PROVIDER - ATTENDING DISCHARGE PHYSICAL EXAMINATION:
CONSTITUTIONAL: Non toxic appearing, lying in bed  ENMT: Moist oral mucosa, no pharyngeal injection or exudates, central cyanotic appearance of lips and nose (reports ch)  RESPIRATORY: Normal respiratory effort;  lungs CTA  CARDIOVASCULAR: RRR, normal S1 and S2, no murmur/rub/gallop; improved LE edema   ABDOMEN: Nontender to palpation, normoactive bowel sounds, no rebound/guarding; No hepatosplenomegaly  MUSCLOSKELETAL:  No clubbing or cyanosis of digits; no joint swelling or tenderness to palpation  PSYCH: A+O to person, place, and time; affect appropriate  NEUROLOGY: CN 2-12 are intact and symmetric; no gross sensory deficits;   SKIN: No rashes; no palpable lesions

## 2024-04-15 NOTE — DISCHARGE NOTE PROVIDER - NSDCCPCAREPLAN_GEN_ALL_CORE_FT
PRINCIPAL DISCHARGE DIAGNOSIS  Diagnosis: Acute on chronic systolic congestive heart failure  Assessment and Plan of Treatment:

## 2024-04-15 NOTE — CONSULT NOTE ADULT - PROBLEM SELECTOR RECOMMENDATION 9
Etiology: ICM +valvular (LVEF <20%, LVEDD 6cm)  Initially presented with with concern for shock (lactate 2.7, cool extremities) which is now improving on low dose .  Continue Dobutamine @ 2.5 mcg/kg/min for now. Do not titrate.   NPO for RHC today.  Diuretics: Bumex gtt. @ 0.5 mg/hr.  GDMT: BB on hold while receiving inotropic support. Losartan held in view of low BP. C/w Spironolactone 25 mg BID. (Home medications included Farxiga 5 mg daily, and Verquovo 2.5mg daily. He was previously unable to tolerated Entresto due to hypotension).  Device: DC-ICD w/ elevated RV pacing burden. May benefit from eventual CRT-D upgrade (Follows w/ Dr. Apple as outpt).  CardioMEMS: Goal PAD 32-34. He does have an element of pre-capillary hypertension with PVR 4.6 on last RHC with PCWP of 22. Wt goal of 222-224lbs. Below MEMS of 30 he feels worse.  Advanced therapies:  3/2024 CPET values (PVO2 of 7.6ml/kg/min and VE/VCO2 slope of 47).  In past he was against LVAD therapy. However, he did attend a support group meeting and would like to attend a second one. He is very much focused on his quality of life and is hesitant that LVAD would disrupt this including his ability to go boating. Etiology: ICM +valvular (LVEF <20%, LVIDd 7.4cm).   Initially presented with with concern for shock (lactate 2.7, cool extremities) which is now improving on low dose .  RHC today showed mildly elevated filling pressures (RA 15, PA 68/25, PCWP 23) and CI 2.68 on low dose inotropic support.  Continue Dobutamine @ 2.5 mcg/kg/min for now. Do not titrate.   Diuretics: Increase Bumex gtt. to 1 mg/hr.   GDMT: BB on hold while receiving inotropic support. Losartan held in view of low BP. C/w Spironolactone 25 mg BID. (Home medications included Farxiga 5 mg daily, and Verquovo 2.5mg daily. He was previously unable to tolerate Entresto due to hypotension).  Device: DC-ICD w/ elevated RV pacing burden. May benefit from eventual CRT-D upgrade (Follows w/ Dr. Apple as outpt).  CardioMEMS: Goal PAD ~30. He does have an element of pre-capillary hypertension with PVR 4.6 on last RHC with PCWP of 22. Wt goal of 222-224lbs. Below MEMS of 30 he feels worse.  Advanced therapies:  3/2024 CPET values (PVO2 of 7.6ml/kg/min and VE/VCO2 slope of 47). Chronotropic incompetence noted and may be related to recent syncopal events.   In past he was against LVAD therapy. However, he did attend a support group meeting and would like to attend a second one. He is very much focused on his quality of life and is hesitant that LVAD would disrupt this including his ability to go boating.

## 2024-04-15 NOTE — PROGRESS NOTE ADULT - SUBJECTIVE AND OBJECTIVE BOX
Department of Cardiology                                                                  [facility]/[place] [address] [address]                                                            Telephone: [phone]. Fax:[phone]                                                    Post- Procedure Note: Left Heart Cardiac Catheterization       Narrative:   Patient s/p Right  heart catheterization via RBV ) approach with Dr. Roy;im,  tolerated procedure well without complications. Arrived to recovery room NAD and hemodynamically stable, distal pulse +, neurovascular intact          PAST MEDICAL & SURGICAL HISTORY:  CAD (coronary artery disease)  2004      MI (myocardial infarction)  Nov 2004      Systolic heart failure  EF 15%      HLD (hyperlipidemia)      HTN (hypertension)      AICD (automatic cardioverter/defibrillator) present      Aortic stenosis  severe, s/p AVR      H/O emphysema  - moderately severe, CT chest 06/10/21      Carotid stenosis  - moderate, b/l (Doppler 12/27/21)      Ischemic cardiomyopathy      H/O pulmonary hypertension      Longstanding persistent atrial fibrillation      Type 2 diabetes mellitus      Osteoarthritis of right knee      Neuralgia      CRI (chronic renal insufficiency)      Stented coronary artery  LAD x 3 (Nov 2004)      S/P knee surgery  - Right knee arthroscopy, 2000      S/P hernia repair  - right inguinal, 1982      S/P AVR  2015, complicated by Asystole/Syncope with 1 shock, Transcatheter valve-in-valve aortic valve replacement utilizing a right common femoral arterial percutaneous approach utilizing a 26 Medtronic Evolut core valve on 08/20/2020      AICD (automatic cardioverter/defibrillator) present  single chamber 2005, replaced with dual chamber 08/17/2021      H/O prior ablation treatment  afib - 2017, 07/2021        Home Medications:  aspirin 81 mg oral delayed release capsule: 1 cap(s) orally once a day (16 Jan 2024 16:22)  atorvastatin 40 mg oral tablet: 1 tab(s) orally once a day (at bedtime) (16 Jan 2024 16:22)  carBAMazepine 100 mg oral capsule, extended release: 1 cap(s) orally 2 times a day (13 Apr 2024 01:24)  Eliquis 5 mg oral tablet: 1 tab(s) orally 2 times a day (16 Jan 2024 16:22)  levothyroxine 88 mcg (0.088 mg) oral capsule: 1 orally once a day (16 Jan 2024 16:22)  metoprolol succinate 25 mg oral capsule, extended release: 1 orally once a day (at bedtime) (16 Jan 2024 16:22)        Entresto (Other)  No Known Allergies      Objective:  Vital Signs Last 24 Hrs  T(C): 36.8 (15 Apr 2024 09:38), Max: 37.2 (15 Apr 2024 04:42)  T(F): 98.2 (15 Apr 2024 09:38), Max: 98.9 (15 Apr 2024 04:42)  HR: 73 (15 Apr 2024 09:38) (72 - 85)  BP: 113/81 (15 Apr 2024 09:38) (102/65 - 158/84)  BP(mean): 100 (15 Apr 2024 06:00) (81 - 101)  RR: 16 (15 Apr 2024 09:38) (16 - 20)  SpO2: 95% (15 Apr 2024 09:38) (95% - 99%)    Parameters below as of 15 Apr 2024 09:38  Patient On (Oxygen Delivery Method): room air        GENERAL: Pt lying comfortably, NAD.  ENMT: PERRL, +EOMI.  NECK: soft, Supple, No JVD,   CHEST/LUNG: fINE crackles LL  HEART: S1S2+, Regular rate and rhythm; No murmurs.  ABDOMEN: Soft, Nontender, Nondistended; Bowel sounds present.  MUSCULOSKELETAL: Normal range of motion.  SKIN: No rashes or lesions.  NEURO: AAOX3, no focal deficits, no motor r sensory loss.  PSYCH: normal mood.  Procedure site: RBV no signs of bleeding or hematoma, neurovascular intact   VASCULAR:   Radial +2 R/+2 L  Femoral +2 R/+2 L  PT +2 R/+2 L  DP +2 R/+2 L                          13.5   8.77  )-----------( 143      ( 15 Apr 2024 05:47 )             41.1     04-15    142  |  101  |  27.4<H>  ----------------------------<  132<H>  3.6   |  24.0  |  1.01    Ca    8.4      15 Apr 2024 05:47  Phos  2.6     04-14  Mg     2.2     04-15    TPro  6.5<L>  /  Alb  3.6  /  TBili  1.9  /  DBili  1.0<H>  /  AST  61<H>  /  ALT  58<H>  /  AlkPhos  165<H>  04-15

## 2024-04-15 NOTE — CONSULT NOTE ADULT - PROBLEM/RECOMMENDATION-4
· Patient without signs of respiratory failure, but requiring 3 L NC to maintain adequate saturations   Secondary to COVID-19   · Continue plan as above DISPLAY PLAN FREE TEXT

## 2024-04-15 NOTE — CHART NOTE - NSCHARTNOTEFT_GEN_A_CORE
Received in report from NP that RA lead potentially interacted with catheters during RHC. Concern for potential lead dislodgement?  Device testing was performed serially which demonstrated consistent bipolar sensing in AFib and stable impedances.   No evidence of RA lead dislodgment observed during interrogation  No clinical need for CXR.

## 2024-04-15 NOTE — CONSULT NOTE ADULT - ASSESSMENT
Primary Cardiologist: Dr. Pressley  HF: Dr. Sanchez   EP: Dr. Apple  Pulmonologist: Dr. Avel Guerrero    74 yo M with CAD s/p late-presenting MI 2005 s/p PCI LAD, ICM/HFrEF (EF 15-20%, LVEDD 6 cm) s/p single chamber ICD with lead revision in 2018 with upgrade to dual-chamber device (8/17/21) however attempts at LV lead placement unsuccessful due to anatomy, s/p MEMS (9/16/21), bioprosthetic AVR (2015) 2/2 AI s/p 26 mm EVOLUT HECTOR for severe bioprosthetic AS (8/20/20), aflutter ablation 7/17 w/ persistent afib, s/p DCCV x2 (8/12/20, 7/7/21, 1/17/2024), Trigeminal neuralgia (4/2022) who presents for increased dyspnea, elevated CardioMEMS w/ poor response to diuretic augmentation.     Of note, this is his second hospitalization this year for ADHF in the setting of Afib. He was hospitalized at [facility] for rising CardioMEMS numbers from 1/10 - 1/17/24. Ablation was aborted due to FEDERICO. He ultimately underwent DCCV on 1/15/24 with improvement in CardioMEMS numbers to 31.  Primary Cardiologist: Dr. Pressley  HF: Dr. Sanchez   EP: Dr. Apple  Pulmonologist: Dr. Avel Guerrero    74 yo M with CAD s/p late-presenting MI 2005 s/p PCI LAD, ICM/HFrEF (EF 15-20%, LVEDD 6 cm) s/p single chamber ICD with lead revision in 2018 with upgrade to dual-chamber device (8/17/21) however attempts at LV lead placement unsuccessful due to anatomy, s/p MEMS (9/16/21), bioprosthetic AVR (2015) 2/2 AI s/p 26 mm EVOLUT HECTOR for severe bioprosthetic AS (8/20/20), aflutter ablation 7/17 w/ persistent afib, s/p DCCV x2 (8/12/20, 7/7/21, 1/17/2024), Trigeminal neuralgia (4/2022) who presents for significant fatigue, increased dyspnea andelevated CardioMEMS w/ poor response to diuretic augmentation.     Of note, this is his second hospitalization this year for ADHF in the setting of Afib. He was hospitalized at [facility] for rising CardioMEMS numbers from 1/10 - 1/17/24. He underwent DCCV on 1/15/24 with improvement in CardioMEMS numbers to 31.     RHC 4/15/24 (Full report pending)-On  2.5mcg/kg/min: HR 75, /69/84 RA 17/15/15, PAP 68/25/43, PCWP 23, CO 5.16, CI 2.68.    TTE 4/13/24: LVEF <20%, LVIDd 7.4cm, LVH, grade III DD, mild RVE w/ mildly reduced systolic function, moderate TR, PASP 52 mmHg (RAP ~15 mmHg).    Prior Cardiac Studies:  Cardiac Cath/PCI: 8/22/23 - RHC (MEMS calibration) - RA 16, RV 60/20, PA 61/36/43, PCWP 22 (v 27), CO/CI 4.6/2.03, PVR 4.54WU, (SBP missing).    9/16/21 - RHC/MEMS implant - RA 12, RV 50/13, PA 51/20/33, PCWP 11, CO/CI 4.7/2.08 (PA sat 59%, Ao sat 93%, Hb 11.5), PVR 4.68 CIFUENTES    [facility] 6/4/20 - LM mild CAD, mLAD 10% stenosis at site of prior stent, LCx/RCA mod CAD

## 2024-04-15 NOTE — DISCHARGE NOTE PROVIDER - HOSPITAL COURSE
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 Transferred to SDU for Bumex ggt with fixed dose Dobutamine. Underwent RHC (x2) and DCCV. Trailed on Entresto per HF. Has had persistence of low cardiac output in the setting of liekly end stage HF in low output state. Labs indictaing multi end organ damage. With mentation being normal, he has made the decision to decline any further intervention or evaluations. Wishes to go home with comfort measures. Now DNR/DNI

## 2024-04-15 NOTE — PROGRESS NOTE ADULT - SUBJECTIVE AND OBJECTIVE BOX
Subjective: No acute events documented overnight. Pt seen in CCL in anticipation of RHC this AM. Pt reports improve in SOB since arrival. Currently denies chest pain, palpitations dizziness.    TELE: Atrial fibrillation with V-paced rhythm.     MEDICATIONS  (STANDING):  aMIOdarone    Tablet 200 milliGRAM(s) Oral two times a day  aspirin enteric coated 81 milliGRAM(s) Oral daily  atorvastatin 40 milliGRAM(s) Oral at bedtime  buMETAnide Infusion 0.5 mG/Hr (2.5 mL/Hr) IV Continuous <Continuous>  carBAMazepine ER Capsule 100 milliGRAM(s) Oral two times a day  DOBUTamine Infusion 2.5 MICROgram(s)/kG/Min (6.12 mL/Hr) IV Continuous <Continuous>  levothyroxine 88 MICROGram(s) Oral daily  spironolactone 25 milliGRAM(s) Oral every 12 hours    MEDICATIONS  (PRN):  acetaminophen     Tablet .. 650 milliGRAM(s) Oral every 6 hours PRN Temp greater or equal to 38C (100.4F), Mild Pain (1 - 3)  aluminum hydroxide/magnesium hydroxide/simethicone Suspension 30 milliLiter(s) Oral every 4 hours PRN Dyspepsia  melatonin 3 milliGRAM(s) Oral at bedtime PRN Insomnia  ondansetron Injectable 4 milliGRAM(s) IV Push every 8 hours PRN Nausea and/or Vomiting      Allergies    No Known Allergies    Intolerances    Entresto (Other)      Vital Signs Last 24 Hrs  T(C): 36.8 (15 Apr 2024 09:38), Max: 37.2 (15 Apr 2024 04:42)  T(F): 98.2 (15 Apr 2024 09:38), Max: 98.9 (15 Apr 2024 04:42)  HR: 73 (15 Apr 2024 09:38) (70 - 85)  BP: 113/81 (15 Apr 2024 09:38) (102/65 - 158/84)  BP(mean): 100 (15 Apr 2024 06:00) (81 - 101)  RR: 16 (15 Apr 2024 09:38) (16 - 21)  SpO2: 95% (15 Apr 2024 09:38) (95% - 99%)    Parameters below as of 15 Apr 2024 09:38  Patient On (Oxygen Delivery Method): room air        Physical Exam:  Constitutional: NAD  Chest wall: normal in appearance, nontender to palpation  Resp: + Rales LLL. + rhonchi b/l  Cardiac: irregular rhythm. Regular rate. + 2 edema b/l LEs.   Neuro: Alert and oriented x 3  Skin: color normal, no rashes or injury  Psych: mood calm    LABS:                        13.5   8.77  )-----------( 143      ( 15 Apr 2024 05:47 )             41.1     04-15    142  |  101  |  27.4<H>  ----------------------------<  132<H>  3.6   |  24.0  |  1.01    Ca    8.4      15 Apr 2024 05:47  Phos  2.6     04-14  Mg     2.2     04-15    TPro  6.5<L>  /  Alb  3.6  /  TBili  1.9  /  DBili  1.0<H>  /  AST  61<H>  /  ALT  58<H>  /  AlkPhos  165<H>  04-15      Urinalysis Basic - ( 15 Apr 2024 05:47 )    Color: x / Appearance: x / SG: x / pH: x  Gluc: 132 mg/dL / Ketone: x  / Bili: x / Urobili: x   Blood: x / Protein: x / Nitrite: x   Leuk Esterase: x / RBC: x / WBC x   Sq Epi: x / Non Sq Epi: x / Bacteria: x        RADIOLOGY & ADDITIONAL TESTS:      < from: TTE W or WO Ultrasound Enhancing Agent (04.13.24 @ 11:31) >  1. Technically difficult image quality.   2. Left ventricular cavity is severely dilated. Left ventricular systolic function is severely decreased with an ejection fraction visually estimated at <20 %. Global left ventricular hypokinesis.   3. There is increased LV mass and eccentric hypertrophy.   4. There is severe (grade 3) left ventricular diastolic dysfunction, with elevated filling pressure.   5. Mildly enlarged right ventricular cavity size and mildly reduced systolic function.   6. The left atrium is mildly dilated.   7. The right atrium is dilated.   8. Mild mitral regurgitation.   9. A bioprosthetic valve replacement Transcatheter deployed (TAVR) valve replacement is present in the aortic position The prosthetic valve is well seated with normal function.  10. Moderate tricuspid regurgitation.  11. Estimated pulmonary artery systolic pressure is 52mmHg, consistent with elevated pulmonary artery pressure.  12. No prior echocardiogram is available for comparison.        Assessment:    73 year old male with trigeminal neuralgia, CAD s/p late-presenting MI 2005 s/p PCI LAD, ICM/HFrEF (EF 15-20%, LVEDD 6 cm) s/p single chamber ICD with [place] lead 2011 s/p lead extraction and upgrade to Medtronic dual-chamber ICD (8/17/21), hx of appropriate ICD shock for VT/VF, s/p MEMS (9/16/21), bioprosthetic AVR (2015) 2/2 AI s/p 26 mm EVOLUT HECTOR for severe bioprosthetic AS (8/20/20), AFL s/p ablation 7/2017, persistent AFib s/p prior cardioversions most recently 1/17/24 following amiodarone load. He presented to [facility] with shortness of breath, and worsening fatigue x 2 weeks. Noted to have elevated filling pressures on Cardiomems. Admitted with acute on chronic heart failure exacerbation.     Pt states he was noted to be back in atrial fibrillation several weeks ago and was instructed to discontinue amiodarone at that time. Pt has remained in AFib throughout admission with a high degree of RV pacing. Amiodarone has been resumed and pt currently pending RHC and eventual ANDREY / DCCV. At time of assessment this morning pt appears hypervolemic with + 2 edema of b/l LEs and + rales. Pt remains on Bumex / Dobutamine infusion. Pt reports overall improvement in SOB since arrival. No other complaints.        Plan:  - continue amiodarone 200mg BID  - Eliquis 5mg Q 12 hrs held in favor of Lovenox as pt is pending RHC on 4/15. Anticipate resuming Eliquis post procedure.   - ANDREY / DCCV postponed today as pt still remains hypervolemic requiring bumex infusion  - Potential ANDREY / DCCV tomorrow pending RHC results and improvement in fluid status  - c/w telemetry monitoring while admitted   - Monitor electrolytes: Keep K > 4, Mg > 2  - GDMT as per HF team  -for long-term AF management, will need to consider AF ablation when more medically optimized, vs CRT upgrade with AVJ ablation.   - EP to follow    Will discuss with Dr. Judge, further recommendations to follow

## 2024-04-15 NOTE — CONSULT NOTE ADULT - SUBJECTIVE AND OBJECTIVE BOX
Department of Cardiology                                                                  [facility]/[place] [id number]                                                            Telephone: [phone]. Fax:[phone]                                                                                      Cardiac Cath NP Note           Patient is a 73y old  Male who presents with a chief complaint of acute on chronic HF (15 Apr 2024 09:26)    Cardiology consulting for ....CHFrEF, Elevated PADP, For RHC  Overnight events: NONE  Plan: RHC          HPI    72 yo M with CAD s/p late-presenting MI 2005 s/p PCI LAD, ICM/HFrEF (EF 15-20%, LVEDD 6 cm) s/p single chamber ICD with lead revision in 2018 with upgrade to dual-chamber device (8/17/21) however attempts at LV lead placement unsuccessful due to anatomy, s/p MEMS (9/16/21), bioprosthetic AVR (2015) 2/2 AI s/p 26 mm EVOLUT HECTOR for severe bioprosthetic AS (8/20/20), aflutter ablation 7/17 w/ persistent afib, s/p DCCV x2 (8/12/20, 7/7/21, 1/17/2024), Trigeminal neuralgia (4/2022) who presented to [facility] for shortness of breath and elevated filling pressures on cardiomems. Patient states that he as been feeling weak for the last few days, and has been experiencing dyspnea on exertion with persistent cough. Patient also noted that his feet and hands were becoming progressively colder, and his cardiomems was up in the 40s (goal 30-34). Patient had his torsemide dose increased at home, but symptoms did not improve. Patient was sent to [facility] for further management. pBNP 2041  ptaient endorses syncopal episodes twice last week, prior to admission, Per patient, dizzy spells occurs prior to th syncopal episodes, Ortho statics were positive at Op office recently.  Of note, this is his second hospitalization this year for ADHF in the setting of Afib. He was hospitalized at [facility] for rising CardioMEMS numbers from 1/10 - 1/17/24. Ablation was aborted due to [place]. He ultimately underwent DCCV on 1/15/24 with improvement in CardioMEMS numbers to 31. Per daughter his C, reeding at home was 41.   Acute on chronic combined systolic and diastolic congestive heart failure, on  Dobutamine @ 2.5 mcg/kg/min for now. Do not titrate,  Bumex gtt. @ 0.5 mg/hr.BB on hold   On DC-ICD w/ elevated RV pacing burden. May benefit from eventual CRT-D upgrade per EP, (Follows w/ Dr. Apple as outpt).  CardioMEMS: Goal PAD 32-34.    Atrial fibrillation, Persistent A-fib on amiodarone per EP recs, iquis held, lovenox dose given 4/14).   H/o late presentation of MI, S/P PCI to LAD  patient now presents to [facility] CCL for RHC       PAST MEDICAL & SURGICAL HISTORY:  CAD (coronary artery disease)  2004      MI (myocardial infarction)  Nov 2004      Systolic heart failure  EF 15%      HLD (hyperlipidemia)      HTN (hypertension)      AICD (automatic cardioverter/defibrillator) present      Aortic stenosis  severe, s/p AVR      H/O emphysema  - moderately severe, CT chest 06/10/21      Carotid stenosis  - moderate, b/l (Doppler 12/27/21)      Ischemic cardiomyopathy      H/O pulmonary hypertension      Longstanding persistent atrial fibrillation      Type 2 diabetes mellitus      Osteoarthritis of right knee      Neuralgia      CRI (chronic renal insufficiency)      Stented coronary artery  LAD x 3 (Nov 2004)      S/P knee surgery  - Right knee arthroscopy, 2000      S/P hernia repair  - right inguinal, 1982      S/P AVR  2015, complicated by Asystole/Syncope with 1 shock, Transcatheter valve-in-valve aortic valve replacement utilizing a right common femoral arterial percutaneous approach utilizing a 26 Medtronic Evolut core valve on 08/20/2020      AICD (automatic cardioverter/defibrillator) present  single chamber 2005, replaced with dual chamber 08/17/2021      H/O prior ablation treatment  afib - 2017, 07/2021          RADIOLOGY & ADDITIONAL STUDIES/DIAGNOSTIC TESTING:      ECHO  FINDINGS:    < from: TTE W or WO Ultrasound Enhancing Agent (04.13.24 @ 11:31) >      1. Technically difficult image quality.   2. Left ventricular cavity is severely dilated. Left ventricular systolic function is severely decreased with an ejection fraction visually estimated at <20 %. Global left ventricular hypokinesis.   3. There is increased LV mass and eccentric hypertrophy.   4. There is severe (grade 3) left ventricular diastolic dysfunction, with elevated filling pressure.   5. Mildly enlarged right ventricular cavity size and mildly reduced systolic function.   6. The left atrium is mildly dilated.   7. The right atrium is dilated.   8. Mild mitral regurgitation.   9. A bioprosthetic valve replacement Transcatheter deployed (TAVR) valve replacement is present in the aortic position The prosthetic valve is well seated with normal function.  10. Moderate tricuspid regurgitation.  11. Estimated pulmonary artery systolic pressure is 52mmHg, consistent with elevated pulmonary artery pressure.  12. No prior echocardiogram is available for comparison.    < end of copied text >    CATHETERIZATION  FINDINGS:    < from: Cardiac Catheterization (08.22.23 @ 13:04) >  Cath Lab Report    Diagnostic Cardiologist:       Hayley Hughes MD   Referring Physician:           Froilan Quiñones MD     Procedures Performed   Procedures:              1.    Venous Access - Right Brachial     2.    Ultrasound Guided Access   3.    RHC     Indications:               Congestive heart failure, chronic systolic     Diagnostic Conclusions:     Successful Cardiomems sensor calibration.       Allergies    No Known Allergies    Intolerances    Entresto (Other)      MEDICATIONS  (STANDING):  aMIOdarone    Tablet 200 milliGRAM(s) Oral two times a day  aspirin enteric coated 81 milliGRAM(s) Oral daily  atorvastatin 40 milliGRAM(s) Oral at bedtime  buMETAnide Infusion 0.5 mG/Hr (2.5 mL/Hr) IV Continuous <Continuous>  carBAMazepine ER Capsule 100 milliGRAM(s) Oral two times a day  DOBUTamine Infusion 2.5 MICROgram(s)/kG/Min (6.12 mL/Hr) IV Continuous <Continuous>  levothyroxine 88 MICROGram(s) Oral daily  spironolactone 25 milliGRAM(s) Oral every 12 hours    MEDICATIONS  (PRN):  acetaminophen     Tablet .. 650 milliGRAM(s) Oral every 6 hours PRN Temp greater or equal to 38C (100.4F), Mild Pain (1 - 3)  aluminum hydroxide/magnesium hydroxide/simethicone Suspension 30 milliLiter(s) Oral every 4 hours PRN Dyspepsia  melatonin 3 milliGRAM(s) Oral at bedtime PRN Insomnia  ondansetron Injectable 4 milliGRAM(s) IV Push every 8 hours PRN Nausea and/or Vomiting      FAMILY HISTORY:  Chronic obstructive pulmonary disease    Family history of colon cancer (Father)        SOCIAL HISTORY:    CIGARETTES: former smoker, Smoked for 30 yrs    ALCOHOL: Social drinking of Beer     REVIEW OF SYSTEMS:  General: No fevers/chills, or fatigue  HEENT: No visual disturbances, no hearing loss, no headaches, no epistaxis.  CV: No chest pain,no palpitations, no edema, no orthopnea, no PND, or MATHEWS  Respiratory: No dyspnea, no wheeze, no cough.  GI: no nausea/vomiting, no black/bloody stools.  : No hematuria  Musculoskeletal: No myalgias, no arthralgias, no back pain.    Vital Signs Last 24 Hrs  T(C): 36.8 (15 Apr 2024 09:38), Max: 37.2 (15 Apr 2024 04:42)  T(F): 98.2 (15 Apr 2024 09:38), Max: 98.9 (15 Apr 2024 04:42)  HR: 73 (15 Apr 2024 09:38) (70 - 85)  BP: 113/81 (15 Apr 2024 09:38) (102/65 - 158/84)  BP(mean): 100 (15 Apr 2024 06:00) (81 - 101)  RR: 16 (15 Apr 2024 09:38) (16 - 21)  SpO2: 95% (15 Apr 2024 09:38) (95% - 99%)    Parameters below as of 15 Apr 2024 09:38  Patient On (Oxygen Delivery Method): room air        Daily     Daily     I&O's Detail    14 Apr 2024 07:01  -  15 Apr 2024 07:00  --------------------------------------------------------  IN:    Bumetanide: 47.5 mL    DOBUTamine: 115.9 mL    Oral Fluid: 365 mL  Total IN: 528.4 mL    OUT:    Voided (mL): 2575 mL  Total OUT: 2575 mL    Total NET: -2046.6 mL      15 Apr 2024 07:01  -  15 Apr 2024 10:20  --------------------------------------------------------  IN:  Total IN: 0 mL    OUT:    Voided (mL): 500 mL  Total OUT: 500 mL    Total NET: -500 mL          PHYSICAL EXAM:   GENERAL: Pt lying comfortably, NAD.  ENMT: PERRL, +EOMI.  NECK: soft, Supple, No JVD,   CHEST/LUNG: crackles, on LL, 3+ pitting edema, B/L ankles and feet, cardiomems +  HEART: S1S2+, Regular rate and rhythm; No murmurs.  ABDOMEN: Soft, Nontender, Nondistended; Bowel sounds present.  MUSCULOSKELETAL: Normal range of motion.  SKIN: No rashes or lesions.  NEURO: AAOX3, no focal deficits, no motor r sensory loss.  PSYCH: normal mood.  VASCULAR:   Radial +2 R/+2 L  Femoral +2 R/+2 L  PT +2 R/+2 L  DP +2 R/+2 L      INTERPRETATION OF TELEMETRY:    ECG: afib, non specific ST/T changes in inferior leads    LABS:                        13.5   8.77  )-----------( 143      ( 15 Apr 2024 05:47 )             41.1     04-15    142  |  101  |  27.4<H>  ----------------------------<  132<H>  3.6   |  24.0  |  1.01    Ca    8.4      15 Apr 2024 05:47  Phos  2.6     04-14  Mg     2.2     04-15    TPro  6.5<L>  /  Alb  3.6  /  TBili  1.9  /  DBili  1.0<H>  /  AST  61<H>  /  ALT  58<H>  /  AlkPhos  165<H>  04-15          Urinalysis Basic - ( 15 Apr 2024 05:47 )    Color: x / Appearance: x / SG: x / pH: x  Gluc: 132 mg/dL / Ketone: x  / Bili: x / Urobili: x   Blood: x / Protein: x / Nitrite: x   Leuk Esterase: x / RBC: x / WBC x   Sq Epi: x / Non Sq Epi: x / Bacteria: x      I&O's Summary    14 Apr 2024 07:01  -  15 Apr 2024 07:00  --------------------------------------------------------  IN: 528.4 mL / OUT: 2575 mL / NET: -2046.6 mL    15 Apr 2024 07:01  -  15 Apr 2024 10:20  --------------------------------------------------------  IN: 0 mL / OUT: 500 mL / NET: -500 mL

## 2024-04-15 NOTE — CONSULT NOTE ADULT - PROBLEM SELECTOR PROBLEM 1
Acute on chronic combined systolic and diastolic congestive heart failure
Acute HFrEF (heart failure with reduced ejection fraction)

## 2024-04-15 NOTE — PROGRESS NOTE ADULT - SUBJECTIVE AND OBJECTIVE BOX
[facility] Division of Hospital Medicine    Chief Complaint:  Acute on ch HF    SUBJECTIVE / OVERNIGHT EVENTS:  Pt examined lying in bed    Patient denies chest pain, SOB, abd pain, N/V, fever, chills, dysuria. Complains of ongoing weakness      MEDICATIONS  (STANDING):  aMIOdarone    Tablet 200 milliGRAM(s) Oral daily  aspirin enteric coated 81 milliGRAM(s) Oral daily  atorvastatin 40 milliGRAM(s) Oral at bedtime  buMETAnide Infusion 0.5 mG/Hr (2.5 mL/Hr) IV Continuous <Continuous>  carBAMazepine ER Capsule 100 milliGRAM(s) Oral two times a day  DOBUTamine Infusion 2.5 MICROgram(s)/kG/Min (6.12 mL/Hr) IV Continuous <Continuous>  enoxaparin Injectable 80 milliGRAM(s) SubCutaneous once  levothyroxine 88 MICROGram(s) Oral daily  potassium chloride    Tablet ER 40 milliEquivalent(s) Oral every 4 hours  spironolactone 25 milliGRAM(s) Oral every 12 hours    MEDICATIONS  (PRN):  acetaminophen     Tablet .. 650 milliGRAM(s) Oral every 6 hours PRN Temp greater or equal to 38C (100.4F), Mild Pain (1 - 3)  aluminum hydroxide/magnesium hydroxide/simethicone Suspension 30 milliLiter(s) Oral every 4 hours PRN Dyspepsia  melatonin 3 milliGRAM(s) Oral at bedtime PRN Insomnia  ondansetron Injectable 4 milliGRAM(s) IV Push every 8 hours PRN Nausea and/or Vomiting        I&O's Summary    13 Apr 2024 07:01  -  14 Apr 2024 07:00  --------------------------------------------------------  IN: 612.1 mL / OUT: 2400 mL / NET: -1787.9 mL    14 Apr 2024 07:01  -  14 Apr 2024 12:44  --------------------------------------------------------  IN: 75.8 mL / OUT: 0 mL / NET: 75.8 mL        PHYSICAL EXAM:  Vital Signs Last 24 Hrs  T(C): 36.8 (14 Apr 2024 07:45), Max: 36.8 (13 Apr 2024 20:41)  T(F): 98.2 (14 Apr 2024 07:45), Max: 98.3 (13 Apr 2024 20:41)  HR: 74 (14 Apr 2024 10:00) (69 - 82)  BP: 123/78 (14 Apr 2024 10:00) (97/67 - 123/78)  BP(mean): 86 (14 Apr 2024 08:00) (69 - 92)  RR: 20 (14 Apr 2024 10:00) (17 - 20)  SpO2: 98% (14 Apr 2024 10:00) (85% - 99%)    Parameters below as of 14 Apr 2024 10:00  Patient On (Oxygen Delivery Method): nasal cannula  O2 Flow (L/min): 2          CONSTITUTIONAL: Non toxic appearing, lying in bed  ENMT: Moist oral mucosa, no pharyngeal injection or exudates, central cyanotic appearance of lips and nose (reports ch)  RESPIRATORY: Normal respiratory effort; lungs are clear to auscultation bilaterally  CARDIOVASCULAR: Irregular rate and rhythm, normal S1 and S2, no murmur/rub/gallop; No lower extremity edema; Peripheral pulses are 2+ bilaterally  ABDOMEN: Nontender to palpation, normoactive bowel sounds, no rebound/guarding; No hepatosplenomegaly  MUSCLOSKELETAL:  Normal gait; no clubbing or cyanosis of digits; no joint swelling or tenderness to palpation  PSYCH: A+O to person, place, and time; affect appropriate  NEUROLOGY: CN 2-12 are intact and symmetric; no gross sensory deficits;   SKIN: No rashes; no palpable lesions    LABS:                        13.3   8.49  )-----------( 140      ( 14 Apr 2024 04:37 )             40.3     04-14    139  |  98  |  35.5<H>  ----------------------------<  108<H>  3.0<L>   |  26.0  |  0.96    Ca    8.1<L>      14 Apr 2024 04:37  Phos  2.6     04-14  Mg     2.2     04-14    TPro  6.5<L>  /  Alb  3.7  /  TBili  2.1<H>  /  DBili  x   /  AST  32  /  ALT  32  /  AlkPhos  149<H>  04-14          Urinalysis Basic - ( 14 Apr 2024 04:37 )    Color: x / Appearance: x / SG: x / pH: x  Gluc: 108 mg/dL / Ketone: x  / Bili: x / Urobili: x   Blood: x / Protein: x / Nitrite: x   Leuk Esterase: x / RBC: x / WBC x   Sq Epi: x / Non Sq Epi: x / Bacteria: x        CAPILLARY BLOOD GLUCOSE            RADIOLOGY & ADDITIONAL TESTS:    1/17/24 Cardiac MRI  IMPRESSION: Cardiac amyloid Imaging study is  not suggestive of transthyretin cardiac amyloidosis.                                     [facility] Division of Hospital Medicine    Chief Complaint:  Acute on ch HF    SUBJECTIVE / OVERNIGHT EVENTS:  Pt examined lying in bed  s/p [facility] today  d/w pt at length regarding HF discussions at [place].   Pt reports initially refused LVAD consideration due to limiations with quality of life however after attending support group is now considering   Patient denies chest pain, SOB, abd pain, N/V, fever, chills, dysuria. Complains of ongoing weakness      MEDICATIONS  (STANDING):  aMIOdarone    Tablet 200 milliGRAM(s) Oral daily  aspirin enteric coated 81 milliGRAM(s) Oral daily  atorvastatin 40 milliGRAM(s) Oral at bedtime  buMETAnide Infusion 0.5 mG/Hr (2.5 mL/Hr) IV Continuous <Continuous>  carBAMazepine ER Capsule 100 milliGRAM(s) Oral two times a day  DOBUTamine Infusion 2.5 MICROgram(s)/kG/Min (6.12 mL/Hr) IV Continuous <Continuous>  enoxaparin Injectable 80 milliGRAM(s) SubCutaneous once  levothyroxine 88 MICROGram(s) Oral daily  potassium chloride    Tablet ER 40 milliEquivalent(s) Oral every 4 hours  spironolactone 25 milliGRAM(s) Oral every 12 hours    MEDICATIONS  (PRN):  acetaminophen     Tablet .. 650 milliGRAM(s) Oral every 6 hours PRN Temp greater or equal to 38C (100.4F), Mild Pain (1 - 3)  aluminum hydroxide/magnesium hydroxide/simethicone Suspension 30 milliLiter(s) Oral every 4 hours PRN Dyspepsia  melatonin 3 milliGRAM(s) Oral at bedtime PRN Insomnia  ondansetron Injectable 4 milliGRAM(s) IV Push every 8 hours PRN Nausea and/or Vomiting    I&O's Summary    14 Apr 2024 07:01  -  15 Apr 2024 07:00  --------------------------------------------------------  IN: 528.4 mL / OUT: 2575 mL / NET: -2046.6 mL    15 Apr 2024 07:01  -  15 Apr 2024 18:22  --------------------------------------------------------  IN: 105.7 mL / OUT: 1150 mL / NET: -1044.3 mL      PHYSICAL EXAM:  Vital Signs Last 24 Hrs  T(C): 37.3 (15 Apr 2024 15:50), Max: 37.3 (15 Apr 2024 15:50)  T(F): 99.1 (15 Apr 2024 15:50), Max: 99.1 (15 Apr 2024 15:50)  HR: 75 (15 Apr 2024 17:08) (54 - 82)  BP: 120/83 (15 Apr 2024 17:08) (100/78 - 131/82)  BP(mean): 100 (15 Apr 2024 06:00) (81 - 101)  RR: 17 (15 Apr 2024 17:08) (16 - 19)  SpO2: 99% (15 Apr 2024 17:08) (95% - 99%)    Parameters below as of 15 Apr 2024 17:08  Patient On (Oxygen Delivery Method): room air            CONSTITUTIONAL: Non toxic appearing, lying in bed  ENMT: Moist oral mucosa, no pharyngeal injection or exudates, central cyanotic appearance of lips and nose (reports ch)  RESPIRATORY: Normal respiratory effort; LLL crackles appreciated   CARDIOVASCULAR: Irregular rate and rhythm, normal S1 and S2, no murmur/rub/gallop; Trace to +1 RLE edema  ABDOMEN: Nontender to palpation, normoactive bowel sounds, no rebound/guarding; No hepatosplenomegaly  MUSCLOSKELETAL:  Normal gait; no clubbing or cyanosis of digits; no joint swelling or tenderness to palpation  PSYCH: A+O to person, place, and time; affect appropriate  NEUROLOGY: CN 2-12 are intact and symmetric; no gross sensory deficits;   SKIN: No rashes; no palpable lesions    LABS:                                   13.5   8.77  )-----------( 143      ( 15 Apr 2024 05:47 )             41.1   04-15    142  |  101  |  27.4<H>  ----------------------------<  132<H>  3.6   |  24.0  |  1.01    Ca    8.4      15 Apr 2024 05:47  Phos  2.6     04-14  Mg     2.2     04-15    TPro  6.5<L>  /  Alb  3.6  /  TBili  1.9  /  DBili  1.0<H>  /  AST  61<H>  /  ALT  58<H>  /  AlkPhos  165<H>  04-15      Urinalysis Basic - ( 14 Apr 2024 04:37 )    Color: x / Appearance: x / SG: x / pH: x  Gluc: 108 mg/dL / Ketone: x  / Bili: x / Urobili: x   Blood: x / Protein: x / Nitrite: x   Leuk Esterase: x / RBC: x / WBC x   Sq Epi: x / Non Sq Epi: x / Bacteria: x        CAPILLARY BLOOD GLUCOSE            RADIOLOGY & ADDITIONAL TESTS:    1/17/24 Cardiac MRI  IMPRESSION: Cardiac amyloid Imaging study is  not suggestive of transthyretin cardiac amyloidosis.

## 2024-04-15 NOTE — CONSULT NOTE ADULT - SUBJECTIVE AND OBJECTIVE BOX
[facility] Advanced Heart Failure - Initial Consult Note  [address] [place] [address]  Office Phone: [phone]/Fax: [phone]  Service/On Call Phone [phone]    HPI: 74 yo M with CAD s/p late-presenting MI  s/p PCI LAD, ICM/HFrEF (EF 15-20%, LVEDD 6 cm) s/p single chamber ICD with lead revision in  with upgrade to dual-chamber device (21) however attempts at LV lead placement unsuccessful due to anatomy, s/p MEMS (21), bioprosthetic AVR ()  AI s/p 26 mm EVOLUT HECTOR for severe bioprosthetic AS (20), aflutter ablation  w/ persistent afib, s/p DCCV x2 (20, 21, 2024), Trigeminal neuralgia (2022) who presents for increased dyspnea, elevated CardioMEMS w/ poor response to diuretic augmentation.     Of note, this is his second hospitalization this year for ADHF in the setting of Afib. He was hospitalized at [facility] for rising CardioMEMS numbers from 1/10 - 24. Ablation was aborted due to [place]. He ultimately underwent DCCV on 1/15/24 with improvement in CardioMEMS numbers to 31.     ROS:  14 point ROS negative in detail apart from as documented in HPI.    Past Medical & Surgical History:  CAD (coronary artery disease)-    MI (myocardial infarction)-2004    Systolic heart failure-EF 15%    Ischemic cardiomyopathy    HLD (hyperlipidemia)    HTN (hypertension)    AICD (automatic cardioverter/defibrillator) present    Aortic stenosis-severe, s/p AVR and HECTOR ()    H/O emphysema-moderately severe, CT chest 06/10/21    Carotid stenosis-moderate, b/l (Doppler 21)    H/O pulmonary hypertension    Longstanding persistent atrial fibrillation    Type 2 diabetes mellitus    Osteoarthritis of right knee    Neuralgia    CRI (chronic renal insufficiency)    Stented coronary artery-LAD x 3 (2004)    S/P knee surgery- Right knee arthroscopy,     S/P hernia repair- right inguinal, 1982    S/P AVR  , complicated by Asystole/Syncope with 1 shock, Transcatheter valve-in-valve aortic valve replacement utilizing a right common femoral arterial percutaneous approach utilizing a 26 Medtronic Evolut core valve on 2020    AICD (automatic cardioverter/defibrillator) present  single chamber , replaced with dual chamber 2021    H/O prior ablation treatment  afib - 2017, 2021    Medications (STANDING):  aMIOdarone    Tablet 200 milliGRAM(s) Oral two times a day  aspirin enteric coated 81 milliGRAM(s) Oral daily  atorvastatin 40 milliGRAM(s) Oral at bedtime  buMETAnide Infusion 0.5 mG/Hr (2.5 mL/Hr) IV Continuous <Continuous>  carBAMazepine ER Capsule 100 milliGRAM(s) Oral two times a day  DOBUTamine Infusion 2.5 MICROgram(s)/kG/Min (6.12 mL/Hr) IV Continuous <Continuous>  levothyroxine 88 MICROGram(s) Oral daily  spironolactone 25 milliGRAM(s) Oral every 12 hours    Medications  (PRN):  acetaminophen     Tablet .. 650 milliGRAM(s) Oral every 6 hours PRN Temp greater or equal to 38C (100.4F), Mild Pain (1 - 3)  aluminum hydroxide/magnesium hydroxide/simethicone Suspension 30 milliLiter(s) Oral every 4 hours PRN Dyspepsia  melatonin 3 milliGRAM(s) Oral at bedtime PRN Insomnia  ondansetron Injectable 4 milliGRAM(s) IV Push every 8 hours PRN Nausea and/or Vomiting    Allergies: No Known Allergies    Social History:    Family History:  Chronic obstructive pulmonary disease  Family history of colon cancer (Father)    Vital Signs Last 24 Hrs  T(C): 36.6, Max: 37.2 (04-15 @ 04:42)  HR: 82 (70 - 85)  BP: 117/85 (102/65 - 158/84)  BP(mean): 100 (81 - 101)  RR: 18 (16 - 21)  SpO2: 95% (95% - 99%)    Weight in k.9 (-)    I&O's Summary Last 24 Hrs  IN: 528.4 mL / OUT: 2575 mL / NET: -2046.6 mL    Tele:    Physical Exam:  General: In no distress.   HEENT: EOMs intact.  Neck: Neck supple. JVP not elevated.   Chest: Clear to auscultation bilaterally.  CV: RRR. Normal S1 and S2. No murmurs, rub, or gallops. Warm peripherally.  PV: No LE edema noted. Pulses full/equal in all four extremities.  Abdomen: Soft, non-distended, non-tender.  Skin: warm, dry, no rash.  Neurology: Alert and oriented times three. Sensation intact.  Psych: Appropriate affect.    Labs:    ( 04-15-24 @ 05:47 )               13.5   8.77  )--------( 143                  41.1     ( 04-15-24 @ 05:47 )     142  |  101  |  27.4  ---------------------<  132  3.6  |  24.0  |  1.01    Ca 8.4  Phos x   Mg 2.2    ( 24 @ 04:37 )  TPro  6.5  /  Alb  3.7  /  TBili  2.1  /  DBili  x   /  AST  32  /  ALT  32  /  AlkPhos  149  ( 24 @ 14:32 )  TPro  6.7  /  Alb  3.9  /  TBili  2.6  /  DBili  x   /  AST  29  /  ALT  30  /  AlkPhos  131      ( 24 @ 19:18 )  TropHS 35    / CK x     / CKMB x                     [facility] Advanced Heart Failure - Initial Consult Note  [address] [place] [address]  Office Phone: [phone]/Fax: [phone]  Service/On Call Phone [phone]    HPI: 72 yo M with CAD s/p late-presenting MI  s/p PCI LAD, ICM/HFrEF (EF 15-20%, LVEDD 6 cm) s/p single chamber ICD with lead revision in  with upgrade to dual-chamber device (21) however attempts at LV lead placement unsuccessful due to anatomy, s/p MEMS (21), bioprosthetic AVR ()  AI s/p 26 mm EVOLUT HECTOR for severe bioprosthetic AS (20), aflutter ablation  w/ persistent afib, s/p DCCV x2 (20, 21, 2024), Trigeminal neuralgia (2022) who presents for significant fatigue, increased dyspnea and elevated CardioMEMS w/ poor response to diuretic augmentation.     Of note, this is his second hospitalization this year for ADHF in the setting of Afib. He was hospitalized at [facility] for rising CardioMEMS numbers from 1/10 - 24. He underwent DCCV on 1/15/24 with improvement in CardioMEMS numbers to 31.     Currently, the patient denies CP, palpitations, SOB, dizziness/LH. He reports his symptoms have improved since admission specifically SOB/fatigue however he has not been very active.     ROS:  14 point ROS negative in detail apart from as documented in HPI.    Past Medical & Surgical History:  CAD (coronary artery disease)-    MI (myocardial infarction)-2004    Systolic heart failure-EF 15%    Ischemic cardiomyopathy    HLD (hyperlipidemia)    HTN (hypertension)    AICD (automatic cardioverter/defibrillator) present    Aortic stenosis-severe, s/p AVR and HECTOR ()    H/O emphysema-moderately severe, CT chest 06/10/21    Carotid stenosis-moderate, b/l (Doppler 21)    H/O pulmonary hypertension    Longstanding persistent atrial fibrillation    Type 2 diabetes mellitus    Osteoarthritis of right knee    Neuralgia    CRI (chronic renal insufficiency)    Stented coronary artery-LAD x 3 (2004)    S/P knee surgery- Right knee arthroscopy,     S/P hernia repair- right inguinal,     S/P AVR  , complicated by Asystole/Syncope with 1 shock, Transcatheter valve-in-valve aortic valve replacement utilizing a right common femoral arterial percutaneous approach utilizing a 26 Medtronic Evolut core valve on 2020    AICD (automatic cardioverter/defibrillator) present  single chamber , replaced with dual chamber 2021    H/O prior ablation treatment  afib - , 2021    Medications (STANDING):  aMIOdarone    Tablet 200 milliGRAM(s) Oral two times a day  aspirin enteric coated 81 milliGRAM(s) Oral daily  atorvastatin 40 milliGRAM(s) Oral at bedtime  buMETAnide Infusion 0.5 mG/Hr (2.5 mL/Hr) IV Continuous <Continuous>  carBAMazepine ER Capsule 100 milliGRAM(s) Oral two times a day  DOBUTamine Infusion 2.5 MICROgram(s)/kG/Min (6.12 mL/Hr) IV Continuous <Continuous>  levothyroxine 88 MICROGram(s) Oral daily  spironolactone 25 milliGRAM(s) Oral every 12 hours    Medications  (PRN):  acetaminophen     Tablet .. 650 milliGRAM(s) Oral every 6 hours PRN Temp greater or equal to 38C (100.4F), Mild Pain (1 - 3)  aluminum hydroxide/magnesium hydroxide/simethicone Suspension 30 milliLiter(s) Oral every 4 hours PRN Dyspepsia  melatonin 3 milliGRAM(s) Oral at bedtime PRN Insomnia  ondansetron Injectable 4 milliGRAM(s) IV Push every 8 hours PRN Nausea and/or Vomiting    Allergies: No Known Allergies    Social History: Lives at home w/ wife.     Family History:  Chronic obstructive pulmonary disease  Family history of colon cancer (Father)    Vital Signs Last 24 Hrs  T(C): 36.6, Max: 37.2 (04-15 @ 04:42)  HR: 82 (70 - 85)  BP: 117/85 (102/65 - 158/84)  BP(mean): 100 (81 - 101)  RR: 18 (16 - 21)  SpO2: 95% (95% - 99%)    Weight in k.9 (04-12-24)    I&O's Summary Last 24 Hrs  IN: 528.4 mL / OUT: 2575 mL / NET: -2046.6 mL    Tele: SR, PVCs    Physical Exam:  General: In no distress.   HEENT: EOMs intact.  Neck: Neck supple. JVP not significantly elevated.   Chest: Diminished at bases.  CV: RRR. Normal S1 and S2. No murmurs, rub, or gallops. Warm peripherally.  PV: 1-2+ pretibial pitting edema bilaterally.  Abdomen: Soft, non-distended, non-tender.  Skin: warm, dry.  Neurology: Alert and oriented times three. Sensation intact.  Psych: Appropriate affect.    Labs:    ( 04-15-24 @ 05:47 )               13.5   8.77  )--------( 143                  41.1     ( 04-15-24 @ 05:47 )     142  |  101  |  27.4  ---------------------<  132  3.6  |  24.0  |  1.01    Ca 8.4  Phos x   Mg 2.2    ( 24 @ 04:37 )  TPro  6.5  /  Alb  3.7  /  TBili  2.1  /  DBili  x   /  AST  32  /  ALT  32  /  AlkPhos  149  ( 24 @ 14:32 )  TPro  6.7  /  Alb  3.9  /  TBili  2.6  /  DBili  x   /  AST  29  /  ALT  30  /  AlkPhos  131      ( 24 @ 19:18 )  TropHS 35    / CK x     / CKMB x        TTE W or WO Ultrasound Enhancing Agent (24 @ 11:31)   CONCLUSIONS:      1. Technically difficult image quality.   2. Left ventricular cavity is severely dilated. Left ventricular systolic function is severely decreased with an ejection fraction visually estimated at <20 %. Global left ventricular hypokinesis.   3. There is increased LV mass and eccentric hypertrophy.   4. There is severe (grade 3) left ventricular diastolic dysfunction, with elevated filling pressure.   5. Mildly enlarged right ventricular cavity size and mildly reduced systolic function.   6. The left atrium is mildly dilated.   7. The right atrium is dilated.   8. Mild mitral regurgitation.   9. A bioprosthetic valve replacement Transcatheter deployed (TAVR) valve replacement is present in the aortic position The prosthetic valve is well seated with normal function.  10. Moderate tricuspid regurgitation.  11. Estimated pulmonary artery systolic pressure is 52mmHg, consistent with elevated pulmonary artery pressure.  12. No prior echocardiogram is available for comparison.    < end of copied text >               [facility] Advanced Heart Failure - Initial Consult Note  [address] [place] [address]  Office Phone: [phone]/Fax: [phone]  Service/On Call Phone [phone]    HPI: 74 yo M with CAD s/p late-presenting MI  s/p PCI LAD, ICM/HFrEF (EF 15-20%, LVEDD 6 cm) s/p single chamber ICD with lead revision in  with upgrade to dual-chamber device (21) however attempts at LV lead placement unsuccessful due to anatomy, s/p MEMS (21), bioprosthetic AVR ()  AI s/p 26 mm EVOLUT HECTOR for severe bioprosthetic AS (20), aflutter ablation  w/ persistent afib, s/p DCCV x2 (20, 21, 2024), Trigeminal neuralgia (2022) who presents for significant fatigue, increased dyspnea and elevated CardioMEMS w/ poor response to diuretic augmentation.     Of note, this is his second hospitalization this year for ADHF in the setting of Afib. He was hospitalized at [facility] for rising CardioMEMS numbers from 1/10 - 24. He underwent DCCV on 1/15/24 with improvement in CardioMEMS numbers to 31.     Currently, the patient denies CP, palpitations, SOB, dizziness/LH. He reports his symptoms have improved since admission specifically SOB/fatigue however he has not been very active.     ROS:  14 point ROS negative in detail apart from as documented in HPI.    Past Medical & Surgical History:  CAD (coronary artery disease)-    MI (myocardial infarction)-2004    Systolic heart failure-EF 15%    Ischemic cardiomyopathy    HLD (hyperlipidemia)    HTN (hypertension)    AICD (automatic cardioverter/defibrillator) present    Aortic stenosis-severe, s/p AVR and HECTOR ()    H/O emphysema-moderately severe, CT chest 06/10/21    Carotid stenosis-moderate, b/l (Doppler 21)    H/O pulmonary hypertension    Longstanding persistent atrial fibrillation    Type 2 diabetes mellitus    Osteoarthritis of right knee    Neuralgia    CRI (chronic renal insufficiency)    Stented coronary artery-LAD x 3 (2004)    S/P knee surgery- Right knee arthroscopy,     S/P hernia repair- right inguinal,     S/P AVR  , complicated by Asystole/Syncope with 1 shock, Transcatheter valve-in-valve aortic valve replacement utilizing a right common femoral arterial percutaneous approach utilizing a 26 Medtronic Evolut core valve on 2020    AICD (automatic cardioverter/defibrillator) present  single chamber , replaced with dual chamber 2021    H/O prior ablation treatment  afib - , 2021    Medications (STANDING):  aMIOdarone    Tablet 200 milliGRAM(s) Oral two times a day  aspirin enteric coated 81 milliGRAM(s) Oral daily  atorvastatin 40 milliGRAM(s) Oral at bedtime  buMETAnide Infusion 0.5 mG/Hr (2.5 mL/Hr) IV Continuous <Continuous>  carBAMazepine ER Capsule 100 milliGRAM(s) Oral two times a day  DOBUTamine Infusion 2.5 MICROgram(s)/kG/Min (6.12 mL/Hr) IV Continuous <Continuous>  levothyroxine 88 MICROGram(s) Oral daily  spironolactone 25 milliGRAM(s) Oral every 12 hours    Medications  (PRN):  acetaminophen     Tablet .. 650 milliGRAM(s) Oral every 6 hours PRN Temp greater or equal to 38C (100.4F), Mild Pain (1 - 3)  aluminum hydroxide/magnesium hydroxide/simethicone Suspension 30 milliLiter(s) Oral every 4 hours PRN Dyspepsia  melatonin 3 milliGRAM(s) Oral at bedtime PRN Insomnia  ondansetron Injectable 4 milliGRAM(s) IV Push every 8 hours PRN Nausea and/or Vomiting    Allergies: No Known Allergies    Social History: Lives at home w/ wife.     Family History:  Chronic obstructive pulmonary disease  Family history of colon cancer (Father)    Vital Signs Last 24 Hrs  T(C): 36.6, Max: 37.2 (04-15 @ 04:42)  HR: 82 (70 - 85)  BP: 117/85 (102/65 - 158/84)  BP(mean): 100 (81 - 101)  RR: 18 (16 - 21)  SpO2: 95% (95% - 99%)    Weight in k.9 (04-12-24)    I&O's Summary Last 24 Hrs  IN: 528.4 mL / OUT: 2575 mL / NET: -2046.6 mL    Tele: SR, occ , PVCs    Physical Exam:  General: In no distress.   HEENT: EOMs intact.  Neck: Neck supple. JVP not significantly elevated.   Chest: Diminished at bases.  CV: RRR. Normal S1 and S2. No murmurs, rub, or gallops. Warm peripherally.  PV: 1-2+ pretibial pitting edema bilaterally.  Abdomen: Soft, non-distended, non-tender.  Skin: warm, dry.  Neurology: Alert and oriented times three. Sensation intact.  Psych: Appropriate affect.    Labs:    ( 04-15-24 @ 05:47 )               13.5   8.77  )--------( 143                  41.1     ( 04-15-24 @ 05:47 )     142  |  101  |  27.4  ---------------------<  132  3.6  |  24.0  |  1.01    Ca 8.4  Phos x   Mg 2.2    ( 24 @ 04:37 )  TPro  6.5  /  Alb  3.7  /  TBili  2.1  /  DBili  x   /  AST  32  /  ALT  32  /  AlkPhos  149  ( 24 @ 14:32 )  TPro  6.7  /  Alb  3.9  /  TBili  2.6  /  DBili  x   /  AST  29  /  ALT  30  /  AlkPhos  131      ( 24 @ 19:18 )  TropHS 35    / CK x     / CKMB x        TTE W or WO Ultrasound Enhancing Agent (24 @ 11:31)   CONCLUSIONS:      1. Technically difficult image quality.   2. Left ventricular cavity is severely dilated. Left ventricular systolic function is severely decreased with an ejection fraction visually estimated at <20 %. Global left ventricular hypokinesis.   3. There is increased LV mass and eccentric hypertrophy.   4. There is severe (grade 3) left ventricular diastolic dysfunction, with elevated filling pressure.   5. Mildly enlarged right ventricular cavity size and mildly reduced systolic function.   6. The left atrium is mildly dilated.   7. The right atrium is dilated.   8. Mild mitral regurgitation.   9. A bioprosthetic valve replacement Transcatheter deployed (TAVR) valve replacement is present in the aortic position The prosthetic valve is well seated with normal function.  10. Moderate tricuspid regurgitation.  11. Estimated pulmonary artery systolic pressure is 52mmHg, consistent with elevated pulmonary artery pressure.  12. No prior echocardiogram is available for comparison.    < end of copied text >

## 2024-04-15 NOTE — CONSULT NOTE ADULT - PROBLEM SELECTOR RECOMMENDATION 4
H/o BioAVR (2015) w/ severe bioprosthetic AS -> s/p HECTOR (2020).  Continue monitoring via serial TTE's.

## 2024-04-15 NOTE — CONSULT NOTE ADULT - PROBLEM SELECTOR RECOMMENDATION 3
- Patient with history of Afib s/p ablation and multiple cardioversions, last in 01/2024.   - Atrial fibrillation thought to be contributing factor to decompensated HFrEF.   - EP consulted to further evaluate, as patient is also awaiting CRT-D upgrade.   - Hold Eliquis for possible RHC on 04/15/23, will give lovenox for AC at this time.   - If lactate is still elevated, will need to hold Toprol XL.   - Continue amiodarone.   - Continue telemetry monitoring.
H/o late presenting MI s/p PCI to LAD  C/w daily ASA/statin therapy as prescribed

## 2024-04-15 NOTE — DISCHARGE NOTE PROVIDER - NSDCMRMEDTOKEN_GEN_ALL_CORE_FT
aspirin 81 mg oral delayed release capsule: 1 cap(s) orally once a day  atorvastatin 40 mg oral tablet: 1 tab(s) orally once a day (at bedtime)  carBAMazepine 100 mg oral capsule, extended release: 1 cap(s) orally 2 times a day  Eliquis 5 mg oral tablet: 1 tab(s) orally 2 times a day  Farxiga 5 mg oral tablet: 1 tab(s) orally once a day  levothyroxine 88 mcg (0.088 mg) oral capsule: 1 orally once a day  losartan 25 mg oral tablet: 1 tab(s) orally once a day  metoprolol succinate 25 mg oral capsule, extended release: 1 orally once a day (at bedtime)  spironolactone 25 mg oral tablet: 1 tab(s) orally every 12 hours  torsemide 60 mg oral tablet: 1 tab(s) orally 2 times a day   amiodarone 200 mg oral tablet: 1 tab(s) orally 2 times a day  apixaban 5 mg oral tablet: 1 tab(s) orally every 12 hours  aspirin 81 mg oral delayed release tablet: 1 tab(s) orally once a day  bumetanide 0.5 mg oral tablet: 1 tab(s) orally 2 times a day  carBAMazepine 100 mg oral capsule, extended release: 1 cap(s) orally 2 times a day  Farxiga 5 mg oral tablet: 1 tab(s) orally once a day  levothyroxine 88 mcg (0.088 mg) oral tablet: 1 tab(s) orally once a day  pregabalin 300 mg oral capsule: 1 cap(s) orally 2 times a day MDD: 600 mg  spironolactone 25 mg oral tablet: 1 tab(s) orally every 12 hours   Complex Repair And Modified Advancement Flap Text: The defect edges were debeveled with a #15 scalpel blade.  The primary defect was closed partially with a complex linear closure.  Given the location of the remaining defect, shape of the defect and the proximity to free margins a modified advancement flap was deemed most appropriate for complete closure of the defect.  Using a sterile surgical marker, an appropriate advancement flap was drawn incorporating the defect and placing the expected incisions within the relaxed skin tension lines where possible.    The area thus outlined was incised deep to adipose tissue with a #15 scalpel blade.  The skin margins were undermined to an appropriate distance in all directions utilizing iris scissors.

## 2024-04-16 LAB
ALBUMIN SERPL ELPH-MCNC: 4.1 G/DL — SIGNIFICANT CHANGE UP (ref 3.3–5.2)
ALP SERPL-CCNC: 163 U/L — HIGH (ref 40–120)
ALT FLD-CCNC: 57 U/L — HIGH
ANION GAP SERPL CALC-SCNC: 13 MMOL/L — SIGNIFICANT CHANGE UP (ref 5–17)
ANION GAP SERPL CALC-SCNC: 17 MMOL/L — SIGNIFICANT CHANGE UP (ref 5–17)
AST SERPL-CCNC: 41 U/L — HIGH
BILIRUB SERPL-MCNC: 2.9 MG/DL — HIGH (ref 0.4–2)
BUN SERPL-MCNC: 19.3 MG/DL — SIGNIFICANT CHANGE UP (ref 8–20)
BUN SERPL-MCNC: 20.4 MG/DL — HIGH (ref 8–20)
CALCIUM SERPL-MCNC: 8.9 MG/DL — SIGNIFICANT CHANGE UP (ref 8.4–10.5)
CALCIUM SERPL-MCNC: 9.3 MG/DL — SIGNIFICANT CHANGE UP (ref 8.4–10.5)
CHLORIDE SERPL-SCNC: 100 MMOL/L — SIGNIFICANT CHANGE UP (ref 96–108)
CHLORIDE SERPL-SCNC: 99 MMOL/L — SIGNIFICANT CHANGE UP (ref 96–108)
CO2 SERPL-SCNC: 25 MMOL/L — SIGNIFICANT CHANGE UP (ref 22–29)
CO2 SERPL-SCNC: 28 MMOL/L — SIGNIFICANT CHANGE UP (ref 22–29)
CREAT SERPL-MCNC: 0.89 MG/DL — SIGNIFICANT CHANGE UP (ref 0.5–1.3)
CREAT SERPL-MCNC: 0.94 MG/DL — SIGNIFICANT CHANGE UP (ref 0.5–1.3)
EGFR: 85 ML/MIN/1.73M2 — SIGNIFICANT CHANGE UP
EGFR: 90 ML/MIN/1.73M2 — SIGNIFICANT CHANGE UP
GLUCOSE SERPL-MCNC: 140 MG/DL — HIGH (ref 70–99)
GLUCOSE SERPL-MCNC: 182 MG/DL — HIGH (ref 70–99)
HCT VFR BLD CALC: 43.7 % — SIGNIFICANT CHANGE UP (ref 39–50)
HGB BLD-MCNC: 14.1 G/DL — SIGNIFICANT CHANGE UP (ref 13–17)
LACTATE SERPL-SCNC: 1.4 MMOL/L — SIGNIFICANT CHANGE UP (ref 0.5–2)
MCHC RBC-ENTMCNC: 30.8 PG — SIGNIFICANT CHANGE UP (ref 27–34)
MCHC RBC-ENTMCNC: 32.3 GM/DL — SIGNIFICANT CHANGE UP (ref 32–36)
MCV RBC AUTO: 95.4 FL — SIGNIFICANT CHANGE UP (ref 80–100)
PLATELET # BLD AUTO: 155 K/UL — SIGNIFICANT CHANGE UP (ref 150–400)
POTASSIUM SERPL-MCNC: 3.7 MMOL/L — SIGNIFICANT CHANGE UP (ref 3.5–5.3)
POTASSIUM SERPL-MCNC: 3.8 MMOL/L — SIGNIFICANT CHANGE UP (ref 3.5–5.3)
POTASSIUM SERPL-SCNC: 3.7 MMOL/L — SIGNIFICANT CHANGE UP (ref 3.5–5.3)
POTASSIUM SERPL-SCNC: 3.8 MMOL/L — SIGNIFICANT CHANGE UP (ref 3.5–5.3)
PROT SERPL-MCNC: 7.2 G/DL — SIGNIFICANT CHANGE UP (ref 6.6–8.7)
RBC # BLD: 4.58 M/UL — SIGNIFICANT CHANGE UP (ref 4.2–5.8)
RBC # FLD: 15.8 % — HIGH (ref 10.3–14.5)
SODIUM SERPL-SCNC: 139 MMOL/L — SIGNIFICANT CHANGE UP (ref 135–145)
SODIUM SERPL-SCNC: 141 MMOL/L — SIGNIFICANT CHANGE UP (ref 135–145)
WBC # BLD: 9.02 K/UL — SIGNIFICANT CHANGE UP (ref 3.8–10.5)
WBC # FLD AUTO: 9.02 K/UL — SIGNIFICANT CHANGE UP (ref 3.8–10.5)

## 2024-04-16 PROCEDURE — ZZZZZ: CPT

## 2024-04-16 PROCEDURE — 99233 SBSQ HOSP IP/OBS HIGH 50: CPT

## 2024-04-16 PROCEDURE — 99232 SBSQ HOSP IP/OBS MODERATE 35: CPT

## 2024-04-16 RX ORDER — POTASSIUM CHLORIDE 20 MEQ
40 PACKET (EA) ORAL ONCE
Refills: 0 | Status: COMPLETED | OUTPATIENT
Start: 2024-04-16 | End: 2024-04-16

## 2024-04-16 RX ORDER — DOBUTAMINE HCL 250MG/20ML
1 VIAL (ML) INTRAVENOUS
Qty: 500 | Refills: 0 | Status: DISCONTINUED | OUTPATIENT
Start: 2024-04-16 | End: 2024-04-17

## 2024-04-16 RX ORDER — ASPIRIN/CALCIUM CARB/MAGNESIUM 324 MG
81 TABLET ORAL DAILY
Refills: 0 | Status: DISCONTINUED | OUTPATIENT
Start: 2024-04-16 | End: 2024-04-20

## 2024-04-16 RX ORDER — BUMETANIDE 0.25 MG/ML
4 INJECTION INTRAMUSCULAR; INTRAVENOUS
Refills: 0 | Status: DISCONTINUED | OUTPATIENT
Start: 2024-04-16 | End: 2024-04-19

## 2024-04-16 RX ORDER — SACUBITRIL AND VALSARTAN 24; 26 MG/1; MG/1
1 TABLET, FILM COATED ORAL
Refills: 0 | Status: DISCONTINUED | OUTPATIENT
Start: 2024-04-16 | End: 2024-04-18

## 2024-04-16 RX ORDER — POLYETHYLENE GLYCOL 3350 17 G/17G
17 POWDER, FOR SOLUTION ORAL ONCE
Refills: 0 | Status: COMPLETED | OUTPATIENT
Start: 2024-04-16 | End: 2024-04-16

## 2024-04-16 RX ADMIN — AMIODARONE HYDROCHLORIDE 200 MILLIGRAM(S): 400 TABLET ORAL at 06:27

## 2024-04-16 RX ADMIN — ATORVASTATIN CALCIUM 40 MILLIGRAM(S): 80 TABLET, FILM COATED ORAL at 21:21

## 2024-04-16 RX ADMIN — Medication 88 MICROGRAM(S): at 06:27

## 2024-04-16 RX ADMIN — ONDANSETRON 4 MILLIGRAM(S): 8 TABLET, FILM COATED ORAL at 18:00

## 2024-04-16 RX ADMIN — Medication 100 MILLIGRAM(S): at 06:26

## 2024-04-16 RX ADMIN — Medication 2.45 MICROGRAM(S)/KG/MIN: at 11:43

## 2024-04-16 RX ADMIN — AMIODARONE HYDROCHLORIDE 200 MILLIGRAM(S): 400 TABLET ORAL at 18:23

## 2024-04-16 RX ADMIN — APIXABAN 5 MILLIGRAM(S): 2.5 TABLET, FILM COATED ORAL at 06:27

## 2024-04-16 RX ADMIN — BUMETANIDE 132 MILLIGRAM(S): 0.25 INJECTION INTRAMUSCULAR; INTRAVENOUS at 18:10

## 2024-04-16 RX ADMIN — APIXABAN 5 MILLIGRAM(S): 2.5 TABLET, FILM COATED ORAL at 18:02

## 2024-04-16 RX ADMIN — SPIRONOLACTONE 25 MILLIGRAM(S): 25 TABLET, FILM COATED ORAL at 06:27

## 2024-04-16 RX ADMIN — Medication 81 MILLIGRAM(S): at 15:00

## 2024-04-16 RX ADMIN — Medication 40 MILLIEQUIVALENT(S): at 15:00

## 2024-04-16 RX ADMIN — Medication 100 MILLIGRAM(S): at 18:15

## 2024-04-16 RX ADMIN — SACUBITRIL AND VALSARTAN 1 TABLET(S): 24; 26 TABLET, FILM COATED ORAL at 21:21

## 2024-04-16 RX ADMIN — POLYETHYLENE GLYCOL 3350 17 GRAM(S): 17 POWDER, FOR SOLUTION ORAL at 18:23

## 2024-04-16 RX ADMIN — SACUBITRIL AND VALSARTAN 1 TABLET(S): 24; 26 TABLET, FILM COATED ORAL at 11:41

## 2024-04-16 RX ADMIN — BUMETANIDE 5 MG/HR: 0.25 INJECTION INTRAMUSCULAR; INTRAVENOUS at 07:26

## 2024-04-16 RX ADMIN — SPIRONOLACTONE 25 MILLIGRAM(S): 25 TABLET, FILM COATED ORAL at 18:09

## 2024-04-16 NOTE — PROGRESS NOTE ADULT - ASSESSMENT
73M hx CAD s/p late-presenting MI 2005 s/p PCI LAD, ICM/HFrEF (EF 15-20%, LVEDD 6 cm) s/p single chamber ICD with [place] lead 2011 s/p lead extraction and upgrade to Medtronic dual-chamber ICD (8/17/21), has hx of appropriate ICD shock for VT/VF, s/p MEMS (9/16/21), bioprosthetic AVR (2015) 2/2 AI s/p 26 mm EVOLUT HECTOR for severe bioprosthetic AS (8/20/20), prior aflutter ablation 7/2017, persistent afib, s/p DCCV x2 (8/12/20, 7/7/21, 01/2024), Trigeminal neuralgia (4/2022) who presented to [facility] for shortness of breath and elevated filling pressures on cardiomems. Patient states that he as been feeling weak for the last few days, and has been experiencing dyspnea on exertion with persistent cough. Patient also noted that his feet and hands were becoming progressively colder, and his cardiomems was up in the 40s (goal 30-34). Patient had his torsemide dose increased at home, but symptoms did not improve. Patient was sent to [facility] for further management. Patient states today his symptoms are beginning to improve.

## 2024-04-16 NOTE — PROGRESS NOTE ADULT - SUBJECTIVE AND OBJECTIVE BOX
Subjective: No acute events documented overnight. Pt resting comfortably at time of assessment this AM. Pt continues to report improvement in SOB. Denies chest pain, dizziness, chills.      TELE: Atrial fibrillation with intermittent ventricular pacing. Controlled ventricular rates.     MEDICATIONS  (STANDING):  aMIOdarone    Tablet 200 milliGRAM(s) Oral two times a day  apixaban 5 milliGRAM(s) Oral every 12 hours  aspirin enteric coated 81 milliGRAM(s) Oral daily  atorvastatin 40 milliGRAM(s) Oral at bedtime  buMETAnide IVPB 4 milliGRAM(s) IV Intermittent two times a day  carBAMazepine ER Capsule 100 milliGRAM(s) Oral two times a day  DOBUTamine Infusion 1 MICROgram(s)/kG/Min (2.45 mL/Hr) IV Continuous <Continuous>  levothyroxine 88 MICROGram(s) Oral daily  sacubitril 24 mG/valsartan 26 mG 1 Tablet(s) Oral two times a day  spironolactone 25 milliGRAM(s) Oral every 12 hours    MEDICATIONS  (PRN):  acetaminophen     Tablet .. 650 milliGRAM(s) Oral every 6 hours PRN Temp greater or equal to 38C (100.4F), Mild Pain (1 - 3)  aluminum hydroxide/magnesium hydroxide/simethicone Suspension 30 milliLiter(s) Oral every 4 hours PRN Dyspepsia  melatonin 3 milliGRAM(s) Oral at bedtime PRN Insomnia  ondansetron Injectable 4 milliGRAM(s) IV Push every 8 hours PRN Nausea and/or Vomiting      Allergies    No Known Allergies    Intolerances    Entresto (Other)      Vital Signs Last 24 Hrs  T(C): 36.7 (16 Apr 2024 07:36), Max: 37.3 (15 Apr 2024 15:50)  T(F): 98.1 (16 Apr 2024 07:36), Max: 99.1 (15 Apr 2024 15:50)  HR: 78 (16 Apr 2024 10:00) (54 - 95)  BP: 137/88 (16 Apr 2024 10:00) (100/78 - 137/88)  BP(mean): 101 (16 Apr 2024 08:00) (84 - 925)  RR: 18 (16 Apr 2024 10:00) (14 - 18)  SpO2: 94% (16 Apr 2024 10:00) (94% - 99%)    Parameters below as of 16 Apr 2024 10:00  Patient On (Oxygen Delivery Method): room air      Physical Exam:  Constitutional: NAD  Chest wall: normal in appearance, nontender to palpation  Resp: Rales LLL.   Cardiac: Irregular rhythm. S1/S2  Neuro: Alert and oriented x 3,   Skin: color normal, no rashes or injury  Psych: mood calm      LABS:                        14.1   9.02  )-----------( 155      ( 16 Apr 2024 06:42 )             43.7     04-16    141  |  100  |  20.4<H>  ----------------------------<  140<H>  3.8   |  25.0  |  0.94    Ca    8.9      16 Apr 2024 06:42  Mg     2.2     04-15    TPro  6.5<L>  /  Alb  3.6  /  TBili  1.9  /  DBili  1.0<H>  /  AST  61<H>  /  ALT  58<H>  /  AlkPhos  165<H>  04-15      Urinalysis Basic - ( 16 Apr 2024 06:42 )    Color: x / Appearance: x / SG: x / pH: x  Gluc: 140 mg/dL / Ketone: x  / Bili: x / Urobili: x   Blood: x / Protein: x / Nitrite: x   Leuk Esterase: x / RBC: x / WBC x   Sq Epi: x / Non Sq Epi: x / Bacteria: x        RADIOLOGY & ADDITIONAL TESTS:    < from: TTE W or WO Ultrasound Enhancing Agent (04.13.24 @ 11:31) >  1. Technically difficult image quality.   2. Left ventricular cavity is severely dilated. Left ventricular systolic function is severely decreased with an ejection fraction visually estimated at <20 %. Global left ventricular hypokinesis.   3. There is increased LV mass and eccentric hypertrophy.   4. There is severe (grade 3) left ventricular diastolic dysfunction, with elevated filling pressure.   5. Mildly enlarged right ventricular cavity size and mildly reduced systolic function.   6. The left atrium is mildly dilated.   7. The right atrium is dilated.   8. Mild mitral regurgitation.   9. A bioprosthetic valve replacement Transcatheter deployed (TAVR) valve replacement is present in the aortic position The prosthetic valve is well seated with normal function.  10. Moderate tricuspid regurgitation.  11. Estimated pulmonary artery systolic pressure is 52mmHg, consistent with elevated pulmonary artery pressure.  12. No prior echocardiogram is available for comparison.        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 He presented to [facility] with shortness of breath, and worsening fatigue x 2 weeks. Noted to have elevated filling pressures on Cardiomems. Admitted with acute on chronic heart failure exacerbation.     Pt has remained in AFib throughout admission with a high degree of RV pacing. Pt underwent RHC yesterday (4/16/24) which revealed RA: 17/17/15, RV: 66/7/15, PCW; 23/27/23, PA: 68/25/43, CO: 5.16, CI: 2.68. Pt remains on dobutamine and Bumex infusion, for which HF team is managing. Pt resting comfortably at time of assessment this AM. Pt continues to report improvement in SOB. Denies chest pain, dizziness, chills.      Plan:  - continue amiodarone 200mg BID  - c/w Eliquis 5mg Q12HR   - ANDREY / DCCV deferred today as pt is still being weaned off Dobutamine  - NPO after midnight for potential ANDREY/DCCV tomorrow  - c/w telemetry monitoring while admitted   - Monitor electrolytes: Keep K > 4, Mg > 2  - GDMT and diuresis as per HF team  - for long-term AF management, will need to consider AF ablation when more medically optimized, vs CRT upgrade with AVJ ablation.   - EP to follow    Discussed with Dr. Judge, further recommendations to follow        Subjective: No acute events documented overnight. Pt resting comfortably at time of assessment this AM. Pt continues to report improvement in SOB. Denies chest pain, dizziness, chills.      TELE: Atrial fibrillation with intermittent ventricular pacing. Controlled ventricular rates.     MEDICATIONS  (STANDING):  aMIOdarone    Tablet 200 milliGRAM(s) Oral two times a day  apixaban 5 milliGRAM(s) Oral every 12 hours  aspirin enteric coated 81 milliGRAM(s) Oral daily  atorvastatin 40 milliGRAM(s) Oral at bedtime  buMETAnide IVPB 4 milliGRAM(s) IV Intermittent two times a day  carBAMazepine ER Capsule 100 milliGRAM(s) Oral two times a day  DOBUTamine Infusion 1 MICROgram(s)/kG/Min (2.45 mL/Hr) IV Continuous <Continuous>  levothyroxine 88 MICROGram(s) Oral daily  sacubitril 24 mG/valsartan 26 mG 1 Tablet(s) Oral two times a day  spironolactone 25 milliGRAM(s) Oral every 12 hours    MEDICATIONS  (PRN):  acetaminophen     Tablet .. 650 milliGRAM(s) Oral every 6 hours PRN Temp greater or equal to 38C (100.4F), Mild Pain (1 - 3)  aluminum hydroxide/magnesium hydroxide/simethicone Suspension 30 milliLiter(s) Oral every 4 hours PRN Dyspepsia  melatonin 3 milliGRAM(s) Oral at bedtime PRN Insomnia  ondansetron Injectable 4 milliGRAM(s) IV Push every 8 hours PRN Nausea and/or Vomiting      Allergies    No Known Allergies    Intolerances    Entresto (Other)      Vital Signs Last 24 Hrs  T(C): 36.7 (16 Apr 2024 07:36), Max: 37.3 (15 Apr 2024 15:50)  T(F): 98.1 (16 Apr 2024 07:36), Max: 99.1 (15 Apr 2024 15:50)  HR: 78 (16 Apr 2024 10:00) (54 - 95)  BP: 137/88 (16 Apr 2024 10:00) (100/78 - 137/88)  BP(mean): 101 (16 Apr 2024 08:00) (84 - 925)  RR: 18 (16 Apr 2024 10:00) (14 - 18)  SpO2: 94% (16 Apr 2024 10:00) (94% - 99%)    Parameters below as of 16 Apr 2024 10:00  Patient On (Oxygen Delivery Method): room air      Physical Exam:  Constitutional: NAD  Chest wall: normal in appearance, nontender to palpation  Resp: Rales LLL.   Cardiac: Irregular rhythm. S1/S2  Neuro: Alert and oriented x 3,   Skin: color normal, no rashes or injury  Psych: mood calm      LABS:                        14.1   9.02  )-----------( 155      ( 16 Apr 2024 06:42 )             43.7     04-16    141  |  100  |  20.4<H>  ----------------------------<  140<H>  3.8   |  25.0  |  0.94    Ca    8.9      16 Apr 2024 06:42  Mg     2.2     04-15    TPro  6.5<L>  /  Alb  3.6  /  TBili  1.9  /  DBili  1.0<H>  /  AST  61<H>  /  ALT  58<H>  /  AlkPhos  165<H>  04-15      Urinalysis Basic - ( 16 Apr 2024 06:42 )    Color: x / Appearance: x / SG: x / pH: x  Gluc: 140 mg/dL / Ketone: x  / Bili: x / Urobili: x   Blood: x / Protein: x / Nitrite: x   Leuk Esterase: x / RBC: x / WBC x   Sq Epi: x / Non Sq Epi: x / Bacteria: x        RADIOLOGY & ADDITIONAL TESTS:    < from: TTE W or WO Ultrasound Enhancing Agent (04.13.24 @ 11:31) >  1. Technically difficult image quality.   2. Left ventricular cavity is severely dilated. Left ventricular systolic function is severely decreased with an ejection fraction visually estimated at <20 %. Global left ventricular hypokinesis.   3. There is increased LV mass and eccentric hypertrophy.   4. There is severe (grade 3) left ventricular diastolic dysfunction, with elevated filling pressure.   5. Mildly enlarged right ventricular cavity size and mildly reduced systolic function.   6. The left atrium is mildly dilated.   7. The right atrium is dilated.   8. Mild mitral regurgitation.   9. A bioprosthetic valve replacement Transcatheter deployed (TAVR) valve replacement is present in the aortic position The prosthetic valve is well seated with normal function.  10. Moderate tricuspid regurgitation.  11. Estimated pulmonary artery systolic pressure is 52mmHg, consistent with elevated pulmonary artery pressure.  12. No prior echocardiogram is available for comparison.        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 He presented to [facility] with shortness of breath, and worsening fatigue x 2 weeks. Noted to have elevated filling pressures on Cardiomems. Admitted with acute on chronic heart failure exacerbation.     Pt has remained in AFib throughout admission with a high degree of RV pacing. Pt underwent RHC yesterday (4/16/24) which revealed RA: 17/17/15, RV: 66/7/15, PCW; 23/27/23, PA: 68/25/43, CO: 5.16, CI: 2.68. Pt remains on dobutamine and Bumex infusion, for which HF team is managing. Pt resting comfortably at time of assessment this AM. Pt continues to report improvement in SOB. Denies chest pain, dizziness, chills.      Plan:  - continue amiodarone 200mg BID  - c/w Eliquis 5mg Q12HR   - ANDREY / DCCV deferred today as pt is still being weaned off Dobutamine  - NPO after midnight for potential ANDREY/DCCV tomorrow  - c/w telemetry monitoring while admitted   - Monitor electrolytes: Keep K > 4, Mg > 2  - GDMT and diuresis as per HF team  - for long-term AF management, will need to consider AF ablation when more medically optimized, vs CRT upgrade with AVJ ablation.   - EP to follow      Addendum:  Above discussed with Dr. Apple. Pt would likely benefit from an additional day of PO amiodarone prior to DCCV. Will tentatively plan for ANDREY / DCCV on 4/18/24.

## 2024-04-16 NOTE — PROGRESS NOTE ADULT - PROBLEM SELECTOR PLAN 4
.  - H/o BioAVR (2015) w/ severe bioprosthetic AS -> s/p HECTOR (2020).  - Continue monitoring via serial TTE's.

## 2024-04-16 NOTE — PROGRESS NOTE ADULT - PROBLEM SELECTOR PLAN 4
H/o BioAVR (2015) w/ severe bioprosthetic AS -> s/p HECTOR (2020).  Continue monitoring via serial TTE's.  Can consider stopping ASA as above unless otherwise indicated. Will defer to primary cardiologist.

## 2024-04-16 NOTE — PROGRESS NOTE ADULT - SUBJECTIVE AND OBJECTIVE BOX
[facility]                                                         CARDIOLOGY AT [facility]                                                                  [address] [facility], [place] [place] [address]                                                         Telephone: [phone]. Fax:[phone]                                                                             PROGRESS NOTE    Reason for follow up: decompensated HFrEF  Update: ANDREY/DCCV canceled today, still on dobutamine GTT, will need to be weaned off prior to DCCV. Appreciate HF reccs, changed bumex to IVP intermittent, decreased dobutamine and introduced entresto. Appears euvolemic on exam. s/p RHC      Review of symptoms:   Cardiac:  No chest pain. No dyspnea. No palpitations.  Respiratory: no cough. +dyspnea  Gastrointestinal: No diarrhea. No abdominal pain. No bleeding.   Neuro: No focal neuro complaints.    Vitals:  T(C): 36.7 (04-16-24 @ 07:36), Max: 37.3 (04-15-24 @ 15:50)  HR: 78 (04-16-24 @ 10:00) (54 - 95)  BP: 137/88 (04-16-24 @ 10:00) (100/78 - 137/88)  RR: 18 (04-16-24 @ 10:00) (14 - 18)  SpO2: 94% (04-16-24 @ 10:00) (94% - 99%)  Wt(kg): --  I&O's Summary    15 Apr 2024 07:01  -  16 Apr 2024 07:00  --------------------------------------------------------  IN: 416.7 mL / OUT: 4000 mL / NET: -3583.3 mL      Weight (kg): 81.6 (04-12 @ 19:05)    PHYSICAL EXAM:  Appearance: Comfortable. No acute distress  HEENT:  Atraumatic. Normocephalic.  Normal oral mucosa  Neurologic: A & O x 3, no gross focal deficits.  Cardiovascular: irreg irregular S1 S2, No murmur, no rubs/gallops. No JVD  Respiratory: Lungs diminished  Gastrointestinal:  Soft, Non-tender, + BS  Lower Extremities: 2+ Peripheral Pulses, No clubbing, cyanosis, or edema  Psychiatry: Patient is calm. No agitation.   Skin: warm and dry  CATH SITE: right wrist benign s/p cath.  No bleeding, no ecchymosis, no hematoma. Extremity Warm to touch, with palpable distal pulses, and brisk capillary refill.      CURRENT CARDIAC MEDICATIONS:  aMIOdarone    Tablet 200 milliGRAM(s) Oral two times a day  buMETAnide IVPB 4 milliGRAM(s) IV Intermittent two times a day  DOBUTamine Infusion 1 MICROgram(s)/kG/Min IV Continuous <Continuous>  sacubitril 24 mG/valsartan 26 mG 1 Tablet(s) Oral two times a day  spironolactone 25 milliGRAM(s) Oral every 12 hours      CURRENT OTHER MEDICATIONS:  acetaminophen     Tablet .. 650 milliGRAM(s) Oral every 6 hours PRN Temp greater or equal to 38C (100.4F), Mild Pain (1 - 3)  carBAMazepine ER Capsule 100 milliGRAM(s) Oral two times a day  melatonin 3 milliGRAM(s) Oral at bedtime PRN Insomnia  ondansetron Injectable 4 milliGRAM(s) IV Push every 8 hours PRN Nausea and/or Vomiting  aluminum hydroxide/magnesium hydroxide/simethicone Suspension 30 milliLiter(s) Oral every 4 hours PRN Dyspepsia  atorvastatin 40 milliGRAM(s) Oral at bedtime  levothyroxine 88 MICROGram(s) Oral daily  apixaban 5 milliGRAM(s) Oral every 12 hours  aspirin enteric coated 81 milliGRAM(s) Oral daily      LABS:	 	                            14.1   9.02  )-----------( 155      ( 16 Apr 2024 06:42 )             43.7     04-16    141  |  100  |  20.4<H>  ----------------------------<  140<H>  3.8   |  25.0  |  0.94    Ca    8.9      16 Apr 2024 06:42  Mg     2.2     04-15    TPro  6.5<L>  /  Alb  3.6  /  TBili  1.9  /  DBili  1.0<H>  /  AST  61<H>  /  ALT  58<H>  /  AlkPhos  165<H>  04-15      Lipid Profile:   HgA1c:   TSH: Thyroid Stimulating Hormone, Serum: 0.34 uIU/mL      TELEMETRY: Afib rate controlled  ECG:    DIAGNOSTIC TESTING:  [ ] Echocardiogram: < from: TTE W or WO Ultrasound Enhancing Agent (04.13.24 @ 11:31) >    TRANSTHORACIC ECHOCARDIOGRAM REPORT  ________________________________________________________________________________                                      _______       Pt. Name:       DUDLEY BERMUDEZ Study Date:    4/13/2024  MRN:            [id number]             YOB: 1950  Accession #:    [id number]            Age:           73 years  Account#:       [id number]           Gender:        M  Visit ID#  Heart Rate:                          Height:        67.00 in (170.18 cm)  Rhythm:                              Weight:        182.25 lb (82.67 kg)  Blood Pressure: 98/62 mmHg           BSA/BMI:       1.94 m² / 28.54 kg/m²  ________________________________________________________________________________________  Referring Physician:    [id number] Jose Elias Stanton  Interpreting Physician: Avel Figueroa MD  Primary Sonographer:    Myriam Bowens    CPT:                ECHO TTE WITH CON COMP W DOPP - .m;DEFINITY ECHO                      CONTRAST PER ML - .m  Indication(s):      Cardiomyopathy, unspecified - I42.9  Procedure:          Transthoracic echocardiogram with 2-D, M-mode and complete                      spectral and color flow Doppler.  Ordering Location:  [facility]  Admission Status:   Inpatient  Contrast Injection: Verbal consent was obtained for injection of Ultrasonic                      Enhancing Agent following a discussion of risks and                      benefits.                      Endocardial visualization enhanced with 2 ml of Definity                    Ultrasound enhancing agent (Lot#:[id number] Exp.Date:04/2025).  UEA Reaction:       Patient had no adverse reaction after injection of                      Ultrasound Enhancing Agent.  Study Information:  Image quality for this study is technically difficult.    _______________________________________________________________________________________     CONCLUSIONS:      1. Technically difficult image quality.   2. Left ventricular cavity is severely dilated. Left ventricular systolic function is severely decreased with an ejection fraction visually estimated at <20 %. Global left ventricular hypokinesis.   3. There is increased LV mass and eccentric hypertrophy.   4. There is severe (grade 3) left ventricular diastolic dysfunction, with elevated filling pressure.   5. Mildly enlarged right ventricular cavity size and mildly reduced systolic function.   6. The left atrium is mildly dilated.   7. The right atrium is dilated.   8. Mild mitral regurgitation.   9. A bioprosthetic valve replacement Transcatheter deployed (TAVR) valve replacement is present in the aortic position The prosthetic valve is well seated with normal function.  10. Moderate tricuspid regurgitation.  11. Estimated pulmonary artery systolic pressure is 52mmHg, consistent with elevated pulmonary artery pressure.  12. No prior echocardiogram is available for comparison.    _____________________________________________________________    < end of copied text >    [ ]  Catheterization:  [ ] Stress Test:    OTHER:

## 2024-04-16 NOTE — PROGRESS NOTE ADULT - SUBJECTIVE AND OBJECTIVE BOX
[facility] Division of Hospital Medicine    Chief Complaint:  Acute on ch HF    SUBJECTIVE / OVERNIGHT EVENTS:  Pt examined lying in bed  States he feels tired, otherwise unchanged   Patient denies chest pain, SOB, abd pain, N/V, fever, chills, dysuria. Complains of ongoing weakness      MEDICATIONS  (STANDING):  aMIOdarone    Tablet 200 milliGRAM(s) Oral two times a day  apixaban 5 milliGRAM(s) Oral every 12 hours  aspirin enteric coated 81 milliGRAM(s) Oral daily  atorvastatin 40 milliGRAM(s) Oral at bedtime  buMETAnide IVPB 4 milliGRAM(s) IV Intermittent two times a day  carBAMazepine ER Capsule 100 milliGRAM(s) Oral two times a day  DOBUTamine Infusion 1 MICROgram(s)/kG/Min (2.45 mL/Hr) IV Continuous <Continuous>  levothyroxine 88 MICROGram(s) Oral daily  potassium chloride    Tablet ER 40 milliEquivalent(s) Oral once  sacubitril 24 mG/valsartan 26 mG 1 Tablet(s) Oral two times a day  spironolactone 25 milliGRAM(s) Oral every 12 hours    MEDICATIONS  (PRN):  acetaminophen     Tablet .. 650 milliGRAM(s) Oral every 6 hours PRN Temp greater or equal to 38C (100.4F), Mild Pain (1 - 3)  aluminum hydroxide/magnesium hydroxide/simethicone Suspension 30 milliLiter(s) Oral every 4 hours PRN Dyspepsia  melatonin 3 milliGRAM(s) Oral at bedtime PRN Insomnia  ondansetron Injectable 4 milliGRAM(s) IV Push every 8 hours PRN Nausea and/or Vomiting      PHYSICAL EXAM:  Vital Signs Last 24 Hrs  T(C): 36.8 (16 Apr 2024 13:40), Max: 37.3 (15 Apr 2024 15:50)  T(F): 98.2 (16 Apr 2024 13:40), Max: 99.1 (15 Apr 2024 15:50)  HR: 76 (16 Apr 2024 10:00) (74 - 95)  BP: 117/99 (16 Apr 2024 10:00) (101/73 - 137/88)  BP(mean): 101 (16 Apr 2024 08:00) (84 - 925)  RR: 18 (16 Apr 2024 10:00) (14 - 18)  SpO2: 94% (16 Apr 2024 10:00) (94% - 99%)    Parameters below as of 16 Apr 2024 10:00  Patient On (Oxygen Delivery Method): room air          CONSTITUTIONAL: Non toxic appearing, lying in bed  ENMT: Moist oral mucosa, no pharyngeal injection or exudates, central cyanotic appearance of lips and nose (reports ch)  RESPIRATORY: Normal respiratory effort; LLL basal crackles appreciated   CARDIOVASCULAR: Irregular rate and rhythm, normal S1 and S2, no murmur/rub/gallop; improved LE edema   ABDOMEN: Nontender to palpation, normoactive bowel sounds, no rebound/guarding; No hepatosplenomegaly  MUSCLOSKELETAL:  Normal gait; no clubbing or cyanosis of digits; no joint swelling or tenderness to palpation  PSYCH: A+O to person, place, and time; affect appropriate  NEUROLOGY: CN 2-12 are intact and symmetric; no gross sensory deficits;   SKIN: No rashes; no palpable lesions    LABS:                                              14.1   9.02  )-----------( 155      ( 16 Apr 2024 06:42 )             43.7   04-16    139  |  99  |  19.3  ----------------------------<  182<H>  3.7   |  28.0  |  0.89    Ca    9.3      16 Apr 2024 11:33  Mg     2.2     04-15    TPro  7.2  /  Alb  4.1  /  TBili  2.9<H>  /  DBili  x   /  AST  41<H>  /  ALT  57<H>  /  AlkPhos  163<H>  04-16          CAPILLARY BLOOD GLUCOSE            RADIOLOGY & ADDITIONAL TESTS:    1/17/24 Cardiac MRI  IMPRESSION: Cardiac amyloid Imaging study is  not suggestive of transthyretin cardiac amyloidosis.

## 2024-04-16 NOTE — PROGRESS NOTE ADULT - ASSESSMENT
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 Transferred to SDU for bumex ggt with fixed dose Dobutamine. Now s/p RHC with increase in diuresis.      Acute on systolic chronic HF   Comfortable on RA  Continue Bumex ggt and Dobutamine 2.5 mg/kg/min  daily weights, strict I/O   Resume Farxiga on dsc   Started on Entresto per HF  Continue Spironolactone   Cardiology following  s/p RHC  Pt to f/u with HF team as outpt on dsc. Is now more agreeable to considering LVAD options    Afib s/p ANDREY/DCCV on 1/17/24  rated controlled   AC now switched back to Eliquis 5 mg bid  On Amio   Needs to come off Dobutamine prior to DCCV    CAD s/p PCI  cont ASA, statin     Hypothyroidism   cont with synthroid  Send TSH in am     Trigeminal neurologia   cont carbamazepine     DVT ppx : DOAC   Eliquis--> lovenox    Dispo: acute. Pending EP and Cardio final recs

## 2024-04-16 NOTE — PROGRESS NOTE ADULT - SUBJECTIVE AND OBJECTIVE BOX
ADVANCED HEART FAILURE - PROGRESS NOTE  [address] [place] [address]  Office Phone: [phone]/Fax: [phone]  Service/On Call Phone [phone]  _______________________________________________________________________________________________________    Subjective:  - NAEO  - Patient resting comfortably in bed with no complaints    Medications:  acetaminophen     Tablet .. 650 milliGRAM(s) Oral every 6 hours PRN  aluminum hydroxide/magnesium hydroxide/simethicone Suspension 30 milliLiter(s) Oral every 4 hours PRN  aMIOdarone    Tablet 200 milliGRAM(s) Oral two times a day  apixaban 5 milliGRAM(s) Oral every 12 hours  aspirin enteric coated 81 milliGRAM(s) Oral daily  atorvastatin 40 milliGRAM(s) Oral at bedtime  buMETAnide IVPB 4 milliGRAM(s) IV Intermittent two times a day  carBAMazepine ER Capsule 100 milliGRAM(s) Oral two times a day  DOBUTamine Infusion 1 MICROgram(s)/kG/Min IV Continuous <Continuous>  levothyroxine 88 MICROGram(s) Oral daily  melatonin 3 milliGRAM(s) Oral at bedtime PRN  ondansetron Injectable 4 milliGRAM(s) IV Push every 8 hours PRN  sacubitril 24 mG/valsartan 26 mG 1 Tablet(s) Oral two times a day  spironolactone 25 milliGRAM(s) Oral every 12 hours      ICU Vital Signs Last 24 Hrs  T(C): 36.4, Max: 37.1 (04-15 @ 19:19)  HR: 93 (74 - 95)  BP: 101/80 (101/73 - 137/88)  BP(mean): 88 (84 - 925)  ABP: --  ABP(mean): --  RR: 19 (14 - 19)  SpO2: 93% (93% - 99%)    Weight in k.9 (24)      I&O's Summary Last 24 Hrs    IN: 416.7 mL / OUT: 4000 mL / NET: -3583.3 mL      Tele: Afib/ 70-80s, PVCs    Physical Exam:    General: No distress. Comfortable.  Neck: JVP not elevated.  Respiratory: Clear to auscultation bilaterally  CV: Irregularly irregular. Normal S1 and S2. No murmurs, rub, or gallops. Radial pulses normal.  Abdomen: Soft, non-distended, non-tender  Extremities: Warm, no edema  Neurology: Non-focal, alert and oriented times three.   Psych: Affect normal    Labs:    ( 24 @ 06:42 )               14.1   9.02  )--------( 155                  43.7     ( 24 @ 11:33 )     139  |  99  |  19.3  ---------------------<  182  3.7  |  28.0  |  0.89    Ca 9.3  Phos x   Mg x     ( 24 @ 11:33 )  TPro  7.2  /  Alb  4.1  /  TBili  2.9  /  DBili  x   /  AST  41  /  ALT  57  /  AlkPhos  163  ( 04-15-24 @ 05:47 )  TPro  6.5  /  Alb  3.6  /  TBili  1.9  /  DBili  1.0  /  AST  61  /  ALT  58  /  AlkPhos  165    ( 24 @ 19:18 )  TropHS 35    / CK x     / CKMB x        Lactate, Blood: 1.4 (24 @ 11:33)

## 2024-04-16 NOTE — PROGRESS NOTE ADULT - ASSESSMENT
Primary Cardiologist: Dr. Pressley  HF: Dr. Sanchez   EP: Dr. Apple  Pulmonologist: Dr. 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     Of note, this is his second hospitalization this year for ADHF in the setting of Afib. He was hospitalized at [facility] for rising CardioMEMS numbers from 1/10 - 24. He underwent DCCV on 1/15/24 with improvement in CardioMEMS numbers to 31.     CardioMEMS readin/16: PAD 33-34    RHC 4/15/24 (Full report pending) on  2.5mcg/kg/min: HR 75, /69/84 RA 17/15/15, PAP 68/25/43, PCWP 23, CO 5.16, CI 2.68.    TTE 24: LVEF <20%, LVIDd 7.4cm, LVH, grade III DD, mild RVE w/ mildly reduced systolic function, moderate TR, PASP 52 mmHg (RAP ~15 mmHg).    Prior Cardiac Studies:  Cardiac Cath/PCI: 23 - RHC (MEMS calibration) - RA 16, RV 60/20, PA 61/36/43, PCWP 22 (v 27), CO/CI 4.6/2.03, PVR 4.54WU, (SBP missing).    21 - RHC/MEMS implant - RA 12, RV 50/13, PA 51/20/33, PCWP 11, CO/CI 4.7/2.08 (PA sat 59%, Ao sat 93%, Hb 11.5), PVR 4.68 CIFUENTES    [facility] 20 - LM mild CAD, mLAD 10% stenosis at site of prior stent, LCx/RCA mod CAD

## 2024-04-17 LAB
ALBUMIN SERPL ELPH-MCNC: 3.8 G/DL — SIGNIFICANT CHANGE UP (ref 3.3–5.2)
ALP SERPL-CCNC: 155 U/L — HIGH (ref 40–120)
ALT FLD-CCNC: 46 U/L — HIGH
ANION GAP SERPL CALC-SCNC: 15 MMOL/L — SIGNIFICANT CHANGE UP (ref 5–17)
AST SERPL-CCNC: 31 U/L — SIGNIFICANT CHANGE UP
BILIRUB DIRECT SERPL-MCNC: 2.2 MG/DL — HIGH (ref 0–0.3)
BILIRUB INDIRECT FLD-MCNC: 1.1 MG/DL — HIGH (ref 0.2–1)
BILIRUB SERPL-MCNC: 3.3 MG/DL — HIGH (ref 0.4–2)
BILIRUB SERPL-MCNC: 3.3 MG/DL — HIGH (ref 0.4–2)
BUN SERPL-MCNC: 18.8 MG/DL — SIGNIFICANT CHANGE UP (ref 8–20)
CALCIUM SERPL-MCNC: 9 MG/DL — SIGNIFICANT CHANGE UP (ref 8.4–10.5)
CHLORIDE SERPL-SCNC: 93 MMOL/L — LOW (ref 96–108)
CO2 SERPL-SCNC: 28 MMOL/L — SIGNIFICANT CHANGE UP (ref 22–29)
CREAT SERPL-MCNC: 0.95 MG/DL — SIGNIFICANT CHANGE UP (ref 0.5–1.3)
EGFR: 84 ML/MIN/1.73M2 — SIGNIFICANT CHANGE UP
GLUCOSE BLDC GLUCOMTR-MCNC: 147 MG/DL — HIGH (ref 70–99)
GLUCOSE BLDC GLUCOMTR-MCNC: 159 MG/DL — HIGH (ref 70–99)
GLUCOSE SERPL-MCNC: 117 MG/DL — HIGH (ref 70–99)
HCT VFR BLD CALC: 44.7 % — SIGNIFICANT CHANGE UP (ref 39–50)
HGB BLD-MCNC: 14.2 G/DL — SIGNIFICANT CHANGE UP (ref 13–17)
LACTATE SERPL-SCNC: 1 MMOL/L — SIGNIFICANT CHANGE UP (ref 0.5–2)
MAGNESIUM SERPL-MCNC: 1.9 MG/DL — SIGNIFICANT CHANGE UP (ref 1.6–2.6)
MCHC RBC-ENTMCNC: 30.9 PG — SIGNIFICANT CHANGE UP (ref 27–34)
MCHC RBC-ENTMCNC: 31.8 GM/DL — LOW (ref 32–36)
MCV RBC AUTO: 97.2 FL — SIGNIFICANT CHANGE UP (ref 80–100)
PLATELET # BLD AUTO: 180 K/UL — SIGNIFICANT CHANGE UP (ref 150–400)
POTASSIUM SERPL-MCNC: 3.8 MMOL/L — SIGNIFICANT CHANGE UP (ref 3.5–5.3)
POTASSIUM SERPL-SCNC: 3.8 MMOL/L — SIGNIFICANT CHANGE UP (ref 3.5–5.3)
PROT SERPL-MCNC: 6.8 G/DL — SIGNIFICANT CHANGE UP (ref 6.6–8.7)
RBC # BLD: 4.6 M/UL — SIGNIFICANT CHANGE UP (ref 4.2–5.8)
RBC # FLD: 15.7 % — HIGH (ref 10.3–14.5)
SODIUM SERPL-SCNC: 135 MMOL/L — SIGNIFICANT CHANGE UP (ref 135–145)
WBC # BLD: 10.67 K/UL — HIGH (ref 3.8–10.5)
WBC # FLD AUTO: 10.67 K/UL — HIGH (ref 3.8–10.5)

## 2024-04-17 PROCEDURE — 99234 HOSP IP/OBS SM DT SF/LOW 45: CPT

## 2024-04-17 PROCEDURE — 99233 SBSQ HOSP IP/OBS HIGH 50: CPT

## 2024-04-17 PROCEDURE — 76700 US EXAM ABDOM COMPLETE: CPT | Mod: 26

## 2024-04-17 PROCEDURE — 99232 SBSQ HOSP IP/OBS MODERATE 35: CPT

## 2024-04-17 RX ORDER — MAGNESIUM SULFATE 500 MG/ML
1 VIAL (ML) INJECTION ONCE
Refills: 0 | Status: COMPLETED | OUTPATIENT
Start: 2024-04-17 | End: 2024-04-17

## 2024-04-17 RX ORDER — BUMETANIDE 0.25 MG/ML
4 INJECTION INTRAMUSCULAR; INTRAVENOUS ONCE
Refills: 0 | Status: COMPLETED | OUTPATIENT
Start: 2024-04-17 | End: 2024-04-17

## 2024-04-17 RX ORDER — POTASSIUM CHLORIDE 20 MEQ
20 PACKET (EA) ORAL
Refills: 0 | Status: DISCONTINUED | OUTPATIENT
Start: 2024-04-17 | End: 2024-04-17

## 2024-04-17 RX ORDER — BUMETANIDE 0.25 MG/ML
4 INJECTION INTRAMUSCULAR; INTRAVENOUS ONCE
Refills: 0 | Status: DISCONTINUED | OUTPATIENT
Start: 2024-04-17 | End: 2024-04-17

## 2024-04-17 RX ORDER — SODIUM CHLORIDE 9 MG/ML
1000 INJECTION INTRAMUSCULAR; INTRAVENOUS; SUBCUTANEOUS ONCE
Refills: 0 | Status: COMPLETED | OUTPATIENT
Start: 2024-04-17 | End: 2024-04-17

## 2024-04-17 RX ORDER — POTASSIUM CHLORIDE 20 MEQ
40 PACKET (EA) ORAL ONCE
Refills: 0 | Status: COMPLETED | OUTPATIENT
Start: 2024-04-17 | End: 2024-04-17

## 2024-04-17 RX ORDER — ALBUMIN HUMAN 25 %
50 VIAL (ML) INTRAVENOUS ONCE
Refills: 0 | Status: COMPLETED | OUTPATIENT
Start: 2024-04-17 | End: 2024-04-17

## 2024-04-17 RX ORDER — POLYETHYLENE GLYCOL 3350 17 G/17G
17 POWDER, FOR SOLUTION ORAL DAILY
Refills: 0 | Status: DISCONTINUED | OUTPATIENT
Start: 2024-04-17 | End: 2024-04-20

## 2024-04-17 RX ADMIN — Medication 20 MILLIEQUIVALENT(S): at 14:02

## 2024-04-17 RX ADMIN — Medication 88 MICROGRAM(S): at 05:20

## 2024-04-17 RX ADMIN — Medication 300 MILLIGRAM(S): at 20:57

## 2024-04-17 RX ADMIN — SPIRONOLACTONE 25 MILLIGRAM(S): 25 TABLET, FILM COATED ORAL at 18:50

## 2024-04-17 RX ADMIN — Medication 100 MILLIGRAM(S): at 05:24

## 2024-04-17 RX ADMIN — BUMETANIDE 132 MILLIGRAM(S): 0.25 INJECTION INTRAMUSCULAR; INTRAVENOUS at 14:00

## 2024-04-17 RX ADMIN — Medication 100 MILLIGRAM(S): at 18:55

## 2024-04-17 RX ADMIN — SACUBITRIL AND VALSARTAN 1 TABLET(S): 24; 26 TABLET, FILM COATED ORAL at 05:20

## 2024-04-17 RX ADMIN — SACUBITRIL AND VALSARTAN 1 TABLET(S): 24; 26 TABLET, FILM COATED ORAL at 18:51

## 2024-04-17 RX ADMIN — APIXABAN 5 MILLIGRAM(S): 2.5 TABLET, FILM COATED ORAL at 05:20

## 2024-04-17 RX ADMIN — Medication 100 GRAM(S): at 14:01

## 2024-04-17 RX ADMIN — Medication 650 MILLIGRAM(S): at 04:30

## 2024-04-17 RX ADMIN — SODIUM CHLORIDE 1000 MILLILITER(S): 9 INJECTION INTRAMUSCULAR; INTRAVENOUS; SUBCUTANEOUS at 22:29

## 2024-04-17 RX ADMIN — AMIODARONE HYDROCHLORIDE 200 MILLIGRAM(S): 400 TABLET ORAL at 05:20

## 2024-04-17 RX ADMIN — AMIODARONE HYDROCHLORIDE 200 MILLIGRAM(S): 400 TABLET ORAL at 18:53

## 2024-04-17 RX ADMIN — Medication 650 MILLIGRAM(S): at 03:58

## 2024-04-17 RX ADMIN — APIXABAN 5 MILLIGRAM(S): 2.5 TABLET, FILM COATED ORAL at 18:51

## 2024-04-17 RX ADMIN — Medication 40 MILLIEQUIVALENT(S): at 14:01

## 2024-04-17 RX ADMIN — Medication 50 MILLILITER(S): at 20:42

## 2024-04-17 RX ADMIN — BUMETANIDE 132 MILLIGRAM(S): 0.25 INJECTION INTRAMUSCULAR; INTRAVENOUS at 05:24

## 2024-04-17 RX ADMIN — Medication 81 MILLIGRAM(S): at 12:01

## 2024-04-17 RX ADMIN — POLYETHYLENE GLYCOL 3350 17 GRAM(S): 17 POWDER, FOR SOLUTION ORAL at 12:01

## 2024-04-17 RX ADMIN — ATORVASTATIN CALCIUM 40 MILLIGRAM(S): 80 TABLET, FILM COATED ORAL at 20:57

## 2024-04-17 NOTE — PROGRESS NOTE ADULT - SUBJECTIVE AND OBJECTIVE BOX
ADVANCED HEART FAILURE - PROGRESS NOTE  [address] [place] [address]  Office Phone: [phone]/Fax: [phone]  Service/On Call Phone [phone]  _______________________________________________________________________________________________________    Subjective:  - NAEO  - States he feels well and denies any SOB, orthopnea, PND, LH/dizziness, CP, or palpitations    Medications:  acetaminophen     Tablet .. 650 milliGRAM(s) Oral every 6 hours PRN  aluminum hydroxide/magnesium hydroxide/simethicone Suspension 30 milliLiter(s) Oral every 4 hours PRN  aMIOdarone    Tablet 200 milliGRAM(s) Oral two times a day  apixaban 5 milliGRAM(s) Oral every 12 hours  aspirin enteric coated 81 milliGRAM(s) Oral daily  atorvastatin 40 milliGRAM(s) Oral at bedtime  bisacodyl Suppository 10 milliGRAM(s) Rectal daily PRN  buMETAnide IVPB 4 milliGRAM(s) IV Intermittent once  buMETAnide IVPB 4 milliGRAM(s) IV Intermittent two times a day  carBAMazepine ER Capsule 100 milliGRAM(s) Oral two times a day  levothyroxine 88 MICROGram(s) Oral daily  melatonin 3 milliGRAM(s) Oral at bedtime PRN  ondansetron Injectable 4 milliGRAM(s) IV Push every 8 hours PRN  polyethylene glycol 3350 17 Gram(s) Oral daily  sacubitril 24 mG/valsartan 26 mG 1 Tablet(s) Oral two times a day  spironolactone 25 milliGRAM(s) Oral every 12 hours      ICU Vital Signs Last 24 Hrs  T(C): 36.7, Max: 37 (04-16 @ 20:31)  HR: 77 (71 - 83)  BP: 115/76 (100/71 - 120/75)  BP(mean): 89 (76 - 96)  ABP: --  ABP(mean): --  RR: 19 (18 - 19)  SpO2: 95% (93% - 97%)    Weight in k.9 (24)      I&O's Summary Last 24 Hrs    IN: 996.1 mL / OUT: 1850 mL / NET: -853.9 mL      Tele: AFib/ 70-100s    Physical Exam:    General: No distress. Comfortable.  Neck: JVP elevated.  Respiratory: Clear to auscultation bilaterally  CV: Irregularly irregular. Normal S1 and S2. No murmurs, rub, or gallops. Radial pulses normal.  Abdomen: Soft, non-distended, non-tender  Extremities: Warm, trace RLE edema.  Neurology: Non-focal, alert and oriented times three.   Psych: Affect normal    Labs:    ( 24 @ 06:21 )               14.2   10.67 )--------( 180                  44.7     ( 24 @ 06:21 )     135  |  93  |  18.8  ---------------------<  117  3.8  |  28.0  |  0.95    Ca 9.0  Phos x   Mg 1.9    ( 24 @ 06:21 )  TPro  6.8  /  Alb  3.8  /  TBili  3.3  /  DBili  2.2  /  AST  31  /  ALT  46  /  AlkPhos  155  ( 24 @ 11:33 )  TPro  7.2  /  Alb  4.1  /  TBili  2.9  /  DBili  x   /  AST  41  /  ALT  57  /  AlkPhos  163    ( 24 @ 19:18 )  TropHS 35    / CK x     / CKMB x        Lactate, Blood: 1.0 (24 @ 06:21)

## 2024-04-17 NOTE — DIETITIAN INITIAL EVALUATION ADULT - OTHER INFO
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 Transferred to SDU for bumex ggt with fixed dose Dobutamine. Now s/p RHC. Slowly being weaned off pressors to prepare for possible DCCV

## 2024-04-17 NOTE — PROGRESS NOTE ADULT - SUBJECTIVE AND OBJECTIVE BOX
[facility] Division of Hospital Medicine    Chief Complaint:  Acute on ch HF    SUBJECTIVE / OVERNIGHT EVENTS:  Pt examined lying in bed  No longer nauseous  Unwilling to use enema despite continuation of constipation   Patient denies chest pain, SOB, abd pain, N/V, fever, chills, dysuria. Complains of ongoing weakness      MEDICATIONS  (STANDING):  aMIOdarone    Tablet 200 milliGRAM(s) Oral two times a day  apixaban 5 milliGRAM(s) Oral every 12 hours  aspirin enteric coated 81 milliGRAM(s) Oral daily  atorvastatin 40 milliGRAM(s) Oral at bedtime  buMETAnide IVPB 4 milliGRAM(s) IV Intermittent two times a day  carBAMazepine ER Capsule 100 milliGRAM(s) Oral two times a day  DOBUTamine Infusion 1 MICROgram(s)/kG/Min (2.45 mL/Hr) IV Continuous <Continuous>  levothyroxine 88 MICROGram(s) Oral daily  potassium chloride    Tablet ER 40 milliEquivalent(s) Oral once  sacubitril 24 mG/valsartan 26 mG 1 Tablet(s) Oral two times a day  spironolactone 25 milliGRAM(s) Oral every 12 hours    MEDICATIONS  (PRN):  acetaminophen     Tablet .. 650 milliGRAM(s) Oral every 6 hours PRN Temp greater or equal to 38C (100.4F), Mild Pain (1 - 3)  aluminum hydroxide/magnesium hydroxide/simethicone Suspension 30 milliLiter(s) Oral every 4 hours PRN Dyspepsia  melatonin 3 milliGRAM(s) Oral at bedtime PRN Insomnia  ondansetron Injectable 4 milliGRAM(s) IV Push every 8 hours PRN Nausea and/or Vomiting      PHYSICAL EXAM:  Vital Signs Last 24 Hrs  T(C): 36.5 (17 Apr 2024 13:13), Max: 37 (16 Apr 2024 20:31)  T(F): 97.7 (17 Apr 2024 13:13), Max: 98.6 (16 Apr 2024 20:31)  HR: 77 (17 Apr 2024 06:00) (71 - 93)  BP: 115/76 (17 Apr 2024 08:00) (100/71 - 120/75)  BP(mean): 89 (17 Apr 2024 08:00) (76 - 96)  RR: 19 (17 Apr 2024 06:00) (18 - 19)  SpO2: 95% (17 Apr 2024 06:00) (93% - 97%)    Parameters below as of 17 Apr 2024 08:00  Patient On (Oxygen Delivery Method): nasal cannula      CONSTITUTIONAL: Non toxic appearing, lying in bed  ENMT: Moist oral mucosa, no pharyngeal injection or exudates, central cyanotic appearance of lips and nose (reports ch)  RESPIRATORY: Normal respiratory effort; LLL basal crackles appreciated   CARDIOVASCULAR: Irregular rate and rhythm, normal S1 and S2, no murmur/rub/gallop; improved LE edema   ABDOMEN: Nontender to palpation, normoactive bowel sounds, no rebound/guarding; No hepatosplenomegaly  MUSCLOSKELETAL:  Normal gait; no clubbing or cyanosis of digits; no joint swelling or tenderness to palpation  PSYCH: A+O to person, place, and time; affect appropriate  NEUROLOGY: CN 2-12 are intact and symmetric; no gross sensory deficits;   SKIN: No rashes; no palpable lesions    LABS:                                            14.1   9.02  )-----------( 155      ( 16 Apr 2024 06:42 )             43.7   04-16    139  |  99  |  19.3  ----------------------------<  182<H>  3.7   |  28.0  |  0.89    Ca    9.3      16 Apr 2024 11:33  Mg     2.2     04-15    TPro  7.2  /  Alb  4.1  /  TBili  2.9<H>  /  DBili  x   /  AST  41<H>  /  ALT  57<H>  /  AlkPhos  163<H>  04-16          CAPILLARY BLOOD GLUCOSE            RADIOLOGY & ADDITIONAL TESTS:    1/17/24 Cardiac MRI  IMPRESSION: Cardiac amyloid Imaging study is  not suggestive of transthyretin cardiac amyloidosis.                                     [facility] Division of Hospital Medicine    Chief Complaint:  Acute on ch HF    SUBJECTIVE / OVERNIGHT EVENTS:  Pt examined lying in bed  No longer nauseous  Unwilling to use enema despite continuation of constipation   Patient denies chest pain, SOB, abd pain, N/V, fever, chills, dysuria. Complains of ongoing weakness      MEDICATIONS  (STANDING):  aMIOdarone    Tablet 200 milliGRAM(s) Oral two times a day  apixaban 5 milliGRAM(s) Oral every 12 hours  aspirin enteric coated 81 milliGRAM(s) Oral daily  atorvastatin 40 milliGRAM(s) Oral at bedtime  buMETAnide IVPB 4 milliGRAM(s) IV Intermittent two times a day  buMETAnide IVPB 4 milliGRAM(s) IV Intermittent once  carBAMazepine ER Capsule 100 milliGRAM(s) Oral two times a day  levothyroxine 88 MICROGram(s) Oral daily  magnesium sulfate  IVPB 1 Gram(s) IV Intermittent once  polyethylene glycol 3350 17 Gram(s) Oral daily  potassium chloride    Tablet ER 40 milliEquivalent(s) Oral once  sacubitril 24 mG/valsartan 26 mG 1 Tablet(s) Oral two times a day  spironolactone 25 milliGRAM(s) Oral every 12 hours    MEDICATIONS  (PRN):  acetaminophen     Tablet .. 650 milliGRAM(s) Oral every 6 hours PRN Temp greater or equal to 38C (100.4F), Mild Pain (1 - 3)  aluminum hydroxide/magnesium hydroxide/simethicone Suspension 30 milliLiter(s) Oral every 4 hours PRN Dyspepsia  bisacodyl Suppository 10 milliGRAM(s) Rectal daily PRN Constipation  melatonin 3 milliGRAM(s) Oral at bedtime PRN Insomnia  ondansetron Injectable 4 milliGRAM(s) IV Push every 8 hours PRN Nausea and/or Vomiting      PHYSICAL EXAM:  Vital Signs Last 24 Hrs  T(C): 36.5 (17 Apr 2024 13:13), Max: 37 (16 Apr 2024 20:31)  T(F): 97.7 (17 Apr 2024 13:13), Max: 98.6 (16 Apr 2024 20:31)  HR: 77 (17 Apr 2024 06:00) (71 - 93)  BP: 115/76 (17 Apr 2024 08:00) (100/71 - 120/75)  BP(mean): 89 (17 Apr 2024 08:00) (76 - 96)  RR: 19 (17 Apr 2024 06:00) (18 - 19)  SpO2: 95% (17 Apr 2024 06:00) (93% - 97%)    Parameters below as of 17 Apr 2024 08:00  Patient On (Oxygen Delivery Method): nasal cannula        CONSTITUTIONAL: Non toxic appearing, lying in bed  ENMT: Moist oral mucosa, no pharyngeal injection or exudates, central cyanotic appearance of lips and nose (reports ch)  RESPIRATORY: Normal respiratory effort; LLL basal crackles appreciated   CARDIOVASCULAR: Irregular rate and rhythm, normal S1 and S2, no murmur/rub/gallop; improved LE edema   ABDOMEN: Nontender to palpation, normoactive bowel sounds, no rebound/guarding; No hepatosplenomegaly  MUSCLOSKELETAL:  Normal gait; no clubbing or cyanosis of digits; no joint swelling or tenderness to palpation  PSYCH: A+O to person, place, and time; affect appropriate  NEUROLOGY: CN 2-12 are intact and symmetric; no gross sensory deficits;   SKIN: No rashes; no palpable lesions    LABS:                                            14.2   10.67 )-----------( 180      ( 17 Apr 2024 06:21 )             44.7   04-17    135  |  93<L>  |  18.8  ----------------------------<  117<H>  3.8   |  28.0  |  0.95    Ca    9.0      17 Apr 2024 06:21  Mg     1.9     04-17    TPro  6.8  /  Alb  3.8  /  TBili  3.3<H>  /  DBili  2.2<H>  /  AST  31  /  ALT  46<H>  /  AlkPhos  155<H>  04-17          CAPILLARY BLOOD GLUCOSE      POCT Blood Glucose.: 159 mg/dL (17 Apr 2024 11:52)              RADIOLOGY & ADDITIONAL TESTS:  1/13/24 TTE   1. Technically difficult image quality.   2. Left ventricular cavity is severely dilated. Left ventricular systolic function is severely decreased with an ejection fraction visually estimated at <20 %. Global left ventricular hypokinesis.   3. There is increased LV mass and eccentric hypertrophy.   4. There is severe (grade 3) left ventricular diastolic dysfunction, with elevated filling pressure.   5. Mildly enlarged right ventricular cavity size and mildly reduced systolic function.   6. The left atrium is mildly dilated.   7. The right atrium is dilated.   8. Mild mitral regurgitation.   9. A bioprosthetic valve replacement Transcatheter deployed (TAVR) valve replacement is present in the aortic position The prosthetic valve is well seated with normal function.  10. Moderate tricuspid regurgitation.  11. Estimated pulmonary artery systolic pressure is 52 mmHg, consistent with elevated pulmonary artery pressure.  12. No prior echocardiogram is available for comparison.    1/17/24 Cardiac MRI  IMPRESSION: Cardiac amyloid Imaging study is  not suggestive of transthyretin cardiac amyloidosis.

## 2024-04-17 NOTE — PROGRESS NOTE ADULT - ASSESSMENT
73M hx CAD s/p late-presenting MI 2005 s/p PCI LAD, ICM/HFrEF (EF 15-20%, LVEDD 6 cm) s/p single chamber ICD with [place] lead 2011 s/p lead extraction and upgrade to Medtronic dual-chamber ICD (8/17/21), has hx of appropriate ICD shock for VT/VF, s/p MEMS (9/16/21), bioprosthetic AVR (2015) 2/2 AI s/p 26 mm EVOLUT HECTOR for severe bioprosthetic AS (8/20/20), prior aflutter ablation 7/2017, persistent afib, s/p DCCV x2 (8/12/20, 7/7/21, 01/2024), Trigeminal neuralgia (4/2022) who presented to [facility] for shortness of breath and elevated filling pressures on cardiomems. Patient states that he as been feeling weak for the last few days, and has been experiencing dyspnea on exertion with persistent cough. Patient also noted that his feet and hands were becoming progressively colder, and his cardiomems was up in the 40s (goal 30-34). Patient had his torsemide dose increased at home, but symptoms did not improve. Patient was sent to [facility] for further management. Patient states today his symptoms are beginning to improve.

## 2024-04-17 NOTE — PROGRESS NOTE ADULT - ASSESSMENT
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 Transferred to SDU for bumex ggt with fixed dose Dobutamine. Now s/p RHC with increase in diuresis.      Acute on systolic chronic HF   Comfortable on RA  Continue Bumex ggt and Dobutamine 2.5 mg/kg/min  daily weights, strict I/O   Resume Farxiga on dsc   Started on Entresto per HF  Continue Spironolactone   Cardiology following  s/p RHC  Pt to f/u with HF team as outpt on dsc. Is now more agreeable to considering LVAD options    Afib s/p ANDREY/DCCV on 1/17/24  rated controlled   AC now switched back to Eliquis 5 mg bid  On Amio   Needs to come off Dobutamine prior to DCCV    CAD s/p PCI  cont ASA, statin     Hypothyroidism   cont with synthroid  Send TSH in am     Trigeminal neurologia   cont carbamazepine     DVT ppx : DOAC   Eliquis--> lovenox    Dispo: acute. Pending EP and Cardio final recs 73 year old male with CAD s/p late-presenting MI 2005 s/p PCI LAD, ICM/HFrEF (EF 15-20%, LVEDD 6 cm) s/p single chamber ICD with [place]evan lead 2011 s/p lead extraction and upgrade to Medtronic dual-chamber ICD (8/17/21), has hx of appropriate ICD shock for VT/VF, s/p MEMS (9/16/21), bioprosthetic AVR (2015) 2/2 AI s/p 26 mm EVOLUT HECTOR for severe bioprosthetic AS (8/20/20), prior aflutter ablation 7/2017, persistent afib, s/p DCCV x2 (8/12/20, 7/7/21), Trigeminal neuralgia (4/2022) presented for elevated filling pressure on Cardiomems. Transferred to SDU for bumex ggt with fixed dose Dobutamine. Now s/p RHC. Slowly being weaned off pressors to prepare for possible DCCV     Acute on systolic chronic HF   Comfortable on RA  Continue Bumex ggt  Dobutamine gradually weaning down. Cardio attempting today  daily weights, strict I/O   Resume Farxiga on dsc   Started on Entresto per HF  Continue Spironolactone   Cardiology following  s/p RHC  Pt to f/u with HF team as outpt on dsc. Is now more agreeable to considering LVAD options    Nausea with constipation  s/p bisacodyl spp  Declining enema for now  Continue bowel regimen  Rising Bili (t/ind). US abd ordered    Afib s/p ANDREY/DCCV on 1/17/24  rated controlled   AC now switched back to Eliquis 5 mg bid  On Amio   Needs to come off Dobutamine prior to DCCV    CAD s/p PCI  cont ASA, statin     Hypothyroidism   cont with synthroid  Send TSH in am     Trigeminal neurologia   cont carbamazepine     DVT ppx : DOAC     Dispo: acute. Pending EP and Cardio final recs

## 2024-04-17 NOTE — DIETITIAN INITIAL EVALUATION ADULT - PERTINENT MEDS FT
MEDICATIONS  (STANDING):  aMIOdarone    Tablet 200 milliGRAM(s) Oral two times a day  apixaban 5 milliGRAM(s) Oral every 12 hours  buMETAnide IVPB 4 milliGRAM(s) IV Intermittent once  carBAMazepine ER Capsule 100 milliGRAM(s) Oral two times a day  levothyroxine 88 MICROGram(s) Oral daily  magnesium sulfate  IVPB 1 Gram(s) IV Intermittent once  polyethylene glycol 3350 17 Gram(s) Oral daily  potassium chloride    Tablet ER 20 milliEquivalent(s) Oral every 2 hours  sacubitril 24 mG/valsartan 26 mG 1 Tablet(s) Oral two times a day  spironolactone 25 milliGRAM(s) Oral every 12 hours    MEDICATIONS  (PRN):  bisacodyl Suppository 10 milliGRAM(s) Rectal daily PRN Constipation  ondansetron Injectable 4 milliGRAM(s) IV Push every 8 hours PRN Nausea and/or Vomiting

## 2024-04-17 NOTE — PROGRESS NOTE ADULT - ASSESSMENT
Assessment/Recommendations:   73 year old male with trigeminal neuralgia, CAD s/p late-presenting MI 2005 s/p PCI LAD, ICM/HFrEF (EF 15-20%, LVEDD 6 cm) s/p single chamber ICD with [place]an lead 2011 s/p lead extraction and upgrade to Medtronic dual-chamber ICD (8/17/21), hx of appropriate ICD shock for VT/VF, s/p MEMS (9/16/21), bioprosthetic AVR (2015) 2/2 AI s/p 26 mm EVOLUT HECTOR for severe bioprosthetic AS (8/20/20), AFL s/p ablation 7/2017, persistent AFib s/p prior cardioversions most recently 1/17/24 following amiodarone load. He presented to [facility] with shortness of breath, and worsening fatigue x 2 weeks. Noted to have elevated filling pressures on Cardiomems. Admitted with acute on chronic heart failure exacerbation. Pt underwent RHC on 4/16/24 which revealed RA: 17/17/15, RV: 66/7/15, PCW; 23/27/23, PA: 68/25/43, CO: 5.16, CI: 2.68. Pt has remained in AFib throughout admission with a high degree of RV pacing.    Plan:  # Persistent atrial fibrillation   - Continue amiodarone 200mg BID.   - Continue Eliquis 5mg Q 12 hrs.  - Per HF team, will attempt to wean off dobutamine gtt today.   - Will plan for ANDREY/DCCV on 4/18/24. Keep NPO after midnight.    - Telemetry monitoring.   - Monitor electrolytes: Keep K > 4, Mg > 2   - for long-term AF management, will need to consider outpatient AF ablation when medically optimized, vs CRT upgrade with AVJ ablation.     # Acute heart failure exacerbation   - LVEF ~ 15%   - Diuresis and GDMT as per HF/cardiology   - Daily weights   - Strict I/O's     Discussed with Dr. Smith (covering EP attending). Will follow.

## 2024-04-17 NOTE — PROGRESS NOTE ADULT - SUBJECTIVE AND OBJECTIVE BOX
[facility]                                                         CARDIOLOGY AT [facility]                                                                  [address], [place] [address]                                                         Telephone: [phone]. Fax:[phone]                                                                             PROGRESS NOTE    Reason for follow up: HFrEF  Update: remains on 1mcg Dobutamine, to be weaned today. remains on bumex IVP and tolerating entresto      Review of symptoms:   Cardiac:  No chest pain. No dyspnea. No palpitations.  Respiratory: no cough. + mild dyspnea  Gastrointestinal: No diarrhea. No abdominal pain. No bleeding.   Neuro: No focal neuro complaints.    Vitals:  T(C): 36.6 (04-17-24 @ 05:10), Max: 37 (04-16-24 @ 20:31)  HR: 77 (04-17-24 @ 06:00) (71 - 93)  BP: 115/76 (04-17-24 @ 08:00) (100/71 - 120/75)  RR: 19 (04-17-24 @ 06:00) (18 - 19)  SpO2: 95% (04-17-24 @ 06:00) (93% - 97%)  Wt(kg): --  I&O's Summary    16 Apr 2024 07:01  -  17 Apr 2024 07:00  --------------------------------------------------------  IN: 996.1 mL / OUT: 1850 mL / NET: -853.9 mL      Weight (kg): 81.6 (04-12 @ 19:05)    PHYSICAL EXAM:  Appearance: Comfortable. No acute distress  HEENT:  Atraumatic. Normocephalic.  Normal oral mucosa  Neurologic: A & O x 3, no gross focal deficits.  Cardiovascular: irreg irregular S1 S2, No murmur, no rubs/gallops. No JVD  Respiratory: Lungs diminished  Gastrointestinal:  Soft, Non-tender, + BS  Lower Extremities: 2+ Peripheral Pulses, No clubbing, cyanosis, or edema  Psychiatry: Patient is calm. No agitation.   Skin: warm and dry.    CURRENT CARDIAC MEDICATIONS:  aMIOdarone    Tablet 200 milliGRAM(s) Oral two times a day  buMETAnide IVPB 4 milliGRAM(s) IV Intermittent two times a day  DOBUTamine Infusion 1 MICROgram(s)/kG/Min IV Continuous <Continuous>  sacubitril 24 mG/valsartan 26 mG 1 Tablet(s) Oral two times a day  spironolactone 25 milliGRAM(s) Oral every 12 hours      CURRENT OTHER MEDICATIONS:  acetaminophen     Tablet .. 650 milliGRAM(s) Oral every 6 hours PRN Temp greater or equal to 38C (100.4F), Mild Pain (1 - 3)  carBAMazepine ER Capsule 100 milliGRAM(s) Oral two times a day  melatonin 3 milliGRAM(s) Oral at bedtime PRN Insomnia  ondansetron Injectable 4 milliGRAM(s) IV Push every 8 hours PRN Nausea and/or Vomiting  aluminum hydroxide/magnesium hydroxide/simethicone Suspension 30 milliLiter(s) Oral every 4 hours PRN Dyspepsia  bisacodyl Suppository 10 milliGRAM(s) Rectal daily PRN Constipation  polyethylene glycol 3350 17 Gram(s) Oral daily  atorvastatin 40 milliGRAM(s) Oral at bedtime  levothyroxine 88 MICROGram(s) Oral daily  apixaban 5 milliGRAM(s) Oral every 12 hours  aspirin enteric coated 81 milliGRAM(s) Oral daily      LABS:	 	                            14.2   10.67 )-----------( 180      ( 17 Apr 2024 06:21 )             44.7     04-17    135  |  93<L>  |  18.8  ----------------------------<  117<H>  3.8   |  28.0  |  0.95    Ca    9.0      17 Apr 2024 06:21  Mg     1.9     04-17    TPro  6.8  /  Alb  3.8  /  TBili  3.3<H>  /  DBili  x   /  AST  31  /  ALT  46<H>  /  AlkPhos  155<H>  04-17      Lipid Profile:   HgA1c:   TSH: Thyroid Stimulating Hormone, Serum: 0.34 uIU/mL      TELEMETRY: Afib with paced beats  ECG:    DIAGNOSTIC TESTING:  [ ] Echocardiogram: < from: TTE W or WO Ultrasound Enhancing Agent (04.13.24 @ 11:31) >    TRANSTHORACIC ECHOCARDIOGRAM REPORT  ________________________________________________________________________________                                      _______       Pt. Name:       DUDLEY BERMUDEZ Study Date:    4/13/2024  MRN:            [id number]             YOB: 1950  Accession #:    [id number]            Age:           73 years  Account#:       [id number]           Gender:        M  Visit ID#  Heart Rate:                          Height:        67.00 in (170.18 cm)  Rhythm:                              Weight:        182.25 lb (82.67 kg)  Blood Pressure: 98/62 mmHg           BSA/BMI:       1.94 m² / 28.54 kg/m²  ________________________________________________________________________________________  Referring Physician:    [id number] Jose Elias Stanton  Interpreting Physician: Avel Figueroa MD  Primary Sonographer:    Myriam Bowens    CPT:                ECHO TTE WITH CON COMP W DOPP - .m;DEFINITY ECHO                      CONTRAST PER ML - .m  Indication(s):      Cardiomyopathy, unspecified - I42.9  Procedure:          Transthoracic echocardiogram with 2-D, M-mode and complete                      spectral and color flow Doppler.  Ordering Location:  [facility]  Admission Status:   Inpatient  Contrast Injection: Verbal consent was obtained for injection of Ultrasonic                      Enhancing Agent following a discussion of risks and                      benefits.                      Endocardial visualization enhanced with 2 ml of Definity                    Ultrasound enhancing agent (Lot#:[id number] Exp.Date:04/2025).  UEA Reaction:       Patient had no adverse reaction after injection of                      Ultrasound Enhancing Agent.  Study Information:  Image quality for this study is technically difficult.    _______________________________________________________________________________________     CONCLUSIONS:      1. Technically difficult image quality.   2. Left ventricular cavity is severely dilated. Left ventricular systolic function is severely decreased with an ejection fraction visually estimated at <20 %. Global left ventricular hypokinesis.   3. There is increased LV mass and eccentric hypertrophy.   4. There is severe (grade 3) left ventricular diastolic dysfunction, with elevated filling pressure.   5. Mildly enlarged right ventricular cavity size and mildly reduced systolic function.   6. The left atrium is mildly dilated.   7. The right atrium is dilated.   8. Mild mitral regurgitation.   9. A bioprosthetic valve replacement Transcatheter deployed (TAVR) valve replacement is present in the aortic position The prosthetic valve is well seated with normal function.  10. Moderate tricuspid regurgitation.  11. Estimated pulmonary artery systolic pressure is 52mmHg, consistent with elevated pulmonary artery pressure.  12. No prior echocardiogram is available for comparison.    ________________________________________________________________    < end of copied text >    [ ]  Catheterization:  < from: Cardiac Cath Lab - Adult (12.22.14 @ 14:30) >  VENTRICLES: Global left ventricular function was severely depressed. EF  estimated was 20 %.  VALVES: AORTIC VALVE: The aortic valve was evaluated by hemodynamic  measurement, left ventriculography, and aortography. There was trivial  aortic stenosis. There was severe aortic insufficiency. MITRAL VALVE: The  mitral valve was evaluated by hemodynamic measurements and left  ventriculography. There was no mitral stenosis. The mitral valve exhibited  moderate regurgitation.  CORONARY VESSELS: The coronary circulation is right dominant.  LM:   --  LM: Angiography showed mild atherosclerosis with no flow limiting  lesions.  LAD:   --  LAD: Angiography showed moderate atherosclerosis.  --  Mid LAD: Moderate in-stent restenosis.  CX:   --  Circumflex: Angiography showed moderate atherosclerosis.  RCA:   --  RCA: Angiography showed mild atherosclerosis with no flow  limiting lesions.  AORTA: The root exhibited normal size. Ascending aorta: Normal.    < end of copied text >  [ ] Stress Test:    OTHER:

## 2024-04-17 NOTE — DIETITIAN INITIAL EVALUATION ADULT - ORAL INTAKE PTA/DIET HISTORY
Patient tolerating current diet well. States his appetite was good the first few days of admission. States he was feeling nauseous all day yesterday, so he did not eat much. Reports he is "taking it slow" today, so is eating smaller portions at meals. No c/o N/V today. States his last BM was 4 days ago, bowel regimen in place. Current weight 179 lbs, no reports of unintentional weight loss in the past year. Pt does not meet criteria for malnutrition. Pt and wife educated on DASH/TLC diet. State they are very familiar with this as they follow it at home. All questions/concerns addressed. RD contact information provided for further nutrition-related questions or concerns. Will continue to monitor and follow up as needed. RD remains available.

## 2024-04-17 NOTE — CHART NOTE - NSCHARTNOTEFT_GEN_A_CORE
Pt reports that he is on Lyrica 400 mg bid from his neurologist. Medication confirmed via DrFirst.   Will order 300 mg bid at present. First dose Now

## 2024-04-17 NOTE — CHART NOTE - NSCHARTNOTEFT_GEN_A_CORE
Alerted by RN while doing rounds on 3T regarding pt's low BP.  Reviewed chart, pt Alerted by RN while doing rounds on 3T regarding pt's low BP.  Reviewed chart, pt w/ extensive cardiac history admitted for acute on chronic CHF w/ concern for shock requiring inotropic support w/ Dobutamine gtt.  Was also on Bumex gtt.    Dobutamine was d/c this afternoon.  Pt received 8 mg of Bumex in total since this am.  Currently hypotensive, 71/53 w/ MAP of 59.  Pt was seen at bedside, pt watching TV, mentating well, denies any symptoms, states he was relieved to have a BM today and feeling better.    ICU Vital Signs Last 24 Hrs  T(C): 36.6 (17 Apr 2024 19:26), Max: 36.7 (17 Apr 2024 00:09)  T(F): 97.8 (17 Apr 2024 19:26), Max: 98 (17 Apr 2024 00:09)  HR: 87 (17 Apr 2024 20:00) (71 - 87)  BP: 71/53 (17 Apr 2024 20:00) (71/53 - 118/83)  BP(mean): 59 (17 Apr 2024 20:00) (59 - 93)  ABP: --  ABP(mean): --  RR: 18 (17 Apr 2024 20:00) (18 - 19)  SpO2: 95% (17 Apr 2024 20:00) (95% - 97%)    O2 Parameters below as of 17 Apr 2024 20:00  Patient On (Oxygen Delivery Method): room air    General   Pt is A&Ox3 in NAD on RA  Lungs      fair air entry b/l w/o w/r/r  Heart       s1/s2 +murmur    Hypotensive  Mentating well    Likely from Dobutamine discontinuation and additional dose of Bumex in pm  RN to hold ordered pm Bumex  Albumin 25% 50cc IVPB   RN to repeat vitals and escalate prn, sooner if any change in pt's status Alerted by RN while doing rounds on 3T regarding pt's low BP.  Reviewed chart, pt w/ extensive cardiac history admitted for acute on chronic CHF w/ concern for shock requiring inotropic support w/ Dobutamine gtt.  Was also on Bumex gtt.    Dobutamine was d/c this afternoon.  Pt received 8 mg of Bumex in total since this am.  Currently hypotensive, 71/53 w/ MAP of 59.  Pt was seen at bedside, pt watching TV, mentating well, denies any symptoms, states he was relieved to have a BM today and feeling better.    ICU Vital Signs Last 24 Hrs  T(C): 36.6 (17 Apr 2024 19:26), Max: 36.7 (17 Apr 2024 00:09)  T(F): 97.8 (17 Apr 2024 19:26), Max: 98 (17 Apr 2024 00:09)  HR: 87 (17 Apr 2024 20:00) (71 - 87)  BP: 71/53 (17 Apr 2024 20:00) (71/53 - 118/83)  BP(mean): 59 (17 Apr 2024 20:00) (59 - 93)  ABP: --  ABP(mean): --  RR: 18 (17 Apr 2024 20:00) (18 - 19)  SpO2: 95% (17 Apr 2024 20:00) (95% - 97%)    O2 Parameters below as of 17 Apr 2024 20:00  Patient On (Oxygen Delivery Method): room air    General   Pt is A&Ox3 in NAD on RA  Lungs      fair air entry b/l w/o w/r/r  Heart       s1/s2 +murmur    Hypotensive  Mentating well    Likely from Dobutamine discontinuation and additional dose of Bumex in pm  RN to hold ordered pm Bumex  Albumin 25% 50cc IVPB   RN to repeat vitals and escalate prn, sooner if any change in pt's status    22:00  No improvement in BP  Ordered NS 1L IVPB over an hour  ICU consulted Alerted by RN while doing rounds on 3T regarding pt's low BP.  Reviewed chart, pt w/ extensive cardiac history admitted for acute on chronic CHF w/ concern for shock requiring inotropic support w/ Dobutamine gtt.  Was also on Bumex gtt.    Dobutamine was d/c this afternoon.  Pt received 8 mg of Bumex in total since this am.  Currently hypotensive, 71/53 w/ MAP of 59.  Pt was seen at bedside, pt watching TV, mentating well, denies any symptoms, states he was relieved to have a BM today and feeling better.    ICU Vital Signs Last 24 Hrs  T(C): 36.6 (17 Apr 2024 19:26), Max: 36.7 (17 Apr 2024 00:09)  T(F): 97.8 (17 Apr 2024 19:26), Max: 98 (17 Apr 2024 00:09)  HR: 87 (17 Apr 2024 20:00) (71 - 87)  BP: 71/53 (17 Apr 2024 20:00) (71/53 - 118/83)  BP(mean): 59 (17 Apr 2024 20:00) (59 - 93)  ABP: --  ABP(mean): --  RR: 18 (17 Apr 2024 20:00) (18 - 19)  SpO2: 95% (17 Apr 2024 20:00) (95% - 97%)    O2 Parameters below as of 17 Apr 2024 20:00  Patient On (Oxygen Delivery Method): room air    General   Pt is A&Ox3 in NAD on RA  Lungs      fair air entry b/l w/o w/r/r  Heart       s1/s2 +murmur    Hypotensive  Mentating well    Likely from Dobutamine discontinuation and additional dose of Bumex in pm  RN to hold ordered pm Bumex  Albumin 25% 50cc IVPB   RN to repeat vitals and escalate prn, sooner if any change in pt's status    22:00  No improvement in BP; continues to mentate well  ICU consulted  Ordered NS 1L IVPB over an hour as per ICU recommendation  RN was made aware of plan. Alerted by RN while doing rounds on 3T regarding pt's low BP.  Reviewed chart, pt w/ extensive cardiac history admitted for acute on chronic CHF w/ concern for shock requiring inotropic support w/ Dobutamine gtt.  Was also on Bumex gtt.    Dobutamine was d/c this afternoon.  Pt received 8 mg of Bumex in total since this am.  Currently hypotensive, 71/53 w/ MAP of 59.  Pt was seen at bedside, pt watching TV, mentating well, denies any symptoms, states he was relieved to have a BM today and feeling better.    ICU Vital Signs Last 24 Hrs  T(C): 36.6 (17 Apr 2024 19:26), Max: 36.7 (17 Apr 2024 00:09)  T(F): 97.8 (17 Apr 2024 19:26), Max: 98 (17 Apr 2024 00:09)  HR: 87 (17 Apr 2024 20:00) (71 - 87)  BP: 71/53 (17 Apr 2024 20:00) (71/53 - 118/83)  BP(mean): 59 (17 Apr 2024 20:00) (59 - 93)  ABP: --  ABP(mean): --  RR: 18 (17 Apr 2024 20:00) (18 - 19)  SpO2: 95% (17 Apr 2024 20:00) (95% - 97%)    O2 Parameters below as of 17 Apr 2024 20:00  Patient On (Oxygen Delivery Method): room air    General   Pt is A&Ox3 in NAD on RA  Lungs      fair air entry b/l w/o w/r/r  Heart       s1/s2 +murmur    Hypotensive  Mentating well    Likely from Dobutamine discontinuation and additional dose of Bumex in pm  RN to hold ordered pm Bumex  Albumin 25% 50cc IVPB   RN to repeat vitals and escalate prn, sooner if any change in pt's status    22:00  No improvement in BP; continues to mentate well  ICU consulted  Ordered NS 1L IVPB over an hour as per ICU recommendation  RN was made aware of plan.  Monitor for respiratory status and escalate prn Alerted by RN while doing rounds on 3T regarding pt's low BP.  Reviewed chart, pt w/ extensive cardiac history admitted for acute on chronic CHF w/ concern for shock requiring inotropic support w/ Dobutamine gtt.  Was also on Bumex gtt.    Dobutamine was d/c this afternoon.  Pt received 8 mg of Bumex in total since this am.  Currently hypotensive, 71/53 w/ MAP of 59.  Pt was seen at bedside, pt watching TV, mentating well, denies any symptoms, states he was relieved to have a BM today and feeling better.    ICU Vital Signs Last 24 Hrs  T(C): 36.6 (17 Apr 2024 19:26), Max: 36.7 (17 Apr 2024 00:09)  T(F): 97.8 (17 Apr 2024 19:26), Max: 98 (17 Apr 2024 00:09)  HR: 87 (17 Apr 2024 20:00) (71 - 87)  BP: 71/53 (17 Apr 2024 20:00) (71/53 - 118/83)  BP(mean): 59 (17 Apr 2024 20:00) (59 - 93)  ABP: --  ABP(mean): --  RR: 18 (17 Apr 2024 20:00) (18 - 19)  SpO2: 95% (17 Apr 2024 20:00) (95% - 97%)    O2 Parameters below as of 17 Apr 2024 20:00  Patient On (Oxygen Delivery Method): room air    General   Pt is A&Ox3 in NAD on RA  Lungs      fair air entry b/l w/o w/r/r  Heart       s1/s2 +murmur    Hypotensive  Mentating well    Likely from Dobutamine discontinuation and additional dose of Bumex in pm  RN to hold ordered pm Bumex  Albumin 25% 50cc IVPB   RN to repeat vitals and escalate prn, sooner if any change in pt's status    22:00  No improvement in BP; continues to mentate well  ICU consulted  Ordered NS 1L IVPB over an hour as per ICU recommendation  RN was made aware of plan.  Monitor for respiratory status and escalate prn    23:00  Followed up w/ RN regarding pt's status and vitals  Pt sleeping, arousable, answered all questions appropriately  VS BP 81/64  P 69  Awaiting ICU consult  Monitor and reassess prn Alerted by RN while doing rounds on 3T regarding pt's low BP.  Reviewed chart, pt w/ extensive cardiac history admitted for acute on chronic CHF w/ concern for shock requiring inotropic support w/ Dobutamine gtt.  Was also on Bumex gtt.    Dobutamine was d/c this afternoon.  Pt received 8 mg of Bumex in total since this am.  Currently hypotensive, 71/53 w/ MAP of 59.  Pt was seen at bedside, pt watching TV, mentating well, denies any symptoms, states he was relieved to have a BM today and feeling better.    ICU Vital Signs Last 24 Hrs  T(C): 36.6 (17 Apr 2024 19:26), Max: 36.7 (17 Apr 2024 00:09)  T(F): 97.8 (17 Apr 2024 19:26), Max: 98 (17 Apr 2024 00:09)  HR: 87 (17 Apr 2024 20:00) (71 - 87)  BP: 71/53 (17 Apr 2024 20:00) (71/53 - 118/83)  BP(mean): 59 (17 Apr 2024 20:00) (59 - 93)  ABP: --  ABP(mean): --  RR: 18 (17 Apr 2024 20:00) (18 - 19)  SpO2: 95% (17 Apr 2024 20:00) (95% - 97%)    O2 Parameters below as of 17 Apr 2024 20:00  Patient On (Oxygen Delivery Method): room air    General   Pt is A&Ox3 in NAD on RA  Lungs      fair air entry b/l w/o w/r/r  Heart       s1/s2 +murmur    Hypotensive  Mentating well    Likely from Dobutamine discontinuation and additional dose of Bumex in pm  RN to hold ordered pm Bumex  Albumin 25% 50cc IVPB   RN to repeat vitals and escalate prn, sooner if any change in pt's status    22:00  No improvement in BP; continues to mentate well  ICU consulted  Ordered NS 1L IVPB over an hour as per ICU recommendation  RN was made aware of plan.  Monitor for respiratory status and escalate prn    23:00  Followed up w/ RN regarding pt's status and vitals  Pt sleeping, arousable, answered all questions appropriately  VS BP 81/64  P 69  Awaiting ICU consult  Monitor and reassess prn    03:00  Called by RN w/ vitals, pt initially responded to IVF 86/60, now 69/56  Pt is somnolent, but arousable, able to answer questions   Called ICU PA back, made aware of above  Recommended another bolus of NS 500cc IVF  Stat CBC, CMP, lactate ordered  RN was made aware of above plan Alerted by RN while doing rounds on 3T regarding pt's low BP.  Reviewed chart, pt w/ extensive cardiac history admitted for acute on chronic CHF w/ concern for shock requiring inotropic support w/ Dobutamine gtt.  Was also on Bumex gtt.    Dobutamine was d/c this afternoon.  Pt received 8 mg of Bumex in total since this am.  Currently hypotensive, 71/53 w/ MAP of 59.  Pt was seen at bedside, pt watching TV, mentating well, denies any symptoms, states he was relieved to have a BM today and feeling better.    ICU Vital Signs Last 24 Hrs  T(C): 36.6 (17 Apr 2024 19:26), Max: 36.7 (17 Apr 2024 00:09)  T(F): 97.8 (17 Apr 2024 19:26), Max: 98 (17 Apr 2024 00:09)  HR: 87 (17 Apr 2024 20:00) (71 - 87)  BP: 71/53 (17 Apr 2024 20:00) (71/53 - 118/83)  BP(mean): 59 (17 Apr 2024 20:00) (59 - 93)  ABP: --  ABP(mean): --  RR: 18 (17 Apr 2024 20:00) (18 - 19)  SpO2: 95% (17 Apr 2024 20:00) (95% - 97%)    O2 Parameters below as of 17 Apr 2024 20:00  Patient On (Oxygen Delivery Method): room air    General   Pt is A&Ox3 in NAD on RA  Lungs      fair air entry b/l w/o w/r/r  Heart       s1/s2 +murmur    Hypotensive  Mentating well    Likely from Dobutamine discontinuation and additional dose of Bumex in pm  RN to hold ordered pm Bumex  Albumin 25% 50cc IVPB   RN to repeat vitals and escalate prn, sooner if any change in pt's status    22:00  No improvement in BP; continues to mentate well  ICU consulted  Ordered NS 1L IVPB over an hour as per ICU recommendation  RN was made aware of plan.  Monitor for respiratory status and escalate prn    23:00  Followed up w/ RN regarding pt's status and vitals  Pt sleeping, arousable, answered all questions appropriately  VS BP 81/64  P 69  Awaiting ICU consult  Monitor and reassess prn    03:00  Called by RN w/ vitals, pt initially responded to IVF 86/60, now 69/56  Pt is somnolent, arousable, able to answer questions   Called ICU PA back, made aware of above  Recommended another bolus of NS 500cc IVF  Stat CBC, CMP, lactate ordered  RN was made aware of above plan

## 2024-04-17 NOTE — PROGRESS NOTE ADULT - TIME BILLING
discussion with patient and his family, as well as consulting team about management plan, acute and long-term AF management options
Low output ADHF, Afib

## 2024-04-17 NOTE — PROGRESS NOTE ADULT - ASSESSMENT
Primary Cardiologist: Dr. Pressley  HF: Dr. Sanchez   EP: Dr. Apple  Pulmonologist: Dr. 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     Of note, this is his second hospitalization this year for ADHF in the setting of Afib. He was hospitalized at [facility] for rising CardioMEMS numbers from 1/10 - 24. He underwent DCCV on 1/15/24 with improvement in CardioMEMS numbers to 31.     CardioMEMS readin/16: PAD 33-34    RHC 4/15/24 (Full report pending) on  2.5mcg/kg/min: HR 75, /69/84 RA 17/15/15, PAP 68/25/43, PCWP 23, CO 5.16, CI 2.68.    TTE 24: LVEF <20%, LVIDd 7.4cm, LVH, grade III DD, mild RVE w/ mildly reduced systolic function, moderate TR, PASP 52 mmHg (RAP ~15 mmHg).    Prior Cardiac Studies:  Cardiac Cath/PCI: 23 - RHC (MEMS calibration) - RA 16, RV 60/20, PA 61/36/43, PCWP 22 (v 27), CO/CI 4.6/2.03, PVR 4.54WU, (SBP missing).    21 - RHC/MEMS implant - RA 12, RV 50/13, PA 51/20/33, PCWP 11, CO/CI 4.7/2.08 (PA sat 59%, Ao sat 93%, Hb 11.5), PVR 4.68 CIFUENTES    [facility] [place] 20 - LM mild CAD, mLAD 10% stenosis at site of prior stent, LCx/RCA mod CAD

## 2024-04-17 NOTE — PROGRESS NOTE ADULT - SUBJECTIVE AND OBJECTIVE BOX
Pt seen lying in bed. Concerned with lack of ambulation- requesting PT. Denies chest pain, palpitation, shortness of breath. States energy levels are slightly improved.   Telemetry shows atrial fibrillation w/ controlled rates. Remains on dobutamine gtt.     MEDICATIONS  (STANDING):  aMIOdarone    Tablet 200 milliGRAM(s) Oral two times a day  apixaban 5 milliGRAM(s) Oral every 12 hours  aspirin enteric coated 81 milliGRAM(s) Oral daily  atorvastatin 40 milliGRAM(s) Oral at bedtime  bisacodyl 5 milliGRAM(s) Oral once  bisacodyl Suppository 10 milliGRAM(s) Rectal once  buMETAnide IVPB 4 milliGRAM(s) IV Intermittent two times a day  carBAMazepine ER Capsule 100 milliGRAM(s) Oral two times a day  DOBUTamine Infusion 1 MICROgram(s)/kG/Min (2.45 mL/Hr) IV Continuous <Continuous>  levothyroxine 88 MICROGram(s) Oral daily  polyethylene glycol 3350 17 Gram(s) Oral daily  sacubitril 24 mG/valsartan 26 mG 1 Tablet(s) Oral two times a day  spironolactone 25 milliGRAM(s) Oral every 12 hours    MEDICATIONS  (PRN):  acetaminophen     Tablet .. 650 milliGRAM(s) Oral every 6 hours PRN Temp greater or equal to 38C (100.4F), Mild Pain (1 - 3)  aluminum hydroxide/magnesium hydroxide/simethicone Suspension 30 milliLiter(s) Oral every 4 hours PRN Dyspepsia  bisacodyl Suppository 10 milliGRAM(s) Rectal daily PRN Constipation  melatonin 3 milliGRAM(s) Oral at bedtime PRN Insomnia  ondansetron Injectable 4 milliGRAM(s) IV Push every 8 hours PRN Nausea and/or Vomiting    Allergies:  No Known Allergies    Intolerances:  Entresto (Other)    Vital Signs Last 24 Hrs  T(C): 36.6 (17 Apr 2024 08:51), Max: 37 (16 Apr 2024 20:31)  T(F): 97.9 (17 Apr 2024 08:51), Max: 98.6 (16 Apr 2024 20:31)  HR: 77 (17 Apr 2024 06:00) (71 - 93)  BP: 115/76 (17 Apr 2024 08:00) (100/71 - 120/75)  BP(mean): 89 (17 Apr 2024 08:00) (76 - 99)  RR: 19 (17 Apr 2024 06:00) (18 - 19)  SpO2: 95% (17 Apr 2024 06:00) (93% - 97%)    Parameters below as of 17 Apr 2024 08:00  Patient On (Oxygen Delivery Method): nasal cannula    Physical Exam:  Constitutional: awake, alert, frail   Cardiovascular: +S1S2 RRR  Pulmonary: CTA b/l, unlabored  GI: soft NTND +BS  Extremities: no pedal edema, +distal pulses b/l  Neuro: non focal, TERRAZAS x4    LABS:                        14.2   10.67 )-----------( 180      ( 17 Apr 2024 06:21 )             44.7     04-17    135  |  93<L>  |  18.8  ----------------------------<  117<H>  3.8   |  28.0  |  0.95    Ca    9.0      17 Apr 2024 06:21  Mg     1.9     04-17    TPro  6.8  /  Alb  3.8  /  TBili  3.3<H>  /  DBili  x   /  AST  31  /  ALT  46<H>  /  AlkPhos  155<H>  04-17    Urinalysis Basic - ( 17 Apr 2024 06:21 )    Color: x / Appearance: x / SG: x / pH: x  Gluc: 117 mg/dL / Ketone: x  / Bili: x / Urobili: x   Blood: x / Protein: x / Nitrite: x   Leuk Esterase: x / RBC: x / WBC x   Sq Epi: x / Non Sq Epi: x / Bacteria: x    RADIOLOGY & ADDITIONAL TESTS:  TTE: 4/13/24: CONCLUSIONS:   1. Technically difficult image quality.   2. Left ventricular cavity is severely dilated. Left ventricular systolic function is severely decreased with an ejection fraction visually estimated at <20 %. Global left ventricular hypokinesis.   3. There is increased LV mass and eccentric hypertrophy.   4. There is severe (grade 3) left ventricular diastolic dysfunction, with elevated filling pressure.   5. Mildly enlarged right ventricular cavity size and mildly reduced systolic function.   6. The left atrium is mildly dilated.   7. The right atrium is dilated.   8. Mild mitral regurgitation.   9. A bioprosthetic valve replacement Transcatheter deployed (TAVR) valve replacement is present in the aortic position The prosthetic valve is well seated with normal function.  10. Moderate tricuspid regurgitation.  11. Estimated pulmonary artery systolic pressure is 52mmHg, consistent with elevated pulmonary artery pressure.  12. No prior echocardiogram is available for comparison.

## 2024-04-17 NOTE — DIETITIAN INITIAL EVALUATION ADULT - NS FNS DIET ORDER
Diet, NPO:   NPO for Procedure/Test     NPO Start Date: 17-Apr-2024,   NPO Start Time: 23:59  Except Medications (04-17-24 @ 09:21)  Diet, DASH/TLC:   Sodium & Cholesterol Restricted (04-12-24 @ 22:54)    Diet, NPO:   NPO for Procedure/Test     NPO Start Date: 17-Apr-2024,   NPO Start Time: 23:59  Except Medications (04-17-24 @ 09:21)

## 2024-04-17 NOTE — DIETITIAN INITIAL EVALUATION ADULT - ADD RECOMMEND
1) Continue diet as tolerated.   2) Obtain HgbA1c as pt with hx of T2DM.  3) Encourage po intake, monitor diet tolerance, and provide assistance at meals as needed.   4) Rx: MVI daily.   5) Obtain daily weights to monitor trends.

## 2024-04-18 ENCOUNTER — RESULT REVIEW (OUTPATIENT)
Age: 74
End: 2024-04-18

## 2024-04-18 LAB
ALBUMIN SERPL ELPH-MCNC: 3.4 G/DL — SIGNIFICANT CHANGE UP (ref 3.3–5.2)
ALP SERPL-CCNC: 146 U/L — HIGH (ref 40–120)
ALT FLD-CCNC: 47 U/L — HIGH
ANION GAP SERPL CALC-SCNC: 13 MMOL/L — SIGNIFICANT CHANGE UP (ref 5–17)
AST SERPL-CCNC: 39 U/L — SIGNIFICANT CHANGE UP
BASOPHILS # BLD AUTO: 0.08 K/UL — SIGNIFICANT CHANGE UP (ref 0–0.2)
BASOPHILS NFR BLD AUTO: 1 % — SIGNIFICANT CHANGE UP (ref 0–2)
BILIRUB SERPL-MCNC: 2.6 MG/DL — HIGH (ref 0.4–2)
BUN SERPL-MCNC: 26 MG/DL — HIGH (ref 8–20)
CALCIUM SERPL-MCNC: 8.5 MG/DL — SIGNIFICANT CHANGE UP (ref 8.4–10.5)
CHLORIDE SERPL-SCNC: 98 MMOL/L — SIGNIFICANT CHANGE UP (ref 96–108)
CO2 SERPL-SCNC: 26 MMOL/L — SIGNIFICANT CHANGE UP (ref 22–29)
CREAT SERPL-MCNC: 1.33 MG/DL — HIGH (ref 0.5–1.3)
EGFR: 56 ML/MIN/1.73M2 — LOW
EOSINOPHIL # BLD AUTO: 0.18 K/UL — SIGNIFICANT CHANGE UP (ref 0–0.5)
EOSINOPHIL NFR BLD AUTO: 2.2 % — SIGNIFICANT CHANGE UP (ref 0–6)
GLUCOSE BLDC GLUCOMTR-MCNC: 93 MG/DL — SIGNIFICANT CHANGE UP (ref 70–99)
GLUCOSE BLDC GLUCOMTR-MCNC: 95 MG/DL — SIGNIFICANT CHANGE UP (ref 70–99)
GLUCOSE SERPL-MCNC: 89 MG/DL — SIGNIFICANT CHANGE UP (ref 70–99)
HCT VFR BLD CALC: 42 % — SIGNIFICANT CHANGE UP (ref 39–50)
HGB BLD-MCNC: 13.6 G/DL — SIGNIFICANT CHANGE UP (ref 13–17)
IMM GRANULOCYTES NFR BLD AUTO: 0.6 % — SIGNIFICANT CHANGE UP (ref 0–0.9)
LACTATE BLDV-MCNC: 1 MMOL/L — SIGNIFICANT CHANGE UP (ref 0.5–2)
LYMPHOCYTES # BLD AUTO: 1.05 K/UL — SIGNIFICANT CHANGE UP (ref 1–3.3)
LYMPHOCYTES # BLD AUTO: 12.5 % — LOW (ref 13–44)
MAGNESIUM SERPL-MCNC: 2.2 MG/DL — SIGNIFICANT CHANGE UP (ref 1.6–2.6)
MCHC RBC-ENTMCNC: 30.8 PG — SIGNIFICANT CHANGE UP (ref 27–34)
MCHC RBC-ENTMCNC: 32.4 GM/DL — SIGNIFICANT CHANGE UP (ref 32–36)
MCV RBC AUTO: 95.2 FL — SIGNIFICANT CHANGE UP (ref 80–100)
MONOCYTES # BLD AUTO: 1.2 K/UL — HIGH (ref 0–0.9)
MONOCYTES NFR BLD AUTO: 14.3 % — HIGH (ref 2–14)
NEUTROPHILS # BLD AUTO: 5.81 K/UL — SIGNIFICANT CHANGE UP (ref 1.8–7.4)
NEUTROPHILS NFR BLD AUTO: 69.4 % — SIGNIFICANT CHANGE UP (ref 43–77)
PLATELET # BLD AUTO: 187 K/UL — SIGNIFICANT CHANGE UP (ref 150–400)
POTASSIUM SERPL-MCNC: 4.1 MMOL/L — SIGNIFICANT CHANGE UP (ref 3.5–5.3)
POTASSIUM SERPL-SCNC: 4.1 MMOL/L — SIGNIFICANT CHANGE UP (ref 3.5–5.3)
PROT SERPL-MCNC: 6.1 G/DL — LOW (ref 6.6–8.7)
RBC # BLD: 4.41 M/UL — SIGNIFICANT CHANGE UP (ref 4.2–5.8)
RBC # FLD: 15.2 % — HIGH (ref 10.3–14.5)
SODIUM SERPL-SCNC: 137 MMOL/L — SIGNIFICANT CHANGE UP (ref 135–145)
WBC # BLD: 8.37 K/UL — SIGNIFICANT CHANGE UP (ref 3.8–10.5)
WBC # FLD AUTO: 8.37 K/UL — SIGNIFICANT CHANGE UP (ref 3.8–10.5)

## 2024-04-18 PROCEDURE — 99233 SBSQ HOSP IP/OBS HIGH 50: CPT

## 2024-04-18 PROCEDURE — 99232 SBSQ HOSP IP/OBS MODERATE 35: CPT

## 2024-04-18 PROCEDURE — 93312 ECHO TRANSESOPHAGEAL: CPT | Mod: 26

## 2024-04-18 PROCEDURE — 93320 DOPPLER ECHO COMPLETE: CPT | Mod: 26

## 2024-04-18 PROCEDURE — 93325 DOPPLER ECHO COLOR FLOW MAPG: CPT | Mod: 26

## 2024-04-18 PROCEDURE — 93451 RIGHT HEART CATH: CPT | Mod: 26

## 2024-04-18 PROCEDURE — 93010 ELECTROCARDIOGRAM REPORT: CPT

## 2024-04-18 RX ORDER — SODIUM CHLORIDE 9 MG/ML
250 INJECTION INTRAMUSCULAR; INTRAVENOUS; SUBCUTANEOUS ONCE
Refills: 0 | Status: COMPLETED | OUTPATIENT
Start: 2024-04-18 | End: 2024-04-18

## 2024-04-18 RX ORDER — SODIUM CHLORIDE 9 MG/ML
500 INJECTION INTRAMUSCULAR; INTRAVENOUS; SUBCUTANEOUS ONCE
Refills: 0 | Status: COMPLETED | OUTPATIENT
Start: 2024-04-18 | End: 2024-04-18

## 2024-04-18 RX ORDER — DOBUTAMINE HCL 250MG/20ML
5 VIAL (ML) INTRAVENOUS
Qty: 500 | Refills: 0 | Status: DISCONTINUED | OUTPATIENT
Start: 2024-04-18 | End: 2024-04-20

## 2024-04-18 RX ADMIN — Medication 100 MILLIGRAM(S): at 18:27

## 2024-04-18 RX ADMIN — Medication 12.2 MICROGRAM(S)/KG/MIN: at 20:13

## 2024-04-18 RX ADMIN — ATORVASTATIN CALCIUM 40 MILLIGRAM(S): 80 TABLET, FILM COATED ORAL at 21:34

## 2024-04-18 RX ADMIN — SODIUM CHLORIDE 2000 MILLILITER(S): 9 INJECTION INTRAMUSCULAR; INTRAVENOUS; SUBCUTANEOUS at 03:29

## 2024-04-18 RX ADMIN — APIXABAN 5 MILLIGRAM(S): 2.5 TABLET, FILM COATED ORAL at 18:26

## 2024-04-18 RX ADMIN — AMIODARONE HYDROCHLORIDE 200 MILLIGRAM(S): 400 TABLET ORAL at 18:36

## 2024-04-18 RX ADMIN — Medication 88 MICROGRAM(S): at 05:10

## 2024-04-18 RX ADMIN — SODIUM CHLORIDE 500 MILLILITER(S): 9 INJECTION INTRAMUSCULAR; INTRAVENOUS; SUBCUTANEOUS at 22:34

## 2024-04-18 RX ADMIN — SPIRONOLACTONE 25 MILLIGRAM(S): 25 TABLET, FILM COATED ORAL at 18:26

## 2024-04-18 RX ADMIN — Medication 100 MILLIGRAM(S): at 05:10

## 2024-04-18 RX ADMIN — Medication 81 MILLIGRAM(S): at 11:50

## 2024-04-18 RX ADMIN — Medication 300 MILLIGRAM(S): at 05:10

## 2024-04-18 RX ADMIN — APIXABAN 5 MILLIGRAM(S): 2.5 TABLET, FILM COATED ORAL at 05:11

## 2024-04-18 RX ADMIN — Medication 300 MILLIGRAM(S): at 18:27

## 2024-04-18 NOTE — PROGRESS NOTE ADULT - ASSESSMENT
73M hx CAD s/p late-presenting MI 2005 s/p PCI LAD, ICM/HFrEF (EF 15-20%, LVEDD 6 cm) s/p single chamber ICD with [place] lead 2011 s/p lead extraction and upgrade to Medtronic dual-chamber ICD (8/17/21), has hx of appropriate ICD shock for VT/VF, s/p MEMS (9/16/21), bioprosthetic AVR (2015) 2/2 AI s/p 26 mm EVOLUT HECTOR for severe bioprosthetic AS (8/20/20), prior aflutter ablation 7/2017, persistent afib, s/p DCCV x2 (8/12/20, 7/7/21, 01/2024), Trigeminal neuralgia (4/2022) who presented to [facility] for shortness of breath and elevated filling pressures on cardiomems. Patient states that he as been feeling weak for the last few days, and has been experiencing dyspnea on exertion with persistent cough. Patient also noted that his feet and hands were becoming progressively colder, and his cardiomems was up in the 40s (goal 30-34). Patient had his torsemide dose increased at home, but symptoms did not improve. Patient was sent to [facility] for further management. Patient states today his symptoms are beginning to improve.

## 2024-04-18 NOTE — PROGRESS NOTE ADULT - SUBJECTIVE AND OBJECTIVE BOX
ADVANCED HEART FAILURE - PROGRESS NOTE  [address] [place] [address]  Office Phone: [phone]/Fax: [phone]  Service/On Call Phone [phone]  _______________________________________________________________________________________________________    Subjective:  - Hypotensive last night, received IVF  - This morning resting comfortably in bed. Denies any SOB at rest, orthopnea, PND, LH/dizziness    Medications:  acetaminophen     Tablet .. 650 milliGRAM(s) Oral every 6 hours PRN  aluminum hydroxide/magnesium hydroxide/simethicone Suspension 30 milliLiter(s) Oral every 4 hours PRN  aMIOdarone    Tablet 200 milliGRAM(s) Oral two times a day  apixaban 5 milliGRAM(s) Oral every 12 hours  aspirin enteric coated 81 milliGRAM(s) Oral daily  atorvastatin 40 milliGRAM(s) Oral at bedtime  bisacodyl Suppository 10 milliGRAM(s) Rectal daily PRN  buMETAnide IVPB 4 milliGRAM(s) IV Intermittent two times a day  carBAMazepine ER Capsule 100 milliGRAM(s) Oral two times a day  levothyroxine 88 MICROGram(s) Oral daily  melatonin 3 milliGRAM(s) Oral at bedtime PRN  ondansetron Injectable 4 milliGRAM(s) IV Push every 8 hours PRN  polyethylene glycol 3350 17 Gram(s) Oral daily  pregabalin 300 milliGRAM(s) Oral two times a day  spironolactone 25 milliGRAM(s) Oral every 12 hours      ICU Vital Signs Last 24 Hrs  T(C): 36.6, Max: 37.4 (04-18 @ 08:35)  HR: 87 (69 - 88)  BP: 86/69 (62/46 - 109/67)  BP(mean): 82 (59 - 86)  ABP: --  ABP(mean): --  RR: 22 (17 - 22)  SpO2: 96% (94% - 99%)        I&O's Summary Last 24 Hrs    IN: 1550 mL / OUT: 910 mL / NET: 640 mL      Tele: Afib/ 70-80s    Physical Exam:    General: NAD.  Neck: JVP elevated.  Respiratory: Clear to auscultation bilaterally  CV: Normal S1 and S2. No murmurs, rub, or gallops. Radial pulses normal.  Abdomen: Soft, non-distended, non-tender  Extremities: Warm, trace ankle edema  Neurology: Non-focal, alert and oriented times three.   Psych: Affect normal    Labs:    ( 04-18-24 @ 04:03 )               13.6   8.37  )--------( 187                  42.0     ( 04-18-24 @ 04:03 )     137  |  98  |  26.0  ---------------------<  89  4.1  |  26.0  |  1.33    Ca 8.5  Phos x   Mg 2.2    ( 04-18-24 @ 04:03 )  TPro  6.1  /  Alb  3.4  /  TBili  2.6  /  DBili  x   /  AST  39  /  ALT  47  /  AlkPhos  146  ( 04-17-24 @ 06:21 )  TPro  6.8  /  Alb  3.8  /  TBili  3.3  /  DBili  2.2  /  AST  31  /  ALT  46  /  AlkPhos  155    ( 04-12-24 @ 19:18 )  TropHS 35    / CK x     / CKMB x        Blood Gas Venous - Lactate: 1.00 (04-18-24 @ 04:03)

## 2024-04-18 NOTE — CONSULT NOTE ADULT - CRITICAL CARE ATTENDING COMMENT
Pt seen by PA and myself, PMHx sig for CAD s/p PCI (2005), ICM/HFrEF (15-20%), ICD, persistent afib s/p DCCV x 2 and trigeminal neuralgia.  Pt admitted for ?CHF exacerbation and diuresed.  >900 cc urine obtained BP became low and given NS w appropriate response to fluid challenge. Pt had been on dobutamine which has been stopped and now receiving Bumex.  Would hold for now.  Cardio f/u.  MAP >65 at this time.  Reconsult ICU prn.

## 2024-04-18 NOTE — CONSULT NOTE ADULT - SUBJECTIVE AND OBJECTIVE BOX
Patient is a 73y old  Male who presents with a chief complaint of acute on chronic HF (17 Apr 2024 17:02)      BRIEF HOSPITAL COURSE: Patient is a 72 y/o male with pmhx of CAD s/p PCI (2005), ICM/HFrEF (15-20%), ICD, persistent afib s/p DCCV x 2 and trigeminal neuralgia who presented for elevated filling pressure on cardiomems. Transferred to SDU for bumex ggt with fixed dose Dobutamine. Now s/p RHC. Slowly being weaned off pressors to prepare for possible DCCV tomorrow.     Events last 24 hours: MICU consulted this evening for profound hypotension. Patient seen and examined at bedside. Pt is without acute complaints at this time.     PAST MEDICAL & SURGICAL HISTORY:  CAD (coronary artery disease)  2004      MI (myocardial infarction)  Nov 2004      Systolic heart failure  EF 15%      HLD (hyperlipidemia)      HTN (hypertension)      AICD (automatic cardioverter/defibrillator) present      Aortic stenosis  severe, s/p AVR      H/O emphysema  - moderately severe, CT chest 06/10/21      Carotid stenosis  - moderate, b/l (Doppler 12/27/21)      Ischemic cardiomyopathy      H/O pulmonary hypertension      Longstanding persistent atrial fibrillation      Type 2 diabetes mellitus      Osteoarthritis of right knee      Neuralgia      CRI (chronic renal insufficiency)      Stented coronary artery  LAD x 3 (Nov 2004)      S/P knee surgery  - Right knee arthroscopy, 2000      S/P hernia repair  - right inguinal, 1982      S/P AVR  2015, complicated by Asystole/Syncope with 1 shock, Transcatheter valve-in-valve aortic valve replacement utilizing a right common femoral arterial percutaneous approach utilizing a 26 Medtronic Evolut core valve on 08/20/2020      AICD (automatic cardioverter/defibrillator) present  single chamber 2005, replaced with dual chamber 08/17/2021      H/O prior ablation treatment  afib - 2017, 07/2021        Allergies    No Known Allergies    Intolerances    Entresto (Other)    FAMILY HISTORY:  Chronic obstructive pulmonary disease    Family history of colon cancer (Father)        Review of Systems:  CONSTITUTIONAL: No fever, chills, or fatigue  EYES: No eye pain, visual disturbances, or discharge  ENMT:  No difficulty hearing, tinnitus, vertigo; No sinus or throat pain  NECK: No pain or stiffness  RESPIRATORY: No cough, wheezing, chills or hemoptysis; No shortness of breath  CARDIOVASCULAR: No chest pain, palpitations, dizziness, or leg swelling  GASTROINTESTINAL: No abdominal or epigastric pain. No nausea, vomiting, or hematemesis; No diarrhea or constipation. No melena or hematochezia.  GENITOURINARY: No dysuria, frequency, hematuria, or incontinence  NEUROLOGICAL: No headaches, memory loss, loss of strength, numbness, or tremors  SKIN: No itching, burning, rashes, or lesions   MUSCULOSKELETAL: No joint pain or swelling; No muscle, back, or extremity pain  PSYCHIATRIC: No depression, anxiety, mood swings, or difficulty sleeping      Medications:    aMIOdarone    Tablet 200 milliGRAM(s) Oral two times a day  buMETAnide IVPB 4 milliGRAM(s) IV Intermittent two times a day  sacubitril 24 mG/valsartan 26 mG 1 Tablet(s) Oral two times a day  spironolactone 25 milliGRAM(s) Oral every 12 hours      acetaminophen     Tablet .. 650 milliGRAM(s) Oral every 6 hours PRN  carBAMazepine ER Capsule 100 milliGRAM(s) Oral two times a day  melatonin 3 milliGRAM(s) Oral at bedtime PRN  ondansetron Injectable 4 milliGRAM(s) IV Push every 8 hours PRN  pregabalin 300 milliGRAM(s) Oral two times a day      apixaban 5 milliGRAM(s) Oral every 12 hours  aspirin enteric coated 81 milliGRAM(s) Oral daily    aluminum hydroxide/magnesium hydroxide/simethicone Suspension 30 milliLiter(s) Oral every 4 hours PRN  bisacodyl Suppository 10 milliGRAM(s) Rectal daily PRN  polyethylene glycol 3350 17 Gram(s) Oral daily      atorvastatin 40 milliGRAM(s) Oral at bedtime  levothyroxine 88 MICROGram(s) Oral daily                  ICU Vital Signs Last 24 Hrs  T(C): 36.6 (18 Apr 2024 00:15), Max: 36.7 (17 Apr 2024 15:37)  T(F): 97.9 (18 Apr 2024 00:15), Max: 98 (17 Apr 2024 15:37)  HR: 72 (18 Apr 2024 03:00) (69 - 87)  BP: 69/56 (18 Apr 2024 03:00) (62/46 - 118/83)  BP(mean): 62 (18 Apr 2024 03:00) (59 - 93)  ABP: --  ABP(mean): --  RR: 17 (18 Apr 2024 03:00) (17 - 19)  SpO2: 95% (18 Apr 2024 03:00) (95% - 97%)    O2 Parameters below as of 18 Apr 2024 03:00  Patient On (Oxygen Delivery Method): nasal cannula  O2 Flow (L/min): 2        Vital Signs Last 24 Hrs  T(C): 36.6 (18 Apr 2024 00:15), Max: 36.7 (17 Apr 2024 15:37)  T(F): 97.9 (18 Apr 2024 00:15), Max: 98 (17 Apr 2024 15:37)  HR: 72 (18 Apr 2024 03:00) (69 - 87)  BP: 69/56 (18 Apr 2024 03:00) (62/46 - 118/83)  BP(mean): 62 (18 Apr 2024 03:00) (59 - 93)  RR: 17 (18 Apr 2024 03:00) (17 - 19)  SpO2: 95% (18 Apr 2024 03:00) (95% - 97%)    Parameters below as of 18 Apr 2024 03:00  Patient On (Oxygen Delivery Method): nasal cannula  O2 Flow (L/min): 2          I&O's Detail    16 Apr 2024 07:01  -  17 Apr 2024 07:00  --------------------------------------------------------  IN:    DOBUTamine: 24.4 mL    DOBUTamine: 41.7 mL    Oral Fluid: 930 mL  Total IN: 996.1 mL    OUT:    Voided (mL): 1850 mL  Total OUT: 1850 mL    Total NET: -853.9 mL      17 Apr 2024 07:01  -  18 Apr 2024 03:35  --------------------------------------------------------  IN:  Total IN: 0 mL    OUT:    Voided (mL): 910 mL  Total OUT: 910 mL    Total NET: -910 mL            LABS:                        14.2   10.67 )-----------( 180      ( 17 Apr 2024 06:21 )             44.7     04-17    135  |  93<L>  |  18.8  ----------------------------<  117<H>  3.8   |  28.0  |  0.95    Ca    9.0      17 Apr 2024 06:21  Mg     1.9     04-17    TPro  6.8  /  Alb  3.8  /  TBili  3.3<H>  /  DBili  2.2<H>  /  AST  31  /  ALT  46<H>  /  AlkPhos  155<H>  04-17          CAPILLARY BLOOD GLUCOSE      POCT Blood Glucose.: 147 mg/dL (17 Apr 2024 22:05)      Urinalysis Basic - ( 17 Apr 2024 06:21 )    Color: x / Appearance: x / SG: x / pH: x  Gluc: 117 mg/dL / Ketone: x  / Bili: x / Urobili: x   Blood: x / Protein: x / Nitrite: x   Leuk Esterase: x / RBC: x / WBC x   Sq Epi: x / Non Sq Epi: x / Bacteria: x      CULTURES:      Physical Examination:    General: No acute distress.  A&O x3.     HEENT: NC/AT     PULM: CTA. Sating well on nasal cannula     CVS: Irregular rate and rhythm, normal S1 and S2, no murmur/rub/gallop; improved LE edema     ABD: Soft, nondistended, nontender, normoactive bowel sounds, no masses    EXT: No edema, nontender    SKIN: Warm and well perfused, no rashes noted.    RADIOLOGY: < from: Xray Chest 1 View- PORTABLE-Urgent (04.12.24 @ 21:15) >  IMPRESSION: Median sternotomy. Aortic valve replacement. Cardio mems   device. Left cardiac pacer. Stable cardiomegaly. No consolidation or   effusion.    < from: TTE W or WO Ultrasound Enhancing Agent (04.13.24 @ 11:31) >  CONCLUSIONS:      1. Technically difficult image quality.   2. Left ventricular cavity is severely dilated. Left ventricular systolic function is severely decreased with an ejection fraction visually estimated at <20 %. Global left ventricular hypokinesis.   3. There is increased LV mass and eccentric hypertrophy.   4. There is severe (grade 3) left ventricular diastolic dysfunction, with elevated filling pressure.   5. Mildly enlarged right ventricular cavity size and mildly reduced systolic function.   6. The left atrium is mildly dilated.   7. The right atrium is dilated.   8. Mild mitral regurgitation.   9. A bioprosthetic valve replacement Transcatheter deployed (TAVR) valve replacement is present in the aortic position The prosthetic valve is well seated with normal function.  10. Moderate tricuspid regurgitation.  11. Estimated pulmonary artery systolic pressure is 52mmHg, consistent with elevated pulmonary artery pressure.  12. No prior echocardiogram is available for comparison.

## 2024-04-18 NOTE — CHART NOTE - NSCHARTNOTEFT_GEN_A_CORE
Interventional Cardiology NP Precath Note      HPI:  74 yo M with CAD s/p late-presenting MI 2005 s/p PCI LAD, ICM/HFrEF (EF 15-20%, LVEDD 6 cm) s/p single chamber ICD with lead revision in 2018 with upgrade to dual-chamber device (8/17/21) however attempts at LV lead placement unsuccessful due to anatomy, s/p MEMS (9/16/21), bioprosthetic AVR (2015) 2/2 AI s/p 26 mm EVOLUT HECTOR for severe bioprosthetic AS (8/20/20), aflutter ablation 7/17 w/ persistent afib, s/p DCCV x2 (8/12/20, 7/7/21, 1/17/2024), Trigeminal neuralgia (4/2022) who presented to [facility] for shortness of breath and elevated filling pressures on cardiomems. Patient states that he as been feeling weak for the last few days, and has been experiencing dyspnea on exertion with persistent cough. Patient also noted that his feet and hands were becoming progressively colder, and his cardiomems was up in the 40s (goal 30-34). Patient had his torsemide dose increased at home, but symptoms did not improve. Patient was sent to [facility] for further management. pBNP 2041  ptaient endorses syncopal episodes twice last week, prior to admission, Per patient, dizzy spells occurs prior to th syncopal episodes, Ortho statics were positive at Op office recently.  Of note, this is his second hospitalization this year for ADHF in the setting of Afib. He was hospitalized at [facility] for rising CardioMEMS numbers from 1/10 - 1/17/24. Ablation was aborted due to [place]. He ultimately underwent DCCV on 1/15/24 with improvement in CardioMEMS numbers to 31. Per daughter his C, reeding at home was 41.   Acute on chronic combined systolic and diastolic congestive heart failure, on  Dobutamine @ 2.5 mcg/kg/min for now. Do not titrate,  Bumex gtt. @ 0.5 mg/hr.BB on hold   On DC-ICD w/ elevated RV pacing burden. May benefit from eventual CRT-D upgrade per EP, (Follows w/ Dr. Apple as outpt).  CardioMEMS: Goal PAD 32-34.   H/o late presentation of MI, S/P PCI to LAD  patient now presents to [facility] CCL for RHC         ALL: NKDA, NKFA. Denies shellfish/Contrast dye allergy.  PAST MEDICAL & SURGICAL HISTORY:  CAD (coronary artery disease)  2004      MI (myocardial infarction)  Nov 2004      Systolic heart failure  EF 15%      HLD (hyperlipidemia)      HTN (hypertension)      AICD (automatic cardioverter/defibrillator) present      Aortic stenosis  severe, s/p AVR      H/O emphysema  - moderately severe, CT chest 06/10/21      Carotid stenosis  - moderate, b/l (Doppler 12/27/21)      Ischemic cardiomyopathy      H/O pulmonary hypertension      Longstanding persistent atrial fibrillation      Type 2 diabetes mellitus      Osteoarthritis of right knee      Neuralgia      CRI (chronic renal insufficiency)      Stented coronary artery  LAD x 3 (Nov 2004)      S/P knee surgery  - Right knee arthroscopy, 2000      S/P hernia repair  - right inguinal, 1982      S/P AVR  2015, complicated by Asystole/Syncope with 1 shock, Transcatheter valve-in-valve aortic valve replacement utilizing a right common femoral arterial percutaneous approach utilizing a 26 Medtronic Evolut core valve on 08/20/2020      AICD (automatic cardioverter/defibrillator) present  single chamber 2005, replaced with dual chamber 08/17/2021      H/O prior ablation treatment  afib - 2017, 07/2021        SocHX: Denies EtoH/TOB/IVDU    MEDS:   acetaminophen     Tablet .. 650 milliGRAM(s) Oral every 6 hours PRN  aluminum hydroxide/magnesium hydroxide/simethicone Suspension 30 milliLiter(s) Oral every 4 hours PRN  aMIOdarone    Tablet 200 milliGRAM(s) Oral two times a day  apixaban 5 milliGRAM(s) Oral every 12 hours  aspirin enteric coated 81 milliGRAM(s) Oral daily  atorvastatin 40 milliGRAM(s) Oral at bedtime  bisacodyl Suppository 10 milliGRAM(s) Rectal daily PRN  buMETAnide IVPB 4 milliGRAM(s) IV Intermittent two times a day  carBAMazepine ER Capsule 100 milliGRAM(s) Oral two times a day  levothyroxine 88 MICROGram(s) Oral daily  melatonin 3 milliGRAM(s) Oral at bedtime PRN  ondansetron Injectable 4 milliGRAM(s) IV Push every 8 hours PRN  polyethylene glycol 3350 17 Gram(s) Oral daily  pregabalin 300 milliGRAM(s) Oral two times a day  spironolactone 25 milliGRAM(s) Oral every 12 hours    T(C): 37.4 (04-18-24 @ 08:35), Max: 37.4 (04-18-24 @ 08:35)  HR: 79 (04-18-24 @ 11:00) (69 - 88)  BP: 109/67 (04-18-24 @ 11:00) (62/46 - 109/67)  RR: 17 (04-18-24 @ 11:00) (17 - 19)  SpO2: 98% (04-18-24 @ 11:00) (94% - 99%)  Wt(kg): --                              13.6   8.37  )-----------( 187      ( 18 Apr 2024 04:03 )             42.0     04-18    137  |  98  |  26.0<H>  ----------------------------<  89  4.1   |  26.0  |  1.33<H>    Ca    8.5      18 Apr 2024 04:03  Mg     2.2     04-18    TPro  6.1<L>  /  Alb  3.4  /  TBili  2.6<H>  /  DBili  x   /  AST  39  /  ALT  47<H>  /  AlkPhos  146<H>  04-18      Risk Assessments:  ASA: 3  Mallampati: 2  BRA: 4.2%        A/P: 73M hx CAD s/p late-presenting MI 2005 s/p PCI LAD, ICM/HFrEF (EF 15-20%, LVEDD 6 cm) s/p single chamber ICD with [place] lead 2011 s/p lead extraction and upgrade to Medtronic dual-chamber ICD (8/17/21), has hx of appropriate ICD shock for VT/VF, s/p MEMS (9/16/21), bioprosthetic AVR (2015) 2/2 AI s/p 26 mm EVOLUT HECTOR for severe bioprosthetic AS (8/20/20), prior aflutter ablation 7/2017, persistent afib, s/p DCCV x2 (8/12/20, 7/7/21, 01/2024), Trigeminal neuralgia (4/2022) who presented to [facility] for shortness of breath and elevated filling pressures on cardiomems. Patient states that he as been feeling weak for the last few days, and has been experiencing dyspnea on exertion with persistent cough. Patient also noted that his feet and hands were becoming progressively colder, and his cardiomems was up in the 40s (goal 30-34). Patient had his torsemide dose increased at home, but symptoms did not improve. Patient was sent to [facility] for further management. Patient states today his symptoms are beginning to improve. RHC on 4/15 showed mildly elevated filling pressures (RA 15, PA 68/25, PCWP 23) and CI 2.68 on low dose inotropic support.  - per HF team, start Entresto 24/26 BID with hold parameters. d/c today for hypotension will have HF team re-evaluate.      Plan:    -plan for RHC VIA RBV  -preferred access: RBV  -confirmed appropriate NPO duration  -ECG and Labs reviewed  -NS 0.9% 250ml/hr x 1 bolus; pre procedure MARK ppx HELD IN SETTING OF CHF  -procedure discussed with patient; risks and benefits explained, questions answered  -consent obtained by attending DR Chung            Risks & benefits of procedure and sedation and risks and benefits for the alternative therapy have been explained to the patient in layman’s terms including but not limited to: allergic reaction, bleeding, infection, arrhythmia, respiratory compromise, renal and vascular compromise, limb damage, MI, CVA, emergent CABG/Vascular Surgery and death. Informed consent obtained and in chart.

## 2024-04-18 NOTE — PROGRESS NOTE ADULT - NS ATTEND AMEND GEN_ALL_CORE FT
pt remains volume overloaded in CHF, on DBT and bumex gtts.   Remains in AF with frequent RV pacing.   Will plan ANDREY/DCCV once closer to euvolemia, and off inotropes.  continue amiodarone, and restart anticoagulation.
Meet with family and discussed treatment options  At this time he does not want mechanical support devices
.  .  Patient with severe CHF, now off Dobutamine. Plan for RHC tomorrow to assess cardiac function. If no plan for LVAD, will pursue ANDREY/CV. Can also consider AVN ablation + BiV upgrade which may help improve his cardiomyopathy, though may be of limited benefit given stage IV/class D CHF.    Karthik Smith MD  Clinical Cardiac Electrophysiology    A total of 37 min were spent on this patient encounter.
-      73M hx CAD s/p late-presenting MI 2005 s/p PCI LAD, ICM/HFrEF (EF 15-20%, LVEDD 6 cm) s/p single chamber ICD with [place] lead 2011 s/p lead extraction and upgrade to Medtronic dual-chamber ICD (8/17/21), has hx of appropriate ICD shock for VT/VF, s/p MEMS (9/16/21), bioprosthetic AVR (2015) 2/2 AI s/p 26 mm EVOLUT HECTOR for severe bioprosthetic AS (8/20/20), prior aflutter ablation 7/2017, persistent afib, s/p DCCV x2 (8/12/20, 7/7/21, 01/2024), Trigeminal neuralgia (4/2022) who presented to [facility] for shortness of breath and elevated filling pressures on cardiomems. Patient states that he as been feeling weak for the last few days, and has been experiencing dyspnea on exertion with persistent cough. Patient also noted that his feet and hands were becoming progressively colder, and his cardiomems was up in the 40s (goal 30-34). Patient had his torsemide dose increased at home, but symptoms did not improve. Patient was sent to [facility] for further management. Patient states today his symptoms are beginning to improve.      AoC-HFrEF    - Patient with longstanding history of HFrEF (EF 15%) on GDMT.   - Patient with cardiomems with goal 30-34, with last check yesterday with PAD in the 40s.   - PBNP 2041   - Repeat echo with EF <20%, grade III DD, mild MR, well functioning TAVR, mod TR.  - All heart failure medications on hold at this time including Farxiga, toprol XL, losartan, and aldactone.  - Upon arrival patient with some FEDERICO, elevated AST, and lactate of 2.70.   - Continued on dobutamine gtt at 2.5mg/hr.   - Lactate normalized, FEDERICO improved, transaminits resolved.  - Bumex gtt at 0.5mg/hr, diuresing, negative 1 L yesterday. Goal negative 1-2 L daily.   - NPO after midnight.  - Plan for RHC tomorrow 04/15.  - Will need to contact [facility] to input patient into our cardiomems system 04/15, and check device then.     CAD   - Hx of remote MI s/p REINA to mLAD.   - No s/s of ACS.   - Last [facility] 2020 with patent stent and moderate non-obstructive CAD.  - Cont GDMT, asam statin    Persistent atrial fibrillation   - EP Evalauted 4/13 as per notes  - Resume amiodarone 200mg daily.   - Eliquis 5mg Q 12 hrs held in favor of Lovenox as pt is pending RHC on 4/15. Anticipate resuming Eliquis post procedure.   - AF ablation may be of benefit, but he is elevated risk for complications given severe CHF- nevertheless, this should be considered.   If he does remain in AF despite efforts, would consider upgrade to CRT and AVJ ablation. He will be getting RHC next week. Following this will plan ANDREY/DCCV, and continue amiodarone for now.  - NPO after midnight for ANDREY/DCCV on 4/15 after RHC, if medically optimized.
Patient seen by me.
Dobutamine stopped.   Patient with rising bili but other labs stable.   Unclear etiology.   Possible plan for maxine/cv/rhc tomorrow.
seen with above,    73M history of CAD with late presentation MI with LAD stent 2005, severe AI s/p bioAVR 2015 and HECTOR in 2020 for bioAVR stenosis,  ICM/HFrEF 15-20% with dilated cardiomyopathy has CardioMems, VT/VF Medtronic ICD, Aflutter ablation 2017 and persistent Afib s/p multiple DCCV admitted for ADHF with low output state required dobutamine gtt  -weaning off dobutamine today  -reamins on IV Bumex 4mg  -retrying low dose Entresto monitor BP tolerance  -plan for ANDREY/CV and RHC tomorrow, currently on Eliquis  -would benefit from AV node ablation and CRT in the future, probably needs assessment for future LVAD           Gume Little DO, [facility]  Faculty Non-Invasive Cardiologist  [phone]

## 2024-04-18 NOTE — PROGRESS NOTE ADULT - ASSESSMENT
Primary Cardiologist: Dr. Pressley  HF: Dr. Sanchez   EP: Dr. Apple  Pulmonologist: Dr. 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     Of note, this is his second hospitalization this year for ADHF in the setting of Afib. He was hospitalized at [facility] for rising CardioMEMS numbers from 1/10 - 24. He underwent DCCV on 1/15/24 with improvement in CardioMEMS numbers to 31.     CardioMEMS readin/16: PAD 33-34    RHC 4/15/24 (Full report pending) on  2.5mcg/kg/min: HR 75, /69/84 RA 17/15/15, PAP 68/25/43, PCWP 23, CO 5.16, CI 2.68.    TTE 24: LVEF <20%, LVIDd 7.4cm, LVH, grade III DD, mild RVE w/ mildly reduced systolic function, moderate TR, PASP 52 mmHg (RAP ~15 mmHg).    Prior Cardiac Studies:  Cardiac Cath/PCI: 23 - RHC (MEMS calibration) - RA 16, RV 60/20, PA 61/36/43, PCWP 22 (v 27), CO/CI 4.6/2.03, PVR 4.54WU, (SBP missing).    21 - RHC/MEMS implant - RA 12, RV 50/13, PA 51/20/33, PCWP 11, CO/CI 4.7/2.08 (PA sat 59%, Ao sat 93%, Hb 11.5), PVR 4.68 CIFUENTES    [facility] 20 - LM mild CAD, mLAD 10% stenosis at site of prior stent, LCx/RCA mod CAD

## 2024-04-18 NOTE — PROGRESS NOTE ADULT - NS ATTEND OPT1A GEN_ALL_CORE
History/Exam/Medical decision making
Medical decision making
History/Exam/Medical decision making

## 2024-04-18 NOTE — PROGRESS NOTE ADULT - SUBJECTIVE AND OBJECTIVE BOX
[facility] Division of Hospital Medicine    Chief Complaint:  Acute on ch HF    SUBJECTIVE / OVERNIGHT EVENTS:  Pt examined lying in bed  No longer nauseous, s/p BM yesterday  Feels well despite hypotension   Patient denies chest pain, SOB, abd pain, N/V, fever, chills, dysuria. Complains of ongoing weakness      MEDICATIONS  (STANDING):  aMIOdarone    Tablet 200 milliGRAM(s) Oral two times a day  apixaban 5 milliGRAM(s) Oral every 12 hours  aspirin enteric coated 81 milliGRAM(s) Oral daily  atorvastatin 40 milliGRAM(s) Oral at bedtime  buMETAnide IVPB 4 milliGRAM(s) IV Intermittent two times a day  buMETAnide IVPB 4 milliGRAM(s) IV Intermittent once  carBAMazepine ER Capsule 100 milliGRAM(s) Oral two times a day  levothyroxine 88 MICROGram(s) Oral daily  magnesium sulfate  IVPB 1 Gram(s) IV Intermittent once  polyethylene glycol 3350 17 Gram(s) Oral daily  potassium chloride    Tablet ER 40 milliEquivalent(s) Oral once  sacubitril 24 mG/valsartan 26 mG 1 Tablet(s) Oral two times a day  spironolactone 25 milliGRAM(s) Oral every 12 hours    MEDICATIONS  (PRN):  acetaminophen     Tablet .. 650 milliGRAM(s) Oral every 6 hours PRN Temp greater or equal to 38C (100.4F), Mild Pain (1 - 3)  aluminum hydroxide/magnesium hydroxide/simethicone Suspension 30 milliLiter(s) Oral every 4 hours PRN Dyspepsia  bisacodyl Suppository 10 milliGRAM(s) Rectal daily PRN Constipation  melatonin 3 milliGRAM(s) Oral at bedtime PRN Insomnia  ondansetron Injectable 4 milliGRAM(s) IV Push every 8 hours PRN Nausea and/or Vomiting      PHYSICAL EXAM:  Vital Signs Last 24 Hrs  T(C): 36.6 (18 Apr 2024 12:15), Max: 37.4 (18 Apr 2024 08:35)  T(F): 97.9 (18 Apr 2024 12:15), Max: 99.3 (18 Apr 2024 08:35)  HR: 87 (18 Apr 2024 12:15) (69 - 88)  BP: 86/69 (18 Apr 2024 12:15) (62/46 - 109/67)  BP(mean): 81 (18 Apr 2024 11:00) (59 - 86)  RR: 22 (18 Apr 2024 12:15) (17 - 22)  SpO2: 96% (18 Apr 2024 12:15) (94% - 99%)    Parameters below as of 18 Apr 2024 12:15  Patient On (Oxygen Delivery Method): nasal cannula      CONSTITUTIONAL: Non toxic appearing, lying in bed  ENMT: Moist oral mucosa, no pharyngeal injection or exudates, central cyanotic appearance of lips and nose (reports ch)  RESPIRATORY: Normal respiratory effort; LLL basal crackles appreciated   CARDIOVASCULAR: Irregular rate and rhythm, normal S1 and S2, no murmur/rub/gallop; improved LE edema   ABDOMEN: Nontender to palpation, normoactive bowel sounds, no rebound/guarding; No hepatosplenomegaly  MUSCLOSKELETAL:  No clubbing or cyanosis of digits; no joint swelling or tenderness to palpation  PSYCH: A+O to person, place, and time; affect appropriate  NEUROLOGY: CN 2-12 are intact and symmetric; no gross sensory deficits;   SKIN: No rashes; no palpable lesions      LABS:                                        13.6   8.37  )-----------( 187      ( 18 Apr 2024 04:03 )             42.0   04-18    137  |  98  |  26.0<H>  ----------------------------<  89  4.1   |  26.0  |  1.33<H>    Ca    8.5      18 Apr 2024 04:03  Mg     2.2     04-18    TPro  6.1<L>  /  Alb  3.4  /  TBili  2.6<H>  /  DBili  x   /  AST  39  /  ALT  47<H>  /  AlkPhos  146<H>  04-18                   CAPILLARY BLOOD GLUCOSE      POCT Blood Glucose.: 159 mg/dL (17 Apr 2024 11:52)              RADIOLOGY & ADDITIONAL TESTS:  1/13/24 TTE   1. Technically difficult image quality.   2. Left ventricular cavity is severely dilated. Left ventricular systolic function is severely decreased with an ejection fraction visually estimated at <20 %. Global left ventricular hypokinesis.   3. There is increased LV mass and eccentric hypertrophy.   4. There is severe (grade 3) left ventricular diastolic dysfunction, with elevated filling pressure.   5. Mildly enlarged right ventricular cavity size and mildly reduced systolic function.   6. The left atrium is mildly dilated.   7. The right atrium is dilated.   8. Mild mitral regurgitation.   9. A bioprosthetic valve replacement Transcatheter deployed (TAVR) valve replacement is present in the aortic position The prosthetic valve is well seated with normal function.  10. Moderate tricuspid regurgitation.  11. Estimated pulmonary artery systolic pressure is 52 mmHg, consistent with elevated pulmonary artery pressure.  12. No prior echocardiogram is available for comparison.    1/17/24 Cardiac MRI  IMPRESSION: Cardiac amyloid Imaging study is  not suggestive of transthyretin cardiac amyloidosis.                                     [facility] Division of Hospital Medicine    Chief Complaint:  Acute on ch HF    SUBJECTIVE / OVERNIGHT EVENTS:  Pt examined lying in bed  No longer nauseous, s/p BM yesterday  Feels well despite hypotension   Patient denies chest pain, SOB, abd pain, N/V, fever, chills, dysuria. Complains of ongoing weakness      MEDICATIONS  (STANDING):  aMIOdarone    Tablet 200 milliGRAM(s) Oral two times a day  apixaban 5 milliGRAM(s) Oral every 12 hours  aspirin enteric coated 81 milliGRAM(s) Oral daily  atorvastatin 40 milliGRAM(s) Oral at bedtime  buMETAnide IVPB 4 milliGRAM(s) IV Intermittent two times a day  buMETAnide IVPB 4 milliGRAM(s) IV Intermittent once  carBAMazepine ER Capsule 100 milliGRAM(s) Oral two times a day  levothyroxine 88 MICROGram(s) Oral daily  magnesium sulfate  IVPB 1 Gram(s) IV Intermittent once  polyethylene glycol 3350 17 Gram(s) Oral daily  potassium chloride    Tablet ER 40 milliEquivalent(s) Oral once  sacubitril 24 mG/valsartan 26 mG 1 Tablet(s) Oral two times a day  spironolactone 25 milliGRAM(s) Oral every 12 hours    MEDICATIONS  (PRN):  acetaminophen     Tablet .. 650 milliGRAM(s) Oral every 6 hours PRN Temp greater or equal to 38C (100.4F), Mild Pain (1 - 3)  aluminum hydroxide/magnesium hydroxide/simethicone Suspension 30 milliLiter(s) Oral every 4 hours PRN Dyspepsia  bisacodyl Suppository 10 milliGRAM(s) Rectal daily PRN Constipation  melatonin 3 milliGRAM(s) Oral at bedtime PRN Insomnia  ondansetron Injectable 4 milliGRAM(s) IV Push every 8 hours PRN Nausea and/or Vomiting      PHYSICAL EXAM:  Vital Signs Last 24 Hrs  T(C): 36.6 (18 Apr 2024 12:15), Max: 37.4 (18 Apr 2024 08:35)  T(F): 97.9 (18 Apr 2024 12:15), Max: 99.3 (18 Apr 2024 08:35)  HR: 87 (18 Apr 2024 12:15) (69 - 88)  BP: 86/69 (18 Apr 2024 12:15) (62/46 - 109/67)  BP(mean): 81 (18 Apr 2024 11:00) (59 - 86)  RR: 22 (18 Apr 2024 12:15) (17 - 22)  SpO2: 96% (18 Apr 2024 12:15) (94% - 99%)    Parameters below as of 18 Apr 2024 12:15  Patient On (Oxygen Delivery Method): nasal cannula        CONSTITUTIONAL: Non toxic appearing, lying in bed  ENMT: Moist oral mucosa, no pharyngeal injection or exudates, central cyanotic appearance of lips and nose (reports ch)  RESPIRATORY: Normal respiratory effort;  lungs CTA  CARDIOVASCULAR: Irregular rate and rhythm, normal S1 and S2, no murmur/rub/gallop; improved LE edema   ABDOMEN: Nontender to palpation, normoactive bowel sounds, no rebound/guarding; No hepatosplenomegaly  MUSCLOSKELETAL:  No clubbing or cyanosis of digits; no joint swelling or tenderness to palpation  PSYCH: A+O to person, place, and time; affect appropriate  NEUROLOGY: CN 2-12 are intact and symmetric; no gross sensory deficits;   SKIN: No rashes; no palpable lesions      LABS:                                                   13.6   8.37  )-----------( 187      ( 18 Apr 2024 04:03 )             42.0   04-18    137  |  98  |  26.0<H>  ----------------------------<  89  4.1   |  26.0  |  1.33<H>    Ca    8.5      18 Apr 2024 04:03  Mg     2.2     04-18    TPro  6.1<L>  /  Alb  3.4  /  TBili  2.6<H>  /  DBili  x   /  AST  39  /  ALT  47<H>  /  AlkPhos  146<H>  04-18      CAPILLARY BLOOD GLUCOSE      POCT Blood Glucose.: 95 mg/dL (18 Apr 2024 11:52)  POCT Blood Glucose.: 93 mg/dL (18 Apr 2024 08:37)  POCT Blood Glucose.: 147 mg/dL (17 Apr 2024 22:05)            RADIOLOGY & ADDITIONAL TESTS:  1/13/24 TTE   1. Technically difficult image quality.   2. Left ventricular cavity is severely dilated. Left ventricular systolic function is severely decreased with an ejection fraction visually estimated at <20 %. Global left ventricular hypokinesis.   3. There is increased LV mass and eccentric hypertrophy.   4. There is severe (grade 3) left ventricular diastolic dysfunction, with elevated filling pressure.   5. Mildly enlarged right ventricular cavity size and mildly reduced systolic function.   6. The left atrium is mildly dilated.   7. The right atrium is dilated.   8. Mild mitral regurgitation.   9. A bioprosthetic valve replacement Transcatheter deployed (TAVR) valve replacement is present in the aortic position The prosthetic valve is well seated with normal function.  10. Moderate tricuspid regurgitation.  11. Estimated pulmonary artery systolic pressure is 52 mmHg, consistent with elevated pulmonary artery pressure.  12. No prior echocardiogram is available for comparison.    1/17/24 Cardiac MRI  IMPRESSION: Cardiac amyloid Imaging study is  not suggestive of transthyretin cardiac amyloidosis.

## 2024-04-18 NOTE — CONSULT NOTE ADULT - ASSESSMENT
Patient is a 72 y/o male with pmhx of CAD s/p PCI (2005), ICM/HFrEF (15-20%), ICD, persistent afib s/p DCCV x 2 and trigeminal neuralgia admitted for: Acute on chronic systolic heart failure     -Patient with adequate mentation. Endorses no complaints at this time.   -Inotrope was weaned yesterday afternoon. MICU now consulted for hypotension with SBP in 70s. Hemodynamics improving after fluid bolus (MAP>65). S/p 1L and now slowly receiving additional 500cc. Suspect patient is intravascularly depleted given aggressive diuresis with bumex.   -Would hold GDMT at this time and slowly reintroduce pending stabilization. Suggest obtaining repeat set of labs with lactate to assess end organ perfusion. Prior labs without any evidence of end organ dysfunction. Consider obtaining blood/urine cultures to r/o acute underlying infectious etiology as he does have mild leukocytosis.   -Tentative plans by EP to undergo cardioversion tomorrow. Continue Eliquis and Amiodarone as per their recs.    -Imaging without consolidations or effusions. He is currently sating well on 2L nasal cannula.   -Patient does not require ICU level of care at this point in time. Will f/u with repeat labs and assess hemodynamics after receiving fluid.      TIME SPENT: 55 minutes   Time spent evaluating/treating patient, reviewing data/labs/imaging, discussing case with multidisciplinary team, discussing plan/goals of care with patient/family. Non-inclusive of procedure time. Date of entry of this note is equal to the date of services rendered.     Case Discussed with ICU Attending, Dr. Meza

## 2024-04-18 NOTE — PROGRESS NOTE ADULT - SUBJECTIVE AND OBJECTIVE BOX
A/P                                                                      ELECTROPHYSIOLOGY BRIEF POST-OP NOTE    I have personally seen and examined the patient today in cath lab recovery area spot 17    PRE-OP DIAGNOSIS: Persistent AFib    POST-OP DIAGNOSIS: SR    PROCEDURE: ANDREY followed by DCCV x 1 @ 300J    Physician: JUANI Prasad  Assistant: Dr. Garcia    ESTIMATED BLOOD LOSS: none    ANESTHESIA TYPE:  [  ]General Anesthesia  [ x ]Sedation  [  ]Local/Regional    CONDITION:  [  ]Critical  [ x ]Serious  [  ]Stable  [  ]Good    FINDINGS: ANDREY with no clot in THONY    EKG: SR at 94bpm; QRSD 126ms    Vital Signs   HR: 94  BP: 115/74  RR: 22   SpO2: 96%     Physical Exam:  Constitutional: awake, alert, frail   Cardiovascular: +S1S2 RRR; SR at time of exam  Pulmonary: Coarse breath sounds   GI: soft NTND +BS  Extremities: no pedal edema,   Neuro: non focal, TERRAZAS x4  LABS:                        13.6   8.37  )-----------( 187      ( 18 Apr 2024 04:03 )             42.0     137  |  98  |  26.0<H>  ----------------------------<  89  4.1   |  26.0  |  1.33<H>  Ca    8.5      18 Apr 2024 04:03  Mg     2.2     04-18  TPro  6.1<L>  /  Alb  3.4  /  TBili  2.6<H>  /  DBili  x   /  AST  39  /  ALT  47<H>  /  AlkPhos  146<H>  04-18    A/P  73 year old male with trigeminal neuralgia, CAD s/p late-presenting MI 2005 s/p PCI LAD, ICM/HFrEF (EF 15-20%, LVEDD 6 cm) s/p single chamber ICD with [place] lead 2011 s/p lead extraction and upgrade to Medtronic dual-chamber ICD (8/17/21), hx of appropriate ICD shock for VT/VF, s/p MEMS (9/16/21), bioprosthetic AVR (2015) 2/2 AI s/p 26 mm EVOLUT HECTOR for severe bioprosthetic AS (8/20/20), AFL s/p ablation 7/2017, persistent AFib s/p prior cardioversions most recently 1/17/24 following amiodarone load who is admitted with an acute on chronic combined systolic and diastolic heart failure exacerbation and persistent AFib who is now s/p successful ANDREY guided DCCV with restoration of SR    - Bedrest x 1 hour  - Continue Eliquis without interruption for a minimum of 30 days  - Keep K>4; Mg>2  - Management of HF per HF/Cardiology team  - Ongoing discussion regarding advanced therapies and management.   - May consider antiarrythmic drugs  - Full recommendations to follow below.      A/P                                                                      ELECTROPHYSIOLOGY BRIEF POST-OP NOTE    I have personally seen and examined the patient today in cath lab recovery area spot 17    PRE-OP DIAGNOSIS: Persistent AFib    POST-OP DIAGNOSIS: SR    PROCEDURE: ANDREY followed by DCCV x 1 @ 300J    Physician: JUANI Prasad  Assistant: Dr. Garcia    ESTIMATED BLOOD LOSS: none    ANESTHESIA TYPE:  [  ]General Anesthesia  [ x ]Sedation  [  ]Local/Regional    CONDITION:  [  ]Critical  [ x ]Serious  [  ]Stable  [  ]Good    FINDINGS: ANDREY with no clot in THONY    EKG: SR at 94bpm; QRSD 126ms    Vital Signs   HR: 94  BP: 115/74  RR: 22   SpO2: 96%     Physical Exam:  Constitutional: awake, alert, frail   Cardiovascular: +S1S2 RRR; SR at time of exam  Pulmonary: Coarse breath sounds   GI: soft NTND +BS  Extremities: no pedal edema,   Neuro: non focal, TERRAZAS x4  LABS:                        13.6   8.37  )-----------( 187      ( 18 Apr 2024 04:03 )             42.0     137  |  98  |  26.0<H>  ----------------------------<  89  4.1   |  26.0  |  1.33<H>  Ca    8.5      18 Apr 2024 04:03  Mg     2.2     04-18  TPro  6.1<L>  /  Alb  3.4  /  TBili  2.6<H>  /  DBili  x   /  AST  39  /  ALT  47<H>  /  AlkPhos  146<H>  04-18    A/P  73 year old male with trigeminal neuralgia, CAD s/p late-presenting MI 2005 s/p PCI LAD, ICM/HFrEF (EF 15-20%, LVEDD 6 cm) s/p single chamber ICD with [place] lead 2011 s/p lead extraction and upgrade to Medtronic dual-chamber ICD (8/17/21), hx of appropriate ICD shock for VT/VF, s/p MEMS (9/16/21), bioprosthetic AVR (2015) 2/2 AI s/p 26 mm EVOLUT HECTOR for severe bioprosthetic AS (8/20/20), AFL s/p ablation 7/2017, persistent AFib s/p prior cardioversions most recently 1/17/24 following amiodarone load who is admitted with an acute on chronic combined systolic and diastolic heart failure exacerbation and persistent AFib who is now s/p successful ANDREY guided DCCV with restoration of SR    - Bedrest x 1 hour  - Continue Eliquis without interruption for a minimum of 30 days  - Keep K>4; Mg>2  - Management of HF per HF/Cardiology team  - Ongoing discussion regarding advanced therapies and management.   - Continue Amiodarone as written   - Full recommendations to follow below.

## 2024-04-18 NOTE — PROGRESS NOTE ADULT - NS ATTEND OPT1 GEN_ALL_CORE
I independently performed the documented:
I attest my time as attending is greater than 50% of the total combined time spent on qualifying patient care activities by the PA/NP and attending.
I attest my time as attending is greater than 50% of the total combined time spent on qualifying patient care activities by the PA/NP and attending.
I independently performed the documented:

## 2024-04-18 NOTE — PROGRESS NOTE ADULT - PROBLEM SELECTOR PLAN 4
.  - H/o BioAVR (2015) w/ severe bioprosthetic AS -> s/p HECTOR (2020).  - Continue monitoring via serial TTE's. .  - H/o BioAVR (2015) w/ severe bioprosthetic AS -> s/p HECTOR (2020).  - Continue monitoring via serial TTE's.      No further inpatient cardiac work up at this time. General cardiology will sing off.   EP and HF follwing.  Will sign off.  Please reconsult if needed.

## 2024-04-18 NOTE — PROGRESS NOTE ADULT - SUBJECTIVE AND OBJECTIVE BOX
73y Male who had a RHC which showed wedge 15, CO 3.2, CI 1.77. RIght AC site benign.  Patient awake and alert without complaints. Denies chest pain, sob, palps.      A&Ox3  Lungs: CTA B/L  CV: S1, S2, 3/6 MALLIKA  Abd: Soft  Right AC no bleeding, no hematoma, no ecchymosis  Extremity: + distal pulses, cap refill <3sec      A/P: 73y Male s/p RHC no intervention  1. AC management discussed with patient  2. Continue current meds  3. Keep NPO for possible ANDREY/CV  4. Bedrest x 1 hour

## 2024-04-18 NOTE — PROGRESS NOTE ADULT - SUBJECTIVE AND OBJECTIVE BOX
[facility]                                                         CARDIOLOGY AT [place] [address]                                                         Telephone: [phone]. Fax:[phone]                                                                             PROGRESS NOTE    Reason for follow up: HFrEF  Update: off dobutamine. D/C Entresto for hypotension. asymptomatic. Planned for RHC/ maxine+ DCCV today      Review of symptoms:   Cardiac:  No chest pain. No dyspnea. No palpitations.  Respiratory: no cough. No dyspnea  Gastrointestinal: No diarrhea. No abdominal pain. No bleeding.   Neuro: No focal neuro complaints.    Vitals:  T(C): 37.4 (04-18-24 @ 08:35), Max: 37.4 (04-18-24 @ 08:35)  HR: 69 (04-18-24 @ 06:00) (69 - 87)  BP: 85/59 (04-18-24 @ 06:00) (62/46 - 107/63)  RR: 17 (04-18-24 @ 06:00) (17 - 18)  SpO2: 95% (04-18-24 @ 06:00) (95% - 97%)  Wt(kg): --  I&O's Summary    17 Apr 2024 07:01  -  18 Apr 2024 07:00  --------------------------------------------------------  IN: 1550 mL / OUT: 910 mL / NET: 640 mL    18 Apr 2024 07:01  -  18 Apr 2024 09:29  --------------------------------------------------------  IN: 0 mL / OUT: 400 mL / NET: -400 mL          PHYSICAL EXAM:  Appearance: Comfortable. No acute distress  HEENT:  Atraumatic. Normocephalic.  Normal oral mucosa  Neurologic: A & O x 3, no gross focal deficits.  Cardiovascular: RRR S1 S2, No murmur, no rubs/gallops. No JVD  Respiratory: Lungs clear to auscultation, unlabored   Gastrointestinal:  Soft, Non-tender, + BS  Lower Extremities: 2+ Peripheral Pulses, No clubbing, cyanosis, or edema  Psychiatry: Patient is calm. No agitation.   Skin: warm and dry.    CURRENT CARDIAC MEDICATIONS:  aMIOdarone    Tablet 200 milliGRAM(s) Oral two times a day  buMETAnide IVPB 4 milliGRAM(s) IV Intermittent two times a day  spironolactone 25 milliGRAM(s) Oral every 12 hours      CURRENT OTHER MEDICATIONS:  acetaminophen     Tablet .. 650 milliGRAM(s) Oral every 6 hours PRN Temp greater or equal to 38C (100.4F), Mild Pain (1 - 3)  carBAMazepine ER Capsule 100 milliGRAM(s) Oral two times a day  melatonin 3 milliGRAM(s) Oral at bedtime PRN Insomnia  ondansetron Injectable 4 milliGRAM(s) IV Push every 8 hours PRN Nausea and/or Vomiting  pregabalin 300 milliGRAM(s) Oral two times a day  aluminum hydroxide/magnesium hydroxide/simethicone Suspension 30 milliLiter(s) Oral every 4 hours PRN Dyspepsia  bisacodyl Suppository 10 milliGRAM(s) Rectal daily PRN Constipation  polyethylene glycol 3350 17 Gram(s) Oral daily  atorvastatin 40 milliGRAM(s) Oral at bedtime  levothyroxine 88 MICROGram(s) Oral daily  apixaban 5 milliGRAM(s) Oral every 12 hours  aspirin enteric coated 81 milliGRAM(s) Oral daily      LABS:	 	                            13.6   8.37  )-----------( 187      ( 18 Apr 2024 04:03 )             42.0     04-18    137  |  98  |  26.0<H>  ----------------------------<  89  4.1   |  26.0  |  1.33<H>    Ca    8.5      18 Apr 2024 04:03  Mg     2.2     04-18    TPro  6.1<L>  /  Alb  3.4  /  TBili  2.6<H>  /  DBili  x   /  AST  39  /  ALT  47<H>  /  AlkPhos  146<H>  04-18      Lipid Profile:   HgA1c:   TSH: Thyroid Stimulating Hormone, Serum: 0.34 uIU/mL      TELEMETRY: Afib rate controlled / Paced  ECG:    DIAGNOSTIC TESTING:  [ ] Echocardiogram:   < from: TTE W or WO Ultrasound Enhancing Agent (04.13.24 @ 11:31) >  TRANSTHORACIC ECHOCARDIOGRAM REPORT  ________________________________________________________________________________                                      _______       Pt. Name:       DUDLEY BERMUDEZ Study Date:    4/13/2024  MRN:            [id number]             YOB: 1950  Accession #:    [id number]            Age:           73 years  Account#:       [id number]           Gender:        M  Visit ID#  Heart Rate:                          Height:        67.00 in (170.18 cm)  Rhythm:                              Weight:        182.25 lb (82.67 kg)  Blood Pressure: 98/62 mmHg           BSA/BMI:       1.94 m² / 28.54 kg/m²  ________________________________________________________________________________________  Referring Physician:    [id number] Jose Elias Stanton  Interpreting Physician: Avel Figueroa MD  Primary Sonographer:    Myriam Bowens    CPT:                ECHO TTE WITH CON COMP W DOPP - .m;DEFINITY ECHO                      CONTRAST PER ML - .m  Indication(s):      Cardiomyopathy, unspecified - I42.9  Procedure:          Transthoracic echocardiogram with 2-D, M-mode and complete                      spectral and color flow Doppler.  Ordering Location:  [facility]  Admission Status:   Inpatient  Contrast Injection: Verbal consent was obtained for injection of Ultrasonic                      Enhancing Agent following a discussion of risks and                      benefits.                      Endocardial visualization enhanced with 2 ml of Definity                    Ultrasound enhancing agent (Lot#:[id number] Exp.Date:04/2025).  UEA Reaction:       Patient had no adverse reaction after injection of                      Ultrasound Enhancing Agent.  Study Information:  Image quality for this study is technically difficult.    _______________________________________________________________________________________     CONCLUSIONS:      1. Technically difficult image quality.   2. Left ventricular cavity is severely dilated. Left ventricular systolic function is severely decreased with an ejection fraction visually estimated at <20 %. Global left ventricular hypokinesis.   3. There is increased LV mass and eccentric hypertrophy.   4. There is severe (grade 3) left ventricular diastolic dysfunction, with elevated filling pressure.   5. Mildly enlarged right ventricular cavity size and mildly reduced systolic function.   6. The left atrium is mildly dilated.   7. The right atrium is dilated.   8. Mild mitral regurgitation.   9. A bioprosthetic valve replacement Transcatheter deployed (TAVR) valve replacement is present in the aortic position The prosthetic valve is well seated with normal function.  10. Moderate tricuspid regurgitation.  11. Estimated pulmonary artery systolic pressure is 52mmHg, consistent with elevated pulmonary artery pressure.  12. No prior echocardiogram is available for comparison.    < end of copied text >  [ ]  Catheterization:  [ ] Stress Test:    OTHER:

## 2024-04-18 NOTE — PROGRESS NOTE ADULT - ASSESSMENT
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 Transferred to SDU for bumex ggt with fixed dose Dobutamine. Now s/p RHC. Slowly being weaned off pressors to prepare for possible DCCV     Acute on systolic chronic HF   Comfortable on RA  Continue Bumex ggt  Dobutamine weaned off  daily weights, strict I/O   Resume Farxiga on dsc   Started on Entresto per HF. Now held due to Hypotebsion   Continue Spironolactone   Cardiology following  s/p RHC  Pt to f/u with HF team as outpt on dsc. Is now more agreeable to considering LVAD options    Nausea with constipation  s/p bisacodyl spp   Declining enema for now   Continue bowel regimen   Rising Bili (t/ind). US abd ordered     Afib s/p ADNREY/DCCV on 1/17/24   rated controlled   AC now switched back to Eliquis 5 mg bid   On Amio    Needs to come off Dobutamine prior to DCCV     CAD s/p PCI   cont ASA, statin     Hypothyroidism   cont with synthroid   Send TSH in am     Trigeminal neurologia   cont carbamazepine     DVT ppx : DOAC     Dispo: acute. Pending EP and Cardio final recs 73 year old male with CAD s/p late-presenting MI 2005 s/p PCI LAD, ICM/HFrEF (EF 15-20%, LVEDD 6 cm) s/p single chamber ICD with Estevan lead 2011 s/p lead extraction and upgrade to Medtronic dual-chamber ICD (8/17/21), has hx of appropriate ICD shock for VT/VF, s/p MEMS (9/16/21), bioprosthetic AVR (2015) 2/2 AI s/p 26 mm EVOLUT HECTOR for severe bioprosthetic AS (8/20/20), prior aflutter ablation 7/2017, persistent afib, s/p DCCV x2 (8/12/20, 7/7/21), Trigeminal neuralgia (4/2022) presented for elevated filling pressure on Cardiomems. Transferred to SDU for bumex ggt with fixed dose Dobutamine. Now s/p RHC. Slowly being weaned off pressors to prepare for possible DCCV     Acute on systolic chronic HF   Comfortable on RA  Continue Bumex ggt  Dobutamine weaned off  daily weights, strict I/O   Resume Farxiga on dsc   Started on Entresto per HF. Now held due to Hypotension   Continue Spironolactone   Cardiology following  s/p RHC  Pt to f/u with HF team as outpt on dsc. Is now more agreeable to considering LVAD options    Nausea with constipation  s/p bisacodyl spp   Declining enema for now   Continue bowel regimen   Rising Bili (t/ind). US abd ordered     Afib s/p ANDREY/DCCV on 1/17/24   rated controlled   AC now switched back to Eliquis 5 mg bid   On Amio    Needs to come off Dobutamine prior to DCCV     CAD s/p PCI   cont ASA, statin     Hypothyroidism   cont with synthroid      Trigeminal neurologia   cont carbamazepine     DVT ppx : DOAC     Dispo: acute. Pending EP and Cardio final recs

## 2024-04-18 NOTE — PROGRESS NOTE ADULT - PROBLEM SELECTOR PLAN 4
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 He woke up feeling very short of breath and was brought to [facility] by EMS.   Patient was placed on BiPAP at OSH w/ ABG showing PaO2 80 on CPAP 100%. Given Duoneb, lasix 60mg IV, solumedrol 125mg, nitro gtt. Patient had persistent bradycardia, atrial flutter, and dyspnea and was transferred to [facility] for possible PPM.     Recent cardiac evaluation:  4/2017- stress test neg for ischemia, at that time in NSR w/ RBBB  3/2017- echo showed normal LV function w/ moderate concentric hypertrophy (13 Mar 2018 23:00)    [facility] CCU course: D/C nitro gtt. Episode of torsades with normal magnesium and K. Multiple episodes of nonsustained VT, self-resolved. Cath with 100$ chronic lesion in the RCA, no intervention at that time. Due to recurrent polymorphic VT, patient had dual chamber ICD placement on 3/15/18, set to 80 BPM with no further episodes. Patient was electively intubated for the procedure, extubated 3/16/18. GI consulted for evaluation for bleeding risk in anticipation of possible watchman.     Cards Dr Cabrera  EP Dr Jenni Coker    Plan: Follow up with GI re: endoscopy prior to anticoagulation. Discuss possible PCI with Dr Coker.       LABS:                         10.7   11.4  )-----------( 371      ( 16 Mar 2018 06:41 )             33.5     03-16    144  |  106  |  45<H>  ----------------------------<  136<H>  4.4   |  23  |  1.36<H>    Ca    8.8      16 Mar 2018 04:51  Phos  4.7     03-16  Mg     2.3     03-16    TPro  7.1  /  Alb  3.2<L>  /  TBili  0.4  /  DBili  x   /  AST  29  /  ALT  18  /  AlkPhos  135<H>  03-16    PT/INR - ( 15 Mar 2018 05:05 )   PT: 12.9 sec;   INR: 1.18 ratio         PTT - ( 15 Mar 2018 05:05 )  PTT:28.9 sec          RADIOLOGY, EKG & ADDITIONAL TESTS: Reviewed. H/o BioAVR (2015) w/ severe bioprosthetic AS -> s/p HECTOR (2020).  Continue monitoring via serial TTE's.  Can consider stopping ASA as above unless otherwise indicated. Will defer to primary cardiologist.

## 2024-04-19 ENCOUNTER — TRANSCRIPTION ENCOUNTER (OUTPATIENT)
Age: 74
End: 2024-04-19

## 2024-04-19 VITALS
HEART RATE: 74 BPM | RESPIRATION RATE: 18 BRPM | DIASTOLIC BLOOD PRESSURE: 63 MMHG | OXYGEN SATURATION: 95 % | SYSTOLIC BLOOD PRESSURE: 88 MMHG

## 2024-04-19 LAB
ALBUMIN SERPL ELPH-MCNC: 3.4 G/DL — SIGNIFICANT CHANGE UP (ref 3.3–5.2)
ALP SERPL-CCNC: 167 U/L — HIGH (ref 40–120)
ALT FLD-CCNC: 70 U/L — HIGH
ANION GAP SERPL CALC-SCNC: 12 MMOL/L — SIGNIFICANT CHANGE UP (ref 5–17)
AST SERPL-CCNC: 71 U/L — HIGH
BILIRUB SERPL-MCNC: 3 MG/DL — HIGH (ref 0.4–2)
BUN SERPL-MCNC: 36.2 MG/DL — HIGH (ref 8–20)
CALCIUM SERPL-MCNC: 8.4 MG/DL — SIGNIFICANT CHANGE UP (ref 8.4–10.5)
CHLORIDE SERPL-SCNC: 94 MMOL/L — LOW (ref 96–108)
CO2 SERPL-SCNC: 26 MMOL/L — SIGNIFICANT CHANGE UP (ref 22–29)
CREAT SERPL-MCNC: 1.66 MG/DL — HIGH (ref 0.5–1.3)
EGFR: 43 ML/MIN/1.73M2 — LOW
GLUCOSE SERPL-MCNC: 96 MG/DL — SIGNIFICANT CHANGE UP (ref 70–99)
HCT VFR BLD CALC: 39.4 % — SIGNIFICANT CHANGE UP (ref 39–50)
HGB BLD-MCNC: 12.9 G/DL — LOW (ref 13–17)
MAGNESIUM SERPL-MCNC: 2 MG/DL — SIGNIFICANT CHANGE UP (ref 1.8–2.6)
MCHC RBC-ENTMCNC: 30.9 PG — SIGNIFICANT CHANGE UP (ref 27–34)
MCHC RBC-ENTMCNC: 32.7 GM/DL — SIGNIFICANT CHANGE UP (ref 32–36)
MCV RBC AUTO: 94.3 FL — SIGNIFICANT CHANGE UP (ref 80–100)
PLATELET # BLD AUTO: 191 K/UL — SIGNIFICANT CHANGE UP (ref 150–400)
POTASSIUM SERPL-MCNC: 4.3 MMOL/L — SIGNIFICANT CHANGE UP (ref 3.5–5.3)
POTASSIUM SERPL-SCNC: 4.3 MMOL/L — SIGNIFICANT CHANGE UP (ref 3.5–5.3)
PROT SERPL-MCNC: 5.9 G/DL — LOW (ref 6.6–8.7)
RBC # BLD: 4.18 M/UL — LOW (ref 4.2–5.8)
RBC # FLD: 15.2 % — HIGH (ref 10.3–14.5)
SODIUM SERPL-SCNC: 132 MMOL/L — LOW (ref 135–145)
WBC # BLD: 9.3 K/UL — SIGNIFICANT CHANGE UP (ref 3.8–10.5)
WBC # FLD AUTO: 9.3 K/UL — SIGNIFICANT CHANGE UP (ref 3.8–10.5)

## 2024-04-19 PROCEDURE — 99232 SBSQ HOSP IP/OBS MODERATE 35: CPT

## 2024-04-19 PROCEDURE — 99223 1ST HOSP IP/OBS HIGH 75: CPT

## 2024-04-19 PROCEDURE — 99497 ADVNCD CARE PLAN 30 MIN: CPT | Mod: 25

## 2024-04-19 PROCEDURE — 99233 SBSQ HOSP IP/OBS HIGH 50: CPT

## 2024-04-19 PROCEDURE — 99498 ADVNCD CARE PLAN ADDL 30 MIN: CPT

## 2024-04-19 RX ORDER — APIXABAN 2.5 MG/1
1 TABLET, FILM COATED ORAL
Qty: 14 | Refills: 0
Start: 2024-04-19 | End: 2024-04-25

## 2024-04-19 RX ORDER — APIXABAN 2.5 MG/1
1 TABLET, FILM COATED ORAL
Refills: 0 | DISCHARGE

## 2024-04-19 RX ORDER — AMIODARONE HYDROCHLORIDE 400 MG/1
1 TABLET ORAL
Qty: 14 | Refills: 0
Start: 2024-04-19 | End: 2024-04-25

## 2024-04-19 RX ORDER — METOPROLOL TARTRATE 50 MG
1 TABLET ORAL
Refills: 0 | DISCHARGE

## 2024-04-19 RX ORDER — LEVOTHYROXINE SODIUM 125 MCG
1 TABLET ORAL
Qty: 7 | Refills: 0
Start: 2024-04-19 | End: 2024-04-25

## 2024-04-19 RX ORDER — CARBAMAZEPINE 200 MG
1 TABLET ORAL
Refills: 0 | DISCHARGE

## 2024-04-19 RX ORDER — CARBAMAZEPINE 200 MG
1 TABLET ORAL
Qty: 14 | Refills: 0
Start: 2024-04-19 | End: 2024-04-25

## 2024-04-19 RX ORDER — ASPIRIN/CALCIUM CARB/MAGNESIUM 324 MG
1 TABLET ORAL
Qty: 7 | Refills: 0
Start: 2024-04-19 | End: 2024-04-25

## 2024-04-19 RX ORDER — LEVOTHYROXINE SODIUM 125 MCG
1 TABLET ORAL
Refills: 0 | DISCHARGE

## 2024-04-19 RX ORDER — DAPAGLIFLOZIN 10 MG/1
1 TABLET, FILM COATED ORAL
Qty: 7 | Refills: 0
Start: 2024-04-19 | End: 2024-04-25

## 2024-04-19 RX ORDER — ASPIRIN/CALCIUM CARB/MAGNESIUM 324 MG
1 TABLET ORAL
Qty: 0 | Refills: 0 | DISCHARGE

## 2024-04-19 RX ORDER — SPIRONOLACTONE 25 MG/1
1 TABLET, FILM COATED ORAL
Qty: 14 | Refills: 0
Start: 2024-04-19 | End: 2024-04-25

## 2024-04-19 RX ADMIN — Medication 88 MICROGRAM(S): at 05:24

## 2024-04-19 RX ADMIN — APIXABAN 5 MILLIGRAM(S): 2.5 TABLET, FILM COATED ORAL at 05:24

## 2024-04-19 RX ADMIN — Medication 300 MILLIGRAM(S): at 18:13

## 2024-04-19 RX ADMIN — Medication 81 MILLIGRAM(S): at 12:51

## 2024-04-19 RX ADMIN — APIXABAN 5 MILLIGRAM(S): 2.5 TABLET, FILM COATED ORAL at 18:12

## 2024-04-19 RX ADMIN — Medication 100 MILLIGRAM(S): at 05:24

## 2024-04-19 RX ADMIN — POLYETHYLENE GLYCOL 3350 17 GRAM(S): 17 POWDER, FOR SOLUTION ORAL at 12:51

## 2024-04-19 RX ADMIN — Medication 12.2 MICROGRAM(S)/KG/MIN: at 18:09

## 2024-04-19 RX ADMIN — AMIODARONE HYDROCHLORIDE 200 MILLIGRAM(S): 400 TABLET ORAL at 05:24

## 2024-04-19 RX ADMIN — AMIODARONE HYDROCHLORIDE 200 MILLIGRAM(S): 400 TABLET ORAL at 18:13

## 2024-04-19 RX ADMIN — Medication 100 MILLIGRAM(S): at 18:14

## 2024-04-19 RX ADMIN — Medication 300 MILLIGRAM(S): at 05:25

## 2024-04-19 NOTE — PROGRESS NOTE ADULT - PROBLEM SELECTOR PROBLEM 1
Acute on chronic combined systolic and diastolic congestive heart failure
Acute on chronic combined systolic and diastolic congestive heart failure
Atrial fibrillation
Acute on chronic combined systolic and diastolic congestive heart failure
Acute HFrEF (heart failure with reduced ejection fraction)
Acute on chronic combined systolic and diastolic congestive heart failure
Acute on chronic combined systolic and diastolic congestive heart failure

## 2024-04-19 NOTE — PHYSICAL THERAPY INITIAL EVALUATION ADULT - NSPTDISCHREC_GEN_A_CORE
rehab disposition recommendation may be change base on patient  functional progress, social support/Sub-acute Rehab

## 2024-04-19 NOTE — CONSULT NOTE ADULT - ASSESSMENT
73yr old man  CAD  s/p PCI LAD, ICM/HFrEF (EF 15-20%, LVEDD 6 cm) s/p single chamber ICD  s/p lead extraction and upgrade to Medtronic dual-chamber ICD (8/17/21),, s/p MEMS (9/16/21), bioprosthetic AVR (2015) 2/2 Aprior aflutter ablation 7/2017, persistent afib, s/p DCCV x2 (8/12/20, 7/7/21), hx Trigeminal neuralgia  admitted for acute on chronic HF.  Patient offered LVAD - declined.     Acute on Chronic CHF  Patient Declines LVAD  hospice referred  cont current meds     FEDERICO  Avoid Nephrotoxic agents  Would use Dilaudid over Morphine for symptom management    HLD  D/C Lipitor to reduce pill burden    Encounter for Palliative Care  See separate GOC note.  In summary  - Patient declines LVAD  - Agrees to home hospice services  - MOLST completed DNR/I  Comfort measures  - Agrees with Deactivation of ICD  - Maintain Dobutamine drip up until the time he is to be discharged    Hospice services being arranged through [facility]

## 2024-04-19 NOTE — PHYSICAL THERAPY INITIAL EVALUATION ADULT - PERTINENT HX OF CURRENT PROBLEM, REHAB EVAL
as per medical chart: persistent AFib s/p prior cardioversions most recently 1/17/24 following amiodarone load who is admitted with an acute on chronic combined systolic and diastolic heart failure exacerbation and persistent AFib

## 2024-04-19 NOTE — CONSULT NOTE ADULT - CONSULT REQUESTED DATE/TIME
13-Apr-2024 13:30
15-Apr-2024 10:36
18-Apr-2024 03:34
13-Apr-2024 08:54
19-Apr-2024 13:08
15-Apr-2024 08:00

## 2024-04-19 NOTE — CONSULT NOTE ADULT - TIME BILLING
Time spent with review of chart documents, labs, imaging. Direct patient assessment,  formulation of care plan. Discussion with  Interdisciplinary  team  Ivet Pineda RN Time spent with review of chart documents, labs, imaging. Direct patient assessment,  formulation of care plan. Discussion with  Interdisciplinary  team  Ivet Pineda RN  ACP - 16min, with MOLST completion

## 2024-04-19 NOTE — PROGRESS NOTE ADULT - PROBLEM SELECTOR PROBLEM 2
Atrial fibrillation
CAD (coronary artery disease)
CAD (coronary artery disease)
Acute HFrEF (heart failure with reduced ejection fraction)
CAD (coronary artery disease)
CAD (coronary artery disease)
Atrial fibrillation

## 2024-04-19 NOTE — PROGRESS NOTE ADULT - PROBLEM SELECTOR PLAN 4
H/o BioAVR (2015) w/ severe bioprosthetic AS -> s/p HECTOR (2020).  Can consider stopping ASA as above unless otherwise indicated. Will defer to primary cardiologist.

## 2024-04-19 NOTE — GOALS OF CARE CONVERSATION - ADVANCED CARE PLANNING - CONVERSATION DETAILS
Met with patient and family.  Patient was offered an LVAD and declines. He is aware his time is limited and just wants to go home.   Hospice was previously discussed and accepting of services.  KATINA reviewed - DNR/I comfort measures    Patient with ICD - Agreeable To Deactivate    Psychosocial support.

## 2024-04-19 NOTE — PROGRESS NOTE ADULT - ASSESSMENT
Primary Cardiologist: Dr. Pressley  HF: Dr. Sanchez   EP: Dr. Apple  Pulmonologist: Dr. Avel Guerrero    74 yo M with CAD s/p late-presenting MI  s/p PCI LAD, ICM/HFrEF (EF 15-20%, LVEDD 6 cm) s/p single chamber ICD with lead revision in  with upgrade to dual-chamber device (21) however attempts at LV lead placement unsuccessful due to anatomy, s/p MEMS (21), bioprosthetic AVR ()  AI s/p 26 mm EVOLUT HECTOR for severe bioprosthetic AS (20), aflutter ablation  w/ persistent afib, s/p DCCV x2 (20, 21, 2024), Trigeminal neuralgia (2022) who presents for significant fatigue, increased dyspnea andelevated CardioMEMS w/ poor response to diuretic augmentation.     Of note, this is his second hospitalization this year for ADHF in the setting of Afib. He was hospitalized at [facility] for rising CardioMEMS numbers from 1/10 - 24. He underwent DCCV on 1/15/24 with improvement in CardioMEMS numbers to 31.     He is s/p RHC yesterday off inotropes which showed low cardiac output and was placed back on dobutamine. He was hypotensive again overnight and has signs of worsening end organ function.    CardioMEMS readin/16: PAD 33-34    RHC 4/15/24 (Full report pending) on  2.5mcg/kg/min: HR 75, /69/84 RA 17/15/15, PAP 68/25/43, PCWP 23, CO 5.16, CI 2.68.    TTE 24: LVEF <20%, LVIDd 7.4cm, LVH, grade III DD, mild RVE w/ mildly reduced systolic function, moderate TR, PASP 52 mmHg (RAP ~15 mmHg).    Prior Cardiac Studies:  Cardiac Cath/PCI: 23 - RHC (MEMS calibration) - RA 16, RV 60/20, PA 61/36/43, PCWP 22 (v 27), CO/CI 4.6/2.03, PVR 4.54WU, (SBP missing).    21 - RHC/MEMS implant - RA 12, RV 50/13, PA 51//33, PCWP 11, CO/CI 4.7/2.08 (PA sat 59%, Ao sat 93%, Hb 11.5), PVR 4.68 CIFUENTES    [facility] 20 - LM mild CAD, mLAD 10% stenosis at site of prior stent, LCx/RCA mod CAD

## 2024-04-19 NOTE — PHYSICAL THERAPY INITIAL EVALUATION ADULT - ADDITIONAL COMMENTS
as per pt: resides in the private house with no steps to enter, PTA independent with amb., owns RW, wife available to assist as needed, s/p recent fall at home

## 2024-04-19 NOTE — PROGRESS NOTE ADULT - PROBLEM SELECTOR PLAN 3
H/o late presenting MI s/p PCI to LAD  C/w daily statin therapy  Can consider stopping ASA given that he is on systemic AC with DOAC and he had remote PCI
- Patient with history of Afib s/p ablation and multiple cardioversions, last in 01/2024.   - Atrial fibrillation thought to be contributing factor to decompensated HFrEF.   - EP consulted to further evaluate, as patient is also awaiting CRT-D upgrade.   - Hold Eliquis for possible RHC on 04/15/23, will give lovenox for AC at this time. -   - On lovenox for AC, will hold AM dose.   - Restarted on Amiodarone.   - NPO after midnight.   - Plan for ANDREY/DCCV.   - Continue telemetry monitoring.
H/o late presenting MI s/p PCI to LAD  C/w daily statin therapy  Can consider stopping ASA given that he is on systemic AC with DOAC and he had remote PCI
H/o late presenting MI s/p PCI to LAD  C/w daily statin therapy  Can consider stopping ASA given that he is on systemic AC with DOAC and he had remote PCI
.  - Persistent A-fib   - C/w amiodarone per EP recs.   - Plan for ANDREY/DCCV likely tomorrow, deferred today due to remaining on dobutamine GTT  May ultimately need to consider AVJ ablation as output.
.  - Persistent A-fib   - C/w amiodarone per EP recs.   - Plan for ANDREY/DCCV  today 4/18  - May ultimately need to consider AVJ ablation as output.
.  - Persistent A-fib   - C/w amiodarone per EP recs.   - Plan for ANDREY/DCCV likely tomorrow 4/18, once weaned off dobutamine GTT  May ultimately need to consider AVJ ablation as output.
H/o late presenting MI s/p PCI to LAD  C/w daily statin therapy  Can consider stopping ASA given that he is on systemic AC with DOAC and he had remote PCI

## 2024-04-19 NOTE — PROGRESS NOTE ADULT - PROBLEM SELECTOR PLAN 2
- Hx of remote MI s/p REINA to mLAD.   - No s/s of ACS.   - Last [facility] 2020 with patent stent and moderate non-obstructive CAD.  - Continue home aspirin and lipitor.
Persistent A-fib   C/w amiodarone per EP recs.   Will tentatively schedule for ANDREY/DCCV tomorrow  May ultimately need to consider AVJ ablation as output.
Persistent A-fib, s/p successful DCCV yesterday.  C/w amiodarone per EP
.  - H/o late presenting MI s/p PCI to LAD  - C/w daily ASA/statin therapy as prescribed.
Persistent A-fib   C/w amiodarone per EP recs.   Tentatively scheduled for ANDREY/DCCV today after RHC  May ultimately need to consider AVJ ablation as output.
.  - H/o late presenting MI s/p PCI to LAD  - C/w daily ASA/statin therapy as prescribed.
H/o late presenting MI s/p PCI to LAD  C/w daily ASA/statin therapy as prescribed.
Persistent A-fib   C/w amiodarone per EP recs.   Will tentatively schedule for ANDREY/DCCV tomorrow  May ultimately need to consider AVJ ablation as output.

## 2024-04-19 NOTE — PROGRESS NOTE ADULT - PROBLEM SELECTOR PLAN 1
- Patient with longstanding history of HFrEF (EF 15%) on GDMT.   - Repeat echo with EF <20%, grade III DD, mild MR, well functioning TAVR, mod TR.  - Patient with cardiomems with goal 30-34, with last check 04/12 with PAD in the 40s.   - Patient also at home with cold hands and feet indicating a low flow state.   - Upon arrival patient with some FEDERICO, elevated AST, and lactate of 2.70.   - All heart failure medications on hold at this time including Farxiga, toprol XL, losartan, and aldactone.   - Lactate continued to be elevated, so currently on dobutamine gtt at 2.5mg/hr.   - Lactate normalized, FEDERICO resolving, and transaminits resolved.  - Continue Bumex gtt at 0.5mg/hr, negative 1 L yesterday.  - Goal negative 1-2 L daily.   - NPO after midnight.  - Plan for RHC tomorrow 04/15.  - Will need to contact [employer] to input patient into our cardiomems system 04/15, and check device then.   - Monitor on telemetry.    - Strict i/o and daily weights.    - Keep K > 4, Mg > 2.    - Monitor renal function with ongoing diuresis.
- Patient with longstanding history of HFrEF (EF 15%) on GDMT.   - Patient with cardiomems with goal 30-34, with last check yesterday with PAD in the 40s.   - PBNP 2041   - Repeat echo with EF <20%, grade III DD, mild MR, well functioning TAVR, mod TR.  - All heart failure medications on hold at this time including Farxiga, toprol XL, losartan, and aldactone.  - Upon arrival patient with some FEDERICO, elevated AST, and lactate of 2.70.   - Continued on dobutaminegtt, decreased to 1 mcg/kg/min   - Lactate normalized, FEDERICO improved, transaminitis resolved.  - Bumex gtt d/c now to Bumex IVP BID  - Will need to contact [employer] to input patient into our cardiomems system 04/15, and check device then.   - RHCon 4/15 showed mildly elevated filling pressures (RA 15, PA 68/25, PCWP 23) and CI 2.68 on low dose inotropic support.  - per HF team, start Entresto 24/26 BID with hold parameters
.  - Patient with longstanding history of HFrEF (EF 15%) on GDMT.   - Patient with cardiomems with goal 30-34, with last check yesterday with PAD in the 40s.   - PBNP 2041   - Repeat echo with EF <20%, grade III DD, mild MR, well functioning TAVR, mod TR.  - All heart failure medications on hold at this time including Farxiga, toprol XL, losartan, and aldactone.  - markers of perfusion improved   - Continued on dobutamine gtt, decreased to 1 mcg/kg/min, plan to wean today  - continue Bumex IVP BID  - RHC on 4/15 showed mildly elevated filling pressures (RA 15, PA 68/25, PCWP 23) and CI 2.68 on low dose inotropic support.  - per HF team, start Entresto 24/26 BID with hold parameters. appears to be tolerating.
Etiology: ICM +valvular (LVEF <20%, LVIDd 7.4cm).   Initially presented with concern for shock (lactate 2.7, cool extremities) which improved on low dose .   weaned off on 4/17. Plan is for repeat RHC today.  GDMT: BB on hold. Entresto 24-26 mg BID stopped for hypotension. Continue Spironolactone 25 mg BID. (Home medications included Farxiga 5 mg daily, Toprol XL 25 mg daily, losartan 12.5 mg daily, spironolactone 25 mg daily, and Verquvo 2.5mg daily).  Diuretics: Diuretics held overnight for hypotension and patient received IVF. Will assess hemos with RHC today.  Device: DC-ICD w/ elevated RV pacing burden. May benefit from eventual CRT-D upgrade (Follows w/ Dr. Apple as outpt).  CardioMEMS: Goal PAD ~30. He does have an element of pre-capillary hypertension with PVR 4.6 on last RHC with PCWP of 22. Wt goal of 222-224lbs. Below MEMS of 30 he feels worse.  Advanced therapies: 3/2024 CPET values (PVO2 of 7.6ml/kg/min and VE/VCO2 slope of 47). Chronotropic incompetence noted and may be related to recent syncopal events. In past he was against LVAD therapy. However, he did attend a support group meeting and would like to attend a second one. He is very much focused on his quality of life and is hesitant that LVAD would disrupt this including his ability to go boating.
Etiology: ICM +valvular (LVEF <20%, LVIDd 7.4cm).   Initially presented with concern for shock (lactate 2.7, cool extremities) which is now improving on low dose .  RHC on 4/15 showed mildly elevated filling pressures (RA 15, PA 68/25, PCWP 23) and CI 2.68 on low dose inotropic support.  Will stop  and repeat RHC tomorrow.  Diuretics: JVP elevated today with rising Tbili. Will give an extra 4 mg of Bumex today and then continue Bumex 4 mg IV BID.  GDMT: BB on hold while receiving inotropic support. BP improved. Continue Entresto 24-26 mg BID and Spironolactone 25 mg BID. (Home medications included Farxiga 5 mg daily, Toprol XL 25 mg daily, losartan 12.5 mg daily, spironolactone 25 mg daily, and Verquvo 2.5mg daily).  Device: DC-ICD w/ elevated RV pacing burden. May benefit from eventual CRT-D upgrade (Follows w/ Dr. Apple as outpt).  CardioMEMS: Goal PAD ~30. He does have an element of pre-capillary hypertension with PVR 4.6 on last RHC with PCWP of 22. Wt goal of 222-224lbs. Below MEMS of 30 he feels worse.  Advanced therapies: 3/2024 CPET values (PVO2 of 7.6ml/kg/min and VE/VCO2 slope of 47). Chronotropic incompetence noted and may be related to recent syncopal events. In past he was against LVAD therapy. However, he did attend a support group meeting and would like to attend a second one. He is very much focused on his quality of life and is hesitant that LVAD would disrupt this including his ability to go boating.
.  - Patient with longstanding history of HFrEF (EF 15%) on GDMT.   - Patient with cardiomems with goal 30-34,  PAD in the 40s during   - PBNP 2041   - Repeat echo with EF <20%, grade III DD, mild MR, well functioning TAVR, mod TR.  - All heart failure medications on hold at this time including Farxiga, toprol XL, losartan, and aldactone.  - markers of perfusion improved   - Continued on dobutamine gtt, decreased to 1 mcg/kg/min, plan to wean today  - continue Bumex IVP BID  - RHC on 4/15 showed mildly elevated filling pressures (RA 15, PA 68/25, PCWP 23) and CI 2.68 on low dose inotropic support.  - per HF team, start Entresto 24/26 BID with hold parameters. d/c today for hypotension will have HF team re-evaluate
Etiology: ICM +valvular (LVEF <20%, LVIDd 7.4cm).   Initially presented with concern for shock (lactate 2.7, cool extremities) which is now improving on low dose .  RHC on 4/15 showed mildly elevated filling pressures (RA 15, PA 68/25, PCWP 23) and CI 2.68 on low dose inotropic support.  Will try to add afterload reduction and wean Dobutamine to 1 mcg/kg/min. Do not titrate further. Repeat lactate/CMP 4 hours after decreasing dose and again in the AM.  Diuretics: Diuresed 4L overnight. Will switch Bumex gtt to intermittent Bumex 4 mg IV BID.  GDMT: BB on hold while receiving inotropic support. BP improved today. Will start Entresto 24-26 mg BID (previously intolerant due to hypotension but BP is robust today). C/w Spironolactone 25 mg BID. (Home medications included Farxiga 5 mg daily, Toprol XL 25 mg daily, losartan 12.5 mg daily, spironolactone 25 mg daily, and Verquvo 2.5mg daily).  Device: DC-ICD w/ elevated RV pacing burden. May benefit from eventual CRT-D upgrade (Follows w/ Dr. Apple as outpt).  CardioMEMS: Goal PAD ~30. He does have an element of pre-capillary hypertension with PVR 4.6 on last RHC with PCWP of 22. Wt goal of 222-224lbs. Below MEMS of 30 he feels worse.  Advanced therapies: 3/2024 CPET values (PVO2 of 7.6ml/kg/min and VE/VCO2 slope of 47). Chronotropic incompetence noted and may be related to recent syncopal events. In past he was against LVAD therapy. However, he did attend a support group meeting and would like to attend a second one. He is very much focused on his quality of life and is hesitant that LVAD would disrupt this including his ability to go boating.
Etiology: ICM +valvular (LVEF <20%, LVIDd 7.4cm).   Initially presented with concern for shock (lactate 2.7, cool extremities) which improved on low dose .   weaned off on 4/17. Repeat RHC showed low cardiac output and  ws resumed.  GDMT: BB on hold. Entresto 24-26 mg BID stopped for hypotension. Continue Spironolactone 25 mg BID.  Diuretics: Please resume home dose of torsemide 40 mg BID.  Device: s/p DC-ICD.  Advanced therapies: Patient clearly has stage D HF and is inotrope dependent based upon RHC yesterday and has signs of worsening end organ function. I had a long discussion with him and his family this morning. Patient would like to focus on comfort measures and has no interest in pursuing advanced therapies such as LVAD. He would also like to go home as soon as possible and does not want to wait for home inotropes to be set up. He is aware that stopping inotropes could cause him to decompensate quickly which he understands. His goal is to be comfortable and at home. Will ask for palliative care/home hospice consult. He will also need his ICD turned off prior to discharge.

## 2024-04-19 NOTE — PROGRESS NOTE ADULT - CONVERSATION DETAILS
Pt advised to transfer to [facility] per HF recs. D/w pt, he states that he is done with all further cardiac intervention. Adamantly declining any further intervention despite advising pt that he may still be able to be evaluated by the HF team for LVAD consideration. States, ' doc, I should have  20 years ago with the  maker but I didn't. My doctor told me that only 25% of people ever make it to the hospital and even less go home. I've thought about this for a very long time and my wife and I have discussed this. I just want to go home and whats gonna come is gonna come. Im not scared of the grim reaper, maybe a little mad, but not scared".   After d/w pt and then informing HF, palliative and home hospice consulted.   Pt now DNR/DNI, EP contacted to turn off ICD per pts wishes Pt advised to transfer to [facility] per HF recs. D/w pt, he states that he is done with all further cardiac intervention. Adamantly declining any further intervention despite advising pt that he may still be able to be evaluated by the HF team for LVAD consideration. States, ' doc, I should have  20 years ago with the  maker but I didn't. My doctor told me that only 25% of people ever make it to the hospital and even less go home. I've thought about this for a very long time and my wife and I have discussed this. I just want to go home and whats gonna come is gonna come. Im not scared of the grim reaper, maybe a little mad, but not scared".   After d/w pt and then informing HF, palliative and home hospice consulted.   Pt now DNR/DNI, EP contacted to turn off ICD per pts wishes      ACP time : 65 mins

## 2024-04-19 NOTE — PROGRESS NOTE ADULT - SUBJECTIVE AND OBJECTIVE BOX
[facility] Division of Hospital Medicine    Chief Complaint:  Acute on ch HF    SUBJECTIVE / OVERNIGHT EVENTS:  Pt examined lying in bed  Extensively discussed current prognosis with pt if wishes to decline further potetial cardiac / HF maagement  States that he underrstands and that he would like to go home and do everything 'au naturale'   Patient denies chest pain, SOB, abd pain, N/V, fever, chills, dysuria. Complains of ongoing weakness      MEDICATIONS  (STANDING):  MEDICATIONS  (STANDING):  aMIOdarone    Tablet 200 milliGRAM(s) Oral two times a day  apixaban 5 milliGRAM(s) Oral every 12 hours  aspirin enteric coated 81 milliGRAM(s) Oral daily  carBAMazepine ER Capsule 100 milliGRAM(s) Oral two times a day  DOBUTamine Infusion 5 MICROgram(s)/kG/Min (12.2 mL/Hr) IV Continuous <Continuous>  levothyroxine 88 MICROGram(s) Oral daily  polyethylene glycol 3350 17 Gram(s) Oral daily  pregabalin 300 milliGRAM(s) Oral two times a day  spironolactone 25 milliGRAM(s) Oral every 12 hours      PHYSICAL EXAM:  Vital Signs Last 24 Hrs  T(C): 37.1 (19 Apr 2024 07:54), Max: 37.1 (19 Apr 2024 07:54)  T(F): 98.7 (19 Apr 2024 07:54), Max: 98.7 (19 Apr 2024 07:54)  HR: 78 (19 Apr 2024 12:00) (73 - 97)  BP: 93/56 (19 Apr 2024 12:00) (74/55 - 130/77)  BP(mean): 68 (19 Apr 2024 12:00) (57 - 94)  RR: 16 (19 Apr 2024 12:00) (16 - 22)  SpO2: 94% (19 Apr 2024 12:00) (93% - 97%)    Parameters below as of 19 Apr 2024 12:00  Patient On (Oxygen Delivery Method): nasal cannula  O2 Flow (L/min): 4      CONSTITUTIONAL: Non toxic appearing, lying in bed  ENMT: Moist oral mucosa, no pharyngeal injection or exudates, central cyanotic appearance of lips and nose (reports ch)  RESPIRATORY: Normal respiratory effort;  lungs CTA  CARDIOVASCULAR: Irregular rate and rhythm, normal S1 and S2, no murmur/rub/gallop; improved LE edema   ABDOMEN: Nontender to palpation, normoactive bowel sounds, no rebound/guarding; No hepatosplenomegaly  MUSCLOSKELETAL:  No clubbing or cyanosis of digits; no joint swelling or tenderness to palpation  PSYCH: A+O to person, place, and time; affect appropriate  NEUROLOGY: CN 2-12 are intact and symmetric; no gross sensory deficits;   SKIN: No rashes; no palpable lesions      LABS:                                        12.9   9.30  )-----------( 191      ( 19 Apr 2024 06:00 )             39.4   04-19    132<L>  |  94<L>  |  36.2<H>  ----------------------------<  96  4.3   |  26.0  |  1.66<H>    Ca    8.4      19 Apr 2024 06:00  Mg     2.0     04-19    TPro  5.9<L>  /  Alb  3.4  /  TBili  3.0<H>  /  DBili  x   /  AST  71<H>  /  ALT  70<H>  /  AlkPhos  167<H>  04-19               CAPILLARY BLOOD GLUCOSE              RADIOLOGY & ADDITIONAL TESTS:  1/13/24 TTE   1. Technically difficult image quality.   2. Left ventricular cavity is severely dilated. Left ventricular systolic function is severely decreased with an ejection fraction visually estimated at <20 %. Global left ventricular hypokinesis.   3. There is increased LV mass and eccentric hypertrophy.   4. There is severe (grade 3) left ventricular diastolic dysfunction, with elevated filling pressure.   5. Mildly enlarged right ventricular cavity size and mildly reduced systolic function.   6. The left atrium is mildly dilated.   7. The right atrium is dilated.   8. Mild mitral regurgitation.   9. A bioprosthetic valve replacement Transcatheter deployed (TAVR) valve replacement is present in the aortic position The prosthetic valve is well seated with normal function.  10. Moderate tricuspid regurgitation.  11. Estimated pulmonary artery systolic pressure is 52 mmHg, consistent with elevated pulmonary artery pressure.  12. No prior echocardiogram is available for comparison.    1/17/24 Cardiac MRI  IMPRESSION: Cardiac amyloid Imaging study is  not suggestive of transthyretin cardiac amyloidosis.                                     [facility] Division of Hospital Medicine    Chief Complaint:  Acute on ch HF    SUBJECTIVE / OVERNIGHT EVENTS:  Pt examined lying in bed  Extensively discussed current prognosis with pt if wishes to decline further potential cardiac / HF management  States that he understands and that he would like to go home and do everything 'au naturale'   Patient denies chest pain, SOB, abd pain, N/V, fever, chills, dysuria. Complains of ongoing weakness      MEDICATIONS  (STANDING):  MEDICATIONS  (STANDING):  aMIOdarone    Tablet 200 milliGRAM(s) Oral two times a day  apixaban 5 milliGRAM(s) Oral every 12 hours  aspirin enteric coated 81 milliGRAM(s) Oral daily  carBAMazepine ER Capsule 100 milliGRAM(s) Oral two times a day  DOBUTamine Infusion 5 MICROgram(s)/kG/Min (12.2 mL/Hr) IV Continuous <Continuous>  levothyroxine 88 MICROGram(s) Oral daily  polyethylene glycol 3350 17 Gram(s) Oral daily  pregabalin 300 milliGRAM(s) Oral two times a day  spironolactone 25 milliGRAM(s) Oral every 12 hours      PHYSICAL EXAM:  Vital Signs Last 24 Hrs  T(C): 37.1 (19 Apr 2024 07:54), Max: 37.1 (19 Apr 2024 07:54)  T(F): 98.7 (19 Apr 2024 07:54), Max: 98.7 (19 Apr 2024 07:54)  HR: 78 (19 Apr 2024 12:00) (73 - 97)  BP: 93/56 (19 Apr 2024 12:00) (74/55 - 130/77)  BP(mean): 68 (19 Apr 2024 12:00) (57 - 94)  RR: 16 (19 Apr 2024 12:00) (16 - 22)  SpO2: 94% (19 Apr 2024 12:00) (93% - 97%)    Parameters below as of 19 Apr 2024 12:00  Patient On (Oxygen Delivery Method): nasal cannula  O2 Flow (L/min): 4      CONSTITUTIONAL: Non toxic appearing, lying in bed  ENMT: Moist oral mucosa, no pharyngeal injection or exudates, central cyanotic appearance of lips and nose (reports ch)  RESPIRATORY: Normal respiratory effort;  lungs CTA  CARDIOVASCULAR: Irregular rate and rhythm, normal S1 and S2, no murmur/rub/gallop; improved LE edema   ABDOMEN: Nontender to palpation, normoactive bowel sounds, no rebound/guarding; No hepatosplenomegaly  MUSCLOSKELETAL:  No clubbing or cyanosis of digits; no joint swelling or tenderness to palpation  PSYCH: A+O to person, place, and time; affect appropriate  NEUROLOGY: CN 2-12 are intact and symmetric; no gross sensory deficits;   SKIN: No rashes; no palpable lesions      LABS:                                        12.9   9.30  )-----------( 191      ( 19 Apr 2024 06:00 )             39.4   04-19    132<L>  |  94<L>  |  36.2<H>  ----------------------------<  96  4.3   |  26.0  |  1.66<H>    Ca    8.4      19 Apr 2024 06:00  Mg     2.0     04-19    TPro  5.9<L>  /  Alb  3.4  /  TBili  3.0<H>  /  DBili  x   /  AST  71<H>  /  ALT  70<H>  /  AlkPhos  167<H>  04-19               CAPILLARY BLOOD GLUCOSE              RADIOLOGY & ADDITIONAL TESTS:  1/13/24 TTE   1. Technically difficult image quality.   2. Left ventricular cavity is severely dilated. Left ventricular systolic function is severely decreased with an ejection fraction visually estimated at <20 %. Global left ventricular hypokinesis.   3. There is increased LV mass and eccentric hypertrophy.   4. There is severe (grade 3) left ventricular diastolic dysfunction, with elevated filling pressure.   5. Mildly enlarged right ventricular cavity size and mildly reduced systolic function.   6. The left atrium is mildly dilated.   7. The right atrium is dilated.   8. Mild mitral regurgitation.   9. A bioprosthetic valve replacement Transcatheter deployed (TAVR) valve replacement is present in the aortic position The prosthetic valve is well seated with normal function.  10. Moderate tricuspid regurgitation.  11. Estimated pulmonary artery systolic pressure is 52 mmHg, consistent with elevated pulmonary artery pressure.  12. No prior echocardiogram is available for comparison.    1/17/24 Cardiac MRI  IMPRESSION: Cardiac amyloid Imaging study is  not suggestive of transthyretin cardiac amyloidosis.

## 2024-04-19 NOTE — PROCEDURE NOTE - ADDITIONAL PROCEDURE DETAILS
Battery: 5.8 years remaining   At the request of patient, family, and care team the ICD therapies of this device have been disabled.

## 2024-04-19 NOTE — PROGRESS NOTE ADULT - REASON FOR ADMISSION
acute on chronic HF

## 2024-04-19 NOTE — PROGRESS NOTE ADULT - SUBJECTIVE AND OBJECTIVE BOX
ADVANCED HEART FAILURE - PROGRESS NOTE  [address] [place] [address]  Office Phone: [phone]/Fax: [phone]  Service/On Call Phone [phone]  _______________________________________________________________________________________________________    Subjective:      Medications:  acetaminophen     Tablet .. 650 milliGRAM(s) Oral every 6 hours PRN  aluminum hydroxide/magnesium hydroxide/simethicone Suspension 30 milliLiter(s) Oral every 4 hours PRN  aMIOdarone    Tablet 200 milliGRAM(s) Oral two times a day  apixaban 5 milliGRAM(s) Oral every 12 hours  aspirin enteric coated 81 milliGRAM(s) Oral daily  atorvastatin 40 milliGRAM(s) Oral at bedtime  bisacodyl Suppository 10 milliGRAM(s) Rectal daily PRN  carBAMazepine ER Capsule 100 milliGRAM(s) Oral two times a day  DOBUTamine Infusion 5 MICROgram(s)/kG/Min IV Continuous <Continuous>  levothyroxine 88 MICROGram(s) Oral daily  melatonin 3 milliGRAM(s) Oral at bedtime PRN  ondansetron Injectable 4 milliGRAM(s) IV Push every 8 hours PRN  polyethylene glycol 3350 17 Gram(s) Oral daily  pregabalin 300 milliGRAM(s) Oral two times a day  spironolactone 25 milliGRAM(s) Oral every 12 hours      ICU Vital Signs Last 24 Hrs  T(C): 37.1, Max: 37.1 (04-19 @ 07:54)  HR: 76 (73 - 97)  BP: 78/58 (74/55 - 130/77)  BP(mean): 57 (57 - 94)  ABP: --  ABP(mean): --  RR: 18 (16 - 22)  SpO2: 97% (93% - 97%)        I&O's Summary Last 24 Hrs    IN: 1171.4 mL / OUT: 1250 mL / NET: -78.6 mL      Tele: SR 70-80s, PVCs    Physical Exam:    General: NAD.  Neck: JVP elevated.  Respiratory: Clear to auscultation bilaterally  CV: RRR. Normal S1 and S2. No murmurs, rub, or gallops. Radial pulses normal.  Abdomen: Soft, non-distended, non-tender  Extremities: Warm, trace ankle edema bilaterally  Neurology: Non-focal, alert and oriented times three.   Psych: Affect normal    Labs:    ( 04-19-24 @ 06:00 )               12.9   9.30  )--------( 191                  39.4     ( 04-19-24 @ 06:00 )     132  |  94  |  36.2  ---------------------<  96  4.3  |  26.0  |  1.66    Ca 8.4  Phos x   Mg 2.0    ( 04-19-24 @ 06:00 )  TPro  5.9  /  Alb  3.4  /  TBili  3.0  /  DBili  x   /  AST  71  /  ALT  70  /  AlkPhos  167  ( 04-18-24 @ 04:03 )  TPro  6.1  /  Alb  3.4  /  TBili  2.6  /  DBili  x   /  AST  39  /  ALT  47  /  AlkPhos  146    ( 04-12-24 @ 19:18 )  TropHS 35    / CK x     / CKMB x       ADVANCED HEART FAILURE - PROGRESS NOTE  [address] [place] [address]  Office Phone: [phone]/Fax: [phone]  Service/On Call Phone [phone]  _______________________________________________________________________________________________________    Subjective:  - Hypotensive last night and this morning, asymptomatic  - He denies any SOB at rest, LH/dizziness, CP, or palpitations    Medications:  acetaminophen     Tablet .. 650 milliGRAM(s) Oral every 6 hours PRN  aluminum hydroxide/magnesium hydroxide/simethicone Suspension 30 milliLiter(s) Oral every 4 hours PRN  aMIOdarone    Tablet 200 milliGRAM(s) Oral two times a day  apixaban 5 milliGRAM(s) Oral every 12 hours  aspirin enteric coated 81 milliGRAM(s) Oral daily  atorvastatin 40 milliGRAM(s) Oral at bedtime  bisacodyl Suppository 10 milliGRAM(s) Rectal daily PRN  carBAMazepine ER Capsule 100 milliGRAM(s) Oral two times a day  DOBUTamine Infusion 5 MICROgram(s)/kG/Min IV Continuous <Continuous>  levothyroxine 88 MICROGram(s) Oral daily  melatonin 3 milliGRAM(s) Oral at bedtime PRN  ondansetron Injectable 4 milliGRAM(s) IV Push every 8 hours PRN  polyethylene glycol 3350 17 Gram(s) Oral daily  pregabalin 300 milliGRAM(s) Oral two times a day  spironolactone 25 milliGRAM(s) Oral every 12 hours      ICU Vital Signs Last 24 Hrs  T(C): 37.1, Max: 37.1 (04-19 @ 07:54)  HR: 76 (73 - 97)  BP: 78/58 (74/55 - 130/77)  BP(mean): 57 (57 - 94)  ABP: --  ABP(mean): --  RR: 18 (16 - 22)  SpO2: 97% (93% - 97%)        I&O's Summary Last 24 Hrs    IN: 1171.4 mL / OUT: 1250 mL / NET: -78.6 mL      Tele: SR 70-80s, PVCs    Physical Exam:    General: NAD.  Neck: JVP elevated.  Respiratory: Clear to auscultation bilaterally  CV: RRR. Normal S1 and S2. No murmurs, rub, or gallops. Radial pulses normal.  Abdomen: Soft, non-distended, non-tender  Extremities: Warm, trace ankle edema bilaterally  Neurology: Non-focal, alert and oriented times three.   Psych: Affect normal    Labs:    ( 04-19-24 @ 06:00 )               12.9   9.30  )--------( 191                  39.4     ( 04-19-24 @ 06:00 )     132  |  94  |  36.2  ---------------------<  96  4.3  |  26.0  |  1.66    Ca 8.4  Phos x   Mg 2.0    ( 04-19-24 @ 06:00 )  TPro  5.9  /  Alb  3.4  /  TBili  3.0  /  DBili  x   /  AST  71  /  ALT  70  /  AlkPhos  167  ( 04-18-24 @ 04:03 )  TPro  6.1  /  Alb  3.4  /  TBili  2.6  /  DBili  x   /  AST  39  /  ALT  47  /  AlkPhos  146    ( 04-12-24 @ 19:18 )  TropHS 35    / CK x     / CKMB x

## 2024-04-19 NOTE — PROGRESS NOTE ADULT - PROBLEM SELECTOR PROBLEM 3
Atrial fibrillation
CAD (coronary artery disease)
Atrial fibrillation
CAD (coronary artery disease)
Atrial fibrillation
CAD (coronary artery disease)
CAD (coronary artery disease)
Atrial fibrillation

## 2024-04-19 NOTE — PROGRESS NOTE ADULT - PROBLEM SELECTOR PROBLEM 4
History of bioprosthetic transcatheter aortic valve implantation (HECTOR)

## 2024-04-19 NOTE — DISCHARGE NOTE NURSING/CASE MANAGEMENT/SOCIAL WORK - PATIENT PORTAL LINK FT
You can access the FollowMyHealth Patient Portal offered by [facility] by registering at the following website: [email or web address]. By joining [employer]’s FollowMyHealth portal, you will also be able to view your health information using other applications (apps) compatible with our system.

## 2024-04-19 NOTE — CONSULT NOTE ADULT - SUBJECTIVE AND OBJECTIVE BOX
HPI  73 year old male with PMH of CAD s/p late-presenting MI 2005 s/p PCI LAD, ICM/HFrEF (EF 15-20%, LVEDD 6 cm) s/p single chamber ICD with Estevan lead 2011 s/p lead extraction and upgrade to Medtronic dual-chamber ICD (8/17/21), has hx of appropriate ICD shock for VT/VF, s/p MEMS (9/16/21), bioprosthetic AVR (2015) 2/2 AI s/p 26 mm EVOLUT HECTOR for severe bioprosthetic AS (8/20/20), prior aflutter ablation 7/2017, persistent afib, s/p DCCV x2 (8/12/20, 7/7/21), Trigeminal neuralgia (4/2022) presented for elevated filling pressure on cardiomems. Pt denies any sob, palpitations, cp, he states that tried to dobule up on his torsemide but still with elevated read, advised to come to ED by Dr. Quiñones Pt was admitted to [facility] for similar finding in Jan and had ANDREY/DCCV. In the ED hypoxic requiring 3L NC and bp borderline.  (12 Apr 2024 22:59)  Discussed with Dr. De Leon - patient was offered an LVAD and refused.  Hospice consulted    PERTINENT PMH REVIEWED: Yes    PAST MEDICAL & SURGICAL HISTORY:  CAD (coronary artery disease)  2004      MI (myocardial infarction)  Nov 2004      Systolic heart failure  EF 15%      HLD (hyperlipidemia)      HTN (hypertension)      AICD (automatic cardioverter/defibrillator) present      Aortic stenosis  severe, s/p AVR      H/O emphysema  - moderately severe, CT chest 06/10/21      Carotid stenosis  - moderate, b/l (Doppler 12/27/21)      Ischemic cardiomyopathy      H/O pulmonary hypertension      Longstanding persistent atrial fibrillation      Type 2 diabetes mellitus      Osteoarthritis of right knee      Neuralgia      CRI (chronic renal insufficiency)      Stented coronary artery  LAD x 3 (Nov 2004)      S/P knee surgery  - Right knee arthroscopy, 2000      S/P hernia repair  - right inguinal, 1982      S/P AVR  2015, complicated by Asystole/Syncope with 1 shock, Transcatheter valve-in-valve aortic valve replacement utilizing a right common femoral arterial percutaneous approach utilizing a 26 Medtronic Evolut core valve on 08/20/2020      AICD (automatic cardioverter/defibrillator) present  single chamber 2005, replaced with dual chamber 08/17/2021      H/O prior ablation treatment  afib - 2017, 07/2021      SOCIAL HISTORY:                      Substance history: no hx                    Admitted from:  home                     Restoration/spirituality:  Jehovah's witness                    Cultural concerns:    Baseline ADLs (prior to admission):  Independent                        Surrogate/HCP/Guardian: wife Megan Bermudez    FAMILY HISTORY:  Chronic obstructive pulmonary disease    Family history of colon cancer (Father)        Allergies    No Known Allergies    Intolerances    Entresto (Other)      http://npcrc.org/files/[email or web address]    Present Symptoms:   Dyspnea:  No   Nausea/Vomiting:  No    Anxiety:   No  Depression: No  Fatigue: Yes   Loss of appetite:  N/A   Constipation:  Not reported     Pain:  No            Location            Character            Duration            Factors            Severity            Effect    Pain AD Score:  [email or web address]/painad.pdf (press ctrl + left click to view)    Review of Systems: Reviewed    All other ROS negative      MEDICATIONS  (STANDING):  aMIOdarone    Tablet 200 milliGRAM(s) Oral two times a day  apixaban 5 milliGRAM(s) Oral every 12 hours  aspirin enteric coated 81 milliGRAM(s) Oral daily  carBAMazepine ER Capsule 100 milliGRAM(s) Oral two times a day  DOBUTamine Infusion 5 MICROgram(s)/kG/Min (12.2 mL/Hr) IV Continuous <Continuous>  levothyroxine 88 MICROGram(s) Oral daily  polyethylene glycol 3350 17 Gram(s) Oral daily  pregabalin 300 milliGRAM(s) Oral two times a day  spironolactone 25 milliGRAM(s) Oral every 12 hours    MEDICATIONS  (PRN):  acetaminophen     Tablet .. 650 milliGRAM(s) Oral every 6 hours PRN Temp greater or equal to 38C (100.4F), Mild Pain (1 - 3)  aluminum hydroxide/magnesium hydroxide/simethicone Suspension 30 milliLiter(s) Oral every 4 hours PRN Dyspepsia  bisacodyl Suppository 10 milliGRAM(s) Rectal daily PRN Constipation  melatonin 3 milliGRAM(s) Oral at bedtime PRN Insomnia  ondansetron Injectable 4 milliGRAM(s) IV Push every 8 hours PRN Nausea and/or Vomiting      PHYSICAL EXAM:    Vital Signs Last 24 Hrs  T(C): 37.1 (19 Apr 2024 07:54), Max: 37.1 (19 Apr 2024 07:54)  T(F): 98.7 (19 Apr 2024 07:54), Max: 98.7 (19 Apr 2024 07:54)  HR: 78 (19 Apr 2024 12:00) (73 - 97)  BP: 93/56 (19 Apr 2024 12:00) (74/55 - 130/77)  BP(mean): 68 (19 Apr 2024 12:00) (57 - 94)  RR: 16 (19 Apr 2024 12:00) (16 - 22)  SpO2: 94% (19 Apr 2024 12:00) (93% - 97%)    Parameters below as of 19 Apr 2024 12:00  Patient On (Oxygen Delivery Method): nasal cannula  O2 Flow (L/min): 4    Karnofsky 40  %  General:  M awake alert NAD  HEENT: NCAT  non icteric  Lungs: comfortable  CV: RR  MSK: weakness  Neuro: AOx4 no focal deficits  Skin: warm dry  Psych:  calm, affect appropriate    LABS:                        12.9   9.30  )-----------( 191      ( 19 Apr 2024 06:00 )             39.4     04-19    132<L>  |  94<L>  |  36.2<H>  ----------------------------<  96  4.3   |  26.0  |  1.66<H>    Ca    8.4      19 Apr 2024 06:00  Mg     2.0     04-19    TPro  5.9<L>  /  Alb  3.4  /  TBili  3.0<H>  /  DBili  x   /  AST  71<H>  /  ALT  70<H>  /  AlkPhos  167<H>  04-19      Urinalysis Basic - ( 19 Apr 2024 06:00 )    Color: x / Appearance: x / SG: x / pH: x  Gluc: 96 mg/dL / Ketone: x  / Bili: x / Urobili: x   Blood: x / Protein: x / Nitrite: x   Leuk Esterase: x / RBC: x / WBC x   Sq Epi: x / Non Sq Epi: x / Bacteria: x      I&O's Summary    18 Apr 2024 07:01  -  19 Apr 2024 07:00  --------------------------------------------------------  IN: 1171.4 mL / OUT: 1250 mL / NET: -78.6 mL    19 Apr 2024 07:01  -  19 Apr 2024 13:08  --------------------------------------------------------  IN: 73.2 mL / OUT: 0 mL / NET: 73.2 mL        RADIOLOGY & ADDITIONAL STUDIES:  Imaging reviewed   < from: ANDREY W or WO Ultrasound Enhancing Agent (04.18.24 @ 16:02) >    TRANSESOPHAGEAL ECHOCARDIOGRAM REPORT  ________________________________________________________________________________                                      _______       Pt. Name:       UDDLEY BERMUDEZ Study Date:    4/18/2024  MRN: [id number]             YOB: 1950  Accession #:    [id number]            Age:           73 years  Account#:       [id number]           Gender:        M  Visit ID#  Heart Rate:     92 bpm               Height:        67.00 in (170.18 cm)  Rhythm:         atrial fibrillation  Weight:        179.00 lb (81.19 kg)  Blood Pressure: 135/95 mmHg          BSA/BMI:       1.93 m² / 28.04 kg/m²  ________________________________________________________________________________________  Referring Physician:    [id number] Matthieu De Leon  Interpreting Physician: Cresencio Garcia  Primary Sonographer:    MAURISIO    CPT:               ECHO TRANSESOPH W/O CON - 26896.m;DOPPLER ECHO COMP W SPECT -                     06912.m;DOPPL ECHO COLOR FLOW - 51557.m  Indication(s):     Paroxysmal atrial fibrillation - I48.0  Procedure:         Transesophageal echocardiogram performed with 2D, M-mode and                     complete spectral and color flow Doppler.  Ordering Location: [facility]  Admission Status:  Inpatient  Study Information: Image quality for this study is good.    _______________________________________________________________________________________     CONCLUSIONS:      1. Left ventricular systolic function is severely decreased with an ejection fraction visually estimated at <20 %.   2. Limited ANDREY for THONY screen for thrombus evaluation.   3. No evidence of left atrial or left atrial appendage thrombus.   4. The patient was successfully cardioverted to sinus rhythm by EP Dr. Lomax.   5. There were no complications from the cardioversion procedure.    ____________________________________________________________________  ANDREY Procedure:  After discussion of the risks and benefits of the ANDREY, an informed consent was obtained. Local oropharyngeal anesthetic was provided with viscous lidocaine and ceteicaine. Study was performed with conscious sedation. The adult Siemens ANDREY probe was introduced and passed into the esophagus. The ANDREY probe was passed without difficulty. Images were obtained with the patient in a left lateral decubitus position. Image quality was good. The patient's vital signs; including heart rate, blood pressure, and oxygen saturation; remained stable throughout the procedure. The patient tolerated the procedure well and without complications.  ________________________________________________________________________________________  Cardioversion Procedure:  Written, informed consent to proceed with cardioversion was obtained prior to administering conscious sedation. After the transesophageal echocardiogram demonstrated no left atrial appendage thrombus, it was decided to proceed with cardioversion. The patient was preoxygenated with oxygen by face mask. The patient was monitored throughout the procedureby a nurse. The patient was shocked at 300 J on a biphasic defibrillator. The patient was successfully cardioverted to sinus rhythm. There were no complications from the cardioversion procedure. The attending physician was present for the entire procedure.     ________________________________________________________________________________________  FINDINGS:     Left Ventricle:  Left ventricular systolic function is severely decreased with an ejection fraction visually estimated at <20%.     LeftAtrium:  There is no evidence of left atrial or left atrial appendage thrombus. Ultrasound enhancing agent was not utilized to visualize the left atrial appendage.     Right Atrium:  Right atrial appendage is not well visualized.  ____________________________________________________________________  QUANTITATIVE DATA:  Left Ventricle Measurements: (Indexed to BSA)     Visualized LV EF%: <20%       ________________________________________________________________________________________  Electronically signed on 4/18/2024 at 8:40:29 PM by Cresencio Garcia         *** Final ***    < end of copied text >             HPI  73 year old male with PMH of CAD s/p late-presenting MI 2005 s/p PCI LAD, ICM/HFrEF (EF 15-20%, LVEDD 6 cm) s/p single chamber ICD with Estevan lead 2011 s/p lead extraction and upgrade to Medtronic dual-chamber ICD (8/17/21), has hx of appropriate ICD shock for VT/VF, s/p MEMS (9/16/21), bioprosthetic AVR (2015) 2/2 AI s/p 26 mm EVOLUT HECTOR for severe bioprosthetic AS (8/20/20), prior aflutter ablation 7/2017, persistent afib, s/p DCCV x2 (8/12/20, 7/7/21), Trigeminal neuralgia (4/2022) presented for elevated filling pressure on cardiomems. Pt denies any sob, palpitations, cp, he states that tried to dobule up on his torsemide but still with elevated read, advised to come to ED by Dr. Quiñones Pt was admitted to [facility] for similar finding in Jan and had ANDREY/DCCV. In the ED hypoxic requiring 3L NC and bp borderline.  (12 Apr 2024 22:59)  Discussed with Dr. De Leon - patient was offered an LVAD and refused.  Hospice consulted. He will continue with the Dobutamine infusion until time of discharge    PERTINENT PMH REVIEWED: Yes    PAST MEDICAL & SURGICAL HISTORY:  CAD (coronary artery disease)  2004      MI (myocardial infarction)  Nov 2004      Systolic heart failure  EF 15%      HLD (hyperlipidemia)      HTN (hypertension)      AICD (automatic cardioverter/defibrillator) present      Aortic stenosis  severe, s/p AVR      H/O emphysema  - moderately severe, CT chest 06/10/21      Carotid stenosis  - moderate, b/l (Doppler 12/27/21)      Ischemic cardiomyopathy      H/O pulmonary hypertension      Longstanding persistent atrial fibrillation      Type 2 diabetes mellitus      Osteoarthritis of right knee      Neuralgia      CRI (chronic renal insufficiency)      Stented coronary artery  LAD x 3 (Nov 2004)      S/P knee surgery  - Right knee arthroscopy, 2000      S/P hernia repair  - right inguinal, 1982      S/P AVR  2015, complicated by Asystole/Syncope with 1 shock, Transcatheter valve-in-valve aortic valve replacement utilizing a right common femoral arterial percutaneous approach utilizing a 26 Medtronic Evolut core valve on 08/20/2020      AICD (automatic cardioverter/defibrillator) present  single chamber 2005, replaced with dual chamber 08/17/2021      H/O prior ablation treatment  afib - 2017, 07/2021      SOCIAL HISTORY:                      Substance history: no hx                    Admitted from:  home                     Jehovah's witness/spirituality:  Quaker                    Cultural concerns:    Baseline ADLs (prior to admission):  Independent                        Surrogate/HCP/Guardian: wife Megan Bermudez    FAMILY HISTORY:  Chronic obstructive pulmonary disease    Family history of colon cancer (Father)        Allergies    No Known Allergies    Intolerances    Entresto (Other)      http://npcrc.org/files/[email or web address]    Present Symptoms:   Dyspnea:  No   Nausea/Vomiting:  No    Anxiety:   No  Depression: No  Fatigue: Yes   Loss of appetite:  N/A   Constipation:  Not reported     Pain:  No            Location            Character            Duration            Factors            Severity            Effect    Pain AD Score:  [email or web address].[email or web address]/painad.pdf (press ctrl + left click to view)    Review of Systems: Reviewed    All other ROS negative      MEDICATIONS  (STANDING):  aMIOdarone    Tablet 200 milliGRAM(s) Oral two times a day  apixaban 5 milliGRAM(s) Oral every 12 hours  aspirin enteric coated 81 milliGRAM(s) Oral daily  carBAMazepine ER Capsule 100 milliGRAM(s) Oral two times a day  DOBUTamine Infusion 5 MICROgram(s)/kG/Min (12.2 mL/Hr) IV Continuous <Continuous>  levothyroxine 88 MICROGram(s) Oral daily  polyethylene glycol 3350 17 Gram(s) Oral daily  pregabalin 300 milliGRAM(s) Oral two times a day  spironolactone 25 milliGRAM(s) Oral every 12 hours    MEDICATIONS  (PRN):  acetaminophen     Tablet .. 650 milliGRAM(s) Oral every 6 hours PRN Temp greater or equal to 38C (100.4F), Mild Pain (1 - 3)  aluminum hydroxide/magnesium hydroxide/simethicone Suspension 30 milliLiter(s) Oral every 4 hours PRN Dyspepsia  bisacodyl Suppository 10 milliGRAM(s) Rectal daily PRN Constipation  melatonin 3 milliGRAM(s) Oral at bedtime PRN Insomnia  ondansetron Injectable 4 milliGRAM(s) IV Push every 8 hours PRN Nausea and/or Vomiting      PHYSICAL EXAM:    Vital Signs Last 24 Hrs  T(C): 37.1 (19 Apr 2024 07:54), Max: 37.1 (19 Apr 2024 07:54)  T(F): 98.7 (19 Apr 2024 07:54), Max: 98.7 (19 Apr 2024 07:54)  HR: 78 (19 Apr 2024 12:00) (73 - 97)  BP: 93/56 (19 Apr 2024 12:00) (74/55 - 130/77)  BP(mean): 68 (19 Apr 2024 12:00) (57 - 94)  RR: 16 (19 Apr 2024 12:00) (16 - 22)  SpO2: 94% (19 Apr 2024 12:00) (93% - 97%)    Parameters below as of 19 Apr 2024 12:00  Patient On (Oxygen Delivery Method): nasal cannula  O2 Flow (L/min): 4    Karnofsky 40  %  General:  M awake alert NAD  HEENT: NCAT  non icteric  Lungs: comfortable  CV: RR  MSK: weakness  Neuro: AOx4 no focal deficits  Skin: warm dry  Psych:  calm, affect appropriate    LABS:                        12.9   9.30  )-----------( 191      ( 19 Apr 2024 06:00 )             39.4     04-19    132<L>  |  94<L>  |  36.2<H>  ----------------------------<  96  4.3   |  26.0  |  1.66<H>    Ca    8.4      19 Apr 2024 06:00  Mg     2.0     04-19    TPro  5.9<L>  /  Alb  3.4  /  TBili  3.0<H>  /  DBili  x   /  AST  71<H>  /  ALT  70<H>  /  AlkPhos  167<H>  04-19      Urinalysis Basic - ( 19 Apr 2024 06:00 )    Color: x / Appearance: x / SG: x / pH: x  Gluc: 96 mg/dL / Ketone: x  / Bili: x / Urobili: x   Blood: x / Protein: x / Nitrite: x   Leuk Esterase: x / RBC: x / WBC x   Sq Epi: x / Non Sq Epi: x / Bacteria: x      I&O's Summary    18 Apr 2024 07:01  -  19 Apr 2024 07:00  --------------------------------------------------------  IN: 1171.4 mL / OUT: 1250 mL / NET: -78.6 mL    19 Apr 2024 07:01  -  19 Apr 2024 13:08  --------------------------------------------------------  IN: 73.2 mL / OUT: 0 mL / NET: 73.2 mL        RADIOLOGY & ADDITIONAL STUDIES:  Imaging reviewed   < from: ANDREY W or WO Ultrasound Enhancing Agent (04.18.24 @ 16:02) >    TRANSESOPHAGEAL ECHOCARDIOGRAM REPORT  ________________________________________________________________________________                                      _______       Pt. Name:       DUDLEY BERMUDEZ Study Date:    4/18/2024  MRN: [id number]             YOB: 1950  Accession #:    [id number]            Age:           73 years  Account#:       [id number]           Gender:        M  Visit ID#  Heart Rate:     92 bpm               Height:        67.00 in (170.18 cm)  Rhythm:         atrial fibrillation  Weight:        179.00 lb (81.19 kg)  Blood Pressure: 135/95 mmHg          BSA/BMI:       1.93 m² / 28.04 kg/m²  ________________________________________________________________________________________  Referring Physician:    [id number] Matthieu De Leon  Interpreting Physician: Cresencio Garcia  Primary Sonographer:    MAURISIO    CPT:               ECHO TRANSESOPH W/O CON - 43532.m;DOPPLER ECHO COMP W SPECT -                     19938.m;DOPPL ECHO COLOR FLOW - 78238.m  Indication(s):     Paroxysmal atrial fibrillation - I48.0  Procedure:         Transesophageal echocardiogram performed with 2D, M-mode and                     complete spectral and color flow Doppler.  Ordering Location: [facility]  Admission Status:  Inpatient  Study Information: Image quality for this study is good.    _______________________________________________________________________________________     CONCLUSIONS:      1. Left ventricular systolic function is severely decreased with an ejection fraction visually estimated at <20 %.   2. Limited ANDREY for THONY screen for thrombus evaluation.   3. No evidence of left atrial or left atrial appendage thrombus.   4. The patient was successfully cardioverted to sinus rhythm by EP Dr. Lomax.   5. There were no complications from the cardioversion procedure.    ____________________________________________________________________  ANDREY Procedure:  After discussion of the risks and benefits of the ANDREY, an informed consent was obtained. Local oropharyngeal anesthetic was provided with viscous lidocaine and ceteicaine. Study was performed with conscious sedation. The adult Siemens NADREY probe was introduced and passed into the esophagus. The ANDREY probe was passed without difficulty. Images were obtained with the patient in a left lateral decubitus position. Image quality was good. The patient's vital signs; including heart rate, blood pressure, and oxygen saturation; remained stable throughout the procedure. The patient tolerated the procedure well and without complications.  ________________________________________________________________________________________  Cardioversion Procedure:  Written, informed consent to proceed with cardioversion was obtained prior to administering conscious sedation. After the transesophageal echocardiogram demonstrated no left atrial appendage thrombus, it was decided to proceed with cardioversion. The patient was preoxygenated with oxygen by face mask. The patient was monitored throughout the procedureby a nurse. The patient was shocked at 300 J on a biphasic defibrillator. The patient was successfully cardioverted to sinus rhythm. There were no complications from the cardioversion procedure. The attending physician was present for the entire procedure.     ________________________________________________________________________________________  FINDINGS:     Left Ventricle:  Left ventricular systolic function is severely decreased with an ejection fraction visually estimated at <20%.     LeftAtrium:  There is no evidence of left atrial or left atrial appendage thrombus. Ultrasound enhancing agent was not utilized to visualize the left atrial appendage.     Right Atrium:  Right atrial appendage is not well visualized.  ____________________________________________________________________  QUANTITATIVE DATA:  Left Ventricle Measurements: (Indexed to BSA)     Visualized LV EF%: <20%       ________________________________________________________________________________________  Electronically signed on 4/18/2024 at 8:40:29 PM by Cresencio Garcia         *** Final ***    < end of copied text >

## 2024-04-19 NOTE — PROGRESS NOTE ADULT - ASSESSMENT
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 Transferred to SDU for bumex ggt with fixed dose Dobutamine. Underwent RHC (x2) and DCCV. Trialled on Entresto per HF with labs indictaing multi end organ damage, likley in the setting of profound low flow output state. With mentation being normal, he has made the decision to decline any further intervention or evaluations. Wishes to go home with comfort measures. Now DNR/DNI     Acute on systolic chronic HF   Comfortable on RA  s/p Bumex ggt and Dobutamine ggt   Renal / hepatic dysunftcion suggestive of worsening cardiac output.   Wishes to go home with home hospice.   Hospice / Palliative consulted  Can continue Dobutamine till pt dsc   dc telemetry/ vitals  Resume Farxiga on dsc   Continue Spironolactone     Nausea with constipation  s/p bisacodyl spp   Declining enema for now   Continue bowel regimen   Rising Bili (t/ind)    FEDERICO  Low des output state, suspect isch ATN     Afib s/p ANDREY/DCCV on 1/17/24   s/p DCCV  Continue Amiodorone  Continue Eliquis     CAD s/p PCI   cont ASA,   dc Statin     Hypothyroidism   cont with synthroid      Trigeminal neurologia   cont carbamazepine     DVT ppx : DOAC     Dispo: acute. Pending EP and Cardio final recs

## 2024-04-19 NOTE — CONSULT NOTE ADULT - REASON FOR ADMISSION
acute on chronic HF

## 2024-04-19 NOTE — PROGRESS NOTE ADULT - PROVIDER SPECIALTY LIST ADULT
Electrophysiology
Internal Medicine
Electrophysiology
Electrophysiology
Hospitalist
Internal Medicine
Internal Medicine
Intervent Cardiology
Electrophysiology
Electrophysiology
Intervent Cardiology
Cardiology
Cardiology
Heart Failure
Heart Failure
Internal Medicine
Cardiology
Heart Failure
Heart Failure
Cardiology

## 2024-04-19 NOTE — PHYSICAL THERAPY INITIAL EVALUATION ADULT - ONSET DATE, REHAB EVAL
PATIENT INFORMATION     Eat organic fruits and vegetables.  Avoid processed food/fast food.  Avoid sugary drinks and desserts.  If possible, exercise is recommended 45 minutes per day.   For Mental Stress - Meditation is recommended 30 minutes per day. Website - www.[employer].org.  Avoid exposure to tobacco/marijuana.  Avoid excess caffeine or alcohol use.     Imaging Scheduling - Please call     Labs and Xrays: Basement   Hours - 7am to 5 pm M-F and until Noon on Saturday.    For fasting labs - Do not eat 8 hours prior to blood draw - ok to drink water and take nondiabetes medications.      Do not hesitate to call if you have any matter that concerns you.     The following preventive health measures are overdue for you --  please get these per your convenience.  Health Maintenance Due   Topic Date Due   • Pneumococcal Vaccine 0-64 (1 of 1 - PPSV23) 12/08/1993   • Varicella Vaccine (1 of 2 - 13+ 2-dose series) 12/08/2000   • DTaP/Tdap/Td Vaccine (6 - Tdap) 08/13/2003   • Influenza Vaccine (1) 09/01/2019         You may get a survey in the mail/online.   Please take time to fill it and give us your feedback on your experience with me.   Thanks for choosing me to provide your health care today.        
18-Apr-2024

## 2024-04-19 NOTE — CHART NOTE - NSCHARTNOTEFT_GEN_A_CORE
Pt now DNR/DNI per his wishes. Being transitioned to home hospice care. EP contacted for assistance with deactivation of ICD as per pts wishes (pt understands risks of acute sudden decompensation)

## 2024-04-20 PROCEDURE — 80076 HEPATIC FUNCTION PANEL: CPT

## 2024-04-20 PROCEDURE — 82247 BILIRUBIN TOTAL: CPT

## 2024-04-20 PROCEDURE — 36415 COLL VENOUS BLD VENIPUNCTURE: CPT

## 2024-04-20 PROCEDURE — 83735 ASSAY OF MAGNESIUM: CPT

## 2024-04-20 PROCEDURE — 93325 DOPPLER ECHO COLOR FLOW MAPG: CPT

## 2024-04-20 PROCEDURE — 84484 ASSAY OF TROPONIN QUANT: CPT

## 2024-04-20 PROCEDURE — 99285 EMERGENCY DEPT VISIT HI MDM: CPT | Mod: 25

## 2024-04-20 PROCEDURE — 82947 ASSAY GLUCOSE BLOOD QUANT: CPT

## 2024-04-20 PROCEDURE — 83880 ASSAY OF NATRIURETIC PEPTIDE: CPT

## 2024-04-20 PROCEDURE — P9047: CPT

## 2024-04-20 PROCEDURE — 82962 GLUCOSE BLOOD TEST: CPT

## 2024-04-20 PROCEDURE — 84443 ASSAY THYROID STIM HORMONE: CPT

## 2024-04-20 PROCEDURE — 93005 ELECTROCARDIOGRAM TRACING: CPT

## 2024-04-20 PROCEDURE — C1887: CPT

## 2024-04-20 PROCEDURE — C8929: CPT

## 2024-04-20 PROCEDURE — 84295 ASSAY OF SERUM SODIUM: CPT

## 2024-04-20 PROCEDURE — 82803 BLOOD GASES ANY COMBINATION: CPT

## 2024-04-20 PROCEDURE — 85027 COMPLETE CBC AUTOMATED: CPT

## 2024-04-20 PROCEDURE — 82330 ASSAY OF CALCIUM: CPT

## 2024-04-20 PROCEDURE — 0225U NFCT DS DNA&RNA 21 SARSCOV2: CPT

## 2024-04-20 PROCEDURE — 83605 ASSAY OF LACTIC ACID: CPT

## 2024-04-20 PROCEDURE — 71045 X-RAY EXAM CHEST 1 VIEW: CPT

## 2024-04-20 PROCEDURE — 93312 ECHO TRANSESOPHAGEAL: CPT

## 2024-04-20 PROCEDURE — 93320 DOPPLER ECHO COMPLETE: CPT

## 2024-04-20 PROCEDURE — 85025 COMPLETE CBC W/AUTO DIFF WBC: CPT

## 2024-04-20 PROCEDURE — C1769: CPT

## 2024-04-20 PROCEDURE — 84100 ASSAY OF PHOSPHORUS: CPT

## 2024-04-20 PROCEDURE — 93451 RIGHT HEART CATH: CPT

## 2024-04-20 PROCEDURE — 85014 HEMATOCRIT: CPT

## 2024-04-20 PROCEDURE — 80048 BASIC METABOLIC PNL TOTAL CA: CPT

## 2024-04-20 PROCEDURE — 80053 COMPREHEN METABOLIC PANEL: CPT

## 2024-04-20 PROCEDURE — C1894: CPT

## 2024-04-20 PROCEDURE — 85018 HEMOGLOBIN: CPT

## 2024-04-20 PROCEDURE — 82248 BILIRUBIN DIRECT: CPT

## 2024-04-20 PROCEDURE — 84132 ASSAY OF SERUM POTASSIUM: CPT

## 2024-04-20 PROCEDURE — 76700 US EXAM ABDOM COMPLETE: CPT

## 2024-04-20 PROCEDURE — 82435 ASSAY OF BLOOD CHLORIDE: CPT

## 2024-04-20 RX ORDER — BUMETANIDE 0.25 MG/ML
1 INJECTION INTRAMUSCULAR; INTRAVENOUS
Qty: 14 | Refills: 0
Start: 2024-04-20 | End: 2024-04-26

## 2024-04-20 RX ADMIN — Medication 300 MILLIGRAM(S): at 05:20

## 2024-04-20 RX ADMIN — Medication 100 MILLIGRAM(S): at 05:20

## 2024-04-20 RX ADMIN — SPIRONOLACTONE 25 MILLIGRAM(S): 25 TABLET, FILM COATED ORAL at 05:20

## 2024-04-20 RX ADMIN — Medication 88 MICROGRAM(S): at 05:21

## 2024-04-20 RX ADMIN — AMIODARONE HYDROCHLORIDE 200 MILLIGRAM(S): 400 TABLET ORAL at 05:29

## 2024-04-20 RX ADMIN — APIXABAN 5 MILLIGRAM(S): 2.5 TABLET, FILM COATED ORAL at 05:22

## 2024-04-23 ENCOUNTER — NON-APPOINTMENT (OUTPATIENT)
Age: 74
End: 2024-04-23

## 2024-04-24 PROBLEM — I48.19 PERSISTENT ATRIAL FIBRILLATION: Status: ACTIVE | Noted: 2020-06-19

## 2024-04-24 PROBLEM — R06.02 SHORTNESS OF BREATH ON EXERTION: Status: ACTIVE | Noted: 2020-06-02

## 2024-04-24 PROBLEM — Z95.810 CARDIAC DEFIBRILLATOR IN PLACE: Status: ACTIVE | Noted: 2018-09-02

## 2024-04-24 PROBLEM — I50.9 ACC/AHA STAGE C CONGESTIVE HEART FAILURE: Status: ACTIVE | Noted: 2024-02-14

## 2024-04-24 PROBLEM — Z95.3 S/P TAVR (TRANSCATHETER AORTIC VALVE REPLACEMENT), BIOPROSTHETIC: Status: ACTIVE | Noted: 2020-08-24

## 2024-04-24 NOTE — PHYSICAL EXAM
[No Acute Distress] : no acute distress [Normal Conjunctiva] : normal conjunctiva [Normal Venous Pressure] : normal venous pressure [Normal S1, S2] : normal S1, S2 [No Rub] : no rub [Clear Lung Fields] : clear lung fields [Non Tender] : non-tender [No Oral Pallor] : no oral pallor [Normal Jugular Venous V Waves Present] : normal jugular venous V waves present [Arterial Pulses Normal] : the arterial pulses were normal [Impaired Insight] : insight and judgment were intact [No Edema] : no edema [FreeTextEntry1] : Normal prosthetic valve sound; trace bilateral edema

## 2024-04-24 NOTE — REVIEW OF SYSTEMS
[Feeling Fatigued] : feeling fatigued [Dyspnea on exertion] : dyspnea during exertion [Dizziness] : dizziness [Fever] : no fever [Sore Throat] : no sore throat [SOB] : no shortness of breath [Chest Discomfort] : no chest discomfort [Palpitations] : no palpitations [Cough] : no cough [Abdominal Pain] : no abdominal pain [Rash] : no rash [Easy Bleeding] : no tendency for easy bleeding

## 2024-04-24 NOTE — DISCUSSION/SUMMARY
[FreeTextEntry1] : ICD interrogation Showed that she has been in persistent A-fib.  He is ventricular paced 87%.  No VT episode.  We discussed options including AF ablation.  Patient does not want this option at this time.  Discussed the option of other antiarrhythmics given potential amiodarone failure.  His creatinine has been elevated anticus and may not be a good option.  Discussed upgrade of his device to CRT or left bundle pacing.  Patient will consider this option.  He had previous problems with his subclavian and axis may be a difficult issue.  He will follow-up with heart failure regarding further management.  The patient does not want to have mechanical support treatment. Chronic systolic heart failure with NYHA class II-III  Ischemic cardiomyopathy with EF <20%. Previous persistent atrial fibrillation with severe left atrial enlargement .

## 2024-04-24 NOTE — HISTORY OF PRESENT ILLNESS
[FreeTextEntry1] : Chava is a 73-year-old man who is seen in follow-up evaluation regarding his atrial fibrillation.  He was accompanied by his spouse.  He has been having more symptoms of fatigue and shortness of breath.  We have previously discussed treatment options for his A-fib.  He was on amiodarone and had recurrent A-fib.  We discussed the catheter ablation procedure including the success rate and potential complications given the fact that he has severe left ventricular dysfunction and acute issues with his heart failure.    He was recently Electively admitted to [facility] under the care of heart failure as well as electrophysiology.  I discussed the case with them.  Initially we discussed a possible upgrade to a biventricular device but because of his previous anatomy and his AV delay was able to increased prevent him to decrease his RV pacing it was decided not to pursue an LV upgrade. The AF ablation procedure was also discussed.  It was elected to start on amiodarone and he underwent a cardioversion.  He was discharged home not clear whether he had had any significant improvement in his symptoms. He presented to [facility] approximate 2 days later after falling secondary to dizziness/lightheadedness.  He mistakenly took extra doses of diuretic and was noted to have very low blood pressure. His device was interrogated while at [facility] emergency room and he was still noted to be in sinus rhythm with atrial pacing.No ventricular tachycardia was noted.   In follow-up today the patient feels some increase in energy and less shortness of breath but he still complains of dizziness lightheadedness and fatigue. He does not feel a difference between being in sinus rhythm now versus when he was in A-fib. Previous history:  He was scheduled for AF ablation but unable procedure he was noted to be in acute kidney injury with creatinine of 2.5.  The procedure was canceled.  He was admitted to [facility] for the management of his acute heart failure and renal insufficiency.  The patient improved rapidly and was discharged home in stable condition with follow-up today to reassess his A-fib and decision regarding ablation.   Echo 10/25/23: EF 20-25%; Severely dilated LA with mild MR> ALY 60 cc/m2 Patient had an echocardiogram performed on 5/26/2023 that showed EF 20 to 25%, mild mitral regurgitation, mild left atrial enlargement with left atrial volume index 36.7 cc per metered square.  Previous history: S/P  TAV in [facility].  He has had prior  cardioversion for A. fib.  Prior  history of myocardial infarction 2005 status post previous PCI to the LAD,  dilated cardiomyopathy ejection fraction 15 to 20%, s/p Medtronic single-chamber ICD implant 2005, status post appropriate shocks for VT VF 2015.  And prior atrial flutter ablation in 2017.  He underwent extraction of Medtronic [place] lead October 2018.  Previous bioprosthetic aortic valve replacement with subsequent premature failure noted on ANDREY.he underwent upgrade of his single-chamber ICD to a dual-chamber ICD.  He has a CardioMEMS device.Patient was previously on amiodarone and this was discontinued because of tremors.  The tremors had resolved after discontinuation. After upgrade to dual-chamber device the patient has felt well and had remained in sinus rhythm.    Echocardiogram from 4/Lasix 22 showed EF 20%.  He has mild mitral regurgitation.  He had a low mean transaortic valve gradient 8 mmHg.  Patient has severe left atrial enlargement with left atrial volume index 54 cc per metered square. Previous history:    Cardiac catheterization performed 6/4/2020 showed nonobstructive coronary arteries. Echocardiogram from 6/3/2020 showed severely reduced ejection fraction 15 to 20%, dilated LV diameter 6.5 cm, moderate to severe diastolic dysfunction  Device: Medtronic cobalt XT DR dual-chamber remaining longevity 10.8 years.  Programmed lower rate 60 upper rate 130 with VF as well as VT zone.  Paced and sensed AV delay set long at 350 ms.  Rate sensor was programmed on.

## 2024-04-26 ENCOUNTER — APPOINTMENT (OUTPATIENT)
Dept: HEART FAILURE | Facility: CLINIC | Age: 74
End: 2024-04-26

## 2024-04-29 ENCOUNTER — APPOINTMENT (OUTPATIENT)
Dept: CARDIOLOGY | Facility: CLINIC | Age: 74
End: 2024-04-29

## 2024-04-30 ENCOUNTER — APPOINTMENT (OUTPATIENT)
Dept: CARDIOLOGY | Facility: CLINIC | Age: 74
End: 2024-04-30

## 2024-05-17 NOTE — REVIEW OF SYSTEMS
[FreeTextEntry5] : per HPI [FreeTextEntry1] : 10 point review of systems was negative except for that listed in the HPI. declines

## 2024-06-04 ENCOUNTER — APPOINTMENT (OUTPATIENT)
Dept: CARDIOLOGY | Facility: CLINIC | Age: 74
End: 2024-06-04

## 2024-06-10 NOTE — ED ADULT TRIAGE NOTE - ESI TRIAGE ACUITY LEVEL, MLM
Please get labs checked. I will message you with results via portal.     We will then consider whether to start a small dose of levothyroxine supplement.    Follow up pending results.     Watch for symptoms of high or low thyroid hormone levels. Call if you experience these, so we can recheck your levels.  - Symptoms of high thyroid hormone levels (hyperthyroidism): feeling hot for no reason, sweating, oily skin, heart racing, restlessness,  feeling like you can't catch your breath, diarrhea, increased appetite, and weight loss.  - Symptoms of low thyroid hormone levels (hypothyroidism): feeling cold for no reason, dry skin, brittle hair, feeling tired that does not get better with sleep, constipation, poor appetite, weight gain, swelling of legs.  - In either case, let us know if you notice a swelling at the front of your neck and/or difficulty swallowing.   4

## 2024-07-31 NOTE — PROGRESS NOTE ADULT - NS_MD_PANP_GEN_ALL_CORE

## 2024-08-05 NOTE — CONSULT NOTE ADULT - PROBLEM SELECTOR PROBLEM 2
on recent bloodwork  - recommend fasting lipid panel in 3 months  - if in 3 months patient has LDL>190 recommend initiating atorvastatin 20mg daily  - Follow up with nutrition for weight loss and dietary counseling pending   Longstanding persistent atrial fibrillation
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
## 2024-10-21 NOTE — PROGRESS NOTE ADULT - TIME BILLING
- Generation of cardiovascular treatment plan.  - Coordination of care with primary team. EKG completed

## 2024-12-10 NOTE — DIETITIAN INITIAL EVALUATION ADULT - ENTER TO (G/KG)
Chronic   - continue home atenolol and diltiazem with hold parameters  - AC with Eliquis 5mg BID 1.2

## 2025-04-02 NOTE — H&P CARDIOLOGY - CIGARETTE, PACK YRS
History of COPD and CHF, baseline O2 needs of 4 L via nasal cannula  Uses trilogy NIV at night and is on prednisone 10 mg daily chronically  Admitted to ICU for IV diuresis and respiratory support with BiPAP, steroids, nebulizers,  improved and tansferred to floor on 3/22  Most likely due to severe COPD  Pulmonology following  Pulmonary following, continues to need intermittent BiPAP versus high flow nasal cannula due to dyspnea   Continue to encourage to work with PT  Patient and his wife are now open to [facility] or [facility]; referral requested by case management, still full code, and not open to hospice   60

## 2025-07-05 NOTE — H&P PST ADULT - OCCUPATION
Pharmacokinetic Assessment Follow Up: IV Vancomycin    Vancomycin serum concentration assessment(s):    The random level was drawn correctly and can be used to guide therapy at this time. The measurement is below the desired definitive target range of 15 to 20 mcg/mL.    Vancomycin Regimen Plan:    Re-dose when the random level is less than 20 mcg/mL, next level to be drawn at 1200 on 7/05/2025    Drug levels (last 3 results):  Recent Labs   Lab Result Units 07/04/25  2141   Vancomycin Random ug/ml 11.3       Pharmacy will continue to follow and monitor vancomycin.    Please contact pharmacy at extension [phone] for questions regarding this assessment.    Thank you for the consult,   Geoffrey Zamudio       Patient brief summary:  Lisa Moreno is a 44 y.o. male initiated on antimicrobial therapy with IV Vancomycin for treatment of pneumonia        Drug Allergies:   Review of patient's allergies indicates:   Allergen Reactions    Penicillins      Hives and Itching  Tolerated zosyn- 7/1/2025    Allergen ext-venom-honey bee        Actual Body Weight:   59.9kg    Renal Function:   Estimated Creatinine Clearance: 131.7 mL/min (based on SCr of 0.6 mg/dL).,     Dialysis Method (if applicable):  N/A    CBC (last 72 hours):  Recent Labs   Lab Result Units 07/02/25  0459 07/03/25  0326 07/04/25  0311   WBC K/uL 5.25 4.99 6.31   HGB gm/dL 9.5* 9.0* 9.2*   HCT % 31.7* 29.4* 30.1*   Platelet Count K/uL 259 209 187   Lymph % % 15.4* 19.8 18.9   Mono % % 3.6* 7.0 8.4   Eos % % 0.6 1.8 1.9   Basophil % % 0.4 0.4 0.2       Metabolic Panel (last 72 hours):  Recent Labs   Lab Result Units 07/02/25  0459 07/02/25  0726 07/03/25  0326 07/04/25  0311   Sodium mmol/L 141 142 140 137   Potassium mmol/L 4.5 4.4 4.1 4.4   Chloride mmol/L 110 110 107 104   CO2 mmol/L 23 25 25 27   Glucose mg/dL 62* 74 83 95   BUN mg/dL 37* 37* 32* 24*   Creatinine mg/dL 0.9 1.0 0.7 0.6   Albumin g/dL 1.7* 1.6* 1.6* 1.6*   Bilirubin Total mg/dL 0.2 0.2 0.2  0.2   ALP unit/L 64 61 56 52   AST unit/L 19 19 17 16   ALT unit/L 16 17 12 13   Magnesium  mg/dL 1.9  --  2.0 1.8   Phosphorus Level mg/dL 4.2  --  3.0 2.3*       Vancomycin Administrations:  vancomycin given in the last 96 hours                     vancomycin 750 mg in 0.9% NaCl 250 mL IVPB (admixture device) (mg) 750 mg New Bag 07/05/25 0041    vancomycin 1,250 mg in 0.9% NaCl 250 mL IVPB (admixture device) (mg) 1,250 mg New Bag 07/04/25 0939                    Microbiologic Results:  Microbiology Results (last 7 days)       Procedure Component Value Units Date/Time    Blood culture x two cultures. Draw prior to antibiotics. [[id number]]  (Normal) Collected: 06/29/25 1805    Order Status: Completed Specimen: Blood from Peripheral, Antecubital, Left Updated: 07/04/25 2300     Blood Culture No Growth After 5 Days    Blood culture x two cultures. Draw prior to antibiotics. [[id number]]  (Normal) Collected: 06/29/25 1814    Order Status: Completed Specimen: Blood from Peripheral, Hand, Left Updated: 07/04/25 2300     Blood Culture No Growth After 5 Days    Culture, Respiratory with Gram Stain [[id number]] Collected: 07/04/25 0833    Order Status: Completed Specimen: Respiratory from Sputum, Induced Updated: 07/04/25 1511     GRAM STAIN >10 Epithelial Cells/LPF      Many WBC seen      Rare Yeast      Many Gram Negative Rods      Many Gram positive cocci    MRSA Screen by PCR [[id number]]  (Normal) Collected: 06/30/25 1144    Order Status: Completed Specimen: Nasal Swab Updated: 06/30/25 1442     MRSA PCR Screen Not Detected           retired [employer] Technician

## (undated) DEVICE — SYR LUER LOK 20CC

## (undated) DEVICE — ELCTR BOVIE PENCIL SMOKE EVACUATION

## (undated) DEVICE — SPECIMEN CONTAINER 100ML

## (undated) DEVICE — VENODYNE/SCD SLEEVE CALF LARGE

## (undated) DEVICE — DRAPE SURGICAL #1010

## (undated) DEVICE — MAKO VIZADISC KNEE TRACKING KIT

## (undated) DEVICE — SUT VICRYL 1 27" CPX UNDYED

## (undated) DEVICE — DRSG STOCKINETTE IMPERVIOUS XL

## (undated) DEVICE — SUT PDO 2 1/2 CIRCLE 40MM NDL 45CM

## (undated) DEVICE — BLADE SCALPEL SAFETYLOCK #20

## (undated) DEVICE — SOL IRR POUR NS 0.9% 500ML

## (undated) DEVICE — WARMING BLANKET UPPER ADULT

## (undated) DEVICE — GLV 8.5 PROTEXIS (WHITE)

## (undated) DEVICE — SAW BLADE STRYKER SAGITTAL EXTRA WIDE THIN SHORT

## (undated) DEVICE — SUT QUILL MONODERM 0 1/2 CIRCLE TAPR 45CM 26MM

## (undated) DEVICE — SOL INJ NS 0.9% 1000ML

## (undated) DEVICE — SLING SHOULDER IMMOBILIZER CLINIC LARGE

## (undated) DEVICE — GLV 8 PROTEXIS (WHITE)

## (undated) DEVICE — MAKO BLADE STANDARD

## (undated) DEVICE — DRSG DERMABOND 0.7ML

## (undated) DEVICE — MAKO BLADE NARROW

## (undated) DEVICE — HOOD FLYTE STRYKER HELMET SHIELD

## (undated) DEVICE — DRAPE MAGNETIC INSTRUMENT MEDIUM

## (undated) DEVICE — STRYKER INTERPULSE HANDPIECE W IRR SUCTION TUBE

## (undated) DEVICE — DRAPE IOBAN 23" X 23"

## (undated) DEVICE — BAG DECANTER IV STERILE

## (undated) DEVICE — NDL HYPO SAFE 22G X 1.5" (BLACK)

## (undated) DEVICE — SUT STRATAFIX SYMMETRIC PDS PLUS 1 18" CTX VIOLET

## (undated) DEVICE — SUT MONOCRYL 2-0 27" SH UNDYED

## (undated) DEVICE — TUBING TUR 2 PRONG

## (undated) DEVICE — MAKO CHECKPOINT KIT FEMORAL / TIBIAL

## (undated) DEVICE — SOL IRR POUR H2O 250ML

## (undated) DEVICE — POSITIONER FOAM EGG CRATE ULNAR 2PCS (PINK)

## (undated) DEVICE — PACK MIS KNEE (1 PIECE)

## (undated) DEVICE — SOL BETADINE POUCH 0.75OZ STERILE

## (undated) DEVICE — TOURNIQUET CUFF 34" DUAL PORT W PLC

## (undated) DEVICE — HOOD FLYTE STRYKER SURGICOOL W PEELAWAY

## (undated) DEVICE — GLV 7.5 PROTEXIS (WHITE)

## (undated) DEVICE — MAKO DRAPE KIT

## (undated) DEVICE — FRAZIER SUCTION TIP 18FR

## (undated) DEVICE — SOL INJ NS 0.9% 500ML 1-PORT

## (undated) DEVICE — SUT QUILL MONODERM 2-0 3/8 CIRCLE 45CM

## (undated) DEVICE — DRSG AQUACEL 3.5 X 12"